# Patient Record
Sex: MALE | Race: ASIAN | NOT HISPANIC OR LATINO | Employment: UNEMPLOYED | URBAN - METROPOLITAN AREA
[De-identification: names, ages, dates, MRNs, and addresses within clinical notes are randomized per-mention and may not be internally consistent; named-entity substitution may affect disease eponyms.]

---

## 2018-03-27 ENCOUNTER — OFFICE VISIT (OUTPATIENT)
Dept: URGENT CARE | Facility: CLINIC | Age: 33
End: 2018-03-27
Payer: COMMERCIAL

## 2018-03-27 ENCOUNTER — HOSPITAL ENCOUNTER (OUTPATIENT)
Dept: RADIOLOGY | Facility: MEDICAL CENTER | Age: 33
End: 2018-03-27
Attending: FAMILY MEDICINE
Payer: COMMERCIAL

## 2018-03-27 VITALS
HEIGHT: 63 IN | TEMPERATURE: 98.5 F | RESPIRATION RATE: 16 BRPM | DIASTOLIC BLOOD PRESSURE: 80 MMHG | BODY MASS INDEX: 38.98 KG/M2 | OXYGEN SATURATION: 96 % | HEART RATE: 95 BPM | WEIGHT: 220 LBS | SYSTOLIC BLOOD PRESSURE: 114 MMHG

## 2018-03-27 DIAGNOSIS — M79.672 PAIN IN BOTH FEET: ICD-10-CM

## 2018-03-27 DIAGNOSIS — S92.515A CLOSED NONDISPLACED FRACTURE OF PROXIMAL PHALANX OF LESSER TOE OF LEFT FOOT, INITIAL ENCOUNTER: ICD-10-CM

## 2018-03-27 DIAGNOSIS — M79.671 PAIN IN BOTH FEET: ICD-10-CM

## 2018-03-27 PROCEDURE — 73610 X-RAY EXAM OF ANKLE: CPT | Mod: RT

## 2018-03-27 PROCEDURE — 99203 OFFICE O/P NEW LOW 30 MIN: CPT | Performed by: FAMILY MEDICINE

## 2018-03-27 PROCEDURE — 73660 X-RAY EXAM OF TOE(S): CPT | Mod: LT

## 2018-03-27 RX ORDER — IBUPROFEN 200 MG
200 TABLET ORAL EVERY 6 HOURS PRN
COMMUNITY
End: 2021-12-26

## 2018-03-27 ASSESSMENT — ENCOUNTER SYMPTOMS
INABILITY TO BEAR WEIGHT: 0
NUMBNESS: 0
LOSS OF SENSATION: 0
TINGLING: 0
LOSS OF MOTION: 1
MUSCLE WEAKNESS: 0

## 2018-03-27 NOTE — PROGRESS NOTES
"Subjective:     Alberto Maldonado is a 32 y.o. male who presents for Ankle Pain (x 1 day (R) ankle and (L) pinky toe swollen)       Ankle Pain    The incident occurred 12 to 24 hours ago. The incident occurred at home. The injury mechanism is unknown. The pain is present in the left ankle and right toes. The pain is moderate. The pain has been constant since onset. Associated symptoms include a loss of motion. Pertinent negatives include no inability to bear weight, loss of sensation, muscle weakness, numbness or tingling. He reports no foreign bodies present. Nothing aggravates the symptoms. He has tried nothing for the symptoms.     Review of Systems   Neurological: Negative for tingling and numbness.     No Known Allergies   Objective:   /80   Pulse 95   Temp 36.9 °C (98.5 °F)   Resp 16   Ht 1.6 m (5' 3\")   Wt 99.8 kg (220 lb)   SpO2 96%   BMI 38.97 kg/m²   Physical Exam   Constitutional: He is oriented to person, place, and time. He appears well-developed and well-nourished. No distress.   HENT:   Head: Normocephalic and atraumatic.   Eyes: Conjunctivae are normal. Pupils are equal, round, and reactive to light.   Cardiovascular: Normal rate and regular rhythm.    Pulmonary/Chest: Effort normal and breath sounds normal.   Musculoskeletal:        Feet:    Neurological: He is alert and oriented to person, place, and time.   Skin: Skin is warm and dry.   Psychiatric: He has a normal mood and affect. Thought content normal.   Vitals reviewed.       Assessment/Plan:   Assessment    1. Closed nondisplaced fracture of proximal phalanx of lesser toe of left foot, initial encounter  - DX-TOE(S) 2+ LEFT; Future  - DX-ANKLE 3+ VIEWS RIGHT; Future  Differential diagnosis, natural history, supportive care, and indications for immediate follow-up discussed.  Use over-the-counter pain reliever, such as acetaminophen (Tylenol), ibuprofen (Advil, Motrin) or naproxen (Aleve) as needed; follow package directions " for dosing.

## 2018-03-27 NOTE — LETTER
March 27, 2018         Patient: Alberto Maldonado   YOB: 1985   Date of Visit: 3/27/2018           To Whom it May Concern:    Alberto Maldonado was seen in my clinic on 3/27/2018..    If you have any questions or concerns, please don't hesitate to call.        Sincerely,           Shawn Hinkle M.D.  Electronically Signed

## 2018-03-27 NOTE — PATIENT INSTRUCTIONS
Ankle Pain  Many things can cause ankle pain, including an injury to the area and overuse of the ankle. The ankle joint holds your body weight and allows you to move around. Ankle pain can occur on either side or the back of one ankle or both ankles. Ankle pain may be sharp and burning or dull and aching. There may be tenderness, stiffness, redness, or warmth around the ankle.  Follow these instructions at home:  Activity  · Rest your ankle as told by your health care provider. Avoid any activities that cause ankle pain.  · Do exercises as told by your health care provider.  · Ask your health care provider if you can drive.  Using a brace, a bandage, or crutches  · If you were given a brace:  ¨ Wear it as told by your health care provider.  ¨ Remove it when you take a bath or a shower.  ¨ Try not to move your ankle very much, but wiggle your toes from time to time. This helps to prevent swelling.  · If you were given an elastic bandage:  ¨ Remove it when you take a bath or a shower.  ¨ Try not to move your ankle very much, but wiggle your toes from time to time. This helps to prevent swelling.  ¨ Adjust the bandage to make it more comfortable if it feels too tight.  ¨ Loosen the bandage if you have numbness or tingling in your foot or if your foot turns cold and blue.  · If you have crutches, use them as told by your health care provider. Continue to use them until you can walk without feeling pain in your ankle.  Managing pain, stiffness, and swelling  · Raise (elevate) your ankle above the level of your heart while you are sitting or lying down.  · If directed, apply ice to the area:  ¨ Put ice in a plastic bag.  ¨ Place a towel between your skin and the bag.  ¨ Leave the ice on for 20 minutes, 2-3 times per day.  General instructions  · Keep all follow-up visits as told by your health care provider. This is important.  · Record this information that may be helpful for you and your health care provider:  ¨ How  often you have ankle pain.  ¨ Where the pain is located.  ¨ What the pain feels like.  · Take over-the-counter and prescription medicines only as told by your health care provider.  Contact a health care provider if:  · Your pain gets worse.  · Your pain is not relieved with medicines.  · You have a fever or chills.  · You are having more trouble with walking.  · You have new symptoms.  Get help right away if:  · Your foot, leg, toes, or ankle tingles or becomes numb.  · Your foot, leg, toes, or ankle becomes swollen.  · Your foot, leg, toes, or ankle turns pale or blue.  This information is not intended to replace advice given to you by your health care provider. Make sure you discuss any questions you have with your health care provider.  Document Released: 06/07/2011 Document Revised: 08/18/2017 Document Reviewed: 07/19/2016  [employer] Interactive Patient Education © 2017 [employer]

## 2019-08-08 ENCOUNTER — HOSPITAL ENCOUNTER (EMERGENCY)
Facility: MEDICAL CENTER | Age: 34
End: 2019-08-08
Attending: EMERGENCY MEDICINE
Payer: COMMERCIAL

## 2019-08-08 VITALS
WEIGHT: 231.48 LBS | TEMPERATURE: 99 F | HEART RATE: 100 BPM | BODY MASS INDEX: 41.02 KG/M2 | OXYGEN SATURATION: 96 % | SYSTOLIC BLOOD PRESSURE: 174 MMHG | DIASTOLIC BLOOD PRESSURE: 89 MMHG | HEIGHT: 63 IN | RESPIRATION RATE: 16 BRPM

## 2019-08-08 DIAGNOSIS — S00.83XA CONTUSION OF FACE, INITIAL ENCOUNTER: ICD-10-CM

## 2019-08-08 PROCEDURE — 99283 EMERGENCY DEPT VISIT LOW MDM: CPT
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
## 2019-08-08 NOTE — ED NOTES
Released with all follow-up and work note at pt request. To POV verbalized understanding all follow-up

## 2020-12-12 ENCOUNTER — OFFICE VISIT (OUTPATIENT)
Dept: URGENT CARE | Facility: CLINIC | Age: 35
End: 2020-12-12
Payer: MEDICAID

## 2020-12-12 VITALS
WEIGHT: 215 LBS | HEART RATE: 110 BPM | OXYGEN SATURATION: 92 % | DIASTOLIC BLOOD PRESSURE: 72 MMHG | RESPIRATION RATE: 16 BRPM | SYSTOLIC BLOOD PRESSURE: 140 MMHG | BODY MASS INDEX: 38.09 KG/M2 | TEMPERATURE: 97.5 F | HEIGHT: 63 IN

## 2020-12-12 DIAGNOSIS — M10.9 ACUTE GOUT INVOLVING TOE OF LEFT FOOT, UNSPECIFIED CAUSE: ICD-10-CM

## 2020-12-12 PROCEDURE — 99214 OFFICE O/P EST MOD 30 MIN: CPT | Mod: 25 | Performed by: PHYSICIAN ASSISTANT

## 2020-12-12 RX ORDER — INDOMETHACIN 50 MG/1
50 CAPSULE ORAL 3 TIMES DAILY
Qty: 21 CAP | Refills: 0 | Status: SHIPPED | OUTPATIENT
Start: 2020-12-12 | End: 2020-12-19

## 2020-12-12 RX ORDER — METHYLPREDNISOLONE SODIUM SUCCINATE 125 MG/2ML
125 INJECTION, POWDER, LYOPHILIZED, FOR SOLUTION INTRAMUSCULAR; INTRAVENOUS ONCE
Status: COMPLETED | OUTPATIENT
Start: 2020-12-12 | End: 2020-12-12

## 2020-12-12 RX ADMIN — METHYLPREDNISOLONE SODIUM SUCCINATE 125 MG: 125 INJECTION, POWDER, LYOPHILIZED, FOR SOLUTION INTRAMUSCULAR; INTRAVENOUS at 11:14

## 2020-12-12 ASSESSMENT — ENCOUNTER SYMPTOMS
CLAUDICATION: 0
LOSS OF CONSCIOUSNESS: 0
ORTHOPNEA: 0
SHORTNESS OF BREATH: 0
SPEECH CHANGE: 0
NAUSEA: 0
COUGH: 0
SEIZURES: 0
FEVER: 0
VOMITING: 0
CHILLS: 0
TINGLING: 0
ABDOMINAL PAIN: 0
SENSORY CHANGE: 0
PALPITATIONS: 0
WEAKNESS: 0
DIZZINESS: 0
DOUBLE VISION: 0
TREMORS: 0
BLURRED VISION: 0
HEADACHES: 0
FOCAL WEAKNESS: 0
DIARRHEA: 0

## 2020-12-12 NOTE — LETTER
December 12, 2020         Patient: Alberto Maldonado   YOB: 1985   Date of Visit: 12/12/2020           To Whom it May Concern:    Alberto Maldonado was seen in my clinic on 12/12/2020.   Please excuse him from work 12/14-12/16/2020.  If you have any questions or concerns, please don't hesitate to call.        Sincerely,           Mukesh Joseph P.A.-C.  Electronically Signed

## 2020-12-13 NOTE — PROGRESS NOTES
"Subjective:   Alberto Maldonado is a 35 y.o. male who presents for Gout (on l leg swelled up )      Foot Swelling  This is a new problem. The current episode started in the past 7 days. The problem occurs constantly. Pertinent negatives include no abdominal pain, chest pain, chills, coughing, fever, headaches, nausea, rash, vomiting or weakness. Nothing aggravates the symptoms. He has tried nothing for the symptoms.       Review of Systems   Constitutional: Negative for chills and fever.   Eyes: Negative for blurred vision and double vision.   Respiratory: Negative for cough and shortness of breath.    Cardiovascular: Negative for chest pain, palpitations, orthopnea, claudication and leg swelling.   Gastrointestinal: Negative for abdominal pain, diarrhea, nausea and vomiting.   Musculoskeletal:        Left big toe pain   Skin: Negative for rash.   Neurological: Negative for dizziness, tingling, tremors, sensory change, speech change, focal weakness, seizures, loss of consciousness, weakness and headaches.   All other systems reviewed and are negative.      Medications:    • ibuprofen Tabs  • indomethacin Caps    Allergies: Patient has no known allergies.    Problem List: Alberto Maldonado does not have a problem list on file.    Surgical History:  No past surgical history on file.    Past Social Hx: Alberto Maldonado  reports that he has never smoked. He has never used smokeless tobacco. He reports that he does not drink alcohol or use drugs.     Past Family Hx:  Alberto Maldonado family history is not on file.     Problem list, medications, and allergies reviewed by myself today in Epic.     Objective:     Blood Pressure 140/72   Pulse (Abnormal) 110   Temperature 36.4 °C (97.5 °F) (Temporal)   Respiration 16   Height 1.6 m (5' 3\")   Weight 97.5 kg (215 lb)   Oxygen Saturation 92%   Body Mass Index 38.09 kg/m²     Physical Exam  Vitals signs reviewed.   Constitutional:       General: " He is not in acute distress.     Appearance: He is well-developed. He is not ill-appearing, toxic-appearing or diaphoretic.   HENT:      Head: Normocephalic and atraumatic.      Right Ear: External ear normal.      Left Ear: External ear normal.   Eyes:      Conjunctiva/sclera: Conjunctivae normal.   Neck:      Musculoskeletal: Normal range of motion and neck supple.   Cardiovascular:      Rate and Rhythm: Normal rate and regular rhythm.      Pulses: Normal pulses.      Heart sounds: Normal heart sounds.   Pulmonary:      Effort: Pulmonary effort is normal.      Breath sounds: Normal breath sounds.   Musculoskeletal:         General: Tenderness present. No deformity.      Comments: Swelling and erythema of the left MTP joint   Skin:     General: Skin is warm and dry.   Neurological:      Mental Status: He is alert and oriented to person, place, and time.      Motor: No abnormal muscle tone.      Coordination: Coordination normal.      Deep Tendon Reflexes: Reflexes are normal and symmetric. Reflexes normal.   Psychiatric:         Behavior: Behavior normal.         Thought Content: Thought content normal.         Judgment: Judgment normal.         Assessment/Plan:     Medical Decision Making/Comments     -recurrent gout attack   Diagnosis and associated orders     1. Acute gout involving toe of left foot, unspecified cause  indomethacin (INDOCIN) 50 MG Cap    methylPREDNISolone sod succ (SOLU-MEDROL) 125 MG injection 125 mg              Differential diagnosis, natural history, supportive care, and indications for immediate follow-up discussed.    Advised the patient to follow-up with the primary care physician for recheck, reevaluation, and consideration of further management.    Please note that this dictation was created using voice recognition software. I have made a reasonable attempt to correct obvious errors, but I expect that there are errors of grammar and possibly content that I did not discover before  finalizing the note.

## 2021-03-21 ENCOUNTER — OCCUPATIONAL MEDICINE (OUTPATIENT)
Dept: URGENT CARE | Facility: CLINIC | Age: 36
End: 2021-03-21
Payer: OTHER MISCELLANEOUS

## 2021-03-21 ENCOUNTER — APPOINTMENT (OUTPATIENT)
Dept: RADIOLOGY | Facility: IMAGING CENTER | Age: 36
End: 2021-03-21
Attending: NURSE PRACTITIONER
Payer: COMMERCIAL

## 2021-03-21 VITALS
WEIGHT: 225 LBS | SYSTOLIC BLOOD PRESSURE: 160 MMHG | DIASTOLIC BLOOD PRESSURE: 100 MMHG | HEIGHT: 63 IN | OXYGEN SATURATION: 95 % | RESPIRATION RATE: 18 BRPM | HEART RATE: 100 BPM | TEMPERATURE: 99.9 F | BODY MASS INDEX: 39.87 KG/M2

## 2021-03-21 DIAGNOSIS — S86.912A KNEE STRAIN, LEFT, INITIAL ENCOUNTER: ICD-10-CM

## 2021-03-21 DIAGNOSIS — S39.012A STRAIN OF LUMBAR REGION, INITIAL ENCOUNTER: ICD-10-CM

## 2021-03-21 PROCEDURE — 99213 OFFICE O/P EST LOW 20 MIN: CPT | Performed by: NURSE PRACTITIONER

## 2021-03-21 PROCEDURE — 73562 X-RAY EXAM OF KNEE 3: CPT | Mod: TC,LT | Performed by: NURSE PRACTITIONER

## 2021-03-21 ASSESSMENT — ENCOUNTER SYMPTOMS
EYE REDNESS: 0
SHORTNESS OF BREATH: 0
NAUSEA: 0
DIZZINESS: 0
VOMITING: 0
FEVER: 0
SORE THROAT: 0
MYALGIAS: 0
CHILLS: 0
BACK PAIN: 1

## 2021-03-21 NOTE — LETTER
"EMPLOYEE’S CLAIM FOR COMPENSATION/ REPORT OF INITIAL TREATMENT  FORM C-4    EMPLOYEE’S CLAIM - PROVIDE ALL INFORMATION REQUESTED   First Name  Alberto Last Name  Erica Birthdate                    1985                Sex  male Claim Number   Home Address  [address] Age  35 y.o. 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 (if applicable)  working    How did this injury or occupational disease occur? (Be specific an answer in detail. Use additional sheet if necessary)  bending, reaching, and walking    If you believe that you have an occupational disease, when did you first have knowledge of the disability and it relationship to your employment?   Witnesses to the Accident  nobody      Nature of Injury or Occupational Disease  Sprain  Part(s) of Body Injured or Affected  Knee (L), Spinal Cord - Trunk,     I certify that the above is true and correct to the best of my knowledge and that I have provided this information in order to obtain the benefits of [place] or " [place]).  I hereby authorize any physician, chiropractor, surgeon, practitioner, or other person, any hospital, including [facility] or [facility] hospital, any medical service organization, any insurance company, or other institution or organization to release to each other, any medical or other information, including benefits paid or payable, pertinent to this injury or disease, except information relative to diagnosis, treatment and/or counseling for AIDS, psychological conditions, alcohol or controlled substances, for which I must give specific authorization.  A Photostat of this authorization shall be as valid as the original.     Date   Place   Employee’s Signature   THIS REPORT MUST BE COMPLETED AND MAILED WITHIN 3 WORKING DAYS OF TREATMENT   Place  [facility]  Name of Facility  [facility]   Date  3/21/2021 Diagnosis  (S86.912A) Knee strain, left, initial encounter  (S39.012A) Strain of lumbar region, initial encounter Is there evidence the injured employee was under the              influence of alcohol and/or another controlled substance at the time of accident?   Hour  6:50 PM Description of Injury or Disease  Diagnoses of Knee strain, left, initial encounter and Strain of lumbar region, initial encounter were pertinent to this visit. No   Treatment  Patient provided pair of crutches advised weightbearing as tolerated he does have a knee brace advised to wear for compression.  Encouraged to rest, ice, gentle range of motion, heat, massage, stretching.  Patient will be placed on temporary work restrictions.  We will transfer care to occupational medicine.  Patient will follow up in 1 week.  Have you advised the patient to remain off work five days or     more? No   X-Ray Findings  Negative   If Yes   From Date  To Date      From information given by the employee, together with medical evidence, can you directly connect this injury or occupational disease as job  "incurred?  Yes If No Full Duty    No Modified Duty  Yes   Is additional medical care by a physician indicated?  Yes If Modified Duty, Specify any Limitations / Restrictions      Do you know of any previous injury or disease contributing to this condition or occupational disease?                            Yes   Date  3/21/2021 Print Doctor’s Name   RICHY Colon I certify the employer’s copy of  this form was mailed on:   Address  [address] [place] [address] [address] Insurer’s Use Only     [facility] Zip  [address]    Provider’s Tax ID Number  [id number] Telephone  Dept: [phone]      becca-LAURYN Reveles  Signature:     Degree          ORIGINAL-TREATING PHYSICIAN OR CHIROPRACTOR    PAGE 2-INSURER/TPA    PAGE 3-EMPLOYER    PAGE 4-EMPLOYEE        Form C-4 (rev.10/07)           BRIEF DESCRIPTION OF RIGHTS AND BENEFITS  (Pursuant to [place] 616C.050)    Notice of Injury or Occupational Disease (Incident Report Form C-1): If an injury or occupational disease (OD) arises out of and in the course of employment, you must provide written notice to your employer as soon as practicable, but no later than 7 days after the accident or OD. Your employer shall maintain a sufficient supply of the required forms.    Claim for Compensation (Form C-4): If medical treatment is sought, the form C-4 is available at the place of initial treatment. A completed \"Claim for Compensation\" (Form C-4) must be filed within 90 days after an accident or OD. The treating physician or chiropractor must, within 3 working days after treatment, complete and mail to the employer, the employer's insurer and third-party , the Claim for Compensation.    Medical Treatment: If you require medical treatment for your on-the-job injury or OD, you may be required to select a physician or chiropractor from a list provided by your workers’ compensation insurer, if it has contracted with an Organization for Managed Care (MCO) or " Preferred Provider Organization (PPO) or providers of health care. If your employer has not entered into a contract with an MCO or PPO, you may select a physician or chiropractor from the Panel of Physicians and Chiropractors. Any medical costs related to your industrial injury or OD will be paid by your insurer.    Temporary Total Disability (TTD): If your doctor has certified that you are unable to work for a period of at least 5 consecutive days, or 5 cumulative days in a 20-day period, or places restrictions on you that your employer does not accommodate, you may be entitled to TTD compensation.    Temporary Partial Disability (TPD): If the wage you receive upon reemployment is less than the compensation for TTD to which you are entitled, the insurer may be required to pay you TPD compensation to make up the difference. TPD can only be paid for a maximum of 24 months.    Permanent Partial Disability (PPD): When your medical condition is stable and there is an indication of a PPD as a result of your injury or OD, within 30 days, your insurer must arrange for an evaluation by a rating physician or chiropractor to determine the degree of your PPD. The amount of your PPD award depends on the date of injury, the results of the PPD evaluation, your age and wage.    Permanent Total Disability (PTD): If you are medically certified by a treating physician or chiropractor as permanently and totally disabled and have been granted a PTD status by your insurer, you are entitled to receive monthly benefits not to exceed 66 2/3% of your average monthly wage. The amount of your PTD payments is subject to reduction if you previously received a lump-sum PPD award.    Vocational Rehabilitation Services: You may be eligible for vocational rehabilitation services if you are unable to return to the job due to a permanent physical impairment or permanent restrictions as a result of your injury or occupational  disease.    Transportation and Per Loyd Reimbursement: You may be eligible for travel expenses and per loyd associated with medical treatment.    Reopening: You may be able to reopen your claim if your condition worsens after claim closure.     Appeal Process: If you disagree with a written determination issued by the insurer or the insurer does not respond to your request, you may appeal to the Department of Administration, , by following the instructions contained in your determination letter. You must appeal the determination within 70 days from the date of the determination letter at [address], [address], [place], [place] [address], or [address], [address], [place], [place] [address]. If you disagree with the  decision, you may appeal to the Department of Administration, . You must file your appeal within 30 days from the date of the  decision letter at [address], [address], [place], [place] [address], or [address], [place], [place] [address]. If you disagree with a decision of an , you may file a petition for judicial review with the District Court. You must do so within 30 days of the Appeal Officer’s decision. You may be represented by an  at your own expense or you may contact the [facility] for possible representation.    [place]  for Injured Workers (NAIW): If you disagree with a  decision, you may request that NAIW represent you without charge at an  Hearing. For information regarding denial of benefits, you may contact the [facility] at: [address], [address], [place] [address], [phone], or [address] [address], [place] [address], [phone]    To File a Complaint with the Division: If you wish to file a complaint with the  of the Division of Industrial Relations (DIR),  please contact the  Workers’ Compensation Section, [address], [address], [place], [place] [address], telephone [phone], or [address], [place], [place] [address], telephone [phone].    For assistance with Workers’ Compensation Issues: You may contact the [facility] Office for Consumer Health Assistance, [address], [place], [place] [address], Toll Free 1-950.822.7508, Web site: http://[facility].[place].gov/Programs/MADHAVI E-mail: madhavi@[facility].nv.gov              __________________________________________________________________                                    _________________            Employee Name / Signature                                                                                                                            Date                                                                                                                                                                                                                              D-2 (rev. 10/20)

## 2021-03-21 NOTE — LETTER
[facility] [address] [address]  Phone:  [phone] - Fax:  [phone]   [facility] Progress Report and Disability Certification  Date of Service: 3/21/2021   No Show:  No  Date / Time of Next Visit: 3/29/2021 @ 3:30PM [facility]   Claim Information   Patient Name: Alberto Maldonado  Claim Number:     Employer: [employer]  Date of Injury: 3/11/2021     Insurer / TPA: [facility] [employer]  ID / SSN:     Occupation: Warehouse / Part Puller  Diagnosis: Diagnoses of Knee strain, left, initial encounter and Strain of lumbar region, initial encounter were pertinent to this visit.    Medical Information   Related to Industrial Injury? Yes    Subjective Complaints:  DOI: 3/11/2021: Patient is a 35-year-old male presents the urgent care for evaluation of low back pain and left knee pain.  Patient states that pain has become exacerbated with bending, walking.  He is uncertain the exact mechanism of injury however associates it with repetitive movement.  Patient notes worsening pain when he lies down at night in bed.  Patient denies previous low back pain and/or left knee injury.  He has tried taking Tylenol and anti-inflammatories with minimal relief in symptoms.  Patient denies any numbness or tingling in bilateral lower extremities, loss of bowel or bladder, fever, dysuria.   Objective Findings: Spine- without midline tenderness, step-off or deformity. Without scoliosis or kyphosis. With lumbar spasms.  Decreased range of motion with flexion and extension.  Without noted tenderness over the sacroiliac notches. Sensation intact bilaterally, BLE motor 5/5 and symmetrical  strength. Negative Straight leg raise.  Gait- WNL without foot drop.   Left knee: Skin without erythema, no ecchymosis, some edema noted of the medial and lateral aspect.  Tenderness palpation to the lateral compartment. +AROM with pain.  Valgus and valgus negative.  Gait antalgic.      Pre-Existing Condition(s):     Assessment:   Initial Visit    Status: Additional Care Required  Permanent Disability:No    Plan: Medication    Diagnostics: X-ray    Comments:  Xray results  No evidence of fracture or dislocation.  Soft tissue swelling is noted.    Disability Information   Status: Released to Restricted Duty    From:  3/21/2021  Through: 3/29/2021 Restrictions are: Temporary   Physical Restrictions   Sitting:    Standing:  < or = to 2 hrs/day Stoopin hrs/day Bendin hrs/day   Squattin hrs/day Walking:  < or = to 2 hrs/day Climbin hrs/day Pushing:      Pulling:    Other:    Reaching Above Shoulder (L):   Reaching Above Shoulder (R):       Reaching Below Shoulder (L):    Reaching Below Shoulder (R):      Not to exceed Weight Limits   Carrying(hrs):   Weight Limit(lb): < or = to 10 pounds Lifting(hrs):   Weight  Limit(lb): < or = to 10 pounds   Comments: Patient provided pair of crutches advised weightbearing as tolerated he does have a knee brace advised to wear for compression.  Encouraged to rest, ice, gentle range of motion, heat, massage, stretching.  Patient will be placed on temporary work restrictions.  We will transfer care to occupational medicine.  Patient will follow up in 1 week.    Repetitive Actions   Hands: i.e. Fine Manipulations from Grasping:     Feet: i.e. Operating Foot Controls:     Driving / Operate Machinery:     Provider Name:   RICHY Colon Physician Signature:  Physician Name:     Clinic Name / Location: [place] [address]  [place] [address] Clinic Phone Number: Dept: [phone]   Appointment Time: 6:45 Pm Visit Start Time: 6:50 PM   Check-In Time:  6:44 Pm Visit Discharge Time:  7:25PM   Original-Treating Physician or Chiropractor    Page 2-Insurer/TPA    Page 3-Employer    Page 4-Employee

## 2021-03-22 NOTE — PROGRESS NOTES
"Subjective:   Alberto Maldonado  is a 35 y.o. male who presents for No chief complaint on file.    DOI: 3/11/2021: Patient is a 35-year-old male presents the urgent care for evaluation of low back pain and left knee pain.  Patient states that pain has become exacerbated with bending, walking.  He is uncertain the exact mechanism of injury however associates it with repetitive movement.  Patient notes worsening pain when he lies down at night in bed.  Patient denies previous low back pain and/or left knee injury.  He has tried taking Tylenol and anti-inflammatories with minimal relief in symptoms.  Patient denies any numbness or tingling in bilateral lower extremities, loss of bowel or bladder, fever, dysuria.   HPI  Review of Systems   Constitutional: Negative for chills and fever.   HENT: Negative for sore throat.    Eyes: Negative for redness.   Respiratory: Negative for shortness of breath.    Cardiovascular: Negative for chest pain.   Gastrointestinal: Negative for nausea and vomiting.   Genitourinary: Negative for dysuria.   Musculoskeletal: Positive for back pain and joint pain. Negative for myalgias.   Skin: Negative for rash.   Neurological: Negative for dizziness.     No Known Allergies   Objective:   /100 (BP Location: Left arm, Patient Position: Sitting, BP Cuff Size: Adult)   Pulse 100   Temp 37.7 °C (99.9 °F) (Temporal)   Resp 18   Ht 1.6 m (5' 3\")   Wt 102 kg (225 lb)   SpO2 95%   BMI 39.86 kg/m²   Physical Exam  Constitutional:       Appearance: Normal appearance. He is not ill-appearing or toxic-appearing.   HENT:      Head: Normocephalic.      Right Ear: External ear normal.      Left Ear: External ear normal.      Nose: Nose normal.      Mouth/Throat:      Lips: Pink.      Mouth: Mucous membranes are moist.   Eyes:      General: Lids are normal.         Right eye: No discharge.         Left eye: No discharge.   Pulmonary:      Effort: Pulmonary effort is normal. No accessory " muscle usage or respiratory distress.   Musculoskeletal:         General: Normal range of motion.      Cervical back: Normal and full passive range of motion without pain.      Thoracic back: Normal.      Lumbar back: Spasms and tenderness present. No swelling. Negative right straight leg raise test and negative left straight leg raise test.      Left knee: Swelling present. No bony tenderness. Tenderness present over the lateral joint line.   Skin:     Coloration: Skin is not pale.   Neurological:      Mental Status: He is alert and oriented to person, place, and time.   Psychiatric:         Mood and Affect: Mood normal.         Thought Content: Thought content normal.       Spine- without midline tenderness, step-off or deformity. Without scoliosis or kyphosis. With lumbar spasms.  Decreased range of motion with flexion and extension.  Without noted tenderness over the sacroiliac notches. Sensation intact bilaterally, BLE motor 5/5 and symmetrical  strength. Negative Straight leg raise.  Gait- WNL without foot drop.   Left knee: Skin without erythema, no ecchymosis, some edema noted of the medial and lateral aspect.  Tenderness palpation to the lateral compartment. +AROM with pain.  Valgus and valgus negative.  Gait antalgic.   Assessment/Plan:     1. Knee strain, left, initial encounter  DX-KNEE 3 VIEWS LEFT    REFERRAL TO OCCUPATIONAL MEDICINE   2. Strain of lumbar region, initial encounter  REFERRAL TO OCCUPATIONAL MEDICINE   • I independently reviewed the patient's imaging and agree with the interpretation of the radiologist.    Xray results  No evidence of fracture or dislocation.  Soft tissue swelling is noted.      Patient provided pair of crutches advised weightbearing as tolerated he does have a knee brace advised to wear for compression.  Encouraged to rest, ice, gentle range of motion, heat, massage, stretching.  Patient will be placed on temporary work restrictions.  We will transfer care to  occupational medicine.  Patient will follow up in 1 week.  Differential diagnosis, natural history, supportive care, and indications for immediate follow-up discussed.

## 2021-12-01 ENCOUNTER — OFFICE VISIT (OUTPATIENT)
Dept: URGENT CARE | Facility: PHYSICIAN GROUP | Age: 36
End: 2021-12-01
Payer: COMMERCIAL

## 2021-12-01 VITALS
OXYGEN SATURATION: 96 % | HEART RATE: 130 BPM | RESPIRATION RATE: 20 BRPM | DIASTOLIC BLOOD PRESSURE: 112 MMHG | WEIGHT: 241 LBS | SYSTOLIC BLOOD PRESSURE: 162 MMHG | HEIGHT: 63 IN | TEMPERATURE: 98.2 F | BODY MASS INDEX: 42.7 KG/M2

## 2021-12-01 DIAGNOSIS — R03.0 ELEVATED BLOOD PRESSURE READING: ICD-10-CM

## 2021-12-01 DIAGNOSIS — R10.33 PERIUMBILICAL ABDOMINAL PAIN: ICD-10-CM

## 2021-12-01 DIAGNOSIS — R11.0 NAUSEA: ICD-10-CM

## 2021-12-01 PROCEDURE — 99214 OFFICE O/P EST MOD 30 MIN: CPT | Performed by: PHYSICIAN ASSISTANT

## 2021-12-01 ASSESSMENT — ENCOUNTER SYMPTOMS
LOSS OF CONSCIOUSNESS: 0
BLOOD IN STOOL: 0
CHILLS: 0
ABDOMINAL PAIN: 1
CONSTIPATION: 0
DIZZINESS: 0
NAUSEA: 1
DIARRHEA: 0
SHORTNESS OF BREATH: 0
FEVER: 0
HEARTBURN: 0

## 2021-12-01 NOTE — PROGRESS NOTES
Subjective:   Alberto Maldonado is a 36 y.o. male who presents for Abdominal Cramps (bloating, nausea, [id number] )        Patient presents for evaluation of sudden onset periumbilical pain that began last night.  Pain occasionally radiates out to the right and left abdomen with movement.  Does not radiate to his back, shoulders, groin.  Pain is worse with movement and slightly improves with rest.  Pain currently 8 out of 10 and seems to be worsening.    He endorses nausea but no vomiting.    Denies diarrhea, melena, hematochezia.  He did have a bowel movement this morning that was normal.    No fevers or chills.    Denies chest pain, shortness of breath, back pain.  Denies prior similar symptoms.   He ate sparingly yesterday.  He would also drink plenty of Gatorade.  He took Tylenol this morning without symptomatic relief.      Review of Systems   Constitutional: Positive for malaise/fatigue. Negative for chills and fever.   Respiratory: Negative for shortness of breath.    Cardiovascular: Negative for chest pain.   Gastrointestinal: Positive for abdominal pain and nausea. Negative for blood in stool, constipation, diarrhea, heartburn and melena.   Neurological: Negative for dizziness and loss of consciousness.       PMH:  has no past medical history of Asthma, Cancer ([facility]), or Diabetes ([facility]).  MEDS:   Current Outpatient Medications:   •  acetaminophen (TYLENOL) 500 MG Tab, Take 500-1,000 mg by mouth every 6 hours as needed., Disp: , Rfl:   •  ibuprofen (MOTRIN) 200 MG Tab, Take 200 mg by mouth every 6 hours as needed., Disp: , Rfl:   ALLERGIES: No Known Allergies  SURGHX: No past surgical history on file.  SOCHX:  reports that he has never smoked. He has never used smokeless tobacco. He reports current alcohol use. He reports that he does not use drugs.  FH: Family history was reviewed, no pertinent findings to report   Objective:   BP (!) 162/112   Pulse (!) 130   Temp 36.8 °C (98.2 °F) (Temporal)    "Resp 20   Ht 1.6 m (5' 3\")   Wt 109 kg (241 lb)   SpO2 96%   BMI 42.69 kg/m²   Physical Exam  Vitals reviewed.   Constitutional:       Appearance: Normal appearance. He is well-developed. He is not toxic-appearing.      Comments: Appears uncomfortable   HENT:      Head: Normocephalic and atraumatic.      Right Ear: External ear normal.      Left Ear: External ear normal.      Nose: Nose normal.   Eyes:      General: Gaze aligned appropriately.   Cardiovascular:      Rate and Rhythm: Regular rhythm. Tachycardia present.   Pulmonary:      Effort: Pulmonary effort is normal. No respiratory distress.      Breath sounds: No stridor.   Abdominal:      General: Abdomen is flat and protuberant. Bowel sounds are normal.      Palpations: Abdomen is soft.      Tenderness: There is abdominal tenderness in the periumbilical area. There is guarding. There is no right CVA tenderness, left CVA tenderness or rebound. Negative signs include Beyer's sign and McBurney's sign.   Musculoskeletal:      Cervical back: Neck supple.   Skin:     General: Skin is warm and dry.      Capillary Refill: Capillary refill takes less than 2 seconds.   Neurological:      Mental Status: He is alert and oriented to person, place, and time.      Comments: CN2-12 grossly intact   Psychiatric:         Speech: Speech normal.         Behavior: Behavior normal.           Assessment/Plan:   1. Periumbilical abdominal pain    2. Nausea    3. Elevated blood pressure reading    Exam somewhat limited by body habitus. Patient is fairly tachycardic today.  Additionally I am concerned by patient's level of pain and progression of symptoms.  I recommend that he have this further evaluated at higher level care immediately.  Concerns discussed with patient and he is amenable to this plan.  He will go to the [facility] emergency room for further evaluation.  He feels comfortable driving himself.    Differential diagnosis, natural history, supportive care, and " indications for immediate follow-up discussed.

## 2021-12-26 ENCOUNTER — OFFICE VISIT (OUTPATIENT)
Dept: URGENT CARE | Facility: CLINIC | Age: 36
End: 2021-12-26
Payer: COMMERCIAL

## 2021-12-26 VITALS
WEIGHT: 241 LBS | BODY MASS INDEX: 42.7 KG/M2 | DIASTOLIC BLOOD PRESSURE: 100 MMHG | RESPIRATION RATE: 20 BRPM | OXYGEN SATURATION: 93 % | HEART RATE: 96 BPM | HEIGHT: 63 IN | TEMPERATURE: 97 F | SYSTOLIC BLOOD PRESSURE: 168 MMHG

## 2021-12-26 DIAGNOSIS — M10.9 ACUTE GOUT OF LEFT KNEE, UNSPECIFIED CAUSE: ICD-10-CM

## 2021-12-26 PROBLEM — I10 UNCONTROLLED HYPERTENSION: Status: ACTIVE | Noted: 2021-12-02

## 2021-12-26 PROBLEM — K85.90 PANCREATITIS: Status: ACTIVE | Noted: 2021-12-02

## 2021-12-26 PROBLEM — Q89.09 ACCESSORY SPLEEN: Status: ACTIVE | Noted: 2021-12-02

## 2021-12-26 PROBLEM — R19.00 ABDOMINAL MASS: Status: ACTIVE | Noted: 2021-12-02

## 2021-12-26 PROCEDURE — 99214 OFFICE O/P EST MOD 30 MIN: CPT | Performed by: PHYSICIAN ASSISTANT

## 2021-12-26 RX ORDER — METHYLPREDNISOLONE 4 MG/1
TABLET ORAL
Qty: 21 TABLET | Refills: 0 | Status: SHIPPED | OUTPATIENT
Start: 2021-12-26 | End: 2022-01-04

## 2021-12-26 RX ORDER — METOPROLOL TARTRATE 50 MG/1
50 TABLET, FILM COATED ORAL 2 TIMES DAILY
COMMUNITY
End: 2023-03-21

## 2021-12-26 RX ORDER — TRIAMCINOLONE ACETONIDE 1 MG/G
OINTMENT TOPICAL
COMMUNITY
Start: 2021-12-13 | End: 2022-01-04

## 2021-12-26 RX ORDER — CETIRIZINE HYDROCHLORIDE 10 MG/1
TABLET ORAL
COMMUNITY
Start: 2021-12-14 | End: 2022-01-04

## 2021-12-26 ASSESSMENT — ENCOUNTER SYMPTOMS
BLURRED VISION: 0
NAUSEA: 0
SORE THROAT: 0
TINGLING: 0
SENSORY CHANGE: 0
CHILLS: 0
PALPITATIONS: 0
SHORTNESS OF BREATH: 0
FEVER: 0
VOMITING: 0

## 2021-12-26 NOTE — PROGRESS NOTES
Subjective     Alberto Maldonado is a 36 y.o. male who presents with Knee Pain (possible gout (L) swollen, hard to bear weight. x 2 days. )    HPI:  Alberto Maldonado is a 36 y.o. male who presents today for evaluation of left knee pain.  Patient reports that his knee started to hurt on Thursday night.  He denies any injury.  Notes that swelling started shortly after and it has been persistent since that time.  It is painful for him to walk or bear weight on it.  He has tried using OTC analgesics without much relief.  He does have a history of gout but mostly has gotten flareups in his toes and ankles.  He does report that this pain feels very similar, however.  He has not been drinking alcohol and notes that he has been eating a mostly healthy diet over the past week.  He denies any fever/chills or numbness/tingling.      Review of Systems   Constitutional: Negative for chills and fever.   HENT: Negative for sore throat.    Eyes: Negative for blurred vision.   Respiratory: Negative for shortness of breath.    Cardiovascular: Negative for chest pain and palpitations.   Gastrointestinal: Negative for nausea and vomiting.   Musculoskeletal: Positive for joint pain (Knee).   Neurological: Negative for tingling and sensory change.         PMH:  has no past medical history of Asthma, Cancer (HCC), or Diabetes ([facility]).  MEDS:   Current Outpatient Medications:   •  metoprolol tartrate (LOPRESSOR) 50 MG Tab, Take 50 mg by mouth 2 times a day., Disp: , Rfl:   •  ALBUTEROL INH, Inhale., Disp: , Rfl:   •  methylPREDNISolone (MEDROL DOSEPAK) 4 MG Tablet Therapy Pack, Follow schedule on package instructions., Disp: 21 Tablet, Rfl: 0  ALLERGIES: No Known Allergies  SURGHX: No past surgical history on file.  SOCHX:  reports that he has never smoked. He has never used smokeless tobacco. He reports current alcohol use. He reports that he does not use drugs.  FH: Family history was reviewed, no pertinent findings to  "report      Objective     BP (!) 168/100   Pulse 96   Temp 36.1 °C (97 °F)   Resp 20   Ht 1.6 m (5' 3\")   Wt 109 kg (241 lb)   SpO2 93%   BMI 42.69 kg/m²      Physical Exam  Constitutional:       Appearance: He is well-developed.   HENT:      Head: Normocephalic and atraumatic.      Right Ear: External ear normal.      Left Ear: External ear normal.   Eyes:      Conjunctiva/sclera: Conjunctivae normal.      Pupils: Pupils are equal, round, and reactive to light.   Cardiovascular:      Rate and Rhythm: Normal rate and regular rhythm.      Heart sounds: Normal heart sounds. No murmur heard.      Pulmonary:      Effort: Pulmonary effort is normal.      Breath sounds: Normal breath sounds. No wheezing.   Musculoskeletal:      Left knee: Swelling and crepitus present. No erythema or bony tenderness. Decreased range of motion. Tenderness present over the medial joint line. Normal alignment, normal meniscus and normal patellar mobility.      Comments: Left knee exhibits diffuse soft tissue swelling with tenderness most and over the medial joint line.  No overlying erythema but there is some warmth to the touch.  Decreased range of motion, most notable with extension.  Antalgic gait.    Skin:     General: Skin is warm and dry.      Capillary Refill: Capillary refill takes less than 2 seconds.   Neurological:      Mental Status: He is alert and oriented to person, place, and time.   Psychiatric:         Behavior: Behavior normal.         Judgment: Judgment normal.           Assessment & Plan     1. Acute gout of left knee, unspecified cause  - methylPREDNISolone (MEDROL DOSEPAK) 4 MG Tablet Therapy Pack; Follow schedule on package instructions.  Dispense: 21 Tablet; Refill: 0  *Symptoms and physical exam findings seem most consistent with gouty arthritis flareup of the left knee.  He will be treated with a Medrol dose pack.  Ace wrap also applied and crutches provided.  Patient was advised to rest the extremity is " much as possible, keep it elevated and apply ice multiple times per day.  Strict return/ER precautions discussed.  Note given for work.      My total time spent caring for the patient on the day of the encounter was 30 minutes.   This does not include time spent on separately billable procedures/tests.      Differential Diagnosis, natural history, and supportive care discussed. Return to the Urgent Care or follow up with your PCP if symptoms fail to resolve, or for any new or worsening symptoms. Emergency room precautions discussed. Patient and/or family appears understanding of information.

## 2021-12-26 NOTE — LETTER
December 26, 2021         Patient: Alberto Maldonado   YOB: 1985   Date of Visit: 12/26/2021           To Whom it May Concern:    Alberto Maldonado was seen in my clinic on 12/26/2021. He may return to work on 12/29/2021.    If you have any questions or concerns, please don't hesitate to call.        Sincerely,           Sondra Rodriguez P.A.-C.  Electronically Signed

## 2022-01-04 ENCOUNTER — OFFICE VISIT (OUTPATIENT)
Dept: URGENT CARE | Facility: CLINIC | Age: 37
End: 2022-01-04
Payer: COMMERCIAL

## 2022-01-04 VITALS
DIASTOLIC BLOOD PRESSURE: 74 MMHG | SYSTOLIC BLOOD PRESSURE: 140 MMHG | HEIGHT: 63 IN | TEMPERATURE: 98.6 F | WEIGHT: 230 LBS | OXYGEN SATURATION: 94 % | RESPIRATION RATE: 20 BRPM | BODY MASS INDEX: 40.75 KG/M2 | HEART RATE: 125 BPM

## 2022-01-04 DIAGNOSIS — M25.562 ACUTE PAIN OF LEFT KNEE: ICD-10-CM

## 2022-01-04 PROCEDURE — 99213 OFFICE O/P EST LOW 20 MIN: CPT | Performed by: PHYSICIAN ASSISTANT

## 2022-01-04 RX ORDER — METHYLPREDNISOLONE 4 MG/1
TABLET ORAL
Qty: 21 TABLET | Refills: 0 | Status: SHIPPED | OUTPATIENT
Start: 2022-01-04 | End: 2022-01-04 | Stop reason: SDUPTHER

## 2022-01-04 RX ORDER — METHYLPREDNISOLONE 4 MG/1
TABLET ORAL
Qty: 21 TABLET | Refills: 0 | Status: SHIPPED | OUTPATIENT
Start: 2022-01-04 | End: 2022-01-22

## 2022-01-04 NOTE — LETTER
January 4, 2022         Patient: Alberto Maldonado   YOB: 1985   Date of Visit: 1/4/2022           To Whom it May Concern:    Alberto Maldonado was seen in my clinic on 1/4/2022. He should be excused from work today and may return to work tomorrow if feeling better.    If you have any questions or concerns, please don't hesitate to call.        Sincerely,           Paige Ogden P.A.-C.  Electronically Signed

## 2022-01-05 PROBLEM — F10.20 ALCOHOLISM (HCC): Status: ACTIVE | Noted: 2022-01-05

## 2022-01-05 PROBLEM — G47.33 OBSTRUCTIVE SLEEP APNEA SYNDROME: Status: ACTIVE | Noted: 2022-01-05

## 2022-01-05 PROBLEM — E66.01 MORBID OBESITY (HCC): Status: ACTIVE | Noted: 2022-01-05

## 2022-01-05 PROBLEM — J45.909 ASTHMA WITHOUT STATUS ASTHMATICUS: Status: ACTIVE | Noted: 2017-06-29

## 2022-01-05 PROBLEM — L23.1 ALLERGIC CONTACT DERMATITIS DUE TO ADHESIVES: Status: ACTIVE | Noted: 2022-01-05

## 2022-01-05 PROBLEM — K76.0 STEATOSIS OF LIVER: Status: ACTIVE | Noted: 2022-01-05

## 2022-01-05 RX ORDER — ALBUTEROL SULFATE 90 UG/1
AEROSOL, METERED RESPIRATORY (INHALATION)
COMMUNITY
End: 2022-12-12

## 2022-01-05 RX ORDER — TRIAMCINOLONE ACETONIDE 1 MG/G
OINTMENT TOPICAL
COMMUNITY
Start: 2021-12-13 | End: 2022-01-22

## 2022-01-05 ASSESSMENT — ENCOUNTER SYMPTOMS
ABDOMINAL PAIN: 0
CHILLS: 0
FEVER: 0
TINGLING: 0
VOMITING: 0
WEAKNESS: 0
NAUSEA: 0
COUGH: 0
SHORTNESS OF BREATH: 0

## 2022-01-05 NOTE — PROGRESS NOTES
"Subjective     Alberto Maldonado is a 36 y.o. male who presents with Knee Pain (Knee and ankle (L) pain, possible gout. Had gout before and it feels identical.)            The patient is here with complaints of recurrent left knee pain. The patient had a gout attack about 2 weeks ago. He took a Medrol dosepak and his symptoms resolved. He states his symptoms returned yesterday after eating some [place] meat. He denies any recent injury. He denies fever or chills. No erythema. Pain is worse with walking and bearing weight. He has no skin redness.    No past medical history on file.    No past surgical history on file.    Family History   Problem Relation Age of Onset   • Stroke Neg Hx    • Heart Disease Neg Hx        No Known Allergies    Medications, Allergies, and current problem list reviewed today in Epic      Review of Systems   Constitutional: Negative for chills, fever and malaise/fatigue.   Respiratory: Negative for cough and shortness of breath.    Cardiovascular: Negative for chest pain and leg swelling.   Gastrointestinal: Negative for abdominal pain, nausea and vomiting.   Musculoskeletal: Positive for joint pain (left knee pain ).   Skin: Negative for rash.   Neurological: Negative for tingling and weakness.     All other systems reviewed and are negative.            Objective     /74 (BP Location: Right arm, Patient Position: Sitting, BP Cuff Size: Adult)   Pulse (!) 125   Temp 37 °C (98.6 °F) (Temporal)   Resp 20   Ht 1.6 m (5' 3\")   Wt 104 kg (230 lb)   SpO2 94%   BMI 40.74 kg/m²      Physical Exam  Constitutional:       General: He is not in acute distress.     Appearance: He is not ill-appearing.   HENT:      Head: Normocephalic and atraumatic.   Eyes:      Conjunctiva/sclera: Conjunctivae normal.   Cardiovascular:      Rate and Rhythm: Normal rate.   Pulmonary:      Effort: Pulmonary effort is normal. No respiratory distress.   Musculoskeletal:      Left knee: Bony tenderness " present. No swelling, deformity or erythema. Normal range of motion. Tenderness (moderate TTP surrounding patella ) present.      Comments: No left calf TTP or edema. No signs of septic joint. Distal n/v intact.    Skin:     General: Skin is warm and dry.      Findings: No erythema.   Neurological:      General: No focal deficit present.      Mental Status: He is alert and oriented to person, place, and time.   Psychiatric:         Mood and Affect: Mood normal.         Behavior: Behavior normal.         Thought Content: Thought content normal.         Judgment: Judgment normal.                             Assessment & Plan        1. Acute pain of left knee    - methylPREDNISolone (MEDROL DOSEPAK) 4 MG Tablet Therapy Pack; Follow schedule on package instructions.  Dispense: 21 Tablet; Refill: 0    Patient has follow-up with PCP next Friday.  Advised him to Discuss Gout recurrence.  Diet modification discussed.     Differential diagnoses, Supportive care, and indications for immediate follow-up discussed with patient.   Pathogenesis of diagnosis discussed including typical length and natural progression.   Instructed to return to clinic or nearest emergency department for any change in condition, further concerns, or worsening of symptoms.    The patient demonstrated a good understanding and agreed with the treatment plan.    Paige Ogden P.A.-C.

## 2022-01-22 ENCOUNTER — OFFICE VISIT (OUTPATIENT)
Dept: URGENT CARE | Facility: CLINIC | Age: 37
End: 2022-01-22
Payer: COMMERCIAL

## 2022-01-22 VITALS
RESPIRATION RATE: 12 BRPM | WEIGHT: 218.6 LBS | TEMPERATURE: 97.9 F | DIASTOLIC BLOOD PRESSURE: 90 MMHG | HEIGHT: 63 IN | SYSTOLIC BLOOD PRESSURE: 148 MMHG | HEART RATE: 103 BPM | OXYGEN SATURATION: 96 % | BODY MASS INDEX: 38.73 KG/M2

## 2022-01-22 DIAGNOSIS — M25.562 ACUTE PAIN OF LEFT KNEE: ICD-10-CM

## 2022-01-22 PROCEDURE — 99214 OFFICE O/P EST MOD 30 MIN: CPT | Performed by: NURSE PRACTITIONER

## 2022-01-23 RX ORDER — METHYLPREDNISOLONE 4 MG/1
TABLET ORAL
Qty: 21 TABLET | Refills: 0 | Status: SHIPPED | OUTPATIENT
Start: 2022-01-23 | End: 2022-12-12

## 2022-01-23 ASSESSMENT — ENCOUNTER SYMPTOMS
NUMBNESS: 0
VOMITING: 0
SHORTNESS OF BREATH: 0
MYALGIAS: 0
DIZZINESS: 0
ABDOMINAL PAIN: 0
EYE PAIN: 0
WEAKNESS: 0
SORE THROAT: 0
CHILLS: 0
JOINT SWELLING: 1
FEVER: 0
NAUSEA: 0

## 2022-01-24 NOTE — PROGRESS NOTES
"Subjective:   Alberto Maldonado is a 36 y.o. male who presents for Gout (Pt has pain on (L) knee x 3 days )      Knee Pain  This is a recurrent problem. The current episode started more than 1 month ago. The problem occurs constantly. The problem has been unchanged. Associated symptoms include joint swelling. Pertinent negatives include no abdominal pain, chest pain, chills, fever, myalgias, nausea, numbness, rash, sore throat, vomiting or weakness. The symptoms are aggravated by walking. He has tried acetaminophen and NSAIDs (steroids) for the symptoms. The treatment provided mild relief.       Review of Systems   Constitutional: Negative for chills and fever.   HENT: Negative for sore throat.    Eyes: Negative for pain.   Respiratory: Negative for shortness of breath.    Cardiovascular: Negative for chest pain.   Gastrointestinal: Negative for abdominal pain, nausea and vomiting.   Genitourinary: Negative for hematuria.   Musculoskeletal: Positive for joint pain and joint swelling. Negative for myalgias.   Skin: Negative for rash.   Neurological: Negative for dizziness, weakness and numbness.       Medications:    • albuterol Aers  • ALBUTEROL INH  • metoprolol tartrate Tabs  • TYLENOL PO    Allergies: Patient has no known allergies.    Problem List: Alberto Maldonado does not have any pertinent problems on file.    Surgical History:  No past surgical history on file.    Past Social Hx: Alberto Maldonado  reports that he has never smoked. He has never used smokeless tobacco. He reports previous alcohol use. He reports that he does not use drugs.     Past Family Hx:  Alberto Maldonado family history is not on file.     Problem list, medications, and allergies reviewed by myself today in Epic.     Objective:     /90 (BP Location: Right arm, Patient Position: Sitting, BP Cuff Size: Large adult)   Pulse (!) 103   Temp 36.6 °C (97.9 °F) (Temporal)   Resp 12   Ht 1.6 m (5' 3\")   Wt " 99.2 kg (218 lb 9.6 oz)   SpO2 96%   BMI 38.72 kg/m²     Physical Exam  Constitutional:       Appearance: Normal appearance. He is not ill-appearing or toxic-appearing.   HENT:      Head: Normocephalic.      Right Ear: External ear normal.      Left Ear: External ear normal.      Nose: Nose normal.      Mouth/Throat:      Lips: Pink.      Mouth: Mucous membranes are moist.   Eyes:      General: Lids are normal.         Right eye: No discharge.         Left eye: No discharge.   Pulmonary:      Effort: Pulmonary effort is normal. No accessory muscle usage or respiratory distress.   Musculoskeletal:      Cervical back: Full passive range of motion without pain.      Left knee: Swelling present. No deformity, effusion, ecchymosis, lacerations or bony tenderness. Decreased range of motion. Tenderness present over the medial joint line.   Skin:     Coloration: Skin is not pale.   Neurological:      Mental Status: He is alert and oriented to person, place, and time.   Psychiatric:         Mood and Affect: Mood normal.         Thought Content: Thought content normal.         Assessment/Plan:     Diagnosis and associated orders:     1. Acute pain of left knee  Referral to Orthopedics      Comments/MDM:     I personally reviewed prior external notes and prior test results pertinent to today's visit.  Patient has been evaluated encounters for similar symptoms and has been treated with oral steroids for suspected gout.  Pain is exacerbated.  Movements and twisting unclear etiologyis present as a possible strain patient follow-up with orthopedics for further evaluation and management.  He has had improvement on steroids trial at this time provide a knee brace patient does have crutches advised weightbearing as tolerated   Discussed management options, risks and benefits, and alternatives to treatment plan agreed upon.   Red flags discussed and indications to immediately call 911 or present to the Emergency Department.    Supportive care, differential diagnoses, and indications for immediate follow-up discussed with patient.    • Patient expresses understanding and agrees to plan. Patient denies any other questions or concerns.   •   • Your blood pressure is elevated here in Urgent Care. Please monitor your blood pressure over the next several days. If your blood pressure continues to be 120/80 or higher please contact your physician for blood pressure management.           My total time spent caring for the patient on the day of the encounter was 30 minutes.   This does not include time spent on separately billable procedures/tests.      Please note that this dictation was created using voice recognition software. I have made a reasonable attempt to correct obvious errors, but I expect that there are errors of grammar and possibly content that I did not discover before finalizing the note.    This note was electronically signed by Ben HOOPER.

## 2022-12-12 ENCOUNTER — OFFICE VISIT (OUTPATIENT)
Dept: URGENT CARE | Facility: CLINIC | Age: 37
End: 2022-12-12
Payer: COMMERCIAL

## 2022-12-12 VITALS
BODY MASS INDEX: 40.93 KG/M2 | RESPIRATION RATE: 18 BRPM | SYSTOLIC BLOOD PRESSURE: 138 MMHG | HEART RATE: 122 BPM | WEIGHT: 231 LBS | DIASTOLIC BLOOD PRESSURE: 102 MMHG | TEMPERATURE: 98.2 F | OXYGEN SATURATION: 97 % | HEIGHT: 63 IN

## 2022-12-12 DIAGNOSIS — H66.001 ACUTE SUPPURATIVE OTITIS MEDIA OF RIGHT EAR WITHOUT SPONTANEOUS RUPTURE OF TYMPANIC MEMBRANE, RECURRENCE NOT SPECIFIED: ICD-10-CM

## 2022-12-12 DIAGNOSIS — J98.8 RTI (RESPIRATORY TRACT INFECTION): ICD-10-CM

## 2022-12-12 DIAGNOSIS — Z87.09 HISTORY OF ASTHMA: ICD-10-CM

## 2022-12-12 PROCEDURE — 99214 OFFICE O/P EST MOD 30 MIN: CPT | Performed by: NURSE PRACTITIONER

## 2022-12-12 RX ORDER — DOXYCYCLINE HYCLATE 100 MG
100 TABLET ORAL 2 TIMES DAILY
Qty: 14 TABLET | Refills: 0 | Status: SHIPPED | OUTPATIENT
Start: 2022-12-12 | End: 2022-12-19

## 2022-12-12 RX ORDER — METHYLPREDNISOLONE 4 MG/1
TABLET ORAL
Qty: 21 TABLET | Refills: 0 | Status: SHIPPED | OUTPATIENT
Start: 2022-12-12 | End: 2023-03-21

## 2022-12-12 RX ORDER — ALBUTEROL SULFATE 90 UG/1
2 AEROSOL, METERED RESPIRATORY (INHALATION) EVERY 6 HOURS PRN
Qty: 8.5 G | Refills: 0 | Status: SHIPPED | OUTPATIENT
Start: 2022-12-12 | End: 2023-06-01

## 2022-12-12 ASSESSMENT — FIBROSIS 4 INDEX: FIB4 SCORE: 2.110579412044345358

## 2022-12-12 ASSESSMENT — ENCOUNTER SYMPTOMS
COUGH: 1
WHEEZING: 1

## 2022-12-12 NOTE — PROGRESS NOTES
Subjective     Alberto Maldonado is a 37 y.o. male who presents with Cough (X 3 weeks with congestion, R ear pain, pain in abdomen when he cough, itchy throat, wheezing, S.O.B..  Hx of Asthma. )            Cough  This is a new problem. Episode onset: pt reports he has been sick for about 3 weeks with sinus congestion and cough. +hx asthma, he feels to be wheezing. no recent fevers. SOB with coughing. The cough is Non-productive. Associated symptoms include ear pain (right), nasal congestion and wheezing. Risk factors: non smoker. He has tried OTC cough suppressant (theraflu and tylenol) for the symptoms. The treatment provided no relief. His past medical history is significant for asthma.     Review of Systems   HENT:  Positive for ear pain (right).    Respiratory:  Positive for cough and wheezing.    All other systems reviewed and are negative.         Past Medical History:   Diagnosis Date    Asthma without status asthmaticus 6/29/2017    History reviewed. No pertinent surgical history.   Social History     Socioeconomic History    Marital status: Single     Spouse name: Not on file    Number of children: Not on file    Years of education: Not on file    Highest education level: Not on file   Occupational History    Not on file   Tobacco Use    Smoking status: Never    Smokeless tobacco: Never   Vaping Use    Vaping Use: Never used   Substance and Sexual Activity    Alcohol use: Not Currently    Drug use: No    Sexual activity: Not Currently   Other Topics Concern    Not on file   Social History Narrative    Not on file     Social Determinants of Health     Financial Resource Strain: Not on file   Food Insecurity: Not on file   Transportation Needs: Not on file   Physical Activity: Not on file   Stress: Not on file   Social Connections: Not on file   Intimate Partner Violence: Not on file   Housing Stability: Not on file       Objective     BP (!) 138/102   Pulse (!) 122   Temp 36.8 °C (98.2 °F) (Temporal)  "  Resp 18   Ht 1.6 m (5' 3\")   Wt 105 kg (231 lb)   SpO2 97%   BMI 40.92 kg/m²      Physical Exam  Vitals and nursing note reviewed.   Constitutional:       Appearance: Normal appearance.   HENT:      Head: Normocephalic and atraumatic.      Right Ear: Tympanic membrane and external ear normal.      Left Ear: Tympanic membrane is erythematous and bulging.      Nose: Nose normal.      Mouth/Throat:      Mouth: Mucous membranes are moist.      Pharynx: Oropharynx is clear.   Eyes:      Extraocular Movements: Extraocular movements intact.      Pupils: Pupils are equal, round, and reactive to light.   Cardiovascular:      Rate and Rhythm: Normal rate and regular rhythm.   Pulmonary:      Effort: Pulmonary effort is normal.      Breath sounds: Wheezing (scattered wheezes throughout) present.   Musculoskeletal:         General: Normal range of motion.      Cervical back: Normal range of motion and neck supple.   Skin:     General: Skin is warm and dry.      Capillary Refill: Capillary refill takes less than 2 seconds.   Neurological:      General: No focal deficit present.      Mental Status: He is alert and oriented to person, place, and time. Mental status is at baseline.   Psychiatric:         Mood and Affect: Mood normal.         Thought Content: Thought content normal.         Judgment: Judgment normal.                           Assessment & Plan        1. History of asthma  - albuterol 108 (90 Base) MCG/ACT Aero Soln inhalation aerosol; Inhale 2 Puffs every 6 hours as needed for Shortness of Breath.  Dispense: 8.5 g; Refill: 0    2. RTI (respiratory tract infection)  - doxycycline (VIBRAMYCIN) 100 MG Tab; Take 1 Tablet by mouth 2 times a day for 7 days.  Dispense: 14 Tablet; Refill: 0  - methylPREDNISolone (MEDROL DOSEPAK) 4 MG Tablet Therapy Pack; Follow schedule on package instructions.  Dispense: 21 Tablet; Refill: 0  - albuterol 108 (90 Base) MCG/ACT Aero Soln inhalation aerosol; Inhale 2 Puffs every 6 " hours as needed for Shortness of Breath.  Dispense: 8.5 g; Refill: 0    3. Acute suppurative otitis media of right ear without spontaneous rupture of tympanic membrane, recurrence not specified  - doxycycline (VIBRAMYCIN) 100 MG Tab; Take 1 Tablet by mouth 2 times a day for 7 days.  Dispense: 14 Tablet; Refill: 0          Take full course of abx and steroids  Use inhaler as directed  Increase water intake  Red flags discussed and when to seek care in the ER  Supportive care, differential diagnoses, and indications for immediate follow-up discussed with patient.    Pathogenesis of diagnosis discussed including typical length and natural progression.    Instructed to return to  or nearest emergency department if symptoms fail to improve, for any change in condition, further concerns, or new concerning symptoms.  Patient states understanding of the plan of care and discharge instructions.

## 2022-12-12 NOTE — LETTER
December 12, 2022    To Whom It May Concern:         This is confirmation that Alberto Manzanares Erica attended his scheduled appointment with RICHY Perry on 12/12/22.    Please excuse him from work today.    Sincerely,      JORDAN Perry.  [phone]

## 2023-01-15 ENCOUNTER — OFFICE VISIT (OUTPATIENT)
Dept: URGENT CARE | Facility: CLINIC | Age: 38
End: 2023-01-15
Payer: COMMERCIAL

## 2023-01-15 VITALS
SYSTOLIC BLOOD PRESSURE: 156 MMHG | DIASTOLIC BLOOD PRESSURE: 108 MMHG | BODY MASS INDEX: 41.82 KG/M2 | RESPIRATION RATE: 16 BRPM | OXYGEN SATURATION: 96 % | HEIGHT: 63 IN | HEART RATE: 108 BPM | TEMPERATURE: 97.2 F | WEIGHT: 236 LBS

## 2023-01-15 DIAGNOSIS — H66.002 NON-RECURRENT ACUTE SUPPURATIVE OTITIS MEDIA OF LEFT EAR WITHOUT SPONTANEOUS RUPTURE OF TYMPANIC MEMBRANE: ICD-10-CM

## 2023-01-15 PROCEDURE — 69210 REMOVE IMPACTED EAR WAX UNI: CPT | Performed by: NURSE PRACTITIONER

## 2023-01-15 PROCEDURE — 99213 OFFICE O/P EST LOW 20 MIN: CPT | Mod: 25 | Performed by: NURSE PRACTITIONER

## 2023-01-15 RX ORDER — AMOXICILLIN 875 MG/1
875 TABLET, COATED ORAL 2 TIMES DAILY
Qty: 14 TABLET | Refills: 0 | Status: SHIPPED | OUTPATIENT
Start: 2023-01-15 | End: 2023-01-22

## 2023-01-15 ASSESSMENT — ENCOUNTER SYMPTOMS
RESPIRATORY NEGATIVE: 1
MUSCULOSKELETAL NEGATIVE: 1
NEUROLOGICAL NEGATIVE: 1
CONSTITUTIONAL NEGATIVE: 1
FEVER: 0
EYES NEGATIVE: 1
GASTROINTESTINAL NEGATIVE: 1
CARDIOVASCULAR NEGATIVE: 1

## 2023-01-15 ASSESSMENT — FIBROSIS 4 INDEX: FIB4 SCORE: 2.110579412044345358

## 2023-01-16 NOTE — PROGRESS NOTES
"Subjective:   Alberto Maldonado is a 37 y.o. male who presents for Otalgia ((L) x 1 month on and off.  Denies fever or chills. )      Otalgia   There is pain in both (left worse than right) ears. Episode onset: one month - did use Qtip, and pulled cotton tip off to get ear wax and scratched ear canal. The problem occurs constantly. The problem has been gradually worsening. There has been no fever. The pain is moderate. He has tried acetaminophen, NSAIDs and heat packs for the symptoms. The treatment provided mild relief.     Review of Systems   Constitutional: Negative.  Negative for fever.   HENT:  Positive for ear pain.    Eyes: Negative.    Respiratory: Negative.     Cardiovascular: Negative.    Gastrointestinal: Negative.    Genitourinary: Negative.    Musculoskeletal: Negative.    Skin: Negative.    Neurological: Negative.      Medications, Allergies, and current problem list reviewed today in Epic.     Objective:     BP (!) 156/108   Pulse (!) 108   Temp 36.2 °C (97.2 °F) (Temporal)   Resp 16   Ht 1.6 m (5' 3\")   Wt 107 kg (236 lb)   SpO2 96%     Physical Exam  Vitals reviewed.   Constitutional:       Appearance: Normal appearance.   HENT:      Head: Normocephalic and atraumatic.      Right Ear: Tenderness present. There is impacted cerumen. Tympanic membrane is erythematous and bulging.      Left Ear: There is impacted cerumen.      Ears:      Comments: Procedure: Cerumen Removal  Risks and benefits of procedure discussed with patient.  Cerumen removed with lavage by the MA. Patient tolerated the procedure well    Post-lavage curette was performed by SANDIE Cook. Post procedure exam right ear clear canal and normal TM. Left ear with erythematous, bulging TM.    Pt educated about proper care of ear canal. Q-tip cleaning discouraged, use of debrox and warm water lavage discussed.         Nose: Nose normal.      Mouth/Throat:      Mouth: Mucous membranes are moist.      Pharynx: Oropharynx is " clear.   Eyes:      Extraocular Movements: Extraocular movements intact.      Conjunctiva/sclera: Conjunctivae normal.      Pupils: Pupils are equal, round, and reactive to light.   Cardiovascular:      Rate and Rhythm: Normal rate and regular rhythm.      Pulses: Normal pulses.      Heart sounds: Normal heart sounds.   Pulmonary:      Effort: Pulmonary effort is normal.      Breath sounds: Normal breath sounds.   Abdominal:      General: Abdomen is flat. Bowel sounds are normal.      Palpations: Abdomen is soft.   Musculoskeletal:         General: Normal range of motion.      Cervical back: Normal range of motion and neck supple.   Skin:     General: Skin is warm and dry.      Capillary Refill: Capillary refill takes less than 2 seconds.   Neurological:      General: No focal deficit present.      Mental Status: He is alert and oriented to person, place, and time.   Psychiatric:         Mood and Affect: Mood normal.         Behavior: Behavior normal.       Assessment/Plan:     Diagnosis and associated orders:     1. Non-recurrent acute suppurative otitis media of left ear without spontaneous rupture of tympanic membrane  amoxicillin (AMOXIL) 875 MG tablet         Comments/MDM:     Provided parent and patient with information on the etiology and pathogenesis of otitis media. Instructed to take antibiotics as prescribed. May give Tylenol/Motrin prn discomfort. May apply warm compress to the ear for prn discomfort. RTC in 2 weeks for reevaluation.           Differential diagnosis, natural history, supportive care, and indications for immediate follow-up discussed.    Advised the patient to follow-up with the primary care physician for recheck, reevaluation, and consideration of further management.    Please note that this dictation was created using voice recognition software. I have made a reasonable attempt to correct obvious errors, but I expect that there are errors of grammar and possibly content that I did not  dino before finalizing the note.    This note was electronically signed by SANDIE Cook

## 2023-01-20 ENCOUNTER — OFFICE VISIT (OUTPATIENT)
Dept: URGENT CARE | Facility: CLINIC | Age: 38
End: 2023-01-20
Payer: COMMERCIAL

## 2023-01-20 VITALS
HEART RATE: 108 BPM | BODY MASS INDEX: 41.29 KG/M2 | HEIGHT: 63 IN | DIASTOLIC BLOOD PRESSURE: 90 MMHG | WEIGHT: 233 LBS | OXYGEN SATURATION: 97 % | RESPIRATION RATE: 16 BRPM | TEMPERATURE: 98.5 F | SYSTOLIC BLOOD PRESSURE: 144 MMHG

## 2023-01-20 DIAGNOSIS — M10.9 ACUTE GOUT OF RIGHT ANKLE, UNSPECIFIED CAUSE: ICD-10-CM

## 2023-01-20 PROCEDURE — 99214 OFFICE O/P EST MOD 30 MIN: CPT | Performed by: PHYSICIAN ASSISTANT

## 2023-01-20 RX ORDER — METHYLPREDNISOLONE 4 MG/1
TABLET ORAL
Qty: 21 TABLET | Refills: 0 | Status: SHIPPED | OUTPATIENT
Start: 2023-01-20 | End: 2023-03-21

## 2023-01-20 RX ORDER — KETOROLAC TROMETHAMINE 30 MG/ML
30 INJECTION, SOLUTION INTRAMUSCULAR; INTRAVENOUS ONCE
Status: COMPLETED | OUTPATIENT
Start: 2023-01-20 | End: 2023-01-20

## 2023-01-20 RX ADMIN — KETOROLAC TROMETHAMINE 30 MG: 30 INJECTION, SOLUTION INTRAMUSCULAR; INTRAVENOUS at 20:38

## 2023-01-20 ASSESSMENT — ENCOUNTER SYMPTOMS
WEAKNESS: 0
MYALGIAS: 1
TINGLING: 0
NAUSEA: 0
FEVER: 0
HEADACHES: 0
CHILLS: 0
DIZZINESS: 0
VOMITING: 0
ABDOMINAL PAIN: 0

## 2023-01-20 ASSESSMENT — FIBROSIS 4 INDEX: FIB4 SCORE: 2.110579412044345358

## 2023-01-21 NOTE — PROGRESS NOTES
Subjective:     CHIEF COMPLAINT  Chief Complaint   Patient presents with    Gout     Right leg x 2 days        HPI  Alberto Maldonado is a very pleasant 37 y.o. male who presents to the clinic with a believed gout flare to his right ankle x2 days.  He denies any preceding injury or trauma.  States he woke up 1 morning and his right ankle was red hot swollen and tender.  He has had multiple gout flares in the past that feels similar to this.  States his diet has not been the best as of late.  He has been eating sushi and red meat.  He denies any fevers, chills, nausea or vomiting.  He has been taking ibuprofen without any improvement.    REVIEW OF SYSTEMS  Review of Systems   Constitutional:  Negative for chills, fever and malaise/fatigue.   Gastrointestinal:  Negative for abdominal pain, nausea and vomiting.   Musculoskeletal:  Positive for joint pain and myalgias.   Skin:         Redness and swelling to right ankle   Neurological:  Negative for dizziness, tingling, weakness and headaches.     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 (Physician Assistant) on 01/20/23 at 1758  Med List Status: <None>     Medication Last Dose Status   Acetaminophen (TYLENOL PO) Taking Active   albuterol 108 (90 Base) MCG/ACT Aero Soln inhalation aerosol Not Taking Active   amoxicillin (AMOXIL) 875 MG tablet Not Taking Active   methylPREDNISolone  "(MEDROL DOSEPAK) 4 MG Tablet Therapy Pack  Active   metoprolol tartrate (LOPRESSOR) 50 MG Tab  Active                    SOCIAL HISTORY  Social History     Tobacco Use    Smoking status: Never    Smokeless tobacco: Never   Vaping Use    Vaping Use: Never used   Substance and Sexual Activity    Alcohol use: Not Currently    Drug use: Not Currently    Sexual activity: Not Currently       FAMILY HISTORY  Family History   Problem Relation Age of Onset    Stroke Neg Hx     Heart Disease Neg Hx           Objective:     VITAL SIGNS: BP (!) 144/90   Pulse (!) 108   Temp 36.9 °C (98.5 °F) (Temporal)   Resp 16   Ht 1.6 m (5' 3\")   Wt 106 kg (233 lb)   SpO2 97%   BMI 41.27 kg/m²     PHYSICAL EXAM  Physical Exam  Constitutional:       Appearance: Normal appearance.   HENT:      Head: Normocephalic and atraumatic.   Eyes:      Conjunctiva/sclera: Conjunctivae normal.   Cardiovascular:      Rate and Rhythm: Tachycardia present.      Pulses: Normal pulses.   Pulmonary:      Effort: Pulmonary effort is normal.   Musculoskeletal:      Cervical back: Normal range of motion.      Comments: Right ankle: Moderate erythema and edema diffusely over the right ankle.  Tenderness to palpation diffusely.  Range of motion limited due to pain.  Gait is antalgic.  No abrasion or signs of puncture wound.   Neurological:      General: No focal deficit present.      Mental Status: He is alert and oriented to person, place, and time. Mental status is at baseline.       Assessment/Plan:     1. Acute gout of right ankle, unspecified cause  - ketorolac (TORADOL) injection 30 mg  - methylPREDNISolone (MEDROL DOSEPAK) 4 MG Tablet Therapy Pack; Follow schedule on package instructions.  Dispense: 21 Tablet; Refill: 0      MDM/Comments:    -Monitor for possible diet correlation and modify diet. Limit alcohol intake.  -Oral hydration.  -RICE Therapy: Rest, Ice, Compression, Elevation  -NSAID's (ibuprofen) for pain and inflammation. The NSAID can be " discontinued two to three days after symptoms have resolved. Typically, the total duration of NSAID therapy for a gout flare is five to seven days.  -Can also take tylenol as directed for pain.   -Advised discussing allopurinol with his PCP    Follow up with PCP. Follow up for persistent or worsening symptoms, fever, chills, signs of infection. Emergently for severe uncontrolled pain, neurovascular compromise (decreased sensation, motion, or circulation).      Differential diagnosis, natural history, supportive care, and indications for immediate follow-up discussed. All questions answered. Patient agrees with the plan of care.    Follow-up as needed if symptoms worsen or fail to improve to PCP, Urgent care or Emergency Room.    I have personally reviewed prior external notes and test results pertinent to today's visit.  I have independently reviewed and interpreted all diagnostics ordered during this urgent care acute visit.   Discussed management options (risks,benefits, and alternatives to treatment). Pt expresses understanding and the treatment plan was agreed upon. Questions were encouraged and answered to pt's satisfaction.    Please note that this dictation was created using voice recognition software. I have made a reasonable attempt to correct obvious errors, but I expect that there are errors of grammar and possibly content that I did not discover before finalizing the note.

## 2023-02-19 ENCOUNTER — OFFICE VISIT (OUTPATIENT)
Dept: URGENT CARE | Facility: CLINIC | Age: 38
End: 2023-02-19
Payer: COMMERCIAL

## 2023-02-19 VITALS
TEMPERATURE: 98.7 F | OXYGEN SATURATION: 98 % | DIASTOLIC BLOOD PRESSURE: 72 MMHG | HEIGHT: 63 IN | BODY MASS INDEX: 41.64 KG/M2 | RESPIRATION RATE: 16 BRPM | SYSTOLIC BLOOD PRESSURE: 152 MMHG | HEART RATE: 130 BPM | WEIGHT: 235 LBS

## 2023-02-19 DIAGNOSIS — H92.01 ACUTE OTALGIA, RIGHT: ICD-10-CM

## 2023-02-19 DIAGNOSIS — R03.0 ELEVATED BLOOD PRESSURE READING: ICD-10-CM

## 2023-02-19 DIAGNOSIS — M62.830 SPASM OF THORACIC BACK MUSCLE: ICD-10-CM

## 2023-02-19 DIAGNOSIS — H61.21 IMPACTED CERUMEN, RIGHT EAR: ICD-10-CM

## 2023-02-19 DIAGNOSIS — R00.0 TACHYCARDIA: ICD-10-CM

## 2023-02-19 PROCEDURE — 99214 OFFICE O/P EST MOD 30 MIN: CPT | Performed by: NURSE PRACTITIONER

## 2023-02-19 RX ORDER — TIZANIDINE 4 MG/1
4 TABLET ORAL EVERY 6 HOURS PRN
Qty: 30 TABLET | Refills: 0 | Status: SHIPPED | OUTPATIENT
Start: 2023-02-19 | End: 2023-02-19

## 2023-02-19 RX ORDER — TIZANIDINE 4 MG/1
4 TABLET ORAL EVERY 6 HOURS PRN
Qty: 30 TABLET | Refills: 0 | Status: SHIPPED | OUTPATIENT
Start: 2023-02-19 | End: 2023-03-21

## 2023-02-19 ASSESSMENT — FIBROSIS 4 INDEX: FIB4 SCORE: 2.110579412044345358

## 2023-02-19 NOTE — PROGRESS NOTES
"Subjective:   Alberto Maldonado is a 37 y.o. male who presents for Ear Pain and Back Pain       HPI  Patient presents for evaluation of approximate 12-hour history of right ear pain and fullness.  Patient was still wearing ear buds often on his right side.  Additionally, patient has had a 2-day history of right thoracic back pain, states this began after walking on the treadmill.  States he has had back pain in the same area in the past.  Denies numbness, tingling, cauda equina symptoms.  Has taken Tylenol without noticing relief of his symptoms.    ROS  All other systems are negative except as documented above within HPI.    MEDS:   Current Outpatient Medications:     Acetaminophen (TYLENOL PO), Take  by mouth., Disp: , Rfl:     methylPREDNISolone (MEDROL DOSEPAK) 4 MG Tablet Therapy Pack, Follow schedule on package instructions. (Patient not taking: Reported on 2/19/2023), Disp: 21 Tablet, Rfl: 0    methylPREDNISolone (MEDROL DOSEPAK) 4 MG Tablet Therapy Pack, Follow schedule on package instructions. (Patient not taking: Reported on 1/15/2023), Disp: 21 Tablet, Rfl: 0    albuterol 108 (90 Base) MCG/ACT Aero Soln inhalation aerosol, Inhale 2 Puffs every 6 hours as needed for Shortness of Breath. (Patient not taking: Reported on 1/20/2023), Disp: 8.5 g, Rfl: 0    metoprolol tartrate (LOPRESSOR) 50 MG Tab, Take 50 mg by mouth 2 times a day. (Patient not taking: Reported on 12/12/2022), Disp: , Rfl:   ALLERGIES: No Known Allergies    Patient's PMH, SocHx, SurgHx, FamHx, Drug allergies and medications were reviewed.     Objective:   BP (!) 152/72   Pulse (!) 130   Temp 37.1 °C (98.7 °F)   Resp 16   Ht 1.6 m (5' 3\")   Wt 107 kg (235 lb)   SpO2 98%   BMI 41.63 kg/m²     Physical Exam  Vitals and nursing note reviewed.   Constitutional:       General: He is awake.      Appearance: Normal appearance. He is well-developed.   HENT:      Head: Normocephalic and atraumatic.      Right Ear: External ear normal.     "  Left Ear: External ear normal. There is impacted cerumen.      Nose: Nose normal.      Mouth/Throat:      Lips: Pink.      Mouth: Mucous membranes are moist.      Pharynx: Oropharynx is clear.   Eyes:      General: Lids are normal.      Extraocular Movements: Extraocular movements intact.      Conjunctiva/sclera: Conjunctivae normal.   Cardiovascular:      Rate and Rhythm: Regular rhythm. Tachycardia present.   Pulmonary:      Effort: Pulmonary effort is normal.   Musculoskeletal:      Cervical back: Normal range of motion.      Thoracic back: Spasms and tenderness present. Decreased range of motion.        Back:    Skin:     General: Skin is warm and dry.   Neurological:      Mental Status: He is alert and oriented to person, place, and time.   Psychiatric:         Mood and Affect: Mood normal.         Behavior: Behavior normal. Behavior is cooperative.         Thought Content: Thought content normal.         Judgment: Judgment normal.       Assessment/Plan:   Assessment    1. Spasm of thoracic back muscle  - tizanidine (ZANAFLEX) 4 MG Tab; Take 1 Tablet by mouth every 6 hours as needed (muscle spams/pain).  Dispense: 30 Tablet; Refill: 0    2. Impacted cerumen, right ear    3. Elevated blood pressure reading    4. Acute otalgia, right    5. Tachycardia      Vital signs stable at today's acute urgent care visit.  Begin medications as listed.  Unable to remove cerumen in right ear, recommend use of OTC Debrox and return to clinic in a few days.  Is also patient continues to be hypertensive as well as slightly tachycardic.  Patient denies chest pain, chest pressure, shortness of breath, lower extremity swelling.    Advised the patient to follow-up with the primary care provider/urgent care if symptoms persist.  Strict red flags discussed and indications to immediately call 911 or present to the ED. All questions were encouraged and answered to the patient's satisfaction and understanding, and they agree to the plan  of care.     This is an acute problem with uncertain prognosis, medication management and instructions as well as management options were provided.  I personally reviewed prior external notes and test results pertinent to today and independently reviewed and interpreted all diagnostics, to include POC testing. Time spent evaluating this patient includes preparing for visit, counseling/education, exam, evaluation, obtaining history, and ordering lab/test/procedures.      Please note that this dictation was created using voice recognition software. I have made a reasonable attempt to correct obvious errors, but I expect that there are errors of grammar and possibly content that I did not discover before finalizing the note.

## 2023-03-21 ENCOUNTER — OFFICE VISIT (OUTPATIENT)
Dept: URGENT CARE | Facility: CLINIC | Age: 38
End: 2023-03-21
Payer: COMMERCIAL

## 2023-03-21 VITALS
SYSTOLIC BLOOD PRESSURE: 168 MMHG | DIASTOLIC BLOOD PRESSURE: 84 MMHG | OXYGEN SATURATION: 94 % | HEART RATE: 115 BPM | RESPIRATION RATE: 22 BRPM | TEMPERATURE: 98.7 F | HEIGHT: 63 IN | WEIGHT: 229 LBS | BODY MASS INDEX: 40.57 KG/M2

## 2023-03-21 DIAGNOSIS — R03.0 ELEVATED BLOOD PRESSURE READING: ICD-10-CM

## 2023-03-21 DIAGNOSIS — Z87.898 HISTORY OF TACHYCARDIA: ICD-10-CM

## 2023-03-21 DIAGNOSIS — M10.9 ACUTE GOUT INVOLVING TOE OF LEFT FOOT, UNSPECIFIED CAUSE: ICD-10-CM

## 2023-03-21 PROBLEM — F10.11 HISTORY OF ALCOHOL ABUSE: Status: ACTIVE | Noted: 2023-03-21

## 2023-03-21 PROCEDURE — 99214 OFFICE O/P EST MOD 30 MIN: CPT | Performed by: NURSE PRACTITIONER

## 2023-03-21 RX ORDER — METHYLPREDNISOLONE 4 MG/1
4 TABLET ORAL DAILY
Qty: 21 EACH | Refills: 0 | Status: SHIPPED | OUTPATIENT
Start: 2023-03-21 | End: 2023-04-01

## 2023-03-21 RX ORDER — METOPROLOL SUCCINATE 50 MG/1
50 TABLET, EXTENDED RELEASE ORAL DAILY
COMMUNITY
End: 2023-06-01

## 2023-03-21 RX ORDER — LOSARTAN POTASSIUM 50 MG/1
75 TABLET ORAL DAILY
COMMUNITY
End: 2023-10-28

## 2023-03-21 ASSESSMENT — FIBROSIS 4 INDEX: FIB4 SCORE: 2.110579412044345358

## 2023-03-21 NOTE — PROGRESS NOTES
Chief Complaint   Patient presents with    Toe Pain     Gout in left toe       HISTORY OF PRESENT ILLNESS: Patient is a pleasant 37 y.o. male with a history of gout, hypertension, tachycardia, alcohol use who presents to urgent care today with complaints of left great toe pain since yesterday.  Denies any fever, chills, malaise.  Denies any recent injury.  Patient notes history of gout, this feels exactly similar.  States that he regularly eats seafood, denies any recent alcohol use.  He has not tried any medication for symptom relief.  He was seen by his PCP last week for his elevated blood pressure and tachycardia, and he is awaiting to undergo a sleep study.    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    Current Outpatient Medications Ordered in Epic   Medication Sig Dispense Refill    metoprolol SR (TOPROL XL) 50 MG TABLET SR 24 HR Take 50 mg by mouth every day.      losartan (COZAAR) 50 MG Tab Take 50 mg by mouth every day.      methylPREDNISolone (MEDROL DOSEPAK) 4 MG Tablet Therapy Pack Take 1 Tablet by mouth every day. Take with food. 21 Each 0    albuterol 108 (90 Base) MCG/ACT Aero Soln inhalation aerosol Inhale 2 Puffs every 6 hours as needed for Shortness of Breath. 8.5 g 0    Acetaminophen (TYLENOL PO) Take  by mouth.       No current Epic-ordered facility-administered medications on file.       Past Medical History:   Diagnosis Date    Asthma without status asthmaticus 6/29/2017       Social History     Tobacco Use    Smoking status:  "Never    Smokeless tobacco: Never   Vaping Use    Vaping Use: Never used   Substance Use Topics    Alcohol use: Not Currently    Drug use: Not Currently       Family Status   Relation Name Status    Neg Hx  (Not Specified)     Family History   Problem Relation Age of Onset    Stroke Neg Hx     Heart Disease Neg Hx        ROS:  Review of Systems   Constitutional: Negative for fever, chills, weight loss, malaise, and fatigue.   HENT: Negative for ear pain, nosebleeds, congestion, sore throat and neck pain.    Eyes: Negative for vision changes.   Neuro: Negative for headache, sensory changes, weakness, seizure, LOC.   Cardiovascular: Negative for chest pain, palpitations, orthopnea and leg swelling.   Respiratory: Negative for cough, sputum production, shortness of breath and wheezing.   Gastrointestinal: Negative for abdominal pain, nausea, vomiting or diarrhea.   Genitourinary: Negative for dysuria, urgency and frequency.  Musculoskeletal: Positive for left great toe pain.  Negative for falls, neck pain, back pain, joint pain, myalgias.   Skin: Negative for rash, diaphoresis.     Exam:  BP (!) 168/84 (BP Location: Left arm, Patient Position: Sitting, BP Cuff Size: Adult)   Pulse (!) 115   Temp 37.1 °C (98.7 °F) (Temporal)   Resp (!) 22   Ht 1.6 m (5' 3\")   Wt 104 kg (229 lb)   SpO2 94%   General: well-nourished, well-developed male in NAD  Head: normocephalic, atraumatic  Eyes: PERRLA, no conjunctival injection, acuity grossly intact, lids normal.  Ears: normal shape and symmetry, no tenderness, no discharge. External canals are without any significant edema or erythema. Tympanic membranes are without any inflammation, no effusion. Gross auditory acuity is intact.  Nose: symmetrical without tenderness, no discharge.  Mouth/Throat: reasonable hygiene, no erythema, exudates or tonsillar enlargement.  Neck: no masses, range of motion within normal limits, no tracheal deviation. No obvious thyroid enlargement. "   Lymph: no cervical adenopathy. No supraclavicular adenopathy.   Neuro: alert and oriented. Cranial nerves 1-12 grossly intact. No sensory deficit.   Cardiovascular: Tachycardic rate and regular rhythm. No edema.  Pulmonary: no distress. Chest is symmetrical with respiration, no wheezes, crackles, or rhonchi.   Musculoskeletal: no clubbing, appropriate muscle tone, gait is stable.  Left tophi: Minimal swelling, tenderness with palpation, distal neurovascular intact.  Skin: warm, dry, intact, no clubbing, no cyanosis, no rashes.   Psych: appropriate mood, affect, judgement.         Assessment/Plan:  1. Acute gout involving toe of left foot, unspecified cause  methylPREDNISolone (MEDROL DOSEPAK) 4 MG Tablet Therapy Pack      2. Elevated blood pressure reading        3. History of tachycardia            Medrol as directed for gout flare.  Diet considerations discussed with patient.  Patient is found to be tachycardic with hypertension in clinic, patient notes history of such and is working closely with his PCP regarding.  Supportive care, differential diagnoses, and indications for immediate follow-up discussed with patient.   Pathogenesis of diagnosis discussed including typical length and natural progression.   Instructed to return to clinic or nearest emergency department for any change in condition, further concerns, or worsening of symptoms.  Patient states understanding of the plan of care and discharge instructions.  Instructed to make an appointment, for follow up, with his primary care provider.        Please note that this dictation was created using voice recognition software. I have made every reasonable attempt to correct obvious errors, but I expect that there are errors of grammar and possibly content that I did not discover before finalizing the note.      JORDAN Krishna.

## 2023-03-21 NOTE — LETTER
March 21, 2023         Patient: Alberto Maldonado   YOB: 1985   Date of Visit: 3/21/2023           To Whom it May Concern:    Alberto Maldonado was seen in my clinic on 3/21/2023. He may be excused from work today and tomorrow.     If you have any questions or concerns, please don't hesitate to call.        Sincerely,           JORDAN Krishna.  Electronically Signed

## 2023-04-01 ENCOUNTER — OFFICE VISIT (OUTPATIENT)
Dept: URGENT CARE | Facility: CLINIC | Age: 38
End: 2023-04-01
Payer: COMMERCIAL

## 2023-04-01 VITALS
BODY MASS INDEX: 41.59 KG/M2 | HEART RATE: 126 BPM | DIASTOLIC BLOOD PRESSURE: 94 MMHG | SYSTOLIC BLOOD PRESSURE: 156 MMHG | TEMPERATURE: 98.1 F | RESPIRATION RATE: 16 BRPM | HEIGHT: 63 IN | WEIGHT: 234.7 LBS | OXYGEN SATURATION: 95 %

## 2023-04-01 DIAGNOSIS — I10 UNCONTROLLED HYPERTENSION: ICD-10-CM

## 2023-04-01 DIAGNOSIS — M25.50 ARTHRALGIA, UNSPECIFIED JOINT: ICD-10-CM

## 2023-04-01 DIAGNOSIS — R00.0 TACHYCARDIA: ICD-10-CM

## 2023-04-01 DIAGNOSIS — R09.89 CHEST CONGESTION: ICD-10-CM

## 2023-04-01 PROCEDURE — 99214 OFFICE O/P EST MOD 30 MIN: CPT | Performed by: FAMILY MEDICINE

## 2023-04-01 RX ORDER — DEXAMETHASONE 6 MG/1
6 TABLET ORAL DAILY
Qty: 3 TABLET | Refills: 0 | Status: SHIPPED | OUTPATIENT
Start: 2023-04-01 | End: 2023-05-13

## 2023-04-01 RX ORDER — COLCHICINE 0.6 MG/1
0.6 TABLET ORAL DAILY
Qty: 7 TABLET | Refills: 0 | Status: SHIPPED | OUTPATIENT
Start: 2023-04-01 | End: 2023-06-01

## 2023-04-01 ASSESSMENT — FIBROSIS 4 INDEX: FIB4 SCORE: 2.110579412044345358

## 2023-04-01 ASSESSMENT — PAIN SCALES - GENERAL: PAINLEVEL: 6=MODERATE PAIN

## 2023-04-01 NOTE — PROGRESS NOTES
Subjective     Alberto Maldonado is a 37 y.o. male who presents with Other (Gout flaring up again, left toes are swollen, congestion in chest)      - This is a pleasant and nontoxic appearing 37 y.o. male who has come to the walk-in clinic today for:    #1) Lt foot pain x 5 days. Feels like gout. No trauma. No NVFC. Had gout attack recently and finished MedrolPak and helped. Usually feels it more in ankle    3-4 days feels a little chest congestion and slight cough like getting a cold. Using otc Delsym and Mucinex and says it helps. No cp/sob or NVFC    Hx HTN and Tachycardia, takes meds      ALLERGIES:  Patient has no known allergies.     PMH:  Past Medical History:   Diagnosis Date    Asthma without status asthmaticus 6/29/2017        PSH:  History reviewed. No pertinent surgical history.    MEDS:    Current Outpatient Medications:     dexamethasone (DECADRON) 6 MG Tab, Take 1 Tablet by mouth every day., Disp: 3 Tablet, Rfl: 0    colchicine (COLCRYS) 0.6 MG Tab, Take 1 Tablet by mouth every day., Disp: 7 Tablet, Rfl: 0    metoprolol SR (TOPROL XL) 50 MG TABLET SR 24 HR, Take 50 mg by mouth every day., Disp: , Rfl:     losartan (COZAAR) 50 MG Tab, Take 50 mg by mouth every day., Disp: , Rfl:     albuterol 108 (90 Base) MCG/ACT Aero Soln inhalation aerosol, Inhale 2 Puffs every 6 hours as needed for Shortness of Breath., Disp: 8.5 g, Rfl: 0    Acetaminophen (TYLENOL PO), Take  by mouth., Disp: , Rfl:     ** I have documented what I find to be significant in regards to past medical, social, family and surgical history  in my HPI or under PMH/PSH/FH review section, otherwise it is noncontributory **         HPI    Review of Systems   Musculoskeletal:  Positive for joint pain.   All other systems reviewed and are negative.           Objective     BP (!) 156/94 (BP Location: Right arm, Patient Position: Sitting, BP Cuff Size: Large adult long)   Pulse (!) 126   Temp 36.7 °C (98.1 °F) (Temporal)   Resp 16   Ht  "1.6 m (5' 3\")   Wt 106 kg (234 lb 11.2 oz)   SpO2 95%   BMI 41.58 kg/m²      Physical Exam  Vitals and nursing note reviewed.   Constitutional:       General: He is not in acute distress.     Appearance: Normal appearance. He is well-developed.   HENT:      Head: Normocephalic.      Mouth/Throat:      Mouth: Mucous membranes are moist.      Pharynx: Oropharynx is clear.   Cardiovascular:      Heart sounds: Normal heart sounds. No murmur heard.  Pulmonary:      Effort: Pulmonary effort is normal. No respiratory distress.      Breath sounds: Normal breath sounds.   Musculoskeletal:        Feet:    Feet:      Comments: Lt foot: slight edema and ttp. No erythema. Good srom  Neurological:      Mental Status: He is alert.      Motor: No abnormal muscle tone.   Psychiatric:         Mood and Affect: Mood normal.         Behavior: Behavior normal.                           Assessment & Plan     1. Gout  dexamethasone (DECADRON) 6 MG Tab    colchicine (COLCRYS) 0.6 MG Tab      2. Chest congestion        3. Uncontrolled hypertension        4. Tachycardia            May be gout, treat as noted in my A&P. If not 100% better in 3-4 days RTC for x-rays and re-evaluation     - Dx, plan & d/c instructions discussed   - Rest  - OTC Motrin and/or Tylenol as needed    Follow up with your regular primary care providers office for a recheck on today's visit. ER if not improving in 2-3 days or if feeling/getting worse. (If you do not have a primary care provider and need to schedule one you may call [facility] at [phone] to do this).  reviewed.     Patient left in stable condition     Pertinent prior office visit notes in [facility] have been reviewed by me today on day of this visit.           "

## 2023-04-01 NOTE — LETTER
April 1, 2023         Patient: Alberto Maldonado   YOB: 1985   Date of Visit: 4/1/2023           To Whom it May Concern:    Alberto Maldonado was seen in my clinic on 4/1/2023. He may return to work in 1-3 days.    If you have any questions or concerns, please don't hesitate to call.        Sincerely,           Jhon Majano M.D.  Electronically Signed

## 2023-04-22 ENCOUNTER — HOSPITAL ENCOUNTER (EMERGENCY)
Facility: MEDICAL CENTER | Age: 38
End: 2023-04-22
Payer: COMMERCIAL

## 2023-04-22 ENCOUNTER — OFFICE VISIT (OUTPATIENT)
Dept: URGENT CARE | Facility: CLINIC | Age: 38
End: 2023-04-22
Payer: COMMERCIAL

## 2023-04-22 VITALS
RESPIRATION RATE: 20 BRPM | HEIGHT: 63 IN | WEIGHT: 239.3 LBS | SYSTOLIC BLOOD PRESSURE: 180 MMHG | DIASTOLIC BLOOD PRESSURE: 114 MMHG | TEMPERATURE: 98.5 F | BODY MASS INDEX: 42.4 KG/M2 | HEART RATE: 115 BPM | OXYGEN SATURATION: 96 %

## 2023-04-22 DIAGNOSIS — R00.0 TACHYCARDIA: ICD-10-CM

## 2023-04-22 DIAGNOSIS — R03.0 ELEVATED BLOOD PRESSURE READING: ICD-10-CM

## 2023-04-22 DIAGNOSIS — J06.9 UPPER RESPIRATORY TRACT INFECTION, UNSPECIFIED TYPE: ICD-10-CM

## 2023-04-22 DIAGNOSIS — M10.9 ACUTE GOUT INVOLVING TOE OF RIGHT FOOT, UNSPECIFIED CAUSE: ICD-10-CM

## 2023-04-22 PROCEDURE — 99214 OFFICE O/P EST MOD 30 MIN: CPT

## 2023-04-22 ASSESSMENT — ENCOUNTER SYMPTOMS
NAUSEA: 0
ABDOMINAL PAIN: 0
COUGH: 1
SHORTNESS OF BREATH: 0
VOMITING: 0
WHEEZING: 0
SORE THROAT: 1
FEVER: 0
DIZZINESS: 1
BLURRED VISION: 0
HEADACHES: 0

## 2023-04-22 ASSESSMENT — FIBROSIS 4 INDEX: FIB4 SCORE: 2.110579412044345358

## 2023-04-22 NOTE — PROGRESS NOTES
Subjective:     Alberto Maldonado is a 37 y.o. male who presents for Otalgia (X4days Cough/yellow phlegm/chest congestion/gout flare up medication refill/)    Patient presents to the urgent care with a chief complaint of pain in the right ear, productive cough, sore throat, and congestion.  He reports that these symptoms began approximately 7 days ago and that they are unchanged since date of onset.  He has tried Mucinex with mild relief.  In addition, he reports that he began experiencing a gout flare.  He is having pain in the fifth toe of the right foot.  He is experiencing pain with ambulation and has taken Tylenol/ibuprofen with mild relief.  He is hypertensive and tachycardic in the clinic with reports of feeling lightheaded.  He reports that he took his blood pressure medications this morning.     Review of Systems   Constitutional:  Positive for malaise/fatigue. Negative for fever.   HENT:  Positive for congestion, ear pain and sore throat.    Eyes:  Negative for blurred vision.   Respiratory:  Positive for cough. Negative for shortness of breath and wheezing.    Cardiovascular:  Positive for chest pain.   Gastrointestinal:  Negative for abdominal pain, nausea and vomiting.   Musculoskeletal:  Positive for joint pain.        Right fifth toe   Skin:  Negative for rash.   Neurological:  Positive for dizziness. Negative for headaches.   All other systems reviewed and are negative.    PMH:   Past Medical History:   Diagnosis Date    Asthma without status asthmaticus 6/29/2017     ALLERGIES: No Known Allergies  SURGHX: History reviewed. No pertinent surgical history.  SOCHX:   Social History     Socioeconomic History    Marital status: Single   Tobacco Use    Smoking status: Never    Smokeless tobacco: Never   Vaping Use    Vaping Use: Never used   Substance and Sexual Activity    Alcohol use: Not Currently    Drug use: Not Currently    Sexual activity: Not Currently     FH:   Family History   Problem  "Relation Age of Onset    Stroke Neg Hx     Heart Disease Neg Hx          Objective:   BP (!) 180/114 (BP Location: Left arm, Patient Position: Sitting)   Pulse (!) 115   Temp 36.9 °C (98.5 °F) (Temporal)   Resp 20   Ht 1.6 m (5' 3\")   Wt 109 kg (239 lb 4.8 oz)   SpO2 96%   BMI 42.39 kg/m²     Physical Exam  Vitals and nursing note reviewed.   Constitutional:       Appearance: Normal appearance.   HENT:      Head: Normocephalic and atraumatic.      Right Ear: Tympanic membrane, ear canal and external ear normal.      Left Ear: Tympanic membrane, ear canal and external ear normal.      Nose: Congestion present. No rhinorrhea.      Mouth/Throat:      Mouth: Mucous membranes are moist.      Pharynx: Posterior oropharyngeal erythema present. No oropharyngeal exudate.   Eyes:      Extraocular Movements: Extraocular movements intact.      Pupils: Pupils are equal, round, and reactive to light.   Cardiovascular:      Rate and Rhythm: Regular rhythm. Tachycardia present.      Pulses: Normal pulses.      Heart sounds: Normal heart sounds.   Pulmonary:      Effort: Pulmonary effort is normal. No tachypnea.      Breath sounds: Normal air entry. No transmitted upper airway sounds. Examination of the right-middle field reveals rhonchi. Examination of the left-middle field reveals rhonchi. Examination of the right-lower field reveals rhonchi. Examination of the left-lower field reveals rhonchi. Rhonchi present. No decreased breath sounds, wheezing or rales.   Abdominal:      General: Abdomen is flat. There is no distension.   Musculoskeletal:         General: No swelling or tenderness.      Cervical back: Normal range of motion and neck supple.        Feet:    Feet:      Right foot:      Skin integrity: Erythema present.      Comments: TTP over right fifth toe.  Erythema and swelling of right fifth toe present.  Skin:     General: Skin is warm and dry.      Findings: No rash.   Neurological:      General: No focal deficit " present.      Mental Status: He is alert and oriented to person, place, and time. Mental status is at baseline.   Psychiatric:         Mood and Affect: Mood normal.         Behavior: Behavior normal.         Thought Content: Thought content normal.         Judgment: Judgment normal.       Assessment/Plan:   Assessment      1. Elevated blood pressure reading    2. Tachycardia    3. Upper respiratory tract infection, unspecified type    4. Acute gout involving toe of right foot, unspecified cause     Patient tachycardic at 115 in clinic and experiencing an elevated blood pressure 180/114 despite taking oral antihypertensive medications.  He is experiencing lightheadedness, which is likely related to his elevated blood pressure. Due to limited resources in urgent care and diagnostic uncertainty, patient referred to emergency room for further work-up.  Patient verbalized understanding and is agreement with this plan of care.  He would like to drive himself to the emergency room and declined EMS transport.  Report called to transfer center.  Patient stable at time of transfer.    I personally reviewed prior external notes and test results pertinent to today's visit.  I have independently reviewed and interpreted all diagnostics ordered during this urgent care visit.     This dictation has been created using voice recognition software. The accuracy of the dictation is limited by the abilities of the software. I expect there may be some errors of grammar and possibly content. I made every attempt to manually correct the errors within my dictation. However, errors related to voice recognition software may still exist and should be interpreted within the appropriate context.    This note was electronically signed by RICHY Garcia

## 2023-04-27 ENCOUNTER — OFFICE VISIT (OUTPATIENT)
Dept: URGENT CARE | Facility: PHYSICIAN GROUP | Age: 38
End: 2023-04-27
Payer: COMMERCIAL

## 2023-04-27 VITALS
BODY MASS INDEX: 41.64 KG/M2 | HEIGHT: 63 IN | OXYGEN SATURATION: 94 % | SYSTOLIC BLOOD PRESSURE: 122 MMHG | RESPIRATION RATE: 16 BRPM | DIASTOLIC BLOOD PRESSURE: 72 MMHG | TEMPERATURE: 98.4 F | HEART RATE: 100 BPM | WEIGHT: 235 LBS

## 2023-04-27 DIAGNOSIS — M10.071 ACUTE IDIOPATHIC GOUT OF RIGHT ANKLE: ICD-10-CM

## 2023-04-27 DIAGNOSIS — B96.89 ACUTE BACTERIAL SINUSITIS: ICD-10-CM

## 2023-04-27 DIAGNOSIS — J01.90 ACUTE BACTERIAL SINUSITIS: ICD-10-CM

## 2023-04-27 PROCEDURE — 99214 OFFICE O/P EST MOD 30 MIN: CPT | Performed by: NURSE PRACTITIONER

## 2023-04-27 RX ORDER — PREDNISONE 20 MG/1
TABLET ORAL
Qty: 10 TABLET | Refills: 0 | Status: SHIPPED | OUTPATIENT
Start: 2023-04-27 | End: 2023-05-13

## 2023-04-27 RX ORDER — AMOXICILLIN AND CLAVULANATE POTASSIUM 875; 125 MG/1; MG/1
1 TABLET, FILM COATED ORAL 2 TIMES DAILY
Qty: 14 TABLET | Refills: 0 | Status: SHIPPED | OUTPATIENT
Start: 2023-04-27 | End: 2023-05-04

## 2023-04-27 ASSESSMENT — ENCOUNTER SYMPTOMS
DIARRHEA: 0
FEVER: 0
CHILLS: 0
HEADACHES: 0
VOMITING: 0
DIZZINESS: 0
COUGH: 1
SPUTUM PRODUCTION: 1

## 2023-04-27 ASSESSMENT — FIBROSIS 4 INDEX: FIB4 SCORE: 2.110579412044345358

## 2023-04-27 NOTE — PROGRESS NOTES
Subjective     Alberto Maldonado is a 37 y.o. male who presents with Other (Gout on right ankle, right ear px, cough, head congestion x 1 week )            HPI  New. 37 year old male with gout to right ankle as well as 10 day history of nasal congestion and cough plus right ear pain. He denies fever, chills, other joint pain or myalgia. Denies headache, nausea or diarrhea. He has been taking tylenol for gout symptoms as he is out of regular medications.   Patient has no known allergies.  Current Outpatient Medications on File Prior to Visit   Medication Sig Dispense Refill    dexamethasone (DECADRON) 6 MG Tab Take 1 Tablet by mouth every day. 3 Tablet 0    colchicine (COLCRYS) 0.6 MG Tab Take 1 Tablet by mouth every day. 7 Tablet 0    metoprolol SR (TOPROL XL) 50 MG TABLET SR 24 HR Take 50 mg by mouth every day.      losartan (COZAAR) 50 MG Tab Take 50 mg by mouth every day.      albuterol 108 (90 Base) MCG/ACT Aero Soln inhalation aerosol Inhale 2 Puffs every 6 hours as needed for Shortness of Breath. 8.5 g 0    Acetaminophen (TYLENOL PO) Take  by mouth.       No current facility-administered medications on file prior to visit.     Social History     Socioeconomic History    Marital status: Single     Spouse name: Not on file    Number of children: Not on file    Years of education: Not on file    Highest education level: Not on file   Occupational History    Not on file   Tobacco Use    Smoking status: Never    Smokeless tobacco: Never   Vaping Use    Vaping Use: Never used   Substance and Sexual Activity    Alcohol use: Not Currently    Drug use: Not Currently    Sexual activity: Not Currently   Other Topics Concern    Not on file   Social History Narrative    Not on file     Social Determinants of Health     Financial Resource Strain: Not on file   Food Insecurity: Not on file   Transportation Needs: Not on file   Physical Activity: Not on file   Stress: Not on file   Social Connections: Not on file  "  Intimate Partner Violence: Not on file   Housing Stability: Not on file     Breast Cancer-related family history is not on file.      Review of Systems   Constitutional:  Negative for chills, fever and malaise/fatigue.   HENT:  Positive for congestion and ear pain.    Respiratory:  Positive for cough and sputum production.    Gastrointestinal:  Negative for diarrhea and vomiting.   Musculoskeletal:  Positive for joint pain.   Neurological:  Negative for dizziness and headaches.            Objective     /72   Pulse 100   Temp 36.9 °C (98.4 °F) (Temporal)   Resp 16   Ht 1.6 m (5' 3\")   Wt 107 kg (235 lb)   SpO2 94%   BMI 41.63 kg/m²      Physical Exam  Vitals and nursing note reviewed.   Constitutional:       General: He is not in acute distress.     Appearance: He is well-developed.   HENT:      Head: Normocephalic.      Right Ear: Tympanic membrane and external ear normal.      Left Ear: Tympanic membrane and external ear normal.      Nose: Mucosal edema, congestion and rhinorrhea present.      Mouth/Throat:      Pharynx: No posterior oropharyngeal erythema.   Eyes:      General:         Right eye: No discharge.         Left eye: No discharge.      Conjunctiva/sclera: Conjunctivae normal.   Cardiovascular:      Rate and Rhythm: Normal rate and regular rhythm.      Heart sounds: Normal heart sounds.   Pulmonary:      Effort: Pulmonary effort is normal.      Breath sounds: Normal breath sounds.   Musculoskeletal:         General: Normal range of motion.      Cervical back: Normal range of motion and neck supple.      Right ankle: No swelling. Tenderness present over the lateral malleolus and medial malleolus.   Lymphadenopathy:      Cervical: No cervical adenopathy.   Skin:     General: Skin is warm and dry.   Neurological:      Mental Status: He is alert and oriented to person, place, and time.   Psychiatric:         Behavior: Behavior normal.         Thought Content: Thought content normal.           "                 Assessment & Plan        1. Acute idiopathic gout of right ankle  predniSONE (DELTASONE) 20 MG Tab      2. Acute bacterial sinusitis  amoxicillin-clavulanate (AUGMENTIN) 875-125 MG Tab        Differential diagnosis, natural history, supportive care, and indications for immediate follow-up were discussed.

## 2023-04-27 NOTE — LETTER
April 27, 2023        Alberto Leighton Maldonado  [address] [place] [address]        Alberto was seen in our clinic today and he is excused from work for today and tomorrow. Thank you.   If you have any questions or concerns, please don't hesitate to call.        Sincerely,        REX Santana.P.OSMAR.    Electronically Signed

## 2023-05-13 ENCOUNTER — OFFICE VISIT (OUTPATIENT)
Dept: URGENT CARE | Facility: PHYSICIAN GROUP | Age: 38
End: 2023-05-13
Payer: COMMERCIAL

## 2023-05-13 VITALS
SYSTOLIC BLOOD PRESSURE: 122 MMHG | DIASTOLIC BLOOD PRESSURE: 80 MMHG | HEART RATE: 88 BPM | RESPIRATION RATE: 16 BRPM | TEMPERATURE: 97 F | BODY MASS INDEX: 41.82 KG/M2 | WEIGHT: 236 LBS | HEIGHT: 63 IN | OXYGEN SATURATION: 94 %

## 2023-05-13 DIAGNOSIS — H61.23 BILATERAL IMPACTED CERUMEN: ICD-10-CM

## 2023-05-13 DIAGNOSIS — H60.501 ACUTE OTITIS EXTERNA OF RIGHT EAR, UNSPECIFIED TYPE: ICD-10-CM

## 2023-05-13 DIAGNOSIS — M10.9 ACUTE GOUT INVOLVING TOE OF RIGHT FOOT, UNSPECIFIED CAUSE: ICD-10-CM

## 2023-05-13 PROCEDURE — 3079F DIAST BP 80-89 MM HG: CPT | Performed by: STUDENT IN AN ORGANIZED HEALTH CARE EDUCATION/TRAINING PROGRAM

## 2023-05-13 PROCEDURE — 99214 OFFICE O/P EST MOD 30 MIN: CPT | Performed by: STUDENT IN AN ORGANIZED HEALTH CARE EDUCATION/TRAINING PROGRAM

## 2023-05-13 PROCEDURE — 1125F AMNT PAIN NOTED PAIN PRSNT: CPT | Performed by: STUDENT IN AN ORGANIZED HEALTH CARE EDUCATION/TRAINING PROGRAM

## 2023-05-13 PROCEDURE — 3074F SYST BP LT 130 MM HG: CPT | Performed by: STUDENT IN AN ORGANIZED HEALTH CARE EDUCATION/TRAINING PROGRAM

## 2023-05-13 RX ORDER — METOPROLOL TARTRATE 50 MG/1
TABLET, FILM COATED ORAL
COMMUNITY
End: 2023-05-13

## 2023-05-13 RX ORDER — OFLOXACIN 3 MG/ML
5 SOLUTION AURICULAR (OTIC) DAILY
Qty: 7 ML | Refills: 0 | Status: SHIPPED | OUTPATIENT
Start: 2023-05-13 | End: 2023-05-20

## 2023-05-13 RX ORDER — LOSARTAN POTASSIUM 50 MG/1
TABLET ORAL
COMMUNITY
End: 2023-05-13

## 2023-05-13 RX ORDER — PREDNISONE 20 MG/1
30 TABLET ORAL DAILY
Qty: 8 TABLET | Refills: 0 | Status: SHIPPED | OUTPATIENT
Start: 2023-05-13 | End: 2023-05-18

## 2023-05-13 RX ORDER — METOPROLOL TARTRATE 50 MG/1
TABLET, FILM COATED ORAL
COMMUNITY
Start: 2023-04-15 | End: 2023-05-13

## 2023-05-13 ASSESSMENT — FIBROSIS 4 INDEX: FIB4 SCORE: 2.110579412044345358

## 2023-05-13 NOTE — PROGRESS NOTES
"Subjective:   Alberto Maldonado is a 37 y.o. male who presents for Ear Pain (R ear pain, was seen earlier, did not improve, painful, still feels full, hearing muffled) and Foot Pain (R foot, gout, colchicine for gout issues)      HPI:  Pleasant 37-year-old male presents the urgent care for 2 different issues.  Patient's first issue is right great toe pain that started a couple days ago.  He states he does have a history of gout and this feels exactly like those past experiences.  He states he does have an appointment with his PCP this coming Friday.  He denies taking any daily allopurinol.  States that it is uncomfortable to walk on and even painful with brushing up against bedsheets.  His second issue is right-sided muffled hearing without ear pain.  Denies fever, chills, blurred vision, double vision, nausea, vomiting, chest pain, palpitations, cough, shortness of breath, sore throat, ear discharge, tinnitus, for hearing loss, dizziness, or headache.      Medications:    albuterol Aers  colchicine Tabs  losartan Tabs  metoprolol SR Tb24  predniSONE Tabs  TYLENOL PO    Allergies: Patient has no known allergies.    Problem List: Alberto Maldonado does not have any pertinent problems on file.    Surgical History:  No past surgical history on file.    Past Social Hx: Alberto Maldonado  reports that he has never smoked. He has never used smokeless tobacco. He reports that he does not currently use alcohol. He reports that he does not currently use drugs.     Past Family Hx:  Alberto Maldonado family history is not on file.     Problem list, medications, and allergies reviewed by myself today in Epic.     Objective:     /80   Pulse 88   Temp 36.1 °C (97 °F) (Temporal)   Resp 16   Ht 1.6 m (5' 3\")   Wt 107 kg (236 lb)   SpO2 94%   BMI 41.81 kg/m²     Physical Exam  Vitals reviewed.   Constitutional:       General: He is not in acute distress.     Appearance: Normal appearance.   HENT: "      Head: Normocephalic.      Right Ear: Hearing, tympanic membrane, ear canal and external ear normal. There is impacted cerumen.      Left Ear: Hearing, tympanic membrane, ear canal and external ear normal. No drainage. There is impacted cerumen. Tympanic membrane is not injected, perforated, erythematous or bulging.      Ears:      Comments: Initial exam shows bilateral impacted cerumen.  Irrigation was performed and cerumen impaction was removed on the left side.  Large chunk of wax also removed on the right side.  Reevaluation does show what looks like discharge to the right ear canal with moderate canal swelling.  There is also erythema to the ear canal.  It looks like the majority of the wax has been cleared and is more consistent with discharge at this time.  Unable to visualize the TM.  Mild tragal tenderness present on the right side.     Nose: Nose normal.      Mouth/Throat:      Mouth: Mucous membranes are moist.   Eyes:      Conjunctiva/sclera: Conjunctivae normal.      Pupils: Pupils are equal, round, and reactive to light.   Cardiovascular:      Rate and Rhythm: Normal rate and regular rhythm.      Pulses: Normal pulses.      Heart sounds: Normal heart sounds. No murmur heard.  Pulmonary:      Effort: Pulmonary effort is normal. No respiratory distress.      Breath sounds: Normal breath sounds. No stridor. No wheezing, rhonchi or rales.   Musculoskeletal:      Cervical back: Normal range of motion.   Lymphadenopathy:      Cervical: No cervical adenopathy.   Skin:     General: Skin is warm and dry.      Capillary Refill: Capillary refill takes less than 2 seconds.      Findings: No erythema, lesion or rash.   Neurological:      General: No focal deficit present.      Mental Status: He is alert and oriented to person, place, and time.         Assessment/Plan:     Diagnosis and associated orders:     1. Acute gout involving toe of right foot, unspecified cause  predniSONE (DELTASONE) 20 MG Tab      2.  Bilateral impacted cerumen        3. Acute otitis externa of right ear, unspecified type  ofloxacin otic sol (FLOXIN OTIC) 0.3 % Solution         Comments/MDM:     Patient's presentation physical exam findings are consistent with acute gout of the right great toe.  We will treat with prednisone.  He has follow-up with his PCP.  Advised to discuss potential allopurinol to determine if this is an appropriate preventative measure.  Alternate ice and heat 3-5 times per day for 15 to 20 minutes at a time.  Elevate the foot at home.  Patient's second issue is bilateral impacted cerumen.  Cerumen impaction removed on the left without any signs of infection.  Large amount of earwax removed from the right side which shows signs of otitis externa.  There does appear to be discharge and I cannot fully visualize the TM.  We will start ofloxacin at this time.  Advised to follow-up with his PCP to make sure infection has fully resolved.  He may also see at that time if his cerumen impaction has fully been removed or if the remainder seen today is discharge due to otitis externa.  Vitals all within normal limits.  Patient is well-appearing and nontoxic.         Differential diagnosis, natural history, supportive care, and indications for immediate follow-up discussed.    Advised the patient to follow-up with the primary care physician for recheck, reevaluation, and consideration of further management.    Please note that this dictation was created using voice recognition software. I have made a reasonable attempt to correct obvious errors, but I expect that there are errors of grammar and possibly content that I did not discover before finalizing the note.    Electronically signed by Tarun Gutierrez PA-C.

## 2023-06-01 ENCOUNTER — APPOINTMENT (OUTPATIENT)
Dept: RADIOLOGY | Facility: MEDICAL CENTER | Age: 38
DRG: 897 | End: 2023-06-01
Attending: EMERGENCY MEDICINE
Payer: COMMERCIAL

## 2023-06-01 ENCOUNTER — HOSPITAL ENCOUNTER (INPATIENT)
Facility: MEDICAL CENTER | Age: 38
LOS: 2 days | DRG: 897 | End: 2023-06-03
Attending: EMERGENCY MEDICINE | Admitting: HOSPITALIST
Payer: COMMERCIAL

## 2023-06-01 DIAGNOSIS — E86.0 DEHYDRATION: ICD-10-CM

## 2023-06-01 DIAGNOSIS — E83.42 HYPOMAGNESEMIA: ICD-10-CM

## 2023-06-01 DIAGNOSIS — F10.10 ALCOHOL ABUSE: ICD-10-CM

## 2023-06-01 PROBLEM — E83.39 HYPOPHOSPHATEMIA: Status: ACTIVE | Noted: 2023-06-01

## 2023-06-01 PROBLEM — E87.20 METABOLIC ACIDOSIS: Status: ACTIVE | Noted: 2023-06-01

## 2023-06-01 PROBLEM — E87.6 HYPOKALEMIA: Status: ACTIVE | Noted: 2023-06-01

## 2023-06-01 PROBLEM — R00.0 TACHYCARDIA WITH HEART RATE 141-160 BEATS PER MINUTE: Status: ACTIVE | Noted: 2023-06-01

## 2023-06-01 PROBLEM — F10.931 ALCOHOL WITHDRAWAL DELIRIUM (HCC): Status: ACTIVE | Noted: 2023-06-01

## 2023-06-01 LAB
ALBUMIN SERPL BCP-MCNC: 4.3 G/DL (ref 3.2–4.9)
ALBUMIN/GLOB SERPL: 1.2 G/DL
ALP SERPL-CCNC: 120 U/L (ref 30–99)
ALT SERPL-CCNC: 63 U/L (ref 2–50)
ANION GAP SERPL CALC-SCNC: 27 MMOL/L (ref 7–16)
AST SERPL-CCNC: 63 U/L (ref 12–45)
BASOPHILS # BLD AUTO: 0.6 % (ref 0–1.8)
BASOPHILS # BLD: 0.08 K/UL (ref 0–0.12)
BILIRUB SERPL-MCNC: 1 MG/DL (ref 0.1–1.5)
BUN SERPL-MCNC: 6 MG/DL (ref 8–22)
CALCIUM ALBUM COR SERPL-MCNC: 8.7 MG/DL (ref 8.5–10.5)
CALCIUM SERPL-MCNC: 8.9 MG/DL (ref 8.5–10.5)
CHLORIDE SERPL-SCNC: 100 MMOL/L (ref 96–112)
CO2 SERPL-SCNC: 14 MMOL/L (ref 20–33)
CREAT SERPL-MCNC: 1.09 MG/DL (ref 0.5–1.4)
EKG IMPRESSION: NORMAL
EKG IMPRESSION: NORMAL
EOSINOPHIL # BLD AUTO: 0.01 K/UL (ref 0–0.51)
EOSINOPHIL NFR BLD: 0.1 % (ref 0–6.9)
ERYTHROCYTE [DISTWIDTH] IN BLOOD BY AUTOMATED COUNT: 44.2 FL (ref 35.9–50)
ETHANOL BLD-MCNC: <10.1 MG/DL
GFR SERPLBLD CREATININE-BSD FMLA CKD-EPI: 90 ML/MIN/1.73 M 2
GLOBULIN SER CALC-MCNC: 3.6 G/DL (ref 1.9–3.5)
GLUCOSE SERPL-MCNC: 118 MG/DL (ref 65–99)
HCT VFR BLD AUTO: 56.8 % (ref 42–52)
HGB BLD-MCNC: 19.7 G/DL (ref 14–18)
IMM GRANULOCYTES # BLD AUTO: 0.04 K/UL (ref 0–0.11)
IMM GRANULOCYTES NFR BLD AUTO: 0.3 % (ref 0–0.9)
INR PPP: 1.08 (ref 0.87–1.13)
LIPASE SERPL-CCNC: 22 U/L (ref 11–82)
LYMPHOCYTES # BLD AUTO: 1.34 K/UL (ref 1–4.8)
LYMPHOCYTES NFR BLD: 9.5 % (ref 22–41)
MAGNESIUM SERPL-MCNC: 1.2 MG/DL (ref 1.5–2.5)
MCH RBC QN AUTO: 31 PG (ref 27–33)
MCHC RBC AUTO-ENTMCNC: 34.7 G/DL (ref 32.3–36.5)
MCV RBC AUTO: 89.4 FL (ref 81.4–97.8)
MONOCYTES # BLD AUTO: 0.85 K/UL (ref 0–0.85)
MONOCYTES NFR BLD AUTO: 6 % (ref 0–13.4)
NEUTROPHILS # BLD AUTO: 11.77 K/UL (ref 1.82–7.42)
NEUTROPHILS NFR BLD: 83.5 % (ref 44–72)
NRBC # BLD AUTO: 0 K/UL
NRBC BLD-RTO: 0 /100 WBC (ref 0–0.2)
NT-PROBNP SERPL IA-MCNC: 122 PG/ML (ref 0–125)
PHOSPHATE SERPL-MCNC: 2.4 MG/DL (ref 2.5–4.5)
PLATELET # BLD AUTO: 402 K/UL (ref 164–446)
PMV BLD AUTO: 8.9 FL (ref 9–12.9)
POTASSIUM SERPL-SCNC: 3.7 MMOL/L (ref 3.6–5.5)
PROT SERPL-MCNC: 7.9 G/DL (ref 6–8.2)
PROTHROMBIN TIME: 13.9 SEC (ref 12–14.6)
RBC # BLD AUTO: 6.35 M/UL (ref 4.7–6.1)
SODIUM SERPL-SCNC: 141 MMOL/L (ref 135–145)
TROPONIN T SERPL-MCNC: 14 NG/L (ref 6–19)
TROPONIN T SERPL-MCNC: 15 NG/L (ref 6–19)
TSH SERPL DL<=0.005 MIU/L-ACNC: 3.04 UIU/ML (ref 0.38–5.33)
WBC # BLD AUTO: 14.1 K/UL (ref 4.8–10.8)

## 2023-06-01 PROCEDURE — 700101 HCHG RX REV CODE 250: Performed by: EMERGENCY MEDICINE

## 2023-06-01 PROCEDURE — 770020 HCHG ROOM/CARE - TELE (206)

## 2023-06-01 PROCEDURE — 96366 THER/PROPH/DIAG IV INF ADDON: CPT

## 2023-06-01 PROCEDURE — 82077 ASSAY SPEC XCP UR&BREATH IA: CPT

## 2023-06-01 PROCEDURE — 700102 HCHG RX REV CODE 250 W/ 637 OVERRIDE(OP): Performed by: HOSPITALIST

## 2023-06-01 PROCEDURE — 96368 THER/DIAG CONCURRENT INF: CPT

## 2023-06-01 PROCEDURE — 71045 X-RAY EXAM CHEST 1 VIEW: CPT

## 2023-06-01 PROCEDURE — 36415 COLL VENOUS BLD VENIPUNCTURE: CPT

## 2023-06-01 PROCEDURE — 84443 ASSAY THYROID STIM HORMONE: CPT

## 2023-06-01 PROCEDURE — 85610 PROTHROMBIN TIME: CPT

## 2023-06-01 PROCEDURE — 93005 ELECTROCARDIOGRAM TRACING: CPT | Performed by: EMERGENCY MEDICINE

## 2023-06-01 PROCEDURE — 93005 ELECTROCARDIOGRAM TRACING: CPT

## 2023-06-01 PROCEDURE — 99223 1ST HOSP IP/OBS HIGH 75: CPT | Performed by: HOSPITALIST

## 2023-06-01 PROCEDURE — 85025 COMPLETE CBC W/AUTO DIFF WBC: CPT

## 2023-06-01 PROCEDURE — 700111 HCHG RX REV CODE 636 W/ 250 OVERRIDE (IP): Performed by: EMERGENCY MEDICINE

## 2023-06-01 PROCEDURE — 83690 ASSAY OF LIPASE: CPT

## 2023-06-01 PROCEDURE — 96365 THER/PROPH/DIAG IV INF INIT: CPT

## 2023-06-01 PROCEDURE — 700102 HCHG RX REV CODE 250 W/ 637 OVERRIDE(OP): Performed by: EMERGENCY MEDICINE

## 2023-06-01 PROCEDURE — 700105 HCHG RX REV CODE 258: Performed by: EMERGENCY MEDICINE

## 2023-06-01 PROCEDURE — 700111 HCHG RX REV CODE 636 W/ 250 OVERRIDE (IP)

## 2023-06-01 PROCEDURE — 94760 N-INVAS EAR/PLS OXIMETRY 1: CPT

## 2023-06-01 PROCEDURE — A9270 NON-COVERED ITEM OR SERVICE: HCPCS | Performed by: EMERGENCY MEDICINE

## 2023-06-01 PROCEDURE — 83880 ASSAY OF NATRIURETIC PEPTIDE: CPT

## 2023-06-01 PROCEDURE — 80053 COMPREHEN METABOLIC PANEL: CPT

## 2023-06-01 PROCEDURE — 96375 TX/PRO/DX INJ NEW DRUG ADDON: CPT

## 2023-06-01 PROCEDURE — 700101 HCHG RX REV CODE 250: Performed by: HOSPITALIST

## 2023-06-01 PROCEDURE — 84100 ASSAY OF PHOSPHORUS: CPT

## 2023-06-01 PROCEDURE — A9270 NON-COVERED ITEM OR SERVICE: HCPCS | Performed by: HOSPITALIST

## 2023-06-01 PROCEDURE — 83735 ASSAY OF MAGNESIUM: CPT

## 2023-06-01 PROCEDURE — 99285 EMERGENCY DEPT VISIT HI MDM: CPT

## 2023-06-01 PROCEDURE — HZ2ZZZZ DETOXIFICATION SERVICES FOR SUBSTANCE ABUSE TREATMENT: ICD-10-PCS | Performed by: HOSPITALIST

## 2023-06-01 PROCEDURE — 84484 ASSAY OF TROPONIN QUANT: CPT

## 2023-06-01 RX ORDER — POLYETHYLENE GLYCOL 3350 17 G/17G
1 POWDER, FOR SOLUTION ORAL
Status: DISCONTINUED | OUTPATIENT
Start: 2023-06-01 | End: 2023-06-03

## 2023-06-01 RX ORDER — METOPROLOL TARTRATE 1 MG/ML
5 INJECTION, SOLUTION INTRAVENOUS ONCE
Status: DISCONTINUED | OUTPATIENT
Start: 2023-06-01 | End: 2023-06-01

## 2023-06-01 RX ORDER — ADENOSINE 3 MG/ML
INJECTION, SOLUTION INTRAVENOUS
Status: COMPLETED
Start: 2023-06-01 | End: 2023-06-01

## 2023-06-01 RX ORDER — SODIUM CHLORIDE 9 MG/ML
1000 INJECTION, SOLUTION INTRAVENOUS ONCE
Status: COMPLETED | OUTPATIENT
Start: 2023-06-01 | End: 2023-06-01

## 2023-06-01 RX ORDER — HYDRALAZINE HYDROCHLORIDE 20 MG/ML
20 INJECTION INTRAMUSCULAR; INTRAVENOUS EVERY 4 HOURS PRN
Status: DISCONTINUED | OUTPATIENT
Start: 2023-06-01 | End: 2023-06-03 | Stop reason: HOSPADM

## 2023-06-01 RX ORDER — ONDANSETRON 4 MG/1
4 TABLET, ORALLY DISINTEGRATING ORAL EVERY 4 HOURS PRN
Status: DISCONTINUED | OUTPATIENT
Start: 2023-06-01 | End: 2023-06-03 | Stop reason: HOSPADM

## 2023-06-01 RX ORDER — ADENOSINE 3 MG/ML
6 INJECTION, SOLUTION INTRAVENOUS ONCE
Status: COMPLETED | OUTPATIENT
Start: 2023-06-01 | End: 2023-06-01

## 2023-06-01 RX ORDER — METOPROLOL TARTRATE 50 MG/1
50 TABLET, FILM COATED ORAL 2 TIMES DAILY
Status: ON HOLD | COMMUNITY
End: 2023-06-03

## 2023-06-01 RX ORDER — LORAZEPAM 2 MG/1
4 TABLET ORAL
Status: DISCONTINUED | OUTPATIENT
Start: 2023-06-01 | End: 2023-06-03 | Stop reason: HOSPADM

## 2023-06-01 RX ORDER — LORAZEPAM 2 MG/ML
2 INJECTION INTRAMUSCULAR
Status: DISCONTINUED | OUTPATIENT
Start: 2023-06-01 | End: 2023-06-03 | Stop reason: HOSPADM

## 2023-06-01 RX ORDER — LORAZEPAM 2 MG/1
2 TABLET ORAL
Status: DISCONTINUED | OUTPATIENT
Start: 2023-06-01 | End: 2023-06-03 | Stop reason: HOSPADM

## 2023-06-01 RX ORDER — ONDANSETRON 2 MG/ML
4 INJECTION INTRAMUSCULAR; INTRAVENOUS EVERY 4 HOURS PRN
Status: DISCONTINUED | OUTPATIENT
Start: 2023-06-01 | End: 2023-06-03 | Stop reason: HOSPADM

## 2023-06-01 RX ORDER — LORAZEPAM 0.5 MG/1
0.5 TABLET ORAL EVERY 4 HOURS PRN
Status: DISCONTINUED | OUTPATIENT
Start: 2023-06-01 | End: 2023-06-03 | Stop reason: HOSPADM

## 2023-06-01 RX ORDER — LORAZEPAM 2 MG/ML
0.5 INJECTION INTRAMUSCULAR EVERY 4 HOURS PRN
Status: DISCONTINUED | OUTPATIENT
Start: 2023-06-01 | End: 2023-06-03 | Stop reason: HOSPADM

## 2023-06-01 RX ORDER — AMOXICILLIN 250 MG
2 CAPSULE ORAL 2 TIMES DAILY
Status: DISCONTINUED | OUTPATIENT
Start: 2023-06-01 | End: 2023-06-03

## 2023-06-01 RX ORDER — LORAZEPAM 2 MG/ML
2 INJECTION INTRAMUSCULAR ONCE
Status: DISCONTINUED | OUTPATIENT
Start: 2023-06-01 | End: 2023-06-01

## 2023-06-01 RX ORDER — PROMETHAZINE HYDROCHLORIDE 25 MG/1
12.5-25 SUPPOSITORY RECTAL EVERY 4 HOURS PRN
Status: DISCONTINUED | OUTPATIENT
Start: 2023-06-01 | End: 2023-06-03 | Stop reason: HOSPADM

## 2023-06-01 RX ORDER — PROCHLORPERAZINE EDISYLATE 5 MG/ML
5-10 INJECTION INTRAMUSCULAR; INTRAVENOUS EVERY 4 HOURS PRN
Status: DISCONTINUED | OUTPATIENT
Start: 2023-06-01 | End: 2023-06-03 | Stop reason: HOSPADM

## 2023-06-01 RX ORDER — FOLIC ACID 1 MG/1
1 TABLET ORAL DAILY
Status: DISCONTINUED | OUTPATIENT
Start: 2023-06-01 | End: 2023-06-03 | Stop reason: HOSPADM

## 2023-06-01 RX ORDER — LORAZEPAM 1 MG/1
1 TABLET ORAL EVERY 4 HOURS PRN
Status: DISCONTINUED | OUTPATIENT
Start: 2023-06-01 | End: 2023-06-03 | Stop reason: HOSPADM

## 2023-06-01 RX ORDER — CHLORDIAZEPOXIDE HYDROCHLORIDE 25 MG/1
25 CAPSULE, GELATIN COATED ORAL EVERY 8 HOURS
Status: DISCONTINUED | OUTPATIENT
Start: 2023-06-01 | End: 2023-06-02

## 2023-06-01 RX ORDER — PROMETHAZINE HYDROCHLORIDE 25 MG/1
12.5-25 TABLET ORAL EVERY 4 HOURS PRN
Status: DISCONTINUED | OUTPATIENT
Start: 2023-06-01 | End: 2023-06-03 | Stop reason: HOSPADM

## 2023-06-01 RX ORDER — BISACODYL 10 MG
10 SUPPOSITORY, RECTAL RECTAL
Status: DISCONTINUED | OUTPATIENT
Start: 2023-06-01 | End: 2023-06-03

## 2023-06-01 RX ORDER — METOPROLOL TARTRATE 50 MG/1
50 TABLET, FILM COATED ORAL 2 TIMES DAILY
Status: DISCONTINUED | OUTPATIENT
Start: 2023-06-01 | End: 2023-06-03

## 2023-06-01 RX ORDER — LORAZEPAM 2 MG/ML
1.5 INJECTION INTRAMUSCULAR
Status: DISCONTINUED | OUTPATIENT
Start: 2023-06-01 | End: 2023-06-03 | Stop reason: HOSPADM

## 2023-06-01 RX ORDER — LORAZEPAM 2 MG/1
2 TABLET ORAL ONCE
Status: COMPLETED | OUTPATIENT
Start: 2023-06-01 | End: 2023-06-01

## 2023-06-01 RX ORDER — SODIUM CHLORIDE AND POTASSIUM CHLORIDE 150; 900 MG/100ML; MG/100ML
INJECTION, SOLUTION INTRAVENOUS CONTINUOUS
Status: DISCONTINUED | OUTPATIENT
Start: 2023-06-01 | End: 2023-06-03

## 2023-06-01 RX ORDER — IBUPROFEN 200 MG
200-400 TABLET ORAL EVERY 6 HOURS PRN
Status: ON HOLD | COMMUNITY
End: 2023-06-03

## 2023-06-01 RX ORDER — MAGNESIUM SULFATE HEPTAHYDRATE 40 MG/ML
2 INJECTION, SOLUTION INTRAVENOUS ONCE
Status: COMPLETED | OUTPATIENT
Start: 2023-06-01 | End: 2023-06-01

## 2023-06-01 RX ORDER — ACETAMINOPHEN 325 MG/1
650 TABLET ORAL EVERY 6 HOURS PRN
Status: DISCONTINUED | OUTPATIENT
Start: 2023-06-01 | End: 2023-06-03 | Stop reason: HOSPADM

## 2023-06-01 RX ORDER — GAUZE BANDAGE 2" X 2"
100 BANDAGE TOPICAL DAILY
Status: DISCONTINUED | OUTPATIENT
Start: 2023-06-01 | End: 2023-06-03 | Stop reason: HOSPADM

## 2023-06-01 RX ORDER — DILTIAZEM HYDROCHLORIDE 5 MG/ML
25 INJECTION INTRAVENOUS ONCE
Status: COMPLETED | OUTPATIENT
Start: 2023-06-01 | End: 2023-06-01

## 2023-06-01 RX ORDER — LORAZEPAM 2 MG/ML
1 INJECTION INTRAMUSCULAR
Status: DISCONTINUED | OUTPATIENT
Start: 2023-06-01 | End: 2023-06-03 | Stop reason: HOSPADM

## 2023-06-01 RX ADMIN — ADENOSINE 6 MG: 3 INJECTION INTRAVENOUS at 11:48

## 2023-06-01 RX ADMIN — MAGNESIUM SULFATE HEPTAHYDRATE 2 G: 2 INJECTION, SOLUTION INTRAVENOUS at 14:19

## 2023-06-01 RX ADMIN — METOPROLOL TARTRATE 50 MG: 50 TABLET ORAL at 17:22

## 2023-06-01 RX ADMIN — POTASSIUM CHLORIDE AND SODIUM CHLORIDE: 900; 150 INJECTION, SOLUTION INTRAVENOUS at 16:52

## 2023-06-01 RX ADMIN — FOLIC ACID 1 MG: 1 TABLET ORAL at 16:54

## 2023-06-01 RX ADMIN — LORAZEPAM 2 MG: 2 TABLET ORAL at 13:48

## 2023-06-01 RX ADMIN — SODIUM CHLORIDE 1000 ML: 9 INJECTION, SOLUTION INTRAVENOUS at 12:02

## 2023-06-01 RX ADMIN — CHLORDIAZEPOXIDE HYDROCHLORIDE 25 MG: 25 CAPSULE ORAL at 16:55

## 2023-06-01 RX ADMIN — RIVAROXABAN 10 MG: 10 TABLET, FILM COATED ORAL at 17:22

## 2023-06-01 RX ADMIN — LORAZEPAM 0.5 MG: 0.5 TABLET ORAL at 17:17

## 2023-06-01 RX ADMIN — ADENOSINE 6 MG: 3 INJECTION, SOLUTION INTRAVENOUS at 11:48

## 2023-06-01 RX ADMIN — POTASSIUM PHOSPHATE, MONOBASIC AND POTASSIUM PHOSPHATE, DIBASIC 30 MMOL: 224; 236 INJECTION, SOLUTION, CONCENTRATE INTRAVENOUS at 16:52

## 2023-06-01 RX ADMIN — DILTIAZEM HYDROCHLORIDE 25 MG: 5 INJECTION INTRAVENOUS at 11:59

## 2023-06-01 RX ADMIN — Medication 100 MG: at 16:55

## 2023-06-01 RX ADMIN — THERA TABS 1 TABLET: TAB at 16:55

## 2023-06-01 RX ADMIN — THIAMINE HYDROCHLORIDE: 100 INJECTION, SOLUTION INTRAMUSCULAR; INTRAVENOUS at 13:52

## 2023-06-01 ASSESSMENT — COGNITIVE AND FUNCTIONAL STATUS - GENERAL
SUGGESTED CMS G CODE MODIFIER DAILY ACTIVITY: CH
DAILY ACTIVITIY SCORE: 24
SUGGESTED CMS G CODE MODIFIER MOBILITY: CH
MOBILITY SCORE: 24

## 2023-06-01 ASSESSMENT — LIFESTYLE VARIABLES
HEADACHE, FULLNESS IN HEAD: NOT PRESENT
ANXIETY: MILDLY ANXIOUS
TREMOR: *
TOTAL SCORE: MILD ITCHING, PINS AND NEEDLES SENSATION, BURNING OR NUMBNESS
VISUAL DISTURBANCES: NOT PRESENT
TOTAL SCORE: 2
DOES PATIENT WANT TO TALK TO SOMEONE ABOUT QUITTING: NO
AUDITORY DISTURBANCES: NOT PRESENT
TOTAL SCORE: 7
CONSUMPTION TOTAL: POSITIVE
ON A TYPICAL DAY WHEN YOU DRINK ALCOHOL HOW MANY DRINKS DO YOU HAVE: 7
HAVE YOU EVER FELT YOU SHOULD CUT DOWN ON YOUR DRINKING: YES
EVER FELT BAD OR GUILTY ABOUT YOUR DRINKING: YES
VISUAL DISTURBANCES: NOT PRESENT
TOTAL SCORE: 4
NAUSEA AND VOMITING: NO NAUSEA AND NO VOMITING
ANXIETY: NO ANXIETY (AT EASE)
AGITATION: NORMAL ACTIVITY
HEADACHE, FULLNESS IN HEAD: NOT PRESENT
AGITATION: NORMAL ACTIVITY
VISUAL DISTURBANCES: NOT PRESENT
HAVE PEOPLE ANNOYED YOU BY CRITICIZING YOUR DRINKING: YES
TOTAL SCORE: 5
AGITATION: NORMAL ACTIVITY
ALCOHOL_USE: YES
EVER HAD A DRINK FIRST THING IN THE MORNING TO STEADY YOUR NERVES TO GET RID OF A HANGOVER: YES
PAROXYSMAL SWEATS: *
TOTAL SCORE: 4
DOES PATIENT WANT TO STOP DRINKING: YES
AUDITORY DISTURBANCES: NOT PRESENT
AVERAGE NUMBER OF DAYS PER WEEK YOU HAVE A DRINK CONTAINING ALCOHOL: 7
ORIENTATION AND CLOUDING OF SENSORIUM: ORIENTED AND CAN DO SERIAL ADDITIONS
TREMOR: NO TREMOR
ANXIETY: MILDLY ANXIOUS
SUBSTANCE_ABUSE: 1
ORIENTATION AND CLOUDING OF SENSORIUM: ORIENTED AND CAN DO SERIAL ADDITIONS
TOTAL SCORE: 4
NAUSEA AND VOMITING: NO NAUSEA AND NO VOMITING
HOW MANY TIMES IN THE PAST YEAR HAVE YOU HAD 5 OR MORE DRINKS IN A DAY: 115
NAUSEA AND VOMITING: NO NAUSEA AND NO VOMITING
PAROXYSMAL SWEATS: NO SWEAT VISIBLE
AUDITORY DISTURBANCES: NOT PRESENT
TOTAL SCORE: MODERATE ITCHING, PINS AND NEEDLES SENSATION, BURNING OR NUMBNESS
HEADACHE, FULLNESS IN HEAD: NOT PRESENT
TREMOR: NO TREMOR
PAROXYSMAL SWEATS: *
ORIENTATION AND CLOUDING OF SENSORIUM: ORIENTED AND CAN DO SERIAL ADDITIONS

## 2023-06-01 ASSESSMENT — ENCOUNTER SYMPTOMS
CHILLS: 0
FEVER: 0
ABDOMINAL PAIN: 1
SHORTNESS OF BREATH: 0
DIARRHEA: 0
VOMITING: 0

## 2023-06-01 ASSESSMENT — PATIENT HEALTH QUESTIONNAIRE - PHQ9
1. LITTLE INTEREST OR PLEASURE IN DOING THINGS: NOT AT ALL
SUM OF ALL RESPONSES TO PHQ9 QUESTIONS 1 AND 2: 0
SUM OF ALL RESPONSES TO PHQ9 QUESTIONS 1 AND 2: 0
2. FEELING DOWN, DEPRESSED, IRRITABLE, OR HOPELESS: NOT AT ALL
2. FEELING DOWN, DEPRESSED, IRRITABLE, OR HOPELESS: NOT AT ALL
1. LITTLE INTEREST OR PLEASURE IN DOING THINGS: NOT AT ALL

## 2023-06-01 ASSESSMENT — FIBROSIS 4 INDEX
FIB4 SCORE: 0.69
FIB4 SCORE: 2.110579412044345358

## 2023-06-01 NOTE — ASSESSMENT & PLAN NOTE
He admits to 5-6 shots a day of vodka last drink was 5/31  With tachycardia, hypertension is ongoing  CIWA protocol  Taper off Librium  MVI  Replace magnesium and phos  Is motivated to quit and family will help keep him accountable while on vacation

## 2023-06-01 NOTE — H&P
Hospital Medicine History & Physical Note    Date of Service  6/1/2023    Primary Care Physician  Pcp Pt States None    Consultants  none      Code Status  Full Code    Chief Complaint  Chief Complaint   Patient presents with    Tachycardia      in triage.    Flank Pain     L sided flank pain started 3 days ago. Hx of pancreatitis. Pt drinks liquor every other day. Also c/o Nausea but no vomiting.       History of Presenting Illness  Alberto Maldonado is a 37 y.o. male who presented 6/1/2023 with abd pain and alcohol withdrawal.  Mr. Maldonado has a past medical history of hypertension and alcohol abuse that has been drinking about 5-6 shots of vodka daily.  Yesterday his family had intervention with him due to the impending trip to the [facility] on [address].  He stopped drinking yesterday and today presents with some left-sided abdominal pain for which he thought he might have pancreatitis.  His lipase is normal though in the emergency room his pulse is up to 160.  The EKG at 156 was very suspicious for SVT though he was given 6 mg adenosine without effect.  Given 25 mg IV Cardizem for possible atrial flutter with 2 1 block.  With IV fluids and these treatments his rate has come down to 130s and appears to be sinus.  He was also found to have low potassium, magnesium, phosphorus, and metabolic acidosis will be admitted for detox and initiated on the CIWA protocol and scheduled Librium.  He is quite motivated to stop drinking for his pending trip to the [facility].    I discussed the plan of care with Dr. Celeste.    Review of Systems  Review of Systems   Constitutional:  Negative for chills and fever.   Respiratory:  Negative for shortness of breath.    Cardiovascular:  Negative for chest pain.   Gastrointestinal:  Positive for abdominal pain. Negative for diarrhea and vomiting.   Psychiatric/Behavioral:  Positive for substance abuse.    All other systems reviewed and are negative.      Past Medical  History   has a past medical history of Asthma without status asthmaticus (6/29/2017).HTN, EtOH abuse requiring hospitalization at [facility] two years ago.    Surgical History  Hand surgery     Family History  family history is not on file.   Family history reviewed with patient. There is no family history that is pertinent to the chief complaint.     Social History   reports that he has never smoked. He has never used smokeless tobacco. He reports current alcohol use of about 7.2 oz of alcohol per week. He reports that he does not currently use drugs.    Allergies  No Known Allergies    Medications  Prior to Admission Medications   Prescriptions Last Dose Informant Patient Reported? Taking?   ibuprofen (MOTRIN) 200 MG Tab ABOUT 1 WEEK AGO at PRN Patient Yes Yes   Sig: Take 200-400 mg by mouth every 6 hours as needed for Mild Pain. 1 to 2 tablets = 200 to 400 mg.   losartan (COZAAR) 50 MG Tab 5/31/2023 at 0800 Patient Yes No   Sig: Take 75 mg by mouth every day. 1.5 tablets = 75 mg.   metoprolol tartrate (LOPRESSOR) 50 MG Tab 5/31/2023 at AM Patient Yes Yes   Sig: Take 50 mg by mouth 2 times a day.      Facility-Administered Medications: None       Physical Exam  Temp:  [36 °C (96.8 °F)] 36 °C (96.8 °F)  Pulse:  [137-158] 138  Resp:  [17-20] 20  BP: (134-183)/() 167/95  SpO2:  [93 %-96 %] 93 %  Blood Pressure: (!) 167/95   Temperature: 36 °C (96.8 °F)   Pulse: (!) 138   Respiration: 20   Pulse Oximetry: 93 %       Physical Exam  Vitals and nursing note reviewed.   Constitutional:       Appearance: He is ill-appearing and toxic-appearing.   HENT:      Mouth/Throat:      Mouth: Mucous membranes are dry.   Cardiovascular:      Rate and Rhythm: Regular rhythm. Tachycardia present.      Heart sounds: No murmur heard.  Pulmonary:      Breath sounds: Normal breath sounds.      Comments: Tachypnea   Abdominal:      General: There is no distension.      Palpations: Abdomen is soft.      Tenderness: There is no  abdominal tenderness.   Musculoskeletal:      Right lower leg: No edema.      Left lower leg: No edema.   Neurological:      General: No focal deficit present.      Mental Status: He is alert and oriented to person, place, and time.   Psychiatric:         Mood and Affect: Mood normal.         Behavior: Behavior normal.         Thought Content: Thought content normal.         Laboratory:  Recent Labs     06/01/23  1152   WBC 14.1*   RBC 6.35*   HEMOGLOBIN 19.7*   HEMATOCRIT 56.8*   MCV 89.4   MCH 31.0   MCHC 34.7   RDW 44.2   PLATELETCT 402   MPV 8.9*     Recent Labs     06/01/23  1222   SODIUM 141   POTASSIUM 3.7   CHLORIDE 100   CO2 14*   GLUCOSE 118*   BUN 6*   CREATININE 1.09   CALCIUM 8.9     Recent Labs     06/01/23  1222   ALTSGPT 63*   ASTSGOT 63*   ALKPHOSPHAT 120*   TBILIRUBIN 1.0   LIPASE 22   GLUCOSE 118*     Recent Labs     06/01/23  1405   INR 1.08     Recent Labs     06/01/23  1222   NTPROBNP 122         Recent Labs     06/01/23  1222   TROPONINT 14       Imaging:  DX-CHEST-PORTABLE (1 VIEW)   Final Result      No acute cardiac or pulmonary abnormalities are identified.          Sinus tachy 156    Assessment/Plan:  Justification for Admission Status  I anticipate this patient will require at least two midnights for appropriate medical management, necessitating inpatient admission because treatment of severe EtOH detox    Patient will need a Telemetry bed on MEDICAL service .  The need is secondary to tachycardia over 150 .    * Alcohol withdrawal delirium (HCC)- (present on admission)  Assessment & Plan  He admits to 5-6 shots a day of vodka last drink was 5/31  With severe tachycardia  He admits to altered mentation  WA protocol  Start scheduled librium  MVI  Replace magnesium and phos  Cessation discussed and he is quite motivated to stop.    Hypokalemia- (present on admission)  Assessment & Plan  IV fluids with potassium ordered  Check level in the morning    Metabolic acidosis- (present on  admission)  Assessment & Plan  Bicarb is low at 14 while anion gap is high at 27  IV fluids ordered.    Hypophosphatemia- (present on admission)  Assessment & Plan  Replacement ordered  Recheck phos in the morning    Hypomagnesemia- (present on admission)  Assessment & Plan  Magnesium is low at 1.2  IV replacement and recheck in the morning    Tachycardia with heart rate 141-160 beats per minute- (present on admission)  Assessment & Plan  Adenosine was given in the ER without effect thus unlikely PSVT. This was followed by 25 mg IV Cardizem which slowed him and appears to be sinus tachycardia.   Continuous tele monitoring.   Secondary to severe alcohol detox.     Morbid obesity (HCC)- (present on admission)  Assessment & Plan  BMI over 40        VTE prophylaxis: Xarelto 10 mg daily as prophylaxis

## 2023-06-01 NOTE — ED NOTES
Med rec completed per patient at bedside.  Allergies reviewed with patient. NKDA.  Patient denies outpatient antibiotic usage within the last 30 days.  Patient's preferred pharmacy: [facility] on [facility].    Patient states no longer using albuterol or colchicine and last doses over 30 days ago. These medications have been removed from the med rec.

## 2023-06-01 NOTE — ED NOTES
Pt came in with elevated heart rate at 152 bpm, erp was paged, pt was given 6mg of adenosine, no response to heart rate

## 2023-06-01 NOTE — ED TRIAGE NOTES
"Chief Complaint   Patient presents with    Tachycardia      in triage.    Flank Pain     L sided flank pain started 3 days ago. Hx of pancreatitis. Pt drinks liquor every other day. Also c/o Nausea but no vomiting.     BP (!) 172/101   Pulse (!) 158   Temp 36 °C (96.8 °F) (Temporal)   Resp 18   Ht 1.6 m (5' 3\")   Wt 104 kg (230 lb)   SpO2 96%   BMI 40.74 kg/m²     "

## 2023-06-01 NOTE — ASSESSMENT & PLAN NOTE
Adenosine was given in the ER without effect thus unlikely PSVT. This was followed by 25 mg IV Cardizem which slowed him and appears to be sinus tachycardia.   Secondary to severe alcohol detox  Remains in sinus tachycardia, continue telemetry

## 2023-06-01 NOTE — LETTER
June 4, 2023          Alberto Maldonado was hospitalized from 6/1/23 to 6/3/23. He can to return to work 6/5/23 with no restrictions.          Thank you,        Dr. Gordon Brambila  [address] [address]  [phone]

## 2023-06-01 NOTE — LETTER
June 4, 2023              Alberto Maldonado was hospitalized at [facility] 6/1/23 to 6/3/23. He may return to work on light duty 6/5/23 for 1 week.        Thank you,        Dr. Gordon Brambila  [address] [address]  [phone]

## 2023-06-01 NOTE — ED PROVIDER NOTES
"ED Provider Note    CHIEF COMPLAINT  Chief Complaint   Patient presents with    Tachycardia      in triage.    Flank Pain     L sided flank pain started 3 days ago. Hx of pancreatitis. Pt drinks liquor every other day. Also c/o Nausea but no vomiting.       EXTERNAL RECORDS REVIEWED  Outpatient Notes family medicine note from 5/13/2023 reviewed.  Patient seen for ear pain at that time.    HPI/ROS  LIMITATION TO HISTORY   Select: : None  OUTSIDE HISTORIAN(S):  Family patient's sister-in-law is at the bedside    Alberto Maldonado is a 37 y.o. male who presents for left flank pain.  Patient states \"I am here for my pancreas.\"    Patient states he noticed that his heart was beating fast this morning.    Patient reports drinking 5 shots daily of \"those small little bottles of alcohol\" on and off since January.  Prior to this the patient was hospitalized for alcohol detox in 2021 and was sober for 1 year.    Patient denies chest pain, shortness of breath, diaphoresis, vomiting, diarrhea, thyroid problems, calf pain, leg swelling, dizziness, abdominal pain, melena, hematochezia.  He reports nausea.      PAST MEDICAL HISTORY   has a past medical history of Asthma without status asthmaticus (6/29/2017).    SURGICAL HISTORY  patient denies any surgical history    FAMILY HISTORY  Family History   Problem Relation Age of Onset    Stroke Neg Hx     Heart Disease Neg Hx        SOCIAL HISTORY  Social History     Tobacco Use    Smoking status: Never    Smokeless tobacco: Never   Vaping Use    Vaping Use: Never used   Substance and Sexual Activity    Alcohol use: Yes     Alcohol/week: 7.2 oz     Types: 12 Shots of liquor per week    Drug use: Not Currently    Sexual activity: Not Currently       CURRENT MEDICATIONS  Home Medications       Reviewed by Bernard Hernández (Pharmacy Tech) on 06/01/23 at 1344  Med List Status: Complete     Medication Last Dose Status   ibuprofen (MOTRIN) 200 MG Tab ABOUT 1 WEEK AGO Active " "  losartan (COZAAR) 50 MG Tab 5/31/2023 Active   metoprolol tartrate (LOPRESSOR) 50 MG Tab 5/31/2023 Active                    ALLERGIES  No Known Allergies    PHYSICAL EXAM  VITAL SIGNS: BP (!) 156/99   Pulse (!) 139   Temp 36 °C (96.8 °F) (Temporal)   Resp 18   Ht 1.6 m (5' 3\")   Wt 104 kg (230 lb)   SpO2 95%   BMI 40.74 kg/m²    General:  WD male with elevated BMI, nontoxic but slightly anxious appearing; A+Ox3; V/S as above; afebrile, not hypoxic; tachycardic with elevated blood pressure  Skin: warm and dry; good color; no rash  HEENT: NCAT; EOMs intact; PERRL; no scleral icterus   Neck: FROM; no JVD  Cardiovascular: Tachycardic heart rate and regular rhythm.  No murmurs, rubs, or gallops; pulses 2+ bilaterally radially and DP areas  Lungs: No respiratory distress or tachypnea; Clear to auscultation with good air movement bilaterally.  No wheezes, rhonchi, or rales.   Abdomen: BS present; soft; NTND; no rebound, guarding, or rigidity.  No organomegaly or pulsatile mass   Extremities: FAUSTIN x 4; no e/o trauma; no pedal edema; neg Jim's  Neurologic: CNs III-XII grossly intact; speech clear  Psychiatric: Appropriate affect, normal mood      DIAGNOSTIC STUDIES / PROCEDURES  EKG  I have independently interpreted this EKG. Aflutter versus SVT in the 150s.    LABS  Results for orders placed or performed during the hospital encounter of 06/01/23   CBC WITH DIFFERENTIAL   Result Value Ref Range    WBC 14.1 (H) 4.8 - 10.8 K/uL    RBC 6.35 (H) 4.70 - 6.10 M/uL    Hemoglobin 19.7 (H) 14.0 - 18.0 g/dL    Hematocrit 56.8 (H) 42.0 - 52.0 %    MCV 89.4 81.4 - 97.8 fL    MCH 31.0 27.0 - 33.0 pg    MCHC 34.7 32.3 - 36.5 g/dL    RDW 44.2 35.9 - 50.0 fL    Platelet Count 402 164 - 446 K/uL    MPV 8.9 (L) 9.0 - 12.9 fL    Neutrophils-Polys 83.50 (H) 44.00 - 72.00 %    Lymphocytes 9.50 (L) 22.00 - 41.00 %    Monocytes 6.00 0.00 - 13.40 %    Eosinophils 0.10 0.00 - 6.90 %    Basophils 0.60 0.00 - 1.80 %    Immature Granulocytes " 0.30 0.00 - 0.90 %    Nucleated RBC 0.00 0.00 - 0.20 /100 WBC    Neutrophils (Absolute) 11.77 (H) 1.82 - 7.42 K/uL    Lymphs (Absolute) 1.34 1.00 - 4.80 K/uL    Monos (Absolute) 0.85 0.00 - 0.85 K/uL    Eos (Absolute) 0.01 0.00 - 0.51 K/uL    Baso (Absolute) 0.08 0.00 - 0.12 K/uL    Immature Granulocytes (abs) 0.04 0.00 - 0.11 K/uL    NRBC (Absolute) 0.00 K/uL   Comp Metabolic Panel   Result Value Ref Range    Sodium 141 135 - 145 mmol/L    Potassium 3.7 3.6 - 5.5 mmol/L    Chloride 100 96 - 112 mmol/L    Co2 14 (L) 20 - 33 mmol/L    Anion Gap 27.0 (H) 7.0 - 16.0    Glucose 118 (H) 65 - 99 mg/dL    Bun 6 (L) 8 - 22 mg/dL    Creatinine 1.09 0.50 - 1.40 mg/dL    Calcium 8.9 8.5 - 10.5 mg/dL    AST(SGOT) 63 (H) 12 - 45 U/L    ALT(SGPT) 63 (H) 2 - 50 U/L    Alkaline Phosphatase 120 (H) 30 - 99 U/L    Total Bilirubin 1.0 0.1 - 1.5 mg/dL    Albumin 4.3 3.2 - 4.9 g/dL    Total Protein 7.9 6.0 - 8.2 g/dL    Globulin 3.6 (H) 1.9 - 3.5 g/dL    A-G Ratio 1.2 g/dL   TROPONIN   Result Value Ref Range    Troponin T 14 6 - 19 ng/L   LIPASE   Result Value Ref Range    Lipase 22 11 - 82 U/L   MAGNESIUM   Result Value Ref Range    Magnesium 1.2 (L) 1.5 - 2.5 mg/dL   PHOSPHORUS   Result Value Ref Range    Phosphorus 2.4 (L) 2.5 - 4.5 mg/dL   DIAGNOSTIC ALCOHOL   Result Value Ref Range    Diagnostic Alcohol <10.1 <10.1 mg/dL   proBrain Natriuretic Peptide, NT   Result Value Ref Range    NT-proBNP 122 0 - 125 pg/mL   CORRECTED CALCIUM   Result Value Ref Range    Correct Calcium 8.7 8.5 - 10.5 mg/dL   ESTIMATED GFR   Result Value Ref Range    GFR (CKD-EPI) 90 >60 mL/min/1.73 m 2   EKG (NOW)   Result Value Ref Range    Report       [facility] Emergency Dept.    Test Date:  2023  Pt Name:    ANNIA ALMONTE                Department: ER  MRN:        [id number]                      Room:  Gender:     Male                         Technician: [id number]  :        1985                   Requested By:ER TRIAGE  PROTOCOL  Order #:    [id number]                    Reading MD: TAYLOR RICKS MD    Measurements  Intervals                                Axis  Rate:       156                          P:          22  LA:         100                          QRS:        33  QRSD:       88                           T:          -79  QT:         287  QTc:        463    Interpretive Statements  atrial flut  Nonspecific repol abnormality, diffuse leads  No previous ECG available for comparison  Electronically Signed On 2023 11:57:01 PDT by TAYLOR RICKS MD     EKG   Result Value Ref Range    Report       [facility] Emergency Dept.    Test Date:  2023  Pt Name:    ANNIA ALMONTE                Department: ER  MRN:        [id number]                      Room:       [facility]  Gender:     Male                         Technician: [id number]  :        1985                   Requested By:TAYLOR RICKS  Order #:    [id number]                    Reading MD: TAYLOR RICKS MD    Measurements  Intervals                                Axis  Rate:       136                          P:          15  LA:         124                          QRS:        16  QRSD:       89                           T:          257  QT:         296  QTc:        446    Interpretive Statements  Sinus tachycardia  Probable LVH with secondary repol abnrm  Anterior Q waves, possibly due to LVH  Baseline wander in lead(s) I,II,aVR  Compared to ECG 2023 11:27:11  Left ventricular hypertrophy now present  Q waves now present  Electronically Signed On 2023 13:36:35 PDT by TAYLOR RICKS MD            RADIOLOGY  I have independently interpreted the diagnostic imaging associated with this visit and am waiting the final reading from the radiologist.   My preliminary interpretation is as follows: no consolidation or effusion  Radiologist interpretation:   DX-CHEST-PORTABLE (1 VIEW)   Final Result      No acute cardiac or pulmonary  abnormalities are identified.            COURSE & MEDICAL DECISION MAKING    ED Observation Status? Yes; I am placing the patient in to an observation status due to a diagnostic uncertainty as well as therapeutic intensity. Patient placed in observation status at 12:00 PM, 6/1/2023.     Observation plan is as follows: Cardioversion versus improvement of heart rate, labs, hydration, treatment for possible alcohol withdrawal    Upon Reevaluation, the patient's condition has: Improved; and will be discharged.    Patient discharged from ED Observation status at 1:52 PM at 6/1/23     INITIAL ASSESSMENT, COURSE AND PLAN  Care Narrative: pt here with SVT vs aflutter    Vagal maneuvers attempted.  Patient advised to bear down, carotid massage performed while medications being prepared.  No effect on heart rate.  Patient consistently 150.    Adenosine 6 mg IV given without change.    Diltiazem bolus given.  Heart rate decreased to the 130s.    1:01 PM  Patient rechecked.  Heart rate 140.  Patient shows me his medications which include metoprolol and ibuprofen.  Repeat EKG requested.    Labs demonstrated leukocytosis and elevated hemoglobin, likely secondary to hemoconcentration.  Although the patient does have a lymphopenia and neutrophilia.  Patient's CMP demonstrates dehydration with a CO2 14, anion gap 27.  Transaminases and alk phos are elevated.  Lipase is normal.  Troponin is normal.  Patient requires magnesium replacement with a level of 1.2 and has hypophosphatemia as well.    Magnesium 2 mg IV ordered.    Repeat EKG demonstrates sinus tachycardia at 136.  No acute ST changes.    Detox bag and Ativan 2 mg p.o. ordered for persistent sinus tachycardia which I suspect is secondary to dehydration and alcohol withdrawal.  Patient's blood pressure currently 150s.      1:52 PM  I discussed the case with Dr. Arechiga from the hospitalist service who agrees to admit the patient.    The total critical care time spent caring  for this patient totaled 35 minutes due to the high probability of imminent deterioration, life-threatening condition, threatened life/limb/vital organ function given cardiac arrhythmia.  This time included frequent reassessments and intervening with necessary action such as adenosine, Cardizem, IV fluids, Ativan, magnesium and speaking with the admitting physician.    HYDRATION: Based on the patient's presentation of CIWA, Dehydration, and Tachycardia the patient was given IV fluids. IV Hydration was used because oral hydration was not adequate alone. Upon recheck following hydration, the patient was improved.      DISPOSITION AND DISCUSSIONS  I have discussed management of the patient with the following physicians and BATSHEVA's:    [place]    Discussion of management with other [facility] or appropriate source(s): Pharmacy [facility]        FINAL DIAGNOSIS  1. Alcohol abuse    2. Dehydration    3. Hypomagnesemia      Electronically signed by: Rosemary Celeste M.D., 6/1/2023 11:54 AM

## 2023-06-02 LAB
ALBUMIN SERPL BCP-MCNC: 3.7 G/DL (ref 3.2–4.9)
ALBUMIN/GLOB SERPL: 1.2 G/DL
ALP SERPL-CCNC: 100 U/L (ref 30–99)
ALT SERPL-CCNC: 50 U/L (ref 2–50)
AMPHET UR QL SCN: NEGATIVE
ANION GAP SERPL CALC-SCNC: 17 MMOL/L (ref 7–16)
ANION GAP SERPL CALC-SCNC: 20 MMOL/L (ref 7–16)
APPEARANCE UR: CLEAR
AST SERPL-CCNC: 53 U/L (ref 12–45)
BACTERIA #/AREA URNS HPF: NEGATIVE /HPF
BARBITURATES UR QL SCN: NEGATIVE
BENZODIAZ UR QL SCN: POSITIVE
BILIRUB SERPL-MCNC: 1.3 MG/DL (ref 0.1–1.5)
BILIRUB UR QL STRIP.AUTO: NEGATIVE
BUN SERPL-MCNC: 5 MG/DL (ref 8–22)
BUN SERPL-MCNC: 9 MG/DL (ref 8–22)
BZE UR QL SCN: NEGATIVE
CALCIUM ALBUM COR SERPL-MCNC: 8.3 MG/DL (ref 8.5–10.5)
CALCIUM SERPL-MCNC: 8.1 MG/DL (ref 8.5–10.5)
CALCIUM SERPL-MCNC: 8.3 MG/DL (ref 8.5–10.5)
CANNABINOIDS UR QL SCN: NEGATIVE
CHLORIDE SERPL-SCNC: 107 MMOL/L (ref 96–112)
CHLORIDE SERPL-SCNC: 107 MMOL/L (ref 96–112)
CO2 SERPL-SCNC: 17 MMOL/L (ref 20–33)
CO2 SERPL-SCNC: 18 MMOL/L (ref 20–33)
COLOR UR: ABNORMAL
CREAT SERPL-MCNC: 0.69 MG/DL (ref 0.5–1.4)
CREAT SERPL-MCNC: 0.93 MG/DL (ref 0.5–1.4)
EPI CELLS #/AREA URNS HPF: NEGATIVE /HPF
FENTANYL UR QL: NEGATIVE
GFR SERPLBLD CREATININE-BSD FMLA CKD-EPI: 109 ML/MIN/1.73 M 2
GFR SERPLBLD CREATININE-BSD FMLA CKD-EPI: 123 ML/MIN/1.73 M 2
GLOBULIN SER CALC-MCNC: 3 G/DL (ref 1.9–3.5)
GLUCOSE SERPL-MCNC: 101 MG/DL (ref 65–99)
GLUCOSE SERPL-MCNC: 140 MG/DL (ref 65–99)
GLUCOSE UR STRIP.AUTO-MCNC: NEGATIVE MG/DL
HYALINE CASTS #/AREA URNS LPF: ABNORMAL /LPF
KETONES UR STRIP.AUTO-MCNC: NEGATIVE MG/DL
LEUKOCYTE ESTERASE UR QL STRIP.AUTO: NEGATIVE
MAGNESIUM SERPL-MCNC: 2 MG/DL (ref 1.5–2.5)
METHADONE UR QL SCN: NEGATIVE
MICRO URNS: ABNORMAL
NITRITE UR QL STRIP.AUTO: NEGATIVE
OPIATES UR QL SCN: NEGATIVE
OXYCODONE UR QL SCN: NEGATIVE
PCP UR QL SCN: NEGATIVE
PH UR STRIP.AUTO: 5.5 [PH] (ref 5–8)
PHOSPHATE SERPL-MCNC: 2.1 MG/DL (ref 2.5–4.5)
POTASSIUM SERPL-SCNC: 3.6 MMOL/L (ref 3.6–5.5)
POTASSIUM SERPL-SCNC: 4.8 MMOL/L (ref 3.6–5.5)
PROPOXYPH UR QL SCN: NEGATIVE
PROT SERPL-MCNC: 6.7 G/DL (ref 6–8.2)
PROT UR QL STRIP: NEGATIVE MG/DL
RBC # URNS HPF: ABNORMAL /HPF
RBC UR QL AUTO: ABNORMAL
SODIUM SERPL-SCNC: 142 MMOL/L (ref 135–145)
SODIUM SERPL-SCNC: 144 MMOL/L (ref 135–145)
SP GR UR STRIP.AUTO: 1.02
UROBILINOGEN UR STRIP.AUTO-MCNC: 1 MG/DL
WBC #/AREA URNS HPF: ABNORMAL /HPF

## 2023-06-02 PROCEDURE — 99232 SBSQ HOSP IP/OBS MODERATE 35: CPT | Performed by: INTERNAL MEDICINE

## 2023-06-02 PROCEDURE — 81001 URINALYSIS AUTO W/SCOPE: CPT

## 2023-06-02 PROCEDURE — 770020 HCHG ROOM/CARE - TELE (206)

## 2023-06-02 PROCEDURE — 84100 ASSAY OF PHOSPHORUS: CPT

## 2023-06-02 PROCEDURE — 80053 COMPREHEN METABOLIC PANEL: CPT

## 2023-06-02 PROCEDURE — 700101 HCHG RX REV CODE 250: Performed by: HOSPITALIST

## 2023-06-02 PROCEDURE — 36415 COLL VENOUS BLD VENIPUNCTURE: CPT

## 2023-06-02 PROCEDURE — 700102 HCHG RX REV CODE 250 W/ 637 OVERRIDE(OP): Performed by: HOSPITALIST

## 2023-06-02 PROCEDURE — 700105 HCHG RX REV CODE 258: Performed by: INTERNAL MEDICINE

## 2023-06-02 PROCEDURE — 80048 BASIC METABOLIC PNL TOTAL CA: CPT

## 2023-06-02 PROCEDURE — 83735 ASSAY OF MAGNESIUM: CPT

## 2023-06-02 PROCEDURE — 80307 DRUG TEST PRSMV CHEM ANLYZR: CPT

## 2023-06-02 PROCEDURE — A9270 NON-COVERED ITEM OR SERVICE: HCPCS | Performed by: HOSPITALIST

## 2023-06-02 PROCEDURE — 700101 HCHG RX REV CODE 250: Performed by: INTERNAL MEDICINE

## 2023-06-02 RX ORDER — CHOLECALCIFEROL (VITAMIN D3) 125 MCG
5 CAPSULE ORAL NIGHTLY PRN
Status: DISCONTINUED | OUTPATIENT
Start: 2023-06-02 | End: 2023-06-03 | Stop reason: HOSPADM

## 2023-06-02 RX ADMIN — Medication 100 MG: at 04:46

## 2023-06-02 RX ADMIN — METOPROLOL TARTRATE 50 MG: 50 TABLET ORAL at 17:49

## 2023-06-02 RX ADMIN — CHLORDIAZEPOXIDE HYDROCHLORIDE 25 MG: 25 CAPSULE ORAL at 00:19

## 2023-06-02 RX ADMIN — LOSARTAN POTASSIUM 75 MG: 50 TABLET, FILM COATED ORAL at 05:02

## 2023-06-02 RX ADMIN — POTASSIUM CHLORIDE AND SODIUM CHLORIDE: 900; 150 INJECTION, SOLUTION INTRAVENOUS at 01:43

## 2023-06-02 RX ADMIN — FOLIC ACID 1 MG: 1 TABLET ORAL at 04:46

## 2023-06-02 RX ADMIN — POTASSIUM CHLORIDE AND SODIUM CHLORIDE: 900; 150 INJECTION, SOLUTION INTRAVENOUS at 13:15

## 2023-06-02 RX ADMIN — METOPROLOL TARTRATE 50 MG: 50 TABLET ORAL at 05:02

## 2023-06-02 RX ADMIN — THERA TABS 1 TABLET: TAB at 04:46

## 2023-06-02 RX ADMIN — POTASSIUM CHLORIDE AND SODIUM CHLORIDE: 900; 150 INJECTION, SOLUTION INTRAVENOUS at 21:34

## 2023-06-02 RX ADMIN — POTASSIUM PHOSPHATE, MONOBASIC AND POTASSIUM PHOSPHATE, DIBASIC 30 MMOL: 224; 236 INJECTION, SOLUTION, CONCENTRATE INTRAVENOUS at 13:18

## 2023-06-02 RX ADMIN — RIVAROXABAN 10 MG: 10 TABLET, FILM COATED ORAL at 17:49

## 2023-06-02 ASSESSMENT — LIFESTYLE VARIABLES
NAUSEA AND VOMITING: NO NAUSEA AND NO VOMITING
VISUAL DISTURBANCES: NOT PRESENT
AGITATION: NORMAL ACTIVITY
TREMOR: NO TREMOR
ORIENTATION AND CLOUDING OF SENSORIUM: ORIENTED AND CAN DO SERIAL ADDITIONS
TOTAL SCORE: MILD ITCHING, PINS AND NEEDLES SENSATION, BURNING OR NUMBNESS
TOTAL SCORE: 3
HEADACHE, FULLNESS IN HEAD: NOT PRESENT
NAUSEA AND VOMITING: NO NAUSEA AND NO VOMITING
AUDITORY DISTURBANCES: NOT PRESENT
PAROXYSMAL SWEATS: NO SWEAT VISIBLE
VISUAL DISTURBANCES: NOT PRESENT
TOTAL SCORE: 3
TOTAL SCORE: 3
AUDITORY DISTURBANCES: NOT PRESENT
NAUSEA AND VOMITING: NO NAUSEA AND NO VOMITING
TOTAL SCORE: 7
VISUAL DISTURBANCES: NOT PRESENT
ANXIETY: *
ORIENTATION AND CLOUDING OF SENSORIUM: ORIENTED AND CAN DO SERIAL ADDITIONS
NAUSEA AND VOMITING: NO NAUSEA AND NO VOMITING
ANXIETY: MILDLY ANXIOUS
AGITATION: NORMAL ACTIVITY
HEADACHE, FULLNESS IN HEAD: NOT PRESENT
PAROXYSMAL SWEATS: NO SWEAT VISIBLE
VISUAL DISTURBANCES: NOT PRESENT
AGITATION: NORMAL ACTIVITY
HEADACHE, FULLNESS IN HEAD: NOT PRESENT
ANXIETY: *
ORIENTATION AND CLOUDING OF SENSORIUM: ORIENTED AND CAN DO SERIAL ADDITIONS
VISUAL DISTURBANCES: NOT PRESENT
AUDITORY DISTURBANCES: NOT PRESENT
ORIENTATION AND CLOUDING OF SENSORIUM: ORIENTED AND CAN DO SERIAL ADDITIONS
ORIENTATION AND CLOUDING OF SENSORIUM: ORIENTED AND CAN DO SERIAL ADDITIONS
NAUSEA AND VOMITING: NO NAUSEA AND NO VOMITING
ORIENTATION AND CLOUDING OF SENSORIUM: ORIENTED AND CAN DO SERIAL ADDITIONS
TOTAL SCORE: 4
AGITATION: NORMAL ACTIVITY
ANXIETY: NO ANXIETY (AT EASE)
TREMOR: NO TREMOR
AUDITORY DISTURBANCES: NOT PRESENT
TOTAL SCORE: MILD ITCHING, PINS AND NEEDLES SENSATION, BURNING OR NUMBNESS
PAROXYSMAL SWEATS: BARELY PERCEPTIBLE SWEATING. PALMS MOIST
TREMOR: NO TREMOR
TREMOR: NO TREMOR
HEADACHE, FULLNESS IN HEAD: NOT PRESENT
PAROXYSMAL SWEATS: NO SWEAT VISIBLE
ANXIETY: *
AGITATION: NORMAL ACTIVITY
VISUAL DISTURBANCES: NOT PRESENT
PAROXYSMAL SWEATS: *
AUDITORY DISTURBANCES: NOT PRESENT
ANXIETY: *
PAROXYSMAL SWEATS: NO SWEAT VISIBLE
HEADACHE, FULLNESS IN HEAD: NOT PRESENT
AGITATION: NORMAL ACTIVITY
AUDITORY DISTURBANCES: NOT PRESENT
TOTAL SCORE: 0
TREMOR: NO TREMOR
NAUSEA AND VOMITING: NO NAUSEA AND NO VOMITING
TREMOR: NO TREMOR
HEADACHE, FULLNESS IN HEAD: NOT PRESENT

## 2023-06-02 ASSESSMENT — ENCOUNTER SYMPTOMS
VOMITING: 0
WEAKNESS: 0
ABDOMINAL PAIN: 0
SHORTNESS OF BREATH: 0
DIARRHEA: 0
NERVOUS/ANXIOUS: 0
DIZZINESS: 0
TREMORS: 0
MYALGIAS: 0

## 2023-06-02 ASSESSMENT — PAIN DESCRIPTION - PAIN TYPE
TYPE: ACUTE PAIN;NEUROPATHIC PAIN
TYPE: ACUTE PAIN

## 2023-06-02 NOTE — PROGRESS NOTES
0700 - Report received from Justyna MILNER at patient's bedside. Patient resting in bed quietly with no complaints at this time. Telemetry monitor intact et functioning. Call light and belongings within reach, safety measures intact, white board updated.     0730 - reported off to Lashawn MILNER

## 2023-06-02 NOTE — PROGRESS NOTES
4 Eyes Skin Assessment Completed by ALF Carvalho and LAF Spann.    Head WDL  Ears WDL  Nose WDL  Mouth WDL  Neck WDL  Breast/Chest WDL  Shoulder Blades WDL  Spine WDL  (R) Arm/Elbow/Hand WDL  (L) Arm/Elbow/Hand WDL  Abdomen WDL  Groin WDL  Scrotum/Coccyx/Buttocks WDL  (R) Leg WDL  (L) Leg WDL  (R) Heel/Foot/Toe WDL  (L) Heel/Foot/Toe WDL          Devices In Places Tele Box      Interventions In Place N/A    Possible Skin Injury No    Pictures Uploaded Into Epic N/A  Wound Consult Placed N/A  RN Wound Prevention Protocol Ordered No

## 2023-06-02 NOTE — DISCHARGE PLANNING
Case Management Discharge Planning    Admission Date: 6/1/2023  GMLOS: 3.3  ALOS: 1    6-Clicks ADL Score: 24  6-Clicks Mobility Score: 24      Anticipated Discharge Dispo: Discharge Disposition: Discharged to home/self care (01)    DME Needed: No    Action(s) Taken: Updated Provider/Nurse on Discharge Plan, Patient Conference, and DC Assessment Complete (See below)    Escalations Completed: None    Medically Clear: No    Next Steps: Drinking cessation resources provided to the patient. He denies any needs at home.    Barriers to Discharge: Medical clearance    Is the patient up for discharge tomorrow: Yes    Is transport arranged for discharge disposition: No

## 2023-06-02 NOTE — DIETARY
NUTRITION SERVICES: BMI - Pt with BMI >40 (=Body mass index is 41.12 kg/m².), Class III obesity. Weight obtained via stand up scale. Weight loss counseling not appropriate in acute care setting. RECOMMEND - If appropriate at DC and per pt preferences,  please refer to outpatient nutrition services for weight management.

## 2023-06-02 NOTE — PROGRESS NOTES
Spoke with Dr. Arechiga about patient's high BP, per MD will put in orders for blood pressure medication. Continue to monitor.

## 2023-06-02 NOTE — PROGRESS NOTES
Received report from ALF Gibson (ER). Pt is A&O x 4. Pt transported via [facility] with ACLS RN and zoll monitor. Pt arrived to unit, tele box on patient, confirmed with  monitor room . Pt ambulated steadily with standby assist from [facility] to hospital bed, tolerated well. Pt has seizure precautions at bedside. Pt oriented to unit and call light, verbalized understanding. Fall precautions in place. Call light and bedside table within reach, bed locked in lowest position bed alarm on. Pt educated on being a high fall risk. Assessment completed. Will continue to monitor.

## 2023-06-02 NOTE — PROGRESS NOTES
Hospital Medicine Daily Progress Note    Date of Service  6/2/2023    Chief Complaint  Alberto Maldonado is a 37 y.o. male admitted 6/1/2023 with abd pain    Hospital Course  36 yo man with HTN, and alcohol abuse who had family intervention and he stopped drinking but developed abd pain. Lipase was normal. He was admitted for alcohol withdrawal.     Interval Problem Update  Sinus on telemetry, remains tachycardic  hypertensive  On librium, no ativan overnight given  Anion gap 20  AST/ALT improved  Hypo-Phos, replete  He denies abdominal pain, nausea, headache, dizziness, sweating, anxiety.  He says he never formally quit alcohol before, has gone several days without alcohol in the past.  He says he had quit for about a year by himself, but then started drinking again due to peer pressure.  His sister later visited and her family plans to keep him accountable during his trip to the [facility].    I have discussed this patient's plan of care and discharge plan at IDT rounds today with Case Management, Nursing, Nursing leadership, and other members of the IDT team.    Consultants/Specialty  none    Code Status  Full Code    Disposition  The patient is not medically cleared for discharge to home or a post-acute facility.  Anticipate discharge to: home with close outpatient follow-up    I have placed the appropriate orders for post-discharge needs.    Review of Systems  Review of Systems   Constitutional:  Negative for malaise/fatigue.   Respiratory:  Negative for shortness of breath.    Cardiovascular:  Negative for chest pain.   Gastrointestinal:  Negative for abdominal pain, diarrhea and vomiting.   Musculoskeletal:  Negative for myalgias.   Neurological:  Negative for dizziness, tremors and weakness.   Psychiatric/Behavioral:  The patient is not nervous/anxious.         Physical Exam  Temp:  [36.4 °C (97.5 °F)-37.1 °C (98.8 °F)] 36.6 °C (97.9 °F)  Pulse:  [] 99  Resp:  [18-22] 18  BP: (138-168)/()  163/107  SpO2:  [91 %-96 %] 91 %    Physical Exam  Vitals and nursing note reviewed.   Constitutional:       General: He is not in acute distress.     Appearance: He is not toxic-appearing.   HENT:      Head: Normocephalic.      Mouth/Throat:      Mouth: Mucous membranes are moist.   Eyes:      General:         Right eye: No discharge.         Left eye: No discharge.   Cardiovascular:      Rate and Rhythm: Regular rhythm. Tachycardia present.   Pulmonary:      Effort: Pulmonary effort is normal. No respiratory distress.      Breath sounds: No wheezing or rales.   Abdominal:      Palpations: Abdomen is soft.      Tenderness: There is no abdominal tenderness. There is no right CVA tenderness or left CVA tenderness.   Musculoskeletal:         General: No swelling.      Cervical back: Neck supple.   Skin:     General: Skin is warm and dry.   Neurological:      Mental Status: He is alert and oriented to person, place, and time.      Comments: No tremor         Fluids    Intake/Output Summary (Last 24 hours) at 6/2/2023 1404  Last data filed at 6/2/2023 0500  Gross per 24 hour   Intake --   Output 400 ml   Net -400 ml       Laboratory  Recent Labs     06/01/23  1152   WBC 14.1*   RBC 6.35*   HEMOGLOBIN 19.7*   HEMATOCRIT 56.8*   MCV 89.4   MCH 31.0   MCHC 34.7   RDW 44.2   PLATELETCT 402   MPV 8.9*     Recent Labs     06/01/23  1222 06/02/23  0134   SODIUM 141 144   POTASSIUM 3.7 3.6   CHLORIDE 100 107   CO2 14* 17*   GLUCOSE 118* 101*   BUN 6* 5*   CREATININE 1.09 0.69   CALCIUM 8.9 8.1*     Recent Labs     06/01/23  1405   INR 1.08               Imaging  DX-CHEST-PORTABLE (1 VIEW)   Final Result      No acute cardiac or pulmonary abnormalities are identified.           Assessment/Plan  * Alcohol withdrawal delirium (HCC)- (present on admission)  Assessment & Plan  He admits to 5-6 shots a day of vodka last drink was 5/31  With tachycardia, hypertension is ongoing  CIWA protocol  Taper off Librium  MVI  Replace magnesium  and phos  Is motivated to quit and family will help keep him accountable while on vacation    Hypokalemia- (present on admission)  Assessment & Plan  Improved    Metabolic acidosis- (present on admission)  Assessment & Plan  Due to alcohol use   anion gap is improving  Continue fluids    Hypophosphatemia- (present on admission)  Assessment & Plan  Low, replete and recheck    Hypomagnesemia- (present on admission)  Assessment & Plan  Improved    Tachycardia with heart rate 141-160 beats per minute- (present on admission)  Assessment & Plan  Adenosine was given in the ER without effect thus unlikely PSVT. This was followed by 25 mg IV Cardizem which slowed him and appears to be sinus tachycardia.   Secondary to severe alcohol detox  Remains in sinus tachycardia, continue telemetry    Morbid obesity (HCC)- (present on admission)  Assessment & Plan  BMI over 40         VTE prophylaxis: Xarelto 10 mg daily as prophylaxis    I have performed a physical exam and reviewed and updated ROS and Plan today (6/2/2023). In review of yesterday's note (6/1/2023), there are no changes except as documented above.

## 2023-06-02 NOTE — CARE PLAN
The patient is Watcher - Medium risk of patient condition declining or worsening    Shift Goals  Clinical Goals: monitor HR, withdrawl symptoms, CIWA  Patient Goals: rest, get home, no pain, decreased withdrawl symptoms    Progress made toward(s) clinical / shift goals:    Problem: Knowledge Deficit - Standard  Goal: Patient and family/care givers will demonstrate understanding of plan of care, disease process/condition, diagnostic tests and medications  Description: Target End Date:  1-3 days or as soon as patient condition allows    Document in Patient Education    1.  Patient and family/caregiver oriented to unit, equipment, visitation policy and means for communicating concern  2.  Complete/review Learning Assessment  3.  Assess knowledge level of disease process/condition, treatment plan, diagnostic tests and medications  4.  Explain disease process/condition, treatment plan, diagnostic tests and medications  Outcome: Progressing     Problem: Optimal Care for Alcohol Withdrawal  Goal: Optimal Care for the alcohol withdrawal patient  Description: Target End Date:  1 to 3 days    1.  Alcohol history screening done on admission  2.  CIWA-AR score assessment (includes assessment of nausea/vomiting, tremor, sweats, anxiety, agitation, tactile, visual and auditory disturbances, headache and orientation/sensorium).  Document on CIWA flowsheet.  3.  Follow CIWA-AR score protocol  4.  Frequent reorientation  5.  Monitor for fluid and electrolyte imbalance.  6.  Assess for respiratory depression due to sedation (pulse ox)  7.  Consider thiamine, multivitamins, folic acid and magnesium sulfate per provider order  8.  Collaborate with Social Workers/Case Management  9.  Collaborate with mental health  Outcome: Progressing     Problem: Seizure Precautions  Goal: Implementation of seizure precautions  Description: Target End Date:  Prior to discharge or change in level of care    1.  Padded side rails up at all times  2.   Suction equipment and oxygen delivery system at bedside  3.  Continuous pulse oximeter in use  4.  Implement fall precautions, bed alarm on, bed in lowest position  5.  IV access (per order)  6.  Provide low stimulus environment, avoid exposure to triggers  7.  Instruct patient to use call light/seizure button if having warning signs of impending seizure  Outcome: Progressing     Problem: Lifestyle Changes  Goal: Patient's ability to identify lifestyle changes and available resources to help reduce recurrence of condition will improve  Description: Target End Date:  1 to 3 days    1.  Discuss recommended lifestyle changes  2.  Identify available resources and support systems  3.  Consider referral to substance abuse program  Outcome: Progressing     Problem: Psychosocial  Goal: Patient's level of anxiety will decrease  Description: Target End Date:  1-3 days or as soon as patient condition allows    1.  Collaborate with patient and family/caregiver to identify triggers and develop strategies to cope with anxiety  2.  Implement stimuli reduction, calming techniques  3.  Pharmacologic management per provider order  4.  Encourage patient/family/care giver participation  5.  Collaborate with interdisciplinary team including Psychologist or Behavioral Health Team as needed  Outcome: Progressing  Goal: Spiritual and cultural needs incorporated into hospitalization  Description: Target End Date:  End of day 1    1.  Encourage verbalization of feelings, concerns, expectations and needs  2.  Collaborate with Case Management/  3.  Collaborate with Pastoral Care to meet spiritual needs  Outcome: Progressing     Problem: Risk for Aspiration  Goal: Patient's risk for aspiration will be absent or decrease  Description: Target End Date:  Prior to discharge or change in level of care    1.   Complete dysphagia screening on admission  2.   NPO until dysphagia screening complete or medically cleared  3.   Collaborate  with Speech Therapy, Clinical Dietitian and interdisciplinary team  4.   Implement aspiration precautions  5.   Assist patient up to chair for meals  6.   Elevate head of bed 90 degrees if patient is unable to get out of bed  7.   Encourage small bites  8.   Ensure foods/liquids are of appropriate consistency  9.   Assess for any signs/symptoms of aspiration  10. Assess breath sounds and vital signs after oral intake  Outcome: Progressing     Problem: Fall Risk  Goal: Patient will remain free from falls  Description: Target End Date:  Prior to discharge or change in level of care    Document interventions on the [facility] [employer] Fall Risk Assessment    1.  Assess for fall risk factors  2.  Implement fall precautions  Outcome: Progressing       Patient is not progressing towards the following goals:

## 2023-06-03 ENCOUNTER — PHARMACY VISIT (OUTPATIENT)
Dept: PHARMACY | Facility: MEDICAL CENTER | Age: 38
End: 2023-06-03
Payer: COMMERCIAL

## 2023-06-03 VITALS
RESPIRATION RATE: 18 BRPM | WEIGHT: 232.14 LBS | TEMPERATURE: 98.1 F | BODY MASS INDEX: 41.13 KG/M2 | HEART RATE: 106 BPM | OXYGEN SATURATION: 95 % | DIASTOLIC BLOOD PRESSURE: 94 MMHG | HEIGHT: 63 IN | SYSTOLIC BLOOD PRESSURE: 149 MMHG

## 2023-06-03 LAB
ALBUMIN SERPL BCP-MCNC: 3.8 G/DL (ref 3.2–4.9)
ALBUMIN/GLOB SERPL: 1.1 G/DL
ALP SERPL-CCNC: 109 U/L (ref 30–99)
ALT SERPL-CCNC: 38 U/L (ref 2–50)
ANION GAP SERPL CALC-SCNC: 13 MMOL/L (ref 7–16)
AST SERPL-CCNC: 37 U/L (ref 12–45)
BASOPHILS # BLD AUTO: 0.4 % (ref 0–1.8)
BASOPHILS # BLD: 0.05 K/UL (ref 0–0.12)
BILIRUB SERPL-MCNC: 1.5 MG/DL (ref 0.1–1.5)
BUN SERPL-MCNC: 9 MG/DL (ref 8–22)
CALCIUM ALBUM COR SERPL-MCNC: 8.6 MG/DL (ref 8.5–10.5)
CALCIUM SERPL-MCNC: 8.4 MG/DL (ref 8.5–10.5)
CHLORIDE SERPL-SCNC: 113 MMOL/L (ref 96–112)
CO2 SERPL-SCNC: 17 MMOL/L (ref 20–33)
CREAT SERPL-MCNC: 0.79 MG/DL (ref 0.5–1.4)
EOSINOPHIL # BLD AUTO: 0.24 K/UL (ref 0–0.51)
EOSINOPHIL NFR BLD: 1.9 % (ref 0–6.9)
ERYTHROCYTE [DISTWIDTH] IN BLOOD BY AUTOMATED COUNT: 45.5 FL (ref 35.9–50)
EST. AVERAGE GLUCOSE BLD GHB EST-MCNC: 105 MG/DL
GFR SERPLBLD CREATININE-BSD FMLA CKD-EPI: 118 ML/MIN/1.73 M 2
GLOBULIN SER CALC-MCNC: 3.5 G/DL (ref 1.9–3.5)
GLUCOSE SERPL-MCNC: 123 MG/DL (ref 65–99)
HBA1C MFR BLD: 5.3 % (ref 4–5.6)
HCT VFR BLD AUTO: 43.1 % (ref 42–52)
HGB BLD-MCNC: 14.5 G/DL (ref 14–18)
IMM GRANULOCYTES # BLD AUTO: 0.04 K/UL (ref 0–0.11)
IMM GRANULOCYTES NFR BLD AUTO: 0.3 % (ref 0–0.9)
LYMPHOCYTES # BLD AUTO: 2.2 K/UL (ref 1–4.8)
LYMPHOCYTES NFR BLD: 17.5 % (ref 22–41)
MAGNESIUM SERPL-MCNC: 1.9 MG/DL (ref 1.5–2.5)
MCH RBC QN AUTO: 31 PG (ref 27–33)
MCHC RBC AUTO-ENTMCNC: 33.6 G/DL (ref 32.3–36.5)
MCV RBC AUTO: 92.1 FL (ref 81.4–97.8)
MONOCYTES # BLD AUTO: 1.08 K/UL (ref 0–0.85)
MONOCYTES NFR BLD AUTO: 8.6 % (ref 0–13.4)
NEUTROPHILS # BLD AUTO: 8.94 K/UL (ref 1.82–7.42)
NEUTROPHILS NFR BLD: 71.3 % (ref 44–72)
NRBC # BLD AUTO: 0 K/UL
NRBC BLD-RTO: 0 /100 WBC (ref 0–0.2)
PHOSPHATE SERPL-MCNC: 2.1 MG/DL (ref 2.5–4.5)
PLATELET # BLD AUTO: 242 K/UL (ref 164–446)
PMV BLD AUTO: 8.8 FL (ref 9–12.9)
POTASSIUM SERPL-SCNC: 3.9 MMOL/L (ref 3.6–5.5)
PROT SERPL-MCNC: 7.3 G/DL (ref 6–8.2)
RBC # BLD AUTO: 4.68 M/UL (ref 4.7–6.1)
SODIUM SERPL-SCNC: 143 MMOL/L (ref 135–145)
WBC # BLD AUTO: 12.6 K/UL (ref 4.8–10.8)

## 2023-06-03 PROCEDURE — A9270 NON-COVERED ITEM OR SERVICE: HCPCS | Performed by: HOSPITALIST

## 2023-06-03 PROCEDURE — 700105 HCHG RX REV CODE 258: Performed by: INTERNAL MEDICINE

## 2023-06-03 PROCEDURE — 84100 ASSAY OF PHOSPHORUS: CPT

## 2023-06-03 PROCEDURE — 83036 HEMOGLOBIN GLYCOSYLATED A1C: CPT

## 2023-06-03 PROCEDURE — RXMED WILLOW AMBULATORY MEDICATION CHARGE: Performed by: INTERNAL MEDICINE

## 2023-06-03 PROCEDURE — 700102 HCHG RX REV CODE 250 W/ 637 OVERRIDE(OP)

## 2023-06-03 PROCEDURE — 36415 COLL VENOUS BLD VENIPUNCTURE: CPT

## 2023-06-03 PROCEDURE — 83735 ASSAY OF MAGNESIUM: CPT

## 2023-06-03 PROCEDURE — 80053 COMPREHEN METABOLIC PANEL: CPT

## 2023-06-03 PROCEDURE — 85025 COMPLETE CBC W/AUTO DIFF WBC: CPT

## 2023-06-03 PROCEDURE — A9270 NON-COVERED ITEM OR SERVICE: HCPCS

## 2023-06-03 PROCEDURE — 99239 HOSP IP/OBS DSCHRG MGMT >30: CPT | Performed by: INTERNAL MEDICINE

## 2023-06-03 PROCEDURE — 700101 HCHG RX REV CODE 250: Performed by: INTERNAL MEDICINE

## 2023-06-03 PROCEDURE — 700102 HCHG RX REV CODE 250 W/ 637 OVERRIDE(OP): Performed by: HOSPITALIST

## 2023-06-03 RX ORDER — METOPROLOL TARTRATE 100 MG/1
100 TABLET ORAL 2 TIMES DAILY
Qty: 60 TABLET | Refills: 0 | Status: SHIPPED | OUTPATIENT
Start: 2023-06-03 | End: 2023-10-28

## 2023-06-03 RX ORDER — LOPERAMIDE HYDROCHLORIDE 2 MG/1
2 CAPSULE ORAL ONCE
Status: COMPLETED | OUTPATIENT
Start: 2023-06-03 | End: 2023-06-03

## 2023-06-03 RX ORDER — METOPROLOL TARTRATE 50 MG/1
100 TABLET, FILM COATED ORAL 2 TIMES DAILY
Status: DISCONTINUED | OUTPATIENT
Start: 2023-06-03 | End: 2023-06-03 | Stop reason: HOSPADM

## 2023-06-03 RX ADMIN — ACETAMINOPHEN 650 MG: 325 TABLET, FILM COATED ORAL at 11:46

## 2023-06-03 RX ADMIN — Medication 100 MG: at 04:34

## 2023-06-03 RX ADMIN — LOPERAMIDE HYDROCHLORIDE 2 MG: 2 CAPSULE ORAL at 05:19

## 2023-06-03 RX ADMIN — METOPROLOL TARTRATE 50 MG: 50 TABLET ORAL at 04:34

## 2023-06-03 RX ADMIN — POTASSIUM PHOSPHATE, MONOBASIC AND POTASSIUM PHOSPHATE, DIBASIC 30 MMOL: 224; 236 INJECTION, SOLUTION, CONCENTRATE INTRAVENOUS at 09:06

## 2023-06-03 RX ADMIN — THERA TABS 1 TABLET: TAB at 04:34

## 2023-06-03 RX ADMIN — FOLIC ACID 1 MG: 1 TABLET ORAL at 04:34

## 2023-06-03 RX ADMIN — LOSARTAN POTASSIUM 75 MG: 50 TABLET, FILM COATED ORAL at 04:34

## 2023-06-03 ASSESSMENT — LIFESTYLE VARIABLES
ANXIETY: NO ANXIETY (AT EASE)
NAUSEA AND VOMITING: NO NAUSEA AND NO VOMITING
TREMOR: NO TREMOR
ORIENTATION AND CLOUDING OF SENSORIUM: ORIENTED AND CAN DO SERIAL ADDITIONS
ORIENTATION AND CLOUDING OF SENSORIUM: ORIENTED AND CAN DO SERIAL ADDITIONS
NAUSEA AND VOMITING: NO NAUSEA AND NO VOMITING
VISUAL DISTURBANCES: NOT PRESENT
AGITATION: NORMAL ACTIVITY
TREMOR: NO TREMOR
AUDITORY DISTURBANCES: NOT PRESENT
PAROXYSMAL SWEATS: NO SWEAT VISIBLE
AGITATION: NORMAL ACTIVITY
PAROXYSMAL SWEATS: NO SWEAT VISIBLE
AUDITORY DISTURBANCES: NOT PRESENT
TOTAL SCORE: 0
HEADACHE, FULLNESS IN HEAD: NOT PRESENT
ANXIETY: NO ANXIETY (AT EASE)
VISUAL DISTURBANCES: NOT PRESENT
HEADACHE, FULLNESS IN HEAD: NOT PRESENT
TOTAL SCORE: 0

## 2023-06-03 ASSESSMENT — PAIN DESCRIPTION - PAIN TYPE: TYPE: ACUTE PAIN

## 2023-06-03 NOTE — DISCHARGE INSTRUCTIONS
Discharge Instructions per Gordon Brambila M.D.    DIAGNOSIS:   As we discussed, it will be important for you to continue abstinence from alcohol. I encourage to utilize community programs such as [facility] or the other alcohol rehab programs provided to help you.  Please see your primary care doctor regarding your hypertension as your blood pressure has been high. It may be high because you have stopped drinking alcohol, which would be temporary. But your primary care doctor needs to recheck your blood pressure.

## 2023-06-03 NOTE — PROGRESS NOTES
Monitor Summary:   Rhythm: NSR - ST  Rate: 98 - 128  Measurement: .14/.07/.32  Ectopy: Rare PVC, occasional bigeminy    12 hour chart check

## 2023-06-03 NOTE — CARE PLAN
The patient is Stable - Low risk of patient condition declining or worsening    Shift Goals  Clinical Goals: Monitor BP, discharge  Patient Goals: Go home  Family Goals: na    Progress made toward(s) clinical / shift goals:    Monitoring SBP, wanting SBP below 150 prior to discharge.  Discharge home once medically cleared. Patient showered, denies pain at the moment.     Problem: Knowledge Deficit - Standard  Goal: Patient and family/care givers will demonstrate understanding of plan of care, disease process/condition, diagnostic tests and medications  Outcome: Progressing     Problem: Optimal Care for Alcohol Withdrawal  Goal: Optimal Care for the alcohol withdrawal patient  Outcome: Progressing     Problem: Psychosocial  Goal: Patient's level of anxiety will decrease  Outcome: Progressing     Problem: Fall Risk  Goal: Patient will remain free from falls  Outcome: Progressing       Patient is not progressing towards the following goals:

## 2023-06-03 NOTE — CARE PLAN
The patient is Stable - Low risk of patient condition declining or worsening    Shift Goals  Clinical Goals: monitor HR, withdrawl symptoms, CIWA  Patient Goals: rest, get home, no pain, decreased withdrawl symptoms    Progress made toward(s) clinical / shift goals:  Librium discontinued      Problem: Knowledge Deficit - Standard  Goal: Patient and family/care givers will demonstrate understanding of plan of care, disease process/condition, diagnostic tests and medications  Outcome: Progressing     Problem: Optimal Care for Alcohol Withdrawal  Goal: Optimal Care for the alcohol withdrawal patient  Outcome: Progressing     Problem: Seizure Precautions  Goal: Implementation of seizure precautions  Outcome: Progressing     Problem: Lifestyle Changes  Goal: Patient's ability to identify lifestyle changes and available resources to help reduce recurrence of condition will improve  Outcome: Progressing     Problem: Psychosocial  Goal: Patient's level of anxiety will decrease  Outcome: Progressing  Goal: Spiritual and cultural needs incorporated into hospitalization  Outcome: Progressing     Problem: Risk for Aspiration  Goal: Patient's risk for aspiration will be absent or decrease  Outcome: Progressing     Problem: Fall Risk  Goal: Patient will remain free from falls  Outcome: Progressing       Patient is not progressing towards the following goals:

## 2023-06-03 NOTE — PROGRESS NOTES
Pt discharged with all belongings, home meds, and meds to bed. Cardiac monitor removed. LDAs disconnected. Pt prescriptions with pt. Pt wheeled out to car in wheelchair. All discharge paperwork discussed.

## 2023-06-03 NOTE — DISCHARGE SUMMARY
Discharge Summary    CHIEF COMPLAINT ON ADMISSION  Chief Complaint   Patient presents with    Tachycardia      in triage.    Flank Pain     L sided flank pain started 3 days ago. Hx of pancreatitis. Pt drinks liquor every other day. Also c/o Nausea but no vomiting.       Reason for Admission  ULQ Pain, history of pancreatitis     Admission Date  6/1/2023    CODE STATUS  Full Code    HPI & HOSPITAL COURSE  36 yo man with HTN, and alcohol abuse who had family intervention and he stopped drinking but developed abd pain. Lipase was normal.  He was found to have hypertension and tachycardia.  It was initially thought to be SVT and given adenosine but appears to be sinus tachycardia.  He was admitted for alcohol withdrawal.  His abdominal pain resolved.  He was weaned off CIWA protocol.  He remains somewhat hypertensive and tachycardic.  TSH and A1c were normal.  Some residual withdrawal could be contributing, though chart review shows he has multiple ER and urgent care visits that shows he has had issues with uncontrolled hypertension.  [facility] about increasing his metoprolol dose but he declined at the time.  He was agreeable to increasing the metoprolol dose now and he plans to follow-up with his PCP after the weekend before he goes on his trip to the [facility].  I discussed alcohol cessation with him.  He has quit before and is motivated to do it again and also work on his diet and exercise.  I discussed with the sister who says that his family will be working on keeping him accountable.  Case management provided him with alcohol program resources.    Therefore, he is discharged in good and stable condition to home with close outpatient follow-up.    The patient met 2-midnight criteria for an inpatient stay at the time of discharge.    Discharge Date  6/3    FOLLOW UP ITEMS POST DISCHARGE  Follow-up hypertension, metoprolol was  increased  Continue support for alcohol cessation    DISCHARGE DIAGNOSES  Principal Problem:    Alcohol withdrawal delirium (HCC) (POA: Yes)  Active Problems:    Morbid obesity (HCC) (POA: Yes)    Tachycardia with heart rate 141-160 beats per minute (POA: Yes)    Hypomagnesemia (POA: Yes)    Hypophosphatemia (POA: Yes)    Metabolic acidosis (POA: Yes)    Hypokalemia (POA: Yes)  Resolved Problems:    * No resolved hospital problems. *      FOLLOW UP    [facility] ([facility]) - Primary Care and Family Medicine  [address] [place] [id number]  [phone]  Schedule an appointment as soon as possible for a visit in 1 week(s)  Follow-up      MEDICATIONS ON DISCHARGE     Medication List        CHANGE how you take these medications        Instructions   metoprolol tartrate 100 MG Tabs  What changed:   medication strength  how much to take  Commonly known as: LOPRESSOR   Take 1 Tablet by mouth 2 times a day.  Dose: 100 mg            CONTINUE taking these medications        Instructions   losartan 50 MG Tabs  Commonly known as: COZAAR   Take 75 mg by mouth every day. 1.5 tablets = 75 mg.  Dose: 75 mg            STOP taking these medications      ibuprofen 200 MG Tabs  Commonly known as: MOTRIN              Allergies  No Known Allergies    DIET  Orders Placed This Encounter   Procedures    Diet Order Diet: Regular     Standing Status:   Standing     Number of Occurrences:   1     Order Specific Question:   Diet:     Answer:   Regular [1]       ACTIVITY  As tolerated.    CONSULTATIONS  none    PROCEDURES  DX-CHEST-PORTABLE (1 VIEW)   Final Result      No acute cardiac or pulmonary abnormalities are identified.            LABORATORY  Lab Results   Component Value Date    SODIUM 143 06/03/2023    POTASSIUM 3.9 06/03/2023    CHLORIDE 113 (H) 06/03/2023    CO2 17 (L) 06/03/2023    GLUCOSE 123 (H) 06/03/2023    BUN 9 06/03/2023    CREATININE 0.79 06/03/2023        Lab Results   Component Value Date    WBC 12.6  (H) 06/03/2023    HEMOGLOBIN 14.5 06/03/2023    HEMATOCRIT 43.1 06/03/2023    PLATELETCT 242 06/03/2023        Total time of the discharge process exceeds 34 minutes.

## 2023-06-03 NOTE — CARE PLAN
The patient is Stable - Low risk of patient condition declining or worsening    Shift Goals  Clinical Goals: monitor HR, withdrawl symptoms, CIWA  Patient Goals: rest, get home, no pain, decreased withdrawl symptoms    Progress made toward(s) clinical / shift goals:  progressing     Problem: Knowledge Deficit - Standard  Goal: Patient and family/care givers will demonstrate understanding of plan of care, disease process/condition, diagnostic tests and medications  Outcome: Progressing     Problem: Optimal Care for Alcohol Withdrawal  Goal: Optimal Care for the alcohol withdrawal patient  Outcome: Progressing     Problem: Lifestyle Changes  Goal: Patient's ability to identify lifestyle changes and available resources to help reduce recurrence of condition will improve  Outcome: Progressing     Problem: Risk for Aspiration  Goal: Patient's risk for aspiration will be absent or decrease  Outcome: Progressing     Problem: Fall Risk  Goal: Patient will remain free from falls  Outcome: Progressing       Patient is not progressing towards the following goals:

## 2023-06-04 ENCOUNTER — OFFICE VISIT (OUTPATIENT)
Dept: URGENT CARE | Facility: CLINIC | Age: 38
End: 2023-06-04
Payer: COMMERCIAL

## 2023-06-04 VITALS
BODY MASS INDEX: 41.11 KG/M2 | HEART RATE: 104 BPM | DIASTOLIC BLOOD PRESSURE: 90 MMHG | TEMPERATURE: 98.8 F | WEIGHT: 232 LBS | HEIGHT: 63 IN | SYSTOLIC BLOOD PRESSURE: 158 MMHG | OXYGEN SATURATION: 96 % | RESPIRATION RATE: 16 BRPM

## 2023-06-04 DIAGNOSIS — M10.042 ACUTE IDIOPATHIC GOUT OF LEFT HAND: ICD-10-CM

## 2023-06-04 PROCEDURE — 3077F SYST BP >= 140 MM HG: CPT | Performed by: FAMILY MEDICINE

## 2023-06-04 PROCEDURE — 3080F DIAST BP >= 90 MM HG: CPT | Performed by: FAMILY MEDICINE

## 2023-06-04 PROCEDURE — 99213 OFFICE O/P EST LOW 20 MIN: CPT | Performed by: FAMILY MEDICINE

## 2023-06-04 RX ORDER — COLCHICINE 0.6 MG/1
TABLET ORAL
Qty: 10 TABLET | Refills: 0 | Status: SHIPPED | OUTPATIENT
Start: 2023-06-04 | End: 2023-08-03

## 2023-06-04 RX ORDER — LOSARTAN POTASSIUM 100 MG/1
TABLET ORAL
COMMUNITY
Start: 2023-05-19 | End: 2023-08-03

## 2023-06-04 RX ORDER — METHYLPREDNISOLONE 4 MG/1
TABLET ORAL
Qty: 21 TABLET | Refills: 0 | Status: SHIPPED | OUTPATIENT
Start: 2023-06-04 | End: 2023-07-08

## 2023-06-04 ASSESSMENT — ENCOUNTER SYMPTOMS
NAUSEA: 0
WEIGHT LOSS: 0
MYALGIAS: 0
EYE REDNESS: 0
VOMITING: 0
EYE DISCHARGE: 0

## 2023-06-04 ASSESSMENT — FIBROSIS 4 INDEX: FIB4 SCORE: 0.87

## 2023-06-04 NOTE — PROGRESS NOTES
"Subjective     Alberto Maldonado is a 37 y.o. male who presents with No chief complaint on file.            Onset last night left hand pain and swelling. Suspects gout. PMH gout. Pain severity 7/10 and worse with movement. No injury. No fever. No arm swelling. No OTC medications. No recent change in diet. No other aggravating or alleviating factors.          Review of Systems   Constitutional:  Negative for malaise/fatigue and weight loss.   Eyes:  Negative for discharge and redness.   Gastrointestinal:  Negative for nausea and vomiting.   Musculoskeletal:  Negative for joint pain and myalgias.        No other joints affected   Skin:  Negative for itching and rash.              Objective     BP (!) 158/90   Pulse (!) 104   Temp 37.1 °C (98.8 °F)   Resp 16   Ht 1.6 m (5' 3\")   Wt 105 kg (232 lb)   SpO2 96%   BMI 41.10 kg/m²      Physical Exam  Constitutional:       Appearance: Normal appearance.   Musculoskeletal:      Comments: Left hand: diffusely tender, swollen, and mildly warm. Distal neuro/vascular intact.      Skin:     General: Skin is warm and dry.      Findings: No rash.   Neurological:      Mental Status: He is alert.                             Assessment & Plan        1. Acute idiopathic gout of left hand  methylPREDNISolone (MEDROL DOSEPAK) 4 MG Tablet Therapy Pack    colchicine (COLCRYS) 0.6 MG Tab        Differential diagnosis, natural history, supportive care, and indications for immediate follow-up were discussed.     F/u pcp        "

## 2023-06-04 NOTE — LETTER
June 8, 2023         Patient: Alberto Maldonado   YOB: 1985   Date of Visit: 6/4/2023           To Whom it May Concern:    Alberto Maldonado was seen in my clinic on 6/4/2023. Please allow light duty 6/5 through 6/19/2023. Lifting and carrying less than 10# with left upper extremity. He may then return to full duty on 6/20/2023.     Sincerely,           Stan Estrella M.D.  Electronically Signed

## 2023-06-04 NOTE — LETTER
June 4, 2023         Patient: Alberto Maldonado   YOB: 1985   Date of Visit: 6/4/2023           To Whom it May Concern:    Alberto Maldonado was seen in my clinic on 6/4/2023. Please excuse from work 6/5 and 6/6/2023. He may then return to his next scheduled shift to full duty.   Sincerely,           Stan Estrella M.D.  Electronically Signed

## 2023-06-06 ENCOUNTER — TELEPHONE (OUTPATIENT)
Dept: URGENT CARE | Facility: CLINIC | Age: 38
End: 2023-06-06

## 2023-07-08 ENCOUNTER — OFFICE VISIT (OUTPATIENT)
Dept: URGENT CARE | Facility: PHYSICIAN GROUP | Age: 38
End: 2023-07-08
Payer: COMMERCIAL

## 2023-07-08 VITALS
RESPIRATION RATE: 18 BRPM | SYSTOLIC BLOOD PRESSURE: 150 MMHG | TEMPERATURE: 98.4 F | OXYGEN SATURATION: 93 % | WEIGHT: 232 LBS | DIASTOLIC BLOOD PRESSURE: 90 MMHG | BODY MASS INDEX: 41.11 KG/M2 | HEART RATE: 83 BPM | HEIGHT: 63 IN

## 2023-07-08 DIAGNOSIS — M10.072 ACUTE IDIOPATHIC GOUT OF LEFT FOOT: ICD-10-CM

## 2023-07-08 DIAGNOSIS — I10 PRIMARY HYPERTENSION: ICD-10-CM

## 2023-07-08 PROCEDURE — 3077F SYST BP >= 140 MM HG: CPT | Performed by: FAMILY MEDICINE

## 2023-07-08 PROCEDURE — 99213 OFFICE O/P EST LOW 20 MIN: CPT | Performed by: FAMILY MEDICINE

## 2023-07-08 PROCEDURE — 3080F DIAST BP >= 90 MM HG: CPT | Performed by: FAMILY MEDICINE

## 2023-07-08 RX ORDER — METHYLPREDNISOLONE 4 MG/1
TABLET ORAL
Qty: 21 TABLET | Refills: 0 | Status: SHIPPED | OUTPATIENT
Start: 2023-07-08 | End: 2023-08-03 | Stop reason: SDUPTHER

## 2023-07-08 ASSESSMENT — ENCOUNTER SYMPTOMS: FEVER: 0

## 2023-07-08 ASSESSMENT — FIBROSIS 4 INDEX: FIB4 SCORE: 0.87

## 2023-07-08 NOTE — PROGRESS NOTES
"Subjective:     Alberto Maldonado is a 37 y.o. male who presents for Other (Gout flare up on L foot, swelling)    HPI  Pt presents for a recurrent problem  Patient with left foot/ankle swelling  Has quite a bit of pain  Has history of recurrent gout and feels similar  Usually gets gout in his foot/ankle  Recently ate calamari and thinks that that was the trigger  He does take allopurinol daily and normally prevents too many breakthroughs, however just had another breakthrough episode of gout a month ago which was treated successfully with Medrol Dosepak    Review of Systems   Constitutional:  Negative for fever.   Skin:  Negative for rash.     PMH:  has a past medical history of Asthma without status asthmaticus (6/29/2017).    He has no past medical history of Cancer (HCC) or Diabetes (HCC).  MEDS:   Current Outpatient Medications:     methylPREDNISolone (MEDROL DOSEPAK) 4 MG Tablet Therapy Pack, Follow schedule on package instructions., Disp: 21 Tablet, Rfl: 0    losartan (COZAAR) 100 MG Tab, TAKE 1 TABLET BY MOUTH EVERY DAY FOR 90 DAYS, Disp: , Rfl:     metoprolol tartrate (LOPRESSOR) 100 MG Tab, Take 1 Tablet by mouth 2 times a day., Disp: 60 Tablet, Rfl: 0    losartan (COZAAR) 50 MG Tab, Take 75 mg by mouth every day. 1.5 tablets = 75 mg., Disp: , Rfl:     colchicine (COLCRYS) 0.6 MG Tab, 2 PO  then 1 PO in 1 hour if pain persists, then 1 PO daily, Disp: 10 Tablet, Rfl: 0  ALLERGIES: No Known Allergies  SURGHX: History reviewed. No pertinent surgical history.  SOCHX:  reports that he has never smoked. He has never used smokeless tobacco. He reports current alcohol use of about 7.2 oz of alcohol per week. He reports that he does not currently use drugs.     Objective:   BP (!) 150/90   Pulse 83   Temp 36.9 °C (98.4 °F) (Temporal)   Resp 18   Ht 1.6 m (5' 3\")   Wt 105 kg (232 lb)   SpO2 93%   BMI 41.10 kg/m²     Physical Exam  Constitutional:       General: He is not in acute distress.     " Appearance: He is well-developed. He is not diaphoretic.   Pulmonary:      Effort: Pulmonary effort is normal.   Neurological:      Mental Status: He is alert.     Right ankle/foot:  Appearance: No bruising, erythema, or deformity appreciated  Palpation: No TTP along medial malleolus or deltoid ligament.  No TTP of lateral malleolus, ATFL, CFL, or PTFL.  +TTP along dorsal midfoot mostly at base of 1st metatarsal   Neurovascular: 2+ dorsalis pedis and posterior tibial  Gait: Antalgic     Assessment/Plan:   Assessment    1. Acute idiopathic gout of left foot  - methylPREDNISolone (MEDROL DOSEPAK) 4 MG Tablet Therapy Pack; Follow schedule on package instructions.  Dispense: 21 Tablet; Refill: 0    2. Primary hypertension    Patient with recurrent gout.  Has done well with Medrol dose packs in the past.  Did advise that this can sometimes make the blood pressure elevate.  Emphasized importance of being strict on a low salt/sodium diet and recommended he check his blood pressure more frequently.  He will follow-up with his PCP for blood pressure management and also for his long-term gout medications.

## 2023-07-20 ENCOUNTER — OFFICE VISIT (OUTPATIENT)
Dept: URGENT CARE | Facility: CLINIC | Age: 38
End: 2023-07-20
Payer: COMMERCIAL

## 2023-07-20 DIAGNOSIS — M10.9 ACUTE GOUT OF RIGHT ANKLE, UNSPECIFIED CAUSE: ICD-10-CM

## 2023-07-20 DIAGNOSIS — R50.9 FEVER, UNSPECIFIED FEVER CAUSE: ICD-10-CM

## 2023-07-20 DIAGNOSIS — I10 HYPERTENSION, UNSPECIFIED TYPE: ICD-10-CM

## 2023-07-20 DIAGNOSIS — M10.9 ACUTE GOUT OF LEFT WRIST, UNSPECIFIED CAUSE: ICD-10-CM

## 2023-07-20 DIAGNOSIS — F10.11 HISTORY OF ALCOHOL ABUSE: ICD-10-CM

## 2023-07-20 LAB
FLUAV RNA SPEC QL NAA+PROBE: NEGATIVE
FLUBV RNA SPEC QL NAA+PROBE: NEGATIVE
RSV RNA SPEC QL NAA+PROBE: NEGATIVE
S PYO DNA SPEC NAA+PROBE: NOT DETECTED
SARS-COV-2 RNA RESP QL NAA+PROBE: NEGATIVE

## 2023-07-20 PROCEDURE — 87651 STREP A DNA AMP PROBE: CPT | Performed by: STUDENT IN AN ORGANIZED HEALTH CARE EDUCATION/TRAINING PROGRAM

## 2023-07-20 PROCEDURE — 3080F DIAST BP >= 90 MM HG: CPT | Performed by: STUDENT IN AN ORGANIZED HEALTH CARE EDUCATION/TRAINING PROGRAM

## 2023-07-20 PROCEDURE — 99215 OFFICE O/P EST HI 40 MIN: CPT | Performed by: STUDENT IN AN ORGANIZED HEALTH CARE EDUCATION/TRAINING PROGRAM

## 2023-07-20 PROCEDURE — 0241U POCT CEPHEID COV-2, FLU A/B, RSV - PCR: CPT | Performed by: STUDENT IN AN ORGANIZED HEALTH CARE EDUCATION/TRAINING PROGRAM

## 2023-07-20 PROCEDURE — 3077F SYST BP >= 140 MM HG: CPT | Performed by: STUDENT IN AN ORGANIZED HEALTH CARE EDUCATION/TRAINING PROGRAM

## 2023-07-20 RX ORDER — COLCHICINE 0.6 MG/1
0.6 TABLET ORAL DAILY
Qty: 30 TABLET | Refills: 1 | Status: SHIPPED | OUTPATIENT
Start: 2023-07-20 | End: 2023-08-18 | Stop reason: SDUPTHER

## 2023-07-20 ASSESSMENT — FIBROSIS 4 INDEX: FIB4 SCORE: 0.87

## 2023-07-20 NOTE — PROGRESS NOTES
Subjective:   CHIEF COMPLAINT  Chief Complaint   Patient presents with    Pain     X1day Right ankle/left wrist pain/gout flare up       HPI  Alberto Maldonado is a 37 y.o. male who presents with a chief complaint of a gout flare.  Says symptoms started overnight with pain and swelling of his left wrist and right ankle.  He has had notable flares in his right ankle and has had 1 previous fair in his left wrist.  Says he had a pot luck yesterday at work and possibly eating port of subs could have triggered the flare.  Says he took 2 Aleve this morning which has not helped.  Pain in wrist is aggravated with range of motion.  Pain in ankles aggravated with weightbearing.  He is on PPx allopurinol.  He has an appointment with his PCP next week.    He was seen in urgent care 12 days ago for an acute gout flare of his left ankle/foot and treated with colchicine and Medrol Dosepak.  This is his fourth visit to urgent care in the last 3 months for gout flareups.  Patient is a poor historian and does not know many of the medications he has been taking for management of his gout.    On presentation patient was afebrile on 2 separate reads with a temperature of 100.5.  Says he is experiencing a scratchy throat, but reports he is otherwise feeling fine.  He is not experiencing any body aches or chills.  No nausea, vomiting or diarrhea.  He is not experiencing abdominal pain.  No dysuria or hematuria.  He has a previous history of alcohol abuse, requiring inpatient hospitalization last month however says he has not had any alcohol since discharge from the hospital.    Additionally blood pressure is acutely elevated at 162/100.  This is a chronic, uncontrolled issue for the patient.  He manages with losartan 100 mg po qd and metoprolol tartrate 100 mg twice daily.  Says he took his medications this morning.  He has not experienced any chest pain or shortness of breath.    REVIEW OF SYSTEMS  General: no fever or chills  GI:  "no nausea or vomiting  See HPI for further details.    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 (Physician) on 07/20/23 at 1418  Med List Status: <None>     Medication Last Dose Status   colchicine (COLCRYS) 0.6 MG Tab  Active   losartan (COZAAR) 100 MG Tab Taking Active   losartan (COZAAR) 50 MG Tab Taking Active   methylPREDNISolone (MEDROL DOSEPAK) 4 MG Tablet Therapy Pack  Active   metoprolol tartrate (LOPRESSOR) 100 MG Tab Taking Active                    SOCIAL HISTORY  Social History     Tobacco Use    Smoking status: Never    Smokeless tobacco: Never   Vaping Use    Vaping Use: Never used   Substance and Sexual Activity    Alcohol use: Yes     Alcohol/week: 7.2 oz     Types: 12 Shots of liquor per week    Drug use: Not Currently    Sexual activity: Not Currently       FAMILY HISTORY  Family History   Problem Relation Age of Onset    Stroke Neg Hx     Heart Disease Neg Hx           Objective:   PHYSICAL EXAM  VITAL SIGNS: BP (!) 162/100 (BP Location: Left arm, Patient Position: Sitting)   Pulse 97   Temp 37.4 °C (99.4 °F)   Resp 20   Ht 1.6 m (5' 3\")   Wt 109 " kg (240 lb 8 oz)   SpO2 95%   BMI 42.60 kg/m²     Gen: no acute distress, normal voice  Skin: dry, intact, moist mucosal membranes  Eyes: No conjunctival injection b/l  Neck: Normal range of motion. No meningeal signs.   ENT: No oropharyngeal erythema or exudates. Uvula midline. TMs clear and intact b/l w/o bulging, erythema or effusion. No lymphadenopathy.  Lungs: No increased work of breathing.  CTAB w/ symmetric expansion  CV: RRR w/o murmurs or clicks  Psych: normal affect, normal judgement, alert, awake  MSK: Mild edema of the left wrist with minimal warmth.  No obvious erythema.  Mild tenderness to palpation along the radiocarpal joint; no focal bony tenderness to palpation.  No motor or sensory deficits of distal 5 digits however mild loss of AROM at the radiocarpal joint secondary to tenderness and effusion.    Right ankle with edema laterally.  No erythema or warmth.  Global tenderness to palpation along the lateral tibiotalar joint.  No focal bony tenderness to palpation.  Good AROM of the ankle joint associated with mild discernible discomfort with terminal plantar flexion and dorsiflexion.  No motor or sensory deficits distally.    Assessment/Plan:     1. Acute gout of right ankle, unspecified cause        2. Acute gout of left wrist, unspecified cause        3. Hypertension, unspecified type        4. History of alcohol abuse  colchicine (COLCRYS) 0.6 MG Tab      5. Fever, unspecified fever cause  POCT GROUP A STREP, PCR    POCT CoV-2, Flu A/B, RSV by PCR      History and physical exam consistent with an acute gout flare.  This has been a recurrent issue for the patient the last couple of months, dietary related.  Additionally his blood pressure is elevated at 162/100, multifactorial secondary to noncompliance to medication, use of NSAIDs, and diet.  He has had multiple rounds of p.o. steroids in the last couple of weeks, so I would like to avoid additional systemic steroids at this time, especially  in the the setting of having a borderline temperature (see below).  And given his blood pressure I would like to avoid additional NSAIDs.  -Ordered prescription for colchicine  -Ice and Tylenol for symptomatic relief  -Dietary modifications discussed  -Strongly encouraged to follow-up with his primary care for reevaluation and continue management    On presentation patient had a borderline temperature of 100.5.  Several repeat temperatures were taken, with a low of 99.4 and up too 102.6 when taken orally.  I was unable to get the same temperature with consecutive attempts and suspect elevated temperature is related to our thermometers.  I ordered a viral panel and strep test which were negative.  I did a thorough review of systems which were negative for a source of infection.  Patient was very well-appearing, nontoxic and clinically did not appear to be someone with a septic joint.  I was able to passively range both the wrist joint and ankle joint patient was able to bear weight without assistance.  I suspect the elevated temperature are due to malfunction of equipment.  However I did had a lengthy and detailed conversation with the patient describing red flags and ED precautions for septic joint.  Patient understood everything discussed today and all questions were answered.    Differential diagnosis and supportive care discussed. Follow-up as needed if symptoms worsen or fail to improve to PCP, Urgent care or Emergency Room.    53 minutes was spent on this encounter including review of previous medical records, face-to-face time, discussing the diagnosis, medical management, need for follow-up, return/emergency room precautions and charting. This does not include time spent on separately billable procedures/tests.    Please note that this dictation was created using voice recognition software. I have made a reasonable attempt to correct obvious errors, but I expect that there are errors of grammar and possibly  content that I did not discover before finalizing the note.

## 2023-07-24 VITALS
BODY MASS INDEX: 42.61 KG/M2 | OXYGEN SATURATION: 95 % | DIASTOLIC BLOOD PRESSURE: 100 MMHG | WEIGHT: 240.5 LBS | TEMPERATURE: 99.4 F | RESPIRATION RATE: 20 BRPM | HEART RATE: 97 BPM | SYSTOLIC BLOOD PRESSURE: 162 MMHG | HEIGHT: 63 IN

## 2023-08-03 ENCOUNTER — OFFICE VISIT (OUTPATIENT)
Dept: URGENT CARE | Facility: PHYSICIAN GROUP | Age: 38
End: 2023-08-03
Payer: COMMERCIAL

## 2023-08-03 VITALS
OXYGEN SATURATION: 98 % | SYSTOLIC BLOOD PRESSURE: 136 MMHG | RESPIRATION RATE: 16 BRPM | HEIGHT: 63 IN | WEIGHT: 233 LBS | DIASTOLIC BLOOD PRESSURE: 84 MMHG | TEMPERATURE: 98 F | HEART RATE: 74 BPM | BODY MASS INDEX: 41.29 KG/M2

## 2023-08-03 DIAGNOSIS — M10.072 ACUTE IDIOPATHIC GOUT OF LEFT FOOT: ICD-10-CM

## 2023-08-03 PROCEDURE — 3075F SYST BP GE 130 - 139MM HG: CPT

## 2023-08-03 PROCEDURE — 99213 OFFICE O/P EST LOW 20 MIN: CPT

## 2023-08-03 PROCEDURE — 3079F DIAST BP 80-89 MM HG: CPT

## 2023-08-03 RX ORDER — METOPROLOL TARTRATE 50 MG/1
TABLET, FILM COATED ORAL
COMMUNITY
Start: 2023-07-28 | End: 2023-08-03

## 2023-08-03 RX ORDER — METHYLPREDNISOLONE 4 MG/1
TABLET ORAL
Qty: 21 TABLET | Refills: 0 | Status: SHIPPED | OUTPATIENT
Start: 2023-08-03 | End: 2023-10-19

## 2023-08-03 RX ORDER — AMLODIPINE BESYLATE 5 MG/1
TABLET ORAL
COMMUNITY
Start: 2023-07-28 | End: 2023-10-28

## 2023-08-03 RX ORDER — ALLOPURINOL 100 MG/1
100 TABLET ORAL 3 TIMES DAILY
COMMUNITY
Start: 2023-06-27 | End: 2023-11-06

## 2023-08-03 ASSESSMENT — FIBROSIS 4 INDEX: FIB4 SCORE: 0.87

## 2023-08-03 NOTE — LETTER
August 3, 2023    To Whom It May Concern:         This is confirmation that Alberto Leightonaaron Maldonado attended his scheduled appointment with RICHY Copeland on 8/03/23.         If you have any questions please do not hesitate to call me at the phone number listed below.    Sincerely,          JORDAN Copeland.  [phone]

## 2023-08-18 ENCOUNTER — OFFICE VISIT (OUTPATIENT)
Dept: URGENT CARE | Facility: PHYSICIAN GROUP | Age: 38
End: 2023-08-18
Payer: COMMERCIAL

## 2023-08-18 VITALS
OXYGEN SATURATION: 98 % | HEIGHT: 63 IN | HEART RATE: 86 BPM | DIASTOLIC BLOOD PRESSURE: 80 MMHG | SYSTOLIC BLOOD PRESSURE: 120 MMHG | RESPIRATION RATE: 16 BRPM | WEIGHT: 233 LBS | BODY MASS INDEX: 41.29 KG/M2 | TEMPERATURE: 98.6 F

## 2023-08-18 DIAGNOSIS — M10.9 ACUTE GOUT OF LEFT KNEE, UNSPECIFIED CAUSE: ICD-10-CM

## 2023-08-18 DIAGNOSIS — F10.11 HISTORY OF ALCOHOL ABUSE: ICD-10-CM

## 2023-08-18 PROCEDURE — 3079F DIAST BP 80-89 MM HG: CPT | Performed by: REGISTERED NURSE

## 2023-08-18 PROCEDURE — 99214 OFFICE O/P EST MOD 30 MIN: CPT | Performed by: REGISTERED NURSE

## 2023-08-18 PROCEDURE — 3074F SYST BP LT 130 MM HG: CPT | Performed by: REGISTERED NURSE

## 2023-08-18 RX ORDER — COLCHICINE 0.6 MG/1
TABLET ORAL
Qty: 6 TABLET | Refills: 1 | Status: SHIPPED | OUTPATIENT
Start: 2023-08-18 | End: 2023-10-19

## 2023-08-18 ASSESSMENT — ENCOUNTER SYMPTOMS
HEADACHES: 0
MYALGIAS: 0
FEVER: 0
EYE PAIN: 0
ABDOMINAL PAIN: 0
SHORTNESS OF BREATH: 0
DIZZINESS: 0

## 2023-08-18 ASSESSMENT — FIBROSIS 4 INDEX: FIB4 SCORE: 0.87

## 2023-08-18 NOTE — LETTER
August 18, 2023         Patient: Alberto Maldonado   YOB: 1985   Date of Visit: 8/18/2023           To Whom it May Concern:    Alberto Maldonado was seen in my clinic on 8/18/2023. He may return to work on 08/21/23.    If you have any questions or concerns, please don't hesitate to call.        Sincerely,           RICHY Amaya  Electronically Signed

## 2023-08-19 NOTE — PROGRESS NOTES
Subjective:   Alberto Maldonado is a 37 y.o. male who presents for Knee Pain (Swelling x2d)    HPI  37-year-old male presenting with left knee pain consistent with prior gout flares.  Has been battling with recurrent flares over the past few months.  Recently primary care started him on allopurinol.  Seen in clinic a few weeks ago given Medrol Dosepak which resolved the symptoms temporarily they returned 2 days ago.  Still drinks alcohol.  Is not on any gout friendly diet.  Tolerating p.o.  No recent illness.  No trauma or injury.    Review of Systems   Constitutional:  Negative for fever.   Eyes:  Negative for pain.   Respiratory:  Negative for shortness of breath.    Cardiovascular:  Negative for chest pain.   Gastrointestinal:  Negative for abdominal pain.   Genitourinary:  Negative for dysuria and hematuria.   Musculoskeletal:  Negative for myalgias.   Neurological:  Negative for dizziness and headaches.       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 Day #2 and thereafter, take 0.6mg BID until symptoms resolve. 6 Tablet 1    allopurinol  "(ZYLOPRIM) 100 MG Tab Take 100 mg by mouth every day.      amLODIPine (NORVASC) 5 MG Tab TAKE 1 TABLET BY MOUTH EVERY DAY FOR 90 DAYS      methylPREDNISolone (MEDROL DOSEPAK) 4 MG Tablet Therapy Pack Follow schedule on package instructions. 21 Tablet 0    metoprolol tartrate (LOPRESSOR) 100 MG Tab Take 1 Tablet by mouth 2 times a day. 60 Tablet 0    losartan (COZAAR) 50 MG Tab Take 75 mg by mouth every day. 1.5 tablets = 75 mg.       No current [facility]-ordered facility-administered medications on file.       No past surgical history on file.    Social History     Tobacco Use    Smoking status: Never    Smokeless tobacco: Never   Vaping Use    Vaping Use: Never used   Substance Use Topics    Alcohol use: Yes     Alcohol/week: 7.2 oz     Types: 12 Shots of liquor per week    Drug use: Not Currently       family history is not on file.     Problem list, medications, and allergies reviewed by myself today in Epic.     Objective:   /80   Pulse 86   Temp 37 °C (98.6 °F)   Resp 16   Ht 1.6 m (5' 3\")   Wt 106 kg (233 lb)   SpO2 98%   BMI 41.27 kg/m²     Physical Exam  Vitals and nursing note reviewed.   Constitutional:       Appearance: Normal appearance. He is not ill-appearing or toxic-appearing.   HENT:      Mouth/Throat:      Mouth: Mucous membranes are moist.   Eyes:      Pupils: Pupils are equal, round, and reactive to light.   Cardiovascular:      Rate and Rhythm: Normal rate and regular rhythm.      Heart sounds: No murmur heard.  Pulmonary:      Effort: Pulmonary effort is normal. No respiratory distress.      Breath sounds: Normal breath sounds.   Abdominal:      General: Abdomen is flat.      Palpations: Abdomen is soft.   Musculoskeletal:      Cervical back: Normal range of motion.      Right knee: Normal.      Left knee: No swelling, effusion, erythema, bony tenderness or crepitus. Decreased range of motion (secondary to pain). No tenderness.   Skin:     General: Skin is warm and dry.      Capillary " Refill: Capillary refill takes less than 2 seconds.      Findings: No rash.   Neurological:      General: No focal deficit present.      Mental Status: He is alert and oriented to person, place, and time.      Cranial Nerves: No cranial nerve deficit.      Gait: Gait abnormal (In wheelchair, secondary to pain).   Psychiatric:         Mood and Affect: Mood normal.       2 months ago: eGFR 118, AST 37, ALT 38, Alk Phos 109    Diagnosis and associated orders:   1. Acute gout of left knee, unspecified cause  URIC ACID, SERUM      2. History of alcohol abuse  colchicine (COLCRYS) 0.6 MG Tab         Comments/MDM:   Differential diagnosis discussed     Pleasant 37-year-old sitting in wheelchair secondary to pain in left knee when ambulating.  Chronic history of gout currently uncontrolled despite allopurinol use.  Patient is not on gout friendly diet and continues to abuse alcohol.  He was treated for gout flare beginning of August with Medrol Dosepak.  No recent illness.  Signs are currently stable.  Estimated GFR from labs completed 2 months ago was 118 mL/min, had AST of 37, ALT 38, alk phos 109.  Exam he has discomfort with range of motion activities of left knee, states he does not ambulate at this time because hurts so he is in a wheelchair, called out of work.  There is no redness, swelling, crepitus.  Neurovascular intact distally.  Lower extremity swelling.  Given recent Medrol Dosepak will place patient on colchicine, liver and kidney function appears intact based on labs completed 2 months ago.  Not see a uric acid level completed, will order serum uric acid.  Recommend patient continue with allopurinol.  Reiterated the importance of gout friendly diet and avoiding alcohol.  Increase fluids.  Follow-up care.    Return to clinic or go to ED if symptoms worsen or persist. Indications for ED discussed at length. Patient/Parent/Guardian voices understanding. Follow-up with your primary care provider in 3-5 days. Red  flag symptoms discussed. All side effects of medication discussed including allergic response, GI upset, tendon injury, rash, sedation etc.    I personally reviewed prior external notes and test results pertinent to today's visit as well as additional imaging and testing completed in clinic today.     Please note that this dictation was created using voice recognition software. I have made every reasonable attempt to correct obvious errors, but I expect that there are errors of grammar and possibly content that I did not discover before finalizing the note.    This note was electronically signed by RICHY Amaya

## 2023-09-25 ENCOUNTER — HOSPITAL ENCOUNTER (OUTPATIENT)
Dept: RADIOLOGY | Facility: MEDICAL CENTER | Age: 38
End: 2023-09-25
Attending: FAMILY MEDICINE
Payer: COMMERCIAL

## 2023-09-25 ENCOUNTER — OFFICE VISIT (OUTPATIENT)
Dept: URGENT CARE | Facility: PHYSICIAN GROUP | Age: 38
End: 2023-09-25
Payer: COMMERCIAL

## 2023-09-25 VITALS
RESPIRATION RATE: 20 BRPM | HEIGHT: 63 IN | OXYGEN SATURATION: 97 % | WEIGHT: 235 LBS | TEMPERATURE: 100 F | BODY MASS INDEX: 41.64 KG/M2 | HEART RATE: 130 BPM | SYSTOLIC BLOOD PRESSURE: 156 MMHG | DIASTOLIC BLOOD PRESSURE: 80 MMHG

## 2023-09-25 DIAGNOSIS — S06.0X1A CONCUSSION WITH LOSS OF CONSCIOUSNESS OF 30 MINUTES OR LESS, INITIAL ENCOUNTER: ICD-10-CM

## 2023-09-25 DIAGNOSIS — R06.02 SOB (SHORTNESS OF BREATH): ICD-10-CM

## 2023-09-25 DIAGNOSIS — S20.219A CONTUSION OF CHEST WALL, UNSPECIFIED LATERALITY, INITIAL ENCOUNTER: ICD-10-CM

## 2023-09-25 PROCEDURE — 3079F DIAST BP 80-89 MM HG: CPT | Performed by: FAMILY MEDICINE

## 2023-09-25 PROCEDURE — 3077F SYST BP >= 140 MM HG: CPT | Performed by: FAMILY MEDICINE

## 2023-09-25 PROCEDURE — 99214 OFFICE O/P EST MOD 30 MIN: CPT | Performed by: FAMILY MEDICINE

## 2023-09-25 PROCEDURE — 71046 X-RAY EXAM CHEST 2 VIEWS: CPT

## 2023-09-25 ASSESSMENT — FIBROSIS 4 INDEX: FIB4 SCORE: 0.87

## 2023-09-25 NOTE — LETTER
September 25, 2023         Patient: Alberto Maldonado   YOB: 1985   Date of Visit: 9/25/2023           To Whom it May Concern:    Alberto Maldonado was seen in my clinic on 9/25/2023. Please excuse from work 9/25 through 9/27/2023. He may then return to full duty.     Sincerely,           Stan Estrella M.D.  Electronically Signed

## 2023-09-25 NOTE — PROGRESS NOTES
"Subjective     Alberto Maldonado is a 37 y.o. male who presents with Head Injury (Hit head against  the door ) and Chest Injury (Upper is sore )            Fall down 10 stairs. Sore upper chest. Mild SOB. No hemoptysis.   Struck top of head to wall. Possible brief LOC. No HA. No vomiting. No light sensitivity. No difficulty concentrating. No emotional lability No neck pain. No back pain. No other aggravating or alleviating factors.          Review of Systems   Constitutional:  Negative for malaise/fatigue and weight loss.   Eyes:  Negative for discharge and redness.   Gastrointestinal:  Negative for nausea and vomiting.   Musculoskeletal:  Negative for joint pain and myalgias.   Skin:  Negative for itching and rash.              Objective     BP (!) 156/80   Pulse (!) 130   Temp 37.8 °C (100 °F) (Temporal)   Resp 20   Ht 1.6 m (5' 3\")   Wt 107 kg (235 lb)   SpO2 97%   BMI 41.63 kg/m²      Physical Exam  Constitutional:       General: He is not in acute distress.     Appearance: He is well-developed.   HENT:      Head: Normocephalic and atraumatic.   Eyes:      Conjunctiva/sclera: Conjunctivae normal.   Cardiovascular:      Rate and Rhythm: Regular rhythm. Tachycardia present.      Heart sounds: Normal heart sounds. No murmur heard.  Pulmonary:      Effort: Pulmonary effort is normal.      Breath sounds: Normal breath sounds. No wheezing.   Chest:       Skin:     General: Skin is warm and dry.      Findings: No rash.   Neurological:      Mental Status: He is alert.                             Assessment & Plan      Cxr per radiology:  1.  Enlarged cardiac silhouette.  2.  No acute abnormality.    1. Concussion with loss of consciousness of 30 minutes or less, initial encounter        2. Contusion of chest wall, unspecified laterality, initial encounter  DX-CHEST-2 VIEWS      3. SOB (shortness of breath)  DX-CHEST-2 VIEWS        Differential diagnosis, natural history, supportive care, and indications for " immediate follow-up were discussed.     Monitor pulse and to ER with persistent tachycardia and/or CP.     Copy of incidental cardiomegaly finding to Alberto to f/u with his primary care.

## 2023-09-26 ASSESSMENT — ENCOUNTER SYMPTOMS
MYALGIAS: 0
EYE DISCHARGE: 0
WEIGHT LOSS: 0
EYE REDNESS: 0
VOMITING: 0
NAUSEA: 0

## 2023-10-19 ENCOUNTER — OFFICE VISIT (OUTPATIENT)
Dept: URGENT CARE | Facility: CLINIC | Age: 38
End: 2023-10-19
Payer: COMMERCIAL

## 2023-10-19 ENCOUNTER — APPOINTMENT (OUTPATIENT)
Dept: RADIOLOGY | Facility: IMAGING CENTER | Age: 38
End: 2023-10-19
Attending: PHYSICIAN ASSISTANT
Payer: COMMERCIAL

## 2023-10-19 VITALS
BODY MASS INDEX: 43.85 KG/M2 | WEIGHT: 247.5 LBS | SYSTOLIC BLOOD PRESSURE: 180 MMHG | RESPIRATION RATE: 16 BRPM | HEIGHT: 63 IN | DIASTOLIC BLOOD PRESSURE: 86 MMHG | OXYGEN SATURATION: 96 % | TEMPERATURE: 97.9 F | HEART RATE: 128 BPM

## 2023-10-19 DIAGNOSIS — R07.9 CHEST PAIN, UNSPECIFIED TYPE: ICD-10-CM

## 2023-10-19 DIAGNOSIS — S29.9XXA INJURY OF CHEST WALL, INITIAL ENCOUNTER: ICD-10-CM

## 2023-10-19 DIAGNOSIS — R03.0 ELEVATED BLOOD PRESSURE READING: ICD-10-CM

## 2023-10-19 PROCEDURE — 93000 ELECTROCARDIOGRAM COMPLETE: CPT | Performed by: PHYSICIAN ASSISTANT

## 2023-10-19 PROCEDURE — 3079F DIAST BP 80-89 MM HG: CPT | Performed by: PHYSICIAN ASSISTANT

## 2023-10-19 PROCEDURE — 99214 OFFICE O/P EST MOD 30 MIN: CPT | Performed by: PHYSICIAN ASSISTANT

## 2023-10-19 PROCEDURE — 3077F SYST BP >= 140 MM HG: CPT | Performed by: PHYSICIAN ASSISTANT

## 2023-10-19 PROCEDURE — 71046 X-RAY EXAM CHEST 2 VIEWS: CPT | Mod: TC | Performed by: PHYSICIAN ASSISTANT

## 2023-10-19 ASSESSMENT — FIBROSIS 4 INDEX: FIB4 SCORE: 0.87

## 2023-10-19 NOTE — LETTER
October 19, 2023         Patient: Alberto Maldonado   YOB: 1985   Date of Visit: 10/19/2023           To Whom it May Concern:    Alberto Maldonado was seen in my clinic on 10/19/2023.     If you have any questions or concerns, please don't hesitate to call.        Sincerely,           Gage Paredes P.A.-C.  Electronically Signed

## 2023-10-28 ENCOUNTER — HOSPITAL ENCOUNTER (INPATIENT)
Facility: MEDICAL CENTER | Age: 38
LOS: 2 days | DRG: 897 | End: 2023-10-30
Attending: EMERGENCY MEDICINE | Admitting: INTERNAL MEDICINE
Payer: COMMERCIAL

## 2023-10-28 ENCOUNTER — APPOINTMENT (OUTPATIENT)
Dept: RADIOLOGY | Facility: MEDICAL CENTER | Age: 38
DRG: 897 | End: 2023-10-28
Attending: INTERNAL MEDICINE
Payer: COMMERCIAL

## 2023-10-28 ENCOUNTER — APPOINTMENT (OUTPATIENT)
Dept: RADIOLOGY | Facility: MEDICAL CENTER | Age: 38
DRG: 897 | End: 2023-10-28
Attending: EMERGENCY MEDICINE
Payer: COMMERCIAL

## 2023-10-28 DIAGNOSIS — F10.939 ALCOHOL WITHDRAWAL SYNDROME WITH COMPLICATION (HCC): ICD-10-CM

## 2023-10-28 DIAGNOSIS — E87.20 LACTIC ACIDOSIS: ICD-10-CM

## 2023-10-28 DIAGNOSIS — R07.9 ACUTE CHEST PAIN: ICD-10-CM

## 2023-10-28 DIAGNOSIS — E66.01 MORBID OBESITY (HCC): ICD-10-CM

## 2023-10-28 DIAGNOSIS — R00.0 SINUS TACHYCARDIA: ICD-10-CM

## 2023-10-28 DIAGNOSIS — I10 UNCONTROLLED HYPERTENSION: ICD-10-CM

## 2023-10-28 DIAGNOSIS — N30.00 ACUTE CYSTITIS WITHOUT HEMATURIA: ICD-10-CM

## 2023-10-28 PROBLEM — R79.89 ELEVATED LFTS: Status: ACTIVE | Noted: 2023-10-28

## 2023-10-28 LAB
ALBUMIN SERPL BCP-MCNC: 4.6 G/DL (ref 3.2–4.9)
ALBUMIN/GLOB SERPL: 1.1 G/DL
ALP SERPL-CCNC: 246 U/L (ref 30–99)
ALT SERPL-CCNC: 90 U/L (ref 2–50)
AMPHET UR QL SCN: NEGATIVE
ANION GAP SERPL CALC-SCNC: 21 MMOL/L (ref 7–16)
ANION GAP SERPL CALC-SCNC: 28 MMOL/L (ref 7–16)
APPEARANCE UR: ABNORMAL
APTT PPP: 30.2 SEC (ref 24.7–36)
AST SERPL-CCNC: 134 U/L (ref 12–45)
BACTERIA #/AREA URNS HPF: NEGATIVE /HPF
BARBITURATES UR QL SCN: NEGATIVE
BASOPHILS # BLD AUTO: 0.8 % (ref 0–1.8)
BASOPHILS # BLD: 0.11 K/UL (ref 0–0.12)
BENZODIAZ UR QL SCN: NEGATIVE
BILIRUB SERPL-MCNC: 1.7 MG/DL (ref 0.1–1.5)
BILIRUB UR QL STRIP.AUTO: ABNORMAL
BUN SERPL-MCNC: 7 MG/DL (ref 8–22)
BUN SERPL-MCNC: 7 MG/DL (ref 8–22)
BZE UR QL SCN: NEGATIVE
CALCIUM ALBUM COR SERPL-MCNC: 8.6 MG/DL (ref 8.5–10.5)
CALCIUM SERPL-MCNC: 8.8 MG/DL (ref 8.5–10.5)
CALCIUM SERPL-MCNC: 9.1 MG/DL (ref 8.5–10.5)
CANNABINOIDS UR QL SCN: NEGATIVE
CHLORIDE SERPL-SCNC: 100 MMOL/L (ref 96–112)
CHLORIDE SERPL-SCNC: 97 MMOL/L (ref 96–112)
CO2 SERPL-SCNC: 15 MMOL/L (ref 20–33)
CO2 SERPL-SCNC: 17 MMOL/L (ref 20–33)
COLOR UR: ABNORMAL
CREAT SERPL-MCNC: 1.04 MG/DL (ref 0.5–1.4)
CREAT SERPL-MCNC: 1.06 MG/DL (ref 0.5–1.4)
EKG IMPRESSION: NORMAL
EOSINOPHIL # BLD AUTO: 0.01 K/UL (ref 0–0.51)
EOSINOPHIL NFR BLD: 0.1 % (ref 0–6.9)
EPI CELLS #/AREA URNS HPF: NEGATIVE /HPF
ERYTHROCYTE [DISTWIDTH] IN BLOOD BY AUTOMATED COUNT: 48.5 FL (ref 35.9–50)
ETHANOL BLD-MCNC: <10.1 MG/DL
FENTANYL UR QL: NEGATIVE
GFR SERPLBLD CREATININE-BSD FMLA CKD-EPI: 93 ML/MIN/1.73 M 2
GFR SERPLBLD CREATININE-BSD FMLA CKD-EPI: 95 ML/MIN/1.73 M 2
GLOBULIN SER CALC-MCNC: 4.3 G/DL (ref 1.9–3.5)
GLUCOSE SERPL-MCNC: 153 MG/DL (ref 65–99)
GLUCOSE SERPL-MCNC: 171 MG/DL (ref 65–99)
GLUCOSE UR STRIP.AUTO-MCNC: NEGATIVE MG/DL
GRAN CASTS #/AREA URNS LPF: ABNORMAL /LPF
HCT VFR BLD AUTO: 53.8 % (ref 42–52)
HGB BLD-MCNC: 18.8 G/DL (ref 14–18)
HYALINE CASTS #/AREA URNS LPF: >20 /LPF
IMM GRANULOCYTES # BLD AUTO: 0.04 K/UL (ref 0–0.11)
IMM GRANULOCYTES NFR BLD AUTO: 0.3 % (ref 0–0.9)
INR PPP: 1.14 (ref 0.87–1.13)
KETONES UR STRIP.AUTO-MCNC: NEGATIVE MG/DL
LACTATE SERPL-SCNC: 4.9 MMOL/L (ref 0.5–2)
LACTATE SERPL-SCNC: 6.3 MMOL/L (ref 0.5–2)
LACTATE SERPL-SCNC: 7.1 MMOL/L (ref 0.5–2)
LEUKOCYTE ESTERASE UR QL STRIP.AUTO: ABNORMAL
LIPASE SERPL-CCNC: 38 U/L (ref 11–82)
LYMPHOCYTES # BLD AUTO: 1.79 K/UL (ref 1–4.8)
LYMPHOCYTES NFR BLD: 13.6 % (ref 22–41)
MAGNESIUM SERPL-MCNC: 2 MG/DL (ref 1.5–2.5)
MCH RBC QN AUTO: 29.7 PG (ref 27–33)
MCHC RBC AUTO-ENTMCNC: 34.9 G/DL (ref 32.3–36.5)
MCV RBC AUTO: 85.1 FL (ref 81.4–97.8)
METHADONE UR QL SCN: NEGATIVE
MICRO URNS: ABNORMAL
MONOCYTES # BLD AUTO: 0.71 K/UL (ref 0–0.85)
MONOCYTES NFR BLD AUTO: 5.4 % (ref 0–13.4)
MUCOUS THREADS #/AREA URNS HPF: ABNORMAL /HPF
NEUTROPHILS # BLD AUTO: 10.47 K/UL (ref 1.82–7.42)
NEUTROPHILS NFR BLD: 79.8 % (ref 44–72)
NITRITE UR QL STRIP.AUTO: POSITIVE
NRBC # BLD AUTO: 0 K/UL
NRBC BLD-RTO: 0 /100 WBC (ref 0–0.2)
OPIATES UR QL SCN: POSITIVE
OXYCODONE UR QL SCN: NEGATIVE
PCP UR QL SCN: NEGATIVE
PH UR STRIP.AUTO: 5 [PH] (ref 5–8)
PHOSPHATE SERPL-MCNC: 2.3 MG/DL (ref 2.5–4.5)
PLATELET # BLD AUTO: 355 K/UL (ref 164–446)
PMV BLD AUTO: 8.7 FL (ref 9–12.9)
POTASSIUM SERPL-SCNC: 3.4 MMOL/L (ref 3.6–5.5)
POTASSIUM SERPL-SCNC: 3.5 MMOL/L (ref 3.6–5.5)
PROPOXYPH UR QL SCN: NEGATIVE
PROT SERPL-MCNC: 8.9 G/DL (ref 6–8.2)
PROT UR QL STRIP: 100 MG/DL
PROTHROMBIN TIME: 14.8 SEC (ref 12–14.6)
RBC # BLD AUTO: 6.32 M/UL (ref 4.7–6.1)
RBC # URNS HPF: ABNORMAL /HPF
RBC UR QL AUTO: ABNORMAL
SODIUM SERPL-SCNC: 138 MMOL/L (ref 135–145)
SODIUM SERPL-SCNC: 140 MMOL/L (ref 135–145)
SP GR UR STRIP.AUTO: 1.03
TROPONIN T SERPL-MCNC: 17 NG/L (ref 6–19)
TROPONIN T SERPL-MCNC: 17 NG/L (ref 6–19)
TSH SERPL DL<=0.005 MIU/L-ACNC: 2.84 UIU/ML (ref 0.38–5.33)
UROBILINOGEN UR STRIP.AUTO-MCNC: 0.2 MG/DL
WBC # BLD AUTO: 13.1 K/UL (ref 4.8–10.8)
WBC #/AREA URNS HPF: ABNORMAL /HPF

## 2023-10-28 PROCEDURE — A9270 NON-COVERED ITEM OR SERVICE: HCPCS | Performed by: INTERNAL MEDICINE

## 2023-10-28 PROCEDURE — 83605 ASSAY OF LACTIC ACID: CPT | Mod: 91

## 2023-10-28 PROCEDURE — 700111 HCHG RX REV CODE 636 W/ 250 OVERRIDE (IP): Performed by: INTERNAL MEDICINE

## 2023-10-28 PROCEDURE — 80307 DRUG TEST PRSMV CHEM ANLYZR: CPT

## 2023-10-28 PROCEDURE — 96375 TX/PRO/DX INJ NEW DRUG ADDON: CPT

## 2023-10-28 PROCEDURE — 36415 COLL VENOUS BLD VENIPUNCTURE: CPT

## 2023-10-28 PROCEDURE — 71045 X-RAY EXAM CHEST 1 VIEW: CPT

## 2023-10-28 PROCEDURE — 700101 HCHG RX REV CODE 250: Performed by: EMERGENCY MEDICINE

## 2023-10-28 PROCEDURE — HZ2ZZZZ DETOXIFICATION SERVICES FOR SUBSTANCE ABUSE TREATMENT: ICD-10-PCS | Performed by: INTERNAL MEDICINE

## 2023-10-28 PROCEDURE — 700102 HCHG RX REV CODE 250 W/ 637 OVERRIDE(OP): Performed by: INTERNAL MEDICINE

## 2023-10-28 PROCEDURE — 85730 THROMBOPLASTIN TIME PARTIAL: CPT

## 2023-10-28 PROCEDURE — 96376 TX/PRO/DX INJ SAME DRUG ADON: CPT

## 2023-10-28 PROCEDURE — 83690 ASSAY OF LIPASE: CPT

## 2023-10-28 PROCEDURE — 96365 THER/PROPH/DIAG IV INF INIT: CPT

## 2023-10-28 PROCEDURE — 700105 HCHG RX REV CODE 258: Performed by: INTERNAL MEDICINE

## 2023-10-28 PROCEDURE — 82077 ASSAY SPEC XCP UR&BREATH IA: CPT

## 2023-10-28 PROCEDURE — 80048 BASIC METABOLIC PNL TOTAL CA: CPT

## 2023-10-28 PROCEDURE — 770020 HCHG ROOM/CARE - TELE (206)

## 2023-10-28 PROCEDURE — 84443 ASSAY THYROID STIM HORMONE: CPT

## 2023-10-28 PROCEDURE — 84100 ASSAY OF PHOSPHORUS: CPT

## 2023-10-28 PROCEDURE — 85610 PROTHROMBIN TIME: CPT

## 2023-10-28 PROCEDURE — 93005 ELECTROCARDIOGRAM TRACING: CPT

## 2023-10-28 PROCEDURE — 99223 1ST HOSP IP/OBS HIGH 75: CPT | Mod: AI | Performed by: INTERNAL MEDICINE

## 2023-10-28 PROCEDURE — 99285 EMERGENCY DEPT VISIT HI MDM: CPT

## 2023-10-28 PROCEDURE — 93005 ELECTROCARDIOGRAM TRACING: CPT | Performed by: EMERGENCY MEDICINE

## 2023-10-28 PROCEDURE — 80053 COMPREHEN METABOLIC PANEL: CPT

## 2023-10-28 PROCEDURE — 85025 COMPLETE CBC W/AUTO DIFF WBC: CPT

## 2023-10-28 PROCEDURE — 84484 ASSAY OF TROPONIN QUANT: CPT

## 2023-10-28 PROCEDURE — 700111 HCHG RX REV CODE 636 W/ 250 OVERRIDE (IP): Mod: JZ,UD | Performed by: EMERGENCY MEDICINE

## 2023-10-28 PROCEDURE — 81001 URINALYSIS AUTO W/SCOPE: CPT

## 2023-10-28 PROCEDURE — 83735 ASSAY OF MAGNESIUM: CPT

## 2023-10-28 PROCEDURE — 96366 THER/PROPH/DIAG IV INF ADDON: CPT

## 2023-10-28 RX ORDER — ENOXAPARIN SODIUM 100 MG/ML
40 INJECTION SUBCUTANEOUS EVERY 12 HOURS
Status: DISCONTINUED | OUTPATIENT
Start: 2023-10-28 | End: 2023-10-30 | Stop reason: HOSPADM

## 2023-10-28 RX ORDER — OXYCODONE HYDROCHLORIDE 5 MG/1
5 TABLET ORAL
Status: DISCONTINUED | OUTPATIENT
Start: 2023-10-28 | End: 2023-10-30 | Stop reason: HOSPADM

## 2023-10-28 RX ORDER — LORAZEPAM 0.5 MG/1
0.5 TABLET ORAL EVERY 4 HOURS PRN
Status: DISCONTINUED | OUTPATIENT
Start: 2023-10-28 | End: 2023-10-29

## 2023-10-28 RX ORDER — LABETALOL HYDROCHLORIDE 5 MG/ML
10 INJECTION, SOLUTION INTRAVENOUS EVERY 4 HOURS PRN
Status: DISCONTINUED | OUTPATIENT
Start: 2023-10-28 | End: 2023-10-30 | Stop reason: HOSPADM

## 2023-10-28 RX ORDER — LORAZEPAM 2 MG/1
4 TABLET ORAL
Status: DISCONTINUED | OUTPATIENT
Start: 2023-10-28 | End: 2023-10-29

## 2023-10-28 RX ORDER — LORAZEPAM 2 MG/ML
1 INJECTION INTRAMUSCULAR ONCE
Status: COMPLETED | OUTPATIENT
Start: 2023-10-28 | End: 2023-10-28

## 2023-10-28 RX ORDER — LORAZEPAM 2 MG/ML
1 INJECTION INTRAMUSCULAR
Status: DISCONTINUED | OUTPATIENT
Start: 2023-10-28 | End: 2023-10-29

## 2023-10-28 RX ORDER — LANOLIN ALCOHOL/MO/W.PET/CERES
400 CREAM (GRAM) TOPICAL 2 TIMES DAILY
Status: COMPLETED | OUTPATIENT
Start: 2023-10-28 | End: 2023-10-29

## 2023-10-28 RX ORDER — PROCHLORPERAZINE EDISYLATE 5 MG/ML
5-10 INJECTION INTRAMUSCULAR; INTRAVENOUS EVERY 4 HOURS PRN
Status: DISCONTINUED | OUTPATIENT
Start: 2023-10-28 | End: 2023-10-30 | Stop reason: HOSPADM

## 2023-10-28 RX ORDER — ONDANSETRON 4 MG/1
4 TABLET, ORALLY DISINTEGRATING ORAL EVERY 4 HOURS PRN
Status: DISCONTINUED | OUTPATIENT
Start: 2023-10-28 | End: 2023-10-30 | Stop reason: HOSPADM

## 2023-10-28 RX ORDER — PROMETHAZINE HYDROCHLORIDE 25 MG/1
12.5-25 TABLET ORAL EVERY 4 HOURS PRN
Status: DISCONTINUED | OUTPATIENT
Start: 2023-10-28 | End: 2023-10-30 | Stop reason: HOSPADM

## 2023-10-28 RX ORDER — LORAZEPAM 2 MG/ML
2 INJECTION INTRAMUSCULAR
Status: DISCONTINUED | OUTPATIENT
Start: 2023-10-28 | End: 2023-10-29

## 2023-10-28 RX ORDER — ACETAMINOPHEN 325 MG/1
650 TABLET ORAL EVERY 6 HOURS PRN
Status: DISCONTINUED | OUTPATIENT
Start: 2023-10-28 | End: 2023-10-30 | Stop reason: HOSPADM

## 2023-10-28 RX ORDER — AMLODIPINE BESYLATE 10 MG/1
10 TABLET ORAL DAILY
COMMUNITY
Start: 2023-09-08 | End: 2023-11-06

## 2023-10-28 RX ORDER — PROMETHAZINE HYDROCHLORIDE 25 MG/1
12.5-25 SUPPOSITORY RECTAL EVERY 4 HOURS PRN
Status: DISCONTINUED | OUTPATIENT
Start: 2023-10-28 | End: 2023-10-30 | Stop reason: HOSPADM

## 2023-10-28 RX ORDER — OXYCODONE HYDROCHLORIDE 10 MG/1
10 TABLET ORAL
Status: DISCONTINUED | OUTPATIENT
Start: 2023-10-28 | End: 2023-10-30 | Stop reason: HOSPADM

## 2023-10-28 RX ORDER — ONDANSETRON 2 MG/ML
4 INJECTION INTRAMUSCULAR; INTRAVENOUS EVERY 4 HOURS PRN
Status: DISCONTINUED | OUTPATIENT
Start: 2023-10-28 | End: 2023-10-30 | Stop reason: HOSPADM

## 2023-10-28 RX ORDER — MORPHINE SULFATE 4 MG/ML
4 INJECTION INTRAVENOUS ONCE
Status: COMPLETED | OUTPATIENT
Start: 2023-10-28 | End: 2023-10-28

## 2023-10-28 RX ORDER — NAPROXEN SODIUM 220 MG
440 TABLET ORAL
COMMUNITY
End: 2023-11-11

## 2023-10-28 RX ORDER — GUAIFENESIN/DEXTROMETHORPHAN 100-10MG/5
10 SYRUP ORAL EVERY 6 HOURS PRN
Status: DISCONTINUED | OUTPATIENT
Start: 2023-10-28 | End: 2023-10-30 | Stop reason: HOSPADM

## 2023-10-28 RX ORDER — LORAZEPAM 2 MG/ML
1.5 INJECTION INTRAMUSCULAR
Status: DISCONTINUED | OUTPATIENT
Start: 2023-10-28 | End: 2023-10-29

## 2023-10-28 RX ORDER — SODIUM CHLORIDE, SODIUM LACTATE, POTASSIUM CHLORIDE, CALCIUM CHLORIDE 600; 310; 30; 20 MG/100ML; MG/100ML; MG/100ML; MG/100ML
INJECTION, SOLUTION INTRAVENOUS CONTINUOUS
Status: DISCONTINUED | OUTPATIENT
Start: 2023-10-28 | End: 2023-10-30

## 2023-10-28 RX ORDER — DILTIAZEM HYDROCHLORIDE 5 MG/ML
10 INJECTION INTRAVENOUS ONCE
Status: COMPLETED | OUTPATIENT
Start: 2023-10-28 | End: 2023-10-28

## 2023-10-28 RX ORDER — BISACODYL 10 MG
10 SUPPOSITORY, RECTAL RECTAL
Status: DISCONTINUED | OUTPATIENT
Start: 2023-10-28 | End: 2023-10-30 | Stop reason: HOSPADM

## 2023-10-28 RX ORDER — AMLODIPINE BESYLATE 5 MG/1
5 TABLET ORAL
Status: DISCONTINUED | OUTPATIENT
Start: 2023-10-29 | End: 2023-10-29

## 2023-10-28 RX ORDER — POLYETHYLENE GLYCOL 3350 17 G/17G
1 POWDER, FOR SOLUTION ORAL
Status: DISCONTINUED | OUTPATIENT
Start: 2023-10-28 | End: 2023-10-30 | Stop reason: HOSPADM

## 2023-10-28 RX ORDER — AMOXICILLIN 250 MG
2 CAPSULE ORAL 2 TIMES DAILY
Status: DISCONTINUED | OUTPATIENT
Start: 2023-10-28 | End: 2023-10-30 | Stop reason: HOSPADM

## 2023-10-28 RX ORDER — ONDANSETRON 2 MG/ML
4 INJECTION INTRAMUSCULAR; INTRAVENOUS ONCE
Status: COMPLETED | OUTPATIENT
Start: 2023-10-28 | End: 2023-10-28

## 2023-10-28 RX ORDER — LORAZEPAM 2 MG/1
2 TABLET ORAL
Status: DISCONTINUED | OUTPATIENT
Start: 2023-10-28 | End: 2023-10-29

## 2023-10-28 RX ORDER — LORAZEPAM 1 MG/1
1 TABLET ORAL EVERY 4 HOURS PRN
Status: DISCONTINUED | OUTPATIENT
Start: 2023-10-28 | End: 2023-10-29

## 2023-10-28 RX ORDER — LORAZEPAM 2 MG/ML
0.5 INJECTION INTRAMUSCULAR EVERY 4 HOURS PRN
Status: DISCONTINUED | OUTPATIENT
Start: 2023-10-28 | End: 2023-10-29

## 2023-10-28 RX ORDER — HYDROMORPHONE HYDROCHLORIDE 1 MG/ML
0.5 INJECTION, SOLUTION INTRAMUSCULAR; INTRAVENOUS; SUBCUTANEOUS
Status: DISCONTINUED | OUTPATIENT
Start: 2023-10-28 | End: 2023-10-30 | Stop reason: HOSPADM

## 2023-10-28 RX ORDER — ALLOPURINOL 100 MG/1
100 TABLET ORAL DAILY
Status: DISCONTINUED | OUTPATIENT
Start: 2023-10-28 | End: 2023-10-30 | Stop reason: HOSPADM

## 2023-10-28 RX ADMIN — ONDANSETRON 4 MG: 2 INJECTION INTRAMUSCULAR; INTRAVENOUS at 09:40

## 2023-10-28 RX ADMIN — MORPHINE SULFATE 4 MG: 4 INJECTION, SOLUTION INTRAMUSCULAR; INTRAVENOUS at 11:10

## 2023-10-28 RX ADMIN — DILTIAZEM HYDROCHLORIDE 10 MG: 5 INJECTION INTRAVENOUS at 09:40

## 2023-10-28 RX ADMIN — LABETALOL HYDROCHLORIDE 10 MG: 5 INJECTION INTRAVENOUS at 13:56

## 2023-10-28 RX ADMIN — THIAMINE HYDROCHLORIDE: 100 INJECTION, SOLUTION INTRAMUSCULAR; INTRAVENOUS at 10:11

## 2023-10-28 RX ADMIN — LORAZEPAM 1 MG: 2 INJECTION INTRAMUSCULAR; INTRAVENOUS at 12:19

## 2023-10-28 RX ADMIN — MORPHINE SULFATE 4 MG: 4 INJECTION, SOLUTION INTRAMUSCULAR; INTRAVENOUS at 09:40

## 2023-10-28 RX ADMIN — POTASSIUM BICARBONATE 25 MEQ: 978 TABLET, EFFERVESCENT ORAL at 18:13

## 2023-10-28 RX ADMIN — Medication 400 MG: at 17:38

## 2023-10-28 RX ADMIN — ALLOPURINOL 100 MG: 100 TABLET ORAL at 13:57

## 2023-10-28 RX ADMIN — ENOXAPARIN SODIUM 40 MG: 100 INJECTION SUBCUTANEOUS at 17:38

## 2023-10-28 RX ADMIN — DIBASIC SODIUM PHOSPHATE, MONOBASIC POTASSIUM PHOSPHATE AND MONOBASIC SODIUM PHOSPHATE 500 MG: 852; 155; 130 TABLET ORAL at 17:38

## 2023-10-28 RX ADMIN — POTASSIUM BICARBONATE 25 MEQ: 978 TABLET, EFFERVESCENT ORAL at 13:27

## 2023-10-28 RX ADMIN — SODIUM CHLORIDE, POTASSIUM CHLORIDE, SODIUM LACTATE AND CALCIUM CHLORIDE: 600; 310; 30; 20 INJECTION, SOLUTION INTRAVENOUS at 13:00

## 2023-10-28 ASSESSMENT — LIFESTYLE VARIABLES
HEADACHE, FULLNESS IN HEAD: NOT PRESENT
EVER HAD A DRINK FIRST THING IN THE MORNING TO STEADY YOUR NERVES TO GET RID OF A HANGOVER: NO
ORIENTATION AND CLOUDING OF SENSORIUM: ORIENTED AND CAN DO SERIAL ADDITIONS
DOES PATIENT WANT TO TALK TO SOMEONE ABOUT QUITTING: NO
AGITATION: NORMAL ACTIVITY
VISUAL DISTURBANCES: NOT PRESENT
TREMOR: TREMOR NOT VISIBLE BUT CAN BE FELT, FINGERTIP TO FINGERTIP
HAVE PEOPLE ANNOYED YOU BY CRITICIZING YOUR DRINKING: YES
ANXIETY: NO ANXIETY (AT EASE)
TOTAL SCORE: 3
TOTAL SCORE: 0
PAROXYSMAL SWEATS: BARELY PERCEPTIBLE SWEATING. PALMS MOIST
AVERAGE NUMBER OF DAYS PER WEEK YOU HAVE A DRINK CONTAINING ALCOHOL: 5
AGITATION: NORMAL ACTIVITY
TOTAL SCORE: 3
TREMOR: NO TREMOR
NAUSEA AND VOMITING: NO NAUSEA AND NO VOMITING
PAROXYSMAL SWEATS: NO SWEAT VISIBLE
HEADACHE, FULLNESS IN HEAD: NOT PRESENT
CONSUMPTION TOTAL: POSITIVE
DOES PATIENT WANT TO STOP DRINKING: YES
VISUAL DISTURBANCES: NOT PRESENT
ANXIETY: NO ANXIETY (AT EASE)
HAVE YOU EVER FELT YOU SHOULD CUT DOWN ON YOUR DRINKING: YES
HEADACHE, FULLNESS IN HEAD: NOT PRESENT
AUDITORY DISTURBANCES: NOT PRESENT
TOTAL SCORE: 3
PAROXYSMAL SWEATS: NO SWEAT VISIBLE
ALCOHOL_USE: YES
AUDITORY DISTURBANCES: NOT PRESENT
NAUSEA AND VOMITING: NO NAUSEA AND NO VOMITING
TOTAL SCORE: 1
ORIENTATION AND CLOUDING OF SENSORIUM: ORIENTED AND CAN DO SERIAL ADDITIONS
TOTAL SCORE: 2
ANXIETY: NO ANXIETY (AT EASE)
HOW MANY TIMES IN THE PAST YEAR HAVE YOU HAD 5 OR MORE DRINKS IN A DAY: 250
EVER FELT BAD OR GUILTY ABOUT YOUR DRINKING: YES
TREMOR: TREMOR NOT VISIBLE BUT CAN BE FELT, FINGERTIP TO FINGERTIP
AGITATION: NORMAL ACTIVITY
VISUAL DISTURBANCES: NOT PRESENT
ORIENTATION AND CLOUDING OF SENSORIUM: ORIENTED AND CAN DO SERIAL ADDITIONS
ON A TYPICAL DAY WHEN YOU DRINK ALCOHOL HOW MANY DRINKS DO YOU HAVE: 5
NAUSEA AND VOMITING: NO NAUSEA AND NO VOMITING
AUDITORY DISTURBANCES: NOT PRESENT

## 2023-10-28 ASSESSMENT — COGNITIVE AND FUNCTIONAL STATUS - GENERAL
DAILY ACTIVITIY SCORE: 24
MOBILITY SCORE: 21
SUGGESTED CMS G CODE MODIFIER MOBILITY: CJ
STANDING UP FROM CHAIR USING ARMS: A LITTLE
CLIMB 3 TO 5 STEPS WITH RAILING: A LITTLE
WALKING IN HOSPITAL ROOM: A LITTLE
SUGGESTED CMS G CODE MODIFIER DAILY ACTIVITY: CH

## 2023-10-28 ASSESSMENT — PAIN DESCRIPTION - PAIN TYPE
TYPE: ACUTE PAIN

## 2023-10-28 ASSESSMENT — PATIENT HEALTH QUESTIONNAIRE - PHQ9
1. LITTLE INTEREST OR PLEASURE IN DOING THINGS: NOT AT ALL
2. FEELING DOWN, DEPRESSED, IRRITABLE, OR HOPELESS: NOT AT ALL
SUM OF ALL RESPONSES TO PHQ9 QUESTIONS 1 AND 2: 0

## 2023-10-28 ASSESSMENT — ENCOUNTER SYMPTOMS
SHORTNESS OF BREATH: 1
PALPITATIONS: 0

## 2023-10-28 ASSESSMENT — FIBROSIS 4 INDEX
FIB4 SCORE: 1.39
FIB4 SCORE: 1.39

## 2023-10-28 NOTE — ED PROVIDER NOTES
ED Provider Note    CHIEF COMPLAINT  Chief Complaint   Patient presents with    Chest Pain    Shortness of Breath       EXTERNAL RECORDS REVIEWED  Inpatient Notes previous hospitalization notes which noted him to be in alcohol withdrawal and sinus tachycardia and Outpatient Notes primary care notes and ER notes    HPI/ROS  LIMITATION TO HISTORY   Select: : None    Alberto Maldonado is a 37 y.o. male who presents for evaluation of chest pain and shortness of breath.  Patient states over the last couple days he has been having chest pain and shortness of breath.  He denies radiation to the neck jaw arms associated with this.  He also is having severe abdominal pain.  The patient does have a past history of alcohol abuse and has been diagnosed with pancreatitis in the past.  He states he has been drinking heavily over the last 4 months since he returned from the [facility].  The patient denies recent: Fever, chills, URI symptoms, cough, sputum, hemoptysis, vomiting, hematemesis, melena hematochezia, pain or swelling lower extremities.  No other acute symptomatology or complaints.    PAST MEDICAL HISTORY   has a past medical history of Asthma without status asthmaticus (6/29/2017).    SURGICAL HISTORY  patient denies any surgical history    FAMILY HISTORY  Family History   Problem Relation Age of Onset    Stroke Neg Hx     Heart Disease Neg Hx        SOCIAL HISTORY  Social History     Tobacco Use    Smoking status: Never    Smokeless tobacco: Never   Vaping Use    Vaping Use: Never used   Substance and Sexual Activity    Alcohol use: Yes     Alcohol/week: 7.2 oz     Types: 12 Shots of liquor per week    Drug use: Not Currently    Sexual activity: Not Currently       CURRENT MEDICATIONS  Home Medications       Reviewed by Dion Gamboa R.N. (Registered Nurse) on 10/28/23 at 0830  Med List Status: Partial     Medication Last Dose Status   allopurinol (ZYLOPRIM) 100 MG Tab  Active   amLODIPine (NORVASC) 5 MG Tab   "Active   losartan (COZAAR) 50 MG Tab  Active   metoprolol tartrate (LOPRESSOR) 100 MG Tab  Active   Naproxen Sodium (ALEVE PO)  Active                    ALLERGIES  No Known Allergies    PHYSICAL EXAM  VITAL SIGNS: BP (!) 161/106   Pulse (!) 151   Temp 36.6 °C (97.8 °F) (Temporal)   Resp (!) 22   Ht 1.6 m (5' 3\")   SpO2 97%   BMI 43.84 kg/m²    Constitutional: 37-year-old male, BMI 43; appears uncomfortable but oriented x3  HENT: Normocephalic, Atraumatic, Nares:Clear, Oropharynx: Mildly dry, posterior pharynx:clear   Eyes: PERRL, EOMI, Conjunctiva normal, No discharge.   Neck: Normal range of motion, No tenderness, Supple, No stridor.   Lymphatic: No lymphadenopathy noted.   Cardiovascular: Tachycardic rate and rhythm without mumurs, gallups, rubs   Thorax & Lungs: Mild decreased breath sounds bilaterally with scant rhonchi, No respiratory distress, No wheezing, no stridor, no rales. No chest tenderness.   Abdomen: Epigastric tenderness; nondistended, no organomegaly, positive bowel sounds normal in quality. No guarding or rebound.  Skin: Good skin turgor, pink, warm, dry. No rashes, petechiae, purpura. Normal capillary refill.   Back: No tenderness, No CVA tenderness.   Extremities: Intact distal pulses, No edema, No tenderness, No cyanosis,  Vascular: Pulses are 2+, symmetric in the upper and lower extremities.  Musculoskeletal: Good range of motion in all major joints. No tenderness to palpation or major deformities noted.   Neurologic: Alert & oriented x 3,  No gross focal deficits noted.   Psychiatric: Affect normal, Judgment normal, Mood normal.       DIAGNOSTIC STUDIES / PROCEDURES  EKG  I have independently interpreted this EKG  Rate 150, rhythm sinus tachycardia versus atrial flutter with 2 1 block, axis normal, KS QRS QT intervals normal, nonspecific ST-T wave changes, twelve-lead EKG, this is a change compared to previous tracing when he was noted to be less tachycardic;    LABS  CBC shows white " count of 13.1, 79% neutrophils, 13% lymphocytes, 5% monocytes, hemoglobin 18.8, adequate 53.8; CMP shows potassium 3.5, CO2 15, anion gap 28, glucose 153, , ALT 90, alkaline phosphatase 246; bilirubin 1.7; lipase 38; lactic acid 7.1; troponin 17; PT 14.8/INR 1.14; PTT 30; TSH 2.84;    RADIOLOGY  I have independently interpreted the diagnostic imaging associated with this visit and am waiting the final reading from the radiologist.   My preliminary interpretation is as follows: No significant abnormalities identified  Radiologist interpretation:   DX-CHEST-PORTABLE (1 VIEW)   Final Result      No acute process.            COURSE & MEDICAL DECISION MAKING        INITIAL ASSESSMENT, COURSE AND PLAN    1.  Monitor  2.  IV detox  3.  Zofran 4 mg IV  4.  Morphine 4 mg IV, titrated  5.  Diltiazem 10 mg IV  6.  Ativan 1 mg IV, titrated    Care Narrative: This time, the patient presents for evaluation of chest pain.  The patient was noted to be tachycardic.  It was such a regular tachycardia at 150 I thought this might represent atrial flutter with 2 1 block.  The patient was given diltiazem and his heart rate slowed to 130 and no discernible flutter waves were identified.  Reviewing the old records this was also seen previously.  I spoke with the hospitalist on-call.  I spent 35 minutes of bedside attendance evaluating the patient, reassessing patient, reviewing laboratory and imaging studies, implementing treatment and reviewing response to treatment, speak with consultants.    HYDRATION: Based on the patient's presentation of Dehydration and Tachycardia the patient was given IV fluids. IV Hydration was used because oral hydration was not adequate alone. Upon recheck following hydration, the patient was improved.      ADDITIONAL PROBLEM LIST  1.  Alcohol abuse    DISPOSITION AND DISCUSSIONS  I have discussed management of the patient with the following physicians and BATSHEVA's: Hospitalist on-call      FINAL  DIAGNOSIS  1. Acute chest pain    2. Alcohol withdrawal syndrome with complication (HCC)    3. Sinus tachycardia    4. Lactic acidosis    5.      Critical care time, 35 minutes         Electronically signed by: Guy G Gansert, M.D., 10/28/2023 8:50 AM

## 2023-10-28 NOTE — CARE PLAN
The patient is Stable - Low risk of patient condition declining or worsening         Progress made toward(s) clinical / shift goals:      Problem: Pain - Standard  Goal: Alleviation of pain or a reduction in pain to the patient’s comfort goal  Description: Target End Date:  Prior to discharge or change in level of care    Document on Vitals flowsheet    1.  Document pain using the appropriate pain scale per order or unit policy  2.  Educate and implement non-pharmacologic comfort measures (i.e. relaxation, distraction, massage, cold/heat therapy, etc.)  3.  Pain management medications as ordered  4.  Reassess pain after pain med administration per policy  5.  If opiods administered assess patient's response to pain medication is appropriate per POSS sedation scale  6.  Follow pain management plan developed in collaboration with patient and interdisciplinary team (including palliative care or pain specialists if applicable)  Outcome: Progressing     Problem: Knowledge Deficit - Standard  Goal: Patient and family/care givers will demonstrate understanding of plan of care, disease process/condition, diagnostic tests and medications  Description: Target End Date:  1-3 days or as soon as patient condition allows    Document in Patient Education    1.  Patient and family/caregiver oriented to unit, equipment, visitation policy and means for communicating concern  2.  Complete/review Learning Assessment  3.  Assess knowledge level of disease process/condition, treatment plan, diagnostic tests and medications  4.  Explain disease process/condition, treatment plan, diagnostic tests and medications  Outcome: Progressing     Problem: Optimal Care for Alcohol Withdrawal  Goal: Optimal Care for the alcohol withdrawal patient  Description: Target End Date:  1 to 3 days    1.  Alcohol history screening done on admission  2.  CIWA-AR score assessment (includes assessment of nausea/vomiting, tremor, sweats, anxiety, agitation,  tactile, visual and auditory disturbances, headache and orientation/sensorium).  Document on CIWA flowsheet.  3.  Follow CIWA-AR score protocol  4.  Frequent reorientation  5.  Monitor for fluid and electrolyte imbalance.  6.  Assess for respiratory depression due to sedation (pulse ox)  7.  Consider thiamine, multivitamins, folic acid and magnesium sulfate per provider order  8.  Collaborate with Social Workers/Case Management  9.  Collaborate with mental health  Outcome: Progressing     Problem: Seizure Precautions  Goal: Implementation of seizure precautions  Description: Target End Date:  Prior to discharge or change in level of care    1.  Padded side rails up at all times  2.  Suction equipment and oxygen delivery system at bedside  3.  Continuous pulse oximeter in use  4.  Implement fall precautions, bed alarm on, bed in lowest position  5.  IV access (per order)  6.  Provide low stimulus environment, avoid exposure to triggers  7.  Instruct patient to use call light/seizure button if having warning signs of impending seizure  Outcome: Progressing     Problem: Lifestyle Changes  Goal: Patient's ability to identify lifestyle changes and available resources to help reduce recurrence of condition will improve  Description: Target End Date:  1 to 3 days    1.  Discuss recommended lifestyle changes  2.  Identify available resources and support systems  3.  Consider referral to substance abuse program  Outcome: Progressing     Problem: Psychosocial  Goal: Patient's level of anxiety will decrease  Description: Target End Date:  1-3 days or as soon as patient condition allows    1.  Collaborate with patient and family/caregiver to identify triggers and develop strategies to cope with anxiety  2.  Implement stimuli reduction, calming techniques  3.  Pharmacologic management per provider order  4.  Encourage patient/family/care giver participation  5.  Collaborate with interdisciplinary team including Psychologist or  Behavioral Health Team as needed  Outcome: Progressing  Goal: Spiritual and cultural needs incorporated into hospitalization  Description: Target End Date:  End of day 1    1.  Encourage verbalization of feelings, concerns, expectations and needs  2.  Collaborate with Case Management/  3.  Collaborate with Pastoral Care to meet spiritual needs  Outcome: Progressing     Problem: Risk for Aspiration  Goal: Patient's risk for aspiration will be absent or decrease  Description: Target End Date:  Prior to discharge or change in level of care    1.   Complete dysphagia screening on admission  2.   NPO until dysphagia screening complete or medically cleared  3.   Collaborate with Speech Therapy, Clinical Dietitian and interdisciplinary team  4.   Implement aspiration precautions  5.   Assist patient up to chair for meals  6.   Elevate head of bed 90 degrees if patient is unable to get out of bed  7.   Encourage small bites  8.   Ensure foods/liquids are of appropriate consistency  9.   Assess for any signs/symptoms of aspiration  10. Assess breath sounds and vital signs after oral intake  Outcome: Progressing     Problem: Fall Risk  Goal: Patient will remain free from falls  Description: Target End Date:  Prior to discharge or change in level of care    Document interventions on the Nona Krishnamurthy Fall Risk Assessment    1.  Assess for fall risk factors  2.  Implement fall precautions  Outcome: Progressing

## 2023-10-28 NOTE — H&P
Hospital Medicine History & Physical Note    Date of Service  10/28/2023    Primary Care Physician  Pcp Pt States None    Consultants  None    Specialist Names:     Code Status  Prior    Chief Complaint  Chief Complaint   Patient presents with    Chest Pain    Shortness of Breath       History of Presenting Illness  Alberto Maldonado is a 37 y.o. male with history of hypertension, gout who presented 10/28/2023 with chest tightness, shortness of breath.    Patient states he is recently back from the [facility] and has been drinking a lot of alcohol for the past month.  A few weeks ago after drinking he fell down some stairs and began having chest pain.  An urgent care told him he had rib contusions.  Chest pain has been intermittent sitting forward make chest pain worse.  He is more comfortable lying on his abdomen.    Afebrile.  On presentation he was tachycardic, tachypneic, hypertensive with systolics up into the 200s.  On room air.  Hemoglobin 18.8.  Leukocytosis At 13.1.  Lactic acid 7.1.  Potassium 3.5.  Patient does have high anion gap metabolic acidosis with a bicarb of 15 and anion gap of 28.  Elevated LFTs.  Total bilirubin 1.7.  Troponins were negative x2.    In the emergency room he was given diltiazem 10 mg IV x1.  A total of 8 mg of IV morphine and 1 mg of IV lorazepam.  Patient was started on a rally bag.    I discussed the plan of care with patient and ERP .    Review of Systems  Review of Systems   Respiratory:  Positive for shortness of breath.    Cardiovascular:  Positive for chest pain. Negative for palpitations.       Past Medical History   has a past medical history of Asthma without status asthmaticus (6/29/2017).  Hypertension, gout    Surgical History  Reports prior surgery on his right hand    Family History  Denies family history of heart attack or stroke  Family history reviewed with patient. There is no family history that is pertinent to the chief complaint.     Social History    reports that he has never smoked. He has never used smokeless tobacco. He reports current alcohol use of about 7.2 oz of alcohol per week. He reports that he does not currently use drugs.    Allergies  No Known Allergies    Medications  Prior to Admission Medications   Prescriptions Last Dose Informant Patient Reported? Taking?   Naproxen Sodium (ALEVE PO)   Yes No   Sig: Take  by mouth.   allopurinol (ZYLOPRIM) 100 MG Tab   Yes No   Sig: Take 100 mg by mouth every day.   amLODIPine (NORVASC) 5 MG Tab   Yes No   Sig: TAKE 1 TABLET BY MOUTH EVERY DAY FOR 90 DAYS   losartan (COZAAR) 50 MG Tab  Patient Yes No   Sig: Take 75 mg by mouth every day. 1.5 tablets = 75 mg.   metoprolol tartrate (LOPRESSOR) 100 MG Tab   No No   Sig: Take 1 Tablet by mouth 2 times a day.      Facility-Administered Medications: None       Physical Exam  Temp:  [36.6 °C (97.8 °F)] 36.6 °C (97.8 °F)  Pulse:  [129-152] 129  Resp:  [20-32] 30  BP: (152-204)/() 204/96  SpO2:  [94 %-97 %] 94 %  Blood Pressure: (!) 204/96   Temperature: 36.6 °C (97.8 °F)   Pulse: (!) 129   Respiration: (!) 30   Pulse Oximetry: 94 %       Physical Exam  Vitals and nursing note reviewed.   Constitutional:       Appearance: Normal appearance. He is ill-appearing.   HENT:      Head: Normocephalic and atraumatic.      Nose: Nose normal.      Mouth/Throat:      Mouth: Mucous membranes are moist.      Pharynx: Oropharynx is clear.   Eyes:      Extraocular Movements: Extraocular movements intact.      Conjunctiva/sclera: Conjunctivae normal.   Cardiovascular:      Rate and Rhythm: Regular rhythm. Tachycardia present.      Pulses: Normal pulses.      Heart sounds: Normal heart sounds. No murmur heard.     No friction rub. No gallop.   Pulmonary:      Effort: Pulmonary effort is normal. Tachypnea present.      Breath sounds: Normal breath sounds. No wheezing or rales.   Chest:      Chest wall: Tenderness present.   Abdominal:      General: Abdomen is flat. Bowel sounds  "are normal. There is no distension.      Palpations: Abdomen is soft. There is no mass.      Tenderness: There is no abdominal tenderness. There is no guarding.   Musculoskeletal:         General: Normal range of motion.      Cervical back: Normal range of motion and neck supple.   Skin:     General: Skin is warm.      Capillary Refill: Capillary refill takes less than 2 seconds.   Neurological:      General: No focal deficit present.      Mental Status: He is alert and oriented to person, place, and time. Mental status is at baseline.      Cranial Nerves: No cranial nerve deficit.      Motor: No weakness.   Psychiatric:         Mood and Affect: Mood normal.         Behavior: Behavior normal.         Laboratory:  Recent Labs     10/28/23  0842   WBC 13.1*   RBC 6.32*   HEMOGLOBIN 18.8*   HEMATOCRIT 53.8*   MCV 85.1   MCH 29.7   MCHC 34.9   RDW 48.5   PLATELETCT 355   MPV 8.7*     Recent Labs     10/28/23  0842   SODIUM 140   POTASSIUM 3.5*   CHLORIDE 97   CO2 15*   GLUCOSE 153*   BUN 7*   CREATININE 1.06   CALCIUM 9.1     Recent Labs     10/28/23  0842   ALTSGPT 90*   ASTSGOT 134*   ALKPHOSPHAT 246*   TBILIRUBIN 1.7*   LIPASE 38   GLUCOSE 153*     Recent Labs     10/28/23  0842   APTT 30.2   INR 1.14*     No results for input(s): \"NTPROBNP\" in the last 72 hours.      Recent Labs     10/28/23  0842 10/28/23  1050   TROPONINT 17 17       Imaging:  DX-CHEST-PORTABLE (1 VIEW)   Final Result      No acute process.          X-Ray:  I have personally reviewed the images and compared with prior images. and My impression is: No acute cardiopulmonary disease  EKG:  I have personally reviewed the images and compared with prior images. and My impression is: Sinus tachycardia, no specific ST changes.    Assessment/Plan:  Justification for Admission Status  I anticipate this patient will require at least two midnights for appropriate medical management, necessitating inpatient admission because of alcohol withdrawal, sinus " tachycardia, anion gap metabolic acidosis    Patient will need a Telemetry bed on EMERGENCY service .  The need is secondary to alcohol withdrawal, sinus tachycardia, anion gap metabolic acidosis.    * Alcohol withdrawal delirium (HCC)- (present on admission)  Assessment & Plan  10/28/2023  IV fluids  Received rally bag in the emergency room  Continue CIWA protocol  Aggressively replete electrolytes  Monitor on telemetry    Elevated LFTs  Assessment & Plan  Elevated LFTs and bilirubin  Possibly secondary to alcohol  Check hepatitis serologies  Abdominal ultrasound    Hypokalemia- (present on admission)  Assessment & Plan  Monitor and continue to replete as needed    Metabolic acidosis- (present on admission)  Assessment & Plan  Likely secondary to alcohol intoxication  Continue IV fluids    Morbid obesity (HCC)- (present on admission)  Assessment & Plan  Body mass index is 42.14 kg/m².  Counseled about diet and exercise    Uncontrolled hypertension- (present on admission)  Assessment & Plan  Continue home amlodipine.  Alcohol withdrawal likely contributing        VTE prophylaxis: enoxaparin ppx

## 2023-10-28 NOTE — ED TRIAGE NOTES
Alberto Leighton Maldonado  37 y.o. male  Chief Complaint   Patient presents with    Chest Pain    Shortness of Breath     Pt ambulatory to triage with steady gait for above complaint accompanied by brother.    Pt is GCS 15, speaking in full sentences, follows commands and responds appropriately to questions. Resp are even and unlabored.    Chest pain protocol ordered. Pt placed in ED lobby. Pt educated on triage process. Pt encouraged to alert staff for any changes.       Vitals:    10/28/23 0813   BP: (!) 161/106   Pulse: (!) 151   Resp: (!) 22   Temp: 36.6 °C (97.8 °F)   SpO2: 97%

## 2023-10-28 NOTE — ED NOTES
ERP at bedside   Post-Care Instructions: I reviewed with the patient in detail post-care instructions. Patient is to wear sunprotection, and avoid picking at any of the treated lesions. Pt may apply Vaseline to crusted or scabbing areas. Consent: The patient's consent was obtained including but not limited to risks of crusting, scabbing, blistering, scarring, darker or lighter pigmentary change, recurrence, incomplete removal and infection. Render Note In Bullet Format When Appropriate: No Render Post-Care Instructions In Note?: yes Detail Level: Detailed Duration Of Freeze Thaw-Cycle (Seconds): 10 Number Of Freeze-Thaw Cycles: 2 freeze-thaw cycles

## 2023-10-28 NOTE — ED NOTES
Med rec completed per patient at bedside, with RX bottles provided by patient (reviewed and returned).  Allergies reviewed with patient. NKDA.  Patient's preferred pharmacy: [facility] on [facility].    No outpatient antibiotics within the last 30 days.

## 2023-10-28 NOTE — PROGRESS NOTES
4 Eyes Skin Assessment Completed by ALF Orellana and ALF Dominique.    Head WDL  Ears WDL  Nose WDL  Mouth WDL  Neck WDL  Breast/Chest WDL  Shoulder Blades WDL  Spine WDL  (R) Arm/Elbow/Hand WDL  (L) Arm/Elbow/Hand WDL  Abdomen WDL  Groin WDL  Scrotum/Coccyx/Buttocks Redness, Blanching, and Discoloration  (R) Leg WDL  (L) Leg WDL  (R) Heel/Foot/Toe WDL  (L) Heel/Foot/Toe WDL          Devices In Places Tele Box      Interventions In Place Pillows    Possible Skin Injury No    Pictures Uploaded Into Epic N/A  Wound Consult Placed N/A  RN Wound Prevention Protocol Ordered No

## 2023-10-29 ENCOUNTER — APPOINTMENT (OUTPATIENT)
Dept: CARDIOLOGY | Facility: MEDICAL CENTER | Age: 38
DRG: 897 | End: 2023-10-29
Attending: INTERNAL MEDICINE
Payer: COMMERCIAL

## 2023-10-29 ENCOUNTER — APPOINTMENT (OUTPATIENT)
Dept: RADIOLOGY | Facility: MEDICAL CENTER | Age: 38
DRG: 897 | End: 2023-10-29
Attending: INTERNAL MEDICINE
Payer: COMMERCIAL

## 2023-10-29 PROBLEM — N30.00 ACUTE CYSTITIS WITHOUT HEMATURIA: Status: ACTIVE | Noted: 2023-10-29

## 2023-10-29 LAB
ALBUMIN SERPL BCP-MCNC: 4.1 G/DL (ref 3.2–4.9)
ALBUMIN/GLOB SERPL: 1.1 G/DL
ALP SERPL-CCNC: 204 U/L (ref 30–99)
ALT SERPL-CCNC: 66 U/L (ref 2–50)
ANION GAP SERPL CALC-SCNC: 15 MMOL/L (ref 7–16)
AST SERPL-CCNC: 126 U/L (ref 12–45)
BASOPHILS # BLD AUTO: 1.2 % (ref 0–1.8)
BASOPHILS # BLD: 0.09 K/UL (ref 0–0.12)
BILIRUB SERPL-MCNC: 1.3 MG/DL (ref 0.1–1.5)
BUN SERPL-MCNC: 11 MG/DL (ref 8–22)
CALCIUM ALBUM COR SERPL-MCNC: 8.8 MG/DL (ref 8.5–10.5)
CALCIUM SERPL-MCNC: 8.9 MG/DL (ref 8.5–10.5)
CHLORIDE SERPL-SCNC: 100 MMOL/L (ref 96–112)
CO2 SERPL-SCNC: 21 MMOL/L (ref 20–33)
CREAT SERPL-MCNC: 0.95 MG/DL (ref 0.5–1.4)
EOSINOPHIL # BLD AUTO: 0.23 K/UL (ref 0–0.51)
EOSINOPHIL NFR BLD: 3 % (ref 0–6.9)
ERYTHROCYTE [DISTWIDTH] IN BLOOD BY AUTOMATED COUNT: 52.2 FL (ref 35.9–50)
GFR SERPLBLD CREATININE-BSD FMLA CKD-EPI: 106 ML/MIN/1.73 M 2
GLOBULIN SER CALC-MCNC: 3.7 G/DL (ref 1.9–3.5)
GLUCOSE SERPL-MCNC: 133 MG/DL (ref 65–99)
HAV IGM SERPL QL IA: NORMAL
HBV CORE IGM SER QL: NORMAL
HBV SURFACE AG SER QL: NORMAL
HCT VFR BLD AUTO: 47.9 % (ref 42–52)
HCV AB SER QL: NORMAL
HGB BLD-MCNC: 15.6 G/DL (ref 14–18)
IMM GRANULOCYTES # BLD AUTO: 0.02 K/UL (ref 0–0.11)
IMM GRANULOCYTES NFR BLD AUTO: 0.3 % (ref 0–0.9)
LACTATE SERPL-SCNC: 2.6 MMOL/L (ref 0.5–2)
LACTATE SERPL-SCNC: 4.7 MMOL/L (ref 0.5–2)
LV EJECT FRACT MOD 2C 99903: 76.44
LV EJECT FRACT MOD 4C 99902: 66.21
LV EJECT FRACT MOD BP 99901: 72.13
LYMPHOCYTES # BLD AUTO: 2.24 K/UL (ref 1–4.8)
LYMPHOCYTES NFR BLD: 29.7 % (ref 22–41)
MAGNESIUM SERPL-MCNC: 1.9 MG/DL (ref 1.5–2.5)
MCH RBC QN AUTO: 29.1 PG (ref 27–33)
MCHC RBC AUTO-ENTMCNC: 32.6 G/DL (ref 32.3–36.5)
MCV RBC AUTO: 89.4 FL (ref 81.4–97.8)
MONOCYTES # BLD AUTO: 0.68 K/UL (ref 0–0.85)
MONOCYTES NFR BLD AUTO: 9 % (ref 0–13.4)
NEUTROPHILS # BLD AUTO: 4.29 K/UL (ref 1.82–7.42)
NEUTROPHILS NFR BLD: 56.8 % (ref 44–72)
NRBC # BLD AUTO: 0 K/UL
NRBC BLD-RTO: 0 /100 WBC (ref 0–0.2)
PHOSPHATE SERPL-MCNC: 2.5 MG/DL (ref 2.5–4.5)
PLATELET # BLD AUTO: 284 K/UL (ref 164–446)
PMV BLD AUTO: 8.9 FL (ref 9–12.9)
POTASSIUM SERPL-SCNC: 3.3 MMOL/L (ref 3.6–5.5)
PROT SERPL-MCNC: 7.8 G/DL (ref 6–8.2)
RBC # BLD AUTO: 5.36 M/UL (ref 4.7–6.1)
SODIUM SERPL-SCNC: 136 MMOL/L (ref 135–145)
WBC # BLD AUTO: 7.6 K/UL (ref 4.8–10.8)

## 2023-10-29 PROCEDURE — 83605 ASSAY OF LACTIC ACID: CPT | Mod: 91

## 2023-10-29 PROCEDURE — 80053 COMPREHEN METABOLIC PANEL: CPT

## 2023-10-29 PROCEDURE — 99233 SBSQ HOSP IP/OBS HIGH 50: CPT | Performed by: INTERNAL MEDICINE

## 2023-10-29 PROCEDURE — 700111 HCHG RX REV CODE 636 W/ 250 OVERRIDE (IP): Performed by: INTERNAL MEDICINE

## 2023-10-29 PROCEDURE — 80074 ACUTE HEPATITIS PANEL: CPT

## 2023-10-29 PROCEDURE — 84100 ASSAY OF PHOSPHORUS: CPT

## 2023-10-29 PROCEDURE — 700102 HCHG RX REV CODE 250 W/ 637 OVERRIDE(OP): Performed by: INTERNAL MEDICINE

## 2023-10-29 PROCEDURE — 36415 COLL VENOUS BLD VENIPUNCTURE: CPT

## 2023-10-29 PROCEDURE — 700117 HCHG RX CONTRAST REV CODE 255: Performed by: INTERNAL MEDICINE

## 2023-10-29 PROCEDURE — 700105 HCHG RX REV CODE 258: Performed by: INTERNAL MEDICINE

## 2023-10-29 PROCEDURE — A9270 NON-COVERED ITEM OR SERVICE: HCPCS | Performed by: INTERNAL MEDICINE

## 2023-10-29 PROCEDURE — 76705 ECHO EXAM OF ABDOMEN: CPT

## 2023-10-29 PROCEDURE — 83735 ASSAY OF MAGNESIUM: CPT

## 2023-10-29 PROCEDURE — 700111 HCHG RX REV CODE 636 W/ 250 OVERRIDE (IP): Mod: JZ | Performed by: INTERNAL MEDICINE

## 2023-10-29 PROCEDURE — 85025 COMPLETE CBC W/AUTO DIFF WBC: CPT

## 2023-10-29 PROCEDURE — 93306 TTE W/DOPPLER COMPLETE: CPT | Mod: 26 | Performed by: INTERNAL MEDICINE

## 2023-10-29 PROCEDURE — 93306 TTE W/DOPPLER COMPLETE: CPT

## 2023-10-29 PROCEDURE — 770020 HCHG ROOM/CARE - TELE (206)

## 2023-10-29 RX ORDER — LOSARTAN POTASSIUM 25 MG/1
25 TABLET ORAL
Status: DISCONTINUED | OUTPATIENT
Start: 2023-10-29 | End: 2023-10-30

## 2023-10-29 RX ORDER — AMLODIPINE BESYLATE 5 MG/1
5 TABLET ORAL ONCE
Status: COMPLETED | OUTPATIENT
Start: 2023-10-29 | End: 2023-10-29

## 2023-10-29 RX ORDER — POTASSIUM CHLORIDE 20 MEQ/1
40 TABLET, EXTENDED RELEASE ORAL ONCE
Status: COMPLETED | OUTPATIENT
Start: 2023-10-29 | End: 2023-10-29

## 2023-10-29 RX ORDER — AMLODIPINE BESYLATE 10 MG/1
10 TABLET ORAL
Status: DISCONTINUED | OUTPATIENT
Start: 2023-10-30 | End: 2023-10-30 | Stop reason: HOSPADM

## 2023-10-29 RX ADMIN — AMLODIPINE BESYLATE 5 MG: 5 TABLET ORAL at 05:19

## 2023-10-29 RX ADMIN — HUMAN ALBUMIN MICROSPHERES AND PERFLUTREN 3 ML: 10; .22 INJECTION, SOLUTION INTRAVENOUS at 18:15

## 2023-10-29 RX ADMIN — ENOXAPARIN SODIUM 40 MG: 100 INJECTION SUBCUTANEOUS at 16:34

## 2023-10-29 RX ADMIN — DIBASIC SODIUM PHOSPHATE, MONOBASIC POTASSIUM PHOSPHATE AND MONOBASIC SODIUM PHOSPHATE 500 MG: 852; 155; 130 TABLET ORAL at 05:19

## 2023-10-29 RX ADMIN — CEFTRIAXONE SODIUM 2000 MG: 10 INJECTION, POWDER, FOR SOLUTION INTRAVENOUS at 13:27

## 2023-10-29 RX ADMIN — Medication 400 MG: at 05:19

## 2023-10-29 RX ADMIN — LOSARTAN POTASSIUM 25 MG: 25 TABLET, FILM COATED ORAL at 15:28

## 2023-10-29 RX ADMIN — SODIUM CHLORIDE, POTASSIUM CHLORIDE, SODIUM LACTATE AND CALCIUM CHLORIDE: 600; 310; 30; 20 INJECTION, SOLUTION INTRAVENOUS at 17:44

## 2023-10-29 RX ADMIN — DIBASIC SODIUM PHOSPHATE, MONOBASIC POTASSIUM PHOSPHATE AND MONOBASIC SODIUM PHOSPHATE 500 MG: 852; 155; 130 TABLET ORAL at 13:15

## 2023-10-29 RX ADMIN — POTASSIUM BICARBONATE 25 MEQ: 978 TABLET, EFFERVESCENT ORAL at 05:19

## 2023-10-29 RX ADMIN — AMLODIPINE BESYLATE 5 MG: 5 TABLET ORAL at 10:15

## 2023-10-29 RX ADMIN — OXYCODONE HYDROCHLORIDE 10 MG: 10 TABLET ORAL at 23:29

## 2023-10-29 RX ADMIN — POTASSIUM CHLORIDE 40 MEQ: 1500 TABLET, EXTENDED RELEASE ORAL at 10:12

## 2023-10-29 RX ADMIN — ALLOPURINOL 100 MG: 100 TABLET ORAL at 05:20

## 2023-10-29 RX ADMIN — ENOXAPARIN SODIUM 40 MG: 100 INJECTION SUBCUTANEOUS at 05:20

## 2023-10-29 ASSESSMENT — FIBROSIS 4 INDEX: FIB4 SCORE: 1.91

## 2023-10-29 ASSESSMENT — ENCOUNTER SYMPTOMS
MYALGIAS: 0
VOMITING: 0
DIZZINESS: 0
CHILLS: 0
HEADACHES: 0
SHORTNESS OF BREATH: 0
DEPRESSION: 0
ABDOMINAL PAIN: 0
FEVER: 0
NAUSEA: 0

## 2023-10-29 ASSESSMENT — PAIN DESCRIPTION - PAIN TYPE
TYPE: ACUTE PAIN

## 2023-10-29 NOTE — CARE PLAN
Problem: Knowledge Deficit - Standard  Goal: Patient and family/care givers will demonstrate understanding of plan of care, disease process/condition, diagnostic tests and medications  Outcome: Progressing  Note: Discuss and review POC with patient/family. Re-educate as needed.       Problem: Seizure Precautions  Goal: Implementation of seizure precautions  Outcome: Progressing     Problem: Fall Risk  Goal: Patient will remain free from falls  Outcome: Progressing  Note: Treaded socks and bed/strip alarm on, side rails up x 2. Call light within reach. Pt educated to call for assistance. Reinforce as needed. Continue to monitor.       The patient is Watcher - Medium risk of patient condition declining or worsening    Shift Goals  Clinical Goals: monitor labs  Patient Goals: rest, possibly go home, shower  Family Goals: not present    Progress made toward(s) clinical / shift goals:  No active CIWA, abx started for UTI    Patient is not progressing towards the following goals:

## 2023-10-29 NOTE — CARE PLAN
The patient is Stable - Low risk of patient condition declining or worsening    Shift Goals  Clinical Goals: Follow labs, monitor pain  Patient Goals: Pain control    Progress made toward(s) clinical / shift goals:  Progressing      Problem: Pain - Standard  Goal: Alleviation of pain or a reduction in pain to the patient’s comfort goal  Outcome: Progressing  Note: Pt reports pain is significantly decreased, pt does not require pain interventions at this time     Problem: Knowledge Deficit - Standard  Goal: Patient and family/care givers will demonstrate understanding of plan of care, disease process/condition, diagnostic tests and medications  Outcome: Progressing  Note: Pt verbalizes understanding of plan of care

## 2023-10-29 NOTE — ASSESSMENT & PLAN NOTE
10/28/2023  IV fluids  Received rally bag in the emergency room  Continue CIWA protocol  Aggressively replete electrolytes  Monitor on telemetry

## 2023-10-29 NOTE — PROGRESS NOTES
Hospital Medicine Daily Progress Note    Date of Service  10/29/2023    Chief Complaint  Alberto Maldonado is a 37 y.o. male admitted 10/28/2023 with chest pain    Hospital Course  37 y.o. male with history of hypertension, gout who presented 10/28/2023 with chest tightness, shortness of breath.     Patient states he is recently back from the [facility] and has been drinking a lot of alcohol for the past month.  A few weeks ago after drinking he fell down some stairs and began having chest pain.  An urgent care told him he had rib contusions.  Chest pain has been intermittent sitting forward make chest pain worse.  He is more comfortable lying on his abdomen.     In the emergency room he was given diltiazem 10 mg IV x1.  A total of 8 mg of IV morphine and 1 mg of IV lorazepam.  Patient was started on a rally bag. Troponins were negative x2.     Interval Problem Update  Patient tachycardic and hypertensive on room air. Increase amlodipne 10 mg daily. CIWA score 0, no signs of withdrawal DC CIWA protocol.  Lactic 4.7, continue IV fluids.  UA was positive and patient complaining of urinary frequency we will start Rocephin.  Potassium 3.3, replacement ordered.  Right upper quadrant ultrasound showed hepatomegaly and hepatic steatosis. , ALT 66, hepatitis panel negative.  Reviewed telemetry patient in sinus rhythm and sinus tachycardia overnight.  Patient still having some intermittent chest pain, echo pending.  Patient still hypertensive this afternoon I will start losartan in addition to his amlodipine.  Patient reports he is unable to lie flat when he sleeping.  I have discussed with him that he will need an outpatient sleep study, I have placed a referral.    I have discussed this patient's plan of care and discharge plan at IDT rounds today with Case Management, Nursing, Nursing leadership, and other members of the IDT team.    Consultants/Specialty  N/A    Code Status  Full Code    Disposition  The  patient is not medically cleared for discharge to home or a post-acute facility.  Anticipate discharge to: home with close outpatient follow-up    I have placed the appropriate orders for post-discharge needs.    Review of Systems  Review of Systems   Constitutional:  Negative for chills and fever.   Respiratory:  Negative for shortness of breath.    Cardiovascular:  Positive for chest pain.   Gastrointestinal:  Negative for abdominal pain, nausea and vomiting.   Genitourinary:  Negative for dysuria.   Musculoskeletal:  Negative for myalgias.   Skin:  Negative for rash.   Neurological:  Negative for dizziness and headaches.   Psychiatric/Behavioral:  Negative for depression.    All other systems reviewed and are negative.       Physical Exam  Temp:  [36.5 °C (97.7 °F)-37.1 °C (98.7 °F)] 36.8 °C (98.2 °F)  Pulse:  [] 118  Resp:  [18-20] 18  BP: (151-169)/() 158/99  SpO2:  [93 %-96 %] 93 %    Physical Exam  Vitals and nursing note reviewed.   Constitutional:       General: He is not in acute distress.     Appearance: Normal appearance.   HENT:      Head: Normocephalic and atraumatic.   Eyes:      Conjunctiva/sclera: Conjunctivae normal.      Pupils: Pupils are equal, round, and reactive to light.   Cardiovascular:      Rate and Rhythm: Regular rhythm. Tachycardia present.      Pulses: Normal pulses.   Pulmonary:      Effort: Pulmonary effort is normal. No respiratory distress.      Breath sounds: Normal breath sounds. No wheezing or rales.   Abdominal:      General: Abdomen is flat. There is no distension.      Palpations: Abdomen is soft.      Tenderness: There is no abdominal tenderness.   Musculoskeletal:         General: Normal range of motion.      Cervical back: Normal range of motion and neck supple.      Right lower leg: No edema.      Left lower leg: No edema.   Skin:     General: Skin is warm and dry.      Findings: No rash.   Neurological:      General: No focal deficit present.      Mental  Status: He is alert and oriented to person, place, and time.      Cranial Nerves: No cranial nerve deficit.   Psychiatric:         Mood and Affect: Mood normal.         Behavior: Behavior normal.         Fluids    Intake/Output Summary (Last 24 hours) at 10/29/2023 1507  Last data filed at 10/29/2023 1111  Gross per 24 hour   Intake 120 ml   Output 100 ml   Net 20 ml       Laboratory  Recent Labs     10/28/23  0842 10/29/23  0130   WBC 13.1* 7.6   RBC 6.32* 5.36   HEMOGLOBIN 18.8* 15.6   HEMATOCRIT 53.8* 47.9   MCV 85.1 89.4   MCH 29.7 29.1   MCHC 34.9 32.6   RDW 48.5 52.2*   PLATELETCT 355 284   MPV 8.7* 8.9*     Recent Labs     10/28/23  0842 10/28/23  1256 10/29/23  0130   SODIUM 140 138 136   POTASSIUM 3.5* 3.4* 3.3*   CHLORIDE 97 100 100   CO2 15* 17* 21   GLUCOSE 153* 171* 133*   BUN 7* 7* 11   CREATININE 1.06 1.04 0.95   CALCIUM 9.1 8.8 8.9     Recent Labs     10/28/23  0842   APTT 30.2   INR 1.14*               Imaging  US-RUQ   Final Result         1. Hepatomegaly and hepatic steatosis.   2. Otherwise, normal.   3. Technically challenging exam secondary to body habitus.      DX-CHEST-PORTABLE (1 VIEW)   Final Result      No acute process.      EC-ECHOCARDIOGRAM COMPLETE W/O CONT    (Results Pending)        Assessment/Plan  * Alcohol withdrawal delirium (HCC)- (present on admission)  Assessment & Plan  10/28/2023  IV fluids  Received rally bag in the emergency room  Continue CIWA protocol  Aggressively replete electrolytes  Monitor on telemetry    Elevated LFTs  Assessment & Plan  Elevated LFTs and bilirubin  Possibly secondary to alcohol however, suspect more likely NAFLD  Hepatitis panel negative  Abdominal ultrasound showed hepatic steatosis and hepatomegaly    Hypokalemia- (present on admission)  Assessment & Plan  Monitor and continue to replete as needed    Metabolic acidosis- (present on admission)  Assessment & Plan  Likely secondary to alcohol intoxication  Continue IV fluids    Morbid obesity  ([facility])- (present on admission)  Assessment & Plan  Body mass index is 42.14 kg/m².  Counseled about diet and exercise    Uncontrolled hypertension- (present on admission)  Assessment & Plan  Increase amlodipine 10 mg  Add losartan 25 mg daily         VTE prophylaxis: enoxaparin ppx    I have performed a physical exam and reviewed and updated ROS and Plan today (10/29/2023). In review of yesterday's note (10/28/2023), there are no changes except as documented above.

## 2023-10-29 NOTE — PROGRESS NOTES
Bedside report received from night RN, pt care assumed, assessment completed. Pt is A&O4, pain 0/10, ST on the monitor. Updated on POC, questions answered. Bed in lowest, locked position, treaded socks on, call light and belongings within reach.

## 2023-10-30 ENCOUNTER — APPOINTMENT (OUTPATIENT)
Dept: RADIOLOGY | Facility: IMAGING CENTER | Age: 38
End: 2023-10-30
Attending: PHYSICIAN ASSISTANT
Payer: COMMERCIAL

## 2023-10-30 ENCOUNTER — OFFICE VISIT (OUTPATIENT)
Dept: URGENT CARE | Facility: CLINIC | Age: 38
End: 2023-10-30
Payer: COMMERCIAL

## 2023-10-30 ENCOUNTER — PHARMACY VISIT (OUTPATIENT)
Dept: PHARMACY | Facility: MEDICAL CENTER | Age: 38
End: 2023-10-30
Payer: COMMERCIAL

## 2023-10-30 VITALS
RESPIRATION RATE: 18 BRPM | TEMPERATURE: 97.9 F | BODY MASS INDEX: 44.22 KG/M2 | DIASTOLIC BLOOD PRESSURE: 101 MMHG | WEIGHT: 249.56 LBS | HEART RATE: 101 BPM | SYSTOLIC BLOOD PRESSURE: 162 MMHG | OXYGEN SATURATION: 97 % | HEIGHT: 63 IN

## 2023-10-30 VITALS
SYSTOLIC BLOOD PRESSURE: 186 MMHG | DIASTOLIC BLOOD PRESSURE: 110 MMHG | RESPIRATION RATE: 18 BRPM | OXYGEN SATURATION: 98 % | WEIGHT: 244 LBS | BODY MASS INDEX: 41.66 KG/M2 | HEIGHT: 64 IN | HEART RATE: 90 BPM | TEMPERATURE: 98.6 F

## 2023-10-30 DIAGNOSIS — M79.641 RIGHT HAND PAIN: ICD-10-CM

## 2023-10-30 DIAGNOSIS — S63.91XA SPRAIN OF RIGHT HAND, INITIAL ENCOUNTER: ICD-10-CM

## 2023-10-30 DIAGNOSIS — I10 UNCONTROLLED HYPERTENSION: ICD-10-CM

## 2023-10-30 LAB
ALBUMIN SERPL BCP-MCNC: 3.8 G/DL (ref 3.2–4.9)
ALBUMIN/GLOB SERPL: 1.2 G/DL
ALP SERPL-CCNC: 175 U/L (ref 30–99)
ALT SERPL-CCNC: 56 U/L (ref 2–50)
ANION GAP SERPL CALC-SCNC: 10 MMOL/L (ref 7–16)
AST SERPL-CCNC: 101 U/L (ref 12–45)
BILIRUB SERPL-MCNC: 1 MG/DL (ref 0.1–1.5)
BUN SERPL-MCNC: 9 MG/DL (ref 8–22)
CALCIUM ALBUM COR SERPL-MCNC: 8.9 MG/DL (ref 8.5–10.5)
CALCIUM SERPL-MCNC: 8.7 MG/DL (ref 8.5–10.5)
CHLORIDE SERPL-SCNC: 102 MMOL/L (ref 96–112)
CO2 SERPL-SCNC: 26 MMOL/L (ref 20–33)
CREAT SERPL-MCNC: 0.69 MG/DL (ref 0.5–1.4)
ERYTHROCYTE [DISTWIDTH] IN BLOOD BY AUTOMATED COUNT: 50.4 FL (ref 35.9–50)
GFR SERPLBLD CREATININE-BSD FMLA CKD-EPI: 123 ML/MIN/1.73 M 2
GLOBULIN SER CALC-MCNC: 3.3 G/DL (ref 1.9–3.5)
GLUCOSE SERPL-MCNC: 108 MG/DL (ref 65–99)
HCT VFR BLD AUTO: 41 % (ref 42–52)
HGB BLD-MCNC: 13.9 G/DL (ref 14–18)
LACTATE SERPL-SCNC: 1.7 MMOL/L (ref 0.5–2)
LACTATE SERPL-SCNC: 2.8 MMOL/L (ref 0.5–2)
MCH RBC QN AUTO: 30.3 PG (ref 27–33)
MCHC RBC AUTO-ENTMCNC: 33.9 G/DL (ref 32.3–36.5)
MCV RBC AUTO: 89.3 FL (ref 81.4–97.8)
PLATELET # BLD AUTO: 228 K/UL (ref 164–446)
PMV BLD AUTO: 8.9 FL (ref 9–12.9)
POTASSIUM SERPL-SCNC: 4 MMOL/L (ref 3.6–5.5)
PROT SERPL-MCNC: 7.1 G/DL (ref 6–8.2)
RBC # BLD AUTO: 4.59 M/UL (ref 4.7–6.1)
SODIUM SERPL-SCNC: 138 MMOL/L (ref 135–145)
WBC # BLD AUTO: 10.7 K/UL (ref 4.8–10.8)

## 2023-10-30 PROCEDURE — 700102 HCHG RX REV CODE 250 W/ 637 OVERRIDE(OP): Performed by: INTERNAL MEDICINE

## 2023-10-30 PROCEDURE — 36415 COLL VENOUS BLD VENIPUNCTURE: CPT

## 2023-10-30 PROCEDURE — 80053 COMPREHEN METABOLIC PANEL: CPT

## 2023-10-30 PROCEDURE — A9270 NON-COVERED ITEM OR SERVICE: HCPCS | Performed by: INTERNAL MEDICINE

## 2023-10-30 PROCEDURE — 85027 COMPLETE CBC AUTOMATED: CPT

## 2023-10-30 PROCEDURE — 73130 X-RAY EXAM OF HAND: CPT | Mod: TC,RT | Performed by: PHYSICIAN ASSISTANT

## 2023-10-30 PROCEDURE — 3077F SYST BP >= 140 MM HG: CPT | Performed by: PHYSICIAN ASSISTANT

## 2023-10-30 PROCEDURE — 700105 HCHG RX REV CODE 258: Performed by: INTERNAL MEDICINE

## 2023-10-30 PROCEDURE — 83605 ASSAY OF LACTIC ACID: CPT | Mod: 91

## 2023-10-30 PROCEDURE — 700111 HCHG RX REV CODE 636 W/ 250 OVERRIDE (IP): Performed by: INTERNAL MEDICINE

## 2023-10-30 PROCEDURE — 3080F DIAST BP >= 90 MM HG: CPT | Performed by: PHYSICIAN ASSISTANT

## 2023-10-30 PROCEDURE — RXMED WILLOW AMBULATORY MEDICATION CHARGE: Performed by: INTERNAL MEDICINE

## 2023-10-30 PROCEDURE — 99239 HOSP IP/OBS DSCHRG MGMT >30: CPT | Performed by: INTERNAL MEDICINE

## 2023-10-30 PROCEDURE — 700111 HCHG RX REV CODE 636 W/ 250 OVERRIDE (IP): Mod: JZ | Performed by: INTERNAL MEDICINE

## 2023-10-30 PROCEDURE — 99214 OFFICE O/P EST MOD 30 MIN: CPT | Performed by: PHYSICIAN ASSISTANT

## 2023-10-30 RX ORDER — SULFAMETHOXAZOLE AND TRIMETHOPRIM 800; 160 MG/1; MG/1
1 TABLET ORAL 2 TIMES DAILY
Qty: 10 TABLET | Refills: 0 | Status: ACTIVE | OUTPATIENT
Start: 2023-10-30 | End: 2023-11-04

## 2023-10-30 RX ORDER — LOSARTAN POTASSIUM 50 MG/1
50 TABLET ORAL
Status: DISCONTINUED | OUTPATIENT
Start: 2023-10-31 | End: 2023-10-30 | Stop reason: HOSPADM

## 2023-10-30 RX ORDER — LOSARTAN POTASSIUM 25 MG/1
25 TABLET ORAL ONCE
Status: COMPLETED | OUTPATIENT
Start: 2023-10-30 | End: 2023-10-30

## 2023-10-30 RX ORDER — LOSARTAN POTASSIUM 50 MG/1
50 TABLET ORAL DAILY
Qty: 30 TABLET | Refills: 0 | Status: SHIPPED | OUTPATIENT
Start: 2023-10-31 | End: 2023-11-06

## 2023-10-30 RX ADMIN — ENOXAPARIN SODIUM 40 MG: 100 INJECTION SUBCUTANEOUS at 05:16

## 2023-10-30 RX ADMIN — ALLOPURINOL 100 MG: 100 TABLET ORAL at 05:16

## 2023-10-30 RX ADMIN — SODIUM CHLORIDE, POTASSIUM CHLORIDE, SODIUM LACTATE AND CALCIUM CHLORIDE: 600; 310; 30; 20 INJECTION, SOLUTION INTRAVENOUS at 05:21

## 2023-10-30 RX ADMIN — LOSARTAN POTASSIUM 25 MG: 25 TABLET, FILM COATED ORAL at 05:15

## 2023-10-30 RX ADMIN — CEFTRIAXONE SODIUM 2000 MG: 10 INJECTION, POWDER, FOR SOLUTION INTRAVENOUS at 05:16

## 2023-10-30 RX ADMIN — LOSARTAN POTASSIUM 25 MG: 25 TABLET, FILM COATED ORAL at 09:03

## 2023-10-30 RX ADMIN — AMLODIPINE BESYLATE 10 MG: 10 TABLET ORAL at 05:16

## 2023-10-30 ASSESSMENT — ENCOUNTER SYMPTOMS
FEVER: 0
NAUSEA: 0
VOMITING: 0
CHILLS: 0

## 2023-10-30 ASSESSMENT — PAIN DESCRIPTION - PAIN TYPE
TYPE: ACUTE PAIN

## 2023-10-30 ASSESSMENT — FIBROSIS 4 INDEX
FIB4 SCORE: 2.07
FIB4 SCORE: 2.07

## 2023-10-30 NOTE — DIETARY
NUTRITION SERVICES: BMI - Pt with BMI >40 (=Body mass index is 44.21 kg/m².), Class III obesity. Weight loss counseling not appropriate in acute care setting. RECOMMEND - If appropriate at DC please refer to outpatient nutrition services for weight management.

## 2023-10-30 NOTE — CARE PLAN
The patient is Stable - Low risk of patient condition declining or worsening    Shift Goals  Clinical Goals: pain control  Patient Goals: rest and go home  Family Goals: go home    Progress made toward(s) clinical / shift goals:    Problem: Pain - Standard  Goal: Alleviation of pain or a reduction in pain to the patient’s comfort goal  Outcome: Progressing  Note: Required only one dose of pain medication on NOC shift     Problem: Knowledge Deficit - Standard  Goal: Patient and family/care givers will demonstrate understanding of plan of care, disease process/condition, diagnostic tests and medications  Outcome: Progressing  Note: Education provided to Pt and family     Problem: Optimal Care for Alcohol Withdrawal  Goal: Optimal Care for the alcohol withdrawal patient  Outcome: Progressing  Note: Education provided for ETOH       Patient is not progressing towards the following goals:

## 2023-10-30 NOTE — DISCHARGE SUMMARY
Discharge Summary    CHIEF COMPLAINT ON ADMISSION  Chief Complaint   Patient presents with    Chest Pain    Shortness of Breath       Reason for Admission  SOB. Chest Pain      Admission Date  10/28/2023    CODE STATUS  Full Code    HPI & HOSPITAL COURSE  This is a 37 y.o. male here with chest pain     37 y.o. male with history of hypertension, gout who presented 10/28/2023 with chest tightness, shortness of breath.     Patient states he is recently back from the [facility] and has been drinking a lot of alcohol for the past month.  A few weeks ago after drinking he fell down some stairs and began having chest pain.  An urgent care told him he had rib contusions.  Chest pain has been intermittent sitting forward make chest pain worse.  He is more comfortable lying on his abdomen.     In the emergency room he was given diltiazem 10 mg IV x1.  A total of 8 mg of IV morphine and 1 mg of IV lorazepam.  Patient was started on a rally bag. Troponins were negative x2.  Echocardiogram showed preserved EF.  On admission patient had severe lactic acidosis which was treated with IV fluid hydration and antibiotics for UTI, it has now normalized.  There was also concern for alcohol withdrawal however CIWA scores were within normal limits.  I suspect most of patient's symptoms are from alcohol and sleep apnea.  Patient did have elevated transaminitis, abdominal ultrasound showed hepatic steatosis.  Patient counseled on weight loss, diet, exercise.  He will need an outpatient sleep study for which I have placed a referral.  Patient is still mildly hypertensive, added losartan 50 mg in addition to his amlodipine.  He will also be given Bactrim to finish off his course for his urinary tract infection.  Patient is stable and he is requesting to be discharged home.  We have made him an appointment to follow-up with primary care on 11/3.  He is to return to the ER if his condition worsens.    Therefore, he is discharged in good and  stable condition to home with close outpatient follow-up.    The patient met 2-midnight criteria for an inpatient stay at the time of discharge.    Discharge Date  10/30/2023    FOLLOW UP ITEMS POST DISCHARGE  Follow-up with primary care on 11/3  Referral placed for outpatient sleep study    DISCHARGE DIAGNOSES  Principal Problem:    Alcohol withdrawal delirium (HCC) (POA: Yes)  Active Problems:    Uncontrolled hypertension (POA: Yes)    Morbid obesity (HCC) (POA: Yes)    Metabolic acidosis (POA: Yes)    Hypokalemia (POA: Yes)    Elevated LFTs (POA: Unknown)    Acute cystitis without hematuria (POA: Yes)  Resolved Problems:    * No resolved hospital problems. *      FOLLOW UP  No future appointments.  No follow-up provider specified.    MEDICATIONS ON DISCHARGE     Medication List        START taking these medications        Instructions   losartan 50 MG Tabs  Start taking on: October 31, 2023  Commonly known as: Cozaar   Take 1 Tablet by mouth every day.  Dose: 50 mg     sulfamethoxazole-trimethoprim 800-160 MG tablet  Commonly known as: Bactrim DS   Take 1 Tablet by mouth 2 times a day for 5 days.  Dose: 1 Tablet            CONTINUE taking these medications        Instructions   Aleve 220 MG tablet  Generic drug: naproxen   Take 440 mg by mouth 1 time a day as needed (Mild Pain). 2 tablets = 440 mg.  Dose: 440 mg     allopurinol 100 MG Tabs  Commonly known as: Zyloprim   Take 100 mg by mouth 3 times a day.  Dose: 100 mg     amLODIPine 10 MG Tabs  Commonly known as: Norvasc   Take 10 mg by mouth every day.  Dose: 10 mg              Allergies  No Known Allergies    DIET  Orders Placed This Encounter   Procedures    Diet Order Diet: Regular     Standing Status:   Standing     Number of Occurrences:   1     Order Specific Question:   Diet:     Answer:   Regular [1]       ACTIVITY  As tolerated.  Weight bearing as tolerated    CONSULTATIONS  None    PROCEDURES  None    LABORATORY  Lab Results   Component Value Date     SODIUM 138 10/30/2023    POTASSIUM 4.0 10/30/2023    CHLORIDE 102 10/30/2023    CO2 26 10/30/2023    GLUCOSE 108 (H) 10/30/2023    BUN 9 10/30/2023    CREATININE 0.69 10/30/2023        Lab Results   Component Value Date    WBC 10.7 10/30/2023    HEMOGLOBIN 13.9 (L) 10/30/2023    HEMATOCRIT 41.0 (L) 10/30/2023    PLATELETCT 228 10/30/2023        Total time of the discharge process exceeds 33 minutes.

## 2023-10-30 NOTE — PROGRESS NOTES
Patient being discharged. Extensive education regarding lifestyle changes related to HTN, alcohol cessation and diet provided to patient. Pt educated on discharge instructions and new prescriptions, verbalized understanding. Pt educated on warning signs of HTN and Stroke like symptoms, krames info given. Follow up appointment made with PCP. PIV removed, monitor checked in. Patient going home via family.

## 2023-10-31 NOTE — PROGRESS NOTES
"Subjective:   Alberto Maldonado is a 37 y.o. male who presents for Hand Pain (R hand )        Patient presents with concerns of right hand pain that began 30 minutes ago.  States that he accidentally punched a wall.  Since then he has had pain in his hand and he points to the fourth MCP as the site of his pain.  Denies numbness and tingling.  He is able to move the hand but this does cause some discomfort.  He has not taken any medications for his symptoms.      Review of Systems   Constitutional:  Negative for chills and fever.   Gastrointestinal:  Negative for nausea and vomiting.   Musculoskeletal:  Positive for joint pain.       PMH:  has a past medical history of Asthma without status asthmaticus (6/29/2017).    He has no past medical history of Cancer ([facility]) or Diabetes ([facility]).  MEDS:   Current Outpatient Medications:     [START ON 10/31/2023] losartan (COZAAR) 50 MG Tab, Take 1 Tablet by mouth every day., Disp: 30 Tablet, Rfl: 0    sulfamethoxazole-trimethoprim (BACTRIM DS) 800-160 MG tablet, Take 1 Tablet by mouth 2 times a day for 5 days., Disp: 10 Tablet, Rfl: 0    amLODIPine (NORVASC) 10 MG Tab, Take 10 mg by mouth every day., Disp: , Rfl:     naproxen (ALEVE) 220 MG tablet, Take 440 mg by mouth 1 time a day as needed (Mild Pain). 2 tablets = 440 mg., Disp: , Rfl:     allopurinol (ZYLOPRIM) 100 MG Tab, Take 100 mg by mouth 3 times a day., Disp: , Rfl:   ALLERGIES: No Known Allergies  SURGHX: History reviewed. No pertinent surgical history.  SOCHX:  reports that he has never smoked. He has never used smokeless tobacco. He reports current alcohol use of about 7.2 oz of alcohol per week. He reports that he does not currently use drugs.  FH: Family history was reviewed, no pertinent findings to report   Objective:   BP (!) 186/110 (BP Location: Left arm, Patient Position: Sitting, BP Cuff Size: Adult)   Pulse 90   Temp 37 °C (98.6 °F) (Temporal)   Resp 18   Ht 1.613 m (5' 3.5\")   Wt 111 kg (244 lb)  "  SpO2 98%   BMI 42.54 kg/m²   Physical Exam  Vitals reviewed.   Constitutional:       General: He is not in acute distress.     Appearance: Normal appearance. He is well-developed. He is not toxic-appearing.   HENT:      Head: Normocephalic and atraumatic.      Right Ear: External ear normal.      Left Ear: External ear normal.      Nose: Nose normal.   Cardiovascular:      Rate and Rhythm: Normal rate and regular rhythm.   Pulmonary:      Effort: Pulmonary effort is normal. No respiratory distress.      Breath sounds: No stridor.   Musculoskeletal:      Comments: Right hand: Skin is intact and atraumatic.  Patient is tender to palpation over distal fourth metacarpal and fourth MCP.  Otherwise hand and digits nontender to palpation.  Wrist, hand, digit range of motion within normal limits bilaterally.  Distal sensation intact and even bilaterally.  Radial pulse 2+ and cap refill is brisk.   Skin:     General: Skin is dry.   Neurological:      Comments: Alert and oriented.    Psychiatric:         Speech: Speech normal.         Behavior: Behavior normal.              COMPARISON:  None     FINDINGS:     The bony structures and articulations are within normal limits.  No fracture, subluxation, or dislocation is appreciated.     IMPRESSION:        1.  No acute traumatic bony injury.  Assessment/Plan:   1. Sprain of right hand, initial encounter    2. Right hand pain  - DX-HAND 3+ RIGHT; Future    3. Uncontrolled hypertension    1.  Fortunately there is no evidence of fracture on imaging.  Elevate, ice, ibuprofen or Aleve as needed.  He should avoid aggravating activities.  If no improvement within 7 days or new symptoms develop at any point he should be immediately reevaluated.  Additionally should be reevaluated if his symptoms to fully resolve within 4-6 weeks.    2.  Patient's blood pressure significantly elevated today.  Patient recently underwent extensive cardiac work-up in the ED.  States that he has an  appointment to follow-up with cardiology this Friday.  His blood pressure continues to be poorly controlled but he admittedly did not take one of his blood pressure medications today-he does not recall which one.  Recommend that he continue to monitor and follow-up as scheduled.  He has a good understanding of red flag signs and symptoms and we reviewed ED precautions.

## 2023-11-01 ENCOUNTER — OFFICE VISIT (OUTPATIENT)
Dept: URGENT CARE | Facility: CLINIC | Age: 38
End: 2023-11-01
Payer: COMMERCIAL

## 2023-11-01 VITALS
OXYGEN SATURATION: 98 % | BODY MASS INDEX: 41.66 KG/M2 | SYSTOLIC BLOOD PRESSURE: 124 MMHG | HEART RATE: 103 BPM | RESPIRATION RATE: 20 BRPM | HEIGHT: 64 IN | WEIGHT: 244 LBS | DIASTOLIC BLOOD PRESSURE: 88 MMHG | TEMPERATURE: 98.7 F

## 2023-11-01 DIAGNOSIS — M10.071 ACUTE IDIOPATHIC GOUT OF RIGHT ANKLE: ICD-10-CM

## 2023-11-01 PROCEDURE — 3074F SYST BP LT 130 MM HG: CPT | Performed by: PHYSICIAN ASSISTANT

## 2023-11-01 PROCEDURE — 99214 OFFICE O/P EST MOD 30 MIN: CPT | Performed by: PHYSICIAN ASSISTANT

## 2023-11-01 PROCEDURE — 3079F DIAST BP 80-89 MM HG: CPT | Performed by: PHYSICIAN ASSISTANT

## 2023-11-01 RX ORDER — COLCHICINE 0.6 MG/1
TABLET ORAL
Qty: 12 TABLET | Refills: 0 | Status: SHIPPED | OUTPATIENT
Start: 2023-11-01 | End: 2023-11-11 | Stop reason: SDUPTHER

## 2023-11-01 ASSESSMENT — FIBROSIS 4 INDEX: FIB4 SCORE: 2.07

## 2023-11-02 ENCOUNTER — OFFICE VISIT (OUTPATIENT)
Dept: URGENT CARE | Facility: CLINIC | Age: 38
End: 2023-11-02
Payer: COMMERCIAL

## 2023-11-02 ENCOUNTER — APPOINTMENT (OUTPATIENT)
Dept: URGENT CARE | Facility: CLINIC | Age: 38
End: 2023-11-02
Payer: COMMERCIAL

## 2023-11-02 VITALS
BODY MASS INDEX: 41.66 KG/M2 | OXYGEN SATURATION: 95 % | HEART RATE: 107 BPM | DIASTOLIC BLOOD PRESSURE: 70 MMHG | HEIGHT: 64 IN | TEMPERATURE: 98 F | RESPIRATION RATE: 20 BRPM | WEIGHT: 244 LBS | SYSTOLIC BLOOD PRESSURE: 118 MMHG

## 2023-11-02 DIAGNOSIS — H66.001 ACUTE SUPPURATIVE OTITIS MEDIA OF RIGHT EAR WITHOUT SPONTANEOUS RUPTURE OF TYMPANIC MEMBRANE, RECURRENCE NOT SPECIFIED: ICD-10-CM

## 2023-11-02 PROCEDURE — 3074F SYST BP LT 130 MM HG: CPT | Performed by: FAMILY MEDICINE

## 2023-11-02 PROCEDURE — 3078F DIAST BP <80 MM HG: CPT | Performed by: FAMILY MEDICINE

## 2023-11-02 PROCEDURE — 99213 OFFICE O/P EST LOW 20 MIN: CPT | Performed by: FAMILY MEDICINE

## 2023-11-02 RX ORDER — AMOXICILLIN AND CLAVULANATE POTASSIUM 875; 125 MG/1; MG/1
1 TABLET, FILM COATED ORAL 2 TIMES DAILY
Qty: 14 TABLET | Refills: 0 | Status: SHIPPED | OUTPATIENT
Start: 2023-11-02 | End: 2023-11-06

## 2023-11-02 ASSESSMENT — FIBROSIS 4 INDEX: FIB4 SCORE: 2.07

## 2023-11-03 ENCOUNTER — TELEPHONE (OUTPATIENT)
Dept: HEALTH INFORMATION MANAGEMENT | Facility: OTHER | Age: 38
End: 2023-11-03

## 2023-11-03 ENCOUNTER — OFFICE VISIT (OUTPATIENT)
Dept: URGENT CARE | Facility: CLINIC | Age: 38
End: 2023-11-03
Payer: COMMERCIAL

## 2023-11-03 ENCOUNTER — APPOINTMENT (OUTPATIENT)
Dept: MEDICAL GROUP | Facility: PHYSICIAN GROUP | Age: 38
End: 2023-11-03
Payer: COMMERCIAL

## 2023-11-03 VITALS
HEART RATE: 101 BPM | DIASTOLIC BLOOD PRESSURE: 82 MMHG | RESPIRATION RATE: 20 BRPM | SYSTOLIC BLOOD PRESSURE: 138 MMHG | WEIGHT: 241.5 LBS | OXYGEN SATURATION: 98 % | HEIGHT: 64 IN | BODY MASS INDEX: 41.23 KG/M2 | TEMPERATURE: 99 F

## 2023-11-03 DIAGNOSIS — H66.001 NON-RECURRENT ACUTE SUPPURATIVE OTITIS MEDIA OF RIGHT EAR WITHOUT SPONTANEOUS RUPTURE OF TYMPANIC MEMBRANE: ICD-10-CM

## 2023-11-03 PROCEDURE — 3079F DIAST BP 80-89 MM HG: CPT | Performed by: NURSE PRACTITIONER

## 2023-11-03 PROCEDURE — 3075F SYST BP GE 130 - 139MM HG: CPT | Performed by: NURSE PRACTITIONER

## 2023-11-03 PROCEDURE — 99213 OFFICE O/P EST LOW 20 MIN: CPT | Performed by: NURSE PRACTITIONER

## 2023-11-03 RX ORDER — AMOXICILLIN AND CLAVULANATE POTASSIUM 125; 31.25 MG/5ML; MG/5ML
500 FOR SUSPENSION ORAL 2 TIMES DAILY
Qty: 280 ML | Refills: 0 | Status: SHIPPED | OUTPATIENT
Start: 2023-11-03 | End: 2023-11-06

## 2023-11-03 ASSESSMENT — FIBROSIS 4 INDEX: FIB4 SCORE: 2.07

## 2023-11-03 ASSESSMENT — ENCOUNTER SYMPTOMS
CHILLS: 0
FEVER: 0

## 2023-11-03 NOTE — PROGRESS NOTES
Subjective:      Chief Complaint   Patient presents with    Otalgia     Right ear pain with ringing that started today               Otalgia - rt  This is a new problem. The current episode started in the past 2 days. The problem occurs constantly. The problem has been unchanged. Associated symptoms include congestion and coughing. Pertinent negatives include no abdominal pain, chest pain, chills, fever, headaches, joint swelling, myalgias, nausea, neck pain, rash or visual change. Nothing aggravates the symptoms. She has tried nothing for the symptoms.     Social History     Tobacco Use    Smoking status: Never    Smokeless tobacco: Never   Vaping Use    Vaping Use: Never used   Substance Use Topics    Alcohol use: Yes     Alcohol/week: 7.2 oz     Types: 12 Shots of liquor per week    Drug use: Not Currently         Current Outpatient Medications on File Prior to Visit   Medication Sig Dispense Refill    colchicine (COLCRYS) 0.6 MG Tab Take 2 tabs PO once, then 1 tab PO once 2-3 hours later.  Repeat every 48-72 hours prn gout/pain. 12 Tablet 0    losartan (COZAAR) 50 MG Tab Take 1 Tablet by mouth every day. 30 Tablet 0    sulfamethoxazole-trimethoprim (BACTRIM DS) 800-160 MG tablet Take 1 Tablet by mouth 2 times a day for 5 days. 10 Tablet 0    amLODIPine (NORVASC) 10 MG Tab Take 10 mg by mouth every day.      naproxen (ALEVE) 220 MG tablet Take 440 mg by mouth 1 time a day as needed (Mild Pain). 2 tablets = 440 mg.      allopurinol (ZYLOPRIM) 100 MG Tab Take 100 mg by mouth 3 times a day.       No current facility-administered medications on file prior to visit.         Past Medical History:   Diagnosis Date    Asthma without status asthmaticus 6/29/2017         Family History   Problem Relation Age of Onset    Stroke Neg Hx     Heart Disease Neg Hx           Review of Systems   Constitutional: +fatigue  HENT: Positive for congestion and ear pain. Negative for hearing loss and tinnitus.    Respiratory:   Negative  "for hemoptysis, shortness of breath and wheezing.    Cardiovascular: Negative for chest pain, palpitations and leg swelling.   Gastrointestinal: Negative for nausea and abdominal pain.   Musculoskeletal: Negative for myalgias, joint swelling and neck pain.   Skin: Negative for rash.   Neurological: Negative for headaches.   All other systems reviewed and are negative.         Objective:     /70 (BP Location: Left arm, Patient Position: Sitting, BP Cuff Size: Adult)   Pulse (!) 107   Temp 36.7 °C (98 °F) (Temporal)   Resp 20   Ht 1.613 m (5' 3.5\")   Wt 111 kg (244 lb)   SpO2 95%     Physical Exam   Constitutional: Vital signs are normal.  No distress.   HENT:   Head: There is normal jaw occlusion.   Left  Ear: External ear normal. Tympanic membrane is normal. No middle ear effusion.   Rt  Ear: External ear normal. Tympanic membrane is abnormal - erythematous and bulging. A middle ear effusion is present.   Nose:  No nasal discharge.   Mouth/Throat: Mucous membranes are moist. No oral lesions. Pharynx erythema present. No oropharyngeal exudate, pharynx swelling or pharynx petechiae.  No   exudate.   Eyes: Conjunctivae and EOM are normal. Pupils are equal, round, and reactive to light. Right eye exhibits no discharge. Left eye exhibits no discharge.   Neck: Normal range of motion. Neck supple.    Cardiovascular: Normal rate and regular rhythm.  Pulses are palpable.    No murmur heard.  Pulmonary/Chest: Effort normal and breath sounds normal. There is normal air entry. No respiratory distress. no wheezes, rhonchi,  retraction.   Musculoskeletal:   no edema.   Neurological: A/O x 3.   CN 2-12 intact   Skin: Skin is warm. Capillary refill takes less than 3 seconds. No purpura and no rash noted. Patient is not diaphoretic. No jaundice or pallor.   Nursing note and vitals reviewed.              Assessment/Plan:        1. Acute suppurative otitis media of right ear without spontaneous rupture of tympanic " membrane, recurrence not specified     - amoxicillin-clavulanate (AUGMENTIN) 875-125 MG Tab; Take 1 Tablet by mouth 2 times a day for 7 days.  Dispense: 14 Tablet; Refill: 0      Differential diagnosis, natural history, supportive care, and indications for immediate follow-up discussed. All questions answered. Patient agrees with the plan of care.     Follow-up as needed if symptoms worsen or fail to improve to PCP, Urgent care or Emergency Room.     I have personally reviewed prior external notes and test results pertinent to today's visit.  I have independently reviewed and interpreted all diagnostics ordered during this urgent care acute visit.

## 2023-11-04 NOTE — PROGRESS NOTES
"Subjective:   Alberto Maldonado is a 37 y.o. male who presents for Follow-Up (Patient would like to change antibiotic pills/to ear drops/)      HPI  Patient is a 37-year-old male presents urgent care for evaluation after he was diagnosed with a right ear infection yesterday and prescribed oral Augmentin.  Patient states that the medication prescribed the pills are too large and he is unable to swallow them.  He is requesting an eardrop instead.  Continues to have right ear pain.  No pain with movement, no fevers.  He is also taking oral Bactrim for a UTI which she was prescribed from the ER.    Review of Systems   Constitutional:  Negative for chills, fever and malaise/fatigue.   HENT:  Positive for ear pain. Negative for congestion.        Medications:    allopurinol Tabs  amLODIPine Tabs  amoxicillin-clavulanate Tabs  colchicine Tabs  losartan Tabs  naproxen  sulfamethoxazole-trimethoprim    Allergies: Patient has no known allergies.    Problem List: Alberto Maldonado does not have any pertinent problems on file.    Surgical History:  No past surgical history on file.    Past Social Hx: Alberto Maldonado  reports that he has never smoked. He has never used smokeless tobacco. He reports current alcohol use of about 7.2 oz of alcohol per week. He reports that he does not currently use drugs.     Past Family Hx:  Alberto Maldonado family history is not on file.     Problem list, medications, and allergies reviewed by myself today in Epic.     Objective:     /82 (BP Location: Left arm, Patient Position: Sitting)   Pulse (!) 101   Temp 37.2 °C (99 °F) (Temporal)   Resp 20   Ht 1.613 m (5' 3.5\")   Wt 110 kg (241 lb 8 oz)   SpO2 98%   BMI 42.11 kg/m²     Physical Exam  Vitals and nursing note reviewed.   Constitutional:       General: He is not in acute distress.     Appearance: He is well-developed.   HENT:      Head: Normocephalic and atraumatic.      Right Ear: External ear normal. " No middle ear effusion. Tympanic membrane is erythematous and bulging.      Left Ear: External ear normal.      Nose: Nose normal.      Mouth/Throat:      Mouth: Mucous membranes are moist.   Eyes:      Conjunctiva/sclera: Conjunctivae normal.   Cardiovascular:      Rate and Rhythm: Normal rate.   Pulmonary:      Effort: Pulmonary effort is normal. No respiratory distress.      Breath sounds: Normal breath sounds.   Abdominal:      General: There is no distension.   Musculoskeletal:         General: Normal range of motion.   Skin:     General: Skin is warm and dry.   Neurological:      General: No focal deficit present.      Mental Status: He is alert and oriented to person, place, and time. Mental status is at baseline.      Gait: Gait (gait at baseline) normal.   Psychiatric:         Judgment: Judgment normal.         Assessment/Plan:     Diagnosis and associated orders:     1. Non-recurrent acute suppurative otitis media of right ear without spontaneous rupture of tympanic membrane  amoxicillin-clavulanate (AUGMENTIN) 125-31.25 mg/5mL Recon Susp           Comments/MDM:     I personally reviewed prior external notes and prior test results pertinent to today's visit.   Discussed management options, risks and benefits, and alternatives to treatment plan agreed upon.   Red flags discussed and indications to immediately call 911 or present to the Emergency Department.   Supportive care, differential diagnoses, and indications for immediate follow-up discussed with patient.    Patient expresses understanding and agrees to plan. Patient denies any other questions or concerns.                Please note that this dictation was created using voice recognition software. I have made a reasonable attempt to correct obvious errors, but I expect that there are errors of grammar and possibly content that I did not discover before finalizing the note.    This note was electronically signed by Ben DOBBS

## 2023-11-05 ENCOUNTER — OFFICE VISIT (OUTPATIENT)
Dept: URGENT CARE | Facility: CLINIC | Age: 38
End: 2023-11-05
Payer: COMMERCIAL

## 2023-11-05 VITALS
OXYGEN SATURATION: 98 % | TEMPERATURE: 97.8 F | BODY MASS INDEX: 40.97 KG/M2 | HEART RATE: 100 BPM | RESPIRATION RATE: 16 BRPM | DIASTOLIC BLOOD PRESSURE: 68 MMHG | SYSTOLIC BLOOD PRESSURE: 118 MMHG | HEIGHT: 64 IN | WEIGHT: 240 LBS

## 2023-11-05 DIAGNOSIS — R19.7 DIARRHEA, UNSPECIFIED TYPE: ICD-10-CM

## 2023-11-05 PROCEDURE — 3074F SYST BP LT 130 MM HG: CPT | Performed by: PHYSICIAN ASSISTANT

## 2023-11-05 PROCEDURE — 99214 OFFICE O/P EST MOD 30 MIN: CPT | Performed by: PHYSICIAN ASSISTANT

## 2023-11-05 PROCEDURE — 3078F DIAST BP <80 MM HG: CPT | Performed by: PHYSICIAN ASSISTANT

## 2023-11-05 RX ORDER — LOPERAMIDE HYDROCHLORIDE 2 MG/1
2 CAPSULE ORAL 4 TIMES DAILY PRN
Qty: 30 CAPSULE | Refills: 0 | Status: SHIPPED | OUTPATIENT
Start: 2023-11-05 | End: 2023-11-11

## 2023-11-05 ASSESSMENT — ENCOUNTER SYMPTOMS
DIARRHEA: 1
CHILLS: 1

## 2023-11-05 ASSESSMENT — FIBROSIS 4 INDEX: FIB4 SCORE: 2.07

## 2023-11-06 ENCOUNTER — OFFICE VISIT (OUTPATIENT)
Dept: MEDICAL GROUP | Facility: PHYSICIAN GROUP | Age: 38
End: 2023-11-06
Payer: COMMERCIAL

## 2023-11-06 VITALS
HEART RATE: 96 BPM | SYSTOLIC BLOOD PRESSURE: 122 MMHG | TEMPERATURE: 98.3 F | RESPIRATION RATE: 16 BRPM | OXYGEN SATURATION: 95 % | WEIGHT: 244.3 LBS | HEIGHT: 64 IN | DIASTOLIC BLOOD PRESSURE: 84 MMHG | BODY MASS INDEX: 41.71 KG/M2

## 2023-11-06 DIAGNOSIS — I10 ESSENTIAL HYPERTENSION: Chronic | ICD-10-CM

## 2023-11-06 DIAGNOSIS — M10.9 GOUT, UNSPECIFIED CAUSE, UNSPECIFIED CHRONICITY, UNSPECIFIED SITE: ICD-10-CM

## 2023-11-06 DIAGNOSIS — G47.33 OBSTRUCTIVE SLEEP APNEA SYNDROME: Chronic | ICD-10-CM

## 2023-11-06 DIAGNOSIS — J45.20 MILD INTERMITTENT ASTHMA WITHOUT STATUS ASTHMATICUS WITHOUT COMPLICATION: Chronic | ICD-10-CM

## 2023-11-06 DIAGNOSIS — K76.0 STEATOSIS OF LIVER: Chronic | ICD-10-CM

## 2023-11-06 DIAGNOSIS — R73.03 PREDIABETES: ICD-10-CM

## 2023-11-06 DIAGNOSIS — F10.20 ALCOHOLISM (HCC): Chronic | ICD-10-CM

## 2023-11-06 DIAGNOSIS — R79.89 ELEVATED LFTS: Chronic | ICD-10-CM

## 2023-11-06 DIAGNOSIS — E66.01 MORBID OBESITY (HCC): Chronic | ICD-10-CM

## 2023-11-06 DIAGNOSIS — Z09 HOSPITAL DISCHARGE FOLLOW-UP: ICD-10-CM

## 2023-11-06 PROBLEM — K85.90 PANCREATITIS: Status: RESOLVED | Noted: 2021-12-02 | Resolved: 2023-11-06

## 2023-11-06 PROBLEM — J45.909 ASTHMA WITHOUT STATUS ASTHMATICUS: Chronic | Status: ACTIVE | Noted: 2017-06-29

## 2023-11-06 PROBLEM — L23.1 ALLERGIC CONTACT DERMATITIS DUE TO ADHESIVES: Chronic | Status: ACTIVE | Noted: 2022-01-05

## 2023-11-06 PROCEDURE — 3074F SYST BP LT 130 MM HG: CPT | Performed by: INTERNAL MEDICINE

## 2023-11-06 PROCEDURE — 99215 OFFICE O/P EST HI 40 MIN: CPT | Performed by: INTERNAL MEDICINE

## 2023-11-06 PROCEDURE — G2212 PROLONG OUTPT/OFFICE VIS: HCPCS | Performed by: INTERNAL MEDICINE

## 2023-11-06 PROCEDURE — 3079F DIAST BP 80-89 MM HG: CPT | Performed by: INTERNAL MEDICINE

## 2023-11-06 RX ORDER — LOSARTAN POTASSIUM 50 MG/1
50 TABLET ORAL DAILY
Qty: 90 TABLET | Refills: 3 | Status: SHIPPED | OUTPATIENT
Start: 2023-11-06 | End: 2023-11-11

## 2023-11-06 RX ORDER — AMLODIPINE BESYLATE 10 MG/1
10 TABLET ORAL DAILY
Qty: 90 TABLET | Refills: 3 | Status: SHIPPED | OUTPATIENT
Start: 2023-11-06

## 2023-11-06 RX ORDER — ALLOPURINOL 300 MG/1
300 TABLET ORAL DAILY
Qty: 90 TABLET | Refills: 3 | Status: SHIPPED | OUTPATIENT
Start: 2023-11-06

## 2023-11-06 ASSESSMENT — ENCOUNTER SYMPTOMS
ABDOMINAL PAIN: 0
NEUROLOGICAL NEGATIVE: 1
COUGH: 0
MYALGIAS: 0
COUGH: 0
EYES NEGATIVE: 1
HEADACHES: 1
CONSTITUTIONAL NEGATIVE: 1
MUSCULOSKELETAL NEGATIVE: 1
GASTROINTESTINAL NEGATIVE: 1
CLAUDICATION: 0
HEMOPTYSIS: 0
FALLS: 0
NAUSEA: 0
ORTHOPNEA: 0
WHEEZING: 0
DIZZINESS: 0
BLOOD IN STOOL: 0
SHORTNESS OF BREATH: 1
SPUTUM PRODUCTION: 0
PALPITATIONS: 0
FEVER: 0
RESPIRATORY NEGATIVE: 1
CONSTITUTIONAL NEGATIVE: 1
CARDIOVASCULAR NEGATIVE: 1
SORE THROAT: 0
VOMITING: 0

## 2023-11-06 ASSESSMENT — FIBROSIS 4 INDEX: FIB4 SCORE: 2.07

## 2023-11-06 NOTE — PROGRESS NOTES
Subjective:   Alberto Maldonado is a 37 y.o. male who presents for Diarrhea and Chills        Patient presents with concerns of diarrhea that started today.    Stools are loose, brown and watery.  No melena or hematochezia.  Stools are not foul-smelling.  He endorses occasional chills but denies fevers.  No nausea or vomiting.  No abdominal pain.  He feels that symptoms are slightly improved today.  He is drinking tea and plenty of fluids.  His appetite has been normal and he is eating normally.  States that his nephew was ill with similar symptoms.  Patient started a second round of antibiotics for an ear infection on Friday. Also on Bactrim for a UTI. He is not taking his antibiotic with food.  He is not taking a probiotic.        Review of Systems   Constitutional:  Positive for chills. Negative for fever and malaise/fatigue.   HENT:  Negative for sore throat.    Respiratory:  Negative for cough.    Gastrointestinal:  Positive for diarrhea. Negative for abdominal pain, blood in stool, melena, nausea and vomiting.   Genitourinary: Negative.    Musculoskeletal:  Negative for myalgias.       PMH:  has a past medical history of Asthma without status asthmaticus (6/29/2017).    He has no past medical history of Cancer ([facility]) or Diabetes ([facility]).  MEDS:   Current Outpatient Medications:     loperamide (IMODIUM) 2 MG Cap, Take 1 Capsule by mouth 4 times a day as needed for Diarrhea., Disp: 30 Capsule, Rfl: 0    amoxicillin-clavulanate (AUGMENTIN) 875-125 MG Tab, Take 1 Tablet by mouth 2 times a day for 7 days., Disp: 14 Tablet, Rfl: 0    colchicine (COLCRYS) 0.6 MG Tab, Take 2 tabs PO once, then 1 tab PO once 2-3 hours later.  Repeat every 48-72 hours prn gout/pain., Disp: 12 Tablet, Rfl: 0    losartan (COZAAR) 50 MG Tab, Take 1 Tablet by mouth every day., Disp: 30 Tablet, Rfl: 0    amLODIPine (NORVASC) 10 MG Tab, Take 10 mg by mouth every day., Disp: , Rfl:     naproxen (ALEVE) 220 MG tablet, Take 440 mg by mouth 1  "time a day as needed (Mild Pain). 2 tablets = 440 mg., Disp: , Rfl:     allopurinol (ZYLOPRIM) 100 MG Tab, Take 100 mg by mouth 3 times a day., Disp: , Rfl:     amoxicillin-clavulanate (AUGMENTIN) 125-31.25 mg/5mL Recon Susp, Take 20 mL by mouth 2 times a day for 7 days. (Patient not taking: Reported on 11/5/2023), Disp: 280 mL, Rfl: 0  ALLERGIES: No Known Allergies  SURGHX: History reviewed. No pertinent surgical history.  SOCHX:  reports that he has never smoked. He has never used smokeless tobacco. He reports current alcohol use of about 7.2 oz of alcohol per week. He reports that he does not currently use drugs.  FH: Family history was reviewed, no pertinent findings to report   Objective:   /68 (BP Location: Left arm, Patient Position: Sitting, BP Cuff Size: Adult)   Pulse 100   Temp 36.6 °C (97.8 °F) (Temporal)   Resp 16   Ht 1.613 m (5' 3.5\")   Wt 109 kg (240 lb)   SpO2 98%   BMI 41.85 kg/m²   Physical Exam  Vitals reviewed.   Constitutional:       General: He is not in acute distress.     Appearance: Normal appearance. He is well-developed. He is not toxic-appearing.   HENT:      Head: Normocephalic and atraumatic.      Right Ear: External ear normal.      Left Ear: External ear normal.      Nose: Nose normal.   Cardiovascular:      Rate and Rhythm: Normal rate and regular rhythm.   Pulmonary:      Effort: Pulmonary effort is normal. No respiratory distress.      Breath sounds: No stridor.   Abdominal:      Comments: Normoactive bowel sounds.  Abdomen is soft and nontender to palpation.  No guarding or rebound tenderness.  No palpable masses organomegaly.  No CVA tenderness bilaterally.   Skin:     General: Skin is dry.   Neurological:      Comments: Alert and oriented.    Psychiatric:         Speech: Speech normal.         Behavior: Behavior normal.           Assessment/Plan:   1. Diarrhea, unspecified type  - loperamide (IMODIUM) 2 MG Cap; Take 1 Capsule by mouth 4 times a day as needed for " Diarrhea.  Dispense: 30 Capsule; Refill: 0    Antibiotic associated diarrhea versus viral gastroenteritis?  Clinical suspicion for potentially emergent intra-abdominal pathology low at this time.  Patient advised that I would like him to take his antibiotic with food and I would also like him to start a daily probiotic.  May take Imodium as needed.  He should continue to ensure adequate hydration and I also recommend a 50-50 water Gatorade mix.  Brat diet.    If symptoms or not improving within the next 3 to 4 days, new symptoms develop at any point, or symptoms worsen I would like him to be immediately reevaluated.

## 2023-11-07 NOTE — ASSESSMENT & PLAN NOTE
Chronic condition.  Discussed with the patient regarding diet, exercise, and lifestyle modification to help achieve and maintain healthy weight

## 2023-11-07 NOTE — ASSESSMENT & PLAN NOTE
Chronic condition but the patient is taking amlodipine 10 mg daily.  Losartan 50 mg daily.  Blood pressure has been well controlled.

## 2023-11-07 NOTE — PROGRESS NOTES
Subjective     Alberto Maldonado is a very pleasant 37 y.o. male who presents with Ankle Pain (Gout flare up on the right ankle)            HPI  Acute gout flare of right ankle.  Long history of well-documented gout flares in the past.  He does take allopurinol.  He denies any injury fall or precipitating event.  The area is red, swollen and tender and warm.  There is no rash, open lesions or discharge.  No systemic symptoms.  He does state that colchicine works better than oral corticosteroids.      PMH:  has a past medical history of Asthma without status asthmaticus (6/29/2017).    He has no past medical history of Cancer ([facility]) or Diabetes ([facility]).  MEDS:   Current Outpatient Medications:     colchicine (COLCRYS) 0.6 MG Tab, Take 2 tabs PO once, then 1 tab PO once 2-3 hours later.  Repeat every 48-72 hours prn gout/pain., Disp: 12 Tablet, Rfl: 0    naproxen (ALEVE) 220 MG tablet, Take 440 mg by mouth 1 time a day as needed (Mild Pain). 2 tablets = 440 mg., Disp: , Rfl:     allopurinol (ZYLOPRIM) 300 MG Tab, Take 1 Tablet by mouth every day., Disp: 90 Tablet, Rfl: 3    amLODIPine (NORVASC) 10 MG Tab, Take 1 Tablet by mouth every day., Disp: 90 Tablet, Rfl: 3    losartan (COZAAR) 50 MG Tab, Take 1 Tablet by mouth every day., Disp: 90 Tablet, Rfl: 3    loperamide (IMODIUM) 2 MG Cap, Take 1 Capsule by mouth 4 times a day as needed for Diarrhea., Disp: 30 Capsule, Rfl: 0  ALLERGIES: No Known Allergies  SURGHX: No past surgical history on file.  SOCHX:  reports that he has never smoked. He has never used smokeless tobacco. He reports current alcohol use of about 7.2 oz of alcohol per week. He reports that he does not currently use drugs.  FH: family history is not on file.      Review of Systems   Constitutional: Negative.    Respiratory: Negative.     Cardiovascular: Negative.    Musculoskeletal:  Positive for joint pain. Negative for falls.   Neurological: Negative.        Medications, Allergies, and current  "problem list reviewed today in Epic           Objective     /88 (BP Location: Right arm, Patient Position: Sitting, BP Cuff Size: Adult)   Pulse (!) 103   Temp 37.1 °C (98.7 °F) (Temporal)   Resp 20   Ht 1.613 m (5' 3.5\")   Wt 111 kg (244 lb)   SpO2 98%   BMI 42.54 kg/m²      Physical Exam  Vitals and nursing note reviewed.   Constitutional:       General: He is not in acute distress.     Appearance: Normal appearance. He is well-developed. He is not diaphoretic.   HENT:      Head: Normocephalic.      Right Ear: Hearing and external ear normal.      Left Ear: Hearing and external ear normal.      Nose: Nose normal.      Mouth/Throat:      Mouth: Mucous membranes are moist.   Eyes:      General:         Right eye: No discharge.         Left eye: No discharge.      Conjunctiva/sclera: Conjunctivae normal.   Cardiovascular:      Rate and Rhythm: Normal rate and regular rhythm.   Pulmonary:      Effort: Pulmonary effort is normal. No respiratory distress.      Breath sounds: Normal breath sounds.   Musculoskeletal:      Cervical back: Normal range of motion and neck supple.      Comments: Tenderness, swelling and redness of the right ankle.  Increased warmth noted.  No gross deformity or bruising.  Full range of motion.  Distal neurovascular intact.  No rash, open lesions or discharge.   Skin:     General: Skin is warm and dry.   Neurological:      General: No focal deficit present.      Mental Status: He is alert and oriented to person, place, and time. Mental status is at baseline.   Psychiatric:         Mood and Affect: Mood normal.         Behavior: Behavior normal.         Thought Content: Thought content normal.         Judgment: Judgment normal.                         Assessment & Plan     This is a very pleasant 37-year-old male presenting with an acute gout flare of his right ankle for the past 2 days.  History of well-documented recurrent gout flares in the past.  He does take allopurinol.  He " denies any injury fall or precipitating event.  There is no rash, open lesions or systemic symptoms.  Vital signs appropriate.  Exam shows soft tissue swelling, tenderness and redness of the right ankle.  No open lesions, discharge, rash or signs of infection.  Patient be treated for an acute gouty arthritis with colchicine.  Continue previously prescribed medication.  OTC meds and conservative measures as discussed.    1. Acute idiopathic gout of right ankle  colchicine (COLCRYS) 0.6 MG Tab          I personally reviewed prior external notes and test results pertinent to today's visit. Return to clinic or go to ED if symptoms worsen or persist. Red flag symptoms and indications for ED discussed at length. Patient/Parent/Guardian voices understanding.  AVS with post-visit instructions provided or given verbally.  Follow-up with your primary care provider in 3-5 days. All side effects and potential interactions of prescribed medication discussed including allergic response, GI upset, tendon injury, rash, sedation, OCP effectiveness, etc.    Please note that this dictation was created using voice recognition software. I have made every reasonable attempt to correct obvious errors, but I expect that there are errors of grammar and possibly content that I did not discover before finalizing the note.

## 2023-11-07 NOTE — ASSESSMENT & PLAN NOTE
Chronic condition.  The patient takes allopurinol 30 mg daily.  Patient tolerating medication well.

## 2023-11-07 NOTE — ASSESSMENT & PLAN NOTE
Chronic condition.  The patient advised denies shortness of breath or wheezing.  Patient uses albuterol as needed.

## 2023-11-07 NOTE — ASSESSMENT & PLAN NOTE
Chronic condition.  Noted with chart review.  Patient currently on diet therapy.  Patient not interested in taking the medication at this time

## 2023-11-07 NOTE — PROGRESS NOTES
"03/25/2020  Foot and Ankle Surgery - Established Patient/Follow-up  Provider: Dr. Roc Smith DPM  Location: [facility]    Subjective:  Sánchez Spaulding is a 48 y.o. male.     Chief Complaint   Patient presents with   • Left Foot - Follow-up   • Right Foot - Follow-up       HPI: Patient returns for follow-up regarding his left foot.  He states that he has remained in the cam boot and denies any new issues.  He feels that the area is improving.    No Known Allergies    Current Outpatient Medications on File Prior to Visit   Medication Sig Dispense Refill   • atorvastatin (LIPITOR) 20 MG tablet   11   • JANUMET  MG per tablet      • LANTUS SOLOSTAR 100 UNIT/ML injection pen INJECT 7 UNITS SUBCUTANEOUSLY AT BEDTIME INCREASE BY 2 UNITS EVERY WEEK FOR FASTING BLOOD SUGAR OVER 140     • lisinopril (PRINIVIL,ZESTRIL) 20 MG tablet   11   • RELION PEN NEEDLES 32G X 4 MM misc        No current facility-administered medications on file prior to visit.        Objective   BP (!) 181/110   Pulse 86   Temp 97.7 °F (36.5 °C) (Oral)   Ht 182.9 cm (72\")   Wt 135 kg (298 lb)   BMI 40.42 kg/m²     General Exam  Diabetic Foot Exam: performed  Appearance: appears stated age and healthy   Orientation: alert and oriented to person, place, and time   Affect: appropriate   Gait: unimpaired      Foot/Ankle Exam  Inspection and Palpation  Ecchymosis - Right: none Left: none  Tenderness - Right: none   Left: none   Swelling - Right: none   Left: none    Arch - Right: pes planus (Mild rocker-bottom deformity) Left: normal  Hammertoes - Left: absent  Claw Toes - Left: absent  Mallet Toes - Left: absent  Hallux Valgus - Left: no  Hallux Limitus - Left: no  Skin - Right: skin intact  Left:   Superficial erosion noted to the plantar lateral aspect of the foot.  Wound is granular without deep extension.  No signs of infection.  Wound measures approximately 1.0 x 0.5 cm.     Vascular  Dorsalis Pedis - " Subjective     Alberto Maldonado is a very pleasant 37 y.o. male who presents with Chest Pain (Pt has chest pain, hurts when taking a breath x 1 month. Pt fell down stairs )            HPI  Patient presenting with ongoing chest pressure and shortness of breath for the past month.  He states symptoms started after he fell down a set of stairs.  He was seen in the urgent care where he had a negative chest x-ray for fracture but was diagnosed with a contusion.  He states pain has been present consistently but is improving.  Pain is worse with exertion, deep breathing and palpation.  Patient states his blood pressure has been running very high.  He does take Cozaar 50 mg but states he has not been taking it the last several days and is generally inconsistent with his blood pressure medication use.  Patient has a history of recurrent chest pain, tachycardia and hypertensive crises.  He has had multiple visits to the ER with subsequent admissions and cardiac work-ups.  On further questioning patient admits to a long history of alcohol abuse.  He states over the last several months he has been drinking a significant amount of beer and liquor daily.  He has struggled with alcohol abuse for years.  He was able to quit however 4 months ago started up again after he returned home from the [facility].  His alcohol abuse has caused acute pancreatitis and chronic liver problems.  Currently he denies URI symptoms, fever, chills, hemoptysis, vomiting, hematemesis, melena, hematochezia, lower leg swelling, calf pain or any other symptoms.        PMH:  has a past medical history of Asthma without status asthmaticus (6/29/2017).    He has no past medical history of Cancer (HCC) or Diabetes (HCC).  MEDS:   Current Outpatient Medications:     allopurinol (ZYLOPRIM) 100 MG Tab, Take 100 mg by mouth 3 times a day., Disp: , Rfl:     loperamide (IMODIUM) 2 MG Cap, Take 1 Capsule by mouth 4 times a day as needed for Diarrhea., Disp:  "30 Capsule, Rfl: 0    amoxicillin-clavulanate (AUGMENTIN) 125-31.25 mg/5mL Recon Susp, Take 20 mL by mouth 2 times a day for 7 days. (Patient not taking: Reported on 11/5/2023), Disp: 280 mL, Rfl: 0    amoxicillin-clavulanate (AUGMENTIN) 875-125 MG Tab, Take 1 Tablet by mouth 2 times a day for 7 days., Disp: 14 Tablet, Rfl: 0    colchicine (COLCRYS) 0.6 MG Tab, Take 2 tabs PO once, then 1 tab PO once 2-3 hours later.  Repeat every 48-72 hours prn gout/pain., Disp: 12 Tablet, Rfl: 0    losartan (COZAAR) 50 MG Tab, Take 1 Tablet by mouth every day., Disp: 30 Tablet, Rfl: 0    amLODIPine (NORVASC) 10 MG Tab, Take 10 mg by mouth every day., Disp: , Rfl:     naproxen (ALEVE) 220 MG tablet, Take 440 mg by mouth 1 time a day as needed (Mild Pain). 2 tablets = 440 mg., Disp: , Rfl:   ALLERGIES: No Known Allergies  SURGHX: History reviewed. No pertinent surgical history.  SOCHX:  reports that he has never smoked. He has never used smokeless tobacco. He reports current alcohol use of about 7.2 oz of alcohol per week. He reports that he does not currently use drugs.  FH: family history is not on file.        Review of Systems   Constitutional: Negative.    HENT: Negative.     Eyes: Negative.    Respiratory:  Positive for shortness of breath. Negative for cough, hemoptysis, sputum production and wheezing.    Cardiovascular:  Positive for chest pain. Negative for palpitations, orthopnea, claudication and leg swelling.   Gastrointestinal: Negative.    Genitourinary: Negative.    Musculoskeletal: Negative.    Neurological:  Positive for headaches. Negative for dizziness.   All other systems reviewed and are negative.      Medications, Allergies, and current problem list reviewed today in Epic           Objective     BP (!) 180/86 (BP Location: Left arm, Patient Position: Sitting, BP Cuff Size: Large adult)   Pulse (!) 128   Temp 36.6 °C (97.9 °F) (Temporal)   Resp 16   Ht 1.6 m (5' 3\")   Wt 112 kg (247 lb 8 oz)   SpO2 96%   " Right: 3+ Left: 3+  Posterior Tibial - Right: 3+ Left: 3+  Skin Temperature -  Right: warm  Left: warm    CFT - Right: < 3 seconds Left: < 3 seconds     Neurologic  Saphenous Nerve Sensation  - Right: diminished Left: diminished  Tibial Nerve Sensation - Right: diminished Left: diminished  Superficial Peroneal Nerve Sensation - Right: diminished Left: diminished  Deep Peroneal Nerve Sensation - Right: diminished Left: diminished  Sural Nerve Sensation - Right: diminished Left: diminished  Protective Sensation using [place]-Chante Monofilament - Right: 2 Left: 2  Achilles Reflex - Right: 2+ Left: 2+     Muscle Strength  Dorsiflexion - Right: 5 Left: 5  Plantar Flexion - Right: 5 Left: 5  Eversion - Right: 5 Left: 5  Inversion - Right: 5 Left: 5  Great Toe Extension - Right: 5 Left: 5  Great Toe Flexion - Right: 5 Left: 5     Right Foot/Ankle Comments  No open wounds or signs of infection.  No progressive deformity or instability  Left Foot/Ankle Comments  S/p transmetatarsal amputation -stable    Assessment/Plan   Sánchez was seen today for follow-up and follow-up.    Diagnoses and all orders for this visit:    Chronic ulcer of left foot limited to breakdown of skin (CMS/HCC)    History of transmetatarsal amputation of left foot (CMS/HCC)    DM type 2 with diabetic peripheral neuropathy (CMS/HCC)      Patient returns for follow-up regarding his left foot wound.  He has been wearing the boot.  On evaluation, the wound is superficial and granular today.  There is much improved with less maceration.  No signs of infection.  I have asked that he remain in the cam boot with decreased overall activity.  I would like him to paint the open area with Betadine and monitor closely.  I will see him in 2 weeks for reevaluation where I hope we can return to his custom diabetic shoes.    No orders of the defined types were placed in this encounter.         Note is dictated utilizing voice recognition software. Unfortunately this  BMI 43.84 kg/m²      Physical Exam  Vitals and nursing note reviewed.   Constitutional:       General: He is not in acute distress.     Appearance: Normal appearance. He is well-developed. He is not ill-appearing, toxic-appearing or diaphoretic.   HENT:      Head: Normocephalic and atraumatic.      Right Ear: Tympanic membrane, ear canal and external ear normal.      Left Ear: Tympanic membrane, ear canal and external ear normal.      Nose: Nose normal. No congestion or rhinorrhea.      Mouth/Throat:      Mouth: Mucous membranes are moist.      Pharynx: Oropharynx is clear. No oropharyngeal exudate or posterior oropharyngeal erythema.   Eyes:      General:         Right eye: No discharge.         Left eye: No discharge.      Extraocular Movements: Extraocular movements intact.      Conjunctiva/sclera: Conjunctivae normal.      Pupils: Pupils are equal, round, and reactive to light.   Cardiovascular:      Rate and Rhythm: Regular rhythm. Tachycardia present.      Pulses: Normal pulses.      Heart sounds: Normal heart sounds. No murmur heard.  Pulmonary:      Effort: Pulmonary effort is normal. No respiratory distress.      Breath sounds: Normal breath sounds. No wheezing, rhonchi or rales.   Chest:      Chest wall: No tenderness.   Abdominal:      General: Abdomen is flat. There is no distension.      Palpations: Abdomen is soft. There is no hepatomegaly or splenomegaly.      Tenderness: There is no abdominal tenderness. There is no right CVA tenderness, left CVA tenderness, guarding or rebound. Negative signs include Beyer's sign and McBurney's sign.      Comments: No skin changes.  No masses.    Musculoskeletal:         General: No swelling or tenderness.      Cervical back: Normal range of motion and neck supple. No rigidity or tenderness.      Right lower leg: No edema.      Left lower leg: No edema.   Lymphadenopathy:      Cervical: No cervical adenopathy.   Skin:     General: Skin is warm and dry.       leads to occasional typographical errors. I apologize in advance if the situation occurs. If questions occur please do not hesitate to call our office.   Coloration: Skin is not jaundiced.   Neurological:      General: No focal deficit present.      Mental Status: He is alert and oriented to person, place, and time. Mental status is at baseline.      Cranial Nerves: No cranial nerve deficit.      Motor: No weakness.      Gait: Gait normal.      Deep Tendon Reflexes: Reflexes normal.   Psychiatric:         Mood and Affect: Mood normal.         Behavior: Behavior normal.         Thought Content: Thought content normal.         Judgment: Judgment normal.                      Assessment & Plan     This is a very pleasant 37-year-old male presenting with chest pain, pressure and shortness of breath for the past month.  Symptoms started after a fall down a set of stairs injuring his chest wall.  He was seen in urgent care where he had a negative chest x-ray and was diagnosed with a contusion.  He states the pain is more of a pressure and has been constant but fluctuating in severity.  It is worse with deep breathing and exertion.  He has associated headache.  He states his blood pressure has been running very high recently.  He is prescribed Cozaar 50 mg but admits to not taking it over the last several days and generally being inconsistent with his chronic medication use.  Of note patient admits to a long history of alcohol abuse dating back several years.  He was able to quit however 4 months ago, however 4 months ago he began drinking again after returning home to the [facility].  He admits to excess amount of daily alcohol intake.  His drinking has led to pancreatitis and admission.  He has a long history of chest pain, hypertensive emergencies resulting in hospital visits, and admissions with full cardiac work-up.  Currently he denies URI symptoms, fever, chills, hemoptysis, vomiting, hematemesis, melena, hematochezia, lower leg swelling, calf pain or any other symptoms. His blood pressure is 180/86 on intake.  However, I did retake his blood pressure on each arm and  it was found to be closer to 160/90.  He is tachycardic.  His vital signs are otherwise normal.  Patient is tachycardic on exam.  His lungs are clear bilaterally without wheezing rhonchi or rails.  His abdominal exam is benign showing no tenderness, rebound, guarding, masses or deformity.  He has no lower leg swelling or tenderness.  His neuro exam is at baseline.  There is no diaphoresis, pallor or jaundice.  His EKG and chest x-ray showed no acute findings.  I had long discussion with patient about his symptoms, presentation and alcohol use.  He is accompanied by his brother who is a excellent support system.  I did recommend transfer to ER based on symptoms and presentation.  Patient declines at this time.  I did state that he has to take his blood pressure medication as directed.  If his blood pressure does not return to a normal level then he will report to the ER, his brother states he will drive him personally.  We had a long discussion about alcohol use and abuse and potential sequelae and outcomes.  I did advise patient that if he continues to drink at his current rate he will have long-term consequences and possible death.  I did agree to discharge patient and his brother's care with the caveat that they report to the ER for any worsening or changing symptoms.  Patient will call his PCP in the morning for immediate follow-up for likely blood pressure medication adjustment.    1. Chest pain, unspecified type  DX-CHEST-2 VIEWS    EKG - Clinic Performed      2. Injury of chest wall, initial encounter        3. Elevated blood pressure reading            I personally reviewed prior external notes and test results pertinent to today's visit. Return to clinic or go to ED if symptoms worsen or persist. Red flag symptoms and indications for ED discussed at length. Patient/Parent/Guardian voices understanding.  AVS with post-visit instructions provided or given verbally.  Follow-up with your primary care provider in  3-5 days. All side effects and potential interactions of prescribed medication discussed including allergic response, GI upset, tendon injury, rash, sedation, OCP effectiveness, etc.    Please note that this dictation was created using voice recognition software. I have made every reasonable attempt to correct obvious errors, but I expect that there are errors of grammar and possibly content that I did not discover before finalizing the note.

## 2023-11-07 NOTE — ASSESSMENT & PLAN NOTE
Chronic cond  The patient was admitted to the hospital recently due to alcoholism.  Strongly advised pt to stop drinking etoh completely  Last drink was 2 wks ago.  rec pt to go to AA  Today I also strongly recommended referral for the patient to see addiction medicine specialist.  The patient however declined the recommendation.

## 2023-11-07 NOTE — ASSESSMENT & PLAN NOTE
Chronic condition.  Lab test result discussed with the patient.  Abdominal ultrasound showed patient with hepatomegaly and hepatic steatosis.  Refer the patient to GI for consultation.  Strongly for the patient to abstain from EtOH use.

## 2023-11-07 NOTE — PROGRESS NOTES
PRIMARY CARE CLINIC VISIT        Chief Complaint   Patient presents with    [facility]      New patient here to [facility]  Hospital discharge follow-up  Alcoholism   elevated liver panel  Gout  Hepatic steatosis  Hypertension  Obesity  Asthma  Sleep apnea  Prediabetes        History of Present Illness     Elevated LFTs  Chronic condition.  Lab test result discussed with the patient.  Abdominal ultrasound showed patient with hepatomegaly and hepatic steatosis.  Refer the patient to GI for consultation.  Strongly for the patient to abstain from EtOH use.    Essential hypertension  Chronic condition but the patient is taking amlodipine 10 mg daily.  Losartan 50 mg daily.  Blood pressure has been well controlled.    Morbid obesity (HCC)  Chronic condition.  Discussed with the patient regarding diet, exercise, and lifestyle modification to help achieve and maintain healthy weight          Asthma without status asthmaticus  Chronic condition.  The patient advised denies shortness of breath or wheezing.  Patient uses albuterol as needed.    Gout  Chronic condition.  The patient takes allopurinol 30 mg daily.  Patient tolerating medication well.    Alcoholism (HCC)  Chronic cond  The patient was admitted to the hospital recently due to alcoholism.  Strongly advised pt to stop drinking etoh completely  Last drink was 2 wks ago.  rec pt to go to AA  Today I also strongly recommended referral for the patient to see addiction medicine specialist.  The patient however declined the recommendation.    Obstructive sleep apnea syndrome  Chronic condition.  The patient has appointment to follow-up with sleep medicine specialist.    Prediabetes  Chronic condition.  Noted with chart review.  Patient currently on diet therapy.  Patient not interested in taking the medication at this time    Current Outpatient Medications on File Prior to Visit   Medication Sig Dispense Refill    colchicine (COLCRYS) 0.6 MG Tab Take 2 tabs PO  once, then 1 tab PO once 2-3 hours later.  Repeat every 48-72 hours prn gout/pain. 12 Tablet 0    naproxen (ALEVE) 220 MG tablet Take 440 mg by mouth 1 time a day as needed (Mild Pain). 2 tablets = 440 mg.      loperamide (IMODIUM) 2 MG Cap Take 1 Capsule by mouth 4 times a day as needed for Diarrhea. 30 Capsule 0     No current facility-administered medications on file prior to visit.        Allergies: Patient has no known allergies.    Current Outpatient Medications Ordered in Epic   Medication Sig Dispense Refill    allopurinol (ZYLOPRIM) 300 MG Tab Take 1 Tablet by mouth every day. 90 Tablet 3    amLODIPine (NORVASC) 10 MG Tab Take 1 Tablet by mouth every day. 90 Tablet 3    losartan (COZAAR) 50 MG Tab Take 1 Tablet by mouth every day. 90 Tablet 3    colchicine (COLCRYS) 0.6 MG Tab Take 2 tabs PO once, then 1 tab PO once 2-3 hours later.  Repeat every 48-72 hours prn gout/pain. 12 Tablet 0    naproxen (ALEVE) 220 MG tablet Take 440 mg by mouth 1 time a day as needed (Mild Pain). 2 tablets = 440 mg.      loperamide (IMODIUM) 2 MG Cap Take 1 Capsule by mouth 4 times a day as needed for Diarrhea. 30 Capsule 0     No current Epic-ordered facility-administered medications on file.       Past Medical History:   Diagnosis Date    Asthma without status asthmaticus 6/29/2017       History reviewed. No pertinent surgical history.    Family History   Problem Relation Age of Onset    Stroke Neg Hx     Heart Disease Neg Hx        Social History     Tobacco Use   Smoking Status Never   Smokeless Tobacco Never       Social History     Substance and Sexual Activity   Alcohol Use Yes    Alcohol/week: 7.2 oz    Types: 12 Shots of liquor per week       Review of systems.  As per HPI above. All other systems reviewed and negative.      Past Medical, Social, and Family history reviewed and updated in EPIC     Objective     /84 (BP Location: Left arm, Patient Position: Sitting, BP Cuff Size: Adult)   Pulse 96   Temp 36.8 °C  "(98.3 °F) (Temporal)   Resp 16   Ht 1.613 m (5' 3.5\")   Wt 111 kg (244 lb 4.8 oz)   SpO2 95%    Body mass index is 42.6 kg/m².    General: alert in no apparent distress.  Cardiovascular: regular rate and rhythm  Pulmonary: lungs : no wheezing   Gastrointestinal: BS present.  No rebound guarding rigidity noted  Cranial nerves II to XII grossly intact no withdrawal symptoms noted.        Lab Results   Component Value Date/Time    HBA1C 5.3 06/03/2023 03:01 AM         Lab Results   Component Value Date/Time    SODIUM 138 10/30/2023 01:12 AM    POTASSIUM 4.0 10/30/2023 01:12 AM    GLUCOSE 108 (H) 10/30/2023 01:12 AM    BUN 9 10/30/2023 01:12 AM    CREATININE 0.69 10/30/2023 01:12 AM       No results found for: \"CHOLSTRLTOT\", \"TRIGLYCERIDE\", \"HDL\", \"LDL\"    Lab Results   Component Value Date/Time    ALTSGPT 56 (H) 10/30/2023 01:12 AM             Assessment and Plan     1. Hospital discharge follow-up    2. Alcoholism (HCC)  Chronic condition.  Patient reported that he has determined to quit drinking completely.  Recommend the patient to attend alcoholic Anonymous.  As above the patient declined addiction medicine referral    3. Elevated LFTs  4. Steatosis of liver  - Referral to Gastroenterology  Chronic condition.  Likely due to alcoholism.  In addition recent liver ultrasound reviewed with the patient show patient with hepatomegaly/hepatic steatosis    - Referral to Gastroenterology  Recommend patient to lose weight.  As above advised the patient to abstain from drinking alcohol.    5. Essential hypertension  Chronic condition.  Stable.  Continue amlodipine 10 mg daily and losartan 50 mg daily    6. Morbid obesity (HCC)  Chronic condition.  Uncontrolled.  Advised the patient regarding diet and exercise.  Encourage patient to lose weight.    7. Mild intermittent asthma without status asthmaticus without complication  Chronic stable condition.  Patient may use albuterol as needed.  Currently patient " asymptomatic.    8. Gout, unspecified cause, unspecified chronicity, unspecified site  Chronic stable condition.  The patient denies gout flareup.  Continue allopurinol 300 mg daily    9. Obstructive sleep apnea syndrome  Chronic condition.  For the patient to keep appointment to see sleep medicine specialist    10. Prediabetes  Condition.  Recommend diet and exercise.  Continue to monitor    Other orders  - allopurinol (ZYLOPRIM) 300 MG Tab; Take 1 Tablet by mouth every day.  Dispense: 90 Tablet; Refill: 3  - amLODIPine (NORVASC) 10 MG Tab; Take 1 Tablet by mouth every day.  Dispense: 90 Tablet; Refill: 3  - losartan (COZAAR) 50 MG Tab; Take 1 Tablet by mouth every day.  Dispense: 90 Tablet; Refill: 3      Attestation: I spent:   71  min -  That includes time for chart review before the visit, the actual patient visit, and time spent on documentation in EMR after the visit.  Chart review/prep, review of other providers' records, imaging/lab review, face-to-face time for history/examination, pt's counseling/education, ordering, prescribing,  review of results/meds/ treatment plan with patient, and care coordination.    The patient is of extensive complexity. This pt is at high risk for complications and morbidity.                 Please note that this dictation was created using voice recognition software. I have made every reasonable attempt to correct obvious errors, but I expect that there are errors of grammar and possibly content that I did not discover before finalizing the note.    Rey Pacheco MD  Internal Medicine  [facility]

## 2023-11-09 ENCOUNTER — OFFICE VISIT (OUTPATIENT)
Dept: SLEEP MEDICINE | Facility: MEDICAL CENTER | Age: 38
End: 2023-11-09
Attending: INTERNAL MEDICINE
Payer: COMMERCIAL

## 2023-11-09 VITALS
RESPIRATION RATE: 16 BRPM | DIASTOLIC BLOOD PRESSURE: 84 MMHG | BODY MASS INDEX: 40.8 KG/M2 | SYSTOLIC BLOOD PRESSURE: 128 MMHG | WEIGHT: 239 LBS | OXYGEN SATURATION: 94 % | HEIGHT: 64 IN | HEART RATE: 104 BPM

## 2023-11-09 DIAGNOSIS — F51.04 CHRONIC INSOMNIA: ICD-10-CM

## 2023-11-09 DIAGNOSIS — E66.01 CLASS 3 SEVERE OBESITY DUE TO EXCESS CALORIES WITH SERIOUS COMORBIDITY AND BODY MASS INDEX (BMI) OF 40.0 TO 44.9 IN ADULT (HCC): ICD-10-CM

## 2023-11-09 DIAGNOSIS — G47.19 EXCESSIVE DAYTIME SLEEPINESS: ICD-10-CM

## 2023-11-09 DIAGNOSIS — G47.30 SLEEP DISORDER BREATHING: Primary | ICD-10-CM

## 2023-11-09 DIAGNOSIS — I10 ESSENTIAL HYPERTENSION: Chronic | ICD-10-CM

## 2023-11-09 DIAGNOSIS — K76.0 STEATOSIS OF LIVER: Chronic | ICD-10-CM

## 2023-11-09 PROCEDURE — 3079F DIAST BP 80-89 MM HG: CPT | Performed by: STUDENT IN AN ORGANIZED HEALTH CARE EDUCATION/TRAINING PROGRAM

## 2023-11-09 PROCEDURE — 99204 OFFICE O/P NEW MOD 45 MIN: CPT | Performed by: STUDENT IN AN ORGANIZED HEALTH CARE EDUCATION/TRAINING PROGRAM

## 2023-11-09 PROCEDURE — 3074F SYST BP LT 130 MM HG: CPT | Performed by: STUDENT IN AN ORGANIZED HEALTH CARE EDUCATION/TRAINING PROGRAM

## 2023-11-09 PROCEDURE — 99212 OFFICE O/P EST SF 10 MIN: CPT | Performed by: STUDENT IN AN ORGANIZED HEALTH CARE EDUCATION/TRAINING PROGRAM

## 2023-11-09 ASSESSMENT — FIBROSIS 4 INDEX: FIB4 SCORE: 2.07

## 2023-11-09 NOTE — PROGRESS NOTES
[facility] Consult Note     Date: 11/9/2023 / Time: 12:43 PM      Thank you for requesting a sleep medicine consultation on Alberto Maldonado at the sleep center. Presents today with the chief complaints of snoring,  occasional excessive daytime sleepiness. He is referred by Fernanda Daniels D.O.  [address] [place] [id number] for evaluation and treatment of sleep disorder breathing .     HISTORY OF PRESENT ILLNESS:     Alberto Maldonado is a 37 y.o. male with hypertension, gout, morbid obesity, and suspected obstructive sleep apnea.  Presents to Sleep Clinic for evaluation of sleep.     He states the reason for today's visit is for potential sleep apnea.  He is concerned regarding his breathing in his sleep.  He does snore in his sleep.  He denies any choking or gasping or witnessed apneas.  However during the day he often is tired.  When he was working before he became unemployed he would often nap on his breaks unintentionally.    It generally takes 1 to 2 hours to fall asleep.  Once he is asleep he will often awaken 1-2 times and have difficulty getting back to sleep.  He is napping approximately 2 times a day for 15 to 30 minutes at a time which she finds restorative.  He does have low energy and brain fog at times during the day.  He can be irritable at times during the day.  He does find his sleep restorative.  He denies any movements or purposeful movements in his sleep.  Denies any drowsiness while driving.  He states he has been having trouble with his sleep and breathing in his sleep for the past 3 to 5 years.    He states his brother has sleep apnea and recently was given a CPAP machine    As per supplemental questionnaire to be scanned or imported into chart:    [place] Sleepiness Score: 11    Sleep Schedule  Bedtime: Weekday 10pm Weekend 10pm  Wake time: Weekday 4am Weekend 4am  Sleep-onset latency: 1-2 hrs   Awakenings from sleep: 1-2  Difficulty falling back  "asleep: yes   Bedroom partner: No  Naps: Yes, 2 times a day. 15-30min, refreshing     DAYTIME SYMPTOMS:   Excessive daytime sleepiness: Yes  Daytime fatigue: Yes  Difficulty concentrating: Yes  Memory problems: No   Irritability:Yes  Work/school performance issues: No   Sleepiness with driving: No   Caffeine/stimulant use: No  Alcohol use:No     SLEEP RELATED SYMPTOMS  Snoring: Yes  Witnessed apnea or gasping/choking: No   Dry mouth or mouth breathing: Yes  Sweating: No   Teeth grinding/biting: No   Morning headaches: No   Refreshed Upon Awakening: Yes     SLEEP RELATED BEHAVIORS:  Parasomnias (walking, talking, eating, violence): No   Leg kicking: yes   Restless legs - \"urge to move\": No   Nightmares: No  Recurrent: No   Dream enactment: No      NARCOLEPSY:  Cataplexy: No   Sleep paralysis: No   Sleep attacks: No   Hypnagogic/hypnopompic hallucinations: No     MEDICAL HISTORY  Past Medical History:   Diagnosis Date    Asthma without status asthmaticus 06/29/2017    Chickenpox         SURGICAL HISTORY  History reviewed. No pertinent surgical history.     FAMILY HISTORY  Family History   Problem Relation Age of Onset    Stroke Neg Hx     Heart Disease Neg Hx        SOCIAL HISTORY  Social History     Socioeconomic History    Marital status: Single   Tobacco Use    Smoking status: Never    Smokeless tobacco: Never   Vaping Use    Vaping Use: Never used   Substance and Sexual Activity    Alcohol use: Yes     Alcohol/week: 7.2 oz     Types: 12 Shots of liquor per week    Drug use: Not Currently    Sexual activity: Not Currently        Occupation: unemployed     CURRENT MEDICATIONS  Current Outpatient Medications   Medication Sig Dispense Refill    allopurinol (ZYLOPRIM) 300 MG Tab Take 1 Tablet by mouth every day. 90 Tablet 3    amLODIPine (NORVASC) 10 MG Tab Take 1 Tablet by mouth every day. 90 Tablet 3    losartan (COZAAR) 50 MG Tab Take 1 Tablet by mouth every day. 90 Tablet 3    loperamide (IMODIUM) 2 MG Cap Take 1 " "Capsule by mouth 4 times a day as needed for Diarrhea. 30 Capsule 0    colchicine (COLCRYS) 0.6 MG Tab Take 2 tabs PO once, then 1 tab PO once 2-3 hours later.  Repeat every 48-72 hours prn gout/pain. 12 Tablet 0    naproxen (ALEVE) 220 MG tablet Take 440 mg by mouth 1 time a day as needed (Mild Pain). 2 tablets = 440 mg.       No current facility-administered medications for this visit.       REVIEW OF SYSTEMS  Constitutional: Denies fevers, Denies weight changes  Ears/Nose/Throat/Mouth: Denies nasal congestion or sore throat   Cardiovascular: Denies chest pain  Respiratory: Denies shortness of breath, Denies cough  Gastrointestinal/Hepatic: Denies nausea, vomiting  Sleep: see HPI    Physical Examination:  Vitals/ General Appearance:   Weight/BMI: Body mass index is 41.67 kg/m².  /84 (BP Location: Left arm, Patient Position: Sitting, BP Cuff Size: Adult)   Pulse (!) 104   Resp 16   Ht 1.613 m (5' 3.5\")   Wt 108 kg (239 lb)   SpO2 94%   Vitals:    11/09/23 1243   BP: 128/84   BP Location: Left arm   Patient Position: Sitting   BP Cuff Size: Adult   Pulse: (!) 104   Resp: 16   SpO2: 94%   Weight: 108 kg (239 lb)   Height: 1.613 m (5' 3.5\")       Pt. is alert and oriented to time, place and person. Cooperative and in no apparent distress.     Constitutional: Alert, no distress, well-groomed.  Skin: No rashes in visible areas.  Eye: Round. Conjunctiva clear, lids normal. No icterus.   ENT EXAM  Nasal alae/valves collapsible: No   Nasal septum deviation: Yes  Nasal turbinate hypertrophy: Left: Grade 1   Right: Grade 1  Hard palate narrow: No   Hard palate high: No   Soft palate/uvula (Mallampati score): 4  Tongue Scalloping: Yes  Retrognathia: No   Micrognathia: No   Cardiovascular:no murmus/gallops/rubs, normal S1 and S2 heart sounds, regular rate and rhythm  Pulmonary:Clear to auscultation, No wheezes, No crackles.  Neurologic:Awake, alert and oriented x 3, Normal age appropriate gait, No involuntary " motions.  Extremities: No clubbing, cyanosis, or edema       ASSESSMENT AND PLAN   Alberto Maldonado is a 37 y.o. male with hypertension, gout, morbid obesity, and suspected obstructive sleep apnea.  Presents to Sleep Clinic for evaluation of sleep.     1. Alberto Maldonado  has symptoms of Obstructive Sleep Apnea (BOLIVAR). Alberto Maldonado has symptoms of snoring, daytime fatigue, excessive daytime sleepiness. These  interfere with activities of daily living.   ESS 11  Pt has risk factors for BOLIVAR include obesity, family history, and crowded oropharynx.     The pathophysiology of BOLIVAR and the increased risk of cardiovascular morbidity from untreated BOLIVAR is discussed in detail with the patient. He  also has HTN, which can be worsened by BOLIVAR.      We have discussed diagnostic options including in-laboratory, attended polysomnography and home sleep testing. We have also discussed treatment options including airway pressurization, reconstructive otolaryngologic surgery, dental appliances and weight management.     Subsequently,treatment options will be discussed based on the diagnostic study. Meanwhile, He is urged to avoid supine sleep, weight gain and alcoholic beverages since all of these can worsen BOLIVAR. He is cautioned against drowsy driving. If He feels sleepy while driving, advised must pull over for a break/nap, rather than persist on the road, in the interest of Pt's own safety and that of others on the road.    Plan  -  He  will be scheduled for an overnight  HST to assess sleep related breathing disorder.  - Follow up 1-2 weeks after sleep study to discuss results and treatment options moving forward   -Advised to reach out via MyChart or by phone with any questions or concerns.     2.Chronic Insomnia   With prolonged sleep latency, with difficulty falling back asleep and feeling this is impacting daily functioning for 3-5 years meets criteria for chronic insomnia. Discussed potential causes of  insomnia and importance of having a routine around bedtime.  Discussed that untreated sleep apnea can worsen insomnia.  Recommend treating sleep apnea present and seeing if insomnia improves.    RECOMMENDATIONS  -Maintain a regular sleep schedule  -Avoid caffeinated beverages after lunch, and alcohol in late afternoon and evening  -Avoid prolonged use of light-emitting screens before bedtime  -Exercise regularly for at least 20 minutes, preferably more than four to five hours prior to bedtime  -Avoid daytime naps, especially if they are longer than 20 to 30 minutes or occur late in the day  -Advised to contact our office or myself with any questions via [employer]          Regarding treatment of other past medical problems and general health maintenance,  Pt is urged to follow up with PCP.      Please note portions of this record was created using voice recognition software. I have made every reasonable attempt to correct obvious errors, but I expect that there are errors of grammar and possibly content I did not discover before finalizing the note.

## 2023-11-11 ENCOUNTER — OFFICE VISIT (OUTPATIENT)
Dept: URGENT CARE | Facility: CLINIC | Age: 38
End: 2023-11-11
Payer: COMMERCIAL

## 2023-11-11 VITALS
BODY MASS INDEX: 42.89 KG/M2 | TEMPERATURE: 97.1 F | HEIGHT: 63 IN | OXYGEN SATURATION: 95 % | SYSTOLIC BLOOD PRESSURE: 138 MMHG | RESPIRATION RATE: 16 BRPM | DIASTOLIC BLOOD PRESSURE: 76 MMHG | HEART RATE: 99 BPM | WEIGHT: 242.1 LBS

## 2023-11-11 DIAGNOSIS — M10.072 ACUTE IDIOPATHIC GOUT INVOLVING TOE OF LEFT FOOT: ICD-10-CM

## 2023-11-11 DIAGNOSIS — H66.003 NON-RECURRENT ACUTE SUPPURATIVE OTITIS MEDIA OF BOTH EARS WITHOUT SPONTANEOUS RUPTURE OF TYMPANIC MEMBRANES: ICD-10-CM

## 2023-11-11 PROCEDURE — 99213 OFFICE O/P EST LOW 20 MIN: CPT | Performed by: PHYSICIAN ASSISTANT

## 2023-11-11 PROCEDURE — 3078F DIAST BP <80 MM HG: CPT | Performed by: PHYSICIAN ASSISTANT

## 2023-11-11 PROCEDURE — 3075F SYST BP GE 130 - 139MM HG: CPT | Performed by: PHYSICIAN ASSISTANT

## 2023-11-11 RX ORDER — CEFDINIR 125 MG/5ML
300 POWDER, FOR SUSPENSION ORAL 2 TIMES DAILY
Qty: 168 ML | Refills: 0 | Status: SHIPPED | OUTPATIENT
Start: 2023-11-11 | End: 2023-11-18

## 2023-11-11 RX ORDER — COLCHICINE 0.6 MG/1
TABLET ORAL
Qty: 12 TABLET | Refills: 0 | Status: SHIPPED | OUTPATIENT
Start: 2023-11-11 | End: 2023-12-02

## 2023-11-11 ASSESSMENT — ENCOUNTER SYMPTOMS
DIAPHORESIS: 0
COUGH: 0
EYE PAIN: 0
EYE REDNESS: 0
SORE THROAT: 0
FEVER: 0
CHILLS: 0
SHORTNESS OF BREATH: 0
DIZZINESS: 0
WHEEZING: 0
SINUS PAIN: 0
EYE DISCHARGE: 0
HEADACHES: 0

## 2023-11-11 ASSESSMENT — FIBROSIS 4 INDEX: FIB4 SCORE: 2.07

## 2023-11-12 NOTE — PROGRESS NOTES
"  Subjective:     Alberto Maldonado  is a 37 y.o. male who presents for Gout (Pt has pain on (L) big toe x today ) and Otalgia (Pt has pain on both ears x 2 weeks )       He presents today with acute idiopathic gout flareup of his left big toe.  Symptoms began after he was at the gym today.  No trauma or injury associate with symptom onset.  He has a well-documented past medical history of gout flareups in the past.  Previous gout notes were reviewed.  He is having pain over the left big toe at all times of the day, with active and passive movements as well as with weightbearing.  Antalgic gait due to pain.  Denies fevers.    Also presents in today with Bilateral ear pain.  No changes in hearing.  No drainage or discharge to the ear.  No sinus congestion, no sore throat.  No chest pain or shortness of breath.       Review of Systems   Constitutional:  Negative for chills, diaphoresis, fever and malaise/fatigue.   HENT:  Positive for ear pain. Negative for congestion, ear discharge, hearing loss, sinus pain and sore throat.    Eyes:  Negative for pain, discharge and redness.   Respiratory:  Negative for cough, shortness of breath and wheezing.    Cardiovascular:  Negative for chest pain.   Musculoskeletal:  Positive for joint pain.   Neurological:  Negative for dizziness and headaches.      No Known Allergies  Past Medical History:   Diagnosis Date    Asthma without status asthmaticus 06/29/2017    Chickenpox         Objective:   Temp 36.2 °C (97.1 °F) (Temporal)   Ht 1.6 m (5' 3\")   Wt 110 kg (242 lb 1.6 oz)   BMI 42.89 kg/m²   Physical Exam  Vitals and nursing note reviewed.   Constitutional:       General: He is not in acute distress.     Appearance: He is not ill-appearing or toxic-appearing.   HENT:      Head: Normocephalic.      Ears:      Comments: Eaxmination of the bilateral ear does reveal errythematous and bulging TM with increased supperative fluid within the middle ear     Nose: No rhinorrhea. "   Eyes:      General: No scleral icterus.     Conjunctiva/sclera: Conjunctivae normal.   Pulmonary:      Effort: Pulmonary effort is normal. No respiratory distress.      Breath sounds: No stridor.   Musculoskeletal:      Cervical back: Neck supple.      Comments: Examination of the left foot does reveal localized swelling and erythema over the first MCP joint.  Tenderness to palpation over the joint.  Antalgic gait due to toe pain.   Neurological:      Mental Status: He is alert and oriented to person, place, and time.   Psychiatric:         Mood and Affect: Mood normal.         Behavior: Behavior normal.         Thought Content: Thought content normal.         Judgment: Judgment normal.             Diagnostic testing: None    Assessment/Plan:     Encounter Diagnoses   Name Primary?    Acute idiopathic gout involving toe of left foot     Non-recurrent acute suppurative otitis media of both ears without spontaneous rupture of tympanic membranes         Plan for care for today's complaint includes starting the patient on colchicine for acute gout flareup of his left great toe.  Will have patient discontinue his Augmentin and start cefdinir suspension as his ear infection of bilateral ears is ongoing despite having taking oral antibiotics for 1 week.  Did prescribe suspension medication as patient is unable to take large pills.  Continue to monitor symptoms and return to urgent care or follow-up with primary care provider if symptoms remain ongoing.  Follow-up in the emergency department if symptoms become severe, ER precautions discussed in office today..  Prescription for colchicine, cefdinir suspension.  provided.    See AVS Instructions below for written guidance provided to patient on after-visit management and care in addition to our verbal discussion during the visit.    Please note that this dictation was created using voice recognition software. I have attempted to correct all errors, but there may be  sound-alike, spelling, grammar and possibly content errors that I did not discover before finalizing the note.    Kings Windy GUERRERO

## 2023-11-17 ENCOUNTER — OFFICE VISIT (OUTPATIENT)
Dept: URGENT CARE | Facility: CLINIC | Age: 38
End: 2023-11-17
Payer: COMMERCIAL

## 2023-11-17 VITALS
HEIGHT: 63 IN | OXYGEN SATURATION: 97 % | BODY MASS INDEX: 43.11 KG/M2 | SYSTOLIC BLOOD PRESSURE: 126 MMHG | WEIGHT: 243.3 LBS | HEART RATE: 101 BPM | DIASTOLIC BLOOD PRESSURE: 84 MMHG | RESPIRATION RATE: 16 BRPM | TEMPERATURE: 97.7 F

## 2023-11-17 DIAGNOSIS — R06.2 WHEEZE: ICD-10-CM

## 2023-11-17 DIAGNOSIS — H66.003 NON-RECURRENT ACUTE SUPPURATIVE OTITIS MEDIA OF BOTH EARS WITHOUT SPONTANEOUS RUPTURE OF TYMPANIC MEMBRANES: ICD-10-CM

## 2023-11-17 DIAGNOSIS — Z87.09 HISTORY OF ASTHMA: ICD-10-CM

## 2023-11-17 PROCEDURE — 3074F SYST BP LT 130 MM HG: CPT | Performed by: PHYSICIAN ASSISTANT

## 2023-11-17 PROCEDURE — 3079F DIAST BP 80-89 MM HG: CPT | Performed by: PHYSICIAN ASSISTANT

## 2023-11-17 PROCEDURE — 99213 OFFICE O/P EST LOW 20 MIN: CPT | Performed by: PHYSICIAN ASSISTANT

## 2023-11-17 RX ORDER — CEFDINIR 250 MG/5ML
300 POWDER, FOR SUSPENSION ORAL 2 TIMES DAILY
Qty: 84 ML | Refills: 0 | Status: SHIPPED | OUTPATIENT
Start: 2023-11-17 | End: 2023-11-24

## 2023-11-17 ASSESSMENT — FIBROSIS 4 INDEX: FIB4 SCORE: 2.07

## 2023-11-18 NOTE — PROGRESS NOTES
Subjective     Alberto Maldonado is a 37 y.o. male who presents with Otalgia (Bilateral ear pain,on and off pain, nausea, acid reflux  X 2.5 weeks )    PMH:  has a past medical history of Asthma without status asthmaticus (06/29/2017) and Chickenpox.    He has no past medical history of Cancer ([facility]) or Diabetes ([facility]).  MEDS:   Current Outpatient Medications:     colchicine (COLCRYS) 0.6 MG Tab, Take 2 tabs PO once, then 1 tab PO once 2-3 hours later.  Repeat every 48-72 hours prn gout/pain., Disp: 12 Tablet, Rfl: 0    cefDINIR (OMNICEF) 125 MG/5ML Recon Susp, Take 12 mL by mouth 2 times a day for 7 days., Disp: 168 mL, Rfl: 0    allopurinol (ZYLOPRIM) 300 MG Tab, Take 1 Tablet by mouth every day., Disp: 90 Tablet, Rfl: 3    amLODIPine (NORVASC) 10 MG Tab, Take 1 Tablet by mouth every day., Disp: 90 Tablet, Rfl: 3  ALLERGIES: No Known Allergies  SURGHX: History reviewed. No pertinent surgical history.  SOCHX:  reports that he has never smoked. He has never used smokeless tobacco. He reports that he does not currently use alcohol after a past usage of about 7.2 oz of alcohol per week. He reports that he does not currently use drugs.  FH: Reviewed with patient, not pertinent to this visit.           Patient presents with:  Otalgia: Bilateral ear pain,on and off pain, nausea  X 4 weeks.  Pt was seen one week ago with same symptoms, given rx for one week of cefdinir.  Pt states ear pain has improved a bit but feels he needs a longer course of treatment. PT also has wheezy cough at times taking some otc medication for this. .  No other complaints.         Otalgia   There is pain in both ears. This is a new problem. The current episode started 1 to 4 weeks ago. The problem occurs constantly. The problem has been unchanged. There has been no fever. Associated symptoms include coughing and headaches. Pertinent negatives include no abdominal pain or hearing loss. He has tried antibiotics and NSAIDs for the symptoms. The  "treatment provided mild relief. There is no history of hearing loss or a tympanostomy tube.       Review of Systems   Constitutional:  Negative for fever.   HENT:  Positive for ear pain. Negative for hearing loss.    Respiratory:  Positive for cough and wheezing.    Gastrointestinal:  Negative for abdominal pain.   Neurological:  Positive for headaches.   All other systems reviewed and are negative.             Objective     /84 (BP Location: Left arm, Patient Position: Sitting, BP Cuff Size: Large adult long)   Pulse (!) 101   Temp 36.5 °C (97.7 °F) (Temporal)   Resp 16   Ht 1.6 m (5' 3\")   Wt 110 kg (243 lb 4.8 oz)   SpO2 97%   BMI 43.10 kg/m²      Physical Exam  Vitals and nursing note reviewed.   Constitutional:       General: He is not in acute distress.     Appearance: Normal appearance. He is well-developed, well-groomed and normal weight. He is not toxic-appearing.   HENT:      Head: Normocephalic and atraumatic.      Right Ear: Ear canal normal. A middle ear effusion is present. Tympanic membrane is injected and erythematous.      Left Ear: Ear canal normal. A middle ear effusion is present. Tympanic membrane is injected and erythematous.      Nose: Nose normal.      Mouth/Throat:      Lips: Pink.      Mouth: Mucous membranes are moist.      Pharynx: Uvula midline.   Eyes:      Extraocular Movements: Extraocular movements intact.      Conjunctiva/sclera: Conjunctivae normal.      Pupils: Pupils are equal, round, and reactive to light.   Cardiovascular:      Rate and Rhythm: Normal rate and regular rhythm.      Heart sounds: Normal heart sounds.   Pulmonary:      Effort: Pulmonary effort is normal.      Breath sounds: Wheezing present.   Abdominal:      Palpations: Abdomen is soft.   Musculoskeletal:         General: Normal range of motion.      Cervical back: Normal range of motion and neck supple.   Skin:     General: Skin is warm and dry.      Capillary Refill: Capillary refill takes less " than 2 seconds.   Neurological:      General: No focal deficit present.      Mental Status: He is alert and oriented to person, place, and time.      Gait: Gait normal.   Psychiatric:         Mood and Affect: Mood normal.         Behavior: Behavior is cooperative.                             Assessment & Plan                1. Non-recurrent acute suppurative otitis media of both ears without spontaneous rupture of tympanic membranes  cefdinir (OMNICEF) 250 MG/5ML suspension      2. History of asthma  cefdinir (OMNICEF) 250 MG/5ML suspension      3. Wheeze  cefdinir (OMNICEF) 250 MG/5ML suspension          PT HPI/PE consistent with otitis media of both ears as well as a wheezy cough.  I will treat otitis media with omnicef x 7 more days.  PT declined steroids/inhaler.      Differential diagnosis, supportive care, and indications for immediate follow-up discussed with patient.  Instructed to return to clinic or nearest emergency department for any change in condition, further concerns, or worsening of symptoms.    I personally reviewed prior external notes and test results pertinent to today's visit.  I have independently reviewed and interpreted all diagnostics ordered during this urgent care visit.    PT should follow up with PCP in 1-2 days for re-evaluation if symptoms have not improved.      Discussed red flags and reasons to return to UC or ED.      Pt and/or family verbalized understanding of diagnosis and follow up instructions and was offered informational handout on diagnosis.  PT discharged.     Please note that this dictation was created using voice recognition software. I have made every reasonable attempt to correct obvious errors, but I expect that there may be errors of grammar and possibly content that I did not discover before finalizing the note.

## 2023-11-25 ENCOUNTER — OFFICE VISIT (OUTPATIENT)
Dept: URGENT CARE | Facility: CLINIC | Age: 38
End: 2023-11-25
Payer: COMMERCIAL

## 2023-11-25 VITALS
TEMPERATURE: 96.8 F | OXYGEN SATURATION: 98 % | DIASTOLIC BLOOD PRESSURE: 84 MMHG | SYSTOLIC BLOOD PRESSURE: 128 MMHG | HEART RATE: 121 BPM | HEIGHT: 63 IN | BODY MASS INDEX: 43.12 KG/M2 | RESPIRATION RATE: 16 BRPM | WEIGHT: 243.4 LBS

## 2023-11-25 DIAGNOSIS — B96.89 BACTERIAL SINUSITIS: ICD-10-CM

## 2023-11-25 DIAGNOSIS — J32.9 BACTERIAL SINUSITIS: ICD-10-CM

## 2023-11-25 DIAGNOSIS — R00.0 TACHYCARDIA: ICD-10-CM

## 2023-11-25 PROCEDURE — 3079F DIAST BP 80-89 MM HG: CPT | Performed by: FAMILY MEDICINE

## 2023-11-25 PROCEDURE — 99214 OFFICE O/P EST MOD 30 MIN: CPT | Performed by: FAMILY MEDICINE

## 2023-11-25 PROCEDURE — 3074F SYST BP LT 130 MM HG: CPT | Performed by: FAMILY MEDICINE

## 2023-11-25 RX ORDER — AMOXICILLIN AND CLAVULANATE POTASSIUM 875; 125 MG/1; MG/1
1 TABLET, FILM COATED ORAL 2 TIMES DAILY
Qty: 10 TABLET | Refills: 0 | Status: SHIPPED | OUTPATIENT
Start: 2023-11-25 | End: 2023-11-30

## 2023-11-25 ASSESSMENT — FIBROSIS 4 INDEX: FIB4 SCORE: 2.07

## 2023-11-25 ASSESSMENT — ENCOUNTER SYMPTOMS
WHEEZING: 1
ABDOMINAL PAIN: 0
FEVER: 0
HEADACHES: 1
COUGH: 1

## 2023-11-26 ENCOUNTER — OFFICE VISIT (OUTPATIENT)
Dept: URGENT CARE | Facility: CLINIC | Age: 38
End: 2023-11-26
Payer: COMMERCIAL

## 2023-11-26 VITALS
OXYGEN SATURATION: 98 % | RESPIRATION RATE: 18 BRPM | HEART RATE: 149 BPM | HEIGHT: 64 IN | TEMPERATURE: 99.1 F | SYSTOLIC BLOOD PRESSURE: 146 MMHG | DIASTOLIC BLOOD PRESSURE: 84 MMHG | WEIGHT: 240 LBS | BODY MASS INDEX: 40.97 KG/M2

## 2023-11-26 DIAGNOSIS — F10.20 ALCOHOLISM (HCC): Chronic | ICD-10-CM

## 2023-11-26 DIAGNOSIS — K21.9 GASTROESOPHAGEAL REFLUX DISEASE WITHOUT ESOPHAGITIS: ICD-10-CM

## 2023-11-26 PROCEDURE — 99213 OFFICE O/P EST LOW 20 MIN: CPT | Performed by: PHYSICIAN ASSISTANT

## 2023-11-26 PROCEDURE — 3079F DIAST BP 80-89 MM HG: CPT | Performed by: PHYSICIAN ASSISTANT

## 2023-11-26 PROCEDURE — 3077F SYST BP >= 140 MM HG: CPT | Performed by: PHYSICIAN ASSISTANT

## 2023-11-26 ASSESSMENT — ENCOUNTER SYMPTOMS
VOMITING: 0
MYALGIAS: 0
NAUSEA: 0
FEVER: 0
CHILLS: 0
HEADACHES: 0
COUGH: 0
CONSTIPATION: 0
EYE PAIN: 0
SHORTNESS OF BREATH: 0
HEARTBURN: 1
SORE THROAT: 1
DIARRHEA: 1
ABDOMINAL PAIN: 0

## 2023-11-26 ASSESSMENT — FIBROSIS 4 INDEX: FIB4 SCORE: 2.07

## 2023-11-27 NOTE — PROGRESS NOTES
"Subjective:   Alberto Maldonado is a 37 y.o. male who presents for Heartburn and Pharyngitis      37-year-old male noted some worsening heartburn, mild diarrhea as well as, sore throat after starting his Augmentin for sinus infection yesterday.  Normally does not have significant heartburn, unclear on what to take.  Symptoms are worse after eating and improve thereafter.    Review of Systems   Constitutional:  Negative for chills and fever.   HENT:  Positive for sore throat. Negative for congestion and ear pain.    Eyes:  Negative for pain.   Respiratory:  Negative for cough and shortness of breath.    Cardiovascular:  Negative for chest pain.   Gastrointestinal:  Positive for diarrhea and heartburn. Negative for abdominal pain, constipation, nausea and vomiting.   Genitourinary:  Negative for dysuria.   Musculoskeletal:  Negative for myalgias.   Skin:  Negative for rash.   Neurological:  Negative for headaches.       Medications, Allergies, and current problem list reviewed today in Epic.     Objective:     BP (!) 146/84 (BP Location: Left arm, Patient Position: Sitting, BP Cuff Size: Adult)   Pulse (!) 149   Temp 37.3 °C (99.1 °F) (Temporal)   Resp 18   Ht 1.613 m (5' 3.5\")   Wt 109 kg (240 lb)   SpO2 98%     Physical Exam  Vitals reviewed.   Constitutional:       Appearance: Normal appearance.   HENT:      Head: Normocephalic and atraumatic.      Right Ear: External ear normal.      Left Ear: External ear normal.      Nose: Nose normal.      Mouth/Throat:      Mouth: Mucous membranes are moist.   Eyes:      Conjunctiva/sclera: Conjunctivae normal.   Cardiovascular:      Rate and Rhythm: Normal rate.   Pulmonary:      Effort: Pulmonary effort is normal.   Abdominal:      Tenderness: There is no abdominal tenderness.   Skin:     General: Skin is warm and dry.      Capillary Refill: Capillary refill takes less than 2 seconds.   Neurological:      Mental Status: He is alert and oriented to person, place, " and time.         Assessment/Plan:     Diagnosis and associated orders:     1. Gastroesophageal reflux disease without esophagitis        2. Alcoholism (HCC)           Comments/MDM:     Alcoholic gastritis versus antibiotic gastritis with some associated antibiotic associated diarrhea.  No sign of C. difficile.  Recommend omeprazole, Pepcid, GERD diet.  Reviewed use of these medications.         Differential diagnosis, natural history, supportive care, and indications for immediate follow-up discussed.    Advised the patient to follow-up with the primary care physician for recheck, reevaluation, and consideration of further management.    Please note that this dictation was created using voice recognition software. I have made a reasonable attempt to correct obvious errors, but I expect that there are errors of grammar and possibly content that I did not discover before finalizing the note.    This note was electronically signed by Bill Corbett PA-C

## 2023-12-02 ENCOUNTER — APPOINTMENT (OUTPATIENT)
Dept: RADIOLOGY | Facility: MEDICAL CENTER | Age: 38
DRG: 897 | End: 2023-12-02
Attending: STUDENT IN AN ORGANIZED HEALTH CARE EDUCATION/TRAINING PROGRAM
Payer: COMMERCIAL

## 2023-12-02 ENCOUNTER — HOSPITAL ENCOUNTER (INPATIENT)
Facility: MEDICAL CENTER | Age: 38
LOS: 6 days | DRG: 897 | End: 2023-12-08
Attending: STUDENT IN AN ORGANIZED HEALTH CARE EDUCATION/TRAINING PROGRAM | Admitting: STUDENT IN AN ORGANIZED HEALTH CARE EDUCATION/TRAINING PROGRAM
Payer: COMMERCIAL

## 2023-12-02 DIAGNOSIS — M10.9 ACUTE GOUT OF LEFT ANKLE, UNSPECIFIED CAUSE: Chronic | ICD-10-CM

## 2023-12-02 DIAGNOSIS — F10.930 ALCOHOL WITHDRAWAL SYNDROME WITHOUT COMPLICATION (HCC): ICD-10-CM

## 2023-12-02 DIAGNOSIS — E87.20 METABOLIC ACIDOSIS: ICD-10-CM

## 2023-12-02 DIAGNOSIS — R06.82 TACHYPNEA: ICD-10-CM

## 2023-12-02 DIAGNOSIS — R06.02 SHORTNESS OF BREATH: ICD-10-CM

## 2023-12-02 DIAGNOSIS — E87.20 LACTIC ACIDOSIS: ICD-10-CM

## 2023-12-02 DIAGNOSIS — E87.6 HYPOKALEMIA: ICD-10-CM

## 2023-12-02 DIAGNOSIS — E83.42 HYPOMAGNESEMIA: ICD-10-CM

## 2023-12-02 PROBLEM — R19.7 DIARRHEA: Status: ACTIVE | Noted: 2023-12-02

## 2023-12-02 PROBLEM — I16.0 HYPERTENSIVE URGENCY: Status: ACTIVE | Noted: 2023-12-02

## 2023-12-02 PROBLEM — R79.89 ELEVATED LACTIC ACID LEVEL: Status: ACTIVE | Noted: 2023-12-02

## 2023-12-02 PROBLEM — K21.9 GERD (GASTROESOPHAGEAL REFLUX DISEASE): Status: ACTIVE | Noted: 2023-12-02

## 2023-12-02 PROBLEM — F10.931 ALCOHOL WITHDRAWAL DELIRIUM, ACUTE, HYPERACTIVE (HCC): Status: ACTIVE | Noted: 2023-12-02

## 2023-12-02 PROBLEM — E87.8 ELECTROLYTE ABNORMALITY: Status: ACTIVE | Noted: 2023-12-02

## 2023-12-02 LAB
ALBUMIN SERPL BCP-MCNC: 4.2 G/DL (ref 3.2–4.9)
ALBUMIN/GLOB SERPL: 1.2 G/DL
ALP SERPL-CCNC: 160 U/L (ref 30–99)
ALT SERPL-CCNC: 47 U/L (ref 2–50)
ANION GAP SERPL CALC-SCNC: 19 MMOL/L (ref 7–16)
ANION GAP SERPL CALC-SCNC: 25 MMOL/L (ref 7–16)
AST SERPL-CCNC: 50 U/L (ref 12–45)
B-OH-BUTYR SERPL-MCNC: 0.2 MMOL/L (ref 0.02–0.27)
BASE EXCESS BLDV CALC-SCNC: -2 MMOL/L
BASOPHILS # BLD AUTO: 0.4 % (ref 0–1.8)
BASOPHILS # BLD: 0.04 K/UL (ref 0–0.12)
BILIRUB SERPL-MCNC: 0.9 MG/DL (ref 0.1–1.5)
BODY TEMPERATURE: 37.1 CENTIGRADE
BUN SERPL-MCNC: 4 MG/DL (ref 8–22)
BUN SERPL-MCNC: 4 MG/DL (ref 8–22)
C DIFF DNA SPEC QL NAA+PROBE: NEGATIVE
C DIFF TOX GENS STL QL NAA+PROBE: NEGATIVE
CALCIUM ALBUM COR SERPL-MCNC: 8 MG/DL (ref 8.5–10.5)
CALCIUM SERPL-MCNC: 7.2 MG/DL (ref 8.5–10.5)
CALCIUM SERPL-MCNC: 8.2 MG/DL (ref 8.5–10.5)
CHLORIDE SERPL-SCNC: 103 MMOL/L (ref 96–112)
CHLORIDE SERPL-SCNC: 108 MMOL/L (ref 96–112)
CO2 SERPL-SCNC: 16 MMOL/L (ref 20–33)
CO2 SERPL-SCNC: 19 MMOL/L (ref 20–33)
CREAT SERPL-MCNC: 0.71 MG/DL (ref 0.5–1.4)
CREAT SERPL-MCNC: 0.83 MG/DL (ref 0.5–1.4)
CRP SERPL HS-MCNC: <0.3 MG/DL (ref 0–0.75)
D DIMER PPP IA.FEU-MCNC: 0.3 UG/ML (FEU) (ref 0–0.5)
EOSINOPHIL # BLD AUTO: 0.01 K/UL (ref 0–0.51)
EOSINOPHIL NFR BLD: 0.1 % (ref 0–6.9)
ERYTHROCYTE [DISTWIDTH] IN BLOOD BY AUTOMATED COUNT: 50.4 FL (ref 35.9–50)
ETHANOL BLD-MCNC: <10.1 MG/DL
GFR SERPLBLD CREATININE-BSD FMLA CKD-EPI: 116 ML/MIN/1.73 M 2
GFR SERPLBLD CREATININE-BSD FMLA CKD-EPI: 122 ML/MIN/1.73 M 2
GLOBULIN SER CALC-MCNC: 3.4 G/DL (ref 1.9–3.5)
GLUCOSE SERPL-MCNC: 119 MG/DL (ref 65–99)
GLUCOSE SERPL-MCNC: 152 MG/DL (ref 65–99)
HCO3 BLDV-SCNC: 19 MMOL/L (ref 24–28)
HCT VFR BLD AUTO: 41.7 % (ref 42–52)
HGB BLD-MCNC: 14.6 G/DL (ref 14–18)
IMM GRANULOCYTES # BLD AUTO: 0.05 K/UL (ref 0–0.11)
IMM GRANULOCYTES NFR BLD AUTO: 0.5 % (ref 0–0.9)
INHALED O2 FLOW RATE: ABNORMAL L/MIN
LACTATE SERPL-SCNC: 6.8 MMOL/L (ref 0.5–2)
LACTATE SERPL-SCNC: 7 MMOL/L (ref 0.5–2)
LACTATE SERPL-SCNC: 7.8 MMOL/L (ref 0.5–2)
LYMPHOCYTES # BLD AUTO: 1.78 K/UL (ref 1–4.8)
LYMPHOCYTES NFR BLD: 17.6 % (ref 22–41)
MAGNESIUM SERPL-MCNC: 1.1 MG/DL (ref 1.5–2.5)
MCH RBC QN AUTO: 29.7 PG (ref 27–33)
MCHC RBC AUTO-ENTMCNC: 35 G/DL (ref 32.3–36.5)
MCV RBC AUTO: 84.9 FL (ref 81.4–97.8)
MONOCYTES # BLD AUTO: 0.92 K/UL (ref 0–0.85)
MONOCYTES NFR BLD AUTO: 9.1 % (ref 0–13.4)
NEUTROPHILS # BLD AUTO: 7.32 K/UL (ref 1.82–7.42)
NEUTROPHILS NFR BLD: 72.3 % (ref 44–72)
NRBC # BLD AUTO: 0.04 K/UL
NRBC BLD-RTO: 0.4 /100 WBC (ref 0–0.2)
NT-PROBNP SERPL IA-MCNC: 68 PG/ML (ref 0–125)
PCO2 BLDV: 24 MMHG (ref 41–51)
PCO2 TEMP ADJ BLDV: 24.1 MMHG (ref 41–51)
PH BLDV: 7.51 [PH] (ref 7.31–7.45)
PH TEMP ADJ BLDV: 7.51 [PH] (ref 7.31–7.45)
PHOSPHATE SERPL-MCNC: 3.2 MG/DL (ref 2.5–4.5)
PLATELET # BLD AUTO: 323 K/UL (ref 164–446)
PMV BLD AUTO: 8.7 FL (ref 9–12.9)
PO2 BLDV: 47.9 MMHG (ref 25–40)
PO2 TEMP ADJ BLDV: 48.2 MMHG (ref 25–40)
POTASSIUM SERPL-SCNC: 2.6 MMOL/L (ref 3.6–5.5)
POTASSIUM SERPL-SCNC: 3.1 MMOL/L (ref 3.6–5.5)
PROCALCITONIN SERPL-MCNC: 0.1 NG/ML
PROT SERPL-MCNC: 7.6 G/DL (ref 6–8.2)
RBC # BLD AUTO: 4.91 M/UL (ref 4.7–6.1)
SAO2 % BLDV: 84.6 %
SODIUM SERPL-SCNC: 144 MMOL/L (ref 135–145)
SODIUM SERPL-SCNC: 146 MMOL/L (ref 135–145)
TROPONIN T SERPL-MCNC: 16 NG/L (ref 6–19)
TROPONIN T SERPL-MCNC: 17 NG/L (ref 6–19)
WBC # BLD AUTO: 10.1 K/UL (ref 4.8–10.8)

## 2023-12-02 PROCEDURE — 87493 C DIFF AMPLIFIED PROBE: CPT

## 2023-12-02 PROCEDURE — 700105 HCHG RX REV CODE 258: Performed by: HOSPITALIST

## 2023-12-02 PROCEDURE — 80307 DRUG TEST PRSMV CHEM ANLYZR: CPT

## 2023-12-02 PROCEDURE — 86140 C-REACTIVE PROTEIN: CPT

## 2023-12-02 PROCEDURE — 770000 HCHG ROOM/CARE - INTERMEDIATE ICU *

## 2023-12-02 PROCEDURE — C9113 INJ PANTOPRAZOLE SODIUM, VIA: HCPCS | Performed by: STUDENT IN AN ORGANIZED HEALTH CARE EDUCATION/TRAINING PROGRAM

## 2023-12-02 PROCEDURE — 80048 BASIC METABOLIC PNL TOTAL CA: CPT

## 2023-12-02 PROCEDURE — 700111 HCHG RX REV CODE 636 W/ 250 OVERRIDE (IP): Performed by: STUDENT IN AN ORGANIZED HEALTH CARE EDUCATION/TRAINING PROGRAM

## 2023-12-02 PROCEDURE — 700102 HCHG RX REV CODE 250 W/ 637 OVERRIDE(OP): Mod: JZ | Performed by: STUDENT IN AN ORGANIZED HEALTH CARE EDUCATION/TRAINING PROGRAM

## 2023-12-02 PROCEDURE — 700105 HCHG RX REV CODE 258: Performed by: STUDENT IN AN ORGANIZED HEALTH CARE EDUCATION/TRAINING PROGRAM

## 2023-12-02 PROCEDURE — 99285 EMERGENCY DEPT VISIT HI MDM: CPT

## 2023-12-02 PROCEDURE — 96376 TX/PRO/DX INJ SAME DRUG ADON: CPT

## 2023-12-02 PROCEDURE — 80053 COMPREHEN METABOLIC PANEL: CPT

## 2023-12-02 PROCEDURE — A9270 NON-COVERED ITEM OR SERVICE: HCPCS | Performed by: STUDENT IN AN ORGANIZED HEALTH CARE EDUCATION/TRAINING PROGRAM

## 2023-12-02 PROCEDURE — 96365 THER/PROPH/DIAG IV INF INIT: CPT

## 2023-12-02 PROCEDURE — 36415 COLL VENOUS BLD VENIPUNCTURE: CPT

## 2023-12-02 PROCEDURE — 82010 KETONE BODYS QUAN: CPT

## 2023-12-02 PROCEDURE — 83605 ASSAY OF LACTIC ACID: CPT

## 2023-12-02 PROCEDURE — 700101 HCHG RX REV CODE 250: Performed by: STUDENT IN AN ORGANIZED HEALTH CARE EDUCATION/TRAINING PROGRAM

## 2023-12-02 PROCEDURE — 93005 ELECTROCARDIOGRAM TRACING: CPT

## 2023-12-02 PROCEDURE — A9270 NON-COVERED ITEM OR SERVICE: HCPCS | Mod: JZ | Performed by: STUDENT IN AN ORGANIZED HEALTH CARE EDUCATION/TRAINING PROGRAM

## 2023-12-02 PROCEDURE — 700102 HCHG RX REV CODE 250 W/ 637 OVERRIDE(OP): Performed by: STUDENT IN AN ORGANIZED HEALTH CARE EDUCATION/TRAINING PROGRAM

## 2023-12-02 PROCEDURE — 82077 ASSAY SPEC XCP UR&BREATH IA: CPT

## 2023-12-02 PROCEDURE — 84484 ASSAY OF TROPONIN QUANT: CPT

## 2023-12-02 PROCEDURE — 96375 TX/PRO/DX INJ NEW DRUG ADDON: CPT

## 2023-12-02 PROCEDURE — 93005 ELECTROCARDIOGRAM TRACING: CPT | Performed by: STUDENT IN AN ORGANIZED HEALTH CARE EDUCATION/TRAINING PROGRAM

## 2023-12-02 PROCEDURE — 700102 HCHG RX REV CODE 250 W/ 637 OVERRIDE(OP): Mod: JZ | Performed by: HOSPITALIST

## 2023-12-02 PROCEDURE — 99223 1ST HOSP IP/OBS HIGH 75: CPT | Mod: AI | Performed by: STUDENT IN AN ORGANIZED HEALTH CARE EDUCATION/TRAINING PROGRAM

## 2023-12-02 PROCEDURE — 71045 X-RAY EXAM CHEST 1 VIEW: CPT

## 2023-12-02 PROCEDURE — 700101 HCHG RX REV CODE 250: Performed by: HOSPITALIST

## 2023-12-02 PROCEDURE — 84100 ASSAY OF PHOSPHORUS: CPT

## 2023-12-02 PROCEDURE — 85379 FIBRIN DEGRADATION QUANT: CPT

## 2023-12-02 PROCEDURE — 85025 COMPLETE CBC W/AUTO DIFF WBC: CPT

## 2023-12-02 PROCEDURE — 87040 BLOOD CULTURE FOR BACTERIA: CPT

## 2023-12-02 PROCEDURE — 99255 IP/OBS CONSLTJ NEW/EST HI 80: CPT | Performed by: INTERNAL MEDICINE

## 2023-12-02 PROCEDURE — 83735 ASSAY OF MAGNESIUM: CPT

## 2023-12-02 PROCEDURE — 82803 BLOOD GASES ANY COMBINATION: CPT

## 2023-12-02 PROCEDURE — 96372 THER/PROPH/DIAG INJ SC/IM: CPT

## 2023-12-02 PROCEDURE — 700111 HCHG RX REV CODE 636 W/ 250 OVERRIDE (IP): Mod: JZ | Performed by: STUDENT IN AN ORGANIZED HEALTH CARE EDUCATION/TRAINING PROGRAM

## 2023-12-02 PROCEDURE — 83880 ASSAY OF NATRIURETIC PEPTIDE: CPT

## 2023-12-02 PROCEDURE — A9270 NON-COVERED ITEM OR SERVICE: HCPCS | Mod: JZ | Performed by: HOSPITALIST

## 2023-12-02 PROCEDURE — 84145 PROCALCITONIN (PCT): CPT

## 2023-12-02 PROCEDURE — 81003 URINALYSIS AUTO W/O SCOPE: CPT

## 2023-12-02 RX ORDER — FOLIC ACID 1 MG/1
1 TABLET ORAL DAILY
Status: DISCONTINUED | OUTPATIENT
Start: 2023-12-02 | End: 2023-12-03

## 2023-12-02 RX ORDER — SODIUM CHLORIDE AND POTASSIUM CHLORIDE 300; 900 MG/100ML; MG/100ML
INJECTION, SOLUTION INTRAVENOUS CONTINUOUS
Status: DISCONTINUED | OUTPATIENT
Start: 2023-12-02 | End: 2023-12-03

## 2023-12-02 RX ORDER — ACETAMINOPHEN 325 MG/1
650 TABLET ORAL EVERY 6 HOURS PRN
Status: DISCONTINUED | OUTPATIENT
Start: 2023-12-02 | End: 2023-12-08 | Stop reason: HOSPADM

## 2023-12-02 RX ORDER — POLYETHYLENE GLYCOL 3350 17 G/17G
1 POWDER, FOR SOLUTION ORAL
Status: DISCONTINUED | OUTPATIENT
Start: 2023-12-02 | End: 2023-12-02

## 2023-12-02 RX ORDER — CLONIDINE HYDROCHLORIDE 0.1 MG/1
0.1 TABLET ORAL
Status: DISCONTINUED | OUTPATIENT
Start: 2023-12-02 | End: 2023-12-08 | Stop reason: HOSPADM

## 2023-12-02 RX ORDER — DIAZEPAM 5 MG/ML
2.5 INJECTION, SOLUTION INTRAMUSCULAR; INTRAVENOUS ONCE
Status: COMPLETED | OUTPATIENT
Start: 2023-12-02 | End: 2023-12-02

## 2023-12-02 RX ORDER — LORAZEPAM 1 MG/1
1 TABLET ORAL EVERY 4 HOURS PRN
Status: DISCONTINUED | OUTPATIENT
Start: 2023-12-02 | End: 2023-12-04

## 2023-12-02 RX ORDER — LORAZEPAM 2 MG/ML
1 INJECTION INTRAMUSCULAR ONCE
Status: DISCONTINUED | OUTPATIENT
Start: 2023-12-02 | End: 2023-12-02

## 2023-12-02 RX ORDER — POTASSIUM CHLORIDE 7.45 MG/ML
10 INJECTION INTRAVENOUS ONCE
Status: COMPLETED | OUTPATIENT
Start: 2023-12-02 | End: 2023-12-02

## 2023-12-02 RX ORDER — MAGNESIUM SULFATE HEPTAHYDRATE 40 MG/ML
4 INJECTION, SOLUTION INTRAVENOUS ONCE
Status: COMPLETED | OUTPATIENT
Start: 2023-12-02 | End: 2023-12-02

## 2023-12-02 RX ORDER — PROCHLORPERAZINE EDISYLATE 5 MG/ML
5-10 INJECTION INTRAMUSCULAR; INTRAVENOUS EVERY 4 HOURS PRN
Status: DISCONTINUED | OUTPATIENT
Start: 2023-12-02 | End: 2023-12-08 | Stop reason: HOSPADM

## 2023-12-02 RX ORDER — ALLOPURINOL 300 MG/1
300 TABLET ORAL DAILY
Status: DISCONTINUED | OUTPATIENT
Start: 2023-12-02 | End: 2023-12-08 | Stop reason: HOSPADM

## 2023-12-02 RX ORDER — LOSARTAN POTASSIUM 50 MG/1
50 TABLET ORAL
Status: DISCONTINUED | OUTPATIENT
Start: 2023-12-02 | End: 2023-12-06

## 2023-12-02 RX ORDER — AMLODIPINE BESYLATE 5 MG/1
10 TABLET ORAL DAILY
Status: DISCONTINUED | OUTPATIENT
Start: 2023-12-02 | End: 2023-12-06

## 2023-12-02 RX ORDER — LORAZEPAM 0.5 MG/1
0.5 TABLET ORAL EVERY 4 HOURS PRN
Status: DISCONTINUED | OUTPATIENT
Start: 2023-12-02 | End: 2023-12-04

## 2023-12-02 RX ORDER — CHLORDIAZEPOXIDE HYDROCHLORIDE 25 MG/1
50 CAPSULE, GELATIN COATED ORAL EVERY 6 HOURS
Status: DISCONTINUED | OUTPATIENT
Start: 2023-12-03 | End: 2023-12-04

## 2023-12-02 RX ORDER — POTASSIUM CHLORIDE 20 MEQ/1
40 TABLET, EXTENDED RELEASE ORAL ONCE
Status: COMPLETED | OUTPATIENT
Start: 2023-12-02 | End: 2023-12-02

## 2023-12-02 RX ORDER — LORAZEPAM 2 MG/1
4 TABLET ORAL
Status: DISCONTINUED | OUTPATIENT
Start: 2023-12-02 | End: 2023-12-04

## 2023-12-02 RX ORDER — MAGNESIUM SULFATE 1 G/100ML
1 INJECTION INTRAVENOUS ONCE
Status: DISCONTINUED | OUTPATIENT
Start: 2023-12-02 | End: 2023-12-02

## 2023-12-02 RX ORDER — PROMETHAZINE HYDROCHLORIDE 25 MG/1
12.5-25 SUPPOSITORY RECTAL EVERY 4 HOURS PRN
Status: DISCONTINUED | OUTPATIENT
Start: 2023-12-02 | End: 2023-12-08 | Stop reason: HOSPADM

## 2023-12-02 RX ORDER — DIAZEPAM 5 MG/ML
5 INJECTION, SOLUTION INTRAMUSCULAR; INTRAVENOUS ONCE
Status: COMPLETED | OUTPATIENT
Start: 2023-12-02 | End: 2023-12-02

## 2023-12-02 RX ORDER — DIAZEPAM 5 MG/ML
10 INJECTION, SOLUTION INTRAMUSCULAR; INTRAVENOUS
Status: DISCONTINUED | OUTPATIENT
Start: 2023-12-02 | End: 2023-12-04

## 2023-12-02 RX ORDER — ONDANSETRON 2 MG/ML
4 INJECTION INTRAMUSCULAR; INTRAVENOUS EVERY 4 HOURS PRN
Status: DISCONTINUED | OUTPATIENT
Start: 2023-12-02 | End: 2023-12-08 | Stop reason: HOSPADM

## 2023-12-02 RX ORDER — COLCHICINE 0.6 MG/1
0.6 TABLET ORAL
Status: ON HOLD | COMMUNITY
End: 2023-12-07

## 2023-12-02 RX ORDER — LABETALOL HYDROCHLORIDE 5 MG/ML
10 INJECTION, SOLUTION INTRAVENOUS EVERY 4 HOURS PRN
Status: DISCONTINUED | OUTPATIENT
Start: 2023-12-02 | End: 2023-12-08 | Stop reason: HOSPADM

## 2023-12-02 RX ORDER — DIAZEPAM 5 MG/ML
2.5 INJECTION, SOLUTION INTRAMUSCULAR; INTRAVENOUS ONCE
Status: DISCONTINUED | OUTPATIENT
Start: 2023-12-02 | End: 2023-12-02

## 2023-12-02 RX ORDER — PROMETHAZINE HYDROCHLORIDE 25 MG/1
12.5-25 TABLET ORAL EVERY 4 HOURS PRN
Status: DISCONTINUED | OUTPATIENT
Start: 2023-12-02 | End: 2023-12-08 | Stop reason: HOSPADM

## 2023-12-02 RX ORDER — MAGNESIUM SULFATE 1 G/100ML
1 INJECTION INTRAVENOUS ONCE
Status: COMPLETED | OUTPATIENT
Start: 2023-12-02 | End: 2023-12-02

## 2023-12-02 RX ORDER — LORAZEPAM 2 MG/1
2 TABLET ORAL
Status: DISCONTINUED | OUTPATIENT
Start: 2023-12-02 | End: 2023-12-04

## 2023-12-02 RX ORDER — CEFDINIR 250 MG/5ML
6 POWDER, FOR SUSPENSION ORAL 2 TIMES DAILY
Status: ON HOLD | COMMUNITY
Start: 2023-11-18 | End: 2023-12-07

## 2023-12-02 RX ORDER — BISACODYL 10 MG
10 SUPPOSITORY, RECTAL RECTAL
Status: DISCONTINUED | OUTPATIENT
Start: 2023-12-02 | End: 2023-12-02

## 2023-12-02 RX ORDER — ENOXAPARIN SODIUM 100 MG/ML
40 INJECTION SUBCUTANEOUS DAILY
Status: DISCONTINUED | OUTPATIENT
Start: 2023-12-02 | End: 2023-12-05

## 2023-12-02 RX ORDER — CHLORDIAZEPOXIDE HYDROCHLORIDE 25 MG/1
100 CAPSULE, GELATIN COATED ORAL EVERY 6 HOURS
Status: COMPLETED | OUTPATIENT
Start: 2023-12-02 | End: 2023-12-02

## 2023-12-02 RX ORDER — METOPROLOL TARTRATE 50 MG/1
50 TABLET, FILM COATED ORAL 2 TIMES DAILY
Status: DISCONTINUED | OUTPATIENT
Start: 2023-12-02 | End: 2023-12-06

## 2023-12-02 RX ORDER — ENALAPRILAT 1.25 MG/ML
1.25 INJECTION INTRAVENOUS EVERY 6 HOURS PRN
Status: DISCONTINUED | OUTPATIENT
Start: 2023-12-02 | End: 2023-12-08 | Stop reason: HOSPADM

## 2023-12-02 RX ORDER — SODIUM CHLORIDE, SODIUM LACTATE, POTASSIUM CHLORIDE, AND CALCIUM CHLORIDE .6; .31; .03; .02 G/100ML; G/100ML; G/100ML; G/100ML
1000 INJECTION, SOLUTION INTRAVENOUS ONCE
Status: COMPLETED | OUTPATIENT
Start: 2023-12-02 | End: 2023-12-02

## 2023-12-02 RX ORDER — DEXMEDETOMIDINE HYDROCHLORIDE 4 UG/ML
.1-1.5 INJECTION, SOLUTION INTRAVENOUS CONTINUOUS
Status: DISCONTINUED | OUTPATIENT
Start: 2023-12-02 | End: 2023-12-04

## 2023-12-02 RX ORDER — ONDANSETRON 4 MG/1
4 TABLET, ORALLY DISINTEGRATING ORAL EVERY 4 HOURS PRN
Status: DISCONTINUED | OUTPATIENT
Start: 2023-12-02 | End: 2023-12-08 | Stop reason: HOSPADM

## 2023-12-02 RX ORDER — AMOXICILLIN AND CLAVULANATE POTASSIUM 875; 125 MG/1; MG/1
1 TABLET, FILM COATED ORAL 2 TIMES DAILY
Status: ON HOLD | COMMUNITY
Start: 2023-11-25 | End: 2023-12-07

## 2023-12-02 RX ORDER — AMOXICILLIN 250 MG
2 CAPSULE ORAL 2 TIMES DAILY
Status: DISCONTINUED | OUTPATIENT
Start: 2023-12-02 | End: 2023-12-02

## 2023-12-02 RX ORDER — PANTOPRAZOLE SODIUM 40 MG/10ML
40 INJECTION, POWDER, LYOPHILIZED, FOR SOLUTION INTRAVENOUS DAILY
Status: DISCONTINUED | OUTPATIENT
Start: 2023-12-02 | End: 2023-12-02

## 2023-12-02 RX ORDER — GAUZE BANDAGE 2" X 2"
100 BANDAGE TOPICAL DAILY
Status: DISCONTINUED | OUTPATIENT
Start: 2023-12-02 | End: 2023-12-03

## 2023-12-02 RX ORDER — SODIUM CHLORIDE, SODIUM LACTATE, POTASSIUM CHLORIDE, AND CALCIUM CHLORIDE .6; .31; .03; .02 G/100ML; G/100ML; G/100ML; G/100ML
500 INJECTION, SOLUTION INTRAVENOUS
Status: DISCONTINUED | OUTPATIENT
Start: 2023-12-02 | End: 2023-12-08 | Stop reason: HOSPADM

## 2023-12-02 RX ORDER — PANTOPRAZOLE SODIUM 40 MG/10ML
40 INJECTION, POWDER, LYOPHILIZED, FOR SOLUTION INTRAVENOUS 2 TIMES DAILY
Status: DISCONTINUED | OUTPATIENT
Start: 2023-12-02 | End: 2023-12-03

## 2023-12-02 RX ADMIN — METOPROLOL TARTRATE 50 MG: 50 TABLET, FILM COATED ORAL at 05:40

## 2023-12-02 RX ADMIN — MAGNESIUM SULFATE HEPTAHYDRATE 4 G: 40 INJECTION, SOLUTION INTRAVENOUS at 08:31

## 2023-12-02 RX ADMIN — DIAZEPAM 2.5 MG: 5 INJECTION, SOLUTION INTRAMUSCULAR; INTRAVENOUS at 04:29

## 2023-12-02 RX ADMIN — MAGNESIUM SULFATE IN DEXTROSE 1 G: 10 INJECTION, SOLUTION INTRAVENOUS at 11:36

## 2023-12-02 RX ADMIN — CLONIDINE HYDROCHLORIDE 0.1 MG: 0.1 TABLET ORAL at 07:33

## 2023-12-02 RX ADMIN — DEXMEDETOMIDINE HYDROCHLORIDE 0.2 MCG/KG/HR: 100 INJECTION, SOLUTION INTRAVENOUS at 11:40

## 2023-12-02 RX ADMIN — CHLORDIAZEPOXIDE HYDROCHLORIDE 100 MG: 25 CAPSULE ORAL at 05:50

## 2023-12-02 RX ADMIN — THIAMINE HYDROCHLORIDE 500 MG: 100 INJECTION, SOLUTION INTRAMUSCULAR; INTRAVENOUS at 22:08

## 2023-12-02 RX ADMIN — FOLIC ACID: 5 INJECTION, SOLUTION INTRAMUSCULAR; INTRAVENOUS; SUBCUTANEOUS at 10:59

## 2023-12-02 RX ADMIN — POTASSIUM CHLORIDE AND SODIUM CHLORIDE: 900; 300 INJECTION, SOLUTION INTRAVENOUS at 08:40

## 2023-12-02 RX ADMIN — CHLORDIAZEPOXIDE HYDROCHLORIDE 100 MG: 25 CAPSULE ORAL at 11:29

## 2023-12-02 RX ADMIN — THIAMINE HYDROCHLORIDE 500 MG: 100 INJECTION, SOLUTION INTRAMUSCULAR; INTRAVENOUS at 15:00

## 2023-12-02 RX ADMIN — DIAZEPAM 5 MG: 5 INJECTION, SOLUTION INTRAMUSCULAR; INTRAVENOUS at 05:40

## 2023-12-02 RX ADMIN — AMLODIPINE BESYLATE 10 MG: 5 TABLET ORAL at 08:41

## 2023-12-02 RX ADMIN — THERA TABS 1 TABLET: TAB at 05:40

## 2023-12-02 RX ADMIN — LORAZEPAM 2 MG: 2 TABLET ORAL at 07:33

## 2023-12-02 RX ADMIN — CLONIDINE HYDROCHLORIDE 0.1 MG: 0.1 TABLET ORAL at 11:22

## 2023-12-02 RX ADMIN — PANTOPRAZOLE SODIUM 40 MG: 40 INJECTION, POWDER, FOR SOLUTION INTRAVENOUS at 05:41

## 2023-12-02 RX ADMIN — SODIUM CHLORIDE, POTASSIUM CHLORIDE, SODIUM LACTATE AND CALCIUM CHLORIDE 1000 ML: 600; 310; 30; 20 INJECTION, SOLUTION INTRAVENOUS at 05:51

## 2023-12-02 RX ADMIN — LOSARTAN POTASSIUM 50 MG: 50 TABLET, FILM COATED ORAL at 05:40

## 2023-12-02 RX ADMIN — POTASSIUM CHLORIDE 40 MEQ: 1500 TABLET, EXTENDED RELEASE ORAL at 18:14

## 2023-12-02 RX ADMIN — CHLORDIAZEPOXIDE HYDROCHLORIDE 100 MG: 25 CAPSULE ORAL at 23:45

## 2023-12-02 RX ADMIN — METOPROLOL TARTRATE 50 MG: 50 TABLET, FILM COATED ORAL at 18:00

## 2023-12-02 RX ADMIN — DEXMEDETOMIDINE HYDROCHLORIDE 0.2 MCG/KG/HR: 100 INJECTION, SOLUTION INTRAVENOUS at 19:39

## 2023-12-02 RX ADMIN — ENOXAPARIN SODIUM 40 MG: 100 INJECTION SUBCUTANEOUS at 17:16

## 2023-12-02 RX ADMIN — ENOXAPARIN SODIUM 40 MG: 100 INJECTION SUBCUTANEOUS at 05:41

## 2023-12-02 RX ADMIN — POTASSIUM CHLORIDE 40 MEQ: 1500 TABLET, EXTENDED RELEASE ORAL at 03:27

## 2023-12-02 RX ADMIN — CHLORDIAZEPOXIDE HYDROCHLORIDE 100 MG: 25 CAPSULE ORAL at 18:00

## 2023-12-02 RX ADMIN — PANTOPRAZOLE SODIUM 40 MG: 40 INJECTION, POWDER, FOR SOLUTION INTRAVENOUS at 17:15

## 2023-12-02 RX ADMIN — ALLOPURINOL 300 MG: 300 TABLET ORAL at 08:20

## 2023-12-02 RX ADMIN — FOLIC ACID 1 MG: 1 TABLET ORAL at 05:40

## 2023-12-02 RX ADMIN — THIAMINE HYDROCHLORIDE 500 MG: 100 INJECTION, SOLUTION INTRAMUSCULAR; INTRAVENOUS at 08:34

## 2023-12-02 RX ADMIN — LABETALOL HYDROCHLORIDE 10 MG: 5 INJECTION, SOLUTION INTRAVENOUS at 08:21

## 2023-12-02 RX ADMIN — POTASSIUM CHLORIDE 10 MEQ: 7.46 INJECTION, SOLUTION INTRAVENOUS at 03:36

## 2023-12-02 RX ADMIN — Medication 100 MG: at 05:40

## 2023-12-02 RX ADMIN — LORAZEPAM 1 MG: 1 TABLET ORAL at 08:05

## 2023-12-02 ASSESSMENT — PAIN DESCRIPTION - PAIN TYPE
TYPE: ACUTE PAIN

## 2023-12-02 ASSESSMENT — LIFESTYLE VARIABLES
ANXIETY: *
AUDITORY DISTURBANCES: NOT PRESENT
ANXIETY: NO ANXIETY (AT EASE)
HEADACHE, FULLNESS IN HEAD: NOT PRESENT
ANXIETY: MODERATELY ANXIOUS OR GUARDED, SO ANXIETY IS INFERRED
HEADACHE, FULLNESS IN HEAD: NOT PRESENT
TOTAL SCORE: 0
PAROXYSMAL SWEATS: NO SWEAT VISIBLE
NAUSEA AND VOMITING: NO NAUSEA AND NO VOMITING
TREMOR: NO TREMOR
NAUSEA AND VOMITING: NO NAUSEA AND NO VOMITING
ORIENTATION AND CLOUDING OF SENSORIUM: ORIENTED AND CAN DO SERIAL ADDITIONS
PAROXYSMAL SWEATS: NO SWEAT VISIBLE
AGITATION: NORMAL ACTIVITY
TREMOR: NO TREMOR
AUDITORY DISTURBANCES: NOT PRESENT
NAUSEA AND VOMITING: NO NAUSEA AND NO VOMITING
VISUAL DISTURBANCES: NOT PRESENT
TOTAL SCORE: 13
HEADACHE, FULLNESS IN HEAD: NOT PRESENT
ANXIETY: NO ANXIETY (AT EASE)
AGITATION: MODERATELY FIDGETY AND RESTLESS
PAROXYSMAL SWEATS: NO SWEAT VISIBLE
PAROXYSMAL SWEATS: NO SWEAT VISIBLE
VISUAL DISTURBANCES: NOT PRESENT
PAROXYSMAL SWEATS: NO SWEAT VISIBLE
ORIENTATION AND CLOUDING OF SENSORIUM: ORIENTED AND CAN DO SERIAL ADDITIONS
TREMOR: NO TREMOR
ORIENTATION AND CLOUDING OF SENSORIUM: ORIENTED AND CAN DO SERIAL ADDITIONS
TREMOR: NO TREMOR
HEADACHE, FULLNESS IN HEAD: NOT PRESENT
ORIENTATION AND CLOUDING OF SENSORIUM: CANNOT DO SERIAL ADDITIONS OR IS UNCERTAIN ABOUT DATE
AGITATION: NORMAL ACTIVITY
NAUSEA AND VOMITING: NO NAUSEA AND NO VOMITING
TOTAL SCORE: 2
VISUAL DISTURBANCES: NOT PRESENT
AUDITORY DISTURBANCES: NOT PRESENT
ANXIETY: NO ANXIETY (AT EASE)
AGITATION: NORMAL ACTIVITY
HEADACHE, FULLNESS IN HEAD: MILD
VISUAL DISTURBANCES: NOT PRESENT
ORIENTATION AND CLOUDING OF SENSORIUM: DATE DISORIENTATION BY MORE THAN TWO CALENDAR DAYS
AUDITORY DISTURBANCES: NOT PRESENT
SUBSTANCE_ABUSE: 0
TOTAL SCORE: 3
VISUAL DISTURBANCES: NOT PRESENT
NAUSEA AND VOMITING: *
TREMOR: NO TREMOR
AGITATION: NORMAL ACTIVITY
AUDITORY DISTURBANCES: NOT PRESENT
TOTAL SCORE: 1

## 2023-12-02 ASSESSMENT — ENCOUNTER SYMPTOMS
PALPITATIONS: 1
SEIZURES: 0
DIARRHEA: 1
HEADACHES: 0
DEPRESSION: 0
SPUTUM PRODUCTION: 0
DIZZINESS: 0
HEARTBURN: 1
SHORTNESS OF BREATH: 1
ABDOMINAL PAIN: 1
BRUISES/BLEEDS EASILY: 0
WEAKNESS: 1
ORTHOPNEA: 1
VOMITING: 1
FEVER: 0
NAUSEA: 1
DOUBLE VISION: 0
COUGH: 0
CHILLS: 1
BLURRED VISION: 0
MYALGIAS: 0

## 2023-12-02 ASSESSMENT — FIBROSIS 4 INDEX
FIB4 SCORE: 0.81
FIB4 SCORE: 2.13

## 2023-12-02 NOTE — PROGRESS NOTES
"Pt needed to use the restroom, pt refuses to take pants off to use the urinal. Pt refuses to remove pants. Pt was \"uncomfortable\" curtain was drawn for pt since pt requested privacy. Pt then was unable to use the urinal and is now wet all over including pants and bedding. Pt continues to refuse to let RN clean pt up and remove wet clothing.   "

## 2023-12-02 NOTE — ED NOTES
Pt wheeled to [facility] from [employer] with steady gait.  On monitor, call light in reach. Chart up for ERP.

## 2023-12-02 NOTE — PROGRESS NOTES
4 Eyes Skin Assessment Completed by ALF Gill and ALF Lopes.    Head WDL  Ears WDL  Nose WDL  Mouth WDL  Neck WDL  Breast/Chest WDL  Shoulder Blades WDL  Spine WDL  (R) Arm/Elbow/Hand WDL  (L) Arm/Elbow/Hand WDL  Abdomen WDL  Groin WDL  Scrotum/Coccyx/Buttocks WDL  (R) Leg WDL  (L) Leg WDL  (R) Heel/Foot/Toe WDL  (L) Heel/Foot/Toe WDL          Devices In Places ECG, Tele Box, Blood Pressure Cuff, and Pulse Ox      Interventions In Place Pillows and Q2 Turns    Possible Skin Injury No    Pictures Uploaded Into Epic N/A  Wound Consult Placed N/A  RN Wound Prevention Protocol Ordered Yes

## 2023-12-02 NOTE — ED NOTES
Patient bedside report given to ALF Elena . Pt AAO X 4 , respirations even and unlabored, on room air . Call light in reach,  Fall risk interventions in place.

## 2023-12-02 NOTE — ED NOTES
Sukumar from Lab called with critical result of K 2.6 at 0311 hrs. Critical lab result read back to Sukumar.   Dr. Sood notified of critical lab result at 0312.

## 2023-12-02 NOTE — PROGRESS NOTES
Pt arrived to unit via [facility]ney at 0640 with 2 RNs. Pt oriented to room, unit, and plan of care. Tele-monitor placed and monitor room notified. All questions answered at this time. Call light within reach; fall precautions in place.

## 2023-12-02 NOTE — ED NOTES
Med Rec partial complete per patient   Allergies reviewed  Antibiotics in the past 30 days:yes  Anticoagulant in past 14 days:no  Pharmacy patient utilizes:[facility] [address] [facility]    Metoprolol found via [employer] outside information. Pt unable to verify if he takes Metoprolol at the moment. He states he takes too many medications and is unsure.     Pt states he takes both gout medications PRN

## 2023-12-02 NOTE — H&P
Hospital Medicine History & Physical Note    Date of Service  12/2/2023    Primary Care Physician  Rey Pacheco M.D.    Consultants  ICU (Dr. Beth) - to IMCU    Code Status  Full Code    Chief Complaint  Chief Complaint   Patient presents with    Chest Pain    Shortness of Breath     Pt said he has on/off for 1 week, increased severity which prompted consult    He said he is drinking alcohol everyday    Denied any fever, cough       History of Presenting Illness  Alberto Maldonado is a 38 y.o. morbidly obese male with history of alcohol abuse who presented 12/2/2023 with evaluation for chest pain, shortness of breath.  Patient reported he has been binge drinking for the past week or so, stated his last drink was more than 48 hours ago.  He admits to drinking multiple many bottles of hard liquor daily, however difficulty tolerating oral diet, gagging down.  Complaint of nausea vomiting epigastric discomfort.  He was recently treated with oral antibiotic for suspected sinus infection, reported diarrhea for the past week.  In ER, noted to have concerning potassium level 2.6, magnesium 1.1, lactic acid 7.8.  He was given Valium 7.5 mg IV (shortage of Ativan) and 1 L IVF bolus.  Admission requested by ERP.  I requested ICU attending for evaluation, agreeable for IMCU admission.  Admitted to medicine service for further evaluation and treatment.    I discussed the plan of care with patient, bedside RN, pharmacy, and critical care.    Review of Systems  Review of Systems   Constitutional:  Positive for chills and malaise/fatigue. Negative for fever.   HENT:  Negative for hearing loss and tinnitus.    Eyes:  Negative for blurred vision and double vision.   Respiratory:  Positive for shortness of breath. Negative for cough and sputum production.    Cardiovascular:  Positive for chest pain, palpitations and orthopnea. Negative for leg swelling.   Gastrointestinal:  Positive for abdominal pain, heartburn, nausea and  vomiting.   Genitourinary:  Negative for dysuria, frequency and urgency.   Musculoskeletal:  Negative for joint pain and myalgias.   Skin:  Negative for itching and rash.   Neurological:  Positive for weakness. Negative for dizziness and headaches.   Endo/Heme/Allergies:  Negative for environmental allergies. Does not bruise/bleed easily.   Psychiatric/Behavioral:  Negative for depression and substance abuse.    All other systems reviewed and are negative.      Past Medical History   has a past medical history of Asthma without status asthmaticus (06/29/2017), Chickenpox, and Hypertension.    Surgical History   has no past surgical history on file.     Family History  family history is not on file.   Family history reviewed with patient. There is no family history that is pertinent to the chief complaint.     Social History   reports that he has never smoked. He has never used smokeless tobacco. He reports current alcohol use of about 7.2 oz of alcohol per week. He reports that he does not currently use drugs.    Allergies  No Known Allergies    Medications  Prior to Admission Medications   Prescriptions Last Dose Informant Patient Reported? Taking?   allopurinol (ZYLOPRIM) 300 MG Tab 1 WEEK AGO at K Patient No No   Sig: Take 1 Tablet by mouth every day.   amLODIPine (NORVASC) 10 MG Tab COUPLE WEEKS AGO at OUT Patient No No   Sig: Take 1 Tablet by mouth every day.   amoxicillin-clavulanate (AUGMENTIN) 875-125 MG Tab 11/30/2023 at COMPLETE Patient Yes Yes   Sig: Take 1 Tablet by mouth 2 times a day. X 5 days   cefdinir (OMNICEF) 250 MG/5ML suspension 11/25/2023 at COMPLETE Patient, Historical Yes Yes   Sig: Take 6 mL by mouth 2 times a day. X 7 day supply   colchicine (COLCRYS) 0.6 MG Tab COUPLE WEEKS AGO at PRN Patient, Historical Yes Yes   Sig: Take 0.6 mg by mouth 1 time a day as needed (gout).      Facility-Administered Medications: None       Physical Exam  Temp:  [36.5 °C (97.7 °F)] 36.5 °C (97.7 °F)  Pulse:   [121-140] 132  Resp:  [22-26] 25  BP: (170-189)/() 189/137  SpO2:  [94 %-98 %] 94 %  Blood Pressure: (!) 178/101   Temperature: 36.5 °C (97.7 °F)   Pulse: (!) 124   Respiration: (!) 24   Pulse Oximetry: 95 %       Physical Exam  Vitals and nursing note reviewed.   Constitutional:       Appearance: He is obese.   HENT:      Head: Normocephalic and atraumatic.      Nose: Nose normal.      Mouth/Throat:      Mouth: Mucous membranes are dry.      Pharynx: Oropharynx is clear.   Eyes:      General: No scleral icterus.     Extraocular Movements: Extraocular movements intact.   Cardiovascular:      Rate and Rhythm: Regular rhythm. Tachycardia present.   Pulmonary:      Effort: No respiratory distress.      Breath sounds: No wheezing or rales.      Comments: Tachypneic  Chest:      Chest wall: No tenderness.   Abdominal:      General: There is distension.      Tenderness: There is no abdominal tenderness. There is no guarding or rebound.   Musculoskeletal:         General: No tenderness. Normal range of motion.      Cervical back: Neck supple. No tenderness.      Right lower leg: No edema.      Left lower leg: No edema.   Skin:     General: Skin is warm and dry.      Capillary Refill: Capillary refill takes less than 2 seconds.   Neurological:      General: No focal deficit present.      Mental Status: He is alert and oriented to person, place, and time.         Laboratory:  Recent Labs     12/02/23 0211   WBC 10.1   RBC 4.91   HEMOGLOBIN 14.6   HEMATOCRIT 41.7*   MCV 84.9   MCH 29.7   MCHC 35.0   RDW 50.4*   PLATELETCT 323   MPV 8.7*     Recent Labs     12/02/23 0211   SODIUM 144   POTASSIUM 2.6*   CHLORIDE 103   CO2 16*   GLUCOSE 119*   BUN 4*   CREATININE 0.83   CALCIUM 8.2*     Recent Labs     12/02/23 0211   ALTSGPT 47   ASTSGOT 50*   ALKPHOSPHAT 160*   TBILIRUBIN 0.9   GLUCOSE 119*         Recent Labs     12/02/23 0211   NTPROBNP 68         Recent Labs     12/02/23 0211 12/02/23 0348   TROPONINT 16 17        Imaging:  DX-CHEST-PORTABLE (1 VIEW)   Final Result      Increased BILATERAL underinflation atelectasis          X-Ray:  I have personally reviewed the images and compared with prior images.  EKG:  I have personally reviewed the images and compared with prior images.    Assessment/Plan:  Justification for Admission Status  I anticipate this patient will require at least 2 midnights hospitalization, therefore appropriate for inpatient status.      * Alcohol withdrawal delirium, acute, hyperactive (HCC)- (present on admission)  Assessment & Plan  Detox bag  Multivitamins  Scheduled Librium  CIWA  Low threshold to escalate to Precedex drip  Admit to IMCU for close hemodynamic monitoring      Hypertensive urgency  Assessment & Plan  SBP>190 in ER  Continue losartan, metoprolol, amlodipine  As needed IV labetalol, Catapres    Hypokalemia  Assessment & Plan  K2.6  Replace  Replace Mg    Diarrhea  Assessment & Plan  Given recent antibiotic intake, check C. difficile    Elevated lactic acid level  Assessment & Plan  Likely secondary to EtOH/liver disease, dehydration  IVF  High-dose IV thiamine  Trend    Hypomagnesemia- (present on admission)  Assessment & Plan  Profound, Mg 1.1  Replace    GERD (gastroesophageal reflux disease)  Assessment & Plan  PPI    Gout- (present on admission)  Assessment & Plan  Allopurinol    Morbid obesity (HCC)- (present on admission)  Assessment & Plan  Diet and lifestyle modification  Body mass index is 42.51 kg/m².      BOLIVAR (obstructive sleep apnea)- (present on admission)  Assessment & Plan  Probable severe BOLIVAR  CPAP while inpatient        VTE prophylaxis: enoxaparin ppx

## 2023-12-02 NOTE — CARE PLAN
The patient is Watcher - Medium risk of patient condition declining or worsening    Shift Goals  Clinical Goals: Pt will not fall during shift    Progress made toward(s) clinical / shift goals:        Problem: Knowledge Deficit - Standard  Goal: Patient and family/care givers will demonstrate understanding of plan of care, disease process/condition, diagnostic tests and medications  Outcome: Progressing  Note: Pt educated regarding plan of care and medications. All questions answered.      Problem: Optimal Care for Alcohol Withdrawal  Goal: Optimal Care for the alcohol withdrawal patient  Outcome: Not Progressing  Note: CIWAA protocol in place         Problem: Pain - Standard  Goal: Alleviation of pain or a reduction in pain to the patient’s comfort goal  Outcome: Progressing  Note: Pt assessed for pain regularly and medicated PRN per MAR. Pt assessed for pain regularly and medicated PRN per MAR.        Problem: Optimal Care for Alcohol Withdrawal  Goal: Optimal Care for the alcohol withdrawal patient  Outcome: Not Progressing  Note: CIWAA protocol in place

## 2023-12-02 NOTE — ASSESSMENT & PLAN NOTE
Hx gout.  Treated with allopurinol.  Patient currently has a gout flare in the right ankle.  Cannot bear weight.  - Prednisone 40 mg.  - Allopurinol 300 mg.

## 2023-12-02 NOTE — ASSESSMENT & PLAN NOTE
Resolved    Likely secondary to EtOH/liver disease, dehydration  IVF  High-dose IV thiamine  Trend

## 2023-12-02 NOTE — ED PROVIDER NOTES
ED Provider Note    CHIEF COMPLAINT  Chief Complaint   Patient presents with    Chest Pain    Shortness of Breath     Pt said he has on/off for 1 week, increased severity which prompted consult    He said he is drinking alcohol everyday    Denied any fever, cough       EXTERNAL RECORDS REVIEWED  Other His weight was 230 06/2023. He was admitted 06/01/2023 after presenting with SVT versus atrial flutter, given adenosine without change, given diltiazem and HR decreased to 130s    HPI/ROS  LIMITATION TO HISTORY   Select: : None  OUTSIDE HISTORIAN(S):  brother    Alberto Maldonado is a 38 y.o. male who presents for evaluation of intermittent shortness of breath that has been ongoing for the past week.  States that has been worse since yesterday which made him come to the emergency room.  He is concerned that his symptoms are related to stopping alcohol.  He states that he typically drinks 5 shots in 1 day and has been doing that for the past several weeks.  His last drink was the night before last therefore approximately 29 hours ago.  He is not having any chest pain he just feels like it is hard to take a deep breath.  He has no abdominal pain, nausea, vomiting, fever, chills, leg pain, leg swelling.  He states that he is not able to sleep lying flat which is normal for him.    PAST MEDICAL HISTORY   has a past medical history of Asthma without status asthmaticus (06/29/2017), Chickenpox, and Hypertension.    SURGICAL HISTORY  patient denies any surgical history    FAMILY HISTORY  Family History   Problem Relation Age of Onset    Stroke Neg Hx     Heart Disease Neg Hx        SOCIAL HISTORY  Social History     Tobacco Use    Smoking status: Never    Smokeless tobacco: Never   Vaping Use    Vaping Use: Never used   Substance and Sexual Activity    Alcohol use: Yes     Alcohol/week: 7.2 oz     Types: 12 Shots of liquor per week     Comment: everyday    Drug use: Not Currently    Sexual activity: Not Currently  "      CURRENT MEDICATIONS  Home Medications       Reviewed by Juan Holden R.N. (Registered Nurse) on 12/02/23 at 0200  Med List Status: Partial     Medication Last Dose Status   allopurinol (ZYLOPRIM) 300 MG Tab  Active   amLODIPine (NORVASC) 10 MG Tab  Active   colchicine (COLCRYS) 0.6 MG Tab  Active                    ALLERGIES  No Known Allergies    PHYSICAL EXAM  VITAL SIGNS: BP (!) 189/124   Pulse (!) 140   Temp 36.5 °C (97.7 °F) (Temporal)   Resp (!) 22   Ht 1.6 m (5' 3\")   Wt 109 kg (240 lb)   SpO2 95%   BMI 42.51 kg/m²      Constitutional: Well developed, Well nourished, Sitting up in bed, tachypneic however speaking full sentences  HEENT: Normocephalic, Atraumatic,  external ears normal, pharynx pink,  Mucous  Membranes dry, No rhinorrhea or mucosal edema  Eyes: PERRL, EOMI, Conjunctiva normal, No discharge.   Neck: Normal range of motion, No tenderness, Supple, No stridor.   Cardiovascular: Tachycardic, regular rhythm, No murmurs,  rubs, or gallops.   Thorax & Lungs: Lungs clear to auscultation bilaterally, tachypneic, speaking full sentences  Abdomen:  Soft, non tender, non distended,  No pulsatile masses., no rebound guarding or peritoneal signs.   Skin: Warm, Dry, No erythema, No rash,   Back:  No CVA tenderness,  No spinal tenderness, bony crepitance step offs or instability.   Extremities: Equal, intact distal pulses, No cyanosis, clubbing or edema,  No tenderness.   Musculoskeletal: Good range of motion in all major joints. No tenderness to palpation or major deformities noted.   Neurologic: Alert & oriented No focal deficits noted.  No tremor with outstretched hand noted  Psychiatric: Affect normal, Judgment normal, Mood normal.      DIAGNOSTIC STUDIES / PROCEDURES  EKG  EKG (Now)   Result Value Ref Range    Report       [facility] Emergency Dept.    Test Date:  2023-12-02  Pt Name:    ANNIA ALMONTE                Department: ER  MRN:        [id number]                    "   Room:       [facility] [place]  Gender:     Male                         Technician: [id number]  :        1985                   Requested By:ER TRIAGE PROTOCOL  Order #:    [id number]                    Reading MD: Indigo Nagel    Measurements  Intervals                                Axis  Rate:       130                          P:          69  HI:         130                          QRS:        29  QRSD:       90                           T:          249  QT:         285  QTc:        419    Interpretive Statements  Sinus tachycardia  Normal axis  Normal intervals  Diffuse ST depressions, no ST elevations  Compared to ECG 10/28/2023 08:20:35  No significant changes    Electronically Signed On 2023 09:49:06 PST by Indigo Nagel         I have independently interpreted this EKG      LABS  Abnormal Labs Reviewed   CBC WITH DIFFERENTIAL - Abnormal; Notable for the following components:       Result Value    Hematocrit 41.7 (*)     RDW 50.4 (*)     MPV 8.7 (*)     Neutrophils-Polys 72.30 (*)     Lymphocytes 17.60 (*)     Nucleated RBC 0.40 (*)     Monos (Absolute) 0.92 (*)     All other components within normal limits    Narrative:     Biotin intake of greater than 5 mg per day may interfere with  troponin levels, causing false low values.   COMP METABOLIC PANEL - Abnormal; Notable for the following components:    Potassium 2.6 (*)     Co2 16 (*)     Anion Gap 25.0 (*)     Glucose 119 (*)     Bun 4 (*)     Calcium 8.2 (*)     Correct Calcium 8.0 (*)     AST(SGOT) 50 (*)     Alkaline Phosphatase 160 (*)     All other components within normal limits    Narrative:     Biotin intake of greater than 5 mg per day may interfere with  troponin levels, causing false low values.   LACTIC ACID - Abnormal; Notable for the following components:    Lactic Acid 7.8 (*)     All other components within normal limits   VENOUS BLOOD GAS - Abnormal; Notable for the following components:    Venous Bg Ph 7.51 (*)     Venous Bg Ph  Temp Corrected 7.51 (*)     Venous Bg Pco2 24.0 (*)     Venous Bg Pco2 Temp Corrected 24.1 (*)     Venous Bg Po2 47.9 (*)     Venous Bg Po2 Temp Corrected 48.2 (*)     Venous Bg Hco3 19 (*)     All other components within normal limits   MAGNESIUM - Abnormal; Notable for the following components:    Magnesium 1.1 (*)     All other components within normal limits         RADIOLOGY  I have independently interpreted the diagnostic imaging associated with this visit and am waiting the final reading from the radiologist.   My preliminary interpretation is as follows: no focal consolidation  Radiologist interpretation:   DX-CHEST-PORTABLE (1 VIEW)   Final Result      Increased BILATERAL underinflation atelectasis            COURSE & MEDICAL DECISION MAKING    ED Observation Status? Yes; I am placing the patient in to an observation status due to a diagnostic uncertainty as well as therapeutic intensity. Patient placed in observation status at 2:38 AM, 12/2/2023.     Observation plan is as follows: Labs, imaging, treatment for alcohol withdrawal, likely admission    4:45 AM patient was reevaluated bedside.  He has no now sleeping.  Heart rate at this time is 121, will give another dose of Valium.  Given his acidosis that is noted on BMP, lactate ordered.    4:50 AM I discussed with hospitalist, Dr. Corado, who agreed to admit the patient to the hospital.     0540 AM patient was found to have elevated lactate of 7.8.  He has no leukocytosis, no fever.  Lactic acidosis is likely related to dehydration as well as alcohol use and underlying liver disease.  No indication for antibiotics at this time.  Patient will be admitted to the IMCU for closer observation    Upon Reevaluation, the patient's condition has: not improved; and will be escalated to hospitalization.    Patient discharged from ED Observation status at 450 (Time) 12/02/2023 (Date).     INITIAL ASSESSMENT, COURSE AND PLAN  Care Narrative:   This is a 38-year-old  male with history as noted above who is presenting for evaluation of shortness of breath as well as p.o. intolerance.  He has a history of alcohol use disorder and his last drink was over 24 hours ago.  On arrival, he is hypertensive and tachycardic as well as tachypneic.  He has no fever.  He feels like he is in alcohol withdrawal.    An IV was established and labs were obtained.  There is no leukocytosis.  His hemoglobin is normal.  His alcohol level was less than 10.  He was given a dose of Valium with some improvement in his heart rate.    In terms of his shortness of breath, chest x-ray without any evidence of focal consolidation.  He has orthopnea at baseline likely due to underlying sleep apnea given his body habitus.  I considered PE however his D-dimer is negative and he has no risk factors for PE at this time.  I considered pneumonia but there is no evidence on chest x-ray.  I considered ACS however serial troponins are not elevated and EKG without any acute ischemic changes.  I considered new onset heart failure but there is no evidence of pulmonary edema seen on his chest x-ray and NT proBNP is normal.  Patient recently had a echocardiogram done October 29, 2023 which showed a normal EF.    Labs were obtained and he was found to have an anion gap metabolic acidosis.  VBG was obtained and he is alkalotic with pH of 7.51 with decreasing pCO2 likely due to tachypnea.  I considered alcoholic versus starvation ketosis but his beta hydroxybutyrate is normal.  His glucose is only elevated to 119 and he is not on any medications that would raise concern for euglycemic DKA.  He has no history of diabetes.  He was also found to be hypokalemic to 2.6 therefore he was given oral ablation and started on IV repletion.  His magnesium was also found to be low at 1.1 therefore he was given repletion.  He was found to have a lactic acidosis of 7.8.  I did consider a infectious process therefore blood cultures x 2 were  obtained.  His CRP as well as procalcitonin are not elevated.  He has no fever or leukocytosis.  Antibiotics were deferred at this time.    Patient admitted to [facility] in guarded condition.  His brother was updated by phone who is agreeable to plan.  Patient is agreeable to plan with no further questions.    HYDRATION: Based on the patient's presentation of Other lactic acidosis the patient was given IV fluids. IV Hydration was used because oral hydration was not adequate alone. Upon recheck following hydration, the patient was improved with lactic acidosis slightly better.        ADDITIONAL PROBLEM LIST  Alcohol withdrawal -treated with valium  Hypokalemia and hypomagnesemia - treated with IV potassium and IV magnesium  Anion gap metabolic acidosis -likely secondary to lactic acidosis likely due to dehydration as well as underlying liver disease    CRITICAL CARE  The very real possibilty of a deterioration of this patient's condition required the highest level of my preparedness for sudden, emergent intervention.  I provided critical care services, which included medication orders, frequent reevaluations of the patient's condition and response to treatment, ordering and reviewing test results, and discussing the case with various consultants.  The critical care time associated with the care of the patient was 31 minutes. Review chart for interventions. This time is exclusive of any other billable procedures.       DISPOSITION AND DISCUSSIONS  I have discussed management of the patient with the following physicians and BATSHEVA's:    Hospitalist, Dr. Corado    Discussion of management with other [facility] [place] or appropriate source(s): None     Patient admitted to hospitalistDr. Corado, in guarded condition    FINAL DIAGNOSIS  1. Shortness of breath    2. Tachypnea    3. Alcohol withdrawal syndrome without complication (HCC)    4. Metabolic acidosis    5. Lactic acidosis    6. Hypokalemia    7. Hypomagnesemia            Electronically signed by: Indigo Nagel M.D., 12/2/2023 2:38 AM

## 2023-12-02 NOTE — ED NOTES
Patient refused to sit on bed and started crying because of dyspnea, charge nurse made aware and recliner placed in patient room to initiate IV potasium, call light place with in reach. Instructed patient to call Nurse.

## 2023-12-02 NOTE — ASSESSMENT & PLAN NOTE
Suspect due to dehydration and underlying liver disease  IV hydration and trend lactics  If not improving, CT abdomen

## 2023-12-02 NOTE — PROGRESS NOTES
Pt was making unsafe attempts to get out of bed. Pt was educated to stay in bed. Fall precautions in place. Bed in lowest position. Non-skid socks in place. Personal possessions within reach. Mobility sign on door. Bed-alarm on. Call light within reach. Pt educated regarding fall prevention and states understanding.

## 2023-12-02 NOTE — PROGRESS NOTES
Dr. Soler was updated regarding lactic acid of 7. Also, pt appears restless and SOB. SPO2% 95% on RA.

## 2023-12-02 NOTE — CONSULTS
Critical Care Consultation    Date of consult: 12/2/2023    Referring Physician  Gage Corado M.D.    Reason for Consultation  Alcohol withdrawal, elevated lactic    History of Presenting Illness  38 y.o. male who presented 12/2/2023 for alcohol detox. He drinks 5-10 mini bottles of hard alcohol daily. Last drink Wednesday. Has been gagging so can't tolerate his drinks anymore so came in for detox.  Was treated with both cefdinir and augmentin recently for sinus infection and otitis media. Having 10 episodes of diarrhea daily for a week or 2. Also mild emesis and lots of gagging. No blood in emesis or stool. No history of seizure that he's aware of. Mild abdominal pain. Also feeling a bit SOB.     Code Status  Full Code    Review of Systems  Review of Systems   Respiratory:  Positive for shortness of breath.    Gastrointestinal:  Positive for abdominal pain, diarrhea and vomiting.   Neurological:  Negative for seizures.       Past Medical History   has a past medical history of Asthma without status asthmaticus (06/29/2017), Chickenpox, and Hypertension.    He has no past medical history of Cancer (HCC) or Diabetes (HCC).    Surgical History   has no past surgical history on file.    Family History  family history is not on file.    Social History   reports that he has never smoked. He has never used smokeless tobacco. He reports current alcohol use of about 7.2 oz of alcohol per week. He reports that he does not currently use drugs.    Medications  Home Medications       Reviewed by Bernard Block (Pharmacy Tech) on 12/02/23 at 0423  Med List Status: Partial     Medication Last Dose Status   allopurinol (ZYLOPRIM) 300 MG Tab 1 WEEK AGO Active   amLODIPine (NORVASC) 10 MG Tab COUPLE WEEKS AGO Active   amoxicillin-clavulanate (AUGMENTIN) 875-125 MG Tab 11/30/2023 Active   cefdinir (OMNICEF) 250 MG/5ML suspension 11/25/2023 Active   colchicine (COLCRYS) 0.6 MG Tab COUPLE WEEKS AGO Active                   Current Facility-Administered Medications   Medication Dose Route Frequency Provider Last Rate Last Admin    potassium chloride (Kcl) ivpb 10 mEq  10 mEq Intravenous Once Indigo Nagle M.D. 25 mL/hr at 12/02/23 0336 10 mEq at 12/02/23 0336    Rally Bag IV (D5LR 1000 mL + Thiamine 100 mg + Folic Acid 1 mg + Magnesium Sulfate 1 g) infusion   Intravenous Once Indigo Nagel M.D.        senna-docusate (Pericolace Or Senokot S) 8.6-50 MG per tablet 2 Tablet  2 Tablet Oral BID Gage Corado M.D.        And    polyethylene glycol/lytes (Miralax) Packet 1 Packet  1 Packet Oral QDAY PRN Gage Corado M.D.        And    bisacodyl (Dulcolax) suppository 10 mg  10 mg Rectal QDAY PRN Gage Corado M.D.        lactated ringers infusion (BOLUS)  500 mL Intravenous Once PRN Gage Corado M.D.        enoxaparin (Lovenox) inj 40 mg  40 mg Subcutaneous DAILY AT 1800 Gage Corado M.D.   40 mg at 12/02/23 0541    acetaminophen (Tylenol) tablet 650 mg  650 mg Oral Q6HRS PRN Gage Corado M.D.        labetalol (Normodyne/Trandate) injection 10 mg  10 mg Intravenous Q4HRS PRN Gage Corado M.D.        ondansetron (Zofran) syringe/vial injection 4 mg  4 mg Intravenous Q4HRS PRN Gage Corado M.D.        ondansetron (Zofran ODT) dispertab 4 mg  4 mg Oral Q4HRS PRN Gage Corado M.D.        promethazine (Phenergan) tablet 12.5-25 mg  12.5-25 mg Oral Q4HRS PRN Gage Corado M.D.        promethazine (Phenergan) suppository 12.5-25 mg  12.5-25 mg Rectal Q4HRS PRN Gage Corado M.D.        prochlorperazine (Compazine) injection 5-10 mg  5-10 mg Intravenous Q4HRS PRN Gage Corado M.D.        LORazepam (Ativan) tablet 0.5 mg  0.5 mg Oral Q4HRS PRN Gage Corado M.D.        LORazepam (Ativan) tablet 1 mg  1 mg Oral Q4HRS PRN Gage Corado M.D.        LORazepam (Ativan) tablet 2 mg  2 mg Oral Q2HRS PRN Gage Corado M.D.        LORazepam (Ativan) tablet 3 mg  3 mg Oral Q HOUR PRN Gage Corado M.D.        LORazepam (Ativan) tablet 4  mg  4 mg Oral Q15 MIN PRN Gage Corado M.D.        Or    diazePAM (Valium) injection 10 mg  10 mg Intravenous Q15 MIN PRN Gage Corado M.D.        chlordiazePOXIDE (Librium) capsule 100 mg  100 mg Oral Q6HRS Gage Corado M.D.   100 mg at 12/02/23 0550    Followed by    [START ON 12/3/2023] chlordiazePOXIDE (Librium) capsule 50 mg  50 mg Oral Q6HRS Gage Corado M.D.        thiamine (Vitamin B-1) tablet 100 mg  100 mg Oral DAILY Gage Corado M.D.   100 mg at 12/02/23 0540    And    multivitamin tablet 1 Tablet  1 Tablet Oral DAILY Gage Corado M.D.   1 Tablet at 12/02/23 0540    And    folic acid (Folvite) tablet 1 mg  1 mg Oral DAILY Gage Corado M.D.   1 mg at 12/02/23 0540    cloNIDine (Catapres) tablet 0.1 mg  0.1 mg Oral Q HOUR PRN Gage Corado M.D.        0.9 % NaCl with KCl 40 mEq 1,000 mL   Intravenous Continuous Gage Corado M.D.        pantoprazole (Protonix) injection 40 mg  40 mg Intravenous DAILY Gage Corado M.D.   40 mg at 12/02/23 0541    metoprolol tartrate (Lopressor) tablet 50 mg  50 mg Oral BID Gage Corado M.D.   50 mg at 12/02/23 0540    losartan (Cozaar) tablet 50 mg  50 mg Oral Q DAY Gage Corado M.D.   50 mg at 12/02/23 0540    ipratropium (Atrovent) 0.02 % nebulizer solution 0.5 mg  0.5 mg Nebulization Q4HRS PRN Gage Corado M.D.        thiamine (B-1) 500 mg in dextrose 5% 100 mL IVPB  500 mg Intravenous TID Gage Corado M.D.        magnesium sulfate 5 g in  mL IVPB  5 g Intravenous Once Gage Corado M.D.        allopurinol (Zyloprim) tablet 300 mg  300 mg Oral DAILY Gage Corado M.D.        amLODIPine (Norvasc) tablet 10 mg  10 mg Oral DAILY Gage Corado M.D.         Current Outpatient Medications   Medication Sig Dispense Refill    colchicine (COLCRYS) 0.6 MG Tab Take 0.6 mg by mouth 1 time a day as needed (gout).      amoxicillin-clavulanate (AUGMENTIN) 875-125 MG Tab Take 1 Tablet by mouth 2 times a day. X 5 days      cefdinir (OMNICEF) 250 MG/5ML suspension Take 6  mL by mouth 2 times a day. X 7 day supply      allopurinol (ZYLOPRIM) 300 MG Tab Take 1 Tablet by mouth every day. 90 Tablet 3    amLODIPine (NORVASC) 10 MG Tab Take 1 Tablet by mouth every day. 90 Tablet 3       Allergies  No Known Allergies    Vital Signs last 24 hours  Temp:  [36.5 °C (97.7 °F)] 36.5 °C (97.7 °F)  Pulse:  [121-140] 126  Resp:  [22-26] 25  BP: (170-189)/() 189/137  SpO2:  [95 %-98 %] 95 %    Physical Exam  Physical Exam  Constitutional:       General: He is not in acute distress.     Appearance: He is not ill-appearing.   HENT:      Mouth/Throat:      Mouth: Mucous membranes are dry.   Cardiovascular:      Rate and Rhythm: Regular rhythm. Tachycardia present.   Pulmonary:      Comments: Mildly increase work of breathing  Abdominal:      General: Bowel sounds are normal.      Palpations: Abdomen is soft.      Tenderness: There is no abdominal tenderness.   Skin:     General: Skin is warm and dry.   Neurological:      General: No focal deficit present.      Mental Status: He is oriented to person, place, and time.      Comments: No tremor   Psychiatric:         Mood and Affect: Mood normal.      Comments: pleasant         Fluids  No intake or output data in the 24 hours ending 23 0559    Laboratory  Recent Results (from the past 48 hour(s))   EKG (Now)    Collection Time: 23  1:59 AM   Result Value Ref Range    Report       [facility]    Test Date:  2023  Pt Name:    ANNIA ALMONTE                Department: ER  MRN:        [id number]                      Room:  Gender:     Male                         Technician: [id number]  :        1985                   Requested By:ER TRIAGE PROTOCOL  Order #:    [id number]                    Reading MD:    Measurements  Intervals                                Axis  Rate:       130                          P:          69  IN:         130                          QRS:        29  QRSD:       90                            T:          249  QT:         285  QTc:        419    Interpretive Statements  Sinus tachycardia  LVH with secondary repolarization abnormality  Compared to ECG 10/28/2023 08:20:35  Left ventricular hypertrophy now present  Possible ischemia no longer present     CBC with Differential    Collection Time: 12/02/23  2:11 AM   Result Value Ref Range    WBC 10.1 4.8 - 10.8 K/uL    RBC 4.91 4.70 - 6.10 M/uL    Hemoglobin 14.6 14.0 - 18.0 g/dL    Hematocrit 41.7 (L) 42.0 - 52.0 %    MCV 84.9 81.4 - 97.8 fL    MCH 29.7 27.0 - 33.0 pg    MCHC 35.0 32.3 - 36.5 g/dL    RDW 50.4 (H) 35.9 - 50.0 fL    Platelet Count 323 164 - 446 K/uL    MPV 8.7 (L) 9.0 - 12.9 fL    Neutrophils-Polys 72.30 (H) 44.00 - 72.00 %    Lymphocytes 17.60 (L) 22.00 - 41.00 %    Monocytes 9.10 0.00 - 13.40 %    Eosinophils 0.10 0.00 - 6.90 %    Basophils 0.40 0.00 - 1.80 %    Immature Granulocytes 0.50 0.00 - 0.90 %    Nucleated RBC 0.40 (H) 0.00 - 0.20 /100 WBC    Neutrophils (Absolute) 7.32 1.82 - 7.42 K/uL    Lymphs (Absolute) 1.78 1.00 - 4.80 K/uL    Monos (Absolute) 0.92 (H) 0.00 - 0.85 K/uL    Eos (Absolute) 0.01 0.00 - 0.51 K/uL    Baso (Absolute) 0.04 0.00 - 0.12 K/uL    Immature Granulocytes (abs) 0.05 0.00 - 0.11 K/uL    NRBC (Absolute) 0.04 K/uL   Complete Metabolic Panel (CMP)    Collection Time: 12/02/23  2:11 AM   Result Value Ref Range    Sodium 144 135 - 145 mmol/L    Potassium 2.6 (LL) 3.6 - 5.5 mmol/L    Chloride 103 96 - 112 mmol/L    Co2 16 (L) 20 - 33 mmol/L    Anion Gap 25.0 (H) 7.0 - 16.0    Glucose 119 (H) 65 - 99 mg/dL    Bun 4 (L) 8 - 22 mg/dL    Creatinine 0.83 0.50 - 1.40 mg/dL    Calcium 8.2 (L) 8.5 - 10.5 mg/dL    Correct Calcium 8.0 (L) 8.5 - 10.5 mg/dL    AST(SGOT) 50 (H) 12 - 45 U/L    ALT(SGPT) 47 2 - 50 U/L    Alkaline Phosphatase 160 (H) 30 - 99 U/L    Total Bilirubin 0.9 0.1 - 1.5 mg/dL    Albumin 4.2 3.2 - 4.9 g/dL    Total Protein 7.6 6.0 - 8.2 g/dL    Globulin 3.4 1.9 - 3.5 g/dL    A-G Ratio 1.2 g/dL    Troponins NOW    Collection Time: 12/02/23  2:11 AM   Result Value Ref Range    Troponin T 16 6 - 19 ng/L   D-DIMER    Collection Time: 12/02/23  2:11 AM   Result Value Ref Range    D-Dimer 0.30 0.00 - 0.50 ug/mL (FEU)   proBrain Natriuretic Peptide, NT    Collection Time: 12/02/23  2:11 AM   Result Value Ref Range    NT-proBNP 68 0 - 125 pg/mL   ESTIMATED GFR    Collection Time: 12/02/23  2:11 AM   Result Value Ref Range    GFR (CKD-EPI) 116 >60 mL/min/1.73 m 2   BETA-HYDROXYBUTYRIC ACID    Collection Time: 12/02/23  2:11 AM   Result Value Ref Range    beta-Hydroxybutyric Acid 0.20 0.02 - 0.27 mmol/L   Troponins in two (2) hours    Collection Time: 12/02/23  3:48 AM   Result Value Ref Range    Troponin T 17 6 - 19 ng/L   DIAGNOSTIC ALCOHOL    Collection Time: 12/02/23  3:48 AM   Result Value Ref Range    Diagnostic Alcohol <10.1 <10.1 mg/dL   Lactic Acid    Collection Time: 12/02/23  4:44 AM   Result Value Ref Range    Lactic Acid 7.8 (HH) 0.5 - 2.0 mmol/L   MAGNESIUM    Collection Time: 12/02/23  4:49 AM   Result Value Ref Range    Magnesium 1.1 (L) 1.5 - 2.5 mg/dL   PHOSPHORUS    Collection Time: 12/02/23  4:49 AM   Result Value Ref Range    Phosphorus 3.2 2.5 - 4.5 mg/dL   CRP QUANTITIVE (NON-CARDIAC)    Collection Time: 12/02/23  4:49 AM   Result Value Ref Range    Stat C-Reactive Protein <0.30 0.00 - 0.75 mg/dL   PROCALCITONIN    Collection Time: 12/02/23  4:49 AM   Result Value Ref Range    Procalcitonin 0.10 <0.25 ng/mL   VENOUS BLOOD GAS    Collection Time: 12/02/23  5:16 AM   Result Value Ref Range    Venous Bg Ph 7.51 (H) 7.31 - 7.45    Venous Bg Ph Temp Corrected 7.51 (H) 7.31 - 7.45    Venous Bg Pco2 24.0 (L) 41.0 - 51.0 mmHg    Venous Bg Pco2 Temp Corrected 24.1 (L) 41.0 - 51.0 mmHg    Venous Bg Po2 47.9 (H) 25.0 - 40.0 mmHg    Venous Bg Po2 Temp Corrected 48.2 (H) 25.0 - 40.0 mmHg    Venous Bg O2 Saturation 84.6 %    Venous Bg Hco3 19 (L) 24 - 28 mmol/L    Venous Bg Base Excess -2 mmol/L    Body  Temp 37.1 Centigrade    O2 Therapy room        Imaging  DX-CHEST-PORTABLE (1 VIEW)   Final Result      Increased BILATERAL underinflation atelectasis          Assessment/Plan  * Alcohol withdrawal delirium, acute, hyperactive (HCC)- (present on admission)  Assessment & Plan  Tachycardic, hypertensive, no tremor  Librium and CIWA targeted ativan per hospitalist  vitamins    Electrolyte abnormality  Assessment & Plan  Severe hypokalemia and hypomagnesemia  Replete and trend    Diarrhea  Assessment & Plan  Concern for C. Diff given recent antibiotics use  Check C. Diff  IV hydration    Elevated lactic acid level  Assessment & Plan  Suspect due to dehydration and underlying liver disease  IV hydration and trend lactics  If not improving, CT abdomen      Appropriate for IMCU    Discussed patient condition and risk of morbidity and/or mortality with Hospitalist, RN, and Patient.

## 2023-12-02 NOTE — ASSESSMENT & PLAN NOTE
Resolved.    Detox bag  Multivitamins  CIWA  Low-level sedation as the patient appears intermittently severely medicated, oversedated

## 2023-12-02 NOTE — ED NOTES
Bedside report received from off going RN/tech: Jeimy, assumed care of patient.  POC discussed with patient. Call light within reach, all needs addressed at this time.       Fall risk interventions in place: Not Applicable (all applicable per [place] Fall risk assessment)   Continuous monitoring: Cardiac Leads, Pulse Ox, or Blood Pressure  IVF/IV medications: Infusion per MAR (List Med(s)) Potassium, Rally Bag  Oxygen: Room Air  Bedside sitter: Not Applicable   Isolation: Not Applicable

## 2023-12-02 NOTE — ASSESSMENT & PLAN NOTE
Resolved     SBP>190 in ER  Continue losartan, metoprolol, amlodipine  As needed IV labetalol, Catapres

## 2023-12-02 NOTE — ED TRIAGE NOTES
Chief Complaint   Patient presents with    Chest Pain    Shortness of Breath     Pt said he has on/off for 1 week, increased severity which prompted consult    He said he is drinking alcohol everyday    Denied any fever, cough       Pain: 6/10    Pt came in to triage ambulatory with steady gait for the above complaints.     Pt is alert and oriented x 4, speaking in full sentences, follows commands and responds appropriately to questions.     Noted tachypnea    Pt placed in lobby. Pt educated on triage process.     Pt encouraged to inform staff for any changes in condition or if needs help while waiting to be room in.    Vitals:    12/02/23 0152   BP: (!) 189/124   Pulse: (!) 140   Resp: (!) 22   Temp: 36.5 °C (97.7 °F)   SpO2: 95%

## 2023-12-03 LAB
ANION GAP SERPL CALC-SCNC: 12 MMOL/L (ref 7–16)
BUN SERPL-MCNC: 7 MG/DL (ref 8–22)
CALCIUM SERPL-MCNC: 7.4 MG/DL (ref 8.5–10.5)
CHLORIDE SERPL-SCNC: 111 MMOL/L (ref 96–112)
CO2 SERPL-SCNC: 20 MMOL/L (ref 20–33)
CREAT SERPL-MCNC: 0.8 MG/DL (ref 0.5–1.4)
EKG IMPRESSION: NORMAL
ERYTHROCYTE [DISTWIDTH] IN BLOOD BY AUTOMATED COUNT: 54 FL (ref 35.9–50)
GFR SERPLBLD CREATININE-BSD FMLA CKD-EPI: 118 ML/MIN/1.73 M 2
GLUCOSE SERPL-MCNC: 113 MG/DL (ref 65–99)
HCT VFR BLD AUTO: 35 % (ref 42–52)
HGB BLD-MCNC: 12.2 G/DL (ref 14–18)
MAGNESIUM SERPL-MCNC: 2.5 MG/DL (ref 1.5–2.5)
MCH RBC QN AUTO: 30.5 PG (ref 27–33)
MCHC RBC AUTO-ENTMCNC: 34.9 G/DL (ref 32.3–36.5)
MCV RBC AUTO: 87.5 FL (ref 81.4–97.8)
PLATELET # BLD AUTO: 248 K/UL (ref 164–446)
PMV BLD AUTO: 8.9 FL (ref 9–12.9)
POTASSIUM SERPL-SCNC: 4 MMOL/L (ref 3.6–5.5)
RBC # BLD AUTO: 4 M/UL (ref 4.7–6.1)
SODIUM SERPL-SCNC: 143 MMOL/L (ref 135–145)
WBC # BLD AUTO: 5.8 K/UL (ref 4.8–10.8)

## 2023-12-03 PROCEDURE — 700101 HCHG RX REV CODE 250: Performed by: STUDENT IN AN ORGANIZED HEALTH CARE EDUCATION/TRAINING PROGRAM

## 2023-12-03 PROCEDURE — 94640 AIRWAY INHALATION TREATMENT: CPT

## 2023-12-03 PROCEDURE — 85027 COMPLETE CBC AUTOMATED: CPT

## 2023-12-03 PROCEDURE — A9270 NON-COVERED ITEM OR SERVICE: HCPCS | Performed by: HOSPITALIST

## 2023-12-03 PROCEDURE — 700101 HCHG RX REV CODE 250: Performed by: HOSPITALIST

## 2023-12-03 PROCEDURE — 99233 SBSQ HOSP IP/OBS HIGH 50: CPT | Performed by: HOSPITALIST

## 2023-12-03 PROCEDURE — 770000 HCHG ROOM/CARE - INTERMEDIATE ICU *

## 2023-12-03 PROCEDURE — 700111 HCHG RX REV CODE 636 W/ 250 OVERRIDE (IP): Mod: JZ | Performed by: STUDENT IN AN ORGANIZED HEALTH CARE EDUCATION/TRAINING PROGRAM

## 2023-12-03 PROCEDURE — 80048 BASIC METABOLIC PNL TOTAL CA: CPT

## 2023-12-03 PROCEDURE — 700105 HCHG RX REV CODE 258: Performed by: HOSPITALIST

## 2023-12-03 PROCEDURE — 700105 HCHG RX REV CODE 258: Performed by: STUDENT IN AN ORGANIZED HEALTH CARE EDUCATION/TRAINING PROGRAM

## 2023-12-03 PROCEDURE — C9113 INJ PANTOPRAZOLE SODIUM, VIA: HCPCS | Performed by: STUDENT IN AN ORGANIZED HEALTH CARE EDUCATION/TRAINING PROGRAM

## 2023-12-03 PROCEDURE — 83735 ASSAY OF MAGNESIUM: CPT

## 2023-12-03 PROCEDURE — 700102 HCHG RX REV CODE 250 W/ 637 OVERRIDE(OP): Performed by: HOSPITALIST

## 2023-12-03 PROCEDURE — A9270 NON-COVERED ITEM OR SERVICE: HCPCS | Performed by: STUDENT IN AN ORGANIZED HEALTH CARE EDUCATION/TRAINING PROGRAM

## 2023-12-03 PROCEDURE — 700102 HCHG RX REV CODE 250 W/ 637 OVERRIDE(OP): Performed by: STUDENT IN AN ORGANIZED HEALTH CARE EDUCATION/TRAINING PROGRAM

## 2023-12-03 RX ORDER — CALCIUM GLUCONATE 20 MG/ML
2 INJECTION, SOLUTION INTRAVENOUS ONCE
Status: COMPLETED | OUTPATIENT
Start: 2023-12-03 | End: 2023-12-03

## 2023-12-03 RX ORDER — QUETIAPINE FUMARATE 25 MG/1
50 TABLET, FILM COATED ORAL EVERY MORNING
Status: DISCONTINUED | OUTPATIENT
Start: 2023-12-04 | End: 2023-12-04

## 2023-12-03 RX ORDER — FOLIC ACID 1 MG/1
1 TABLET ORAL DAILY
Status: DISPENSED | OUTPATIENT
Start: 2023-12-04 | End: 2023-12-06

## 2023-12-03 RX ORDER — SODIUM CHLORIDE, SODIUM LACTATE, POTASSIUM CHLORIDE, CALCIUM CHLORIDE 600; 310; 30; 20 MG/100ML; MG/100ML; MG/100ML; MG/100ML
INJECTION, SOLUTION INTRAVENOUS CONTINUOUS
Status: DISCONTINUED | OUTPATIENT
Start: 2023-12-04 | End: 2023-12-04

## 2023-12-03 RX ORDER — OMEPRAZOLE 20 MG/1
20 CAPSULE, DELAYED RELEASE ORAL DAILY
Status: DISCONTINUED | OUTPATIENT
Start: 2023-12-04 | End: 2023-12-03

## 2023-12-03 RX ORDER — CALCIUM GLUCONATE 20 MG/ML
2 INJECTION, SOLUTION INTRAVENOUS ONCE
Status: COMPLETED | OUTPATIENT
Start: 2023-12-04 | End: 2023-12-04

## 2023-12-03 RX ORDER — LOPERAMIDE HYDROCHLORIDE 2 MG/1
2 CAPSULE ORAL 4 TIMES DAILY PRN
Status: DISCONTINUED | OUTPATIENT
Start: 2023-12-03 | End: 2023-12-08 | Stop reason: HOSPADM

## 2023-12-03 RX ORDER — GAUZE BANDAGE 2" X 2"
100 BANDAGE TOPICAL DAILY
Status: COMPLETED | OUTPATIENT
Start: 2023-12-05 | End: 2023-12-06

## 2023-12-03 RX ORDER — HYDROXYZINE HYDROCHLORIDE 25 MG/1
50 TABLET, FILM COATED ORAL 4 TIMES DAILY PRN
Status: DISCONTINUED | OUTPATIENT
Start: 2023-12-03 | End: 2023-12-08 | Stop reason: HOSPADM

## 2023-12-03 RX ORDER — OMEPRAZOLE 20 MG/1
40 CAPSULE, DELAYED RELEASE ORAL 2 TIMES DAILY
Status: DISCONTINUED | OUTPATIENT
Start: 2023-12-04 | End: 2023-12-08 | Stop reason: HOSPADM

## 2023-12-03 RX ORDER — QUETIAPINE FUMARATE 100 MG/1
100 TABLET, FILM COATED ORAL NIGHTLY
Status: DISCONTINUED | OUTPATIENT
Start: 2023-12-04 | End: 2023-12-03

## 2023-12-03 RX ADMIN — ENOXAPARIN SODIUM 40 MG: 100 INJECTION SUBCUTANEOUS at 17:15

## 2023-12-03 RX ADMIN — PANTOPRAZOLE SODIUM 40 MG: 40 INJECTION, POWDER, FOR SOLUTION INTRAVENOUS at 05:43

## 2023-12-03 RX ADMIN — CHLORDIAZEPOXIDE HYDROCHLORIDE 50 MG: 25 CAPSULE ORAL at 05:43

## 2023-12-03 RX ADMIN — QUETIAPINE FUMARATE 150 MG: 100 TABLET ORAL at 23:48

## 2023-12-03 RX ADMIN — IPRATROPIUM BROMIDE 0.5 MG: 0.5 SOLUTION RESPIRATORY (INHALATION) at 19:00

## 2023-12-03 RX ADMIN — POTASSIUM CHLORIDE AND SODIUM CHLORIDE: 900; 300 INJECTION, SOLUTION INTRAVENOUS at 16:24

## 2023-12-03 RX ADMIN — LOPERAMIDE HYDROCHLORIDE 2 MG: 2 CAPSULE ORAL at 16:26

## 2023-12-03 RX ADMIN — CHLORDIAZEPOXIDE HYDROCHLORIDE 50 MG: 25 CAPSULE ORAL at 23:13

## 2023-12-03 RX ADMIN — THIAMINE HYDROCHLORIDE 500 MG: 100 INJECTION, SOLUTION INTRAMUSCULAR; INTRAVENOUS at 20:13

## 2023-12-03 RX ADMIN — LOPERAMIDE HYDROCHLORIDE 2 MG: 2 CAPSULE ORAL at 13:06

## 2023-12-03 RX ADMIN — LORAZEPAM 1 MG: 1 TABLET ORAL at 10:15

## 2023-12-03 RX ADMIN — ALLOPURINOL 300 MG: 300 TABLET ORAL at 05:43

## 2023-12-03 RX ADMIN — DEXMEDETOMIDINE HYDROCHLORIDE 0.2 MCG/KG/HR: 100 INJECTION, SOLUTION INTRAVENOUS at 22:51

## 2023-12-03 RX ADMIN — LORAZEPAM 2 MG: 2 TABLET ORAL at 22:09

## 2023-12-03 RX ADMIN — METOPROLOL TARTRATE 50 MG: 50 TABLET, FILM COATED ORAL at 17:16

## 2023-12-03 RX ADMIN — AMLODIPINE BESYLATE 10 MG: 5 TABLET ORAL at 05:42

## 2023-12-03 RX ADMIN — POTASSIUM CHLORIDE AND SODIUM CHLORIDE: 900; 300 INJECTION, SOLUTION INTRAVENOUS at 01:51

## 2023-12-03 RX ADMIN — THIAMINE HYDROCHLORIDE 500 MG: 100 INJECTION, SOLUTION INTRAMUSCULAR; INTRAVENOUS at 08:32

## 2023-12-03 RX ADMIN — CALCIUM GLUCONATE 2 G: 20 INJECTION, SOLUTION INTRAVENOUS at 08:30

## 2023-12-03 RX ADMIN — THERA TABS 1 TABLET: TAB at 05:43

## 2023-12-03 RX ADMIN — LOPERAMIDE HYDROCHLORIDE 2 MG: 2 CAPSULE ORAL at 14:43

## 2023-12-03 RX ADMIN — CHLORDIAZEPOXIDE HYDROCHLORIDE 50 MG: 25 CAPSULE ORAL at 12:33

## 2023-12-03 RX ADMIN — FOLIC ACID 1 MG: 1 TABLET ORAL at 05:43

## 2023-12-03 RX ADMIN — SODIUM CHLORIDE, POTASSIUM CHLORIDE, SODIUM LACTATE AND CALCIUM CHLORIDE: 600; 310; 30; 20 INJECTION, SOLUTION INTRAVENOUS at 23:55

## 2023-12-03 RX ADMIN — CALCIUM GLUCONATE 2 G: 20 INJECTION, SOLUTION INTRAVENOUS at 23:53

## 2023-12-03 RX ADMIN — Medication 100 MG: at 05:43

## 2023-12-03 RX ADMIN — POTASSIUM CHLORIDE AND SODIUM CHLORIDE: 900; 300 INJECTION, SOLUTION INTRAVENOUS at 09:06

## 2023-12-03 RX ADMIN — THIAMINE HYDROCHLORIDE 500 MG: 100 INJECTION, SOLUTION INTRAMUSCULAR; INTRAVENOUS at 16:24

## 2023-12-03 RX ADMIN — CHLORDIAZEPOXIDE HYDROCHLORIDE 50 MG: 25 CAPSULE ORAL at 17:15

## 2023-12-03 RX ADMIN — LORAZEPAM 2 MG: 2 TABLET ORAL at 20:04

## 2023-12-03 RX ADMIN — METOPROLOL TARTRATE 50 MG: 50 TABLET, FILM COATED ORAL at 05:43

## 2023-12-03 RX ADMIN — LOSARTAN POTASSIUM 50 MG: 50 TABLET, FILM COATED ORAL at 05:44

## 2023-12-03 ASSESSMENT — LIFESTYLE VARIABLES
ORIENTATION AND CLOUDING OF SENSORIUM: CANNOT DO SERIAL ADDITIONS OR IS UNCERTAIN ABOUT DATE
TOTAL SCORE: 14
AUDITORY DISTURBANCES: NOT PRESENT
NAUSEA AND VOMITING: NO NAUSEA AND NO VOMITING
TREMOR: NO TREMOR
AUDITORY DISTURBANCES: NOT PRESENT
ANXIETY: MILDLY ANXIOUS
NAUSEA AND VOMITING: NO NAUSEA AND NO VOMITING
TREMOR: NO TREMOR
PAROXYSMAL SWEATS: *
TOTAL SCORE: 14
HEADACHE, FULLNESS IN HEAD: MODERATE
ANXIETY: MODERATELY ANXIOUS OR GUARDED, SO ANXIETY IS INFERRED
ORIENTATION AND CLOUDING OF SENSORIUM: ORIENTED AND CAN DO SERIAL ADDITIONS
TOTAL SCORE: 1
PAROXYSMAL SWEATS: NO SWEAT VISIBLE
PAROXYSMAL SWEATS: BARELY PERCEPTIBLE SWEATING. PALMS MOIST
AGITATION: MODERATELY FIDGETY AND RESTLESS
ANXIETY: MILDLY ANXIOUS
ORIENTATION AND CLOUDING OF SENSORIUM: ORIENTED AND CAN DO SERIAL ADDITIONS
TREMOR: NO TREMOR
VISUAL DISTURBANCES: NOT PRESENT
HEADACHE, FULLNESS IN HEAD: NOT PRESENT
TREMOR: NO TREMOR
AUDITORY DISTURBANCES: NOT PRESENT
VISUAL DISTURBANCES: NOT PRESENT
AUDITORY DISTURBANCES: NOT PRESENT
ORIENTATION AND CLOUDING OF SENSORIUM: ORIENTED AND CAN DO SERIAL ADDITIONS
ANXIETY: NO ANXIETY (AT EASE)
ORIENTATION AND CLOUDING OF SENSORIUM: CANNOT DO SERIAL ADDITIONS OR IS UNCERTAIN ABOUT DATE
PAROXYSMAL SWEATS: BARELY PERCEPTIBLE SWEATING. PALMS MOIST
AUDITORY DISTURBANCES: NOT PRESENT
TREMOR: NO TREMOR
NAUSEA AND VOMITING: NO NAUSEA AND NO VOMITING
AGITATION: NORMAL ACTIVITY
HEADACHE, FULLNESS IN HEAD: MODERATE
TOTAL SCORE: 2
AGITATION: MODERATELY FIDGETY AND RESTLESS
HEADACHE, FULLNESS IN HEAD: NOT PRESENT
HEADACHE, FULLNESS IN HEAD: NOT PRESENT
ANXIETY: *
NAUSEA AND VOMITING: NO NAUSEA AND NO VOMITING
SUBSTANCE_ABUSE: 1
NAUSEA AND VOMITING: NO NAUSEA AND NO VOMITING
AGITATION: NORMAL ACTIVITY
PAROXYSMAL SWEATS: *
AGITATION: NORMAL ACTIVITY
VISUAL DISTURBANCES: NOT PRESENT
VISUAL DISTURBANCES: NOT PRESENT
TOTAL SCORE: 3
VISUAL DISTURBANCES: NOT PRESENT

## 2023-12-03 ASSESSMENT — ENCOUNTER SYMPTOMS
COUGH: 0
SORE THROAT: 0
DIZZINESS: 0
NAUSEA: 0
PALPITATIONS: 0
ABDOMINAL PAIN: 0
BLOOD IN STOOL: 0
NERVOUS/ANXIOUS: 0
FEVER: 0
SHORTNESS OF BREATH: 0
DIARRHEA: 1
HEADACHES: 0
FOCAL WEAKNESS: 0
HEARTBURN: 1
BACK PAIN: 0
HALLUCINATIONS: 0

## 2023-12-03 ASSESSMENT — PAIN DESCRIPTION - PAIN TYPE
TYPE: ACUTE PAIN
TYPE: ACUTE PAIN

## 2023-12-03 ASSESSMENT — FIBROSIS 4 INDEX: FIB4 SCORE: 1.06

## 2023-12-03 NOTE — PROGRESS NOTES
Hospital Medicine Daily Progress Note    Date of Service  12/3/2023    Chief Complaint  Alcohol withdrawal and diarrhea    Hospital Course  Alberto Maldonado is a obese 38 y.o. male alcoholic who drinks 5-10 mini bottles of hard alcohol daily.  He is having some gagging could not tolerate drinks tomorrow and came in for detox.  He also complained that he is having frequent diarrhea 10 episodes a day for the past week.  He was admitted to the hospital for concerns of alcohol withdrawal.  He was placed in the IMCU and initiated on Precedex drip.  He had recently been on antibiotics for upper respiratory infection and ear infection.  C. difficile study was unremarkable here at the hospital.  Patient continues to have some watery stools multiple times a day.    Interval Problem Update  12/3 have encouraged nursing to give Librium and oral Ativan rather than Precedex drip if able.  They were able to wean Precedex off today.  Patient is alert but lethargic.  His mom and brother at bedside.  I have encouraged and gave tips for alcohol cessation.  The brother states that the patient has some hoarse voice.  Patient denies any problems breathing or any problems swallowing.  Patient denies any hallucinations.    I have discussed this patient's plan of care and discharge plan at IDT rounds today with Case Management, Nursing, Nursing leadership, and other members of the IDT team.      Code Status  Full Code    Disposition  The patient is not medically cleared for discharge to home or a post-acute facility.      I have placed the appropriate orders for post-discharge needs.    Review of Systems  Review of Systems   Constitutional:  Negative for fever and malaise/fatigue.   HENT:  Negative for congestion and sore throat.    Respiratory:  Negative for cough and shortness of breath.    Cardiovascular:  Negative for chest pain, palpitations and leg swelling.   Gastrointestinal:  Positive for diarrhea and heartburn. Negative  for abdominal pain, blood in stool, melena and nausea.   Genitourinary:  Negative for dysuria.   Musculoskeletal:  Negative for back pain.   Neurological:  Negative for dizziness, focal weakness and headaches.   Psychiatric/Behavioral:  Positive for substance abuse. Negative for hallucinations. The patient is not nervous/anxious.         Physical Exam  Temp:  [36.5 °C (97.7 °F)-37 °C (98.6 °F)] 37 °C (98.6 °F)  Pulse:  [65-86] 76  Resp:  [13-26] 26  BP: (104-153)/(57-94) 153/93  SpO2:  [93 %-97 %] 96 %    Physical Exam  Vitals reviewed.   Constitutional:       Appearance: Normal appearance. He is obese. He is not diaphoretic.   HENT:      Head: Normocephalic and atraumatic.      Nose: Nose normal.      Mouth/Throat:      Mouth: Mucous membranes are moist.      Pharynx: No oropharyngeal exudate.      Comments: Mallampati score 3  Eyes:      General: No scleral icterus.        Right eye: No discharge.         Left eye: No discharge.      Extraocular Movements: Extraocular movements intact.      Conjunctiva/sclera: Conjunctivae normal.   Neck:      Comments: Bolus neck with enlarged jowls family states she has been like this patient denies any recent swelling of his neck.  Cardiovascular:      Rate and Rhythm: Regular rhythm. Tachycardia present.      Pulses:           Radial pulses are 2+ on the right side and 2+ on the left side.        Dorsalis pedis pulses are 2+ on the right side and 2+ on the left side.      Heart sounds: No murmur heard.  Pulmonary:      Effort: Pulmonary effort is normal. No respiratory distress.      Breath sounds: Normal breath sounds. No wheezing or rales.   Abdominal:      General: Bowel sounds are normal. There is no distension.      Palpations: Abdomen is soft.      Tenderness: There is no abdominal tenderness.   Musculoskeletal:         General: No swelling or tenderness.      Cervical back: Neck supple. No rigidity or tenderness. No muscular tenderness.      Right lower leg: No edema.       Left lower leg: No edema.   Lymphadenopathy:      Cervical: No cervical adenopathy.   Skin:     General: Skin is dry.      Coloration: Skin is not jaundiced or pale.   Neurological:      General: No focal deficit present.      Mental Status: He is alert and oriented to person, place, and time. Mental status is at baseline.      Cranial Nerves: No cranial nerve deficit.   Psychiatric:         Attention and Perception: He does not perceive auditory or visual hallucinations.         Mood and Affect: Mood normal.         Speech: Speech normal.         Behavior: Behavior is slowed.         Fluids    Intake/Output Summary (Last 24 hours) at 12/3/2023 1408  Last data filed at 12/3/2023 0800  Gross per 24 hour   Intake 5007.4 ml   Output --   Net 5007.4 ml       Laboratory  Recent Labs     12/02/23  0211 12/03/23  0132   WBC 10.1 5.8   RBC 4.91 4.00*   HEMOGLOBIN 14.6 12.2*   HEMATOCRIT 41.7* 35.0*   MCV 84.9 87.5   MCH 29.7 30.5   MCHC 35.0 34.9   RDW 50.4* 54.0*   PLATELETCT 323 248   MPV 8.7* 8.9*     Recent Labs     12/02/23  0211 12/02/23  1135 12/03/23  0132   SODIUM 144 146* 143   POTASSIUM 2.6* 3.1* 4.0   CHLORIDE 103 108 111   CO2 16* 19* 20   GLUCOSE 119* 152* 113*   BUN 4* 4* 7*   CREATININE 0.83 0.71 0.80   CALCIUM 8.2* 7.2* 7.4*                   Imaging  DX-CHEST-PORTABLE (1 VIEW)   Final Result      Increased BILATERAL underinflation atelectasis           Assessment/Plan  * Alcohol withdrawal delirium, acute, hyperactive (HCC)- (present on admission)  Assessment & Plan  Detox bag  Multivitamins  Scheduled Librium  CIWA  Low threshold to escalate to Precedex drip  Admit to IMCU for close hemodynamic monitoring      GERD (gastroesophageal reflux disease)  Assessment & Plan  PPI    Hypertensive urgency  Assessment & Plan  SBP>190 in ER  Continue losartan, metoprolol, amlodipine  As needed IV labetalol, Catapres    Hypokalemia  Assessment & Plan  12/3 K: 4 <3.1< 2.6  Replace and monitor  Replace  Mg    Diarrhea  Assessment & Plan  Normal WBC  C. difficile negative  10/28/23 TSH:2.8  Imodium as needed    Elevated lactic acid level  Assessment & Plan  Likely secondary to EtOH/liver disease, dehydration  IVF  High-dose IV thiamine  Trend    Gout- (present on admission)  Assessment & Plan  Allopurinol    Hypomagnesemia- (present on admission)  Assessment & Plan  Profound, on admit Mg 1.1  Replace and monitor    Morbid obesity (HCC)- (present on admission)  Assessment & Plan  Diet and lifestyle modification  Body mass index is 42.06 kg/m².      BOLIVAR (obstructive sleep apnea)- (present on admission)  Assessment & Plan  Probable severe BOLIVAR  Improving CO2 acidosis 12/3 CO2: 20  Monitor BMP recommend outpatient sleep study and encouraged weight loss         VTE prophylaxis:    enoxaparin ppx      I have performed a physical exam and reviewed and updated ROS and Plan today (12/3/2023). In review of yesterday's note (12/2/2023), there are no changes except as documented above.

## 2023-12-03 NOTE — CARE PLAN
The patient is Stable - Low risk of patient condition declining or worsening    Shift Goals  Clinical Goals: Pt will not fall during shift    Progress made toward(s) clinical / shift goals:      Fall precautions in place. Bed in lowest position. Non-skid socks in place. Personal possessions within reach. Mobility sign on door. Bed-alarm on. Call light within reach. Pt educated regarding fall prevention and states understanding.       Problem: Knowledge Deficit - Standard  Goal: Patient and family/care givers will demonstrate understanding of plan of care, disease process/condition, diagnostic tests and medications  Outcome: Progressing  Note: Pt educated regarding plan of care and medications. All questions answered. Pt educated regarding plan of care and medications. All questions answered.      Problem: Optimal Care for Alcohol Withdrawal  Goal: Optimal Care for the alcohol withdrawal patient  Outcome: Progressing

## 2023-12-04 ENCOUNTER — APPOINTMENT (OUTPATIENT)
Dept: RADIOLOGY | Facility: MEDICAL CENTER | Age: 38
DRG: 897 | End: 2023-12-04
Attending: HOSPITALIST
Payer: COMMERCIAL

## 2023-12-04 ENCOUNTER — APPOINTMENT (OUTPATIENT)
Dept: RADIOLOGY | Facility: MEDICAL CENTER | Age: 38
DRG: 897 | End: 2023-12-04
Attending: STUDENT IN AN ORGANIZED HEALTH CARE EDUCATION/TRAINING PROGRAM
Payer: COMMERCIAL

## 2023-12-04 LAB
ARTERIAL PATENCY WRIST A: ABNORMAL
BASE EXCESS BLDA CALC-SCNC: -4 MMOL/L (ref -4–3)
BODY TEMPERATURE: ABNORMAL DEGREES
CK SERPL-CCNC: 539 U/L (ref 0–154)
CO2 BLDA-SCNC: 22 MMOL/L (ref 20–33)
DELSYS IDSYS: ABNORMAL
ERYTHROCYTE [DISTWIDTH] IN BLOOD BY AUTOMATED COUNT: 57.5 FL (ref 35.9–50)
HCO3 BLDA-SCNC: 20.7 MMOL/L (ref 17–25)
HCT VFR BLD AUTO: 37.1 % (ref 42–52)
HGB BLD-MCNC: 12.3 G/DL (ref 14–18)
LACTATE BLD-SCNC: 0.9 MMOL/L (ref 0.5–2)
LACTATE SERPL-SCNC: 1.6 MMOL/L (ref 0.5–2)
LPM ILPM: 2 LPM
MAGNESIUM SERPL-MCNC: 1.9 MG/DL (ref 1.5–2.5)
MCH RBC QN AUTO: 30.2 PG (ref 27–33)
MCHC RBC AUTO-ENTMCNC: 33.2 G/DL (ref 32.3–36.5)
MCV RBC AUTO: 91.2 FL (ref 81.4–97.8)
PCO2 BLDA: 34.2 MMHG (ref 26–37)
PCO2 TEMP ADJ BLDA: 33.9 MMHG (ref 26–37)
PH BLDA: 7.39 [PH] (ref 7.4–7.5)
PH TEMP ADJ BLDA: 7.39 [PH] (ref 7.4–7.5)
PHOSPHATE SERPL-MCNC: 4 MG/DL (ref 2.5–4.5)
PLATELET # BLD AUTO: 227 K/UL (ref 164–446)
PMV BLD AUTO: 8.7 FL (ref 9–12.9)
PO2 BLDA: 92 MMHG (ref 64–87)
PO2 TEMP ADJ BLDA: 90 MMHG (ref 64–87)
RBC # BLD AUTO: 4.07 M/UL (ref 4.7–6.1)
SAO2 % BLDA: 97 % (ref 93–99)
SPECIMEN DRAWN FROM PATIENT: ABNORMAL
WBC # BLD AUTO: 10.1 K/UL (ref 4.8–10.8)

## 2023-12-04 PROCEDURE — 83605 ASSAY OF LACTIC ACID: CPT | Mod: 91

## 2023-12-04 PROCEDURE — 700102 HCHG RX REV CODE 250 W/ 637 OVERRIDE(OP): Performed by: HOSPITALIST

## 2023-12-04 PROCEDURE — 87040 BLOOD CULTURE FOR BACTERIA: CPT

## 2023-12-04 PROCEDURE — 84100 ASSAY OF PHOSPHORUS: CPT

## 2023-12-04 PROCEDURE — 71045 X-RAY EXAM CHEST 1 VIEW: CPT

## 2023-12-04 PROCEDURE — 94640 AIRWAY INHALATION TREATMENT: CPT

## 2023-12-04 PROCEDURE — 83735 ASSAY OF MAGNESIUM: CPT

## 2023-12-04 PROCEDURE — 700111 HCHG RX REV CODE 636 W/ 250 OVERRIDE (IP): Mod: JZ | Performed by: STUDENT IN AN ORGANIZED HEALTH CARE EDUCATION/TRAINING PROGRAM

## 2023-12-04 PROCEDURE — 700102 HCHG RX REV CODE 250 W/ 637 OVERRIDE(OP): Performed by: STUDENT IN AN ORGANIZED HEALTH CARE EDUCATION/TRAINING PROGRAM

## 2023-12-04 PROCEDURE — 700111 HCHG RX REV CODE 636 W/ 250 OVERRIDE (IP): Performed by: STUDENT IN AN ORGANIZED HEALTH CARE EDUCATION/TRAINING PROGRAM

## 2023-12-04 PROCEDURE — 82803 BLOOD GASES ANY COMBINATION: CPT

## 2023-12-04 PROCEDURE — 770000 HCHG ROOM/CARE - INTERMEDIATE ICU *

## 2023-12-04 PROCEDURE — 700101 HCHG RX REV CODE 250: Performed by: STUDENT IN AN ORGANIZED HEALTH CARE EDUCATION/TRAINING PROGRAM

## 2023-12-04 PROCEDURE — 36600 WITHDRAWAL OF ARTERIAL BLOOD: CPT

## 2023-12-04 PROCEDURE — 85027 COMPLETE CBC AUTOMATED: CPT

## 2023-12-04 PROCEDURE — 700105 HCHG RX REV CODE 258: Performed by: STUDENT IN AN ORGANIZED HEALTH CARE EDUCATION/TRAINING PROGRAM

## 2023-12-04 PROCEDURE — 99233 SBSQ HOSP IP/OBS HIGH 50: CPT | Performed by: HOSPITALIST

## 2023-12-04 PROCEDURE — A9270 NON-COVERED ITEM OR SERVICE: HCPCS | Performed by: HOSPITALIST

## 2023-12-04 PROCEDURE — 700105 HCHG RX REV CODE 258: Performed by: HOSPITALIST

## 2023-12-04 PROCEDURE — A9270 NON-COVERED ITEM OR SERVICE: HCPCS | Performed by: STUDENT IN AN ORGANIZED HEALTH CARE EDUCATION/TRAINING PROGRAM

## 2023-12-04 PROCEDURE — 82550 ASSAY OF CK (CPK): CPT

## 2023-12-04 RX ORDER — DIAZEPAM 5 MG/ML
10 INJECTION, SOLUTION INTRAMUSCULAR; INTRAVENOUS
Status: DISCONTINUED | OUTPATIENT
Start: 2023-12-04 | End: 2023-12-07

## 2023-12-04 RX ORDER — LORAZEPAM 1 MG/1
1 TABLET ORAL EVERY 4 HOURS PRN
Status: DISCONTINUED | OUTPATIENT
Start: 2023-12-04 | End: 2023-12-07

## 2023-12-04 RX ORDER — LABETALOL HYDROCHLORIDE 5 MG/ML
10 INJECTION, SOLUTION INTRAVENOUS ONCE
Status: COMPLETED | OUTPATIENT
Start: 2023-12-05 | End: 2023-12-05

## 2023-12-04 RX ORDER — QUETIAPINE FUMARATE 25 MG/1
50 TABLET, FILM COATED ORAL NIGHTLY
Status: DISCONTINUED | OUTPATIENT
Start: 2023-12-04 | End: 2023-12-06

## 2023-12-04 RX ORDER — ACETAMINOPHEN 325 MG/1
650 TABLET ORAL ONCE
Status: DISCONTINUED | OUTPATIENT
Start: 2023-12-04 | End: 2023-12-05

## 2023-12-04 RX ORDER — CHLORDIAZEPOXIDE HYDROCHLORIDE 25 MG/1
25 CAPSULE, GELATIN COATED ORAL 3 TIMES DAILY
Status: DISCONTINUED | OUTPATIENT
Start: 2023-12-04 | End: 2023-12-05

## 2023-12-04 RX ORDER — LORAZEPAM 2 MG/1
4 TABLET ORAL
Status: DISCONTINUED | OUTPATIENT
Start: 2023-12-04 | End: 2023-12-07

## 2023-12-04 RX ORDER — ALBUTEROL SULFATE 90 UG/1
2 AEROSOL, METERED RESPIRATORY (INHALATION) ONCE
Status: DISCONTINUED | OUTPATIENT
Start: 2023-12-05 | End: 2023-12-04

## 2023-12-04 RX ORDER — LORAZEPAM 2 MG/1
2 TABLET ORAL
Status: DISCONTINUED | OUTPATIENT
Start: 2023-12-04 | End: 2023-12-07

## 2023-12-04 RX ORDER — SODIUM CHLORIDE, SODIUM LACTATE, POTASSIUM CHLORIDE, AND CALCIUM CHLORIDE .6; .31; .03; .02 G/100ML; G/100ML; G/100ML; G/100ML
500 INJECTION, SOLUTION INTRAVENOUS ONCE
Status: COMPLETED | OUTPATIENT
Start: 2023-12-04 | End: 2023-12-04

## 2023-12-04 RX ORDER — MAGNESIUM SULFATE 1 G/100ML
1 INJECTION INTRAVENOUS ONCE
Status: COMPLETED | OUTPATIENT
Start: 2023-12-04 | End: 2023-12-04

## 2023-12-04 RX ORDER — LORAZEPAM 1 MG/1
0.5 TABLET ORAL EVERY 4 HOURS PRN
Status: DISCONTINUED | OUTPATIENT
Start: 2023-12-04 | End: 2023-12-07

## 2023-12-04 RX ORDER — FUROSEMIDE 10 MG/ML
40 INJECTION INTRAMUSCULAR; INTRAVENOUS ONCE
Status: COMPLETED | OUTPATIENT
Start: 2023-12-05 | End: 2023-12-04

## 2023-12-04 RX ORDER — DEXMEDETOMIDINE HYDROCHLORIDE 4 UG/ML
.1-1.5 INJECTION, SOLUTION INTRAVENOUS CONTINUOUS
Status: DISCONTINUED | OUTPATIENT
Start: 2023-12-04 | End: 2023-12-05

## 2023-12-04 RX ADMIN — SODIUM CHLORIDE, POTASSIUM CHLORIDE, SODIUM LACTATE AND CALCIUM CHLORIDE 500 ML: 600; 310; 30; 20 INJECTION, SOLUTION INTRAVENOUS at 21:00

## 2023-12-04 RX ADMIN — THIAMINE HYDROCHLORIDE 500 MG: 100 INJECTION, SOLUTION INTRAMUSCULAR; INTRAVENOUS at 14:38

## 2023-12-04 RX ADMIN — FUROSEMIDE 40 MG: 10 INJECTION, SOLUTION INTRAMUSCULAR; INTRAVENOUS at 23:55

## 2023-12-04 RX ADMIN — THIAMINE HYDROCHLORIDE 500 MG: 100 INJECTION, SOLUTION INTRAMUSCULAR; INTRAVENOUS at 07:57

## 2023-12-04 RX ADMIN — SODIUM CHLORIDE, POTASSIUM CHLORIDE, SODIUM LACTATE AND CALCIUM CHLORIDE 500 ML: 600; 310; 30; 20 INJECTION, SOLUTION INTRAVENOUS at 06:20

## 2023-12-04 RX ADMIN — MAGNESIUM SULFATE HEPTAHYDRATE 1 G: 1 INJECTION, SOLUTION INTRAVENOUS at 06:34

## 2023-12-04 RX ADMIN — IPRATROPIUM BROMIDE 0.5 MG: 0.5 SOLUTION RESPIRATORY (INHALATION) at 07:50

## 2023-12-04 RX ADMIN — ACETAMINOPHEN 650 MG: 325 TABLET, FILM COATED ORAL at 19:58

## 2023-12-04 RX ADMIN — IPRATROPIUM BROMIDE 0.5 MG: 0.5 SOLUTION RESPIRATORY (INHALATION) at 14:02

## 2023-12-04 RX ADMIN — LORAZEPAM 1 MG: 1 TABLET ORAL at 20:31

## 2023-12-04 RX ADMIN — SODIUM CHLORIDE, POTASSIUM CHLORIDE, SODIUM LACTATE AND CALCIUM CHLORIDE: 600; 310; 30; 20 INJECTION, SOLUTION INTRAVENOUS at 11:59

## 2023-12-04 RX ADMIN — ENOXAPARIN SODIUM 40 MG: 100 INJECTION SUBCUTANEOUS at 16:45

## 2023-12-04 RX ADMIN — QUETIAPINE FUMARATE 50 MG: 25 TABLET ORAL at 20:32

## 2023-12-04 RX ADMIN — THIAMINE HYDROCHLORIDE 500 MG: 100 INJECTION, SOLUTION INTRAMUSCULAR; INTRAVENOUS at 20:36

## 2023-12-04 ASSESSMENT — LIFESTYLE VARIABLES
TREMOR: NO TREMOR
TOTAL SCORE: 0
VISUAL DISTURBANCES: NOT PRESENT
ORIENTATION AND CLOUDING OF SENSORIUM: ORIENTED AND CAN DO SERIAL ADDITIONS
NAUSEA AND VOMITING: NO NAUSEA AND NO VOMITING
AUDITORY DISTURBANCES: NOT PRESENT
HEADACHE, FULLNESS IN HEAD: NOT PRESENT
TREMOR: NO TREMOR
HEADACHE, FULLNESS IN HEAD: NOT PRESENT
TOTAL SCORE: 18
NAUSEA AND VOMITING: NO NAUSEA AND NO VOMITING
AGITATION: *
ANXIETY: *
TOTAL SCORE: 0
TOTAL SCORE: 9
NAUSEA AND VOMITING: NO NAUSEA AND NO VOMITING
ANXIETY: NO ANXIETY (AT EASE)
PAROXYSMAL SWEATS: *
VISUAL DISTURBANCES: NOT PRESENT
PAROXYSMAL SWEATS: *
VISUAL DISTURBANCES: NOT PRESENT
PAROXYSMAL SWEATS: NO SWEAT VISIBLE
ANXIETY: *
AGITATION: NORMAL ACTIVITY
PAROXYSMAL SWEATS: NO SWEAT VISIBLE
VISUAL DISTURBANCES: NOT PRESENT
SUBSTANCE_ABUSE: 1
ANXIETY: NO ANXIETY (AT EASE)
AUDITORY DISTURBANCES: NOT PRESENT
ORIENTATION AND CLOUDING OF SENSORIUM: ORIENTED AND CAN DO SERIAL ADDITIONS
ORIENTATION AND CLOUDING OF SENSORIUM: ORIENTED AND CAN DO SERIAL ADDITIONS
AGITATION: NORMAL ACTIVITY
AUDITORY DISTURBANCES: NOT PRESENT
ORIENTATION AND CLOUDING OF SENSORIUM: ORIENTED AND CAN DO SERIAL ADDITIONS
HEADACHE, FULLNESS IN HEAD: MODERATE
TREMOR: *
HEADACHE, FULLNESS IN HEAD: NOT PRESENT
NAUSEA AND VOMITING: NO NAUSEA AND NO VOMITING
AUDITORY DISTURBANCES: NOT PRESENT
AGITATION: *
TREMOR: NO TREMOR

## 2023-12-04 ASSESSMENT — FIBROSIS 4 INDEX: FIB4 SCORE: 1.06

## 2023-12-04 ASSESSMENT — ENCOUNTER SYMPTOMS
NERVOUS/ANXIOUS: 0
DIARRHEA: 1
FOCAL WEAKNESS: 0
FEVER: 0
SORE THROAT: 0
BACK PAIN: 0
BLOOD IN STOOL: 0
COUGH: 0
ABDOMINAL PAIN: 0
SHORTNESS OF BREATH: 0
HEARTBURN: 1
PALPITATIONS: 0
DIZZINESS: 0
NAUSEA: 0
HEADACHES: 0
HALLUCINATIONS: 0

## 2023-12-04 ASSESSMENT — PAIN DESCRIPTION - PAIN TYPE
TYPE: ACUTE PAIN

## 2023-12-04 NOTE — CARE PLAN
The patient is Watcher - Medium risk of patient condition declining or worsening    Shift Goals  Clinical Goals: SPO2% will remain above 100% during shift    Progress made toward(s) clinical / shift goals:      Lung sounds and respiratory effort assessed regularly. RT protocol in effect. Pt given breathing treatments and encouraged to cough PRN.        Problem: Knowledge Deficit - Standard  Goal: Patient and family/care givers will demonstrate understanding of plan of care, disease process/condition, diagnostic tests and medications  Outcome: Progressing  Note: Pt educated regarding plan of care and medications. All questions answered.      Problem: Pain - Standard  Goal: Alleviation of pain or a reduction in pain to the patient’s comfort goal  Outcome: Progressing  Note: Pt assessed for pain regularly and medicated PRN per MAR.      Problem: Fall Risk  Goal: Patient will remain free from falls  Note: Fall precautions in place. Bed in lowest position. Non-skid socks in place. Personal possessions within reach. Mobility sign on door. Bed-alarm on. Call light within reach. Pt educated regarding fall prevention and states understanding.

## 2023-12-04 NOTE — PROGRESS NOTES
Pt became agitated, uncooperative and trying to get out of bed despite prn ativan being given. Pt removed medical equipment and an IV. Pt was brought back to bed with assistance from floor staff. Dex drip was started.

## 2023-12-04 NOTE — PROGRESS NOTES
Hospital Medicine Daily Progress Note    Date of Service  12/4/2023    Chief Complaint  Alcohol withdrawal and diarrhea    Hospital Course  Alberto Maldonado is a obese 38 y.o. male alcoholic who drinks 5-10 mini bottles of hard alcohol daily.  He is having some gagging could not tolerate drinks tomorrow and came in for detox.  He also complained that he is having frequent diarrhea 10 episodes a day for the past week.  He was admitted to the hospital for concerns of alcohol withdrawal.  He was placed in the IMCU and initiated on Precedex drip.  He had recently been on antibiotics for upper respiratory infection and ear infection.  C. difficile study was unremarkable here at the hospital.  Patient continues to have some watery stools multiple times a day.    Interval Problem Update  12/4: Overnight patient was agitated he had been given Seroquel, Librium and was placed later on Precedex drip.  This morning nurse alerted to me that patient was having gurgling upper airway.  Oxygen saturations were stable on supplemental oxygen but was now on oxime mask.  RT had been at bedside got some upper airway suctioning with improvement.  Patient very lethargic responded to chest rub.  A stat ABG was obtained as well as a chest x-ray.  Patient was saturating stable.  A nasal trumpet was placed in the left nare.  I did have pulmonologist Dr. Surya Mckenzie evaluate patient.  This point in time will take the patient off Precedex drip monitor closely if any acute worsening he will go to ICU with potential intubation.    12/3 have encouraged nursing to give Librium and oral Ativan rather than Precedex drip if able.  They were able to wean Precedex off today.  Patient is alert but lethargic.  His mom and brother at bedside.  I have encouraged and gave tips for alcohol cessation.  The brother states that the patient has some hoarse voice.  Patient denies any problems breathing or any problems swallowing.  Patient denies any  hallucinations.    I have discussed this patient's plan of care and discharge plan at IDT rounds today with Case Management, Nursing, Nursing leadership, and other members of the IDT team.      Code Status  Full Code    Disposition  Medically Cleared  I have placed the appropriate orders for post-discharge needs.    Review of Systems  Review of Systems   Constitutional:  Negative for fever and malaise/fatigue.   HENT:  Negative for congestion and sore throat.    Respiratory:  Negative for cough and shortness of breath.    Cardiovascular:  Negative for chest pain, palpitations and leg swelling.   Gastrointestinal:  Positive for diarrhea and heartburn. Negative for abdominal pain, blood in stool, melena and nausea.   Genitourinary:  Negative for dysuria.   Musculoskeletal:  Negative for back pain.   Neurological:  Negative for dizziness, focal weakness and headaches.   Psychiatric/Behavioral:  Positive for substance abuse. Negative for hallucinations. The patient is not nervous/anxious.         Physical Exam  Temp:  [36.2 °C (97.1 °F)-37.1 °C (98.8 °F)] 36.2 °C (97.1 °F)  Pulse:  [] 102  Resp:  [13-40] 18  BP: ()/() 105/59  SpO2:  [87 %-100 %] 100 %    Physical Exam  Vitals reviewed.   Constitutional:       Appearance: Normal appearance. He is obese. He is not diaphoretic.   HENT:      Head: Normocephalic and atraumatic.      Nose: Nose normal.      Mouth/Throat:      Mouth: Mucous membranes are moist.      Pharynx: No oropharyngeal exudate.      Comments: Mallampati score 3  Eyes:      General: No scleral icterus.        Right eye: No discharge.         Left eye: No discharge.      Extraocular Movements: Extraocular movements intact.      Conjunctiva/sclera: Conjunctivae normal.   Neck:      Comments: Bolus neck with enlarged jowls family states she has been like this patient denies any recent swelling of his neck.  Cardiovascular:      Rate and Rhythm: Regular rhythm. Tachycardia present.       Pulses:           Radial pulses are 2+ on the right side and 2+ on the left side.        Dorsalis pedis pulses are 2+ on the right side and 2+ on the left side.      Heart sounds: No murmur heard.  Pulmonary:      Effort: Pulmonary effort is normal. No respiratory distress.      Breath sounds: Normal breath sounds. No wheezing or rales.   Abdominal:      General: Bowel sounds are normal. There is no distension.      Palpations: Abdomen is soft.      Tenderness: There is no abdominal tenderness.   Musculoskeletal:         General: No swelling or tenderness.      Cervical back: Neck supple. No rigidity or tenderness. No muscular tenderness.      Right lower leg: No edema.      Left lower leg: No edema.   Lymphadenopathy:      Cervical: No cervical adenopathy.   Skin:     General: Skin is dry.      Coloration: Skin is not jaundiced or pale.   Neurological:      General: No focal deficit present.      Mental Status: He is alert and oriented to person, place, and time. Mental status is at baseline.      Cranial Nerves: No cranial nerve deficit.   Psychiatric:         Attention and Perception: He does not perceive auditory or visual hallucinations.         Mood and Affect: Mood normal.         Speech: Speech normal.         Behavior: Behavior is slowed.         Fluids    Intake/Output Summary (Last 24 hours) at 12/4/2023 1350  Last data filed at 12/4/2023 0800  Gross per 24 hour   Intake 6079.78 ml   Output 200 ml   Net 5879.78 ml       Laboratory  Recent Labs     12/02/23  0211 12/03/23  0132 12/04/23 0211   WBC 10.1 5.8 10.1   RBC 4.91 4.00* 4.07*   HEMOGLOBIN 14.6 12.2* 12.3*   HEMATOCRIT 41.7* 35.0* 37.1*   MCV 84.9 87.5 91.2   MCH 29.7 30.5 30.2   MCHC 35.0 34.9 33.2   RDW 50.4* 54.0* 57.5*   PLATELETCT 323 248 227   MPV 8.7* 8.9* 8.7*     Recent Labs     12/02/23  0211 12/02/23  1135 12/03/23 0132   SODIUM 144 146* 143   POTASSIUM 2.6* 3.1* 4.0   CHLORIDE 103 108 111   CO2 16* 19* 20   GLUCOSE 119* 152* 113*   BUN  4* 4* 7*   CREATININE 0.83 0.71 0.80   CALCIUM 8.2* 7.2* 7.4*                   Imaging  DX-CHEST-LIMITED (1 VIEW)   Final Result      Bibasilar underinflation atelectasis which could obscure an additional process.      DX-CHEST-PORTABLE (1 VIEW)   Final Result      Increased BILATERAL underinflation atelectasis           Assessment/Plan  * Alcohol withdrawal delirium, acute, hyperactive (HCC)- (present on admission)  Assessment & Plan  Detox bag  Multivitamins  Scheduled Librium with parameters. 12/4 decrease to 25mg TID and hold if lethargy/sedated.  CIWA  Low threshold to escalate to Precedex drip (hold while lethargic)  Admit to IMCU for close hemodynamic monitoring      GERD (gastroesophageal reflux disease)  Assessment & Plan  PPI    Hypertensive urgency  Assessment & Plan  SBP>190 in ER  Continue losartan, metoprolol, amlodipine  As needed IV labetalol, Catapres    Hypokalemia  Assessment & Plan  12/3 K: 4 <3.1< 2.6  Replace and monitor  Replace Mg    Diarrhea  Assessment & Plan  Normal WBC  C. difficile negative  monitor  10/28/23 TSH:2.8  Imodium as needed    Elevated lactic acid level  Assessment & Plan  Likely secondary to EtOH/liver disease, dehydration  IVF  High-dose IV thiamine  Trend    Gout- (present on admission)  Assessment & Plan  Allopurinol    Hypomagnesemia- (present on admission)  Assessment & Plan  Profound, on admit Mg 1.1  Replace and monitor    Morbid obesity (HCC)- (present on admission)  Assessment & Plan  Diet and lifestyle modification  Body mass index is 44.25 kg/m².      BOLIVAR (obstructive sleep apnea)- (present on admission)  Assessment & Plan  Probable severe BOLIVAR  Improving CO2 acidosis 12/3 CO2: 20  Monitor BMP recommend outpatient sleep study and encouraged weight loss         VTE prophylaxis:    enoxaparin ppx      I have performed a physical exam and reviewed and updated ROS and Plan today (12/4/2023). In review of yesterday's note (12/3/2023), there are no changes except as  documented above.

## 2023-12-04 NOTE — PROGRESS NOTES
Evaluated Mr. Maldonado per Dr. Soler's request. He is sleepy but awakes to nox stimuli, coughs with suctioning, some grunting/one word phrases. His ABG is reassuring, nasal trump placed to bypass upper airway obstruction. Hospitalist already reduced sedation. He is ok to remain closely monitored in the IMCU. Should he decline or fail to improve critical care will consult.     Surya Mckenzie M.D.

## 2023-12-04 NOTE — PROGRESS NOTES
Paged by IMCU RN regarding agitation.  Patient try to get out of bed, attempted to rip off IV lines.  He has been off Precedex, however now becoming more tachycardic, tachypneic, hypertensive and tremulousness concerning severe withdrawal    -- Restart Precedex drip  -- Started on Seroquel 150 mg at bedtime, 50 mg every morning to transition with weaning off Precedex  -- continue Librium  -- b/l upper extremities restraints applied  -- replace electrolytes as needed  -- pt seen by me in IMCU, reassured pt and POC d/w pt

## 2023-12-04 NOTE — PROGRESS NOTES
Pt appears very sedated, Pt does squeeze fingers, wiggle toes and follow commands. Pt's breathing is concerning and has tachypnea. Pt was suctioned. SPO2% is 100% on RA. RT was called and breathing treatment was provided. Dr. Soler was updated and at bedside. Nasal trumpet was placed and pt was NT suctioned.     Plan is to continue to assess pt. Dr. Mckenzie was updated.

## 2023-12-04 NOTE — PROGRESS NOTES
Pt became hypotensive with BP in the 80's systolic. Pt needs painful stimulus for arousal but is increasing in alertness.  was updated and placed orders.

## 2023-12-04 NOTE — DIETARY
NUTRITION SERVICES: BMI - Pt with BMI >40 (=Body mass index is 44.25 kg/m².), Class III obesity. Weight loss counseling not appropriate in acute care setting. RECOMMEND - If appropriate at DC please refer to outpatient services for weight management.

## 2023-12-04 NOTE — CARE PLAN
The patient is Watcher - Medium risk of patient condition declining or worsening    Shift Goals  Clinical Goals: Pt will not fall during shift    Progress made toward(s) clinical / shift goals:    Problem: Optimal Care for Alcohol Withdrawal  Goal: Optimal Care for the alcohol withdrawal patient  Outcome: Progressing     Problem: Pain - Standard  Goal: Alleviation of pain or a reduction in pain to the patient’s comfort goal  Outcome: Progressing     Problem: Safety - Medical Restraint  Goal: Remains free of injury from restraints (Restraint for Interference with Medical Device)  Outcome: Progressing  Goal: Free from restraint(s) (Restraint for Interference with Medical Device)  Outcome: Progressing       Patient is not progressing towards the following goals:

## 2023-12-05 LAB
AMPHET UR QL SCN: NEGATIVE
ANION GAP SERPL CALC-SCNC: 14 MMOL/L (ref 7–16)
APPEARANCE UR: CLEAR
BARBITURATES UR QL SCN: NEGATIVE
BASE EXCESS BLDA CALC-SCNC: -3 MMOL/L (ref -4–3)
BENZODIAZ UR QL SCN: POSITIVE
BILIRUB UR QL STRIP.AUTO: NEGATIVE
BODY TEMPERATURE: 36.9 CENTIGRADE
BUN SERPL-MCNC: 6 MG/DL (ref 8–22)
BZE UR QL SCN: NEGATIVE
CALCIUM SERPL-MCNC: 8.2 MG/DL (ref 8.5–10.5)
CANNABINOIDS UR QL SCN: NEGATIVE
CHLORIDE SERPL-SCNC: 106 MMOL/L (ref 96–112)
CO2 SERPL-SCNC: 22 MMOL/L (ref 20–33)
COLOR UR: YELLOW
CORTIS SERPL-MCNC: 11.1 UG/DL (ref 0–23)
CREAT SERPL-MCNC: 0.79 MG/DL (ref 0.5–1.4)
CRP SERPL HS-MCNC: 30.03 MG/DL (ref 0–0.75)
ERYTHROCYTE [DISTWIDTH] IN BLOOD BY AUTOMATED COUNT: 58 FL (ref 35.9–50)
ERYTHROCYTE [SEDIMENTATION RATE] IN BLOOD BY WESTERGREN METHOD: 82 MM/HOUR (ref 0–20)
FENTANYL UR QL: NEGATIVE
FIBRINOGEN PPP-MCNC: 621 MG/DL (ref 215–460)
GFR SERPLBLD CREATININE-BSD FMLA CKD-EPI: 118 ML/MIN/1.73 M 2
GLUCOSE SERPL-MCNC: 115 MG/DL (ref 65–99)
GLUCOSE UR STRIP.AUTO-MCNC: NEGATIVE MG/DL
HCO3 BLDA-SCNC: 22 MMOL/L (ref 17–25)
HCT VFR BLD AUTO: 37.3 % (ref 42–52)
HGB BLD-MCNC: 12.5 G/DL (ref 14–18)
INHALED O2 FLOW RATE: 2.5 L/MIN (ref 2–10)
INHALED O2 FLOW RATE: ABNORMAL L/MIN
INR PPP: 1.19 (ref 0.87–1.13)
KETONES UR STRIP.AUTO-MCNC: ABNORMAL MG/DL
LACTATE SERPL-SCNC: 0.9 MMOL/L (ref 0.5–2)
LDH SERPL L TO P-CCNC: 307 U/L (ref 107–266)
LEUKOCYTE ESTERASE UR QL STRIP.AUTO: NEGATIVE
MAGNESIUM SERPL-MCNC: 1.9 MG/DL (ref 1.5–2.5)
MCH RBC QN AUTO: 30.6 PG (ref 27–33)
MCHC RBC AUTO-ENTMCNC: 33.5 G/DL (ref 32.3–36.5)
MCV RBC AUTO: 91.4 FL (ref 81.4–97.8)
METHADONE UR QL SCN: NEGATIVE
MICRO URNS: ABNORMAL
NITRITE UR QL STRIP.AUTO: NEGATIVE
NT-PROBNP SERPL IA-MCNC: 788 PG/ML (ref 0–125)
OPIATES UR QL SCN: NEGATIVE
OXYCODONE UR QL SCN: NEGATIVE
PCO2 BLDA: 38.3 MMHG (ref 26–37)
PCO2 TEMP ADJ BLDA: 38.1 MMHG (ref 26–37)
PCP UR QL SCN: NEGATIVE
PH BLDA: 7.37 [PH] (ref 7.4–7.5)
PH TEMP ADJ BLDA: 7.37 [PH] (ref 7.4–7.5)
PH UR STRIP.AUTO: 5 [PH] (ref 5–8)
PHOSPHATE SERPL-MCNC: 3.5 MG/DL (ref 2.5–4.5)
PLATELET # BLD AUTO: 213 K/UL (ref 164–446)
PMV BLD AUTO: 8.6 FL (ref 9–12.9)
PO2 BLDA: 108.6 MMHG (ref 64–87)
PO2 TEMP ADJ BLDA: 108 MMHG (ref 64–87)
POTASSIUM SERPL-SCNC: 3.9 MMOL/L (ref 3.6–5.5)
PROCALCITONIN SERPL-MCNC: 0.56 NG/ML
PROPOXYPH UR QL SCN: NEGATIVE
PROT UR QL STRIP: NEGATIVE MG/DL
PROTHROMBIN TIME: 15.3 SEC (ref 12–14.6)
RBC # BLD AUTO: 4.08 M/UL (ref 4.7–6.1)
RBC UR QL AUTO: NEGATIVE
SAO2 % BLDA: 97 % (ref 93–99)
SCCMEC + MECA PNL NOSE NAA+PROBE: NEGATIVE
SCCMEC + MECA PNL NOSE NAA+PROBE: NEGATIVE
SODIUM SERPL-SCNC: 142 MMOL/L (ref 135–145)
SP GR UR STRIP.AUTO: 1.01
UROBILINOGEN UR STRIP.AUTO-MCNC: 0.2 MG/DL
WBC # BLD AUTO: 11.8 K/UL (ref 4.8–10.8)

## 2023-12-05 PROCEDURE — 700102 HCHG RX REV CODE 250 W/ 637 OVERRIDE(OP): Performed by: STUDENT IN AN ORGANIZED HEALTH CARE EDUCATION/TRAINING PROGRAM

## 2023-12-05 PROCEDURE — 770000 HCHG ROOM/CARE - INTERMEDIATE ICU *

## 2023-12-05 PROCEDURE — 83880 ASSAY OF NATRIURETIC PEPTIDE: CPT

## 2023-12-05 PROCEDURE — 85610 PROTHROMBIN TIME: CPT

## 2023-12-05 PROCEDURE — 84145 PROCALCITONIN (PCT): CPT

## 2023-12-05 PROCEDURE — 86140 C-REACTIVE PROTEIN: CPT

## 2023-12-05 PROCEDURE — 700111 HCHG RX REV CODE 636 W/ 250 OVERRIDE (IP): Mod: JZ | Performed by: STUDENT IN AN ORGANIZED HEALTH CARE EDUCATION/TRAINING PROGRAM

## 2023-12-05 PROCEDURE — 85384 FIBRINOGEN ACTIVITY: CPT

## 2023-12-05 PROCEDURE — 700101 HCHG RX REV CODE 250: Performed by: STUDENT IN AN ORGANIZED HEALTH CARE EDUCATION/TRAINING PROGRAM

## 2023-12-05 PROCEDURE — 87640 STAPH A DNA AMP PROBE: CPT

## 2023-12-05 PROCEDURE — A9270 NON-COVERED ITEM OR SERVICE: HCPCS | Performed by: HOSPITALIST

## 2023-12-05 PROCEDURE — 700102 HCHG RX REV CODE 250 W/ 637 OVERRIDE(OP): Performed by: HOSPITALIST

## 2023-12-05 PROCEDURE — 85652 RBC SED RATE AUTOMATED: CPT

## 2023-12-05 PROCEDURE — 82803 BLOOD GASES ANY COMBINATION: CPT

## 2023-12-05 PROCEDURE — 700105 HCHG RX REV CODE 258: Performed by: STUDENT IN AN ORGANIZED HEALTH CARE EDUCATION/TRAINING PROGRAM

## 2023-12-05 PROCEDURE — 84100 ASSAY OF PHOSPHORUS: CPT

## 2023-12-05 PROCEDURE — 87086 URINE CULTURE/COLONY COUNT: CPT

## 2023-12-05 PROCEDURE — 83605 ASSAY OF LACTIC ACID: CPT

## 2023-12-05 PROCEDURE — 85027 COMPLETE CBC AUTOMATED: CPT

## 2023-12-05 PROCEDURE — 700111 HCHG RX REV CODE 636 W/ 250 OVERRIDE (IP): Mod: JZ | Performed by: HOSPITALIST

## 2023-12-05 PROCEDURE — 700111 HCHG RX REV CODE 636 W/ 250 OVERRIDE (IP): Performed by: STUDENT IN AN ORGANIZED HEALTH CARE EDUCATION/TRAINING PROGRAM

## 2023-12-05 PROCEDURE — 83735 ASSAY OF MAGNESIUM: CPT

## 2023-12-05 PROCEDURE — 83615 LACTATE (LD) (LDH) ENZYME: CPT

## 2023-12-05 PROCEDURE — 99233 SBSQ HOSP IP/OBS HIGH 50: CPT | Performed by: HOSPITALIST

## 2023-12-05 PROCEDURE — 82533 TOTAL CORTISOL: CPT

## 2023-12-05 PROCEDURE — 700105 HCHG RX REV CODE 258: Performed by: HOSPITALIST

## 2023-12-05 PROCEDURE — 80048 BASIC METABOLIC PNL TOTAL CA: CPT

## 2023-12-05 PROCEDURE — A9270 NON-COVERED ITEM OR SERVICE: HCPCS | Performed by: STUDENT IN AN ORGANIZED HEALTH CARE EDUCATION/TRAINING PROGRAM

## 2023-12-05 PROCEDURE — 87641 MR-STAPH DNA AMP PROBE: CPT

## 2023-12-05 RX ORDER — DIAZEPAM 5 MG/1
5 TABLET ORAL EVERY 6 HOURS
Status: DISCONTINUED | OUTPATIENT
Start: 2023-12-05 | End: 2023-12-05

## 2023-12-05 RX ORDER — IBUPROFEN 800 MG/1
400 TABLET ORAL EVERY 6 HOURS PRN
Status: DISCONTINUED | OUTPATIENT
Start: 2023-12-05 | End: 2023-12-08 | Stop reason: HOSPADM

## 2023-12-05 RX ORDER — DOXYCYCLINE 100 MG/1
100 TABLET ORAL EVERY 12 HOURS
Status: DISCONTINUED | OUTPATIENT
Start: 2023-12-05 | End: 2023-12-05

## 2023-12-05 RX ORDER — ACETAMINOPHEN 650 MG/1
1000 SUPPOSITORY RECTAL ONCE
Status: COMPLETED | OUTPATIENT
Start: 2023-12-05 | End: 2023-12-05

## 2023-12-05 RX ORDER — DIAZEPAM 2 MG/1
2 TABLET ORAL EVERY 6 HOURS
Status: DISCONTINUED | OUTPATIENT
Start: 2023-12-05 | End: 2023-12-06

## 2023-12-05 RX ORDER — ENOXAPARIN SODIUM 100 MG/ML
40 INJECTION SUBCUTANEOUS EVERY 12 HOURS
Status: DISCONTINUED | OUTPATIENT
Start: 2023-12-05 | End: 2023-12-08 | Stop reason: HOSPADM

## 2023-12-05 RX ADMIN — QUETIAPINE FUMARATE 50 MG: 25 TABLET ORAL at 21:08

## 2023-12-05 RX ADMIN — DIAZEPAM 2 MG: 2 TABLET ORAL at 17:05

## 2023-12-05 RX ADMIN — METOPROLOL TARTRATE 50 MG: 50 TABLET, FILM COATED ORAL at 17:05

## 2023-12-05 RX ADMIN — PIPERACILLIN AND TAZOBACTAM 3.38 G: 3; .375 INJECTION, POWDER, FOR SOLUTION INTRAVENOUS at 04:31

## 2023-12-05 RX ADMIN — METOPROLOL TARTRATE 50 MG: 50 TABLET, FILM COATED ORAL at 10:33

## 2023-12-05 RX ADMIN — ENOXAPARIN SODIUM 40 MG: 100 INJECTION SUBCUTANEOUS at 17:03

## 2023-12-05 RX ADMIN — DOXYCYCLINE 100 MG: 100 INJECTION, POWDER, LYOPHILIZED, FOR SOLUTION INTRAVENOUS at 03:29

## 2023-12-05 RX ADMIN — FOLIC ACID 1 MG: 1 TABLET ORAL at 10:32

## 2023-12-05 RX ADMIN — PIPERACILLIN AND TAZOBACTAM 3.38 G: 3; .375 INJECTION, POWDER, FOR SOLUTION INTRAVENOUS at 01:16

## 2023-12-05 RX ADMIN — ALLOPURINOL 300 MG: 300 TABLET ORAL at 10:33

## 2023-12-05 RX ADMIN — IPRATROPIUM BROMIDE 0.5 MG: 0.5 SOLUTION RESPIRATORY (INHALATION) at 01:11

## 2023-12-05 RX ADMIN — Medication 100 MG: at 10:34

## 2023-12-05 RX ADMIN — LABETALOL HYDROCHLORIDE 10 MG: 5 INJECTION, SOLUTION INTRAVENOUS at 00:01

## 2023-12-05 RX ADMIN — OMEPRAZOLE 40 MG: 20 CAPSULE, DELAYED RELEASE ORAL at 17:05

## 2023-12-05 RX ADMIN — ACETAMINOPHEN 650 MG: 325 TABLET, FILM COATED ORAL at 19:26

## 2023-12-05 RX ADMIN — AMPICILLIN AND SULBACTAM 3 G: 1; 2 INJECTION, POWDER, FOR SOLUTION INTRAMUSCULAR; INTRAVENOUS at 17:08

## 2023-12-05 RX ADMIN — AMPICILLIN AND SULBACTAM 3 G: 1; 2 INJECTION, POWDER, FOR SOLUTION INTRAMUSCULAR; INTRAVENOUS at 12:08

## 2023-12-05 RX ADMIN — ACETAMINOPHEN 975 MG: 650 SUPPOSITORY RECTAL at 01:17

## 2023-12-05 RX ADMIN — THERA TABS 1 TABLET: TAB at 10:34

## 2023-12-05 ASSESSMENT — COGNITIVE AND FUNCTIONAL STATUS - GENERAL
DRESSING REGULAR LOWER BODY CLOTHING: A LITTLE
DRESSING REGULAR UPPER BODY CLOTHING: A LITTLE
PERSONAL GROOMING: A LITTLE
MOVING FROM LYING ON BACK TO SITTING ON SIDE OF FLAT BED: A LITTLE
TURNING FROM BACK TO SIDE WHILE IN FLAT BAD: A LITTLE
DAILY ACTIVITIY SCORE: 19
MOBILITY SCORE: 17
SUGGESTED CMS G CODE MODIFIER DAILY ACTIVITY: CK
WALKING IN HOSPITAL ROOM: A LOT
TOILETING: A LITTLE
CLIMB 3 TO 5 STEPS WITH RAILING: A LOT
HELP NEEDED FOR BATHING: A LITTLE
SUGGESTED CMS G CODE MODIFIER MOBILITY: CK
STANDING UP FROM CHAIR USING ARMS: A LITTLE

## 2023-12-05 ASSESSMENT — ENCOUNTER SYMPTOMS
FEVER: 0
COUGH: 0
NAUSEA: 0
SORE THROAT: 0
DIARRHEA: 1
DIZZINESS: 0
HEADACHES: 0
HEARTBURN: 1
FOCAL WEAKNESS: 0
ABDOMINAL PAIN: 0
PALPITATIONS: 0
HALLUCINATIONS: 0
BLOOD IN STOOL: 0
NERVOUS/ANXIOUS: 0
SHORTNESS OF BREATH: 1
BACK PAIN: 0

## 2023-12-05 ASSESSMENT — LIFESTYLE VARIABLES
ALCOHOL_USE: YES
PAROXYSMAL SWEATS: BARELY PERCEPTIBLE SWEATING. PALMS MOIST
NAUSEA AND VOMITING: NO NAUSEA AND NO VOMITING
TOTAL SCORE: 3
DOES PATIENT WANT TO TALK TO SOMEONE ABOUT QUITTING: NO
HOW MANY STANDARD DRINKS CONTAINING ALCOHOL DO YOU HAVE ON A TYPICAL DAY: 10 OR MORE
AVERAGE NUMBER OF DAYS PER WEEK YOU HAVE A DRINK CONTAINING ALCOHOL: 7
AGITATION: NORMAL ACTIVITY
TREMOR: TREMOR NOT VISIBLE BUT CAN BE FELT, FINGERTIP TO FINGERTIP
ANXIETY: MILDLY ANXIOUS
AGITATION: NORMAL ACTIVITY
HOW OFTEN DO YOU HAVE A DRINK CONTAINING ALCOHOL: 4 OR MORE TIMES A WEEK
CONSUMPTION TOTAL: POSITIVE
EVER HAD A DRINK FIRST THING IN THE MORNING TO STEADY YOUR NERVES TO GET RID OF A HANGOVER: YES
HEADACHE, FULLNESS IN HEAD: NOT PRESENT
ANXIETY: MILDLY ANXIOUS
NAUSEA AND VOMITING: NO NAUSEA AND NO VOMITING
NAUSEA AND VOMITING: NO NAUSEA AND NO VOMITING
TREMOR: TREMOR NOT VISIBLE BUT CAN BE FELT, FINGERTIP TO FINGERTIP
EVER FELT BAD OR GUILTY ABOUT YOUR DRINKING: NO
PAROXYSMAL SWEATS: BARELY PERCEPTIBLE SWEATING. PALMS MOIST
HEADACHE, FULLNESS IN HEAD: NOT PRESENT
ANXIETY: MILDLY ANXIOUS
AGITATION: NORMAL ACTIVITY
TREMOR: TREMOR NOT VISIBLE BUT CAN BE FELT, FINGERTIP TO FINGERTIP
ORIENTATION AND CLOUDING OF SENSORIUM: ORIENTED AND CAN DO SERIAL ADDITIONS
VISUAL DISTURBANCES: NOT PRESENT
DOES PATIENT WANT TO STOP DRINKING: YES
NAUSEA AND VOMITING: NO NAUSEA AND NO VOMITING
AUDITORY DISTURBANCES: NOT PRESENT
HAVE PEOPLE ANNOYED YOU BY CRITICIZING YOUR DRINKING: YES
ORIENTATION AND CLOUDING OF SENSORIUM: ORIENTED AND CAN DO SERIAL ADDITIONS
SKIP TO QUESTIONS 9-10: 0
TOTAL SCORE: 3
ORIENTATION AND CLOUDING OF SENSORIUM: ORIENTED AND CAN DO SERIAL ADDITIONS
AUDITORY DISTURBANCES: NOT PRESENT
AUDITORY DISTURBANCES: NOT PRESENT
SUBSTANCE_ABUSE: 1
AGITATION: NORMAL ACTIVITY
TOTAL SCORE: 3
TOTAL SCORE: 3
ORIENTATION AND CLOUDING OF SENSORIUM: ORIENTED AND CAN DO SERIAL ADDITIONS
ANXIETY: MILDLY ANXIOUS
AUDITORY DISTURBANCES: NOT PRESENT
AUDIT-C TOTAL SCORE: 12
HOW OFTEN DO YOU HAVE SIX OR MORE DRINKS ON ONE OCCASION: DAILY OR ALMOST DAILY
HEADACHE, FULLNESS IN HEAD: NOT PRESENT
VISUAL DISTURBANCES: NOT PRESENT
HAVE YOU EVER FELT YOU SHOULD CUT DOWN ON YOUR DRINKING: YES
TOTAL SCORE: 3
TOTAL SCORE: 3
VISUAL DISTURBANCES: NOT PRESENT
TREMOR: TREMOR NOT VISIBLE BUT CAN BE FELT, FINGERTIP TO FINGERTIP
HOW MANY TIMES IN THE PAST YEAR HAVE YOU HAD 5 OR MORE DRINKS IN A DAY: 10
ON A TYPICAL DAY WHEN YOU DRINK ALCOHOL HOW MANY DRINKS DO YOU HAVE: 7
TOTAL SCORE: 3
VISUAL DISTURBANCES: NOT PRESENT
HEADACHE, FULLNESS IN HEAD: NOT PRESENT

## 2023-12-05 ASSESSMENT — PAIN DESCRIPTION - PAIN TYPE
TYPE: ACUTE PAIN

## 2023-12-05 ASSESSMENT — PATIENT HEALTH QUESTIONNAIRE - PHQ9
2. FEELING DOWN, DEPRESSED, IRRITABLE, OR HOPELESS: NOT AT ALL
1. LITTLE INTEREST OR PLEASURE IN DOING THINGS: NOT AT ALL
SUM OF ALL RESPONSES TO PHQ9 QUESTIONS 1 AND 2: 0

## 2023-12-05 ASSESSMENT — FIBROSIS 4 INDEX: FIB4 SCORE: 1.23

## 2023-12-05 NOTE — PROGRESS NOTES
Hospital Medicine Daily Progress Note    Date of Service  12/5/2023    Chief Complaint  Alcohol withdrawal and diarrhea    Hospital Course  Alberto Maldonado is a obese 38 y.o. male alcoholic who drinks 5-10 mini bottles of hard alcohol daily.  He is having some gagging could not tolerate drinks tomorrow and came in for detox.  He also complained that he is having frequent diarrhea 10 episodes a day for the past week.  He was admitted to the hospital for concerns of alcohol withdrawal.  He was placed in the IMCU and initiated on Precedex drip.  He had recently been on antibiotics for upper respiratory infection and ear infection.  C. difficile study was unremarkable here at the hospital.  Patient continues to have some watery stools multiple times a day.    Interval Problem Update  Patient seen and examined today.  Data, Medication data reviewed.  Case discussed with nursing as available.  Plan of Care reviewed with patient and notified of changes.  12/5 the patient with evidence of significant somnolence yesterday, sedation was loosened, overnight the patient was placed back on Precedex as he has developed some worsening tachycardia, hypertension, anxiety,  This morning the patient extremely somnolent again, does have some upper airway compromise secondary to large neck circumference, likely obstructive apnea.  The patient is afebrile, heart rate around 100, respiration is unlabored, the patient is saturating in high 90s on 2 L, blood pressure in the 1 teens to 140s over 70s,  Laboratory data is reviewed, white count 11.8, hemoglobin 12.5, hematocrit 37.3, platelet count of 213, sedimentation rate 82, glucose 115, BUN is 6, calcium 8.2, negative lactic acid, ,  Repeat ABG with a pH of 7.37, pCO2 of 38, pO2 of 108,  Cortisol 11.1, procalcitonin 0.56, Staph aureus PCR negative, MRSA negative,  Chest x-ray with pulmonary edema, cardiomegaly  The patient had recent echocardiography showing a normal  ejection fraction, possibly right-sided pressure elevation  12/4: Overnight patient was agitated he had been given Seroquel, Librium and was placed later on Precedex drip.  This morning nurse alerted to me that patient was having gurgling upper airway.  Oxygen saturations were stable on supplemental oxygen but was now on oxime mask.  RT had been at bedside got some upper airway suctioning with improvement.  Patient very lethargic responded to chest rub.  A stat ABG was obtained as well as a chest x-ray.  Patient was saturating stable.  A nasal trumpet was placed in the left nare.  I did have pulmonologist Dr. Surya Mckenzie evaluate patient.  This point in time will take the patient off Precedex drip monitor closely if any acute worsening he will go to ICU with potential intubation.    12/3 have encouraged nursing to give Librium and oral Ativan rather than Precedex drip if able.  They were able to wean Precedex off today.  Patient is alert but lethargic.  His mom and brother at bedside.  I have encouraged and gave tips for alcohol cessation.  The brother states that the patient has some hoarse voice.  Patient denies any problems breathing or any problems swallowing.  Patient denies any hallucinations.    I have discussed this patient's plan of care and discharge plan at IDT rounds today with Case Management, Nursing, Nursing leadership, and other members of the IDT team.      Code Status  Full Code    Disposition  The patient is not medically cleared for discharge to home or a post-acute facility.  Anticipate discharge to: home with close outpatient follow-up    I have placed the appropriate orders for post-discharge needs.    Review of Systems  Review of Systems   Constitutional:  Positive for malaise/fatigue. Negative for fever.   HENT:  Negative for congestion and sore throat.    Respiratory:  Positive for shortness of breath. Negative for cough.    Cardiovascular:  Positive for leg swelling. Negative for chest  pain and palpitations.   Gastrointestinal:  Positive for diarrhea and heartburn. Negative for abdominal pain, blood in stool, melena and nausea.   Genitourinary:  Negative for dysuria.   Musculoskeletal:  Negative for back pain.   Neurological:  Negative for dizziness, focal weakness and headaches.   Psychiatric/Behavioral:  Positive for substance abuse. Negative for hallucinations. The patient is not nervous/anxious.         Physical Exam  Temp:  [36.2 °C (97.1 °F)-39.4 °C (102.9 °F)] 37.8 °C (100.1 °F)  Pulse:  [] 120  Resp:  [17-28] 17  BP: (102-196)/() 133/61  SpO2:  [94 %-100 %] 98 %    Physical Exam  Vitals reviewed.   Constitutional:       Appearance: Normal appearance. He is obese. He is not diaphoretic.   HENT:      Head: Normocephalic and atraumatic.      Nose: Nose normal.      Mouth/Throat:      Mouth: Mucous membranes are moist.      Pharynx: No oropharyngeal exudate.   Eyes:      General: No scleral icterus.        Right eye: No discharge.         Left eye: No discharge.      Extraocular Movements: Extraocular movements intact.      Conjunctiva/sclera: Conjunctivae normal.   Neck:      Comments: Large neck circumference  Cardiovascular:      Rate and Rhythm: Regular rhythm. Tachycardia present.      Pulses:           Radial pulses are 2+ on the right side and 2+ on the left side.        Dorsalis pedis pulses are 2+ on the right side and 2+ on the left side.      Heart sounds: No murmur heard.  Pulmonary:      Effort: Pulmonary effort is normal. No respiratory distress.      Breath sounds: Normal breath sounds. No wheezing or rales.   Abdominal:      General: Bowel sounds are normal. There is no distension.      Palpations: Abdomen is soft.      Tenderness: There is no abdominal tenderness.   Musculoskeletal:         General: No swelling or tenderness.      Cervical back: Neck supple. No rigidity or tenderness. No muscular tenderness.      Right lower leg: No edema.      Left lower leg: No  edema.   Lymphadenopathy:      Cervical: No cervical adenopathy.   Skin:     General: Skin is dry.      Coloration: Skin is not jaundiced or pale.   Neurological:      General: No focal deficit present.      Mental Status: He is alert. Mental status is at baseline.      Cranial Nerves: No cranial nerve deficit.      Motor: Weakness present.      Comments: Somnolent   Psychiatric:         Attention and Perception: He does not perceive auditory or visual hallucinations.         Mood and Affect: Mood normal.         Speech: Speech normal.         Behavior: Behavior is slowed.         Fluids    Intake/Output Summary (Last 24 hours) at 12/5/2023 0730  Last data filed at 12/5/2023 0700  Gross per 24 hour   Intake 3652.12 ml   Output 1450 ml   Net 2202.12 ml         Laboratory  Recent Labs     12/03/23  0132 12/04/23  0211 12/05/23  0047   WBC 5.8 10.1 11.8*   RBC 4.00* 4.07* 4.08*   HEMOGLOBIN 12.2* 12.3* 12.5*   HEMATOCRIT 35.0* 37.1* 37.3*   MCV 87.5 91.2 91.4   MCH 30.5 30.2 30.6   MCHC 34.9 33.2 33.5   RDW 54.0* 57.5* 58.0*   PLATELETCT 248 227 213   MPV 8.9* 8.7* 8.6*       Recent Labs     12/02/23  1135 12/03/23  0132 12/05/23  0047   SODIUM 146* 143 142   POTASSIUM 3.1* 4.0 3.9   CHLORIDE 108 111 106   CO2 19* 20 22   GLUCOSE 152* 113* 115*   BUN 4* 7* 6*   CREATININE 0.71 0.80 0.79   CALCIUM 7.2* 7.4* 8.2*       Recent Labs     12/05/23  0047   INR 1.19*               Imaging  DX-CHEST-LIMITED (1 VIEW)   Final Result         1.  Pulmonary edema and/or infiltrates, somewhat increased since prior study.   2.  Cardiomegaly      DX-CHEST-LIMITED (1 VIEW)   Final Result      Bibasilar underinflation atelectasis which could obscure an additional process.      DX-CHEST-PORTABLE (1 VIEW)   Final Result      Increased BILATERAL underinflation atelectasis           Assessment/Plan  * Alcohol withdrawal delirium, acute, hyperactive (HCC)- (present on admission)  Assessment & Plan  Detox bag  Multivitamins  CIWA  Low-level  sedation as the patient appears intermittently severely medicated, oversedated      GERD (gastroesophageal reflux disease)  Assessment & Plan  PPI    Hypertensive urgency  Assessment & Plan  SBP>190 in ER  Continue losartan, metoprolol, amlodipine  As needed IV labetalol, Catapres    Hypokalemia  Assessment & Plan    Replace and monitor  Replace Mg    Electrolyte abnormality- (present on admission)  Assessment & Plan  Monitor, replace accordingly    Diarrhea  Assessment & Plan  Normal WBC  C. difficile negative  monitor  Imodium as needed    Elevated lactic acid level  Assessment & Plan  Likely secondary to EtOH/liver disease, dehydration  IVF  High-dose IV thiamine  Trend    Gout- (present on admission)  Assessment & Plan  Allopurinol    Hypomagnesemia- (present on admission)  Assessment & Plan  Profound, on admit Mg 1.1  Replace and monitor    Alcoholism (HCC)- (present on admission)  Assessment & Plan  Apparently chronic, relapsing    Morbid obesity (HCC)- (present on admission)  Assessment & Plan  Diet and lifestyle modification  Body mass index is 44.25 kg/m².      BOLIVAR (obstructive sleep apnea)- (present on admission)  Assessment & Plan  Probable severe BOLIVAR by appearance, symptoms  Monitor BMP      Plan  Continue supportive care  Hopefully will be able to take oral nutrition and medication  Reduce sedation  Electrolyte balance, replacement  Reevaluate in terms of the patient's respiratory status, ABG without significant increase of pCO2  Continue adequate oxygenation and ventilation  See orders  Patient is has a high medical complexity, complex decision making and is at high risk for complication, morbidity, and mortality.  My total time spent caring for the patient on the day of the encounter was 56 minutes.   This does not include time spent on separately billable procedures/tests.    VTE prophylaxis:    enoxaparin ppx      I have performed a physical exam and reviewed and updated ROS and Plan today  (12/5/2023). In review of yesterday's note (12/4/2023), there are no changes except as documented above.        Please note that this dictation was created using voice recognition software. I have made every reasonable attempt to correct obvious errors, but I expect that there are errors of grammar and possibly context that I did not discover before finalizing the note.

## 2023-12-05 NOTE — PROGRESS NOTES
Noted to have worsening tachycardia with HR 160s, febrile, .  Pt is AAO x 3.    Precedex drip initiated.

## 2023-12-05 NOTE — PROGRESS NOTES
Pt has a temp of 101.2 ice packs applied, temp of the room was turned down and blankets were removed.  pt is waking up from sedation.

## 2023-12-05 NOTE — DISCHARGE PLANNING
Care Transition Team Assessment    RNCM completed assessment and verified information on face sheet. Patient lives in [place] with family. He prefers that his brother, Freddy be listed as his emergency contact. Patient was independent with ADL's and IADL's prior to this admission. Patient has had multiple hospitalizations due to his ETOH use and has been provided with resources each time. It is not likely patient will have any needs upon DC, however HCM will continue to follow to assist as needed. SODH added to AVS.     Information Source  Orientation Level: Oriented to person  Who is responsible for making decisions for patient? : Patient    Readmission Evaluation  Is this a readmission?: No    Elopement Risk  Legal Hold: No  Ambulatory or Self Mobile in Wheelchair: No-Not an Elopement Risk  Elopement Risk: Not at Risk for Elopement         Discharge Preparedness  What is your plan after discharge?: Home with help  What are your discharge supports?: Parent, Sibling  Prior Functional Level: Ambulatory, Drives Self, Independent with Activities of Daily Living, Independent with Medication Management  Difficulity with ADLs: None  Difficulity with IADLs: None    Functional Assesment  Prior Functional Level: Ambulatory, Drives Self, Independent with Activities of Daily Living, Independent with Medication Management    Finances  Financial Barriers to Discharge: No  Prescription Coverage: Yes              Advance Directive  Advance Directive?: None    Domestic Abuse  Have you ever been the victim of abuse or violence?: No    Psychological Assessment  History of Substance Abuse: Alcohol  History of Psychiatric Problems: No  Non-compliant with Treatment: Yes  Newly Diagnosed Illness: No    Discharge Risks or Barriers  Discharge risks or barriers?: Substance abuse  Patient risk factors: Noncompliance, Substance abuse    Anticipated Discharge Information  Discharge Disposition: Discharged to home/self care (01)

## 2023-12-05 NOTE — CARE PLAN
The patient is Watcher - Medium risk of patient condition declining or worsening    Shift Goals  Clinical Goals: hemodynamically stable  Patient Goals: rest  Family Goals: aide    Progress made toward(s) clinical / shift goals:        Problem: Knowledge Deficit - Standard  Goal: Patient and family/care givers will demonstrate understanding of plan of care, disease process/condition, diagnostic tests and medications  Outcome: Progressing     Problem: Optimal Care for Alcohol Withdrawal  Goal: Optimal Care for the alcohol withdrawal patient  Outcome: Progressing     Problem: Seizure Precautions  Goal: Implementation of seizure precautions  Outcome: Progressing     Problem: Pain - Standard  Goal: Alleviation of pain or a reduction in pain to the patient’s comfort goal  Outcome: Progressing     Problem: Safety - Medical Restraint  Goal: Remains free of injury from restraints (Restraint for Interference with Medical Device)  Outcome: Progressing  Goal: Free from restraint(s) (Restraint for Interference with Medical Device)  Outcome: Progressing

## 2023-12-05 NOTE — ASSESSMENT & PLAN NOTE
Apparently chronic, relapsing  - We discussed this with the patient in detail.  Patient reports that he has hobbies visit.  His currently out of work but is going to start looking for work.  Patient is a strong family support.  He is interested in going to AA.  He is also interested in naltrexone treatment outpatient.

## 2023-12-06 LAB
ALBUMIN SERPL BCP-MCNC: 3 G/DL (ref 3.2–4.9)
ALBUMIN/GLOB SERPL: 0.9 G/DL
ALP SERPL-CCNC: 113 U/L (ref 30–99)
ALT SERPL-CCNC: 15 U/L (ref 2–50)
ANION GAP SERPL CALC-SCNC: 9 MMOL/L (ref 7–16)
APPEARANCE UR: CLEAR
AST SERPL-CCNC: 22 U/L (ref 12–45)
BACTERIA #/AREA URNS HPF: NEGATIVE /HPF
BILIRUB SERPL-MCNC: 3.6 MG/DL (ref 0.1–1.5)
BILIRUB UR QL STRIP.AUTO: ABNORMAL
BUN SERPL-MCNC: 13 MG/DL (ref 8–22)
CALCIUM ALBUM COR SERPL-MCNC: 8.4 MG/DL (ref 8.5–10.5)
CALCIUM SERPL-MCNC: 7.6 MG/DL (ref 8.5–10.5)
CHLORIDE SERPL-SCNC: 106 MMOL/L (ref 96–112)
CO2 SERPL-SCNC: 25 MMOL/L (ref 20–33)
COLOR UR: ABNORMAL
CREAT SERPL-MCNC: 0.93 MG/DL (ref 0.5–1.4)
EPI CELLS #/AREA URNS HPF: NEGATIVE /HPF
ERYTHROCYTE [DISTWIDTH] IN BLOOD BY AUTOMATED COUNT: 59.7 FL (ref 35.9–50)
GFR SERPLBLD CREATININE-BSD FMLA CKD-EPI: 109 ML/MIN/1.73 M 2
GLOBULIN SER CALC-MCNC: 3.5 G/DL (ref 1.9–3.5)
GLUCOSE SERPL-MCNC: 112 MG/DL (ref 65–99)
GLUCOSE UR STRIP.AUTO-MCNC: NEGATIVE MG/DL
HCT VFR BLD AUTO: 32.6 % (ref 42–52)
HGB BLD-MCNC: 10.8 G/DL (ref 14–18)
HYALINE CASTS #/AREA URNS LPF: ABNORMAL /LPF
KETONES UR STRIP.AUTO-MCNC: ABNORMAL MG/DL
LEUKOCYTE ESTERASE UR QL STRIP.AUTO: ABNORMAL
MAGNESIUM SERPL-MCNC: 2.1 MG/DL (ref 1.5–2.5)
MCH RBC QN AUTO: 31.1 PG (ref 27–33)
MCHC RBC AUTO-ENTMCNC: 33.1 G/DL (ref 32.3–36.5)
MCV RBC AUTO: 93.9 FL (ref 81.4–97.8)
MICRO URNS: ABNORMAL
NITRITE UR QL STRIP.AUTO: POSITIVE
PH UR STRIP.AUTO: 5.5 [PH] (ref 5–8)
PHOSPHATE SERPL-MCNC: 2.6 MG/DL (ref 2.5–4.5)
PLATELET # BLD AUTO: 200 K/UL (ref 164–446)
PMV BLD AUTO: 9.3 FL (ref 9–12.9)
POTASSIUM SERPL-SCNC: 3.7 MMOL/L (ref 3.6–5.5)
PROT SERPL-MCNC: 6.5 G/DL (ref 6–8.2)
PROT UR QL STRIP: 30 MG/DL
RBC # BLD AUTO: 3.47 M/UL (ref 4.7–6.1)
RBC # URNS HPF: ABNORMAL /HPF
RBC UR QL AUTO: NEGATIVE
SODIUM SERPL-SCNC: 140 MMOL/L (ref 135–145)
SP GR UR STRIP.AUTO: 1.02
UROBILINOGEN UR STRIP.AUTO-MCNC: 2 MG/DL
WBC # BLD AUTO: 10.6 K/UL (ref 4.8–10.8)
WBC #/AREA URNS HPF: ABNORMAL /HPF

## 2023-12-06 PROCEDURE — A9270 NON-COVERED ITEM OR SERVICE: HCPCS | Performed by: HOSPITALIST

## 2023-12-06 PROCEDURE — 700111 HCHG RX REV CODE 636 W/ 250 OVERRIDE (IP): Mod: JZ | Performed by: HOSPITALIST

## 2023-12-06 PROCEDURE — A9270 NON-COVERED ITEM OR SERVICE: HCPCS | Performed by: STUDENT IN AN ORGANIZED HEALTH CARE EDUCATION/TRAINING PROGRAM

## 2023-12-06 PROCEDURE — 700102 HCHG RX REV CODE 250 W/ 637 OVERRIDE(OP): Performed by: HOSPITALIST

## 2023-12-06 PROCEDURE — 81001 URINALYSIS AUTO W/SCOPE: CPT

## 2023-12-06 PROCEDURE — 99233 SBSQ HOSP IP/OBS HIGH 50: CPT | Performed by: HOSPITALIST

## 2023-12-06 PROCEDURE — 80053 COMPREHEN METABOLIC PANEL: CPT

## 2023-12-06 PROCEDURE — A9270 NON-COVERED ITEM OR SERVICE: HCPCS | Mod: JZ | Performed by: HOSPITALIST

## 2023-12-06 PROCEDURE — 700105 HCHG RX REV CODE 258: Performed by: HOSPITALIST

## 2023-12-06 PROCEDURE — 83735 ASSAY OF MAGNESIUM: CPT

## 2023-12-06 PROCEDURE — 700102 HCHG RX REV CODE 250 W/ 637 OVERRIDE(OP): Performed by: STUDENT IN AN ORGANIZED HEALTH CARE EDUCATION/TRAINING PROGRAM

## 2023-12-06 PROCEDURE — 770020 HCHG ROOM/CARE - TELE (206)

## 2023-12-06 PROCEDURE — 84100 ASSAY OF PHOSPHORUS: CPT

## 2023-12-06 PROCEDURE — 85027 COMPLETE CBC AUTOMATED: CPT

## 2023-12-06 PROCEDURE — 700102 HCHG RX REV CODE 250 W/ 637 OVERRIDE(OP): Mod: JZ | Performed by: HOSPITALIST

## 2023-12-06 RX ORDER — QUETIAPINE FUMARATE 25 MG/1
25 TABLET, FILM COATED ORAL NIGHTLY
Status: DISCONTINUED | OUTPATIENT
Start: 2023-12-06 | End: 2023-12-08 | Stop reason: HOSPADM

## 2023-12-06 RX ORDER — POTASSIUM CHLORIDE 20 MEQ/1
20 TABLET, EXTENDED RELEASE ORAL ONCE
Status: COMPLETED | OUTPATIENT
Start: 2023-12-06 | End: 2023-12-06

## 2023-12-06 RX ORDER — ACETAMINOPHEN 650 MG/1
650 SUPPOSITORY RECTAL EVERY 4 HOURS PRN
Status: DISCONTINUED | OUTPATIENT
Start: 2023-12-06 | End: 2023-12-08 | Stop reason: HOSPADM

## 2023-12-06 RX ORDER — SODIUM CHLORIDE 9 MG/ML
1000 INJECTION, SOLUTION INTRAVENOUS ONCE
Status: COMPLETED | OUTPATIENT
Start: 2023-12-06 | End: 2023-12-06

## 2023-12-06 RX ADMIN — AMPICILLIN AND SULBACTAM 3 G: 1; 2 INJECTION, POWDER, FOR SOLUTION INTRAMUSCULAR; INTRAVENOUS at 12:01

## 2023-12-06 RX ADMIN — SODIUM CHLORIDE 1000 ML: 9 INJECTION, SOLUTION INTRAVENOUS at 08:32

## 2023-12-06 RX ADMIN — IBUPROFEN 400 MG: 400 TABLET, FILM COATED ORAL at 12:04

## 2023-12-06 RX ADMIN — ENOXAPARIN SODIUM 40 MG: 100 INJECTION SUBCUTANEOUS at 05:45

## 2023-12-06 RX ADMIN — Medication 100 MG: at 05:46

## 2023-12-06 RX ADMIN — METOPROLOL TARTRATE 50 MG: 50 TABLET, FILM COATED ORAL at 05:46

## 2023-12-06 RX ADMIN — AMPICILLIN AND SULBACTAM 3 G: 1; 2 INJECTION, POWDER, FOR SOLUTION INTRAMUSCULAR; INTRAVENOUS at 00:52

## 2023-12-06 RX ADMIN — OMEPRAZOLE 40 MG: 20 CAPSULE, DELAYED RELEASE ORAL at 17:20

## 2023-12-06 RX ADMIN — LOPERAMIDE HYDROCHLORIDE 2 MG: 2 CAPSULE ORAL at 12:04

## 2023-12-06 RX ADMIN — QUETIAPINE FUMARATE 25 MG: 25 TABLET ORAL at 20:21

## 2023-12-06 RX ADMIN — ACETAMINOPHEN 650 MG: 650 SUPPOSITORY RECTAL at 02:48

## 2023-12-06 RX ADMIN — ALLOPURINOL 300 MG: 300 TABLET ORAL at 05:45

## 2023-12-06 RX ADMIN — POTASSIUM CHLORIDE 20 MEQ: 1500 TABLET, EXTENDED RELEASE ORAL at 12:04

## 2023-12-06 RX ADMIN — AMPICILLIN AND SULBACTAM 3 G: 1; 2 INJECTION, POWDER, FOR SOLUTION INTRAMUSCULAR; INTRAVENOUS at 23:46

## 2023-12-06 RX ADMIN — ENOXAPARIN SODIUM 40 MG: 100 INJECTION SUBCUTANEOUS at 17:20

## 2023-12-06 RX ADMIN — OMEPRAZOLE 40 MG: 20 CAPSULE, DELAYED RELEASE ORAL at 05:45

## 2023-12-06 RX ADMIN — LOPERAMIDE HYDROCHLORIDE 2 MG: 2 CAPSULE ORAL at 09:54

## 2023-12-06 RX ADMIN — AMPICILLIN AND SULBACTAM 3 G: 1; 2 INJECTION, POWDER, FOR SOLUTION INTRAMUSCULAR; INTRAVENOUS at 17:22

## 2023-12-06 RX ADMIN — AMPICILLIN AND SULBACTAM 3 G: 1; 2 INJECTION, POWDER, FOR SOLUTION INTRAMUSCULAR; INTRAVENOUS at 05:47

## 2023-12-06 RX ADMIN — ACETAMINOPHEN 650 MG: 325 TABLET, FILM COATED ORAL at 09:54

## 2023-12-06 RX ADMIN — AMLODIPINE BESYLATE 10 MG: 5 TABLET ORAL at 05:45

## 2023-12-06 ASSESSMENT — LIFESTYLE VARIABLES
VISUAL DISTURBANCES: NOT PRESENT
AGITATION: NORMAL ACTIVITY
PAROXYSMAL SWEATS: BARELY PERCEPTIBLE SWEATING. PALMS MOIST
ORIENTATION AND CLOUDING OF SENSORIUM: CANNOT DO SERIAL ADDITIONS OR IS UNCERTAIN ABOUT DATE
AUDITORY DISTURBANCES: NOT PRESENT
ORIENTATION AND CLOUDING OF SENSORIUM: ORIENTED AND CAN DO SERIAL ADDITIONS
TREMOR: NO TREMOR
HEADACHE, FULLNESS IN HEAD: NOT PRESENT
ANXIETY: MILDLY ANXIOUS
HEADACHE, FULLNESS IN HEAD: NOT PRESENT
VISUAL DISTURBANCES: NOT PRESENT
NAUSEA AND VOMITING: NO NAUSEA AND NO VOMITING
AUDITORY DISTURBANCES: NOT PRESENT
TREMOR: NO TREMOR
PAROXYSMAL SWEATS: BARELY PERCEPTIBLE SWEATING. PALMS MOIST
SUBSTANCE_ABUSE: 1
ORIENTATION AND CLOUDING OF SENSORIUM: ORIENTED AND CAN DO SERIAL ADDITIONS
PAROXYSMAL SWEATS: NO SWEAT VISIBLE
AUDITORY DISTURBANCES: NOT PRESENT
TOTAL SCORE: 2
NAUSEA AND VOMITING: NO NAUSEA AND NO VOMITING
ANXIETY: MILDLY ANXIOUS
HEADACHE, FULLNESS IN HEAD: NOT PRESENT
TREMOR: NO TREMOR
TOTAL SCORE: 2
TOTAL SCORE: 2
AGITATION: NORMAL ACTIVITY
NAUSEA AND VOMITING: NO NAUSEA AND NO VOMITING
VISUAL DISTURBANCES: NOT PRESENT
AGITATION: NORMAL ACTIVITY
ANXIETY: MILDLY ANXIOUS

## 2023-12-06 ASSESSMENT — PAIN DESCRIPTION - PAIN TYPE
TYPE: ACUTE PAIN

## 2023-12-06 ASSESSMENT — ENCOUNTER SYMPTOMS
HEADACHES: 0
ABDOMINAL PAIN: 0
BLOOD IN STOOL: 0
HALLUCINATIONS: 0
PALPITATIONS: 0
HEARTBURN: 1
COUGH: 0
SHORTNESS OF BREATH: 1
FEVER: 0
DIARRHEA: 1
FOCAL WEAKNESS: 0
NERVOUS/ANXIOUS: 0
DIZZINESS: 0
BACK PAIN: 0
NAUSEA: 0
SORE THROAT: 0

## 2023-12-06 ASSESSMENT — FIBROSIS 4 INDEX
FIB4 SCORE: 1.02
FIB4 SCORE: 1.23

## 2023-12-06 NOTE — CARE PLAN
The patient is Watcher - Medium risk of patient condition declining or worsening    Shift Goals  Clinical Goals: reduce fever, hemodynamically stabl  Patient Goals: rest  Family Goals: aide        Problem: Knowledge Deficit - Standard  Goal: Patient and family/care givers will demonstrate understanding of plan of care, disease process/condition, diagnostic tests and medications  Outcome: Progressing     Problem: Optimal Care for Alcohol Withdrawal  Goal: Optimal Care for the alcohol withdrawal patient  Outcome: Progressing     Problem: Seizure Precautions  Goal: Implementation of seizure precautions  Outcome: Progressing     Problem: Hemodynamics  Goal: Patient's hemodynamics, fluid balance and neurologic status will be stable or improve  Outcome: Progressing

## 2023-12-06 NOTE — PROGRESS NOTES
Hospital Medicine Daily Progress Note    Date of Service  12/6/2023    Chief Complaint  Alcohol withdrawal and diarrhea    Hospital Course  Alberto Maldonado is a obese 38 y.o. male alcoholic who drinks 5-10 mini bottles of hard alcohol daily.  He is having some gagging could not tolerate drinks tomorrow and came in for detox.  He also complained that he is having frequent diarrhea 10 episodes a day for the past week.  He was admitted to the hospital for concerns of alcohol withdrawal.  He was placed in the IMCU and initiated on Precedex drip.  He had recently been on antibiotics for upper respiratory infection and ear infection.  C. difficile study was unremarkable here at the hospital.  Patient continues to have some watery stools multiple times a day.    Interval Problem Update  Patient seen and examined today.  Data, Medication data reviewed.  Case discussed with nursing as available.  Plan of Care reviewed with patient and notified of changes.  12/6 the patient is improved, he has low blood pressure and required some IV fluid bolus yesterday, appears to have mild drop in  p.o. intake, he is awake and alert, somewhat drowsy, currently no agitation or withdrawal symptoms noticeable  The patient is afebrile, heart rate in the 90s, respiration unlabored, he is on room air at 95%,  Laboratory data with a count 10.6, hemoglobin 10.8, platelet count 200, chemistry to sodium 140, potassium 3.7, chloride 106, bicarb 25, glucose 112, BUN 13, creatinine 0.93  12/5 the patient with evidence of significant somnolence yesterday, sedation was loosened, overnight the patient was placed back on Precedex as he has developed some worsening tachycardia, hypertension, anxiety,  This morning the patient extremely somnolent again, does have some upper airway compromise secondary to large neck circumference, likely obstructive apnea.  The patient is afebrile, heart rate around 100, respiration is unlabored, the patient is  saturating in high 90s on 2 L, blood pressure in the 1 teens to 140s over 70s,  Laboratory data is reviewed, white count 11.8, hemoglobin 12.5, hematocrit 37.3, platelet count of 213, sedimentation rate 82, glucose 115, BUN is 6, calcium 8.2, negative lactic acid, ,  Repeat ABG with a pH of 7.37, pCO2 of 38, pO2 of 108,  Cortisol 11.1, procalcitonin 0.56, Staph aureus PCR negative, MRSA negative,  Chest x-ray with pulmonary edema, cardiomegaly  The patient had recent echocardiography showing a normal ejection fraction, possibly right-sided pressure elevation  12/4: Overnight patient was agitated he had been given Seroquel, Librium and was placed later on Precedex drip.  This morning nurse alerted to me that patient was having gurgling upper airway.  Oxygen saturations were stable on supplemental oxygen but was now on oxime mask.  RT had been at bedside got some upper airway suctioning with improvement.  Patient very lethargic responded to chest rub.  A stat ABG was obtained as well as a chest x-ray.  Patient was saturating stable.  A nasal trumpet was placed in the left nare.  I did have pulmonologist Dr. Surya Mckenzie evaluate patient.  This point in time will take the patient off Precedex drip monitor closely if any acute worsening he will go to ICU with potential intubation.    12/3 have encouraged nursing to give Librium and oral Ativan rather than Precedex drip if able.  They were able to wean Precedex off today.  Patient is alert but lethargic.  His mom and brother at bedside.  I have encouraged and gave tips for alcohol cessation.  The brother states that the patient has some hoarse voice.  Patient denies any problems breathing or any problems swallowing.  Patient denies any hallucinations.    I have discussed this patient's plan of care and discharge plan at IDT rounds today with Case Management, Nursing, Nursing leadership, and other members of the IDT team.      Code Status  Full  Code    Disposition  The patient is not medically cleared for discharge to home or a post-acute facility.  Anticipate discharge to: home with close outpatient follow-up    I have placed the appropriate orders for post-discharge needs.    Review of Systems  Review of Systems   Constitutional:  Positive for malaise/fatigue. Negative for fever.   HENT:  Negative for congestion and sore throat.    Respiratory:  Positive for shortness of breath. Negative for cough.    Cardiovascular:  Positive for leg swelling. Negative for chest pain and palpitations.   Gastrointestinal:  Positive for diarrhea and heartburn. Negative for abdominal pain, blood in stool, melena and nausea.   Genitourinary:  Negative for dysuria.   Musculoskeletal:  Negative for back pain.   Neurological:  Negative for dizziness, focal weakness and headaches.   Psychiatric/Behavioral:  Positive for substance abuse. Negative for hallucinations. The patient is not nervous/anxious.         Physical Exam  Temp:  [37.3 °C (99.1 °F)-39.9 °C (103.9 °F)] 37.3 °C (99.1 °F)  Pulse:  [100-125] 105  Resp:  [15-39] 39  BP: ()/(50-79) 136/60  SpO2:  [93 %-99 %] 96 %    Physical Exam  Vitals reviewed.   Constitutional:       Appearance: Normal appearance. He is obese. He is not diaphoretic.   HENT:      Head: Normocephalic and atraumatic.      Nose: Nose normal.      Mouth/Throat:      Mouth: Mucous membranes are moist.      Pharynx: No oropharyngeal exudate.   Eyes:      General: No scleral icterus.        Right eye: No discharge.         Left eye: No discharge.      Extraocular Movements: Extraocular movements intact.      Conjunctiva/sclera: Conjunctivae normal.   Neck:      Comments: Large neck circumference  Cardiovascular:      Rate and Rhythm: Regular rhythm. Tachycardia present.      Pulses:           Radial pulses are 2+ on the right side and 2+ on the left side.        Dorsalis pedis pulses are 2+ on the right side and 2+ on the left side.      Heart  sounds: No murmur heard.  Pulmonary:      Effort: Pulmonary effort is normal. No respiratory distress.      Breath sounds: Normal breath sounds. No wheezing or rales.   Abdominal:      General: Bowel sounds are normal. There is no distension.      Palpations: Abdomen is soft.      Tenderness: There is no abdominal tenderness.   Musculoskeletal:         General: No swelling or tenderness.      Cervical back: Neck supple. No rigidity or tenderness. No muscular tenderness.      Right lower leg: No edema.      Left lower leg: No edema.   Lymphadenopathy:      Cervical: No cervical adenopathy.   Skin:     General: Skin is dry.      Coloration: Skin is not jaundiced or pale.   Neurological:      General: No focal deficit present.      Mental Status: He is alert. Mental status is at baseline.      Cranial Nerves: No cranial nerve deficit.      Motor: Weakness present.      Comments: Somnolent   Psychiatric:         Attention and Perception: He does not perceive auditory or visual hallucinations.         Mood and Affect: Mood normal.         Speech: Speech normal.         Behavior: Behavior is slowed.         Fluids    Intake/Output Summary (Last 24 hours) at 12/6/2023 0833  Last data filed at 12/6/2023 0600  Gross per 24 hour   Intake 240 ml   Output 1650 ml   Net -1410 ml         Laboratory  Recent Labs     12/04/23  0211 12/05/23  0047 12/06/23  0419   WBC 10.1 11.8* 10.6   RBC 4.07* 4.08* 3.47*   HEMOGLOBIN 12.3* 12.5* 10.8*   HEMATOCRIT 37.1* 37.3* 32.6*   MCV 91.2 91.4 93.9   MCH 30.2 30.6 31.1   MCHC 33.2 33.5 33.1   RDW 57.5* 58.0* 59.7*   PLATELETCT 227 213 200   MPV 8.7* 8.6* 9.3       Recent Labs     12/05/23  0047 12/06/23  0419   SODIUM 142 140   POTASSIUM 3.9 3.7   CHLORIDE 106 106   CO2 22 25   GLUCOSE 115* 112*   BUN 6* 13   CREATININE 0.79 0.93   CALCIUM 8.2* 7.6*       Recent Labs     12/05/23  0047   INR 1.19*                 Imaging  DX-CHEST-LIMITED (1 VIEW)   Final Result         1.  Pulmonary edema  and/or infiltrates, somewhat increased since prior study.   2.  Cardiomegaly      DX-CHEST-LIMITED (1 VIEW)   Final Result      Bibasilar underinflation atelectasis which could obscure an additional process.      DX-CHEST-PORTABLE (1 VIEW)   Final Result      Increased BILATERAL underinflation atelectasis           Assessment/Plan  * Alcohol withdrawal delirium, acute, hyperactive (HCC)- (present on admission)  Assessment & Plan  Detox bag  Multivitamins  CIWA  Low-level sedation as the patient appears intermittently severely medicated, oversedated      GERD (gastroesophageal reflux disease)  Assessment & Plan  PPI    Hypertensive urgency  Assessment & Plan  SBP>190 in ER  Continue losartan, metoprolol, amlodipine  As needed IV labetalol, Catapres    Hypokalemia  Assessment & Plan    Replace and monitor  Replace Mg    Electrolyte abnormality- (present on admission)  Assessment & Plan  Monitor, replace accordingly    Diarrhea  Assessment & Plan  Normal WBC  C. difficile negative  monitor  Imodium as needed    Elevated lactic acid level  Assessment & Plan  Likely secondary to EtOH/liver disease, dehydration  IVF  High-dose IV thiamine  Trend    Gout- (present on admission)  Assessment & Plan  Allopurinol    Hypomagnesemia- (present on admission)  Assessment & Plan  Profound, on admit Mg 1.1  Replace and monitor    Alcoholism (HCC)- (present on admission)  Assessment & Plan  Apparently chronic, relapsing    Morbid obesity (HCC)- (present on admission)  Assessment & Plan  Diet and lifestyle modification  Body mass index is 44.25 kg/m².      BOLIVAR (obstructive sleep apnea)- (present on admission)  Assessment & Plan  Probable severe BOLIVAR by appearance, symptoms  Monitor BMP      Plan  DC scheduled sedation, monitor, increase activity, increase p.o. intake, fluid intake  Fluid bolus, hold antihypertensives  Decrease beta-blockade, observe further  Continue supportive care  Hopefully will be able to take oral nutrition and  medication in a more adequate fashion  Electrolyte balance, replacement  Continue adequate oxygenation and ventilation  See orders  Patient is has a high medical complexity, complex decision making and is at high risk for complication, morbidity, and mortality.  My total time spent caring for the patient on the day of the encounter was 52 minutes.   This does not include time spent on separately billable procedures/tests.    VTE prophylaxis:    enoxaparin ppx      I have performed a physical exam and reviewed and updated ROS and Plan today (12/6/2023). In review of yesterday's note (12/5/2023), there are no changes except as documented above.        Please note that this dictation was created using voice recognition software. I have made every reasonable attempt to correct obvious errors, but I expect that there are errors of grammar and possibly context that I did not discover before finalizing the note.

## 2023-12-06 NOTE — CARE PLAN
The patient is Stable - Low risk of patient condition declining or worsening    Shift Goals  Clinical Goals: Hemodynamic stability  Patient Goals: Rest  Family Goals: aide    Progress made toward(s) clinical / shift goals:    Problem: Knowledge Deficit - Standard  Goal: Patient and family/care givers will demonstrate understanding of plan of care, disease process/condition, diagnostic tests and medications  Outcome: Progressing     Problem: Optimal Care for Alcohol Withdrawal  Goal: Optimal Care for the alcohol withdrawal patient  Outcome: Progressing     Problem: Seizure Precautions  Goal: Implementation of seizure precautions  Outcome: Progressing     Problem: Hemodynamics  Goal: Patient's hemodynamics, fluid balance and neurologic status will be stable or improve  Outcome: Progressing     Problem: Pain - Standard  Goal: Alleviation of pain or a reduction in pain to the patient’s comfort goal  Outcome: Progressing     Problem: Fall Risk  Goal: Patient will remain free from falls  Outcome: Progressing     Problem: Skin Integrity  Goal: Skin integrity is maintained or improved  Outcome: Progressing

## 2023-12-07 LAB
ALBUMIN SERPL BCP-MCNC: 3 G/DL (ref 3.2–4.9)
ALBUMIN/GLOB SERPL: 0.8 G/DL
ALP SERPL-CCNC: 116 U/L (ref 30–99)
ALT SERPL-CCNC: 16 U/L (ref 2–50)
ANION GAP SERPL CALC-SCNC: 10 MMOL/L (ref 7–16)
AST SERPL-CCNC: 29 U/L (ref 12–45)
BACTERIA BLD CULT: NORMAL
BACTERIA BLD CULT: NORMAL
BACTERIA UR CULT: NORMAL
BILIRUB SERPL-MCNC: 1.7 MG/DL (ref 0.1–1.5)
BUN SERPL-MCNC: 10 MG/DL (ref 8–22)
CALCIUM ALBUM COR SERPL-MCNC: 8.6 MG/DL (ref 8.5–10.5)
CALCIUM SERPL-MCNC: 7.8 MG/DL (ref 8.5–10.5)
CHLORIDE SERPL-SCNC: 106 MMOL/L (ref 96–112)
CO2 SERPL-SCNC: 24 MMOL/L (ref 20–33)
CREAT SERPL-MCNC: 0.67 MG/DL (ref 0.5–1.4)
ERYTHROCYTE [DISTWIDTH] IN BLOOD BY AUTOMATED COUNT: 58.3 FL (ref 35.9–50)
GFR SERPLBLD CREATININE-BSD FMLA CKD-EPI: 124 ML/MIN/1.73 M 2
GLOBULIN SER CALC-MCNC: 4 G/DL (ref 1.9–3.5)
GLUCOSE SERPL-MCNC: 108 MG/DL (ref 65–99)
HCT VFR BLD AUTO: 32.7 % (ref 42–52)
HGB BLD-MCNC: 10.6 G/DL (ref 14–18)
MAGNESIUM SERPL-MCNC: 2.2 MG/DL (ref 1.5–2.5)
MCH RBC QN AUTO: 30.3 PG (ref 27–33)
MCHC RBC AUTO-ENTMCNC: 32.4 G/DL (ref 32.3–36.5)
MCV RBC AUTO: 93.4 FL (ref 81.4–97.8)
PHOSPHATE SERPL-MCNC: 1.9 MG/DL (ref 2.5–4.5)
PLATELET # BLD AUTO: 239 K/UL (ref 164–446)
PMV BLD AUTO: 9.5 FL (ref 9–12.9)
POTASSIUM SERPL-SCNC: 3.4 MMOL/L (ref 3.6–5.5)
PROT SERPL-MCNC: 7 G/DL (ref 6–8.2)
RBC # BLD AUTO: 3.5 M/UL (ref 4.7–6.1)
SIGNIFICANT IND 70042: NORMAL
SITE SITE: NORMAL
SODIUM SERPL-SCNC: 140 MMOL/L (ref 135–145)
SOURCE SOURCE: NORMAL
WBC # BLD AUTO: 7.9 K/UL (ref 4.8–10.8)

## 2023-12-07 PROCEDURE — A9270 NON-COVERED ITEM OR SERVICE: HCPCS

## 2023-12-07 PROCEDURE — 85027 COMPLETE CBC AUTOMATED: CPT

## 2023-12-07 PROCEDURE — A9270 NON-COVERED ITEM OR SERVICE: HCPCS | Performed by: STUDENT IN AN ORGANIZED HEALTH CARE EDUCATION/TRAINING PROGRAM

## 2023-12-07 PROCEDURE — 700105 HCHG RX REV CODE 258: Performed by: HOSPITALIST

## 2023-12-07 PROCEDURE — 700111 HCHG RX REV CODE 636 W/ 250 OVERRIDE (IP)

## 2023-12-07 PROCEDURE — 770020 HCHG ROOM/CARE - TELE (206)

## 2023-12-07 PROCEDURE — 99232 SBSQ HOSP IP/OBS MODERATE 35: CPT | Mod: GC | Performed by: INTERNAL MEDICINE

## 2023-12-07 PROCEDURE — 94760 N-INVAS EAR/PLS OXIMETRY 1: CPT

## 2023-12-07 PROCEDURE — A9270 NON-COVERED ITEM OR SERVICE: HCPCS | Performed by: HOSPITALIST

## 2023-12-07 PROCEDURE — 700111 HCHG RX REV CODE 636 W/ 250 OVERRIDE (IP): Mod: JZ | Performed by: HOSPITALIST

## 2023-12-07 PROCEDURE — 700102 HCHG RX REV CODE 250 W/ 637 OVERRIDE(OP)

## 2023-12-07 PROCEDURE — 36415 COLL VENOUS BLD VENIPUNCTURE: CPT

## 2023-12-07 PROCEDURE — 97162 PT EVAL MOD COMPLEX 30 MIN: CPT

## 2023-12-07 PROCEDURE — 80053 COMPREHEN METABOLIC PANEL: CPT

## 2023-12-07 PROCEDURE — 700102 HCHG RX REV CODE 250 W/ 637 OVERRIDE(OP): Performed by: HOSPITALIST

## 2023-12-07 PROCEDURE — 700102 HCHG RX REV CODE 250 W/ 637 OVERRIDE(OP): Performed by: STUDENT IN AN ORGANIZED HEALTH CARE EDUCATION/TRAINING PROGRAM

## 2023-12-07 PROCEDURE — 51798 US URINE CAPACITY MEASURE: CPT

## 2023-12-07 PROCEDURE — 83735 ASSAY OF MAGNESIUM: CPT

## 2023-12-07 PROCEDURE — 84100 ASSAY OF PHOSPHORUS: CPT

## 2023-12-07 PROCEDURE — 97167 OT EVAL HIGH COMPLEX 60 MIN: CPT

## 2023-12-07 RX ORDER — PREDNISONE 20 MG/1
40 TABLET ORAL DAILY
Status: DISCONTINUED | OUTPATIENT
Start: 2023-12-07 | End: 2023-12-08 | Stop reason: HOSPADM

## 2023-12-07 RX ORDER — NALTREXONE HYDROCHLORIDE 50 MG/1
50 TABLET, FILM COATED ORAL DAILY
Qty: 30 TABLET | Refills: 0 | Status: SHIPPED | OUTPATIENT
Start: 2023-12-07 | End: 2024-01-06

## 2023-12-07 RX ORDER — OMEPRAZOLE 40 MG/1
40 CAPSULE, DELAYED RELEASE ORAL 2 TIMES DAILY
Qty: 60 CAPSULE | Refills: 0 | Status: SHIPPED | OUTPATIENT
Start: 2023-12-07

## 2023-12-07 RX ORDER — POTASSIUM CHLORIDE 20 MEQ/1
40 TABLET, EXTENDED RELEASE ORAL ONCE
Status: COMPLETED | OUTPATIENT
Start: 2023-12-07 | End: 2023-12-07

## 2023-12-07 RX ADMIN — METOPROLOL TARTRATE 25 MG: 25 TABLET, FILM COATED ORAL at 16:59

## 2023-12-07 RX ADMIN — ALLOPURINOL 300 MG: 300 TABLET ORAL at 05:33

## 2023-12-07 RX ADMIN — OMEPRAZOLE 40 MG: 20 CAPSULE, DELAYED RELEASE ORAL at 05:32

## 2023-12-07 RX ADMIN — QUETIAPINE FUMARATE 25 MG: 25 TABLET ORAL at 21:25

## 2023-12-07 RX ADMIN — ENOXAPARIN SODIUM 40 MG: 100 INJECTION SUBCUTANEOUS at 05:32

## 2023-12-07 RX ADMIN — POTASSIUM CHLORIDE 40 MEQ: 1500 TABLET, EXTENDED RELEASE ORAL at 08:41

## 2023-12-07 RX ADMIN — PREDNISONE 40 MG: 20 TABLET ORAL at 13:35

## 2023-12-07 RX ADMIN — AMPICILLIN AND SULBACTAM 3 G: 1; 2 INJECTION, POWDER, FOR SOLUTION INTRAMUSCULAR; INTRAVENOUS at 05:44

## 2023-12-07 RX ADMIN — DIBASIC SODIUM PHOSPHATE, MONOBASIC POTASSIUM PHOSPHATE AND MONOBASIC SODIUM PHOSPHATE 250 MG: 852; 155; 130 TABLET ORAL at 08:41

## 2023-12-07 RX ADMIN — OMEPRAZOLE 40 MG: 20 CAPSULE, DELAYED RELEASE ORAL at 16:59

## 2023-12-07 RX ADMIN — METOPROLOL TARTRATE 25 MG: 25 TABLET, FILM COATED ORAL at 05:33

## 2023-12-07 RX ADMIN — ENOXAPARIN SODIUM 40 MG: 100 INJECTION SUBCUTANEOUS at 16:59

## 2023-12-07 RX ADMIN — DIBASIC SODIUM PHOSPHATE, MONOBASIC POTASSIUM PHOSPHATE AND MONOBASIC SODIUM PHOSPHATE 250 MG: 852; 155; 130 TABLET ORAL at 16:59

## 2023-12-07 RX ADMIN — DIBASIC SODIUM PHOSPHATE, MONOBASIC POTASSIUM PHOSPHATE AND MONOBASIC SODIUM PHOSPHATE 250 MG: 852; 155; 130 TABLET ORAL at 13:36

## 2023-12-07 ASSESSMENT — LIFESTYLE VARIABLES
VISUAL DISTURBANCES: NOT PRESENT
NAUSEA AND VOMITING: NO NAUSEA AND NO VOMITING
PAROXYSMAL SWEATS: NO SWEAT VISIBLE
TOTAL SCORE: 1
NAUSEA AND VOMITING: NO NAUSEA AND NO VOMITING
ANXIETY: NO ANXIETY (AT EASE)
AGITATION: NORMAL ACTIVITY
AGITATION: NORMAL ACTIVITY
ANXIETY: NO ANXIETY (AT EASE)
TREMOR: NO TREMOR
HEADACHE, FULLNESS IN HEAD: NOT PRESENT
VISUAL DISTURBANCES: NOT PRESENT
ORIENTATION AND CLOUDING OF SENSORIUM: CANNOT DO SERIAL ADDITIONS OR IS UNCERTAIN ABOUT DATE
HEADACHE, FULLNESS IN HEAD: NOT PRESENT
AUDITORY DISTURBANCES: NOT PRESENT
AUDITORY DISTURBANCES: NOT PRESENT
TREMOR: NO TREMOR
PAROXYSMAL SWEATS: NO SWEAT VISIBLE
ORIENTATION AND CLOUDING OF SENSORIUM: CANNOT DO SERIAL ADDITIONS OR IS UNCERTAIN ABOUT DATE
TOTAL SCORE: 1

## 2023-12-07 ASSESSMENT — COGNITIVE AND FUNCTIONAL STATUS - GENERAL
MOBILITY SCORE: 10
SUGGESTED CMS G CODE MODIFIER DAILY ACTIVITY: CK
DRESSING REGULAR LOWER BODY CLOTHING: A LOT
SUGGESTED CMS G CODE MODIFIER MOBILITY: CL
STANDING UP FROM CHAIR USING ARMS: A LITTLE
HELP NEEDED FOR BATHING: A LITTLE
DRESSING REGULAR UPPER BODY CLOTHING: A LITTLE
DAILY ACTIVITIY SCORE: 18
MOVING FROM LYING ON BACK TO SITTING ON SIDE OF FLAT BED: UNABLE
PERSONAL GROOMING: A LITTLE
WALKING IN HOSPITAL ROOM: A LOT
CLIMB 3 TO 5 STEPS WITH RAILING: A LOT
TOILETING: A LITTLE
MOVING TO AND FROM BED TO CHAIR: UNABLE
TURNING FROM BACK TO SIDE WHILE IN FLAT BAD: UNABLE

## 2023-12-07 ASSESSMENT — ENCOUNTER SYMPTOMS
FEVER: 0
SHORTNESS OF BREATH: 0
DIAPHORESIS: 0
CHILLS: 0
CONSTIPATION: 0
ORTHOPNEA: 0
TREMORS: 0
HEADACHES: 0
ABDOMINAL PAIN: 0
COUGH: 0
DIARRHEA: 0
PALPITATIONS: 0
VOMITING: 0
WHEEZING: 0
HEARTBURN: 0
SPUTUM PRODUCTION: 0
NAUSEA: 0

## 2023-12-07 ASSESSMENT — GAIT ASSESSMENTS: GAIT LEVEL OF ASSIST: UNABLE TO PARTICIPATE

## 2023-12-07 ASSESSMENT — ACTIVITIES OF DAILY LIVING (ADL): TOILETING: INDEPENDENT

## 2023-12-07 ASSESSMENT — FIBROSIS 4 INDEX: FIB4 SCORE: 1.09

## 2023-12-07 ASSESSMENT — PAIN DESCRIPTION - PAIN TYPE
TYPE: ACUTE PAIN

## 2023-12-07 NOTE — CARE PLAN
The patient is Stable - Low risk of patient condition declining or worsening    Shift Goals  Clinical Goals: temperature control  Patient Goals: Confort  Family Goals: aide    Progress made toward(s) clinical / shift goals:    Problem: Knowledge Deficit - Standard  Goal: Patient and family/care givers will demonstrate understanding of plan of care, disease process/condition, diagnostic tests and medications  Outcome: Progressing     Problem: Optimal Care for Alcohol Withdrawal  Goal: Optimal Care for the alcohol withdrawal patient  Outcome: Progressing     Problem: Pain - Standard  Goal: Alleviation of pain or a reduction in pain to the patient’s comfort goal  Outcome: Progressing     Problem: Fall Risk  Goal: Patient will remain free from falls  Outcome: Progressing     Problem: Skin Integrity  Goal: Skin integrity is maintained or improved  Outcome: Progressing

## 2023-12-07 NOTE — PROGRESS NOTES
Pt transferred from [facility] during shift. A/o x4, forgetful at times. Tele sitter at bedside. Tele monitored SR/ST. Urinated via urinal, no BM. PIV x2. SL, abx given during shift per MAR. Slept most of the night. Up heavy assist of 2 w/ walker

## 2023-12-07 NOTE — CARE PLAN
The patient is Stable - Low risk of patient condition declining or worsening    Shift Goals  Clinical Goals: temperature control  Patient Goals: Confort  Family Goals: aide    Progress made toward(s) clinical / shift goals:        Problem: Pain - Standard  Goal: Alleviation of pain or a reduction in pain to the patient’s comfort goal  Outcome: Progressing  Note: Patient declines pain during shift     Problem: Fall Risk  Goal: Patient will remain free from falls  Outcome: Progressing  Note: Assessed pt for fall risks, fall precautions implemented. Frame alarm on, telesitter in room, pt a/o x4. Calls appropriately.

## 2023-12-07 NOTE — PROGRESS NOTES
Bladder scanned pt for 10mL, pt attempted to pee but does not feel like he has to. Will continue to assess with increased intake.

## 2023-12-07 NOTE — PROGRESS NOTES
Report called to [facility] on S1.   Patient transferring to S149.  VSS.   Patient updated on plan of care.

## 2023-12-07 NOTE — PROGRESS NOTES
Report received, poc discussed, assumed care of pt.   Call light in reach, hourly rounding in place.   Pt gets up x 2 max A.   Regular diet.  + void. LBM 12/6, loose, none overnight.   Denies pain.  No further needs.   Bed alarm is on.

## 2023-12-07 NOTE — CARE PLAN
The patient is Stable - Low risk of patient condition declining or worsening    Shift Goals  Clinical Goals: ambulate, participate in care, adequate arousal  Patient Goals: rest  Family Goals: not present    Progress made toward(s) clinical / shift goals: Seen by PT/OT and was unwilling to attempt ambulation. Patient was able to get up to BR x 2 assist later with RN and CNA but is having trouble safely ambulating. Possible combination of weakness, deconditioning and cooperation. Patient remains lethargic when not stimulated.     Patient is not progressing towards the following goals:  N/A

## 2023-12-07 NOTE — THERAPY
Physical Therapy   Initial Evaluation     Patient Name: Alberto Maldonado  Age:  38 y.o., Sex:  male  Medical Record #: [id number]  Today's Date: 12/7/2023          Assessment  Patient is 38 y.o. male admitted w/ SOB, chest pain and diarrhea.  Hx of ETOH, obesity.  He lives w/ his brother and sister in law in a two story house w/ multiple steps to enter.  He was indep PTA.  Today, he needs min assist to sit eob and to stand w/ a fww.  Unable to come to full upright standing despite assist.  Attempted ambulation several time w/ a fww, pt unable.  Presentation upon exam appeared effort dependent.  PT will follow and address impairments noted below.  Recommend eob all meals and oob w/ nsg as able.  Plan    Physical Therapy Initial Treatment Plan   Treatment Plan : Bed Mobility, Gait Training, Therapeutic Activities, Stair Training  Treatment Frequency: 3 Times per Week  Duration: Until Therapy Goals Met    DC Equipment Recommendations: Unable to determine at this time  Discharge Recommendations: Recommend home health for continued physical therapy services         Objective       12/07/23 0828   Prior Living Situation   Housing / Facility 2 Story House   Steps Into Home 10   Steps In Home 15   Rail Both Rail (Steps into Home);Left Rail (Steps in Home)   Equipment Owned None   Lives with - Patient's Self Care Capacity   (brother and sister in law)   Prior Level of Functional Mobility   Bed Mobility Independent   Transfer Status Independent   Ambulation Independent   Assistive Devices Used None   Stairs Independent   Cognition    Level of Consciousness Alert   Strength Lower Body   Comments grossly 3+/5, limited by effort   Balance Assessment   Sitting Balance (Static) Fair +   Sitting Balance (Dynamic) Fair +   Standing Balance (Static) Fair -   Standing Balance (Dynamic) Poor +   Weight Shift Sitting Poor   Weight Shift Standing Absent   Comments w/ fww   Bed Mobility    Supine to Sit Minimal Assist   Gait Analysis    Gait Level Of Assist Unable to Participate   Functional Mobility   Sit to Stand Minimal Assist   Bed, Chair, Wheelchair Transfer Unable to Participate   Short Term Goals    Short Term Goal # 1 Pt to move supine to/from eob w/ spv in 6 visits to improve fxl indep   Short Term Goal # 2 Pt to move sit to/from stand w/ spv in 6 visits to improve fxl indep   Short Term Goal # 3 Pt to ambulate w/ or w/o fww 150 ft w/ spv in 6 visits   Short Term Goal # 4 Pt to move up/down flight of stairs w/ spv in 6 visits to access his home   Physical Therapy Initial Treatment Plan    Treatment Plan  Bed Mobility;Gait Training;Therapeutic Activities;Stair Training   Treatment Frequency 3 Times per Week   Duration Until Therapy Goals Met   Problem List    Problems Impaired Ambulation;Impaired Transfers;Impaired Bed Mobility;Decreased Activity Tolerance   Anticipated Discharge Equipment and Recommendations   DC Equipment Recommendations Unable to determine at this time   Discharge Recommendations Recommend home health for continued physical therapy services

## 2023-12-07 NOTE — DISCHARGE PLANNING
Case Management Discharge Planning    Admission Date: 2023  GMLOS: 3.4  ALOS: 5    6-Clicks ADL Score: 18  6-Clicks Mobility Score: 10  PT and/or OT Eval ordered: Yes  Post-acute Referrals Ordered: No  Post-acute Choice Obtained: No  Has referral(s) been sent to post-acute provider:  No      Anticipated Discharge Dispo: Discharge Disposition: Discharged to home/self care ()    DME Needed: No    Action(s) Taken: Updated Provider/Nurse on Discharge Plan    Escalations Completed: None    Medically Clear: No    Next Steps: SW met with patient, identified self, role and provided supportive visit. Patient is alert and oriented x4; however, appears drowsy. Patient can verify: pt's name, , location as pt is in the hospital and verify; pt's mother is at bedside. SW discussed ETOH use, pt reports drinking alcohol, every other day. Patient denies tobacco and recreational drug use. Patient reports, patient has never been to a detox facility in the past. Patient reports, pt is currently unemployed and last worked 1 month ago. Patient is receptive to substance use resources and reported a desire to want to stop drinking. SW provided patient with substance use resources and informed pt; pt will need to call facilities on his own to discuss entering a facility for inpatient detox vs outpatient (IOP). Patient expressed a desire for mental health counseling to learn coping skills. SW discussed with patient mental health resources within substance use packet. SW pointed out [facility] listed in packet. SW answered pt's questions to satisfaction. Patient reported no additional need for SW support at this time. SW to remain available for discharge planning needs.     Barriers to Discharge: Medical clearance    Is the patient up for discharge tomorrow: No

## 2023-12-07 NOTE — PROGRESS NOTES
4 Eyes Skin Assessment Completed by ALF [facility] and ALF [facility].    Head WDL  Ears WDL  Nose WDL  Mouth WDL  Neck WDL  Breast/Chest WDL  Shoulder Blades WDL  Spine WDL  (R) Arm/Elbow/Hand WDL  (L) Arm/Elbow/Hand WDL  Abdomen WDL  Groin Swelling  Scrotum/Coccyx/Buttocks WDL  (R) Leg WDL  (L) Leg WDL  (R) Heel/Foot/Toe WDL  (L) Heel/Foot/Toe WDL          Devices In Places Tele Box      Interventions In Place N/A    Possible Skin Injury No    Pictures Uploaded Into Epic N/A  Wound Consult Placed N/A  RN Wound Prevention Protocol Ordered No

## 2023-12-08 ENCOUNTER — PHARMACY VISIT (OUTPATIENT)
Dept: PHARMACY | Facility: MEDICAL CENTER | Age: 38
End: 2023-12-08
Payer: COMMERCIAL

## 2023-12-08 VITALS
RESPIRATION RATE: 22 BRPM | WEIGHT: 247.14 LBS | HEIGHT: 63 IN | OXYGEN SATURATION: 96 % | HEART RATE: 78 BPM | SYSTOLIC BLOOD PRESSURE: 142 MMHG | TEMPERATURE: 97.4 F | BODY MASS INDEX: 43.79 KG/M2 | DIASTOLIC BLOOD PRESSURE: 93 MMHG

## 2023-12-08 LAB
ALBUMIN SERPL BCP-MCNC: 3.2 G/DL (ref 3.2–4.9)
ALBUMIN/GLOB SERPL: 0.8 G/DL
ALP SERPL-CCNC: 132 U/L (ref 30–99)
ALT SERPL-CCNC: 20 U/L (ref 2–50)
ANION GAP SERPL CALC-SCNC: 13 MMOL/L (ref 7–16)
AST SERPL-CCNC: 37 U/L (ref 12–45)
BASOPHILS # BLD AUTO: 0.2 % (ref 0–1.8)
BASOPHILS # BLD: 0.02 K/UL (ref 0–0.12)
BILIRUB SERPL-MCNC: 1 MG/DL (ref 0.1–1.5)
BUN SERPL-MCNC: 10 MG/DL (ref 8–22)
CALCIUM ALBUM COR SERPL-MCNC: 8.5 MG/DL (ref 8.5–10.5)
CALCIUM SERPL-MCNC: 7.9 MG/DL (ref 8.5–10.5)
CHLORIDE SERPL-SCNC: 105 MMOL/L (ref 96–112)
CO2 SERPL-SCNC: 23 MMOL/L (ref 20–33)
CREAT SERPL-MCNC: 0.69 MG/DL (ref 0.5–1.4)
EOSINOPHIL # BLD AUTO: 0.01 K/UL (ref 0–0.51)
EOSINOPHIL NFR BLD: 0.1 % (ref 0–6.9)
ERYTHROCYTE [DISTWIDTH] IN BLOOD BY AUTOMATED COUNT: 56.3 FL (ref 35.9–50)
GFR SERPLBLD CREATININE-BSD FMLA CKD-EPI: 123 ML/MIN/1.73 M 2
GLOBULIN SER CALC-MCNC: 3.8 G/DL (ref 1.9–3.5)
GLUCOSE SERPL-MCNC: 142 MG/DL (ref 65–99)
HCT VFR BLD AUTO: 32.2 % (ref 42–52)
HGB BLD-MCNC: 10.5 G/DL (ref 14–18)
IMM GRANULOCYTES # BLD AUTO: 0.07 K/UL (ref 0–0.11)
IMM GRANULOCYTES NFR BLD AUTO: 0.8 % (ref 0–0.9)
LYMPHOCYTES # BLD AUTO: 1.24 K/UL (ref 1–4.8)
LYMPHOCYTES NFR BLD: 14.7 % (ref 22–41)
MAGNESIUM SERPL-MCNC: 2.2 MG/DL (ref 1.5–2.5)
MCH RBC QN AUTO: 30.2 PG (ref 27–33)
MCHC RBC AUTO-ENTMCNC: 32.6 G/DL (ref 32.3–36.5)
MCV RBC AUTO: 92.5 FL (ref 81.4–97.8)
MONOCYTES # BLD AUTO: 0.48 K/UL (ref 0–0.85)
MONOCYTES NFR BLD AUTO: 5.7 % (ref 0–13.4)
NEUTROPHILS # BLD AUTO: 6.61 K/UL (ref 1.82–7.42)
NEUTROPHILS NFR BLD: 78.5 % (ref 44–72)
NRBC # BLD AUTO: 0 K/UL
NRBC BLD-RTO: 0 /100 WBC (ref 0–0.2)
PHOSPHATE SERPL-MCNC: 2.2 MG/DL (ref 2.5–4.5)
PLATELET # BLD AUTO: 316 K/UL (ref 164–446)
PMV BLD AUTO: 9.5 FL (ref 9–12.9)
POTASSIUM SERPL-SCNC: 3.7 MMOL/L (ref 3.6–5.5)
PROT SERPL-MCNC: 7 G/DL (ref 6–8.2)
RBC # BLD AUTO: 3.48 M/UL (ref 4.7–6.1)
SODIUM SERPL-SCNC: 141 MMOL/L (ref 135–145)
WBC # BLD AUTO: 8.4 K/UL (ref 4.8–10.8)

## 2023-12-08 PROCEDURE — 700102 HCHG RX REV CODE 250 W/ 637 OVERRIDE(OP): Mod: JZ

## 2023-12-08 PROCEDURE — 83735 ASSAY OF MAGNESIUM: CPT

## 2023-12-08 PROCEDURE — A9270 NON-COVERED ITEM OR SERVICE: HCPCS | Performed by: HOSPITALIST

## 2023-12-08 PROCEDURE — 700102 HCHG RX REV CODE 250 W/ 637 OVERRIDE(OP): Performed by: HOSPITALIST

## 2023-12-08 PROCEDURE — 85025 COMPLETE CBC W/AUTO DIFF WBC: CPT

## 2023-12-08 PROCEDURE — 700111 HCHG RX REV CODE 636 W/ 250 OVERRIDE (IP): Mod: JZ | Performed by: HOSPITALIST

## 2023-12-08 PROCEDURE — 700102 HCHG RX REV CODE 250 W/ 637 OVERRIDE(OP): Performed by: STUDENT IN AN ORGANIZED HEALTH CARE EDUCATION/TRAINING PROGRAM

## 2023-12-08 PROCEDURE — 80053 COMPREHEN METABOLIC PANEL: CPT

## 2023-12-08 PROCEDURE — 99239 HOSP IP/OBS DSCHRG MGMT >30: CPT | Mod: GC | Performed by: INTERNAL MEDICINE

## 2023-12-08 PROCEDURE — A9270 NON-COVERED ITEM OR SERVICE: HCPCS | Performed by: STUDENT IN AN ORGANIZED HEALTH CARE EDUCATION/TRAINING PROGRAM

## 2023-12-08 PROCEDURE — A9270 NON-COVERED ITEM OR SERVICE: HCPCS | Mod: JZ

## 2023-12-08 PROCEDURE — RXMED WILLOW AMBULATORY MEDICATION CHARGE

## 2023-12-08 PROCEDURE — 700111 HCHG RX REV CODE 636 W/ 250 OVERRIDE (IP)

## 2023-12-08 PROCEDURE — 84100 ASSAY OF PHOSPHORUS: CPT

## 2023-12-08 PROCEDURE — 36415 COLL VENOUS BLD VENIPUNCTURE: CPT

## 2023-12-08 RX ORDER — POTASSIUM CHLORIDE 20 MEQ/1
20 TABLET, EXTENDED RELEASE ORAL ONCE
Status: COMPLETED | OUTPATIENT
Start: 2023-12-08 | End: 2023-12-08

## 2023-12-08 RX ORDER — PREDNISONE 20 MG/1
TABLET ORAL
Qty: 11 TABLET | Refills: 0 | Status: SHIPPED | OUTPATIENT
Start: 2023-12-08 | End: 2023-12-18

## 2023-12-08 RX ADMIN — ALLOPURINOL 300 MG: 300 TABLET ORAL at 05:35

## 2023-12-08 RX ADMIN — OMEPRAZOLE 40 MG: 20 CAPSULE, DELAYED RELEASE ORAL at 05:35

## 2023-12-08 RX ADMIN — METOPROLOL TARTRATE 25 MG: 25 TABLET, FILM COATED ORAL at 05:35

## 2023-12-08 RX ADMIN — ENOXAPARIN SODIUM 40 MG: 100 INJECTION SUBCUTANEOUS at 05:35

## 2023-12-08 RX ADMIN — PREDNISONE 40 MG: 20 TABLET ORAL at 05:35

## 2023-12-08 RX ADMIN — DIBASIC SODIUM PHOSPHATE, MONOBASIC POTASSIUM PHOSPHATE AND MONOBASIC SODIUM PHOSPHATE 500 MG: 852; 155; 130 TABLET ORAL at 11:50

## 2023-12-08 RX ADMIN — DIBASIC SODIUM PHOSPHATE, MONOBASIC POTASSIUM PHOSPHATE AND MONOBASIC SODIUM PHOSPHATE 500 MG: 852; 155; 130 TABLET ORAL at 07:32

## 2023-12-08 RX ADMIN — POTASSIUM CHLORIDE 20 MEQ: 1500 TABLET, EXTENDED RELEASE ORAL at 07:32

## 2023-12-08 ASSESSMENT — FIBROSIS 4 INDEX: FIB4 SCORE: 0.94

## 2023-12-08 NOTE — PROGRESS NOTES
[facility] Internal Medicine Daily Progress Note    Date of Service  12/7/2023    [facility] Team: [facility] Team   Attending: Josh Gregory M.d.  Senior Resident: Dr. Balwinder Laughlin  Intern:  Dr. Paulo Chew  Contact Number: [phone]    Chief Complaint  Alberto Maldonado is a 38 y.o. male admitted 12/2/2023 with alcohol withdrawal.    Hospital Course  Alberto Maldonado is a obese 38 y.o. male alcoholic who drinks 10 mini bottles of hard alcohol daily. He is having some gagging could not tolerate drinks tomorrow and came in for detox. He also complained that he is having frequent diarrhea 10 episodes a day for the past week. He was admitted to the hospital for concerns of alcohol withdrawal. He was placed in the IMCU and initiated on Precedex drip. He had recently been on antibiotics for upper respiratory infection and ear infection. C. difficile study was unremarkable here at the hospital. Patient continues to have some watery stools multiple times a day.  On 12/7/2023, patient was transferred to the floor after multiple negative CIWA scores and not requiring any Ativan.    Interval Problem Update  No acute overnight events.  Patient was doing well so he was transferred to the floor.  CIWA's have been negative.  Not requiring any Ativan.  Patient states that he is doing well.  Plan initially was to discharge patient, however PT OT evaluated patient and he was unable to walk.  Patient then reported that he has an active gout flare in his right ankle.  Otherwise, the patient denies any tremors, hallucinations, agitation, nausea, CP, SOB, palpitations.    I have discussed this patient's plan of care and discharge plan at IDT rounds today with Case Management, Nursing, Nursing leadership, and other members of the IDT team.    Consultants/Specialty  pulmonary    Code Status  Full Code    Disposition  The patient is not medically cleared for discharge to home or a post-acute facility.        Review of  Systems  Review of Systems   Constitutional:  Negative for chills, diaphoresis and fever.   Respiratory:  Negative for cough, sputum production, shortness of breath and wheezing.    Cardiovascular:  Negative for chest pain, palpitations, orthopnea and leg swelling.   Gastrointestinal:  Negative for abdominal pain, constipation, diarrhea, heartburn, nausea and vomiting.   Musculoskeletal:  Positive for joint pain.   Neurological:  Negative for tremors and headaches.        Physical Exam  Temp:  [36.3 °C (97.3 °F)-37.4 °C (99.4 °F)] 36.9 °C (98.5 °F)  Pulse:  [] 114  Resp:  [18-20] 18  BP: (103-132)/(64-88) 122/73  SpO2:  [92 %-97 %] 94 %    Physical Exam  Constitutional:       General: He is not in acute distress.     Appearance: He is obese. He is not ill-appearing, toxic-appearing or diaphoretic.   HENT:      Head: Normocephalic and atraumatic.   Eyes:      General: No scleral icterus.        Right eye: No discharge.         Left eye: No discharge.      Extraocular Movements: Extraocular movements intact.      Conjunctiva/sclera: Conjunctivae normal.   Cardiovascular:      Rate and Rhythm: Normal rate and regular rhythm.      Pulses: Normal pulses.      Heart sounds: Normal heart sounds. No murmur heard.     No friction rub. No gallop.   Pulmonary:      Effort: No respiratory distress.      Breath sounds: Normal breath sounds. No stridor. No wheezing, rhonchi or rales.   Abdominal:      General: Bowel sounds are normal. There is no distension.      Palpations: Abdomen is soft.      Tenderness: There is no abdominal tenderness. There is no guarding or rebound.   Musculoskeletal:      Cervical back: Normal range of motion. No rigidity.      Right lower leg: No edema.      Left lower leg: No edema.      Right ankle: Tenderness present.   Skin:     General: Skin is warm and dry.   Neurological:      General: No focal deficit present.      Mental Status: He is alert and oriented to person, place, and time. Mental  status is at baseline.   Psychiatric:         Mood and Affect: Mood normal.         Behavior: Behavior normal.         Thought Content: Thought content normal.         Fluids    Intake/Output Summary (Last 24 hours) at 12/7/2023 1954  Last data filed at 12/7/2023 1500  Gross per 24 hour   Intake 2386.14 ml   Output 250 ml   Net 2136.14 ml       Laboratory  Recent Labs     12/05/23 0047 12/06/23 0419 12/07/23 0212   WBC 11.8* 10.6 7.9   RBC 4.08* 3.47* 3.50*   HEMOGLOBIN 12.5* 10.8* 10.6*   HEMATOCRIT 37.3* 32.6* 32.7*   MCV 91.4 93.9 93.4   MCH 30.6 31.1 30.3   MCHC 33.5 33.1 32.4   RDW 58.0* 59.7* 58.3*   PLATELETCT 213 200 239   MPV 8.6* 9.3 9.5     Recent Labs     12/05/23 0047 12/06/23 0419 12/07/23 0212   SODIUM 142 140 140   POTASSIUM 3.9 3.7 3.4*   CHLORIDE 106 106 106   CO2 22 25 24   GLUCOSE 115* 112* 108*   BUN 6* 13 10   CREATININE 0.79 0.93 0.67   CALCIUM 8.2* 7.6* 7.8*     Recent Labs     12/05/23 0047   INR 1.19*               Imaging  DX-CHEST-LIMITED (1 VIEW)   Final Result         1.  Pulmonary edema and/or infiltrates, somewhat increased since prior study.   2.  Cardiomegaly      DX-CHEST-LIMITED (1 VIEW)   Final Result      Bibasilar underinflation atelectasis which could obscure an additional process.      DX-CHEST-PORTABLE (1 VIEW)   Final Result      Increased BILATERAL underinflation atelectasis           Assessment/Plan  Problem Representation:    * Alcohol withdrawal delirium, acute, hyperactive (HCC)- (present on admission)  Assessment & Plan  Resolved.    Detox bag  Multivitamins  CIWA  Low-level sedation as the patient appears intermittently severely medicated, oversedated      GERD (gastroesophageal reflux disease)  Assessment & Plan  PPI    Hypertensive urgency  Assessment & Plan  Resolved     SBP>190 in ER  Continue losartan, metoprolol, amlodipine  As needed IV labetalol, Catapres    Hypokalemia  Assessment & Plan    Replace and monitor  Replace Mg    Electrolyte abnormality-  (present on admission)  Assessment & Plan  Monitor, replace accordingly    Diarrhea  Assessment & Plan  Normal WBC  C. difficile negative  monitor  Imodium as needed    Elevated lactic acid level  Assessment & Plan  Resolved    Likely secondary to EtOH/liver disease, dehydration  IVF  High-dose IV thiamine  Trend    Gout- (present on admission)  Assessment & Plan  Hx gout.  Treated with allopurinol.  Patient currently has a gout flare in the right ankle.  Cannot bear weight.  - Prednisone 40 mg.  - Allopurinol 300 mg.    Hypomagnesemia- (present on admission)  Assessment & Plan  Profound, on admit Mg 1.1  Replace and monitor    Alcoholism (HCC)- (present on admission)  Assessment & Plan  Apparently chronic, relapsing  - We discussed this with the patient in detail.  Patient reports that he has hobbies visit.  His currently out of work but is going to start looking for work.  Patient is a strong family support.  He is interested in going to AA.  He is also interested in naltrexone treatment outpatient.    Morbid obesity (HCC)- (present on admission)  Assessment & Plan  Diet and lifestyle modification  Body mass index is 44.25 kg/m².      BOLIVAR (obstructive sleep apnea)- (present on admission)  Assessment & Plan  Probable severe BOLIVAR by appearance, symptoms  Monitor BMP           VTE prophylaxis: enoxaparin ppx    I have performed a physical exam and reviewed and updated ROS and Plan today (12/7/2023). In review of yesterday's note (12/6/2023), there are no changes except as documented above.

## 2023-12-08 NOTE — HOSPITAL COURSE
Alberto Maldonado is a obese 38 y.o. male alcoholic who drinks 10 mini bottles of hard alcohol daily. He is having some gagging could not tolerate drinks tomorrow and came in for detox. He also complained that he is having frequent diarrhea 10 episodes a day for the past week. He was admitted to the hospital for concerns of alcohol withdrawal. He was placed in the IMCU and initiated on Precedex drip. He had recently been on antibiotics for upper respiratory infection and ear infection. C. difficile study was unremarkable here at the hospital. Patient continues to have some watery stools multiple times a day.  On 12/7/2023, patient was transferred to the floor after multiple negative CIWA scores and not requiring any Ativan.

## 2023-12-08 NOTE — DISCHARGE INSTRUCTIONS
- You were admitted for alcohol withdrawal.  - We are recommending that you follow up with a support group to maintain sobriety, such as Alcoholic's Anonymous.  - We are also recommending getting rid of any alcohol in the house to help prevent the temptation to drink. Also try to identify the triggers that make you want to drink and avoid those triggers.  - We are prescribing a medication called naltrexone to help with alcohol cravings.  - Please continue to take prednisone for your gout flare. It is a taper, so please take 20 mg for 3 days, then 15 mg for 3 days, then 10 mg for 3 days, and finally 5 mg for 3 days.  - If you develop shaking, tremors, agitation, headaches, and/or hallucinations (all symptoms of alcohol withdrawal), then please return to the emergency department for further evaluation.  - Please follow up with your primary care provider.

## 2023-12-08 NOTE — PROGRESS NOTES
Pt a/o x4, forgetful and impulsive at times but redirectable. Tele monitored.   Nonproductive cough, dyspnea on exertion. Heavy assist of 1 with walker to restroom. BM x1. Left ankle swollen.

## 2023-12-08 NOTE — CARE PLAN
The patient is Stable - Low risk of patient condition declining or worsening    Shift Goals  Clinical Goals: ambulate, participate in care, adequate arousal  Patient Goals: rest  Family Goals: not present    Progress made toward(s) clinical / shift goals:        Problem: Fall Risk  Goal: Patient will remain free from falls  Outcome: Progressing  Note: Pt assessed for fall risks, implemented fall precautions with bed alarm     Problem: Skin Integrity  Goal: Skin integrity is maintained or improved  Outcome: Progressing  Note: Skin intact.

## 2023-12-08 NOTE — DISCHARGE PLANNING
Case Management Discharge Planning    Admission Date: 12/2/2023  GMLOS: 3.4  ALOS: 6    6-Clicks ADL Score: 18  6-Clicks Mobility Score: 10  PT and/or OT Eval ordered: Yes  Post-acute Referrals Ordered: No  Post-acute Choice Obtained: No  Has referral(s) been sent to post-acute provider:  No      Anticipated Discharge Dispo: Discharge Disposition: Discharged to home/self care (01)    DME Needed: Yes    DME Ordered: Yes    Action(s) Taken: Updated Provider/Nurse on Discharge Plan    Escalations Completed: None    Medically Clear: Yes    Next Steps: Per IDT, patient is medically clear. YAMEL acknowledged order for DME-FWW. YAMEL sent referral to [address] [place] [address] (f) [phone] (f) [phone]. YAMEL informed pt's RN Mirrissa via voalte to place a traction order    Barriers to Discharge: None

## 2023-12-09 LAB
BACTERIA BLD CULT: NORMAL
BACTERIA BLD CULT: NORMAL
SIGNIFICANT IND 70042: NORMAL
SIGNIFICANT IND 70042: NORMAL
SITE SITE: NORMAL
SITE SITE: NORMAL
SOURCE SOURCE: NORMAL
SOURCE SOURCE: NORMAL

## 2023-12-09 NOTE — DISCHARGE SUMMARY
[facility] Internal Medicine Discharge Summary    Attending: Dr. Josh Gregory  Senior Resident: Dr. Balwinder Laughlin  Intern:  Dr. Paulo Chew    CHIEF COMPLAINT ON ADMISSION  Chief Complaint   Patient presents with    Chest Pain    Shortness of Breath     Pt said he has on/off for 1 week, increased severity which prompted consult    He said he is drinking alcohol everyday    Denied any fever, cough       Reason for Admission  Chest Pain     Admission Date  12/2/2023    CODE STATUS  Full Code    HPI & HOSPITAL COURSE  Alberto Maldonado is a obese 38 y.o. male alcoholic who drinks 10 mini bottles of hard alcohol daily. He is having some gagging could not tolerate drinks tomorrow and came in for detox. He also complained that he is having frequent diarrhea 10 episodes a day for the past week. He was admitted to the hospital for concerns of alcohol withdrawal. He was placed in the IMCU and initiated on Precedex drip. He had recently been on antibiotics for upper respiratory infection and ear infection. C. difficile study was unremarkable here at the hospital. Patient continues to have some watery stools multiple times a day.  On 12/7/2023, patient was transferred to the floor after multiple negative CIWA scores and not requiring any Ativan. Patient was going to be discharged, but then informed the team of the left gout flare. He was then treated with steroids and improved significantly. Patient told to attend AA, get rid of alcohol in house, and avoid triggers. He was offered naltrexone and would like to trial it outpatient for cravings. Discussed prednisone taper for acute gout flare.    Therefore, he is discharged in good and stable condition to home with close outpatient follow-up.    The patient met 2-midnight criteria for an inpatient stay at the time of discharge.    Discharge Date  12/8/2023    Physical Exam on Day of Discharge  Constitutional:       General: He is not in acute distress.     Appearance: He is  obese. He is not ill-appearing, toxic-appearing or diaphoretic.   HENT:      Head: Normocephalic and atraumatic.   Eyes:      General: No scleral icterus.        Right eye: No discharge.         Left eye: No discharge.      Extraocular Movements: Extraocular movements intact.      Conjunctiva/sclera: Conjunctivae normal.   Cardiovascular:      Rate and Rhythm: Normal rate and regular rhythm.      Pulses: Normal pulses.      Heart sounds: Normal heart sounds. No murmur heard.     No friction rub. No gallop.   Pulmonary:      Effort: No respiratory distress.      Breath sounds: Normal breath sounds. No stridor. No wheezing, rhonchi or rales.   Abdominal:      General: Bowel sounds are normal. There is no distension.      Palpations: Abdomen is soft.      Tenderness: There is no abdominal tenderness. There is no guarding or rebound.   Musculoskeletal:      Cervical back: Normal range of motion. No rigidity.      Right lower leg: No edema.      Left lower leg: No edema.      Left ankle: Tenderness present, improved. Able to walk.   Skin:     General: Skin is warm and dry.   Neurological:      General: No focal deficit present.      Mental Status: He is alert and oriented to person, place, and time. Mental status is at baseline.   Psychiatric:         Mood and Affect: Mood normal.         Behavior: Behavior normal.         Thought Content: Thought content normal.     FOLLOW UP ITEMS POST DISCHARGE  Follow up with primary care and AA.    DISCHARGE DIAGNOSES  Principal Problem:    Alcohol withdrawal delirium, acute, hyperactive (HCC) (POA: Yes)  Active Problems:    BOLIVAR (obstructive sleep apnea) (POA: Yes)      Overview: Awaiting to see sleep specialist    Morbid obesity (HCC) (Chronic) (POA: Yes)    Alcoholism (HCC) (Chronic) (POA: Yes)    Hypomagnesemia (POA: Yes)    Gout (Chronic) (POA: Yes)    Elevated lactic acid level (POA: Unknown)    Diarrhea (POA: Unknown)    Electrolyte abnormality (POA: Yes)    Hypokalemia (POA:  Unknown)    Hypertensive urgency (POA: Unknown)    GERD (gastroesophageal reflux disease) (POA: Unknown)  Resolved Problems:    * No resolved hospital problems. *      FOLLOW UP  Future Appointments   Date Time Provider Department Center   12/14/2023  2:20 PM [facility]   1/4/2024  2:00 PM [facility] None   2/6/2024  1:40 PM Bakari Jorge M.D. [facility] None   2/7/2024  1:20 PM SERGEY Collins [place]     Rey Pacheco M.D.  [address]  [facility] [address]  [phone]    Schedule an appointment as soon as possible for a visit in 1 week(s)        MEDICATIONS ON DISCHARGE     Medication List        START taking these medications        Instructions   metoprolol tartrate 25 MG Tabs  Commonly known as: Lopressor   Take 1 Tablet by mouth 2 times a day.  Dose: 25 mg     naltrexone 50 MG Tabs  Commonly known as: Depade   Take 1 Tablet by mouth every day for 30 days.  Dose: 50 mg     omeprazole 40 MG delayed-release capsule  Commonly known as: PriLOSEC   Take 1 Capsule by mouth 2 times a day.  Dose: 40 mg     predniSONE 20 MG Tabs  Start taking on: December 8, 2023  Commonly known as: Deltasone   Take 2 Tablets by mouth every day for 1 day, THEN 1.5 Tablets every day for 3 days, THEN 1 Tablet every day for 3 days, THEN 0.5 Tablets every day for 3 days.            CONTINUE taking these medications        Instructions   allopurinol 300 MG Tabs  Commonly known as: Zyloprim   Take 1 Tablet by mouth every day.  Dose: 300 mg     amLODIPine 10 MG Tabs  Commonly known as: Norvasc   Take 1 Tablet by mouth every day.  Dose: 10 mg            STOP taking these medications      amoxicillin-clavulanate 875-125 MG Tabs  Commonly known as: Augmentin     cefdinir 250 MG/5ML suspension  Commonly known as: Omnicef     colchicine 0.6 MG Tabs  Commonly known as: Colcrys              Allergies  No Known Allergies    DIET  No orders of the defined types were placed in this  encounter.      ACTIVITY  As tolerated.  Weight bearing as tolerated    CONSULTATIONS  Pulmonology.    PROCEDURES  None.    LABORATORY  Lab Results   Component Value Date    SODIUM 141 12/08/2023    POTASSIUM 3.7 12/08/2023    CHLORIDE 105 12/08/2023    CO2 23 12/08/2023    GLUCOSE 142 (H) 12/08/2023    BUN 10 12/08/2023    CREATININE 0.69 12/08/2023        Lab Results   Component Value Date    WBC 8.4 12/08/2023    HEMOGLOBIN 10.5 (L) 12/08/2023    HEMATOCRIT 32.2 (L) 12/08/2023    PLATELETCT 316 12/08/2023        Total time of the discharge process exceeds 45 minutes.

## 2023-12-10 ENCOUNTER — OFFICE VISIT (OUTPATIENT)
Dept: URGENT CARE | Facility: CLINIC | Age: 38
End: 2023-12-10
Payer: COMMERCIAL

## 2023-12-10 VITALS
WEIGHT: 237 LBS | TEMPERATURE: 97.5 F | RESPIRATION RATE: 16 BRPM | SYSTOLIC BLOOD PRESSURE: 130 MMHG | HEART RATE: 84 BPM | HEIGHT: 63 IN | OXYGEN SATURATION: 98 % | BODY MASS INDEX: 41.99 KG/M2 | DIASTOLIC BLOOD PRESSURE: 84 MMHG

## 2023-12-10 DIAGNOSIS — H93.91 EAR PROBLEM, RIGHT: ICD-10-CM

## 2023-12-10 PROCEDURE — 3075F SYST BP GE 130 - 139MM HG: CPT

## 2023-12-10 PROCEDURE — 3079F DIAST BP 80-89 MM HG: CPT

## 2023-12-10 PROCEDURE — 99212 OFFICE O/P EST SF 10 MIN: CPT

## 2023-12-10 ASSESSMENT — FIBROSIS 4 INDEX: FIB4 SCORE: 0.94

## 2023-12-11 NOTE — PROGRESS NOTES
Subjective:   Alberto Maldonado is a 38 y.o. male who presents for Foreign Body in Ear (Cotton swab stuck in ear )      HPI: This is a 38-year-old male who presents today for possible foreign body to right ear.  Patient reports that he used Q-tip this evening and believes he may have piece of cotton stuck in his right ear.    ROS per HPI    Medications:    Current Outpatient Medications on File Prior to Visit   Medication Sig Dispense Refill    predniSONE (DELTASONE) 20 MG Tab Take 2 Tablets by mouth every day for 1 day, THEN 1.5 Tablets every day for 3 days, THEN 1 Tablet every day for 3 days, THEN 0.5 Tablets every day for 3 days. 11 Tablet 0    metoprolol tartrate (LOPRESSOR) 25 MG Tab Take 1 Tablet by mouth 2 times a day. 60 Tablet 0    omeprazole (PRILOSEC) 40 MG delayed-release capsule Take 1 Capsule by mouth 2 times a day. 60 Capsule 0    naltrexone (DEPADE) 50 MG Tab Take 1 Tablet by mouth every day for 30 days. 30 Tablet 0    allopurinol (ZYLOPRIM) 300 MG Tab Take 1 Tablet by mouth every day. 90 Tablet 3    amLODIPine (NORVASC) 10 MG Tab Take 1 Tablet by mouth every day. 90 Tablet 3     No current facility-administered medications on file prior to visit.        Allergies:   Patient has no known allergies.    Problem List:   Patient Active Problem List   Diagnosis    Essential hypertension    Steatosis of liver    BOLIVAR (obstructive sleep apnea)    Morbid obesity (HCC)    Asthma without status asthmaticus    Allergic contact dermatitis due to adhesives    Alcoholism (HCC)    Hypomagnesemia    Hypophosphatemia    Elevated LFTs    Gout    Hospital discharge follow-up    Prediabetes    Alcohol withdrawal delirium, acute, hyperactive (HCC)    Elevated lactic acid level    Diarrhea    Electrolyte abnormality    Hypokalemia    Hypertensive urgency    GERD (gastroesophageal reflux disease)        Surgical History:  No past surgical history on file.    Past Social Hx:   Social History     Tobacco Use    Smoking  "status: Never    Smokeless tobacco: Never   Vaping Use    Vaping Use: Never used   Substance Use Topics    Alcohol use: Yes     Alcohol/week: 7.2 oz     Types: 12 Shots of liquor per week     Comment: everyday    Drug use: Not Currently          Problem list, medications, and allergies reviewed by myself today in Epic.     Objective:     /84 (BP Location: Left arm, Patient Position: Sitting, BP Cuff Size: Adult)   Pulse 84   Temp 36.4 °C (97.5 °F) (Temporal)   Resp 16   Ht 1.6 m (5' 3\")   Wt 108 kg (237 lb)   SpO2 98%   BMI 41.98 kg/m²     Physical Exam  HENT:      Right Ear: Tympanic membrane, ear canal and external ear normal.      Left Ear: Tympanic membrane, ear canal and external ear normal.      Ears:      Comments: Right ear: Cerumen deep within canal.  No sign of foreign body identified        Assessment/Plan:     Diagnosis and associated orders:   1. Ear problem, right               Comments/MDM:   Pt is clinically stable at today's acute urgent care visit.  No acute distress noted. Appropriate for outpatient management at this time.     Acute problem.  No foreign body identified to right ear.  Patient noted to have cerumen deep within the canal.  I have discussed using Debrox and caution with Q-tips.  He is to return for any new or worsening signs or symptoms or for symptoms that fail to improve           Discussed DDx, management options (risks,benefits, and alternatives to planned treatment), natural progression and supportive care.  Expressed understanding and the treatment plan was agreed upon. Questions were encouraged and answered   Return to urgent care prn if new or worsening sx or if there is no improvement in condition prn.    Educated in Red flags and indications to immediately call 911 or present to the Emergency Department.   Advised the patient to follow-up with the primary care physician for recheck, reevaluation, and consideration of further management.    I personally reviewed " prior external notes and test results pertinent to today's visit.  I have independently reviewed and interpreted all diagnostics ordered during this urgent care acute visit.       Please note that this dictation was created using voice recognition software. I have made a reasonable attempt to correct obvious errors, but I expect that there are errors of grammar and possibly content that I did not discover before finalizing the note.    This note was electronically signed by SANDIE Tello

## 2023-12-13 ENCOUNTER — OFFICE VISIT (OUTPATIENT)
Dept: URGENT CARE | Facility: CLINIC | Age: 38
End: 2023-12-13
Payer: COMMERCIAL

## 2023-12-13 VITALS
HEART RATE: 82 BPM | OXYGEN SATURATION: 97 % | DIASTOLIC BLOOD PRESSURE: 66 MMHG | BODY MASS INDEX: 43.77 KG/M2 | HEIGHT: 63 IN | TEMPERATURE: 97.5 F | WEIGHT: 247 LBS | SYSTOLIC BLOOD PRESSURE: 118 MMHG | RESPIRATION RATE: 12 BRPM

## 2023-12-13 DIAGNOSIS — H10.33 ACUTE CONJUNCTIVITIS OF BOTH EYES, UNSPECIFIED ACUTE CONJUNCTIVITIS TYPE: ICD-10-CM

## 2023-12-13 DIAGNOSIS — J02.9 PHARYNGITIS, UNSPECIFIED ETIOLOGY: ICD-10-CM

## 2023-12-13 LAB
FLUAV RNA SPEC QL NAA+PROBE: NEGATIVE
FLUBV RNA SPEC QL NAA+PROBE: NEGATIVE
RSV RNA SPEC QL NAA+PROBE: NEGATIVE
SARS-COV-2 RNA RESP QL NAA+PROBE: NEGATIVE

## 2023-12-13 PROCEDURE — 3078F DIAST BP <80 MM HG: CPT | Performed by: NURSE PRACTITIONER

## 2023-12-13 PROCEDURE — 3074F SYST BP LT 130 MM HG: CPT | Performed by: NURSE PRACTITIONER

## 2023-12-13 PROCEDURE — 99213 OFFICE O/P EST LOW 20 MIN: CPT | Performed by: NURSE PRACTITIONER

## 2023-12-13 PROCEDURE — 0241U POCT CEPHEID COV-2, FLU A/B, RSV - PCR: CPT | Performed by: NURSE PRACTITIONER

## 2023-12-13 RX ORDER — POLYMYXIN B SULFATE AND TRIMETHOPRIM 1; 10000 MG/ML; [USP'U]/ML
1 SOLUTION OPHTHALMIC EVERY 4 HOURS
Qty: 10 ML | Refills: 0 | Status: SHIPPED | OUTPATIENT
Start: 2023-12-13 | End: 2023-12-20

## 2023-12-13 ASSESSMENT — ENCOUNTER SYMPTOMS
EYE PAIN: 0
EYE ITCHING: 1
EYE DISCHARGE: 1
SORE THROAT: 1
FOREIGN BODY SENSATION: 0
PHOTOPHOBIA: 0
BLURRED VISION: 0
EYE REDNESS: 1
CHILLS: 0
FEVER: 0

## 2023-12-13 ASSESSMENT — FIBROSIS 4 INDEX: FIB4 SCORE: 0.94

## 2023-12-14 NOTE — PROGRESS NOTES
"Subjective:   Alberto Maldonado is a 38 y.o. male who presents for Eye Problem (Possible pink eye on right eye started in the morning) and Headache (Started today )      Eye Problem   Both eyes are affected. This is a new problem. The current episode started today (multiple family members with pink eye). The problem occurs constantly. The problem has been unchanged. The patient is experiencing no pain. He Does not wear contacts. Associated symptoms include an eye discharge, eye redness and itching. Pertinent negatives include no blurred vision, fever, foreign body sensation or photophobia.       Review of Systems   Constitutional:  Negative for chills, fever and malaise/fatigue.   HENT:  Positive for congestion and sore throat.    Eyes:  Positive for discharge, redness and itching. Negative for blurred vision, photophobia and pain.       Medications:    allopurinol Tabs  amLODIPine Tabs  metoprolol tartrate Tabs  naltrexone Tabs  omeprazole  polymixin-trimethoprim Soln  predniSONE Tabs    Allergies: Patient has no known allergies.    Problem List: Alberto Maldonado does not have any pertinent problems on file.    Surgical History:  No past surgical history on file.    Past Social Hx: Alberto Maldonado  reports that he has never smoked. He has never used smokeless tobacco. He reports current alcohol use of about 7.2 oz of alcohol per week. He reports that he does not currently use drugs.     Past Family Hx:  Alberto Maldonado family history is not on file.     Problem list, medications, and allergies reviewed by myself today in Epic.     Objective:     /66 (BP Location: Left arm, Patient Position: Sitting)   Pulse 82   Temp 36.4 °C (97.5 °F) (Temporal)   Resp 12   Ht 1.6 m (5' 3\")   Wt 112 kg (247 lb)   SpO2 97%   BMI 43.75 kg/m²     Physical Exam  Vitals and nursing note reviewed.   Constitutional:       General: He is not in acute distress.     Appearance: He is well-developed. "   HENT:      Head: Normocephalic and atraumatic.      Right Ear: Tympanic membrane and external ear normal.      Left Ear: Tympanic membrane and external ear normal.      Nose: Nose normal.      Right Sinus: No maxillary sinus tenderness or frontal sinus tenderness.      Left Sinus: No maxillary sinus tenderness or frontal sinus tenderness.      Mouth/Throat:      Mouth: Mucous membranes are moist.      Pharynx: Uvula midline. No posterior oropharyngeal erythema.      Tonsils: No tonsillar exudate or tonsillar abscesses.   Eyes:      General:         Right eye: Discharge present.         Left eye: Discharge present.     Conjunctiva/sclera: Conjunctivae normal.      Right eye: Right conjunctiva is not injected.      Left eye: Left conjunctiva is not injected.   Cardiovascular:      Rate and Rhythm: Normal rate.   Pulmonary:      Effort: Pulmonary effort is normal. No respiratory distress.      Breath sounds: Normal breath sounds.   Abdominal:      General: There is no distension.   Musculoskeletal:         General: Normal range of motion.   Skin:     General: Skin is warm and dry.   Neurological:      General: No focal deficit present.      Mental Status: He is alert and oriented to person, place, and time. Mental status is at baseline.      Gait: Gait (gait at baseline) normal.   Psychiatric:         Judgment: Judgment normal.         Assessment/Plan:     Diagnosis and associated orders:     1. Acute conjunctivitis of both eyes, unspecified acute conjunctivitis type  polymixin-trimethoprim (POLYTRIM) 82772-0.1 UNIT/ML-% Solution      2. Pharyngitis, unspecified etiology  POCT CoV-2, Flu A/B, RSV by PCR         Comments/MDM:     Results for orders placed or performed in visit on 12/13/23   POCT CoV-2, Flu A/B, RSV by PCR   Result Value Ref Range    SARS-CoV-2 by PCR Negative Negative, Invalid    Influenza virus A RNA Negative Negative, Invalid    Influenza virus B, PCR Negative Negative, Invalid    RSV, PCR Negative  Negative, Invalid     It was explained today that due to the viral nature of the pt's illness, we will treat symptomatically today.   Encouraged OTC supportive meds PRN. Humidification, increase fluids, Warm compresses to affected eye(s) 3 times daily  Hand hygiene discussed  Wash all recently used linens and towels in hot water  Do not touch dropper to eye (s)    Discussed side effects of OTC meds and any prescribed.  Given precautionary s/sx that mandate immediate follow up and evaluation in the ED. Advised of risks of not doing so.    DDX, Supportive care, and indications for immediate follow-up discussed with patient.    Instructed to return to clinic or nearest emergency department if we are not available for any change in condition, further concerns, or worsening of symptoms.    The patient  and/or guardian demonstrated a good understanding and agreed with the treatment plan.                 Please note that this dictation was created using voice recognition software. I have made a reasonable attempt to correct obvious errors, but I expect that there are errors of grammar and possibly content that I did not discover before finalizing the note.    This note was electronically signed by Ben HOOPER.

## 2023-12-20 ENCOUNTER — OFFICE VISIT (OUTPATIENT)
Dept: URGENT CARE | Facility: CLINIC | Age: 38
End: 2023-12-20
Payer: COMMERCIAL

## 2023-12-20 VITALS
BODY MASS INDEX: 42.52 KG/M2 | HEIGHT: 63 IN | OXYGEN SATURATION: 93 % | TEMPERATURE: 98.8 F | RESPIRATION RATE: 32 BRPM | DIASTOLIC BLOOD PRESSURE: 80 MMHG | HEART RATE: 103 BPM | SYSTOLIC BLOOD PRESSURE: 136 MMHG | WEIGHT: 240 LBS

## 2023-12-20 DIAGNOSIS — M10.072 ACUTE IDIOPATHIC GOUT OF LEFT FOOT: ICD-10-CM

## 2023-12-20 PROCEDURE — 3075F SYST BP GE 130 - 139MM HG: CPT | Performed by: PHYSICIAN ASSISTANT

## 2023-12-20 PROCEDURE — 3079F DIAST BP 80-89 MM HG: CPT | Performed by: PHYSICIAN ASSISTANT

## 2023-12-20 PROCEDURE — 99214 OFFICE O/P EST MOD 30 MIN: CPT | Performed by: PHYSICIAN ASSISTANT

## 2023-12-20 RX ORDER — METHYLPREDNISOLONE 4 MG/1
TABLET ORAL
Qty: 21 TABLET | Refills: 1 | Status: SHIPPED | OUTPATIENT
Start: 2023-12-20 | End: 2024-01-03

## 2023-12-20 ASSESSMENT — FIBROSIS 4 INDEX: FIB4 SCORE: 0.94

## 2023-12-24 ENCOUNTER — OFFICE VISIT (OUTPATIENT)
Dept: URGENT CARE | Facility: CLINIC | Age: 38
End: 2023-12-24
Payer: COMMERCIAL

## 2023-12-24 VITALS
SYSTOLIC BLOOD PRESSURE: 120 MMHG | HEART RATE: 100 BPM | DIASTOLIC BLOOD PRESSURE: 82 MMHG | TEMPERATURE: 97 F | OXYGEN SATURATION: 96 % | RESPIRATION RATE: 18 BRPM

## 2023-12-24 DIAGNOSIS — R21 RASH AND NONSPECIFIC SKIN ERUPTION: ICD-10-CM

## 2023-12-24 PROCEDURE — 3079F DIAST BP 80-89 MM HG: CPT | Performed by: REGISTERED NURSE

## 2023-12-24 PROCEDURE — 3074F SYST BP LT 130 MM HG: CPT | Performed by: REGISTERED NURSE

## 2023-12-24 PROCEDURE — 99213 OFFICE O/P EST LOW 20 MIN: CPT | Performed by: REGISTERED NURSE

## 2023-12-24 RX ORDER — TRIAMCINOLONE ACETONIDE 1 MG/G
1 OINTMENT TOPICAL 2 TIMES DAILY
Qty: 30 G | Refills: 0 | Status: SHIPPED | OUTPATIENT
Start: 2023-12-24 | End: 2023-12-31

## 2023-12-24 ASSESSMENT — ENCOUNTER SYMPTOMS
FEVER: 0
SHORTNESS OF BREATH: 0
DIZZINESS: 0
CHILLS: 0

## 2023-12-25 NOTE — PROGRESS NOTES
Subjective:   Alberto Maldonado is a 38 y.o. male who presents for Rash (R hand, x few weeks)      HPI  2 to 3 weeks of itchy rash on the dorsal aspect of right hand.  There are also some skin colored bumps there.  No treatments tried.  No recent illness.  No blisters or pustules noted.    Review of Systems   Constitutional:  Negative for chills and fever.   Respiratory:  Negative for shortness of breath.    Cardiovascular:  Negative for chest pain.   Neurological:  Negative for dizziness.       Medications, Allergies, and current problem list reviewed today in Epic.     Objective:     /82   Pulse 100   Temp 36.1 °C (97 °F)   Resp 18   SpO2 96%     Physical Exam  Vitals and nursing note reviewed.   Constitutional:       Appearance: Normal appearance. He is not ill-appearing or toxic-appearing.   HENT:      Mouth/Throat:      Mouth: Mucous membranes are moist.   Eyes:      Pupils: Pupils are equal, round, and reactive to light.   Cardiovascular:      Rate and Rhythm: Normal rate and regular rhythm.      Heart sounds: No murmur heard.  Pulmonary:      Effort: Pulmonary effort is normal.      Breath sounds: Normal breath sounds.   Musculoskeletal:         General: Normal range of motion.        Hands:       Cervical back: Normal range of motion.      Comments: Skin colored papules, excoriation from scratching.  No tunneling.  No pustules or vesicles.  No fluctuance.  No drainage.  Neurovascular intact distally   Skin:     General: Skin is warm and dry.      Capillary Refill: Capillary refill takes less than 2 seconds.   Neurological:      Mental Status: He is alert and oriented to person, place, and time.   Psychiatric:         Mood and Affect: Mood normal.         Assessment/Plan:     Differential diagnosis discussed     1. Rash and nonspecific skin eruption  triamcinolone acetonide (KENALOG) 0.1 % Ointment        2 to 3 weeks of itchy rash on dorsal aspect of right hand.  Has been no pustules or  vesicles.  No treatments tried.  Does have eczema.  Vital signs reassuring.  No recent illness.  Does have papular lesions on the dorsal aspect primarily over the first second third MCP joints, there is excoriation from scratching.  No signs of secondary bacterial infection.  Will start on topical steroid.  Discussed eczema creams.  Needs to follow-up with primary care    Return to clinic or go to ED if symptoms worsen or persist. Indications for ED discussed at length. Red flag symptoms discussed. All side effects of medication discussed including allergic response, GI upset, tendon injury, rash, sedation etc. Patient and/or guardian voices understanding.     I personally reviewed prior external notes and test results pertinent to today's visit as well as additional imaging and testing completed in clinic today.     Please note that this dictation was created using voice recognition software. I have made every reasonable attempt to correct obvious errors, but I expect that there are errors of grammar and possibly content that I did not discover before finalizing the note.    This note was electronically signed by RICHY Amaya

## 2023-12-31 ASSESSMENT — ENCOUNTER SYMPTOMS
COUGH: 0
RESPIRATORY NEGATIVE: 1
GASTROINTESTINAL NEGATIVE: 1
NEUROLOGICAL NEGATIVE: 1
CARDIOVASCULAR NEGATIVE: 1
CONSTITUTIONAL NEGATIVE: 1

## 2023-12-31 NOTE — PROGRESS NOTES
Subjective     Alberto Maldonado is a very pleasant 38 y.o. male who presents with Gout (Gout flareup on left foot.)            HPI  Acute gout flare left foot.  Well-documented history of similar in the past.  No new or concerning symptoms.      PMH:  has a past medical history of Asthma without status asthmaticus (06/29/2017), Chickenpox, and Hypertension.    He has no past medical history of Cancer (HCC) or Diabetes (HCC).  MEDS:   Current Outpatient Medications:     methylPREDNISolone (MEDROL DOSEPAK) 4 MG Tablet Therapy Pack, Follow schedule on package instructions., Disp: 21 Tablet, Rfl: 1    metoprolol tartrate (LOPRESSOR) 25 MG Tab, Take 1 Tablet by mouth 2 times a day., Disp: 60 Tablet, Rfl: 0    omeprazole (PRILOSEC) 40 MG delayed-release capsule, Take 1 Capsule by mouth 2 times a day., Disp: 60 Capsule, Rfl: 0    naltrexone (DEPADE) 50 MG Tab, Take 1 Tablet by mouth every day for 30 days., Disp: 30 Tablet, Rfl: 0    allopurinol (ZYLOPRIM) 300 MG Tab, Take 1 Tablet by mouth every day., Disp: 90 Tablet, Rfl: 3    amLODIPine (NORVASC) 10 MG Tab, Take 1 Tablet by mouth every day., Disp: 90 Tablet, Rfl: 3    triamcinolone acetonide (KENALOG) 0.1 % Ointment, Apply 1 Application topically 2 times a day for 7 days., Disp: 30 g, Rfl: 0  ALLERGIES: No Known Allergies  SURGHX: No past surgical history on file.  SOCHX:  reports that he has never smoked. He has never used smokeless tobacco. He reports current alcohol use of about 7.2 oz of alcohol per week. He reports that he does not currently use drugs.  FH: family history is not on file.    Review of Systems   Constitutional: Negative.    Respiratory: Negative.  Negative for cough.    Cardiovascular: Negative.    Gastrointestinal: Negative.    Genitourinary: Negative.    Musculoskeletal:  Positive for joint pain.   Skin:  Negative for rash.   Neurological: Negative.        Medications, Allergies, and current problem list reviewed today in Epic      "      Objective     /80 (BP Location: Right arm, Patient Position: Sitting, BP Cuff Size: Adult)   Pulse (!) 103   Temp 37.1 °C (98.8 °F)   Resp (!) 32   Ht 1.6 m (5' 3\")   Wt 109 kg (240 lb)   SpO2 93%   BMI 42.51 kg/m²      Physical Exam  Vitals and nursing note reviewed.   Constitutional:       General: He is not in acute distress.     Appearance: Normal appearance. He is well-developed. He is not diaphoretic.   HENT:      Head: Normocephalic.      Right Ear: Hearing and external ear normal.      Left Ear: Hearing and external ear normal.      Nose: Nose normal.      Mouth/Throat:      Mouth: Mucous membranes are moist.   Eyes:      General:         Right eye: No discharge.         Left eye: No discharge.      Conjunctiva/sclera: Conjunctivae normal.   Cardiovascular:      Rate and Rhythm: Normal rate and regular rhythm.   Pulmonary:      Effort: Pulmonary effort is normal. No respiratory distress.      Breath sounds: Normal breath sounds.   Musculoskeletal:      Cervical back: Normal range of motion and neck supple.      Left foot: Normal range of motion. Swelling, tenderness and bony tenderness present.        Feet:    Skin:     General: Skin is warm and dry.   Neurological:      General: No focal deficit present.      Mental Status: He is alert and oriented to person, place, and time. Mental status is at baseline.   Psychiatric:         Mood and Affect: Mood normal.         Behavior: Behavior normal.         Thought Content: Thought content normal.         Judgment: Judgment normal.                             Assessment & Plan        1. Acute idiopathic gout of left foot  methylPREDNISolone (MEDROL DOSEPAK) 4 MG Tablet Therapy Pack        Acute gout flare of left great toe.  Well-documented strips similar in the past.  States this feels exactly the same.  No injury or precipitating event.  No fever, chills, open lesions rash or signs of infection.      I personally reviewed prior external notes " and test results pertinent to today's visit. Return to clinic or go to ED if symptoms worsen or persist. Red flag symptoms and indications for ED discussed at length. Patient/Parent/Guardian voices understanding.  AVS with post-visit instructions provided or given verbally.  Follow-up with your primary care provider in 3-5 days. All side effects and potential interactions of prescribed medication discussed including allergic response, GI upset, tendon injury, rash, sedation, OCP effectiveness, etc.    Please note that this dictation was created using voice recognition software. I have made every reasonable attempt to correct obvious errors, but I expect that there are errors of grammar and possibly content that I did not discover before finalizing the note.

## 2024-01-03 ENCOUNTER — OFFICE VISIT (OUTPATIENT)
Dept: URGENT CARE | Facility: CLINIC | Age: 39
End: 2024-01-03
Payer: COMMERCIAL

## 2024-01-03 VITALS
BODY MASS INDEX: 43.18 KG/M2 | RESPIRATION RATE: 16 BRPM | TEMPERATURE: 97.4 F | DIASTOLIC BLOOD PRESSURE: 84 MMHG | SYSTOLIC BLOOD PRESSURE: 124 MMHG | WEIGHT: 243.7 LBS | HEIGHT: 63 IN | HEART RATE: 87 BPM | OXYGEN SATURATION: 95 %

## 2024-01-03 DIAGNOSIS — M10.071 ACUTE IDIOPATHIC GOUT OF RIGHT ANKLE: ICD-10-CM

## 2024-01-03 PROCEDURE — 99214 OFFICE O/P EST MOD 30 MIN: CPT | Performed by: PHYSICIAN ASSISTANT

## 2024-01-03 PROCEDURE — 3079F DIAST BP 80-89 MM HG: CPT | Performed by: PHYSICIAN ASSISTANT

## 2024-01-03 PROCEDURE — 3074F SYST BP LT 130 MM HG: CPT | Performed by: PHYSICIAN ASSISTANT

## 2024-01-03 RX ORDER — LOSARTAN POTASSIUM 50 MG/1
1 TABLET ORAL
COMMUNITY

## 2024-01-03 RX ORDER — METHYLPREDNISOLONE 4 MG/1
TABLET ORAL
Qty: 21 TABLET | Refills: 1 | Status: SHIPPED | OUTPATIENT
Start: 2024-01-03 | End: 2024-02-22

## 2024-01-03 ASSESSMENT — ENCOUNTER SYMPTOMS
CONSTITUTIONAL NEGATIVE: 1
NECK PAIN: 0
BACK PAIN: 0
CARDIOVASCULAR NEGATIVE: 1
NEUROLOGICAL NEGATIVE: 1
RESPIRATORY NEGATIVE: 1
MYALGIAS: 0

## 2024-01-03 ASSESSMENT — FIBROSIS 4 INDEX: FIB4 SCORE: 0.94

## 2024-01-04 ENCOUNTER — HOME STUDY (OUTPATIENT)
Dept: SLEEP MEDICINE | Facility: MEDICAL CENTER | Age: 39
End: 2024-01-04
Attending: STUDENT IN AN ORGANIZED HEALTH CARE EDUCATION/TRAINING PROGRAM
Payer: COMMERCIAL

## 2024-01-04 DIAGNOSIS — G47.19 EXCESSIVE DAYTIME SLEEPINESS: ICD-10-CM

## 2024-01-04 DIAGNOSIS — I10 ESSENTIAL HYPERTENSION: Chronic | ICD-10-CM

## 2024-01-04 DIAGNOSIS — E66.01 CLASS 3 SEVERE OBESITY DUE TO EXCESS CALORIES WITH SERIOUS COMORBIDITY AND BODY MASS INDEX (BMI) OF 40.0 TO 44.9 IN ADULT (HCC): ICD-10-CM

## 2024-01-04 DIAGNOSIS — F51.04 CHRONIC INSOMNIA: ICD-10-CM

## 2024-01-04 DIAGNOSIS — G47.30 SLEEP DISORDER BREATHING: ICD-10-CM

## 2024-01-04 PROCEDURE — 95800 SLP STDY UNATTENDED: CPT | Performed by: STUDENT IN AN ORGANIZED HEALTH CARE EDUCATION/TRAINING PROGRAM

## 2024-01-04 NOTE — PROGRESS NOTES
Subjective     Alberto Maldonado is a very pleasant 38 y.o. male who presents with Gout (Gout flare up in LT leg x1 day. )            HPI  Acute gout flare of RT ankle started today.  Redness was tenderness and swelling.  No injury or precipitating event.  No systemic symptoms.  Well-documented history of gout flares in the past.  Patient states this feels exactly the same with no concerning symptoms.  He is taking his allopurinol 3 times daily.      PMH:  has a past medical history of Asthma without status asthmaticus (06/29/2017), Chickenpox, and Hypertension.    He has no past medical history of Cancer (HCC) or Diabetes (HCC).  MEDS:   Current Outpatient Medications:     losartan (COZAAR) 50 MG Tab, Take 1 Tablet by mouth every day., Disp: , Rfl:     methylPREDNISolone (MEDROL DOSEPAK) 4 MG Tablet Therapy Pack, Follow schedule on package instructions., Disp: 21 Tablet, Rfl: 1    metoprolol tartrate (LOPRESSOR) 25 MG Tab, Take 1 Tablet by mouth 2 times a day., Disp: 60 Tablet, Rfl: 0    omeprazole (PRILOSEC) 40 MG delayed-release capsule, Take 1 Capsule by mouth 2 times a day., Disp: 60 Capsule, Rfl: 0    naltrexone (DEPADE) 50 MG Tab, Take 1 Tablet by mouth every day for 30 days., Disp: 30 Tablet, Rfl: 0    allopurinol (ZYLOPRIM) 300 MG Tab, Take 1 Tablet by mouth every day., Disp: 90 Tablet, Rfl: 3    amLODIPine (NORVASC) 10 MG Tab, Take 1 Tablet by mouth every day., Disp: 90 Tablet, Rfl: 3  ALLERGIES: No Known Allergies  SURGHX: History reviewed. No pertinent surgical history.  SOCHX:  reports that he has never smoked. He has never used smokeless tobacco. He reports current alcohol use of about 7.2 oz of alcohol per week. He reports that he does not currently use drugs.  FH: family history is not on file.        Review of Systems   Constitutional: Negative.    Respiratory: Negative.     Cardiovascular: Negative.    Musculoskeletal:  Positive for joint pain. Negative for back pain, myalgias and neck pain.  "  Skin: Negative.    Neurological: Negative.        Medications, Allergies, and current problem list reviewed today in Epic             Objective     /84 (BP Location: Right arm, Patient Position: Sitting, BP Cuff Size: Large adult)   Pulse 87   Temp 36.3 °C (97.4 °F) (Temporal)   Resp 16   Ht 1.6 m (5' 3\")   Wt 111 kg (243 lb 11.2 oz)   SpO2 95%   BMI 43.17 kg/m²      Physical Exam  Vitals and nursing note reviewed.   Constitutional:       General: He is not in acute distress.     Appearance: Normal appearance. He is well-developed. He is not diaphoretic.   HENT:      Head: Normocephalic.      Right Ear: Hearing and external ear normal.      Left Ear: Hearing and external ear normal.      Nose: Nose normal.      Mouth/Throat:      Mouth: Mucous membranes are moist.   Eyes:      General:         Right eye: No discharge.         Left eye: No discharge.      Conjunctiva/sclera: Conjunctivae normal.   Cardiovascular:      Rate and Rhythm: Normal rate and regular rhythm.   Pulmonary:      Effort: Pulmonary effort is normal. No respiratory distress.      Breath sounds: Normal breath sounds.   Musculoskeletal:      Cervical back: Normal range of motion and neck supple.      Right ankle: Swelling present. No deformity or ecchymosis. Tenderness present. Normal range of motion. Anterior drawer test negative.      Right Achilles Tendon: Normal.        Legs:    Skin:     General: Skin is warm and dry.   Neurological:      General: No focal deficit present.      Mental Status: He is alert and oriented to person, place, and time. Mental status is at baseline.   Psychiatric:         Mood and Affect: Mood normal.         Behavior: Behavior normal.         Thought Content: Thought content normal.         Judgment: Judgment normal.                             Assessment & Plan        1. Acute idiopathic gout of right ankle  methylPREDNISolone (MEDROL DOSEPAK) 4 MG Tablet Therapy Pack        Acute gout flare of right " ankle.  Well-documented history of similar flares in the past.  States this feels exactly the same.  No injury or precipitating event.  No fever, chills, open lesions rash or signs of infection.      I personally reviewed prior external notes and test results pertinent to today's visit. Return to clinic or go to ED if symptoms worsen or persist. Red flag symptoms and indications for ED discussed at length. Patient/Parent/Guardian voices understanding.  AVS with post-visit instructions provided or given verbally.  Follow-up with your primary care provider in 3-5 days. All side effects and potential interactions of prescribed medication discussed including allergic response, GI upset, tendon injury, rash, sedation, OCP effectiveness, etc.    Please note that this dictation was created using voice recognition software. I have made every reasonable attempt to correct obvious errors, but I expect that there are errors of grammar and possibly content that I did not discover before finalizing the note.

## 2024-01-12 NOTE — PROCEDURES
DIAGNOSTIC HOME SLEEP TEST (HST) REPORT WatchPAT      PATIENT ID:  NAME:  Alberto Maldonado  MRN:               [id number]  YOB: 1985  DATE OF STUDY: 01/04/2024      Impression:     This study shows evidence of:      1. Severe obstructive sleep apnea with PAT apnea hypopnea index(pAHI) of 41.1 per hour.  PAT respiratory disturbance index (pRDI) was 42.5 per hour. These findings are based on 7 channels recording of PAT signal with sleep staging, heart rate, pulse oximetry, actigraphy, body position, snoring and respiratory movement.     2. Oxygenation O2 Sat. mean O2 sat was 91%,  lisette was 53%,  and maximum O2 at 98 %. O2 sat was at or  below 88% for 53.8 min of evaluation time. Oxygen Desaturation (>=4%) Index was 28.8/hr. AVG HR was 76 BPM.      TECHNICAL DESCRIPTION: Patient underwent home sleep apnea testing with peripheral arterial tone signal (WatchPAT™). This is a Type IV portable monitor and device per Medicare. Monitoring was done with 7 channels recording of PAT signal with sleep staging, heart rate, pulse oximetry, actigraphy, body position, snoring and respiratory movement. Prior to using the device, the patient received verbal and written instructions for its application and was provided with the help desk phone number for additional telephonic instruction with 24-hour availability of qualified personnel to answer questions.    Respiratory events:      General sleep summary: . Total recording time is 7 hours and 15 minutes and total Sleep time is 5 hours and 58 minutes. The patient spent 38 minutes in the supine position and 320.3 minutes in the nonsupine position.      Recommendations:    1. CPAP titration study vs Auto CPAP trial.   2.  Clustering of respiratory events with significant oxygen desaturations.   3.  In general patients with sleep apnea are advised to avoid alcohol and sedatives and to not operate a motor vehicle while drowsy. In some cases alternative treatment  options may prove effective in resolving sleep apnea in these options include upper airway surgery, the use of a dental orthotic or weight loss and positional therapy. Clinical correlation is required.         Bakari Jorge MD

## 2024-02-06 ENCOUNTER — OFFICE VISIT (OUTPATIENT)
Dept: SLEEP MEDICINE | Facility: MEDICAL CENTER | Age: 39
End: 2024-02-06
Attending: STUDENT IN AN ORGANIZED HEALTH CARE EDUCATION/TRAINING PROGRAM
Payer: COMMERCIAL

## 2024-02-06 VITALS
HEART RATE: 116 BPM | WEIGHT: 254 LBS | DIASTOLIC BLOOD PRESSURE: 74 MMHG | SYSTOLIC BLOOD PRESSURE: 122 MMHG | BODY MASS INDEX: 45 KG/M2 | OXYGEN SATURATION: 94 % | HEIGHT: 63 IN

## 2024-02-06 DIAGNOSIS — E66.01 CLASS 3 SEVERE OBESITY DUE TO EXCESS CALORIES WITH SERIOUS COMORBIDITY AND BODY MASS INDEX (BMI) OF 40.0 TO 44.9 IN ADULT (HCC): ICD-10-CM

## 2024-02-06 DIAGNOSIS — G47.19 EXCESSIVE DAYTIME SLEEPINESS: ICD-10-CM

## 2024-02-06 DIAGNOSIS — G47.33 OSA (OBSTRUCTIVE SLEEP APNEA): Primary | ICD-10-CM

## 2024-02-06 PROCEDURE — 99212 OFFICE O/P EST SF 10 MIN: CPT | Performed by: STUDENT IN AN ORGANIZED HEALTH CARE EDUCATION/TRAINING PROGRAM

## 2024-02-06 PROCEDURE — 3074F SYST BP LT 130 MM HG: CPT | Performed by: STUDENT IN AN ORGANIZED HEALTH CARE EDUCATION/TRAINING PROGRAM

## 2024-02-06 PROCEDURE — 3078F DIAST BP <80 MM HG: CPT | Performed by: STUDENT IN AN ORGANIZED HEALTH CARE EDUCATION/TRAINING PROGRAM

## 2024-02-06 PROCEDURE — 99213 OFFICE O/P EST LOW 20 MIN: CPT | Performed by: STUDENT IN AN ORGANIZED HEALTH CARE EDUCATION/TRAINING PROGRAM

## 2024-02-06 ASSESSMENT — FIBROSIS 4 INDEX: FIB4 SCORE: 0.94

## 2024-02-06 NOTE — PROGRESS NOTES
[facility] Follow-up Visit    CC: Follow-up to discuss sleep study results      HPI:  Alberto Maldonado is a 38 y.o.male  with hypertension, gout, GERD, morbid obesity, excessive daytime sleepiness, and severe obstructive sleep apnea and nocturnal hypoxia.  Presents today to follow-up to discuss sleep study results.    He reports night the sleep study was a typical night sleep for him.  He states he did have difficulty with the chest sensor staying in place.  He did not review the study results prior to today's visit.    At last visit was discussed that he does have trouble sleeping at night.  In addition he is excessively sleepy.  He does report that since last visit he did discover that his brother also has the diagnosis of sleep apnea.      Sleep History  1/4/2024 HST WatchPAT study showed severe obstructive sleep apnea with an overall AHI of 41.1 events an hour, minimum oxygen saturation of 53%, time out of below 88% saturation of 54 minutes.  Significant clustering seen on night of study suspicion for worsening during REM sleep.    Patient Active Problem List    Diagnosis Date Noted    Alcohol withdrawal delirium, acute, hyperactive (HCC) 12/02/2023    Elevated lactic acid level 12/02/2023    Diarrhea 12/02/2023    Electrolyte abnormality 12/02/2023    Hypokalemia 12/02/2023    Hypertensive urgency 12/02/2023    GERD (gastroesophageal reflux disease) 12/02/2023    Gout 11/06/2023    Hospital discharge follow-up 11/06/2023    Prediabetes 11/06/2023    Elevated LFTs 10/28/2023    Hypomagnesemia 06/01/2023    Hypophosphatemia 06/01/2023    Steatosis of liver 01/05/2022    BOLIVAR (obstructive sleep apnea) 01/05/2022    Morbid obesity (HCC) 01/05/2022    Allergic contact dermatitis due to adhesives 01/05/2022    Alcoholism (HCC) 01/05/2022    Essential hypertension 12/02/2021    Asthma without status asthmaticus 06/29/2017       Past Medical History:   Diagnosis Date    Asthma without status asthmaticus  06/29/2017    Chickenpox     Hypertension         No past surgical history on file.    Family History   Problem Relation Age of Onset    Stroke Neg Hx     Heart Disease Neg Hx        Social History     Socioeconomic History    Marital status: Single     Spouse name: Not on file    Number of children: Not on file    Years of education: Not on file    Highest education level: Not on file   Occupational History    Not on file   Tobacco Use    Smoking status: Never    Smokeless tobacco: Never   Vaping Use    Vaping Use: Never used   Substance and Sexual Activity    Alcohol use: Yes     Alcohol/week: 7.2 oz     Types: 12 Shots of liquor per week     Comment: everyday    Drug use: Not Currently    Sexual activity: Not Currently   Other Topics Concern    Not on file   Social History Narrative    Not on file     Social Determinants of Health     Financial Resource Strain: Not on file   Food Insecurity: Not on file   Transportation Needs: Not on file   Physical Activity: Not on file   Stress: Not on file   Social Connections: Not on file   Intimate Partner Violence: Not on file   Housing Stability: Not on file       Current Outpatient Medications   Medication Sig Dispense Refill    losartan (COZAAR) 50 MG Tab Take 1 Tablet by mouth every day.      methylPREDNISolone (MEDROL DOSEPAK) 4 MG Tablet Therapy Pack Follow schedule on package instructions. 21 Tablet 1    metoprolol tartrate (LOPRESSOR) 25 MG Tab Take 1 Tablet by mouth 2 times a day. 60 Tablet 0    omeprazole (PRILOSEC) 40 MG delayed-release capsule Take 1 Capsule by mouth 2 times a day. 60 Capsule 0    allopurinol (ZYLOPRIM) 300 MG Tab Take 1 Tablet by mouth every day. 90 Tablet 3    amLODIPine (NORVASC) 10 MG Tab Take 1 Tablet by mouth every day. 90 Tablet 3     No current facility-administered medications for this visit.        ALLERGIES: Patient has no known allergies.    ROS  Constitutional: Denies fevers, Denies weight changes  Ears/Nose/Throat/Mouth: Denies  "nasal congestion or sore throat   Cardiovascular: Denies chest pain  Respiratory: Denies shortness of breath, Denies cough  Gastrointestinal/Hepatic: Denies nausea, vomiting  Sleep: see HPI      PHYSICAL EXAM  /74 (BP Location: Left arm, Patient Position: Sitting, BP Cuff Size: Adult)   Pulse (!) 116   Ht 1.6 m (5' 3\")   Wt 115 kg (254 lb)   SpO2 94%   BMI 44.99 kg/m²   Appearance: Well-nourished, well-developed, no acute distress  Eyes:  No scleral icterus , EOMI  Musculoskeletal:  Grossly normal; gait and station normal; digits and nails normal  Skin:  No rashes, petechiae, cyanosis  Neurologic: without focal signs; oriented to person, time, place, and purpose; judgement intact      Medical Decision Making   Assessment and Plan  Alberto Maldonado is a 38 y.o.male  with hypertension, gout, GERD, morbid obesity, excessive daytime sleepiness, and severe obstructive sleep apnea and nocturnal hypoxia.  Presents today to follow-up to discuss sleep study results.    The medical record was reviewed.    Obstructive sleep apnea   Reviewed recent HST with patient showing an AHI of 41.1,  and Min Oxygen saturation of 53%.  Time at or below 88% saturation 54minutes  Based on sleep study and symptoms meets criteria for Severe obstructive sleep apnea.   We discussed the pathophysiology of obstructive sleep apnea (BOLIVAR) and risk factors for the disease. We also discussed possible consequences of untreated BOLIVAR, including excessive daytime sleepiness and fatigue, cognitive dysfunction, cardiovascular complications such as elevated blood pressure, heart attacks, cardiac arrhythmias, and strokes. We discussed how BOLIVAR typically gets worse with age. We discussed treatment options for BOLIVAR, including the gold standard therapy (PAP), alternative options such as a mandibular advancement device (custom-made oral appliances) and surgeries. We will proceed CPAP therapy.     RECOMMENDATIONS  -Start Auto CPAP at pressures 5-15 " cm H2O  -Discussed importance of adherence/compliance   -Prescription generated for supplies   -Patient counseled to avoid driving when sleepy. Encouraged to anticipate sleepiness, consider taking a 10 min nap prior to driving, alternate with another , or pull over if sleepy while driving  -Advised to contact our office or myself with any questions via MyChart  -Follow up in 3 months or sooner if needed      Positive airway pressure will favorably impact many of the adverse conditions and effects provoked by BOLIVAR.    Have advised the patient to follow up with the appropriate healthcare practitioners for all other medical problems and issues.    Return in about 3 months (around 5/6/2024).      Please note portions of this record was created using voice recognition software. I have made every reasonable attempt to correct obvious errors, but I expect that there are errors of grammar and possibly content I did not discover before finalizing the note.

## 2024-02-19 ENCOUNTER — OFFICE VISIT (OUTPATIENT)
Dept: URGENT CARE | Facility: CLINIC | Age: 39
End: 2024-02-19
Payer: COMMERCIAL

## 2024-02-19 VITALS
HEIGHT: 64 IN | OXYGEN SATURATION: 98 % | WEIGHT: 255 LBS | DIASTOLIC BLOOD PRESSURE: 88 MMHG | BODY MASS INDEX: 43.54 KG/M2 | TEMPERATURE: 98.2 F | SYSTOLIC BLOOD PRESSURE: 138 MMHG | HEART RATE: 117 BPM | RESPIRATION RATE: 18 BRPM

## 2024-02-19 DIAGNOSIS — J45.21 MILD INTERMITTENT ASTHMA WITH ACUTE EXACERBATION: ICD-10-CM

## 2024-02-19 DIAGNOSIS — J22 LRTI (LOWER RESPIRATORY TRACT INFECTION): ICD-10-CM

## 2024-02-19 PROCEDURE — 99214 OFFICE O/P EST MOD 30 MIN: CPT | Performed by: PHYSICIAN ASSISTANT

## 2024-02-19 PROCEDURE — 3079F DIAST BP 80-89 MM HG: CPT | Performed by: PHYSICIAN ASSISTANT

## 2024-02-19 PROCEDURE — 3075F SYST BP GE 130 - 139MM HG: CPT | Performed by: PHYSICIAN ASSISTANT

## 2024-02-19 RX ORDER — ALBUTEROL SULFATE 90 UG/1
2 AEROSOL, METERED RESPIRATORY (INHALATION) EVERY 6 HOURS PRN
Qty: 8.5 G | Refills: 0 | Status: SHIPPED | OUTPATIENT
Start: 2024-02-19

## 2024-02-19 RX ORDER — DOXYCYCLINE HYCLATE 100 MG
100 TABLET ORAL 2 TIMES DAILY
Qty: 14 TABLET | Refills: 0 | Status: SHIPPED | OUTPATIENT
Start: 2024-02-19 | End: 2024-02-24

## 2024-02-19 ASSESSMENT — FIBROSIS 4 INDEX: FIB4 SCORE: 0.94

## 2024-02-19 NOTE — LETTER
ANGY  [facility] [id number] [facility]  [place] [address]     February 19, 2024    Patient: Alberto Maldonado   YOB: 1985   Date of Visit: 2/19/2024       To Whom It May Concern:    Alberto Maldonado was seen and treated in our department on 2/19/2024. I recommend light duty for the rest of the week.    Sincerely,     Jason Huerta P.A.-C.

## 2024-02-20 ASSESSMENT — ENCOUNTER SYMPTOMS
FEVER: 0
VOMITING: 0
COUGH: 1
ORTHOPNEA: 0
PND: 0
WHEEZING: 1
SHORTNESS OF BREATH: 1
NAUSEA: 0
CHILLS: 0
SINUS PAIN: 1

## 2024-02-20 NOTE — PROGRESS NOTES
Subjective:   Alberto Maldonado is a 38 y.o. male who presents for Cough (X 2 weeks, cough, wheezing, SOB )        Patient presents with concerns of cough, congestion, sinus pressure, wheezing and shortness of breath for the last 2 weeks.  Overall he feels that symptoms have been worsening.  He has not had any fevers or pain with breathing.  Patient saw his PCP this morning and was prescribed prednisone.  He states that his inhaler was not refilled however.  He is not taking any other medications for his symptoms.      Review of Systems   Constitutional:  Positive for malaise/fatigue. Negative for chills and fever.   HENT:  Positive for congestion and sinus pain. Negative for ear pain.    Respiratory:  Positive for cough, shortness of breath and wheezing.    Cardiovascular:  Negative for chest pain, orthopnea, leg swelling and PND.   Gastrointestinal:  Negative for nausea and vomiting.       PMH:  has a past medical history of Asthma without status asthmaticus (06/29/2017), Chickenpox, and Hypertension.    He has no past medical history of Cancer ([facility]) or Diabetes ([facility]).  MEDS:   Current Outpatient Medications:     albuterol 108 (90 Base) MCG/ACT Aero Soln inhalation aerosol, Inhale 2 Puffs every 6 hours as needed for Shortness of Breath., Disp: 8.5 g, Rfl: 0    doxycycline (VIBRAMYCIN) 100 MG Tab, Take 1 Tablet by mouth 2 times a day for 7 days., Disp: 14 Tablet, Rfl: 0    losartan (COZAAR) 50 MG Tab, Take 1 Tablet by mouth every day., Disp: , Rfl:     methylPREDNISolone (MEDROL DOSEPAK) 4 MG Tablet Therapy Pack, Follow schedule on package instructions., Disp: 21 Tablet, Rfl: 1    metoprolol tartrate (LOPRESSOR) 25 MG Tab, Take 1 Tablet by mouth 2 times a day., Disp: 60 Tablet, Rfl: 0    omeprazole (PRILOSEC) 40 MG delayed-release capsule, Take 1 Capsule by mouth 2 times a day., Disp: 60 Capsule, Rfl: 0    allopurinol (ZYLOPRIM) 300 MG Tab, Take 1 Tablet by mouth every day., Disp: 90 Tablet, Rfl: 3     "amLODIPine (NORVASC) 10 MG Tab, Take 1 Tablet by mouth every day., Disp: 90 Tablet, Rfl: 3  ALLERGIES: No Known Allergies  SURGHX: History reviewed. No pertinent surgical history.  SOCHX:  reports that he has never smoked. He has never used smokeless tobacco. He reports current alcohol use of about 7.2 oz of alcohol per week. He reports that he does not currently use drugs.  FH: Family history was reviewed, no pertinent findings to report   Objective:   /88 (BP Location: Left arm, Patient Position: Sitting, BP Cuff Size: Large adult)   Pulse (!) 117   Temp 36.8 °C (98.2 °F) (Temporal)   Resp 18   Ht 1.613 m (5' 3.5\")   Wt 116 kg (255 lb)   SpO2 98%   BMI 44.46 kg/m²   Physical Exam  Vitals reviewed.   Constitutional:       General: He is not in acute distress.     Appearance: Normal appearance. He is well-developed. He is not toxic-appearing.   HENT:      Head: Normocephalic and atraumatic.      Right Ear: External ear normal.      Left Ear: External ear normal.      Nose: Congestion and rhinorrhea present.   Cardiovascular:      Rate and Rhythm: Regular rhythm. Tachycardia present.      Heart sounds: Normal heart sounds, S1 normal and S2 normal.   Pulmonary:      Effort: Pulmonary effort is normal. No respiratory distress.      Breath sounds: No stridor.      Comments: No tachypnea.  Patient speaks comfortably in full sentences.  Patient has coarse expiratory wheezes bilaterally and questionable Rales left lower lobe.  Musculoskeletal:      Right lower leg: No edema.      Left lower leg: No edema.   Skin:     General: Skin is dry.   Neurological:      Comments: Alert and oriented.    Psychiatric:         Speech: Speech normal.         Behavior: Behavior normal.           Assessment/Plan:   1. LRTI (lower respiratory tract infection)  - doxycycline (VIBRAMYCIN) 100 MG Tab; Take 1 Tablet by mouth 2 times a day for 7 days.  Dispense: 14 Tablet; Refill: 0    2. Mild intermittent asthma with acute " exacerbation  - albuterol 108 (90 Base) MCG/ACT Aero Soln inhalation aerosol; Inhale 2 Puffs every 6 hours as needed for Shortness of Breath.  Dispense: 8.5 g; Refill: 0      Patient records reviewed for continuity of care.  Patient was evaluated by his PCP earlier today.  He was diagnosed with an asthma exacerbation and was started on a burst of prednisone and represcribed albuterol as needed.    Exam concerning for early pneumonia.  Will also start patient on doxycycline.  He should continue albuterol as needed for wheezing, bronchospasm and shortness of breath.  May also take over-the-counter antitussives as needed. Recommend PCP in 48 hrs.     Recommend that he rest and ensure he is adequately hydrated.  If no improvement within 48 hours, new symptoms develop at any point or symptoms worsen I would like him to return to clinic or follow-up with his PCP for reevaluation.    If he develops any worsening shortness of breath, difficulty breathing, chest pain, pain with breathing, cyanosis, confusion or dizziness he should call 911 and go to the emergency room for reevaluation.

## 2024-02-22 ENCOUNTER — OFFICE VISIT (OUTPATIENT)
Dept: MEDICAL GROUP | Facility: PHYSICIAN GROUP | Age: 39
End: 2024-02-22
Payer: COMMERCIAL

## 2024-02-22 ENCOUNTER — TELEPHONE (OUTPATIENT)
Dept: SLEEP MEDICINE | Facility: MEDICAL CENTER | Age: 39
End: 2024-02-22

## 2024-02-22 VITALS
SYSTOLIC BLOOD PRESSURE: 126 MMHG | HEART RATE: 100 BPM | BODY MASS INDEX: 44.9 KG/M2 | HEIGHT: 63 IN | TEMPERATURE: 98.8 F | DIASTOLIC BLOOD PRESSURE: 84 MMHG | OXYGEN SATURATION: 94 % | WEIGHT: 253.4 LBS

## 2024-02-22 DIAGNOSIS — M10.9 GOUT, UNSPECIFIED CAUSE, UNSPECIFIED CHRONICITY, UNSPECIFIED SITE: Chronic | ICD-10-CM

## 2024-02-22 DIAGNOSIS — J45.20 MILD INTERMITTENT ASTHMA WITHOUT STATUS ASTHMATICUS WITHOUT COMPLICATION: Chronic | ICD-10-CM

## 2024-02-22 DIAGNOSIS — R09.89 CHEST CONGESTION: ICD-10-CM

## 2024-02-22 DIAGNOSIS — I10 ESSENTIAL HYPERTENSION: Chronic | ICD-10-CM

## 2024-02-22 DIAGNOSIS — E66.01 MORBID OBESITY (HCC): Chronic | ICD-10-CM

## 2024-02-22 LAB
FLUAV RNA SPEC QL NAA+PROBE: NEGATIVE
FLUBV RNA SPEC QL NAA+PROBE: POSITIVE
RSV RNA SPEC QL NAA+PROBE: NEGATIVE
SARS-COV-2 RNA RESP QL NAA+PROBE: NEGATIVE

## 2024-02-22 PROCEDURE — 3074F SYST BP LT 130 MM HG: CPT | Performed by: INTERNAL MEDICINE

## 2024-02-22 PROCEDURE — 0241U POCT CEPHEID COV-2, FLU A/B, RSV - PCR: CPT | Performed by: INTERNAL MEDICINE

## 2024-02-22 PROCEDURE — 3079F DIAST BP 80-89 MM HG: CPT | Performed by: INTERNAL MEDICINE

## 2024-02-22 PROCEDURE — 99214 OFFICE O/P EST MOD 30 MIN: CPT | Performed by: INTERNAL MEDICINE

## 2024-02-22 RX ORDER — BENZONATATE 100 MG/1
100 CAPSULE ORAL 3 TIMES DAILY PRN
Qty: 60 CAPSULE | Refills: 0 | Status: SHIPPED | OUTPATIENT
Start: 2024-02-22

## 2024-02-22 RX ORDER — PREDNISONE 10 MG/1
TABLET ORAL
Qty: 20 TABLET | Refills: 0 | Status: SHIPPED | OUTPATIENT
Start: 2024-02-22 | End: 2024-02-29

## 2024-02-22 RX ORDER — OSELTAMIVIR PHOSPHATE 75 MG/1
75 CAPSULE ORAL 2 TIMES DAILY
Qty: 10 CAPSULE | Refills: 0 | Status: SHIPPED | OUTPATIENT
Start: 2024-02-22 | End: 2024-03-05

## 2024-02-22 ASSESSMENT — FIBROSIS 4 INDEX: FIB4 SCORE: 0.94

## 2024-02-22 ASSESSMENT — PATIENT HEALTH QUESTIONNAIRE - PHQ9: CLINICAL INTERPRETATION OF PHQ2 SCORE: 0

## 2024-02-22 NOTE — TELEPHONE ENCOUNTER
Ania  Caller: Alberto Maldonado     Topic/issue: Patient contacted his DME company to follow up on getting his Equipment and he was notified that the order had been canceled due to his insurance.    Callback Number: [phone]      Thank you  Sofi REID

## 2024-02-23 ENCOUNTER — HOSPITAL ENCOUNTER (OUTPATIENT)
Dept: RADIOLOGY | Facility: MEDICAL CENTER | Age: 39
End: 2024-02-23
Attending: INTERNAL MEDICINE
Payer: COMMERCIAL

## 2024-02-23 ENCOUNTER — OFFICE VISIT (OUTPATIENT)
Dept: URGENT CARE | Facility: CLINIC | Age: 39
End: 2024-02-23
Payer: COMMERCIAL

## 2024-02-23 VITALS
OXYGEN SATURATION: 93 % | HEART RATE: 98 BPM | BODY MASS INDEX: 45 KG/M2 | RESPIRATION RATE: 20 BRPM | WEIGHT: 254 LBS | DIASTOLIC BLOOD PRESSURE: 102 MMHG | SYSTOLIC BLOOD PRESSURE: 144 MMHG | TEMPERATURE: 97.4 F | HEIGHT: 63 IN

## 2024-02-23 DIAGNOSIS — R06.2 WHEEZING: ICD-10-CM

## 2024-02-23 DIAGNOSIS — R09.89 CHEST CONGESTION: ICD-10-CM

## 2024-02-23 DIAGNOSIS — J98.11 ATELECTASIS: ICD-10-CM

## 2024-02-23 DIAGNOSIS — J45.21 MILD INTERMITTENT ASTHMA WITH ACUTE EXACERBATION: ICD-10-CM

## 2024-02-23 DIAGNOSIS — J98.8 RTI (RESPIRATORY TRACT INFECTION): ICD-10-CM

## 2024-02-23 PROCEDURE — 71046 X-RAY EXAM CHEST 2 VIEWS: CPT

## 2024-02-23 PROCEDURE — 3080F DIAST BP >= 90 MM HG: CPT | Performed by: STUDENT IN AN ORGANIZED HEALTH CARE EDUCATION/TRAINING PROGRAM

## 2024-02-23 PROCEDURE — 99214 OFFICE O/P EST MOD 30 MIN: CPT | Performed by: STUDENT IN AN ORGANIZED HEALTH CARE EDUCATION/TRAINING PROGRAM

## 2024-02-23 PROCEDURE — 3077F SYST BP >= 140 MM HG: CPT | Performed by: STUDENT IN AN ORGANIZED HEALTH CARE EDUCATION/TRAINING PROGRAM

## 2024-02-23 RX ORDER — AZITHROMYCIN 250 MG/1
TABLET, FILM COATED ORAL
Qty: 6 TABLET | Refills: 0 | Status: SHIPPED | OUTPATIENT
Start: 2024-02-23

## 2024-02-23 RX ORDER — AZITHROMYCIN 250 MG/1
TABLET, FILM COATED ORAL
Qty: 6 TABLET | Refills: 0 | Status: SHIPPED
Start: 2024-02-23 | End: 2024-02-23

## 2024-02-23 ASSESSMENT — ENCOUNTER SYMPTOMS: WHEEZING: 1

## 2024-02-23 ASSESSMENT — FIBROSIS 4 INDEX: FIB4 SCORE: 0.94

## 2024-02-23 NOTE — ASSESSMENT & PLAN NOTE
This is a new condition.  The patient reported persistent cough/congestion since the last several days.  He was seen in urgent care and was given doxycycline and albuterol     Patient reported recurrent wheezing  Patient denies fever or chills.   Denies chest pain nausea or vomiting.

## 2024-02-23 NOTE — ASSESSMENT & PLAN NOTE
This is a chronic condition.  The patient is taking amlodipine and losartan daily.  Blood pressure has been well-controlled.  No significant side effects reported.

## 2024-02-23 NOTE — ASSESSMENT & PLAN NOTE
Chronic ongoing condition.  Patient is taking allopurinol daily.  Patient denied recent gout flare.

## 2024-02-23 NOTE — PROGRESS NOTES
PRIMARY CARE CLINIC VISIT        Chief Complaint   Patient presents with    Follow-Up     Chest congestion cough  Asthma  Hypertension  Gout  Obesity      Pcp JORDAN Johnson.       History of Present Illness     Asthma without status asthmaticus  Chronic condition.  The patient presently taking albuterol as needed.      Essential hypertension  This is a chronic condition.  The patient is taking amlodipine and losartan daily.  Blood pressure has been well-controlled.  No significant side effects reported.    Gout  Chronic ongoing condition.  Patient is taking allopurinol daily.  Patient denied recent gout flare.    Morbid obesity (HCC)  Chronic condition.  Discussed with the patient regarding diet, exercise, and lifestyle modification to help achieve and maintain healthy weight         Chest congestion  This is a new condition.  The patient reported persistent cough/congestion since the last several days.  He was seen in urgent care and was given doxycycline and albuterol     Patient reported recurrent wheezing  Patient denies fever or chills.   Denies chest pain nausea or vomiting.    Current Outpatient Medications on File Prior to Visit   Medication Sig Dispense Refill    albuterol 108 (90 Base) MCG/ACT Aero Soln inhalation aerosol Inhale 2 Puffs every 6 hours as needed for Shortness of Breath. 8.5 g 0    doxycycline (VIBRAMYCIN) 100 MG Tab Take 1 Tablet by mouth 2 times a day for 7 days. 14 Tablet 0    losartan (COZAAR) 50 MG Tab Take 1 Tablet by mouth every day.      metoprolol tartrate (LOPRESSOR) 25 MG Tab Take 1 Tablet by mouth 2 times a day. 60 Tablet 0    omeprazole (PRILOSEC) 40 MG delayed-release capsule Take 1 Capsule by mouth 2 times a day. 60 Capsule 0    allopurinol (ZYLOPRIM) 300 MG Tab Take 1 Tablet by mouth every day. 90 Tablet 3    amLODIPine (NORVASC) 10 MG Tab Take 1 Tablet by mouth every day. 90 Tablet 3     No current facility-administered medications on file prior to visit.         Allergies: Patient has no known allergies.    Current Outpatient Medications Ordered in Epic   Medication Sig Dispense Refill    predniSONE (DELTASONE) 10 MG Tab Take 4 Tablets by mouth every day for 2 days, THEN 3 Tablets every day for 2 days, THEN 2 Tablets every day for 2 days, THEN 1 Tablet every day for 2 days. 20 Tablet 0    benzonatate (TESSALON) 100 MG Cap Take 1 Capsule by mouth 3 times a day as needed for Cough. 60 Capsule 0    albuterol 108 (90 Base) MCG/ACT Aero Soln inhalation aerosol Inhale 2 Puffs every 6 hours as needed for Shortness of Breath. 8.5 g 0    doxycycline (VIBRAMYCIN) 100 MG Tab Take 1 Tablet by mouth 2 times a day for 7 days. 14 Tablet 0    losartan (COZAAR) 50 MG Tab Take 1 Tablet by mouth every day.      metoprolol tartrate (LOPRESSOR) 25 MG Tab Take 1 Tablet by mouth 2 times a day. 60 Tablet 0    omeprazole (PRILOSEC) 40 MG delayed-release capsule Take 1 Capsule by mouth 2 times a day. 60 Capsule 0    allopurinol (ZYLOPRIM) 300 MG Tab Take 1 Tablet by mouth every day. 90 Tablet 3    amLODIPine (NORVASC) 10 MG Tab Take 1 Tablet by mouth every day. 90 Tablet 3     No current Epic-ordered facility-administered medications on file.       Past Medical History:   Diagnosis Date    Asthma without status asthmaticus 06/29/2017    Chickenpox     Hypertension        History reviewed. No pertinent surgical history.    Family History   Problem Relation Age of Onset    Stroke Neg Hx     Heart Disease Neg Hx        Social History     Tobacco Use   Smoking Status Never   Smokeless Tobacco Never       Social History     Substance and Sexual Activity   Alcohol Use Yes    Alcohol/week: 7.2 oz    Types: 12 Shots of liquor per week    Comment: everyday       Review of systems.  As per HPI above. All other systems reviewed and negative.      Past Medical, Social, and Family history reviewed and updated in EPIC     Objective     /84 (BP Location: Left arm, Patient Position: Sitting, BP Cuff Size:  "Adult)   Pulse 100   Temp 37.1 °C (98.8 °F) (Temporal)   Ht 1.6 m (5' 3\")   Wt 115 kg (253 lb 6.4 oz)   SpO2 94%    Body mass index is 44.89 kg/m².    General: alert in no apparent distress.  Cardiovascular: regular rate and rhythm  Gastrointestinal: BS present.   Nose with mild mucosal inflammation oropharynx no exudates  Lung with expiratory wheezing and reduced breath sound the bases            Assessment and Plan     1. Chest congestion/cough  Suspect viral URI/bronchitis.  Rule out COVID infection versus bacterial infection.      Symptomatic and supportive care recommended:   Advised pt to stay well hydrated and plenty of rest   For sore throat: rec warm salt water gargles soft foods. Throat coats /chloraseptic sprays prn.  Saline nasal spray and Flonase as a decongestant.   Infection control measures:  Frequent Hand washings with soap and water, covering sneeze/cough, clean/disinfect surfaces. Avoid close contact with sick people.  May try otc mucinex as needed [expectorant]        Go to urgent care or ER is symptoms worsen /change such as temperature >101, worsening shortness of breath, wheezing, worsening cough, chest pain, dizziness, lightheadedness, lethargy, confusion, headaches, change in vision, motor weakness, abdominal pain, the inability to keep fluids/ foods down, etc... or any change in the pt's condition.    - DX-CHEST-2 VIEWS; Future  - POCT CEPHEID COV-2, FLU A/B, RSV - PCR  - predniSONE (DELTASONE) 10 MG Tab; Take 4 Tablets by mouth every day for 2 days, THEN 3 Tablets every day for 2 days, THEN 2 Tablets every day for 2 days, THEN 1 Tablet every day for 2 days.  Dispense: 20 Tablet; Refill: 0  - benzonatate (TESSALON) 100 MG Cap; Take 1 Capsule by mouth 3 times a day as needed for Cough.  Dispense: 60 Capsule; Refill: 0    2. Mild intermittent asthma without status asthmaticus without complication  Chronic condition.  Patient to start taper prednisone as above.  Patient may use albuterol " as needed.    3. Essential hypertension  Chronic stable condition.  Continue amlodipine 10 mg daily and losartan 50 mg daily.  BP today 126/84.      4. Gout, unspecified cause, unspecified chronicity, unspecified site  Chronic condition but the patient denies any gout flareup.  Continue allopurinol 300 Mg daily      5. Morbid obesity (HCC)  Chronic condition.  Uncontrolled.  Recommend diet and lifestyle modification.  enCourage patient to lose weight    Recommend patient to follow-up with PCP in 7 to 10 days.  -------------------------------------------    Addendum note:    Chart note:    2/22/2024 at 6:15 PM     Pt's name: Alberto Maldonado [phone] (home)    /  PCP: RICHY Johnson.    I personally called the patient    -Influenza test positive.      Recommended pt to start Tamiflu.  Rx sent to pharm        Rey Pacheco M.D.         Attestation: I spent:   32  min -  That includes time for chart review before the visit, the actual patient visit, and time spent on documentation in EMR after the visit.  Chart review/prep, review of other providers' records, imaging/lab review, face-to-face time for history/examination, pt's counseling/education, ordering, prescribing,  review of results/meds/ treatment plan with patient, and care coordination.               Please note that this dictation was created using voice recognition software. I have made every reasonable attempt to correct obvious errors, but I expect that there are errors of grammar and possibly content that I did not discover before finalizing the note.    Rey Pacheco MD  Internal Medicine  [facility]

## 2024-02-24 ENCOUNTER — HOSPITAL ENCOUNTER (OUTPATIENT)
Dept: LAB | Facility: MEDICAL CENTER | Age: 39
End: 2024-02-24
Payer: COMMERCIAL

## 2024-02-24 DIAGNOSIS — I51.7 CARDIOMEGALY: ICD-10-CM

## 2024-02-24 DIAGNOSIS — J18.9 PNEUMONIA DUE TO INFECTIOUS ORGANISM, UNSPECIFIED LATERALITY, UNSPECIFIED PART OF LUNG: ICD-10-CM

## 2024-02-24 LAB
ALBUMIN SERPL BCP-MCNC: 4.1 G/DL (ref 3.2–4.9)
ALBUMIN/GLOB SERPL: 1 G/DL
ALP SERPL-CCNC: 94 U/L (ref 30–99)
ALT SERPL-CCNC: 137 U/L (ref 2–50)
ANION GAP SERPL CALC-SCNC: 12 MMOL/L (ref 7–16)
AST SERPL-CCNC: 82 U/L (ref 12–45)
BASOPHILS # BLD AUTO: 0.6 % (ref 0–1.8)
BASOPHILS # BLD: 0.03 K/UL (ref 0–0.12)
BILIRUB SERPL-MCNC: 0.5 MG/DL (ref 0.1–1.5)
BUN SERPL-MCNC: 11 MG/DL (ref 8–22)
CALCIUM ALBUM COR SERPL-MCNC: 8.6 MG/DL (ref 8.5–10.5)
CALCIUM SERPL-MCNC: 8.7 MG/DL (ref 8.5–10.5)
CHLORIDE SERPL-SCNC: 103 MMOL/L (ref 96–112)
CO2 SERPL-SCNC: 24 MMOL/L (ref 20–33)
CREAT SERPL-MCNC: 0.62 MG/DL (ref 0.5–1.4)
EOSINOPHIL # BLD AUTO: 0 K/UL (ref 0–0.51)
EOSINOPHIL NFR BLD: 0 % (ref 0–6.9)
ERYTHROCYTE [DISTWIDTH] IN BLOOD BY AUTOMATED COUNT: 43.8 FL (ref 35.9–50)
EST. AVERAGE GLUCOSE BLD GHB EST-MCNC: 105 MG/DL
GFR SERPLBLD CREATININE-BSD FMLA CKD-EPI: 127 ML/MIN/1.73 M 2
GGT SERPL-CCNC: 179 U/L (ref 7–51)
GLOBULIN SER CALC-MCNC: 4.1 G/DL (ref 1.9–3.5)
GLUCOSE SERPL-MCNC: 78 MG/DL (ref 65–99)
HBA1C MFR BLD: 5.3 % (ref 4–5.6)
HCT VFR BLD AUTO: 52.4 % (ref 42–52)
HGB BLD-MCNC: 17.2 G/DL (ref 14–18)
IMM GRANULOCYTES # BLD AUTO: 0.01 K/UL (ref 0–0.11)
IMM GRANULOCYTES NFR BLD AUTO: 0.2 % (ref 0–0.9)
INR PPP: 0.93 (ref 0.87–1.13)
IRON SATN MFR SERPL: 19 % (ref 15–55)
IRON SERPL-MCNC: 60 UG/DL (ref 50–180)
LYMPHOCYTES # BLD AUTO: 1.72 K/UL (ref 1–4.8)
LYMPHOCYTES NFR BLD: 33.8 % (ref 22–41)
MCH RBC QN AUTO: 28 PG (ref 27–33)
MCHC RBC AUTO-ENTMCNC: 32.8 G/DL (ref 32.3–36.5)
MCV RBC AUTO: 85.2 FL (ref 81.4–97.8)
MONOCYTES # BLD AUTO: 0.72 K/UL (ref 0–0.85)
MONOCYTES NFR BLD AUTO: 14.1 % (ref 0–13.4)
NEUTROPHILS # BLD AUTO: 2.61 K/UL (ref 1.82–7.42)
NEUTROPHILS NFR BLD: 51.3 % (ref 44–72)
NRBC # BLD AUTO: 0 K/UL
NRBC BLD-RTO: 0 /100 WBC (ref 0–0.2)
PLATELET # BLD AUTO: 306 K/UL (ref 164–446)
PMV BLD AUTO: 9.3 FL (ref 9–12.9)
POTASSIUM SERPL-SCNC: 3.9 MMOL/L (ref 3.6–5.5)
PROT SERPL-MCNC: 8.2 G/DL (ref 6–8.2)
PROTHROMBIN TIME: 12.6 SEC (ref 12–14.6)
RBC # BLD AUTO: 6.15 M/UL (ref 4.7–6.1)
SODIUM SERPL-SCNC: 139 MMOL/L (ref 135–145)
TIBC SERPL-MCNC: 316 UG/DL (ref 250–450)
UIBC SERPL-MCNC: 256 UG/DL (ref 110–370)
WBC # BLD AUTO: 5.1 K/UL (ref 4.8–10.8)

## 2024-02-24 PROCEDURE — 83883 ASSAY NEPHELOMETRY NOT SPEC: CPT

## 2024-02-24 PROCEDURE — 83540 ASSAY OF IRON: CPT

## 2024-02-24 PROCEDURE — 83010 ASSAY OF HAPTOGLOBIN QUANT: CPT

## 2024-02-24 PROCEDURE — 82947 ASSAY GLUCOSE BLOOD QUANT: CPT | Mod: XU

## 2024-02-24 PROCEDURE — 85025 COMPLETE CBC W/AUTO DIFF WBC: CPT

## 2024-02-24 PROCEDURE — 86015 ACTIN ANTIBODY EACH: CPT

## 2024-02-24 PROCEDURE — 86381 MITOCHONDRIAL ANTIBODY EACH: CPT

## 2024-02-24 PROCEDURE — 82728 ASSAY OF FERRITIN: CPT

## 2024-02-24 PROCEDURE — 82465 ASSAY BLD/SERUM CHOLESTEROL: CPT

## 2024-02-24 PROCEDURE — 86803 HEPATITIS C AB TEST: CPT

## 2024-02-24 PROCEDURE — 80053 COMPREHEN METABOLIC PANEL: CPT

## 2024-02-24 PROCEDURE — 84460 ALANINE AMINO (ALT) (SGPT): CPT | Mod: XU

## 2024-02-24 PROCEDURE — 82247 BILIRUBIN TOTAL: CPT | Mod: XU

## 2024-02-24 PROCEDURE — 82977 ASSAY OF GGT: CPT

## 2024-02-24 PROCEDURE — 85610 PROTHROMBIN TIME: CPT

## 2024-02-24 PROCEDURE — 86038 ANTINUCLEAR ANTIBODIES: CPT

## 2024-02-24 PROCEDURE — 36415 COLL VENOUS BLD VENIPUNCTURE: CPT

## 2024-02-24 PROCEDURE — 82104 ALPHA-1-ANTITRYPSIN PHENO: CPT

## 2024-02-24 PROCEDURE — 82103 ALPHA-1-ANTITRYPSIN TOTAL: CPT

## 2024-02-24 PROCEDURE — 83550 IRON BINDING TEST: CPT

## 2024-02-24 PROCEDURE — 87340 HEPATITIS B SURFACE AG IA: CPT

## 2024-02-24 PROCEDURE — 83036 HEMOGLOBIN GLYCOSYLATED A1C: CPT

## 2024-02-24 PROCEDURE — 84443 ASSAY THYROID STIM HORMONE: CPT

## 2024-02-24 PROCEDURE — 84478 ASSAY OF TRIGLYCERIDES: CPT

## 2024-02-24 PROCEDURE — 86704 HEP B CORE ANTIBODY TOTAL: CPT

## 2024-02-24 PROCEDURE — 86706 HEP B SURFACE ANTIBODY: CPT

## 2024-02-24 PROCEDURE — 82172 ASSAY OF APOLIPOPROTEIN: CPT

## 2024-02-24 PROCEDURE — 84439 ASSAY OF FREE THYROXINE: CPT

## 2024-02-24 PROCEDURE — 84450 TRANSFERASE (AST) (SGOT): CPT | Mod: XU

## 2024-02-24 PROCEDURE — 82390 ASSAY OF CERULOPLASMIN: CPT

## 2024-02-24 PROCEDURE — 86708 HEPATITIS A ANTIBODY: CPT

## 2024-02-24 RX ORDER — DOXYCYCLINE 100 MG/1
100 CAPSULE ORAL 2 TIMES DAILY
Qty: 14 CAPSULE | Refills: 0 | Status: SHIPPED | OUTPATIENT
Start: 2024-02-24 | End: 2024-02-24

## 2024-02-24 NOTE — PROGRESS NOTES
"Subjective     Alberto Maldonado is a 38 y.o. male who presents with Wheezing (Wheezing, just had a xray today and wants someone to interpret it for him.)            Alberto is a 38 y.o. male who presents to urgent care today for symptoms of wheezing.  Patient had a x-ray today and is wanting to go over the results of his chest x-ray.  Chest x-ray was ordered by primary care provider.  Patient was also prescribed Tessalon and prednisone from primary care provider to treat wheezing.    Cough  The cough is Productive of sputum. Associated symptoms include wheezing. Pertinent negatives include no chest pain, chills, ear pain, fever, hemoptysis, myalgias, nasal congestion, postnasal drip, sore throat or shortness of breath.       Review of Systems   Constitutional:  Negative for chills, fever and malaise/fatigue.   HENT:  Negative for congestion, ear pain, postnasal drip and sore throat.    Respiratory:  Positive for cough and wheezing. Negative for hemoptysis and shortness of breath.    Cardiovascular:  Negative for chest pain and palpitations.   Gastrointestinal:  Negative for abdominal pain, diarrhea, nausea and vomiting.   Musculoskeletal:  Negative for myalgias.              Objective     BP (!) 144/102 (BP Location: Right arm, Patient Position: Sitting, BP Cuff Size: Large adult)   Pulse 98   Temp 36.3 °C (97.4 °F) (Temporal)   Resp 20   Ht 1.6 m (5' 3\")   Wt 115 kg (254 lb)   SpO2 93%   BMI 44.99 kg/m²      Physical Exam  Vitals reviewed.   Constitutional:       General: He is not in acute distress.     Appearance: Normal appearance. He is obese. He is not toxic-appearing.   HENT:      Head: Normocephalic and atraumatic.      Nose: Nose normal.      Mouth/Throat:      Mouth: Mucous membranes are moist.      Pharynx: Oropharynx is clear.   Eyes:      Extraocular Movements: Extraocular movements intact.      Conjunctiva/sclera: Conjunctivae normal.      Pupils: Pupils are equal, round, and reactive to " light.   Cardiovascular:      Rate and Rhythm: Normal rate and regular rhythm.   Pulmonary:      Effort: No respiratory distress.      Breath sounds: No stridor. Wheezing present. No rhonchi or rales.   Skin:     General: Skin is warm and dry.   Neurological:      General: No focal deficit present.      Mental Status: He is alert and oriented to person, place, and time.                             Assessment & Plan        1. RTI (respiratory tract infection)  - azithromycin (ZITHROMAX) 250 MG Tab; Take 2 tablets (500 mg) PO on day 1 and then take 1 tablet (250 mg) daily on 2-5  Dispense: 6 Tablet; Refill: 0    2. Wheezing    3. Atelectasis  - Referral to Pulmonary and Sleep Medicine    4. Mild intermittent asthma with acute exacerbation  - Continue prednisone as prescribed by PCP.  - Continue albuterol inhaler as needed.  - Referral to Pulmonary and Sleep Medicine     DX-CHEST-2 VIEWS IMPRESSION:    1.  Mildly enlarged cardiac silhouette with changes of vascular congestion.  2.  Probable right lower lobe atelectasis with potential pneumonitis.    I personally reviewed prior external notes and test results pertinent to today's visit, including visit with PCP on 2/22/2024.    Differential diagnoses, supportive care measures and indications for immediate follow-up discussed with patient. Pathogenesis of diagnosis discussed including typical length and natural progression.  ER precautions discussed.  Follow-up with PCP.    Instructed to return to urgent care or nearest emergency department if symptoms fail to improve, for any change in condition, further concerns, or new concerning symptoms.    Patient states understanding and agrees with the plan of care and discharge instructions.      My total time spent caring for the patient on the day of the encounter was 35 minutes including reviewing prior external notes/test results pertinent to today's visit, obtaining patient history, physical exam, discussing plan of care,  supportive care measures, appropriate follow-up and indications for immediate follow-up. This does not include time spent on separately billable procedures/tests.

## 2024-02-25 LAB
FERRITIN SERPL-MCNC: 267 NG/ML (ref 22–322)
HBV CORE AB SERPL QL IA: NONREACTIVE
HBV SURFACE AB SERPL IA-ACNC: 10.87 MIU/ML (ref 0–10)
HBV SURFACE AG SER QL: NORMAL
HCV AB SER QL: NORMAL
T4 FREE SERPL-MCNC: 1.43 NG/DL (ref 0.93–1.7)
TSH SERPL DL<=0.005 MIU/L-ACNC: 2.62 UIU/ML (ref 0.38–5.33)

## 2024-02-26 LAB
CERULOPLASMIN SERPL-MCNC: 25 MG/DL (ref 15–30)
HAV AB SER QL IA: NEGATIVE
MITOCHONDRIA M2 IGG SER-ACNC: 5.4 UNITS (ref 0–24.9)
NUCLEAR IGG SER QL IA: NORMAL
SMA IGG SER-ACNC: 18 UNITS (ref 0–19)

## 2024-02-28 LAB
A1AT PHENOTYP SERPL-IMP: NORMAL
A1AT SERPL-MCNC: 132 MG/DL (ref 90–200)
A2 MACROGLOB SERPL-MCNC: 154 MG/DL (ref 110–276)
ALT SERPL W P-5'-P-CCNC: 127 IU/L (ref 0–55)
APO A-I SERPL-MCNC: 159 MG/DL (ref 101–178)
AST SERPL W P-5'-P-CCNC: 91 IU/L (ref 0–40)
BILIRUB SERPL-MCNC: 0.5 MG/DL (ref 0–1.2)
CHOLEST SERPL-MCNC: 185 MG/DL (ref 100–199)
FIBROSIS SCORING 550107: ABNORMAL
FIBROSIS STAGE SERPL QL: ABNORMAL
GGT SERPL-CCNC: 179 IU/L (ref 0–65)
GLUCOSE SERPL-MCNC: 76 MG/DL (ref 70–99)
HAPTOGLOB SERPL-MCNC: 167 MG/DL (ref 17–317)
LABORATORY COMMENT REPORT: ABNORMAL
LIVER FIBR SCORE SERPL CALC.FIBROSURE: 0.16 (ref 0–0.21)
LIVER STEATOSIS GRADE SERPL QL: ABNORMAL
LIVER STEATOSIS SCORE SERPL: 0.53 (ref 0–0.4)
NASH GRADE SERPL QL: ABNORMAL
NASH INTERPRETATION SERPL-IMP: ABNORMAL
NASH SCORE SERPL: 0.78 (ref 0–0.25)
NASH SCORING Z4789: ABNORMAL
STEATOSIS SCORING NL11718: ABNORMAL
TEST PERFORMANCE INFO SPEC: ABNORMAL
TEST PERFORMANCE INFO SPEC: ABNORMAL
TRIGL SERPL-MCNC: 94 MG/DL (ref 0–149)

## 2024-03-04 ASSESSMENT — ENCOUNTER SYMPTOMS
MYALGIAS: 0
NAUSEA: 0
FEVER: 0
VOMITING: 0
COUGH: 1
ABDOMINAL PAIN: 0
SHORTNESS OF BREATH: 0
PALPITATIONS: 0
DIARRHEA: 0
CHILLS: 0
HEMOPTYSIS: 0
SORE THROAT: 0

## 2024-03-05 ENCOUNTER — OFFICE VISIT (OUTPATIENT)
Dept: MEDICAL GROUP | Facility: PHYSICIAN GROUP | Age: 39
End: 2024-03-05
Payer: COMMERCIAL

## 2024-03-05 VITALS
OXYGEN SATURATION: 97 % | HEART RATE: 94 BPM | HEIGHT: 63 IN | DIASTOLIC BLOOD PRESSURE: 84 MMHG | WEIGHT: 254 LBS | SYSTOLIC BLOOD PRESSURE: 138 MMHG | TEMPERATURE: 98.3 F | BODY MASS INDEX: 45 KG/M2

## 2024-03-05 DIAGNOSIS — M10.9 GOUT, UNSPECIFIED CAUSE, UNSPECIFIED CHRONICITY, UNSPECIFIED SITE: Chronic | ICD-10-CM

## 2024-03-05 DIAGNOSIS — K76.0 STEATOSIS OF LIVER: Chronic | ICD-10-CM

## 2024-03-05 DIAGNOSIS — J18.9 PNEUMONIA DUE TO INFECTIOUS ORGANISM, UNSPECIFIED LATERALITY, UNSPECIFIED PART OF LUNG: ICD-10-CM

## 2024-03-05 DIAGNOSIS — R79.89 ELEVATED LFTS: Chronic | ICD-10-CM

## 2024-03-05 DIAGNOSIS — F10.20 ALCOHOLISM (HCC): Chronic | ICD-10-CM

## 2024-03-05 DIAGNOSIS — I51.7 CARDIOMEGALY: ICD-10-CM

## 2024-03-05 DIAGNOSIS — I10 ESSENTIAL HYPERTENSION: Chronic | ICD-10-CM

## 2024-03-05 DIAGNOSIS — J45.20 MILD INTERMITTENT ASTHMA WITHOUT STATUS ASTHMATICUS WITHOUT COMPLICATION: Chronic | ICD-10-CM

## 2024-03-05 DIAGNOSIS — K21.9 GASTROESOPHAGEAL REFLUX DISEASE, UNSPECIFIED WHETHER ESOPHAGITIS PRESENT: Chronic | ICD-10-CM

## 2024-03-05 DIAGNOSIS — E66.01 MORBID OBESITY (HCC): Chronic | ICD-10-CM

## 2024-03-05 PROBLEM — F10.931 ALCOHOL WITHDRAWAL DELIRIUM, ACUTE, HYPERACTIVE (HCC): Status: RESOLVED | Noted: 2023-12-02 | Resolved: 2024-03-05

## 2024-03-05 PROBLEM — R19.7 DIARRHEA: Status: RESOLVED | Noted: 2023-12-02 | Resolved: 2024-03-05

## 2024-03-05 PROBLEM — E87.8 ELECTROLYTE ABNORMALITY: Status: RESOLVED | Noted: 2023-12-02 | Resolved: 2024-03-05

## 2024-03-05 PROCEDURE — 3075F SYST BP GE 130 - 139MM HG: CPT | Performed by: INTERNAL MEDICINE

## 2024-03-05 PROCEDURE — 3079F DIAST BP 80-89 MM HG: CPT | Performed by: INTERNAL MEDICINE

## 2024-03-05 PROCEDURE — 99214 OFFICE O/P EST MOD 30 MIN: CPT | Performed by: INTERNAL MEDICINE

## 2024-03-05 ASSESSMENT — FIBROSIS 4 INDEX: FIB4 SCORE: 0.95

## 2024-03-05 NOTE — ASSESSMENT & PLAN NOTE
This is a chronic condition.  The patient is using albuterol as needed.  He denies shortness of breath or wheezing at this time.

## 2024-03-05 NOTE — ASSESSMENT & PLAN NOTE
Is a chronic condition.  The patient is taking allopurinol 30 mg daily.  Patient denies gout flareup.

## 2024-03-05 NOTE — PROGRESS NOTES
PRIMARY CARE CLINIC VISIT        Chief Complaint   Patient presents with    Follow-Up     X-ray results.      Follow-up pneumonia  Elevated LFT  Steatosis of the liver  Cardiomegaly  Alcoholism  Asthma  Hypertension  Gout  Obesity  Acid reflux    History of Present Illness     Pneumonia  New condition.  Recent chest x-ray abnormal.  Result discussed with the patient.    DX-CHEST-2 VIEWS  Narrative: 2/23/2024 4:29 PM    HISTORY/REASON FOR EXAM:  Cough and shortness of breath with central chest pain and wheezing over the past 2 weeks.    TECHNIQUE/EXAM DESCRIPTION AND NUMBER OF VIEWS:  Two views of the chest.    COMPARISON:  12/4/2023    FINDINGS:    The mediastinal and cardiac silhouette is mildly enlarged.    There is mild perihilar bronchial wall thickening. There is atherosclerosis.    There are hazy interstitial opacities in the right perihilar and lower lobe region.    There is no significant pleural effusion.    There is no visible pneumothorax.    There are no acute bony abnormalities.  Impression: 1.  Mildly enlarged cardiac silhouette with changes of vascular congestion.  2.  Probable right lower lobe atelectasis with potential pneumonitis.     Patient stated that he has completed the antibiotic azithromycin and has noted significant improvement.  The patient denies fever chills shortness of breath or wheezing.  The patient will follow-up with pulmonology service.    Elevated LFTs  This is a chronic condition.  Patient presently followed by GI service.  Patient was diagnosed with steatosis of the liver.  Patient was advised weight loss and abstain from EtOH.  Patient will see GI service again for follow-up May 14, 2024 per    Alcoholism (HCC)  Patient reported that he has stopped drinking completely since the last several weeks.  No withdrawal symptoms noted.  Patient will attend [facility] with the patient regarding referral to addiction medicine specialist.  Patient declined.    Asthma without status  asthmaticus  This is a chronic condition.  The patient is using albuterol as needed.  He denies shortness of breath or wheezing at this time.    Essential hypertension  Chronic condition.  The patient is taking amlodipine 10 mg daily.  Losartan 50 mg daily and metoprolol 25 Mg twice daily blood pressure has been well-controlled.  Patient tolerating medications well.    Gout  Is a chronic condition.  The patient is taking allopurinol 30 mg daily.  Patient denies gout flareup.    Morbid obesity (HCC)  Chronic condition.  Discussed with the patient regarding diet, exercise, and lifestyle modification to help achieve and maintain healthy weight         GERD (gastroesophageal reflux disease)  Chronic condition.  Patient presently taking omeprazole 40 Mg daily.  Patient denies nausea vomiting dysphagia or unexplained weight loss.    Current Outpatient Medications on File Prior to Visit   Medication Sig Dispense Refill    azithromycin (ZITHROMAX) 250 MG Tab Take 2 tablets (500 mg) PO on day 1 and then take 1 tablet (250 mg) daily on 2-5 6 Tablet 0    benzonatate (TESSALON) 100 MG Cap Take 1 Capsule by mouth 3 times a day as needed for Cough. 60 Capsule 0    albuterol 108 (90 Base) MCG/ACT Aero Soln inhalation aerosol Inhale 2 Puffs every 6 hours as needed for Shortness of Breath. 8.5 g 0    losartan (COZAAR) 50 MG Tab Take 1 Tablet by mouth every day.      metoprolol tartrate (LOPRESSOR) 25 MG Tab Take 1 Tablet by mouth 2 times a day. 60 Tablet 0    omeprazole (PRILOSEC) 40 MG delayed-release capsule Take 1 Capsule by mouth 2 times a day. 60 Capsule 0    allopurinol (ZYLOPRIM) 300 MG Tab Take 1 Tablet by mouth every day. 90 Tablet 3    amLODIPine (NORVASC) 10 MG Tab Take 1 Tablet by mouth every day. 90 Tablet 3     No current facility-administered medications on file prior to visit.        Allergies: Patient has no known allergies.    Current Outpatient Medications Ordered in Epic   Medication Sig Dispense Refill     "azithromycin (ZITHROMAX) 250 MG Tab Take 2 tablets (500 mg) PO on day 1 and then take 1 tablet (250 mg) daily on 2-5 6 Tablet 0    benzonatate (TESSALON) 100 MG Cap Take 1 Capsule by mouth 3 times a day as needed for Cough. 60 Capsule 0    albuterol 108 (90 Base) MCG/ACT Aero Soln inhalation aerosol Inhale 2 Puffs every 6 hours as needed for Shortness of Breath. 8.5 g 0    losartan (COZAAR) 50 MG Tab Take 1 Tablet by mouth every day.      metoprolol tartrate (LOPRESSOR) 25 MG Tab Take 1 Tablet by mouth 2 times a day. 60 Tablet 0    omeprazole (PRILOSEC) 40 MG delayed-release capsule Take 1 Capsule by mouth 2 times a day. 60 Capsule 0    allopurinol (ZYLOPRIM) 300 MG Tab Take 1 Tablet by mouth every day. 90 Tablet 3    amLODIPine (NORVASC) 10 MG Tab Take 1 Tablet by mouth every day. 90 Tablet 3     No current Epic-ordered facility-administered medications on file.       Past Medical History:   Diagnosis Date    Asthma without status asthmaticus 06/29/2017    Chickenpox     Hypertension        No past surgical history on file.    Family History   Problem Relation Age of Onset    Stroke Neg Hx     Heart Disease Neg Hx        Social History     Tobacco Use   Smoking Status Never   Smokeless Tobacco Never       Social History     Substance and Sexual Activity   Alcohol Use Yes    Alcohol/week: 7.2 oz    Types: 12 Shots of liquor per week    Comment: everyday       Review of systems.  As per HPI above. All other systems reviewed and negative.      Past Medical, Social, and Family history reviewed and updated in EPIC     Objective     /84 (BP Location: Right arm, Patient Position: Sitting, BP Cuff Size: Child)   Pulse 94   Temp 36.8 °C (98.3 °F) (Temporal)   Ht 1.6 m (5' 3\")   Wt 115 kg (254 lb)   SpO2 97%    Body mass index is 44.99 kg/m².    General: alert in no apparent distress.  Cardiovascular: regular rate and rhythm  Pulmonary: lungs : no wheezing   Gastrointestinal: BS present.   Nose no purulent " drainage oropharynx no exudates      Lab Results   Component Value Date/Time    HBA1C 5.3 02/24/2024 12:43 PM    HBA1C 5.3 06/03/2023 03:01 AM       Lab Results   Component Value Date/Time    WBC 5.1 02/24/2024 12:43 PM    HEMOGLOBIN 17.2 02/24/2024 12:43 PM    HEMATOCRIT 52.4 (H) 02/24/2024 12:43 PM    MCV 85.2 02/24/2024 12:43 PM    PLATELETCT 306 02/24/2024 12:43 PM         Lab Results   Component Value Date/Time    SODIUM 139 02/24/2024 12:43 PM    POTASSIUM 3.9 02/24/2024 12:43 PM    GLUCOSE 78 02/24/2024 12:43 PM    BUN 11 02/24/2024 12:43 PM    CREATININE 0.62 02/24/2024 12:43 PM       Lab Results   Component Value Date/Time    CHOLSTRLTOT 185 02/24/2024 12:43 PM    TRIGLYCERIDE 94 02/24/2024 12:43 PM       Lab Results   Component Value Date/Time    ALTSGPT 137 (H) 02/24/2024 12:43 PM    ALTSGPT 127 (H) 02/24/2024 12:43 PM             Assessment and Plan     1. Pneumonia due to infectious organism, unspecified laterality, unspecified part of lung  This is new condition.  Clinically resolved.  Patient presently afebrile with temperature 98.3.  O2 saturation 97%.  Patient has completed the antibiotic.  The patient felt that he is back to his previous baseline.  Advised to keep the appointment to see pulmonologist.  Recommend pneumonia vaccination.  The patient declined today      2. Elevated LFTs  3. Steatosis of liver  Chronic condition.  Uncontrolled.  Strongly advised the patient diet and exercise.  Patient reported lose weight.  Also advised the patient to abstain from EtOH.  Continue follow-up with GI service in May 2024.    4. Cardiomegaly  This is a new condition.  Noted with chest x-ray.  Echocardiogram has been ordered.  Advised the patient to follow-up after echo done.    5. Alcoholism (HCC)  Chronic condition.  The patient stated that he has stopped drinking alcohol since the last several weeks.  No signs of withdrawal on exam today.  Discussed with the patient declined addiction medicine specialist  referral.  He will continue attending AA.    6. Mild intermittent asthma without status asthmaticus without complication  Chronic stable condition.  Continue albuterol as needed for rescue treatment.    7. Essential hypertension  This is a chronic stable condition.  Continue amlodipine 10 mg daily.  Losartan 50 Mg daily and metoprolol 25 Mg twice daily.  Continue to monitor blood pressure on regular basis at home.      8. Gout, unspecified cause, unspecified chronicity, unspecified site  Chronic condition.  Continue allopurinol 300 Mg p.o. daily.  No recent gout flareup reported.    9. Morbid obesity (HCC)  This is a chronic condition.  Uncontrolled.  Recommend diet and exercise.  The patient will to lose weight.    10. Gastroesophageal reflux disease, unspecified whether esophagitis present  Chronic stable condition.  Continue omeprazole 40 Mg daily            Attestation: I spent:   36  min -  That includes time for chart review before the visit, the actual patient visit, and time spent on documentation in EMR after the visit.  Chart review/prep, review of other providers' records, imaging/lab review, face-to-face time for history/examination, pt's counseling/education, ordering, prescribing,  review of results/meds/ treatment plan with patient, and care coordination.               Please note that this dictation was created using voice recognition software. I have made every reasonable attempt to correct obvious errors, but I expect that there are errors of grammar and possibly content that I did not discover before finalizing the note.    Rey Pacheco MD  Internal Medicine  [facility]

## 2024-03-05 NOTE — ASSESSMENT & PLAN NOTE
This is a chronic condition.  Patient presently followed by GI service.  Patient was diagnosed with steatosis of the liver.  Patient was advised weight loss and abstain from EtOH.  Patient will see GI service again for follow-up May 14, 2024 per

## 2024-03-05 NOTE — ASSESSMENT & PLAN NOTE
Chronic condition.  Patient presently taking omeprazole 40 Mg daily.  Patient denies nausea vomiting dysphagia or unexplained weight loss.

## 2024-03-05 NOTE — ASSESSMENT & PLAN NOTE
New condition.  Recent chest x-ray abnormal.  Result discussed with the patient.    DX-CHEST-2 VIEWS  Narrative: 2/23/2024 4:29 PM    HISTORY/REASON FOR EXAM:  Cough and shortness of breath with central chest pain and wheezing over the past 2 weeks.    TECHNIQUE/EXAM DESCRIPTION AND NUMBER OF VIEWS:  Two views of the chest.    COMPARISON:  12/4/2023    FINDINGS:    The mediastinal and cardiac silhouette is mildly enlarged.    There is mild perihilar bronchial wall thickening. There is atherosclerosis.    There are hazy interstitial opacities in the right perihilar and lower lobe region.    There is no significant pleural effusion.    There is no visible pneumothorax.    There are no acute bony abnormalities.  Impression: 1.  Mildly enlarged cardiac silhouette with changes of vascular congestion.  2.  Probable right lower lobe atelectasis with potential pneumonitis.     Patient stated that he has completed the antibiotic azithromycin and has noted significant improvement.  The patient denies fever chills shortness of breath or wheezing.  The patient will follow-up with pulmonology service.

## 2024-03-05 NOTE — ASSESSMENT & PLAN NOTE
Patient reported that he has stopped drinking completely since the last several weeks.  No withdrawal symptoms noted.  Patient will attend AA.  Francine with the patient regarding referral to addiction medicine specialist.  Patient declined.

## 2024-03-05 NOTE — ASSESSMENT & PLAN NOTE
Chronic condition.  The patient is taking amlodipine 10 mg daily.  Losartan 50 mg daily and metoprolol 25 Mg twice daily blood pressure has been well-controlled.  Patient tolerating medications well.

## 2024-03-13 ENCOUNTER — OFFICE VISIT (OUTPATIENT)
Dept: URGENT CARE | Facility: CLINIC | Age: 39
End: 2024-03-13
Payer: COMMERCIAL

## 2024-03-13 VITALS — WEIGHT: 257 LBS | BODY MASS INDEX: 45.53 KG/M2 | OXYGEN SATURATION: 95 % | TEMPERATURE: 97.5 F | HEART RATE: 99 BPM

## 2024-03-13 DIAGNOSIS — H66.001 NON-RECURRENT ACUTE SUPPURATIVE OTITIS MEDIA OF RIGHT EAR WITHOUT SPONTANEOUS RUPTURE OF TYMPANIC MEMBRANE: ICD-10-CM

## 2024-03-13 DIAGNOSIS — M10.062 ACUTE IDIOPATHIC GOUT OF LEFT KNEE: ICD-10-CM

## 2024-03-13 PROCEDURE — 99214 OFFICE O/P EST MOD 30 MIN: CPT | Performed by: PHYSICIAN ASSISTANT

## 2024-03-13 RX ORDER — AMOXICILLIN AND CLAVULANATE POTASSIUM 875; 125 MG/1; MG/1
1 TABLET, FILM COATED ORAL 2 TIMES DAILY
Qty: 20 TABLET | Refills: 0 | Status: SHIPPED | OUTPATIENT
Start: 2024-03-13

## 2024-03-13 RX ORDER — METHYLPREDNISOLONE 4 MG/1
TABLET ORAL
Qty: 21 TABLET | Refills: 0 | Status: SHIPPED | OUTPATIENT
Start: 2024-03-13

## 2024-03-13 ASSESSMENT — ENCOUNTER SYMPTOMS
NECK PAIN: 0
DIARRHEA: 0
SHORTNESS OF BREATH: 0
FEVER: 1
CHILLS: 1
RHINORRHEA: 1
DIZZINESS: 0
HEADACHES: 1
VOMITING: 0
WHEEZING: 0
SORE THROAT: 0
MYALGIAS: 0
NAUSEA: 0
ABDOMINAL PAIN: 0
COUGH: 1

## 2024-03-13 ASSESSMENT — FIBROSIS 4 INDEX: FIB4 SCORE: 0.95

## 2024-03-13 NOTE — LETTER
March 13, 2024         Patient: Alberto Maldonado   YOB: 1985   Date of Visit: 3/13/2024           To Whom it May Concern:    Alberto Maldonado was seen in my clinic on 3/13/2024. He was evaluated for a minor left knee injury.        If you have any questions or concerns, please don't hesitate to call.        Sincerely,           Gage Paredes P.A.-C.  Electronically Signed

## 2024-03-14 NOTE — PROGRESS NOTES
Subjective     Alberto Maldonado is a very pleasant 38 y.o. male who presents with Otalgia (gout)            Otalgia   There is pain in the right ear. This is a new problem. The current episode started in the past 7 days. The problem has been gradually worsening. The maximum temperature recorded prior to his arrival was 101 - 101.9 F. The fever has been present for 1 to 2 days. Associated symptoms include coughing, headaches and rhinorrhea. Pertinent negatives include no abdominal pain, diarrhea, ear discharge, hearing loss, neck pain, sore throat or vomiting. He has tried acetaminophen and NSAIDs for the symptoms. The treatment provided mild relief. His past medical history is significant for a chronic ear infection.       Patient also having an acute gout flare of his left knee.  Chronic recurrent gout usually treated with Medrol Dosepak.  Multiple well-documented history of flares in the past and he states this is the proper medication.  No injury fall or precipitating event.  No rash or systemic symptoms pertaining to his left knee.      PMH:  has a past medical history of Asthma without status asthmaticus (06/29/2017), Chickenpox, and Hypertension.    He has no past medical history of Cancer ([facility]) or Diabetes ([facility]).  MEDS:   Current Outpatient Medications:     amoxicillin-clavulanate (AUGMENTIN) 875-125 MG Tab, Take 1 Tablet by mouth 2 times a day., Disp: 20 Tablet, Rfl: 0    methylPREDNISolone (MEDROL DOSEPAK) 4 MG Tablet Therapy Pack, Follow schedule on package instructions., Disp: 21 Tablet, Rfl: 0    azithromycin (ZITHROMAX) 250 MG Tab, Take 2 tablets (500 mg) PO on day 1 and then take 1 tablet (250 mg) daily on 2-5, Disp: 6 Tablet, Rfl: 0    benzonatate (TESSALON) 100 MG Cap, Take 1 Capsule by mouth 3 times a day as needed for Cough., Disp: 60 Capsule, Rfl: 0    albuterol 108 (90 Base) MCG/ACT Aero Soln inhalation aerosol, Inhale 2 Puffs every 6 hours as needed for Shortness of Breath., Disp: 8.5 g,  Rfl: 0    losartan (COZAAR) 50 MG Tab, Take 1 Tablet by mouth every day., Disp: , Rfl:     metoprolol tartrate (LOPRESSOR) 25 MG Tab, Take 1 Tablet by mouth 2 times a day., Disp: 60 Tablet, Rfl: 0    omeprazole (PRILOSEC) 40 MG delayed-release capsule, Take 1 Capsule by mouth 2 times a day., Disp: 60 Capsule, Rfl: 0    allopurinol (ZYLOPRIM) 300 MG Tab, Take 1 Tablet by mouth every day., Disp: 90 Tablet, Rfl: 3    amLODIPine (NORVASC) 10 MG Tab, Take 1 Tablet by mouth every day., Disp: 90 Tablet, Rfl: 3  ALLERGIES: No Known Allergies  SURGHX: No past surgical history on file.  SOCHX:  reports that he has never smoked. He has never used smokeless tobacco. He reports current alcohol use of about 7.2 oz of alcohol per week. He reports that he does not currently use drugs.  FH: family history is not on file.      Review of Systems   Constitutional:  Positive for chills, fever and malaise/fatigue.   HENT:  Positive for congestion, ear pain and rhinorrhea. Negative for ear discharge, hearing loss and sore throat.    Respiratory:  Positive for cough. Negative for shortness of breath and wheezing.    Gastrointestinal:  Negative for abdominal pain, diarrhea, nausea and vomiting.   Musculoskeletal:  Positive for joint pain. Negative for myalgias and neck pain.   Neurological:  Positive for headaches. Negative for dizziness.              Objective     Pulse 99   Temp 36.4 °C (97.5 °F) (Temporal)   Wt 117 kg (257 lb)   SpO2 95%   BMI 45.53 kg/m²      Physical Exam  Vitals and nursing note reviewed.   Constitutional:       General: He is not in acute distress.     Appearance: Normal appearance. He is well-developed. He is not ill-appearing, toxic-appearing or diaphoretic.   HENT:      Head: Normocephalic and atraumatic.      Right Ear: Ear canal and external ear normal. Tympanic membrane is erythematous and bulging.      Left Ear: Tympanic membrane, ear canal and external ear normal.      Nose: Congestion and rhinorrhea  present.      Mouth/Throat:      Mouth: Mucous membranes are moist.      Pharynx: Oropharynx is clear. No oropharyngeal exudate or posterior oropharyngeal erythema.   Eyes:      General:         Right eye: No discharge.         Left eye: No discharge.      Conjunctiva/sclera: Conjunctivae normal.   Cardiovascular:      Rate and Rhythm: Normal rate and regular rhythm.      Pulses: Normal pulses.      Heart sounds: Normal heart sounds. No murmur heard.  Pulmonary:      Effort: Pulmonary effort is normal. No respiratory distress.      Breath sounds: Normal breath sounds. No wheezing, rhonchi or rales.   Chest:      Chest wall: No tenderness.   Musculoskeletal:         General: Swelling and tenderness present.      Cervical back: Normal range of motion and neck supple.      Right lower leg: No edema.      Left lower leg: No edema.      Comments: Generalized soft tissue swelling and tenderness of the left knee.  No gross deformity erythema rash open lesions or discharge.  Full range of motion without crepitus.  Knee is stable.  Antalgic gait favoring left knee noted.   Lymphadenopathy:      Cervical: Cervical adenopathy present.   Skin:     General: Skin is warm and dry.   Neurological:      General: No focal deficit present.      Mental Status: He is alert and oriented to person, place, and time. Mental status is at baseline.   Psychiatric:         Mood and Affect: Mood normal.         Behavior: Behavior normal.         Thought Content: Thought content normal.         Judgment: Judgment normal.                             Assessment & Plan     This is a very pleasant 38-year-old male presenting with a right ear infection.  Have been dealing with viral URI for the last few days with cough, congestion, headache and fever.  Those have improved now having sharp stabbing right ear pain without discharge bleeding or hearing problems.  No dizziness or chest pain.  Eating and drinking without vomiting or diarrhea.  Vitals  normal.  Exam shows nasal congestion rhinorrhea with right TM erythema and bulging.  No perforation, discharge or mastoid tenderness.  Lungs are clear showing no lower respiratory involvement. OTC meds and conservative measures as discussed       1. Non-recurrent acute suppurative otitis media of right ear without spontaneous rupture of tympanic membrane  amoxicillin-clavulanate (AUGMENTIN) 875-125 MG Tab    methylPREDNISolone (MEDROL DOSEPAK) 4 MG Tablet Therapy Pack      2. Acute idiopathic gout of left knee  methylPREDNISolone (MEDROL DOSEPAK) 4 MG Tablet Therapy Pack              I personally reviewed prior external notes and test results pertinent to today's visit. Return to clinic or go to ED if symptoms worsen or persist. Red flag symptoms and indications for ED discussed at length. Patient/Parent/Guardian voices understanding.  AVS with post-visit instructions provided or given verbally.  Follow-up with your primary care provider in 3-5 days. All side effects and potential interactions of prescribed medication discussed including allergic response, GI upset, tendon injury, rash, sedation, OCP effectiveness, etc.    Please note that this dictation was created using voice recognition software. I have made every reasonable attempt to correct obvious errors, but I expect that there are errors of grammar and possibly content that I did not discover before finalizing the note.

## 2024-03-27 ENCOUNTER — TELEPHONE (OUTPATIENT)
Dept: HEALTH INFORMATION MANAGEMENT | Facility: OTHER | Age: 39
End: 2024-03-27
Payer: COMMERCIAL

## 2024-04-04 ENCOUNTER — HOSPITAL ENCOUNTER (EMERGENCY)
Facility: MEDICAL CENTER | Age: 39
End: 2024-04-04
Attending: EMERGENCY MEDICINE
Payer: COMMERCIAL

## 2024-04-04 ENCOUNTER — APPOINTMENT (OUTPATIENT)
Dept: RADIOLOGY | Facility: MEDICAL CENTER | Age: 39
End: 2024-04-04
Attending: EMERGENCY MEDICINE
Payer: COMMERCIAL

## 2024-04-04 VITALS
HEART RATE: 98 BPM | RESPIRATION RATE: 16 BRPM | DIASTOLIC BLOOD PRESSURE: 109 MMHG | WEIGHT: 259.7 LBS | TEMPERATURE: 98 F | BODY MASS INDEX: 46.02 KG/M2 | OXYGEN SATURATION: 93 % | HEIGHT: 63 IN | SYSTOLIC BLOOD PRESSURE: 178 MMHG

## 2024-04-04 DIAGNOSIS — K76.0 STEATOSIS OF LIVER: Chronic | ICD-10-CM

## 2024-04-04 DIAGNOSIS — R79.89 ELEVATED LFTS: Chronic | ICD-10-CM

## 2024-04-04 DIAGNOSIS — R07.9 CHEST PAIN, UNSPECIFIED TYPE: ICD-10-CM

## 2024-04-04 LAB
ALBUMIN SERPL BCP-MCNC: 4.1 G/DL (ref 3.2–4.9)
ALBUMIN/GLOB SERPL: 1.1 G/DL
ALP SERPL-CCNC: 131 U/L (ref 30–99)
ALT SERPL-CCNC: 308 U/L (ref 2–50)
ANION GAP SERPL CALC-SCNC: 16 MMOL/L (ref 7–16)
AST SERPL-CCNC: 591 U/L (ref 12–45)
BASOPHILS # BLD AUTO: 1.1 % (ref 0–1.8)
BASOPHILS # BLD: 0.08 K/UL (ref 0–0.12)
BILIRUB SERPL-MCNC: 1.2 MG/DL (ref 0.1–1.5)
BUN SERPL-MCNC: 9 MG/DL (ref 8–22)
CALCIUM ALBUM COR SERPL-MCNC: 8.1 MG/DL (ref 8.5–10.5)
CALCIUM SERPL-MCNC: 8.2 MG/DL (ref 8.5–10.5)
CHLORIDE SERPL-SCNC: 99 MMOL/L (ref 96–112)
CO2 SERPL-SCNC: 23 MMOL/L (ref 20–33)
CREAT SERPL-MCNC: 0.69 MG/DL (ref 0.5–1.4)
EKG IMPRESSION: NORMAL
EOSINOPHIL # BLD AUTO: 0.15 K/UL (ref 0–0.51)
EOSINOPHIL NFR BLD: 2 % (ref 0–6.9)
ERYTHROCYTE [DISTWIDTH] IN BLOOD BY AUTOMATED COUNT: 42.5 FL (ref 35.9–50)
GFR SERPLBLD CREATININE-BSD FMLA CKD-EPI: 123 ML/MIN/1.73 M 2
GLOBULIN SER CALC-MCNC: 3.7 G/DL (ref 1.9–3.5)
GLUCOSE SERPL-MCNC: 130 MG/DL (ref 65–99)
HCT VFR BLD AUTO: 51.7 % (ref 42–52)
HGB BLD-MCNC: 17.8 G/DL (ref 14–18)
IMM GRANULOCYTES # BLD AUTO: 0.01 K/UL (ref 0–0.11)
IMM GRANULOCYTES NFR BLD AUTO: 0.1 % (ref 0–0.9)
LYMPHOCYTES # BLD AUTO: 2.21 K/UL (ref 1–4.8)
LYMPHOCYTES NFR BLD: 29.4 % (ref 22–41)
MCH RBC QN AUTO: 28.1 PG (ref 27–33)
MCHC RBC AUTO-ENTMCNC: 34.4 G/DL (ref 32.3–36.5)
MCV RBC AUTO: 81.7 FL (ref 81.4–97.8)
MONOCYTES # BLD AUTO: 1.05 K/UL (ref 0–0.85)
MONOCYTES NFR BLD AUTO: 14 % (ref 0–13.4)
NEUTROPHILS # BLD AUTO: 4.01 K/UL (ref 1.82–7.42)
NEUTROPHILS NFR BLD: 53.4 % (ref 44–72)
NRBC # BLD AUTO: 0 K/UL
NRBC BLD-RTO: 0 /100 WBC (ref 0–0.2)
PLATELET # BLD AUTO: 320 K/UL (ref 164–446)
PMV BLD AUTO: 8.4 FL (ref 9–12.9)
POTASSIUM SERPL-SCNC: 3.5 MMOL/L (ref 3.6–5.5)
PROT SERPL-MCNC: 7.8 G/DL (ref 6–8.2)
RBC # BLD AUTO: 6.33 M/UL (ref 4.7–6.1)
SODIUM SERPL-SCNC: 138 MMOL/L (ref 135–145)
TROPONIN T SERPL-MCNC: 12 NG/L (ref 6–19)
WBC # BLD AUTO: 7.5 K/UL (ref 4.8–10.8)

## 2024-04-04 PROCEDURE — 93005 ELECTROCARDIOGRAM TRACING: CPT | Performed by: EMERGENCY MEDICINE

## 2024-04-04 PROCEDURE — 80053 COMPREHEN METABOLIC PANEL: CPT

## 2024-04-04 PROCEDURE — 99284 EMERGENCY DEPT VISIT MOD MDM: CPT

## 2024-04-04 PROCEDURE — 93005 ELECTROCARDIOGRAM TRACING: CPT

## 2024-04-04 PROCEDURE — 84484 ASSAY OF TROPONIN QUANT: CPT

## 2024-04-04 PROCEDURE — 36415 COLL VENOUS BLD VENIPUNCTURE: CPT

## 2024-04-04 PROCEDURE — 71045 X-RAY EXAM CHEST 1 VIEW: CPT

## 2024-04-04 PROCEDURE — 85025 COMPLETE CBC W/AUTO DIFF WBC: CPT

## 2024-04-04 RX ORDER — SUCRALFATE 1 G/1
1 TABLET ORAL 2 TIMES DAILY
Qty: 40 TABLET | Refills: 0 | Status: ON HOLD | OUTPATIENT
Start: 2024-04-04 | End: 2024-04-22

## 2024-04-04 RX ORDER — PANTOPRAZOLE SODIUM 40 MG/1
40 FOR SUSPENSION ORAL DAILY
Qty: 20 EACH | Refills: 0 | Status: SHIPPED | OUTPATIENT
Start: 2024-04-04 | End: 2024-04-05

## 2024-04-04 ASSESSMENT — HEART SCORE
HISTORY: SLIGHTLY SUSPICIOUS
RISK FACTORS: 1-2 RISK FACTORS
AGE: <45
HEART SCORE: 2
TROPONIN: LESS THAN OR EQUAL TO NORMAL LIMIT
ECG: NON-SPECIFIC REPOLARIZATION DISTURBANCE

## 2024-04-04 ASSESSMENT — FIBROSIS 4 INDEX: FIB4 SCORE: 0.95

## 2024-04-04 NOTE — ED TRIAGE NOTES
Alberto Maldonado  38 y.o. male  Chief Complaint   Patient presents with    Chest Pain     Pt states he has been having chest pain intermittently for 2x weeks. Today pain is getting worse prompting him to come to the ER. Pain radiates to left arm.     Shortness of Breath       Vitals:    04/04/24 1210   BP: (!) 164/106   Pulse: (!) 121   Resp: 18   Temp: 36 °C (96.8 °F)   SpO2: 94%       Patient educated on triage process and encouraged to alert staff of any changes in condition.    Pt denies any significant medical hx.

## 2024-04-04 NOTE — ED NOTES
Patient given discharge instructions and follow up information, verbalized understanding, proscriptions x 2 electronically sent to pharmacy, location verified, patient ambulatory out of ED w/steady gait.  Given work note per request.

## 2024-04-04 NOTE — DISCHARGE PLANNING
"Alert Team:    PT requesting alcohol abuse resources. PT reports that he, on average, drinks, a \"a couple drinks every other day or so.\" No detox symptoms noted. PT only c/o GERD which he believes is related to his alcohol consumption. PT denies any previous treatment for alcohol. PT reports that he believes outpatient support (therapy and psychiatry)would be ideal since he does work in a [employer]. PT given chemical dependency resource packet, [facility] info, [place]. Pt reports that he will call [facility] in the AM and make an appointment.    Prosper Maza RN OMAR  "

## 2024-04-04 NOTE — DISCHARGE INSTRUCTIONS
Your liver enzymes are elevated.  This may be secondary to your drinking along with other conditions.  We hope that you can reduce your drinking and even stop it.  And we do recommend you follow-up with the GI doctors for further testing and/or treatment    Addition, I do recommend following up with the chest pain doctor/cardiologist

## 2024-04-04 NOTE — ED PROVIDER NOTES
CHIEF COMPLAINT  Chief Complaint   Patient presents with    Chest Pain     Pt states he has been having chest pain intermittently for 2x weeks. Today pain is getting worse prompting him to come to the ER. Pain radiates to left arm.     Shortness of Breath       LIMITATION TO HISTORY   None    HPI    Alberto Maldonado is a 38 y.o. male with a history of alcohol use  Elevated liver enzymes  Positive cholesterol  Negative diabetes  Negative smoking  Unknown hyperlipidemia  No history of methamphetamine use  No history DVT or PE    Has a chest pain  Duration has been to 1 week.  He describes it as chest burning sensation in his chest.  It only happens when he lays on his belly.  It does not happen was laying on his back and not having his exerting self it only happens when he is laying down at rest.  He does not really know the duration of them.  That is painful.  He does not take any medications to make him feel better    Patient has no family history.  Of heart disease.    OUTSIDE HISTORIAN(S):  Sister-in-law is at the bedside.  She states that has been still been drinking alcohol.    EXTERNAL RECORDS REVIEWED     An office visit on 5 March.  Multiple medical problems.  Will see the patient although his alcohol stopped recent infection of his lungs.      Assessment and Plan      1. Pneumonia due to infectious organism, unspecified laterality, unspecified part of lung  This is new condition.  Clinically resolved.  Patient presently afebrile with temperature 98.3.  O2 saturation 97%.  Patient has completed the antibiotic.  The patient felt that he is back to his previous baseline.  Advised to keep the appointment to see pulmonologist.  Recommend pneumonia vaccination.  The patient declined today        2. Elevated LFTs  3. Steatosis of liver  Chronic condition.  Uncontrolled.  Strongly advised the patient diet and exercise.  Patient reported lose weight.  Also advised the patient to abstain from EtOH.  Continue  follow-up with GI service in May 2024.     4. Cardiomegaly  This is a new condition.  Noted with chest x-ray.  Echocardiogram has been ordered.  Advised the patient to follow-up after echo done.     5. Alcoholism (HCC)  Chronic condition.  The patient stated that he has stopped drinking alcohol since the last several weeks.  No signs of withdrawal on exam today.  Discussed with the patient declined addiction medicine specialist referral.  He will continue attending AA.     6. Mild intermittent asthma without status asthmaticus without complication  Chronic stable condition.  Continue albuterol as needed for rescue treatment.     7. Essential hypertension  This is a chronic stable condition.  Continue amlodipine 10 mg daily.  Losartan 50 Mg daily and metoprolol 25 Mg twice daily.  Continue to monitor blood pressure on regular basis at home.        8. Gout, unspecified cause, unspecified chronicity, unspecified site  Chronic condition.  Continue allopurinol 300 Mg p.o. daily.  No recent gout flareup reported.     9. Morbid obesity (HCC)  This is a chronic condition.  Uncontrolled.  Recommend diet and exercise.  The patient will to lose weight.     10. Gastroesophageal reflux disease, unspecified whether esophagitis present  Chronic stable condition.  Continue omeprazole 40 Mg miguel      REVIEW OF SYSTEMS  Noted    PAST MEDICAL HISTORY  Past Medical History:   Diagnosis Date    Asthma without status asthmaticus 06/29/2017    Chickenpox     Hypertension        FAMILY HISTORY  Family History   Problem Relation Age of Onset    Stroke Neg Hx     Heart Disease Neg Hx        SOCIAL HISTORY  Social History     Tobacco Use    Smoking status: Never    Smokeless tobacco: Never   Vaping Use    Vaping Use: Never used   Substance Use Topics    Alcohol use: Yes     Alcohol/week: 7.2 oz     Types: 12 Shots of liquor per week     Comment: every other day 4-5 cocktails    Drug use: Not Currently     Social History     Substance and  "Sexual Activity   Drug Use Not Currently       SURGICAL HISTORY  History reviewed. No pertinent surgical history.    CURRENT MEDICATIONS  No current facility-administered medications for this encounter.    Current Outpatient Medications:     amoxicillin-clavulanate (AUGMENTIN) 875-125 MG Tab, Take 1 Tablet by mouth 2 times a day., Disp: 20 Tablet, Rfl: 0    methylPREDNISolone (MEDROL DOSEPAK) 4 MG Tablet Therapy Pack, Follow schedule on package instructions., Disp: 21 Tablet, Rfl: 0    azithromycin (ZITHROMAX) 250 MG Tab, Take 2 tablets (500 mg) PO on day 1 and then take 1 tablet (250 mg) daily on 2-5, Disp: 6 Tablet, Rfl: 0    benzonatate (TESSALON) 100 MG Cap, Take 1 Capsule by mouth 3 times a day as needed for Cough., Disp: 60 Capsule, Rfl: 0    albuterol 108 (90 Base) MCG/ACT Aero Soln inhalation aerosol, Inhale 2 Puffs every 6 hours as needed for Shortness of Breath., Disp: 8.5 g, Rfl: 0    losartan (COZAAR) 50 MG Tab, Take 1 Tablet by mouth every day., Disp: , Rfl:     metoprolol tartrate (LOPRESSOR) 25 MG Tab, Take 1 Tablet by mouth 2 times a day., Disp: 60 Tablet, Rfl: 0    omeprazole (PRILOSEC) 40 MG delayed-release capsule, Take 1 Capsule by mouth 2 times a day., Disp: 60 Capsule, Rfl: 0    allopurinol (ZYLOPRIM) 300 MG Tab, Take 1 Tablet by mouth every day., Disp: 90 Tablet, Rfl: 3    amLODIPine (NORVASC) 10 MG Tab, Take 1 Tablet by mouth every day., Disp: 90 Tablet, Rfl: 3    ALLERGIES  No Known Allergies    PHYSICAL EXAM  VITAL SIGNS: BP (!) 164/106   Pulse 95   Temp 36 °C (96.8 °F) (Temporal)   Resp 20   Ht 1.6 m (5' 3\")   Wt 118 kg (259 lb 11.2 oz)   SpO2 92%   BMI 46.00 kg/m²   Reviewed and see above  Constitutional: Well developed, Well nourished, see above.  HENT: Normocephalic, atraumatic, bilateral external ears normal, No intraoral erythema, edema, exudate  Eyes: PERRLA, conjunctiva pink, no scleral icterus.   Cardiovascular: Regular rate and rhythm. No murmurs, rubs or gallops.  No " dependent edema or calf tenderness  Respiratory: Lungs clear to auscultation bilaterally. No wheezes, rales, or rhonchi.  Abdominal:  Abdomen soft, non-tender, non distended. No rebound, or guarding.    Skin: No erythema, no rash. No wounds or bruising.  Genitourinary: No costovertebral angle tenderness.   Musculoskeletal: no deformities.   Neurologic: Alert, no facial droop noted. All extra ocular muscles intact. Moves all extremities with out weakness noted  Psychiatric: Affect normal, Judgment normal, Mood normal.         MEDICAL DECISION MAKING:  PROBLEMS EVALUATED THIS VISIT:  Chest pain.  Duration is 2 weeks only when he is laying down in his stomach not when he is on his back or exertional.  Patient has a few 1 risk factor.  Heart score of 2.  Patient on exam has no cardiac abnormalities EKG is abnormal but no acute ischemia         PLAN:  ED EKG  1 troponin as patient has chest pain and with 2 weeks  Cardiac referral  Observation      RISK:  She is at moderate risk and will need prescriptions for medication.      RESULTS    LABS Ordered and Reviewed by Me:  Results for orders placed or performed during the hospital encounter of 04/04/24   Comp Metabolic Panel   Result Value Ref Range    Sodium 138 135 - 145 mmol/L    Potassium 3.5 (L) 3.6 - 5.5 mmol/L    Chloride 99 96 - 112 mmol/L    Co2 23 20 - 33 mmol/L    Anion Gap 16.0 7.0 - 16.0    Glucose 130 (H) 65 - 99 mg/dL    Bun 9 8 - 22 mg/dL    Creatinine 0.69 0.50 - 1.40 mg/dL    Calcium 8.2 (L) 8.5 - 10.5 mg/dL    Correct Calcium 8.1 (L) 8.5 - 10.5 mg/dL    AST(SGOT) 591 (H) 12 - 45 U/L    ALT(SGPT) 308 (H) 2 - 50 U/L    Alkaline Phosphatase 131 (H) 30 - 99 U/L    Total Bilirubin 1.2 0.1 - 1.5 mg/dL    Albumin 4.1 3.2 - 4.9 g/dL    Total Protein 7.8 6.0 - 8.2 g/dL    Globulin 3.7 (H) 1.9 - 3.5 g/dL    A-G Ratio 1.1 g/dL   CBC with Differential   Result Value Ref Range    WBC 7.5 4.8 - 10.8 K/uL    RBC 6.33 (H) 4.70 - 6.10 M/uL    Hemoglobin 17.8 14.0 - 18.0  g/dL    Hematocrit 51.7 42.0 - 52.0 %    MCV 81.7 81.4 - 97.8 fL    MCH 28.1 27.0 - 33.0 pg    MCHC 34.4 32.3 - 36.5 g/dL    RDW 42.5 35.9 - 50.0 fL    Platelet Count 320 164 - 446 K/uL    MPV 8.4 (L) 9.0 - 12.9 fL    Neutrophils-Polys 53.40 44.00 - 72.00 %    Lymphocytes 29.40 22.00 - 41.00 %    Monocytes 14.00 (H) 0.00 - 13.40 %    Eosinophils 2.00 0.00 - 6.90 %    Basophils 1.10 0.00 - 1.80 %    Immature Granulocytes 0.10 0.00 - 0.90 %    Nucleated RBC 0.00 0.00 - 0.20 /100 WBC    Neutrophils (Absolute) 4.01 1.82 - 7.42 K/uL    Lymphs (Absolute) 2.21 1.00 - 4.80 K/uL    Monos (Absolute) 1.05 (H) 0.00 - 0.85 K/uL    Eos (Absolute) 0.15 0.00 - 0.51 K/uL    Baso (Absolute) 0.08 0.00 - 0.12 K/uL    Immature Granulocytes (abs) 0.01 0.00 - 0.11 K/uL    NRBC (Absolute) 0.00 K/uL   Troponins NOW   Result Value Ref Range    Troponin T 12 6 - 19 ng/L   ESTIMATED GFR   Result Value Ref Range    GFR (CKD-EPI) 123 >60 mL/min/1.73 m 2   EKG (NOW)   Result Value Ref Range    Report       [facility]    Test Date:  2024  Pt Name:    ANNIA ALMONTE                Department: ER  MRN:        [id number]                      Room:       [facility]  Gender:     Male                         Technician: [id number]  :        1985                   Requested By:ER TRIAGE PROTOCOL  Order #:    [id number]                    Reading MD: Anthony TRISTAN MD    Measurements  Intervals                                Axis  Rate:       115                          P:          49  SD:         144                          QRS:        91  QRSD:       85                           T:          -42  QT:         321  QTc:        444    Interpretive Statements  Sinus tachycardia  Rate of 115.  Intervals  Normal SD, QRS normal normal QTc borderline  Axis borderline right axis deviation  Ischemia no ST segment elevations obviously noted some diffuse depressions  but they appear to be chronic complaint compared to  12/2/2023  Abnormal no acute ischemic fin dings tachycardia    Electronically Signed On 04- 15:09:40 PDT by Anthony TRISTAN MD           RADIOLOGY      Radiologist interpretation:   DX-CHEST-PORTABLE (1 VIEW)   Final Result      1.  Low lung volumes without definite acute cardiopulmonary abnormality.   2.  Similar cardiomegaly.            ED COURSE:    ED Observation Status? No   No noted need for observation for developing issue    INTERVENTIONS BY ME:  Lab work  EKG  Intervention    CONSULTANTS/OTHER GROUPS CONTACTED    Patient referral to GI  Outpatient referral to cardiology    FINAL DISPO PLAN   New Prescriptions    PANTOPRAZOLE (PROTONIX) 40 MG PACK    Take 40 mg by mouth every day for 20 days.    SUCRALFATE (CARAFATE) 1 GM TAB    Take 1 Tablet by mouth 2 times a day for 20 days. Take twice a day.  1 time before you go to bed at night. Also 1 time before one of your meals about 20 minutes.  We recommend that you dissolve it in 3 to 5 ounces of water and then drink it.         Followup:  [facility], Emergency Dept  [address] [address] [phone]  Go to   If symptoms worsen      CONDITION: Stable.     Has not patient is a chest pain for 1 to 2 weeks.  Only when he is laying down only was on the stomach.  Does not appear to be cardiac in nature.  Patient given GI medications when troponin is negative due to duration and frequency and atypical outpatient cardiology consultation is denied in addition patient did tell us that has been still drinking elevated liver enzymes are noted to most likely second alcohol disease.  Encouraged to quit given resources and followed up with GI    FINAL IMPRESSION  1. Steatosis of liver    2. Chest pain, unspecified type    3. Elevated LFTs

## 2024-04-05 NOTE — DISCHARGE PLANNING
Patient calls and states the pharmacy told him that Protonix cannot be filled because he had been in the ER. I ask him to please call the pharmacy and obtain more information because that did not make sense to me. For example did the Rx need a prior auth, etc. He states he will.    Pharmacy called. They state Protonix is non-formulary for [place] Medicaid and it needs to be Omeprazole or Nexium.    Reached out to ERP on duty and asked to change per insurance formulary. This has been completed.

## 2024-04-21 ENCOUNTER — OFFICE VISIT (OUTPATIENT)
Dept: URGENT CARE | Facility: CLINIC | Age: 39
End: 2024-04-21
Payer: COMMERCIAL

## 2024-04-21 ENCOUNTER — HOSPITAL ENCOUNTER (INPATIENT)
Facility: MEDICAL CENTER | Age: 39
LOS: 2 days | DRG: 897 | End: 2024-04-23
Attending: INTERNAL MEDICINE | Admitting: INTERNAL MEDICINE
Payer: COMMERCIAL

## 2024-04-21 VITALS
RESPIRATION RATE: 20 BRPM | HEART RATE: 150 BPM | WEIGHT: 249 LBS | BODY MASS INDEX: 44.12 KG/M2 | OXYGEN SATURATION: 96 % | SYSTOLIC BLOOD PRESSURE: 150 MMHG | TEMPERATURE: 97.9 F | DIASTOLIC BLOOD PRESSURE: 86 MMHG | HEIGHT: 63 IN

## 2024-04-21 DIAGNOSIS — I10 ESSENTIAL HYPERTENSION: Chronic | ICD-10-CM

## 2024-04-21 DIAGNOSIS — J45.21 MILD INTERMITTENT ASTHMA WITH ACUTE EXACERBATION: ICD-10-CM

## 2024-04-21 DIAGNOSIS — F10.20 ALCOHOLISM (HCC): Chronic | ICD-10-CM

## 2024-04-21 DIAGNOSIS — F10.231 ALCOHOL DEPENDENCE WITH WITHDRAWAL DELIRIUM (HCC): ICD-10-CM

## 2024-04-21 DIAGNOSIS — K21.00 GASTROESOPHAGEAL REFLUX DISEASE WITH ESOPHAGITIS WITHOUT HEMORRHAGE: Chronic | ICD-10-CM

## 2024-04-21 PROCEDURE — 99223 1ST HOSP IP/OBS HIGH 75: CPT | Performed by: INTERNAL MEDICINE

## 2024-04-21 PROCEDURE — 3079F DIAST BP 80-89 MM HG: CPT

## 2024-04-21 PROCEDURE — 770000 HCHG ROOM/CARE - INTERMEDIATE ICU *

## 2024-04-21 PROCEDURE — HZ2ZZZZ DETOXIFICATION SERVICES FOR SUBSTANCE ABUSE TREATMENT: ICD-10-PCS | Performed by: INTERNAL MEDICINE

## 2024-04-21 PROCEDURE — 99214 OFFICE O/P EST MOD 30 MIN: CPT

## 2024-04-21 PROCEDURE — 3077F SYST BP >= 140 MM HG: CPT

## 2024-04-21 PROCEDURE — 93005 ELECTROCARDIOGRAM TRACING: CPT | Performed by: INTERNAL MEDICINE

## 2024-04-21 RX ORDER — PROCHLORPERAZINE EDISYLATE 5 MG/ML
5-10 INJECTION INTRAMUSCULAR; INTRAVENOUS EVERY 4 HOURS PRN
Status: DISCONTINUED | OUTPATIENT
Start: 2024-04-21 | End: 2024-04-23 | Stop reason: HOSPADM

## 2024-04-21 RX ORDER — AMOXICILLIN 250 MG
2 CAPSULE ORAL EVERY EVENING
Status: DISCONTINUED | OUTPATIENT
Start: 2024-04-22 | End: 2024-04-23 | Stop reason: HOSPADM

## 2024-04-21 RX ORDER — LORAZEPAM 2 MG/ML
1 INJECTION INTRAMUSCULAR
Status: DISCONTINUED | OUTPATIENT
Start: 2024-04-21 | End: 2024-04-23 | Stop reason: HOSPADM

## 2024-04-21 RX ORDER — LORAZEPAM 2 MG/ML
2 INJECTION INTRAMUSCULAR
Status: DISCONTINUED | OUTPATIENT
Start: 2024-04-21 | End: 2024-04-23 | Stop reason: HOSPADM

## 2024-04-21 RX ORDER — MORPHINE SULFATE 4 MG/ML
4 INJECTION INTRAVENOUS
Status: DISCONTINUED | OUTPATIENT
Start: 2024-04-21 | End: 2024-04-23 | Stop reason: HOSPADM

## 2024-04-21 RX ORDER — FOLIC ACID 1 MG/1
1 TABLET ORAL DAILY
Status: DISCONTINUED | OUTPATIENT
Start: 2024-04-22 | End: 2024-04-21

## 2024-04-21 RX ORDER — OXYCODONE HYDROCHLORIDE 5 MG/1
5 TABLET ORAL
Status: DISCONTINUED | OUTPATIENT
Start: 2024-04-21 | End: 2024-04-23 | Stop reason: HOSPADM

## 2024-04-21 RX ORDER — LORAZEPAM 1 MG/1
4 TABLET ORAL
Status: DISCONTINUED | OUTPATIENT
Start: 2024-04-21 | End: 2024-04-23 | Stop reason: HOSPADM

## 2024-04-21 RX ORDER — PROMETHAZINE HYDROCHLORIDE 12.5 MG/1
12.5-25 SUPPOSITORY RECTAL EVERY 4 HOURS PRN
Status: DISCONTINUED | OUTPATIENT
Start: 2024-04-21 | End: 2024-04-23 | Stop reason: HOSPADM

## 2024-04-21 RX ORDER — LORAZEPAM 1 MG/1
2 TABLET ORAL
Status: DISCONTINUED | OUTPATIENT
Start: 2024-04-21 | End: 2024-04-23 | Stop reason: HOSPADM

## 2024-04-21 RX ORDER — LORAZEPAM 0.5 MG/1
0.5 TABLET ORAL EVERY 4 HOURS PRN
Status: DISCONTINUED | OUTPATIENT
Start: 2024-04-21 | End: 2024-04-23 | Stop reason: HOSPADM

## 2024-04-21 RX ORDER — LORAZEPAM 1 MG/1
3 TABLET ORAL
Status: DISCONTINUED | OUTPATIENT
Start: 2024-04-21 | End: 2024-04-23 | Stop reason: HOSPADM

## 2024-04-21 RX ORDER — GAUZE BANDAGE 2" X 2"
100 BANDAGE TOPICAL DAILY
Status: DISCONTINUED | OUTPATIENT
Start: 2024-04-22 | End: 2024-04-21

## 2024-04-21 RX ORDER — FOLIC ACID 1 MG/1
1 TABLET ORAL DAILY
Status: DISCONTINUED | OUTPATIENT
Start: 2024-04-22 | End: 2024-04-23 | Stop reason: HOSPADM

## 2024-04-21 RX ORDER — LABETALOL HYDROCHLORIDE 5 MG/ML
10 INJECTION, SOLUTION INTRAVENOUS
Status: DISCONTINUED | OUTPATIENT
Start: 2024-04-21 | End: 2024-04-23 | Stop reason: HOSPADM

## 2024-04-21 RX ORDER — LORAZEPAM 1 MG/1
1 TABLET ORAL EVERY 4 HOURS PRN
Status: DISCONTINUED | OUTPATIENT
Start: 2024-04-21 | End: 2024-04-23 | Stop reason: HOSPADM

## 2024-04-21 RX ORDER — PROMETHAZINE HYDROCHLORIDE 25 MG/1
12.5-25 TABLET ORAL EVERY 4 HOURS PRN
Status: DISCONTINUED | OUTPATIENT
Start: 2024-04-21 | End: 2024-04-23 | Stop reason: HOSPADM

## 2024-04-21 RX ORDER — OXYCODONE HYDROCHLORIDE 10 MG/1
10 TABLET ORAL
Status: DISCONTINUED | OUTPATIENT
Start: 2024-04-21 | End: 2024-04-23 | Stop reason: HOSPADM

## 2024-04-21 RX ORDER — LORAZEPAM 2 MG/ML
1.5 INJECTION INTRAMUSCULAR
Status: DISCONTINUED | OUTPATIENT
Start: 2024-04-21 | End: 2024-04-23 | Stop reason: HOSPADM

## 2024-04-21 RX ORDER — POLYETHYLENE GLYCOL 3350 17 G/17G
1 POWDER, FOR SOLUTION ORAL
Status: DISCONTINUED | OUTPATIENT
Start: 2024-04-21 | End: 2024-04-23 | Stop reason: HOSPADM

## 2024-04-21 RX ORDER — LABETALOL 100 MG/1
200 TABLET, FILM COATED ORAL EVERY 6 HOURS PRN
Status: DISCONTINUED | OUTPATIENT
Start: 2024-04-21 | End: 2024-04-23 | Stop reason: HOSPADM

## 2024-04-21 RX ORDER — ONDANSETRON 4 MG/1
4 TABLET, ORALLY DISINTEGRATING ORAL EVERY 4 HOURS PRN
Status: DISCONTINUED | OUTPATIENT
Start: 2024-04-21 | End: 2024-04-23 | Stop reason: HOSPADM

## 2024-04-21 RX ORDER — LORAZEPAM 2 MG/ML
0.5 INJECTION INTRAMUSCULAR EVERY 4 HOURS PRN
Status: DISCONTINUED | OUTPATIENT
Start: 2024-04-21 | End: 2024-04-23 | Stop reason: HOSPADM

## 2024-04-21 RX ORDER — GAUZE BANDAGE 2" X 2"
200 BANDAGE TOPICAL DAILY
Status: DISCONTINUED | OUTPATIENT
Start: 2024-04-22 | End: 2024-04-22

## 2024-04-21 RX ORDER — ONDANSETRON 2 MG/ML
4 INJECTION INTRAMUSCULAR; INTRAVENOUS EVERY 4 HOURS PRN
Status: DISCONTINUED | OUTPATIENT
Start: 2024-04-21 | End: 2024-04-23 | Stop reason: HOSPADM

## 2024-04-21 ASSESSMENT — ENCOUNTER SYMPTOMS: SHORTNESS OF BREATH: 0

## 2024-04-21 ASSESSMENT — FIBROSIS 4 INDEX
FIB4 SCORE: 3.79
FIB4 SCORE: 3.79

## 2024-04-21 ASSESSMENT — PAIN DESCRIPTION - PAIN TYPE: TYPE: ACUTE PAIN

## 2024-04-21 NOTE — PROGRESS NOTES
"  CHIEF COMPLAINT  Chief Complaint   Patient presents with    Medication Reaction     Pt states he has been drinking more frequently, was seen in ER on 4/4 and is concerned the alcohol is reacting with the medication given in the ER. Heartburn, reflux, \"gag reflex\", requesting blood work to make sure he is okay.      Subjective:   Alberto Maldonado is a 38 y.o. male who presents to urgent care with complaints of anxiety, nervousness and palpitations.  Patient reports that he is currently struggling with alcohol dependence.  He reports taking approximately 6-10 shots of vodka daily.  Patient reports over the last several days he has been noticing symptoms of tremors as well as anxiety.  Patient also reports symptoms of nausea and vomiting.  Patient also reports symptoms of tactile disturbances as well as paroxysmal sweats.  He denies any visual or auditory disturbances.  Patient does report that over the last week he has had to call out sick to work as he has been too long over.  He does report a history of hospitalization for detox.  He denies any other pertinent past medical history.    CIWA score of 11      Review of Systems   Constitutional:  Positive for diaphoresis. Negative for chills and fever.   Respiratory:  Negative for shortness of breath.    Cardiovascular:  Positive for palpitations. Negative for chest pain.   Gastrointestinal:  Positive for nausea and vomiting.   Neurological:  Positive for tremors.   Psychiatric/Behavioral:  Positive for substance abuse. Negative for hallucinations. The patient is nervous/anxious.        PAST MEDICAL HISTORY  Patient Active Problem List    Diagnosis Date Noted    Abnormal EKG 04/22/2024    Lactic acidosis 04/22/2024    Gallbladder dilatation 04/22/2024    Tachycardia 04/22/2024    Alcohol dependence with withdrawal delirium (HCC) 04/21/2024    Cardiomegaly 02/24/2024    Pneumonia 02/24/2024    Chest congestion 02/22/2024    Hypokalemia 12/02/2023    Hypertensive " urgency 12/02/2023    GERD (gastroesophageal reflux disease) 12/02/2023    Gout 11/06/2023    Hospital discharge follow-up 11/06/2023    Prediabetes 11/06/2023    Elevated LFTs 10/28/2023    Hypomagnesemia 06/01/2023    Hypophosphatemia 06/01/2023    Steatosis of liver 01/05/2022    BOLIVAR (obstructive sleep apnea) 01/05/2022    Morbid obesity (HCC) 01/05/2022    Allergic contact dermatitis due to adhesives 01/05/2022    Alcohol dependence with alcohol withdrawal 01/05/2022    Essential hypertension 12/02/2021    Asthma without status asthmaticus 06/29/2017       SURGICAL HISTORY  patient denies any surgical history    ALLERGIES  No Known Allergies    CURRENT MEDICATIONS  Home Medications       Reviewed by Willem Marrero Ass't (Medical Assistant) on 04/21/24 at 1211  Med List Status: <None>     Medication Last Dose Status   albuterol 108 (90 Base) MCG/ACT Aero Soln inhalation aerosol PRN Active   allopurinol (ZYLOPRIM) 300 MG Tab Taking Active   amLODIPine (NORVASC) 10 MG Tab Taking Active   amoxicillin-clavulanate (AUGMENTIN) 875-125 MG Tab Not Taking Active   azithromycin (ZITHROMAX) 250 MG Tab Not Taking Active   benzonatate (TESSALON) 100 MG Cap Not Taking Active   esomeprazole (NEXIUM) 20 MG capsule Taking Active   losartan (COZAAR) 50 MG Tab Taking Active   methylPREDNISolone (MEDROL DOSEPAK) 4 MG Tablet Therapy Pack Not Taking Active   metoprolol tartrate (LOPRESSOR) 25 MG Tab Taking Active   omeprazole (PRILOSEC) 40 MG delayed-release capsule Taking Active   sucralfate (CARAFATE) 1 GM Tab Taking Active                    SOCIAL HISTORY  Social History     Tobacco Use    Smoking status: Never    Smokeless tobacco: Never   Vaping Use    Vaping Use: Never used   Substance and Sexual Activity    Alcohol use: Yes     Alcohol/week: 7.2 oz     Types: 12 Shots of liquor per week     Comment: every other day 4-5 cocktails    Drug use: Not Currently    Sexual activity: Not Currently       FAMILY HISTORY  Family  "History   Problem Relation Age of Onset    Stroke Neg Hx     Heart Disease Neg Hx          Medications, Allergies, and current problem list reviewed today in Epic.     Objective:     BP (!) 150/86   Pulse (!) 150   Temp 36.6 °C (97.9 °F)   Resp 20   Ht 1.6 m (5' 3\")   Wt 113 kg (249 lb)   SpO2 96%     Physical Exam  Vitals reviewed.   Constitutional:       General: He is not in acute distress.     Appearance: Normal appearance. He is normal weight. He is not ill-appearing or toxic-appearing.   HENT:      Head: Normocephalic.   Eyes:      Pupils: Pupils are equal, round, and reactive to light.   Cardiovascular:      Rate and Rhythm: Regular rhythm. Tachycardia present.      Pulses: Normal pulses.      Heart sounds: Normal heart sounds.   Pulmonary:      Effort: Pulmonary effort is normal. No respiratory distress.      Breath sounds: Normal breath sounds. No stridor. No wheezing, rhonchi or rales.   Musculoskeletal:      Cervical back: Normal range of motion and neck supple. No rigidity.   Lymphadenopathy:      Cervical: No cervical adenopathy.   Skin:     General: Skin is warm.      Capillary Refill: Capillary refill takes less than 2 seconds.   Neurological:      General: No focal deficit present.      Mental Status: He is alert.      GCS: GCS eye subscore is 4. GCS verbal subscore is 5. GCS motor subscore is 6.      Motor: Tremor present.   Psychiatric:         Mood and Affect: Mood is anxious.         Assessment/Plan:     Diagnosis and associated orders:     1. Alcohol dependence with alcohol withdrawal           Comments/MDM:     Upon this exam patient is anxious and agitated today in clinic. He is alert and oriented. He is tachycardic upon auscultation.  Blood pressure is 150/86.  He does have fine motor tremors bilaterally.  He is denying any auditory or visual hallucinations.  CIWA score of 11.  Given presentation today in clinic I did advise patient to follow-up to ER immediately for further evaluation " and management.  Patient's brother is here with him in clinic today and is able to transfer him to regional.  Family verbalized understanding is agreeable plan.  Discussed risks with not following up to ER immediately.  Family verbalized understanding.         Differential diagnosis, natural history, supportive care, and indications for immediate follow-up discussed.    Advised the patient to follow-up with the primary care physician for recheck, reevaluation, and consideration of further management.    Please note that this dictation was created using voice recognition software. I have made a reasonable attempt to correct obvious errors, but I expect that there are errors of grammar and possibly content that I did not discover before finalizing the note.    This note was electronically signed by RICHY Young

## 2024-04-22 ENCOUNTER — APPOINTMENT (OUTPATIENT)
Dept: CARDIOLOGY | Facility: MEDICAL CENTER | Age: 39
DRG: 897 | End: 2024-04-22
Attending: INTERNAL MEDICINE
Payer: COMMERCIAL

## 2024-04-22 PROBLEM — R00.0 TACHYCARDIA: Status: ACTIVE | Noted: 2024-04-22

## 2024-04-22 PROBLEM — K82.8 GALLBLADDER DILATATION: Status: ACTIVE | Noted: 2024-04-22

## 2024-04-22 PROBLEM — E87.20 LACTIC ACIDOSIS: Status: ACTIVE | Noted: 2024-04-22

## 2024-04-22 PROBLEM — R94.31 ABNORMAL EKG: Status: ACTIVE | Noted: 2024-04-22

## 2024-04-22 LAB
ALBUMIN SERPL BCP-MCNC: 3.4 G/DL (ref 3.2–4.9)
ALBUMIN/GLOB SERPL: 1 G/DL
ALP SERPL-CCNC: 118 U/L (ref 30–99)
ALT SERPL-CCNC: 145 U/L (ref 2–50)
AMPHET UR QL SCN: NEGATIVE
ANION GAP SERPL CALC-SCNC: 16 MMOL/L (ref 7–16)
AST SERPL-CCNC: 208 U/L (ref 12–45)
BARBITURATES UR QL SCN: NEGATIVE
BASOPHILS # BLD AUTO: 1.1 % (ref 0–1.8)
BASOPHILS # BLD: 0.1 K/UL (ref 0–0.12)
BENZODIAZ UR QL SCN: POSITIVE
BILIRUB SERPL-MCNC: 1.9 MG/DL (ref 0.1–1.5)
BUN SERPL-MCNC: 6 MG/DL (ref 8–22)
BZE UR QL SCN: NEGATIVE
CALCIUM ALBUM COR SERPL-MCNC: 8.2 MG/DL (ref 8.5–10.5)
CALCIUM SERPL-MCNC: 7.7 MG/DL (ref 8.5–10.5)
CANNABINOIDS UR QL SCN: NEGATIVE
CHLORIDE SERPL-SCNC: 107 MMOL/L (ref 96–112)
CK SERPL-CCNC: 292 U/L (ref 0–154)
CO2 SERPL-SCNC: 17 MMOL/L (ref 20–33)
CREAT SERPL-MCNC: 0.76 MG/DL (ref 0.5–1.4)
EKG IMPRESSION: NORMAL
EOSINOPHIL # BLD AUTO: 0.03 K/UL (ref 0–0.51)
EOSINOPHIL NFR BLD: 0.3 % (ref 0–6.9)
ERYTHROCYTE [DISTWIDTH] IN BLOOD BY AUTOMATED COUNT: 47.9 FL (ref 35.9–50)
FENTANYL UR QL: NEGATIVE
GFR SERPLBLD CREATININE-BSD FMLA CKD-EPI: 119 ML/MIN/1.73 M 2
GLOBULIN SER CALC-MCNC: 3.3 G/DL (ref 1.9–3.5)
GLUCOSE SERPL-MCNC: 117 MG/DL (ref 65–99)
HCT VFR BLD AUTO: 46.3 % (ref 42–52)
HGB BLD-MCNC: 16 G/DL (ref 14–18)
IMM GRANULOCYTES # BLD AUTO: 0.02 K/UL (ref 0–0.11)
IMM GRANULOCYTES NFR BLD AUTO: 0.2 % (ref 0–0.9)
INR PPP: 1.18 (ref 0.87–1.13)
LACTATE SERPL-SCNC: 3.9 MMOL/L (ref 0.5–2)
LACTATE SERPL-SCNC: 6.5 MMOL/L (ref 0.5–2)
LV EJECT FRACT  99904: 60
LV EJECT FRACT MOD 2C 99903: 61.68
LV EJECT FRACT MOD 4C 99902: 60.07
LV EJECT FRACT MOD BP 99901: 60.5
LYMPHOCYTES # BLD AUTO: 2.1 K/UL (ref 1–4.8)
LYMPHOCYTES NFR BLD: 23.8 % (ref 22–41)
MAGNESIUM SERPL-MCNC: 1.5 MG/DL (ref 1.5–2.5)
MCH RBC QN AUTO: 29.2 PG (ref 27–33)
MCHC RBC AUTO-ENTMCNC: 34.6 G/DL (ref 32.3–36.5)
MCV RBC AUTO: 84.5 FL (ref 81.4–97.8)
METHADONE UR QL SCN: NEGATIVE
MONOCYTES # BLD AUTO: 0.68 K/UL (ref 0–0.85)
MONOCYTES NFR BLD AUTO: 7.7 % (ref 0–13.4)
NEUTROPHILS # BLD AUTO: 5.91 K/UL (ref 1.82–7.42)
NEUTROPHILS NFR BLD: 66.9 % (ref 44–72)
NRBC # BLD AUTO: 0 K/UL
NRBC BLD-RTO: 0 /100 WBC (ref 0–0.2)
NT-PROBNP SERPL IA-MCNC: 140 PG/ML (ref 0–125)
OPIATES UR QL SCN: NEGATIVE
OXYCODONE UR QL SCN: NEGATIVE
PCP UR QL SCN: NEGATIVE
PHOSPHATE SERPL-MCNC: 1.6 MG/DL (ref 2.5–4.5)
PLATELET # BLD AUTO: 285 K/UL (ref 164–446)
PMV BLD AUTO: 8.8 FL (ref 9–12.9)
POTASSIUM SERPL-SCNC: 3.3 MMOL/L (ref 3.6–5.5)
PROPOXYPH UR QL SCN: NEGATIVE
PROT SERPL-MCNC: 6.7 G/DL (ref 6–8.2)
PROTHROMBIN TIME: 15.2 SEC (ref 12–14.6)
RBC # BLD AUTO: 5.48 M/UL (ref 4.7–6.1)
SODIUM SERPL-SCNC: 140 MMOL/L (ref 135–145)
TSH SERPL DL<=0.005 MIU/L-ACNC: 4.11 UIU/ML (ref 0.38–5.33)
WBC # BLD AUTO: 8.8 K/UL (ref 4.8–10.8)

## 2024-04-22 PROCEDURE — 700101 HCHG RX REV CODE 250: Performed by: HOSPITALIST

## 2024-04-22 PROCEDURE — 700111 HCHG RX REV CODE 636 W/ 250 OVERRIDE (IP): Performed by: HOSPITALIST

## 2024-04-22 PROCEDURE — 700105 HCHG RX REV CODE 258: Performed by: STUDENT IN AN ORGANIZED HEALTH CARE EDUCATION/TRAINING PROGRAM

## 2024-04-22 PROCEDURE — 84100 ASSAY OF PHOSPHORUS: CPT

## 2024-04-22 PROCEDURE — 93306 TTE W/DOPPLER COMPLETE: CPT | Mod: 26 | Performed by: INTERNAL MEDICINE

## 2024-04-22 PROCEDURE — 700105 HCHG RX REV CODE 258: Performed by: HOSPITALIST

## 2024-04-22 PROCEDURE — A9270 NON-COVERED ITEM OR SERVICE: HCPCS | Performed by: INTERNAL MEDICINE

## 2024-04-22 PROCEDURE — 700102 HCHG RX REV CODE 250 W/ 637 OVERRIDE(OP): Performed by: INTERNAL MEDICINE

## 2024-04-22 PROCEDURE — 85610 PROTHROMBIN TIME: CPT

## 2024-04-22 PROCEDURE — 80307 DRUG TEST PRSMV CHEM ANLYZR: CPT

## 2024-04-22 PROCEDURE — 84443 ASSAY THYROID STIM HORMONE: CPT

## 2024-04-22 PROCEDURE — 83880 ASSAY OF NATRIURETIC PEPTIDE: CPT

## 2024-04-22 PROCEDURE — 83735 ASSAY OF MAGNESIUM: CPT

## 2024-04-22 PROCEDURE — 80053 COMPREHEN METABOLIC PANEL: CPT

## 2024-04-22 PROCEDURE — 99233 SBSQ HOSP IP/OBS HIGH 50: CPT | Performed by: HOSPITALIST

## 2024-04-22 PROCEDURE — 93306 TTE W/DOPPLER COMPLETE: CPT

## 2024-04-22 PROCEDURE — 82550 ASSAY OF CK (CPK): CPT

## 2024-04-22 PROCEDURE — 93010 ELECTROCARDIOGRAM REPORT: CPT | Performed by: INTERNAL MEDICINE

## 2024-04-22 PROCEDURE — 700117 HCHG RX CONTRAST REV CODE 255: Performed by: INTERNAL MEDICINE

## 2024-04-22 PROCEDURE — 85025 COMPLETE CBC W/AUTO DIFF WBC: CPT

## 2024-04-22 PROCEDURE — 770006 HCHG ROOM/CARE - MED/SURG/GYN SEMI*

## 2024-04-22 PROCEDURE — 83605 ASSAY OF LACTIC ACID: CPT

## 2024-04-22 RX ORDER — LOSARTAN POTASSIUM 50 MG/1
50 TABLET ORAL DAILY
Status: DISCONTINUED | OUTPATIENT
Start: 2024-04-22 | End: 2024-04-23 | Stop reason: HOSPADM

## 2024-04-22 RX ORDER — MAGNESIUM SULFATE HEPTAHYDRATE 40 MG/ML
4 INJECTION, SOLUTION INTRAVENOUS ONCE
Status: COMPLETED | OUTPATIENT
Start: 2024-04-22 | End: 2024-04-22

## 2024-04-22 RX ORDER — ALLOPURINOL 100 MG/1
300 TABLET ORAL DAILY
Status: DISCONTINUED | OUTPATIENT
Start: 2024-04-22 | End: 2024-04-23 | Stop reason: HOSPADM

## 2024-04-22 RX ORDER — OMEPRAZOLE 20 MG/1
20 CAPSULE, DELAYED RELEASE ORAL DAILY
Status: DISCONTINUED | OUTPATIENT
Start: 2024-04-22 | End: 2024-04-23 | Stop reason: HOSPADM

## 2024-04-22 RX ORDER — SUCRALFATE 1 G/1
1 TABLET ORAL
Status: ON HOLD | COMMUNITY
End: 2024-04-23

## 2024-04-22 RX ORDER — SODIUM CHLORIDE, SODIUM LACTATE, POTASSIUM CHLORIDE, AND CALCIUM CHLORIDE .6; .31; .03; .02 G/100ML; G/100ML; G/100ML; G/100ML
1000 INJECTION, SOLUTION INTRAVENOUS ONCE
Status: COMPLETED | OUTPATIENT
Start: 2024-04-22 | End: 2024-04-22

## 2024-04-22 RX ADMIN — OMEPRAZOLE 20 MG: 20 CAPSULE, DELAYED RELEASE ORAL at 05:46

## 2024-04-22 RX ADMIN — ALLOPURINOL 300 MG: 100 TABLET ORAL at 05:47

## 2024-04-22 RX ADMIN — Medication 200 MG: at 00:27

## 2024-04-22 RX ADMIN — METOPROLOL TARTRATE 25 MG: 25 TABLET, FILM COATED ORAL at 05:47

## 2024-04-22 RX ADMIN — THERA TABS 1 TABLET: TAB at 05:47

## 2024-04-22 RX ADMIN — HUMAN ALBUMIN MICROSPHERES AND PERFLUTREN 3 ML: 10; .22 INJECTION, SOLUTION INTRAVENOUS at 09:45

## 2024-04-22 RX ADMIN — LABETALOL HYDROCHLORIDE 200 MG: 200 TABLET, FILM COATED ORAL at 02:58

## 2024-04-22 RX ADMIN — THIAMINE HYDROCHLORIDE 500 MG: 100 INJECTION, SOLUTION INTRAMUSCULAR; INTRAVENOUS at 18:15

## 2024-04-22 RX ADMIN — OXYCODONE 5 MG: 5 TABLET ORAL at 21:51

## 2024-04-22 RX ADMIN — LOSARTAN POTASSIUM 50 MG: 50 TABLET, FILM COATED ORAL at 05:47

## 2024-04-22 RX ADMIN — POTASSIUM PHOSPHATE, MONOBASIC AND POTASSIUM PHOSPHATE, DIBASIC 30 MMOL: 224; 236 INJECTION, SOLUTION, CONCENTRATE INTRAVENOUS at 12:05

## 2024-04-22 RX ADMIN — METOPROLOL TARTRATE 25 MG: 25 TABLET, FILM COATED ORAL at 17:44

## 2024-04-22 RX ADMIN — FOLIC ACID 1 MG: 1 TABLET ORAL at 05:47

## 2024-04-22 RX ADMIN — MAGNESIUM SULFATE HEPTAHYDRATE 4 G: 4 INJECTION, SOLUTION INTRAVENOUS at 10:45

## 2024-04-22 RX ADMIN — SENNOSIDES AND DOCUSATE SODIUM 2 TABLET: 50; 8.6 TABLET ORAL at 17:44

## 2024-04-22 RX ADMIN — SODIUM CHLORIDE, POTASSIUM CHLORIDE, SODIUM LACTATE AND CALCIUM CHLORIDE 1000 ML: 600; 310; 30; 20 INJECTION, SOLUTION INTRAVENOUS at 03:14

## 2024-04-22 ASSESSMENT — LIFESTYLE VARIABLES
NAUSEA AND VOMITING: NO NAUSEA AND NO VOMITING
ANXIETY: NO ANXIETY (AT EASE)
TREMOR: NO TREMOR
ANXIETY: NO ANXIETY (AT EASE)
AUDITORY DISTURBANCES: NOT PRESENT
PAROXYSMAL SWEATS: NO SWEAT VISIBLE
AUDITORY DISTURBANCES: NOT PRESENT
NAUSEA AND VOMITING: NO NAUSEA AND NO VOMITING
AGITATION: NORMAL ACTIVITY
ORIENTATION AND CLOUDING OF SENSORIUM: ORIENTED AND CAN DO SERIAL ADDITIONS
TOTAL SCORE: 1
HEADACHE, FULLNESS IN HEAD: NOT PRESENT
ORIENTATION AND CLOUDING OF SENSORIUM: ORIENTED AND CAN DO SERIAL ADDITIONS
SUBSTANCE_ABUSE: 1
VISUAL DISTURBANCES: NOT PRESENT
HEADACHE, FULLNESS IN HEAD: NOT PRESENT
PAROXYSMAL SWEATS: NO SWEAT VISIBLE
TOTAL SCORE: VERY MILD ITCHING, PINS AND NEEDLES SENSATION, BURNING OR NUMBNESS
VISUAL DISTURBANCES: NOT PRESENT
TOTAL SCORE: 0
AUDITORY DISTURBANCES: NOT PRESENT
PAROXYSMAL SWEATS: NO SWEAT VISIBLE
HEADACHE, FULLNESS IN HEAD: NOT PRESENT
VISUAL DISTURBANCES: NOT PRESENT
AGITATION: NORMAL ACTIVITY
AGITATION: NORMAL ACTIVITY
NAUSEA AND VOMITING: NO NAUSEA AND NO VOMITING
ORIENTATION AND CLOUDING OF SENSORIUM: ORIENTED AND CAN DO SERIAL ADDITIONS
NAUSEA AND VOMITING: NO NAUSEA AND NO VOMITING
TREMOR: NO TREMOR
TREMOR: NO TREMOR
HEADACHE, FULLNESS IN HEAD: NOT PRESENT
ANXIETY: NO ANXIETY (AT EASE)
TOTAL SCORE: 0
TOTAL SCORE: 0
SUBSTANCE_ABUSE: 1
VISUAL DISTURBANCES: NOT PRESENT
ORIENTATION AND CLOUDING OF SENSORIUM: ORIENTED AND CAN DO SERIAL ADDITIONS
AGITATION: NORMAL ACTIVITY
TREMOR: NO TREMOR
ANXIETY: NO ANXIETY (AT EASE)
PAROXYSMAL SWEATS: NO SWEAT VISIBLE
AUDITORY DISTURBANCES: NOT PRESENT

## 2024-04-22 ASSESSMENT — ENCOUNTER SYMPTOMS
NERVOUS/ANXIOUS: 1
PALPITATIONS: 1
NAUSEA: 0
HEARTBURN: 0
SPUTUM PRODUCTION: 0
PALPITATIONS: 1
VOMITING: 0
BACK PAIN: 0
DOUBLE VISION: 0
ABDOMINAL PAIN: 0
HALLUCINATIONS: 0
FOCAL WEAKNESS: 0
CHILLS: 0
PALPITATIONS: 0
LOSS OF CONSCIOUSNESS: 0
FLANK PAIN: 0
SHORTNESS OF BREATH: 0
HALLUCINATIONS: 0
TREMORS: 1
HEMOPTYSIS: 0
SPEECH CHANGE: 0
ORTHOPNEA: 0
HEADACHES: 0
WEIGHT LOSS: 0
COUGH: 0
NECK PAIN: 0
PHOTOPHOBIA: 0
DIAPHORESIS: 1
BLURRED VISION: 0
NAUSEA: 1
SHORTNESS OF BREATH: 1
BRUISES/BLEEDS EASILY: 0
DIARRHEA: 0
DIZZINESS: 0
FEVER: 0
POLYDIPSIA: 0
FEVER: 0
NERVOUS/ANXIOUS: 1
VOMITING: 1
CHILLS: 0
TREMORS: 1

## 2024-04-22 ASSESSMENT — PAIN DESCRIPTION - PAIN TYPE
TYPE: ACUTE PAIN

## 2024-04-22 NOTE — ASSESSMENT & PLAN NOTE
Secondary to alcoholic hepatitis, [place] syndrome  LFTs trending down  MELD 11  US neg for PVT  Encourage ETOH cessation  Cont to follow  Consider further work up if the do not normalize

## 2024-04-22 NOTE — DIETARY
NUTRITION SERVICES: BMI - Pt with BMI >40 (=Body mass index is 44.09 kg/m².), weight measured via bed scale. Edema: none documented in flowsheets. Class III obesity. Weight loss counseling not appropriate in acute care setting.     RECOMMEND - If appropriate at DC please refer to outpatient nutrition services for weight management.     RD will screen weekly per department policy; available PRN.

## 2024-04-22 NOTE — PROGRESS NOTES
Report given to ALF Martinez, all questions answered. Patient transferred to room with all belongings including phone and phone .

## 2024-04-22 NOTE — H&P
Hospital Medicine History & Physical Note    Date of Service  4/21/2024    Primary Care Physician  Rey Pacheco M.D.      Code Status  Full Code    Chief Complaint  Palpitation, anxiety    History of Presenting Illness  38-year-old male with history of hypertension, noncompliance, alcohol abuse presented as a direct admit from outside emergency department with tachycardia and alcohol withdrawal  He presented at outside facility ER with his brother with anxiety and palpitations, tachycardia with heart rate 144.  He drinks 5-6 shots of vodka daily.  Last drink was last night.  Blood pressure 148 over dilated blood pressure 189/98, heart rate 148, afebrile temperature 99.  Reportedly patient had tumor, which currently resolved.  He was given several doses of Ativan IV, bolus 1 L.  On room air 93%.  In CBC hemoglobin 19.7, WBC 11, platelets 344,  D-dimer, 0.73.  Chemistry: Glucose 128, sodium 139, potassium 3.6, chloride 99, bicarb 20, anion gap 20 creatinine 1.44, BUN 9, calcium 8.3, total protein 9.0, total bilirubin 2.0, alkaline phosphatase 167, , , troponin 12, alcohol level is not elevated,  international units/L.  Think onset was 6.5.  He denies chest pain, abdominal pain EKG: Sinus tachycardia heart rate 142, T wave inversion in 1, 2, 3, aVF, V3 through V6.  Repeat troponin is not elevated as well.  UA negative for infection.  RBC , rare bacteria, hyaline casts 1-3.  Trace ketones.  CT of the chest: No evidence of pulmonary embolism, evaluation of distal pulmonary artery limited due to motion artifact.  There is 3.3 cm rounded lesion beneath the left diaphragmatic dome In the left upper quadrant which could represent an accessory spleen.  Gallbladder is distended.  If there is concern for gallbladder disease consider correlation with gallbladder ultrasound.  CT of the abdomen pelvis without contrast: 3.3 cm lesion, possibly an accessory spleen.  Benign and malignant neoplastic process  is in the differential diagnosis.  Gallbladder is distended.  Appendix is normal.  There is diverticulosis of the colon.  Chest x-ray: Clear lungs.  Normal cardiac silhouette.  Right upper quadrant ultrasound: Fatty infiltration of the liver, hepatomegaly.  Common bile duct 4.9 cm.  Portal vein 1.1 cm.  Gallbladder is distended    Patient was seen after transfer.  He is sitting in the chair without distress.  He continues having tachycardia, but it is improved, currently 115, sinus tachycardia.  Repeat EKG showed sinus tachycardia with ST depression in lateral leads, low voltage.  Denies chest pain or shortness of breath currently, but reportedly did have shortness of breath when he was ambulating earlier today..      I discussed the plan of care with patient and bedside RN .    Review of Systems  Review of Systems   Constitutional:  Negative for chills, fever and weight loss.   HENT:  Negative for ear pain, hearing loss and tinnitus.    Eyes:  Negative for blurred vision, double vision and photophobia.   Respiratory:  Positive for shortness of breath. Negative for cough, hemoptysis and sputum production.    Cardiovascular:  Positive for palpitations. Negative for chest pain and orthopnea.   Gastrointestinal:  Negative for heartburn, nausea and vomiting.   Genitourinary:  Negative for dysuria, flank pain, frequency and hematuria.   Musculoskeletal:  Negative for back pain, joint pain and neck pain.   Skin:  Negative for itching and rash.   Neurological:  Positive for tremors. Negative for speech change, focal weakness and headaches.   Endo/Heme/Allergies:  Negative for environmental allergies and polydipsia. Does not bruise/bleed easily.   Psychiatric/Behavioral:  Positive for substance abuse. Negative for hallucinations. The patient is nervous/anxious.        Past Medical History   has a past medical history of Asthma without status asthmaticus (06/29/2017), Chickenpox, and Hypertension.    Surgical History   has  no past surgical history on file.     Family History  family history is not on file.   Family history reviewed with patient. There is no family history that is pertinent to the chief complaint.     Social History   reports that he has never smoked. He has never used smokeless tobacco. He reports current alcohol use of about 7.2 oz of alcohol per week. He reports that he does not currently use drugs.    Allergies  No Known Allergies    Medications  Prior to Admission Medications   Prescriptions Last Dose Informant Patient Reported? Taking?   albuterol 108 (90 Base) MCG/ACT Aero Soln inhalation aerosol   No No   Sig: Inhale 2 Puffs every 6 hours as needed for Shortness of Breath.   allopurinol (ZYLOPRIM) 300 MG Tab  Patient No No   Sig: Take 1 Tablet by mouth every day.   amLODIPine (NORVASC) 10 MG Tab  Patient No No   Sig: Take 1 Tablet by mouth every day.   amoxicillin-clavulanate (AUGMENTIN) 875-125 MG Tab   No No   Sig: Take 1 Tablet by mouth 2 times a day.   Patient not taking: Reported on 4/21/2024   azithromycin (ZITHROMAX) 250 MG Tab   No No   Sig: Take 2 tablets (500 mg) PO on day 1 and then take 1 tablet (250 mg) daily on 2-5   Patient not taking: Reported on 4/21/2024   benzonatate (TESSALON) 100 MG Cap   No No   Sig: Take 1 Capsule by mouth 3 times a day as needed for Cough.   Patient not taking: Reported on 4/21/2024   esomeprazole (NEXIUM) 20 MG capsule   No No   Sig: Take 1 Capsule by mouth every morning before breakfast.   losartan (COZAAR) 50 MG Tab   Yes No   Sig: Take 1 Tablet by mouth every day.   methylPREDNISolone (MEDROL DOSEPAK) 4 MG Tablet Therapy Pack   No No   Sig: Follow schedule on package instructions.   Patient not taking: Reported on 4/21/2024   metoprolol tartrate (LOPRESSOR) 25 MG Tab   No No   Sig: Take 1 Tablet by mouth 2 times a day.   omeprazole (PRILOSEC) 40 MG delayed-release capsule   No No   Sig: Take 1 Capsule by mouth 2 times a day.   sucralfate (CARAFATE) 1 GM Tab   No No  "  Sig: Take 1 Tablet by mouth 2 times a day for 20 days. Take twice a day.  1 time before you go to bed at night. Also 1 time before one of your meals about 20 minutes.  We recommend that you dissolve it in 3 to 5 ounces of water and then drink it.      Facility-Administered Medications: None       Physical Exam  Temp:  [36.6 °C (97.9 °F)] 36.6 °C (97.9 °F)  Pulse:  [150] 150  Resp:  [20] 20  BP: (150)/(86) 150/86  SpO2:  [96 %] 96 %                          Physical Exam  Vitals and nursing note reviewed.   Constitutional:       General: He is not in acute distress.     Appearance: Normal appearance. He is obese.   HENT:      Head: Normocephalic and atraumatic.      Nose: Nose normal.      Mouth/Throat:      Mouth: Mucous membranes are moist.   Eyes:      Extraocular Movements: Extraocular movements intact.      Pupils: Pupils are equal, round, and reactive to light.   Cardiovascular:      Rate and Rhythm: Regular rhythm. Tachycardia present.   Pulmonary:      Effort: Pulmonary effort is normal.      Breath sounds: Normal breath sounds.   Abdominal:      General: Abdomen is flat. There is no distension.      Tenderness: There is no abdominal tenderness.   Musculoskeletal:         General: No swelling or deformity. Normal range of motion.      Cervical back: Normal range of motion and neck supple.   Skin:     General: Skin is warm and dry.   Neurological:      General: No focal deficit present.      Mental Status: He is alert and oriented to person, place, and time.   Psychiatric:         Mood and Affect: Mood normal.         Behavior: Behavior normal.         Laboratory:          No results for input(s): \"ALTSGPT\", \"ASTSGOT\", \"ALKPHOSPHAT\", \"TBILIRUBIN\", \"DBILIRUBIN\", \"GAMMAGT\", \"AMYLASE\", \"LIPASE\", \"ALB\", \"PREALBUMIN\", \"GLUCOSE\" in the last 72 hours.      No results for input(s): \"NTPROBNP\" in the last 72 hours.      No results for input(s): \"TROPONINT\" in the last 72 hours.    Imaging:  No orders to display "         Assessment/Plan:  Justification for Admission Status  I anticipate this patient will require at least two midnights for appropriate medical management, necessitating inpatient admission because alcohol withdrawal    Patient will need a Intermediate Care (Adult and Pediatrics) bed on MEDICAL service .  The need is secondary to alcohol withdrawal  * Alcohol dependence with alcohol withdrawal- (present on admission)  Assessment & Plan  Presented with resolved tremor, tachycardia, anxiety presumed secondary to alcohol withdrawal.  History of delirium tremens noted.  Plan: Thiamine supplementation  Lorazepam per [facility] protocol  Fall, Aspiration, seizure precautions  Repeat magnesium, phosphorus    Tachycardia  Assessment & Plan  Secondary to alcohol withdrawal, rule out underlying cardiomyopathy, electrolyte disbalance  Plan: Restart p.o. metoprolol, check TSH, magnesium, phosphorus, transthoracic echo, benzodiazepine per [facility] protocol.  Monitor on telemetry.    Gallbladder dilatation  Assessment & Plan  Gallbladder distention noted on CT of the abdomen without contrast and ultrasound.  No concern for cholecystitis at this time.  Denies abdominal pain nausea vomiting.    Lactic acidosis  Assessment & Plan  Probably secondary to sympathetic adrenal crisis secondary to alcohol withdrawal patient received 2 L of crystalloids at outside hospital  Repeat lactic acid, thiamine supplementation 200 mg once a day, p.o. metoprolol    Abnormal EKG  Assessment & Plan  EKG with low voltage, sinus tachycardia with ST depression in lateral leads.  CTA of the chest is negative for PE at outside facility.  Will obtain transthoracic echo.    GERD (gastroesophageal reflux disease)- (present on admission)  Assessment & Plan  Continue Prilosec    Elevated LFTs- (present on admission)  Assessment & Plan  Secondary to alcoholic hepatitis, [place] syndrome  Repeat CMP, INR, calculate Madrey discrimination score.    Asthma without status  asthmaticus- (present on admission)  Assessment & Plan  Currently not in exacerbation  Albuterol as needed    Morbid obesity (HCC)- (present on admission)  Assessment & Plan  BMI 44    BOLIVAR (obstructive sleep apnea)- (present on admission)  Assessment & Plan  Patient is awaiting to be evaluated for sleep apnea.    Essential hypertension- (present on admission)  Assessment & Plan  Pressure is uncontrolled.  He has been noncompliant with his medications amlodipine, losartan, metoprolol  restart losartan and metoprolol  Monitor blood pressure        VTE prophylaxis: SCDs/TEDs

## 2024-04-22 NOTE — ASSESSMENT & PLAN NOTE
Presented with resolved tremor, tachycardia, anxiety presumed secondary to alcohol withdrawal.  History of delirium tremens noted.  Plan: Thiamine supplementation  Lorazepam per [facility] protocol  Fall, Aspiration, seizure precautions  Repeat magnesium, phosphorus  Doing better today

## 2024-04-22 NOTE — ASSESSMENT & PLAN NOTE
Pressure is uncontrolled.  He has been noncompliant with his medications amlodipine, losartan, metoprolol  restart losartan and metoprolol  Monitor blood pressure

## 2024-04-22 NOTE — DISCHARGE PLANNING
Case Management Discharge Planning    Admission Date: 4/21/2024  GMLOS: 3.4  ALOS: 1    6-Clicks ADL Score:    6-Clicks Mobility Score:      Anticipated Discharge Dispo: Discharge Disposition: Discharged to home/self care (01)  Discharge Address: [address] [place] [address]   [place] [address]  Discharge Contact Phone Number:  - [phone] (Home Phone)    DME Needed: No    Action(s) Taken: DC Assessment Complete (See below) and Resources added to AVS for substance use.     Escalations Completed: None    Medically Clear: No    Barriers to Discharge: Medical clearance    Care Transition Team Assessment    Pt is a frequent readmission pt here at [facility].  Pts main support is Freddy Miller  [phone] and Mother.   Pt works full time at [employer].  Information Source  Orientation Level: Oriented X4    Readmission Evaluation  Is this a readmission?: Yes - unplanned readmission  Why do you think you were readmitted?: Drinking, withdrawl side affects  Was an appointment arranged for you prior to discharge?: No  Were there new prescriptions you were supposed to fill after you were discharged?: Yes, prescriptions filled  Did you understand your discharge instructions?: Yes  Did you have enough support after your last discharge?: Yes    Elopement Risk  Legal Hold: No  Ambulatory or Self Mobile in Wheelchair: Yes  Disoriented: No  Elopement Risk: Not at Risk for Elopement    Interdisciplinary Discharge Planning  Primary Care Physician: Rey Pacheco M.D.  Patient or legal guardian wants to designate a caregiver: No  Patient Prefers to be Discharged to:: Home with Substance Use resources  Durable Medical Equipment: Not Applicable    Discharge Preparedness  What is your plan after discharge?: Home with help  What are your discharge supports?: Sibling  Prior Functional Level: Independent with Activities of Daily Living    Functional Assesment  Prior Functional Level: Independent with  Activities of Daily Living    Finances  Financial Barriers to Discharge: No  Prescription Coverage: Yes    Advance Directive  Advance Directive?: None    Psychological Assessment  History of Substance Abuse: Alcohol  Date Last Used - Alcohol: 4/20/24    Discharge Risks or Barriers  Discharge risks or barriers?: Substance abuse, Mental health  Patient risk factors: Noncompliance, Substance abuse    Anticipated Discharge Information  Discharge Disposition: Discharged to home/self care (01)  Discharge Address: [address] [place] [address] [place] [address]  Discharge Contact Phone Number: [place] - [phone] (Home Phone)

## 2024-04-22 NOTE — ASSESSMENT & PLAN NOTE
Gallbladder distention noted on CT of the abdomen without contrast and ultrasound.  No concern for cholecystitis at this time.  Denies abdominal pain nausea vomiting.

## 2024-04-22 NOTE — ASSESSMENT & PLAN NOTE
I have personally reviewed his EKG and compared it to previous studies in our system  Agree with sinus tach  Agree with lateral ST segment depression not present on ccomparison  Disagree with ant septal infarct: low voltage makes interpretation delicate but computer interpreted q wave is in fact an s wave.  TTE benign with no WMAs  Trop normal  No chest pain

## 2024-04-22 NOTE — PROGRESS NOTES
Pt to arrive to room, request private room, private room unavailable. Pt sitting up in room at this time, Pt Mom at bedside. Getting settled in room and vs, pt has sob with ambulation with transfer but denies pain.

## 2024-04-22 NOTE — CARE PLAN
The patient is Unstable - High likelihood or risk of patient condition declining or worsening    Shift Goals  Clinical Goals: Stable hemodynamics  Patient Goals: Feel better  Family Goals: DEBBI    Progress made toward(s) clinical / shift goals:    Problem: Knowledge Deficit - Standard  Goal: Patient and family/care givers will demonstrate understanding of plan of care, disease process/condition, diagnostic tests and medications  Outcome: Progressing     Problem: Optimal Care for Alcohol Withdrawal  Goal: Optimal Care for the alcohol withdrawal patient  Outcome: Progressing     Problem: Seizure Precautions  Goal: Implementation of seizure precautions  Outcome: Progressing     Problem: Lifestyle Changes  Goal: Patient's ability to identify lifestyle changes and available resources to help reduce recurrence of condition will improve  Outcome: Progressing     Problem: Pain - Standard  Goal: Alleviation of pain or a reduction in pain to the patient’s comfort goal  Outcome: Progressing       Patient is not progressing towards the following goals:

## 2024-04-22 NOTE — CARE PLAN
The patient is Watcher - Medium risk of patient condition declining or worsening    Shift Goals  Clinical Goals: Pt will state he is comfortable with needs met in room by the end of shift.  Patient Goals: DC  Family Goals: DEBBI    Progress made toward(s) clinical / shift goals:  Pt states he is comfortable in room and doing okay, needs met throughout shift with pt need of one on one with nursing staff, prompt cares to assist with reduction/prevention of anxiety for pt.     Patient is not progressing towards the following goals:progressing.

## 2024-04-22 NOTE — PROGRESS NOTES
Hospital Medicine Daily Progress Note    Date of Service  4/22/2024    Chief Complaint  Alberto Maldonado is a 38 y.o. male admitted 4/21/2024 with anxious    Hospital Course  39yo PMHx HTN non compliant with home meds, BOLIVAR, BMI 44, GERD,  ETOH abuse.  Sent to us active ETOH withdrawal, Na 128, TBili 2.0, Alk Phos 167,, ,     Interval Problem Update  Pt states he's feeling better.  Less shaky after medication.  Some back pain from sitting in chair but no other complaints    I have discussed this patient's plan of care and discharge plan at IDT rounds today with Case Management, Nursing, Nursing leadership, and other members of the IDT team.    Consultants/Specialty      Code Status  Full Code    Disposition  The patient is not medically cleared for discharge to home or a post-acute facility.      I have placed the appropriate orders for post-discharge needs.    Review of Systems  Review of Systems   Constitutional:  Negative for chills and fever.   Respiratory:  Negative for cough and shortness of breath.    Cardiovascular:  Negative for chest pain, palpitations and leg swelling.   Gastrointestinal:  Negative for abdominal pain, diarrhea, nausea and vomiting.   Genitourinary:  Negative for dysuria and urgency.   Musculoskeletal:  Negative for back pain.   Skin:  Negative for rash.   Neurological:  Positive for tremors. Negative for dizziness, loss of consciousness and headaches.   Psychiatric/Behavioral:  The patient is nervous/anxious.         Physical Exam  Temp:  [36.6 °C (97.9 °F)-37.2 °C (99 °F)] 37.2 °C (99 °F)  Pulse:  [] 82  Resp:  [20-37] 22  BP: (133-171)/() 140/90  SpO2:  [92 %-96 %] 95 %    Physical Exam  Constitutional:       General: He is not in acute distress.     Appearance: He is well-developed. He is not diaphoretic.   HENT:      Head: Normocephalic and atraumatic.   Eyes:      Conjunctiva/sclera: Conjunctivae normal.   Neck:      Vascular: No JVD.   Cardiovascular:       Rate and Rhythm: Normal rate.      Heart sounds: No murmur heard.     No gallop.   Pulmonary:      Effort: Pulmonary effort is normal. No respiratory distress.      Breath sounds: No stridor. No wheezing or rales.   Abdominal:      Palpations: Abdomen is soft.      Tenderness: There is no abdominal tenderness. There is no guarding or rebound.   Skin:     General: Skin is warm and dry.      Findings: No rash.   Neurological:      Mental Status: He is oriented to person, place, and time.   Psychiatric:         Thought Content: Thought content normal.         Fluids    Intake/Output Summary (Last 24 hours) at 4/22/2024 1156  Last data filed at 4/22/2024 0800  Gross per 24 hour   Intake 1345.75 ml   Output 600 ml   Net 745.75 ml       Laboratory  Recent Labs     04/22/24  0147   WBC 8.8   RBC 5.48   HEMOGLOBIN 16.0   HEMATOCRIT 46.3   MCV 84.5   MCH 29.2   MCHC 34.6   RDW 47.9   PLATELETCT 285   MPV 8.8*     Recent Labs     04/22/24  0258   SODIUM 140   POTASSIUM 3.3*   CHLORIDE 107   CO2 17*   GLUCOSE 117*   BUN 6*   CREATININE 0.76   CALCIUM 7.7*     Recent Labs     04/22/24  0147   INR 1.18*               Imaging  EC-ECHOCARDIOGRAM COMPLETE W/ CONT   Final Result           Assessment/Plan  * Alcohol dependence with alcohol withdrawal- (present on admission)  Assessment & Plan  Presented with resolved tremor, tachycardia, anxiety presumed secondary to alcohol withdrawal.  History of delirium tremens noted.  Plan: Thiamine supplementation  Lorazepam per [place] protocol  Fall, Aspiration, seizure precautions  Repeat magnesium, phosphorus  Doing better today    Tachycardia  Assessment & Plan  Secondary to alcohol withdrawal  TTE benign  Adequately resuscitated  Treating ETOH withdrawal    Gallbladder dilatation  Assessment & Plan  Gallbladder distention noted on CT of the abdomen without contrast and ultrasound.  No concern for cholecystitis at this time.  Denies abdominal pain nausea vomiting.    Lactic  acidosis  Assessment & Plan  Probably secondary to sympathetic adrenal crisis secondary to alcohol withdrawal patient received 2 L of crystalloids at outside hospital  Repeat lactic acid, thiamine supplementation 200 mg once a day, p.o. metoprolol    Abnormal EKG  Assessment & Plan  I have personally reviewed his EKG and compared it to previous studies in our system  Agree with sinus tach  Agree with lateral ST segment depression not present on ccomparison  Disagree with ant septal infarct: low voltage makes interpretation delicate but computer interpreted q wave is in fact an s wave.  TTE benign with no WMAs  Trop normal  No chest pain    GERD (gastroesophageal reflux disease)- (present on admission)  Assessment & Plan  Continue Prilosec    Elevated LFTs- (present on admission)  Assessment & Plan  Secondary to alcoholic hepatitis, [place] syndrome  LFTs trending down  MELD 11  US neg for PVT  Encourage ETOH cessation  Cont to follow  Consider further work up if the do not normalize    Asthma without status asthmaticus- (present on admission)  Assessment & Plan  Currently not in exacerbation  Albuterol as needed    Morbid obesity (HCC)- (present on admission)  Assessment & Plan  BMI 44    BOLIVAR (obstructive sleep apnea)- (present on admission)  Assessment & Plan  Patient is awaiting to be evaluated for sleep apnea.    Essential hypertension- (present on admission)  Assessment & Plan  Pressure is uncontrolled.  He has been noncompliant with his medications amlodipine, losartan, metoprolol  restart losartan and metoprolol  Monitor blood pressure         VTE prophylaxis: VTE Selection    I have performed a physical exam and reviewed and updated ROS and Plan today (4/22/2024). In review of yesterday's note (4/21/2024), there are no changes except as documented above.

## 2024-04-22 NOTE — ASSESSMENT & PLAN NOTE
Probably secondary to sympathetic adrenal crisis secondary to alcohol withdrawal patient received 2 L of crystalloids at outside hospital  Repeat lactic acid, thiamine supplementation 200 mg once a day, p.o. metoprolol

## 2024-04-22 NOTE — PROGRESS NOTES
4 Eyes Skin Assessment Completed by ALF Ross and ALF Rios.    Head WDL  Ears WDL  Nose WDL  Mouth WDL  Neck WDL  Breast/Chest WDL  Shoulder Blades WDL  Spine Lower Back Birthmark   (R) Arm/Elbow/Hand Skin tear  (L) Arm/Elbow/Hand Birthmark  Abdomen WDL  Groin WDL  Scrotum/Coccyx/Buttocks WDL  (R) Leg WDL  (L) Leg WDL  (R) Heel/Foot/Toe Boggy, dry, calloused   (L) Heel/Foot/Toe Boggy, dry, calloused           Devices In Places ECG, Tele Box, Blood Pressure Cuff, and Pulse Ox      Interventions In Place Low Air Loss Mattress    Possible Skin Injury No    Pictures Uploaded Into Epic N/A  Wound Consult Placed N/A  RN Wound Prevention Protocol Ordered No

## 2024-04-22 NOTE — PROGRESS NOTES
[facility] INTENSIVIST DIRECT ADMISSION REPORT    Transferring facility: ER on [facility]  Transferring physician: Dr Jeanne Segura  Chief complaint: Alcohol withdrawal  Pertinent history & patient course: This is a 38-year-old gentleman with a history of alcohol dependence and prior alcohol withdrawal delirium, primary hypertension and BOLIVAR.  He presented to a freestanding ER this evening complaining of anxiety and palpitations.  His heart rate has been as high as 150.  He has a lactic acid of 6.5.  A CTA of the pulmonary arteries was negative for pulmonary embolism.  His bilirubin and aminotransferases are elevated.  He has received 2 L of intravenous fluids.  He has also received lorazepam.  He desires transfer to [facility] for his alcohol withdrawal as he has been hospitalized here in the past.  Pertinent imaging & lab results: As above  Code Status: Full per transferring provider, I personally verified with the transferring provider patient's code status and the transferring provider has confirmed this with the patient.  Further work up or recommendations prior to transfer: None  Consultants called prior to transfer and pertinent input from consultants: Just me  Patient accepted for transfer: Of course  Consultants to be called upon arrival: To be determined  Floor requested: IMCU  ADT order placed for accepted patient: Of course    Please inform the hospitalist upon assignment of an IMCU bed and then again on arrival of the patient.    Deo Wilson MD  Pulmonary and Critical Care Medicine

## 2024-04-23 ENCOUNTER — PHARMACY VISIT (OUTPATIENT)
Dept: PHARMACY | Facility: MEDICAL CENTER | Age: 39
End: 2024-04-23
Payer: COMMERCIAL

## 2024-04-23 VITALS
BODY MASS INDEX: 44.09 KG/M2 | HEART RATE: 90 BPM | SYSTOLIC BLOOD PRESSURE: 150 MMHG | TEMPERATURE: 98.5 F | DIASTOLIC BLOOD PRESSURE: 84 MMHG | WEIGHT: 248.9 LBS | OXYGEN SATURATION: 94 % | RESPIRATION RATE: 18 BRPM

## 2024-04-23 DIAGNOSIS — F32.A ANXIETY AND DEPRESSION: ICD-10-CM

## 2024-04-23 DIAGNOSIS — F41.9 ANXIETY AND DEPRESSION: ICD-10-CM

## 2024-04-23 DIAGNOSIS — F10.280 ALCOHOL DEPENDENCE WITH ALCOHOL-INDUCED ANXIETY DISORDER (HCC): ICD-10-CM

## 2024-04-23 PROBLEM — E87.20 LACTIC ACIDOSIS: Status: RESOLVED | Noted: 2024-04-22 | Resolved: 2024-04-23

## 2024-04-23 PROBLEM — J45.909 ASTHMA WITHOUT STATUS ASTHMATICUS: Chronic | Status: RESOLVED | Noted: 2017-06-29 | Resolved: 2024-04-23

## 2024-04-23 PROBLEM — G47.33 OSA ON CPAP: Status: ACTIVE | Noted: 2024-04-23

## 2024-04-23 LAB
ALBUMIN SERPL BCP-MCNC: 3.7 G/DL (ref 3.2–4.9)
ALBUMIN/GLOB SERPL: 1.1 G/DL
ALP SERPL-CCNC: 113 U/L (ref 30–99)
ALT SERPL-CCNC: 102 U/L (ref 2–50)
ANION GAP SERPL CALC-SCNC: 13 MMOL/L (ref 7–16)
AST SERPL-CCNC: 105 U/L (ref 12–45)
BILIRUB SERPL-MCNC: 1.6 MG/DL (ref 0.1–1.5)
BUN SERPL-MCNC: 8 MG/DL (ref 8–22)
CALCIUM ALBUM COR SERPL-MCNC: 8.5 MG/DL (ref 8.5–10.5)
CALCIUM SERPL-MCNC: 8.3 MG/DL (ref 8.5–10.5)
CHLORIDE SERPL-SCNC: 107 MMOL/L (ref 96–112)
CO2 SERPL-SCNC: 21 MMOL/L (ref 20–33)
CREAT SERPL-MCNC: 0.74 MG/DL (ref 0.5–1.4)
GFR SERPLBLD CREATININE-BSD FMLA CKD-EPI: 120 ML/MIN/1.73 M 2
GLOBULIN SER CALC-MCNC: 3.3 G/DL (ref 1.9–3.5)
GLUCOSE SERPL-MCNC: 83 MG/DL (ref 65–99)
MAGNESIUM SERPL-MCNC: 2.1 MG/DL (ref 1.5–2.5)
PHOSPHATE SERPL-MCNC: 2.8 MG/DL (ref 2.5–4.5)
POTASSIUM SERPL-SCNC: 3.8 MMOL/L (ref 3.6–5.5)
PROT SERPL-MCNC: 7 G/DL (ref 6–8.2)
SODIUM SERPL-SCNC: 141 MMOL/L (ref 135–145)

## 2024-04-23 PROCEDURE — 700102 HCHG RX REV CODE 250 W/ 637 OVERRIDE(OP): Performed by: INTERNAL MEDICINE

## 2024-04-23 PROCEDURE — 84100 ASSAY OF PHOSPHORUS: CPT

## 2024-04-23 PROCEDURE — 700111 HCHG RX REV CODE 636 W/ 250 OVERRIDE (IP): Performed by: HOSPITALIST

## 2024-04-23 PROCEDURE — RXMED WILLOW AMBULATORY MEDICATION CHARGE: Performed by: STUDENT IN AN ORGANIZED HEALTH CARE EDUCATION/TRAINING PROGRAM

## 2024-04-23 PROCEDURE — 83735 ASSAY OF MAGNESIUM: CPT

## 2024-04-23 PROCEDURE — 99239 HOSP IP/OBS DSCHRG MGMT >30: CPT | Performed by: STUDENT IN AN ORGANIZED HEALTH CARE EDUCATION/TRAINING PROGRAM

## 2024-04-23 PROCEDURE — 700102 HCHG RX REV CODE 250 W/ 637 OVERRIDE(OP): Performed by: STUDENT IN AN ORGANIZED HEALTH CARE EDUCATION/TRAINING PROGRAM

## 2024-04-23 PROCEDURE — 700105 HCHG RX REV CODE 258: Performed by: HOSPITALIST

## 2024-04-23 PROCEDURE — A9270 NON-COVERED ITEM OR SERVICE: HCPCS | Performed by: INTERNAL MEDICINE

## 2024-04-23 PROCEDURE — 80053 COMPREHEN METABOLIC PANEL: CPT

## 2024-04-23 PROCEDURE — A9270 NON-COVERED ITEM OR SERVICE: HCPCS | Performed by: STUDENT IN AN ORGANIZED HEALTH CARE EDUCATION/TRAINING PROGRAM

## 2024-04-23 RX ORDER — ALLOPURINOL 300 MG/1
300 TABLET ORAL DAILY
Qty: 30 TABLET | Refills: 1 | Status: SHIPPED | OUTPATIENT
Start: 2024-04-23

## 2024-04-23 RX ORDER — SUCRALFATE 1 G/1
1 TABLET ORAL
Qty: 60 TABLET | Refills: 1 | Status: SHIPPED | OUTPATIENT
Start: 2024-04-23

## 2024-04-23 RX ORDER — FOLIC ACID 1 MG/1
1 TABLET ORAL DAILY
Qty: 30 TABLET | Refills: 1 | Status: SHIPPED | OUTPATIENT
Start: 2024-04-24

## 2024-04-23 RX ORDER — NALTREXONE HYDROCHLORIDE 50 MG/1
TABLET, FILM COATED ORAL
Qty: 27 TABLET | Refills: 1 | Status: SHIPPED | OUTPATIENT
Start: 2024-04-23 | End: 2024-04-23

## 2024-04-23 RX ORDER — AMLODIPINE BESYLATE 10 MG/1
10 TABLET ORAL DAILY
Qty: 30 TABLET | Refills: 1 | Status: SHIPPED | OUTPATIENT
Start: 2024-04-23

## 2024-04-23 RX ORDER — NALTREXONE HYDROCHLORIDE 50 MG/1
TABLET, FILM COATED ORAL
Qty: 27 TABLET | Refills: 1 | Status: SHIPPED | OUTPATIENT
Start: 2024-04-29 | End: 2024-04-23

## 2024-04-23 RX ORDER — ALBUTEROL SULFATE 90 UG/1
2 AEROSOL, METERED RESPIRATORY (INHALATION) EVERY 6 HOURS PRN
Qty: 8.5 G | Refills: 1 | Status: SHIPPED | OUTPATIENT
Start: 2024-04-23

## 2024-04-23 RX ORDER — LOSARTAN POTASSIUM 50 MG/1
50 TABLET ORAL
Qty: 30 TABLET | Refills: 1 | Status: SHIPPED | OUTPATIENT
Start: 2024-04-23

## 2024-04-23 RX ORDER — AMLODIPINE BESYLATE 5 MG/1
2.5 TABLET ORAL
Status: DISCONTINUED | OUTPATIENT
Start: 2024-04-23 | End: 2024-04-23 | Stop reason: HOSPADM

## 2024-04-23 RX ORDER — OMEPRAZOLE 40 MG/1
40 CAPSULE, DELAYED RELEASE ORAL 2 TIMES DAILY
Qty: 60 CAPSULE | Refills: 1 | Status: SHIPPED | OUTPATIENT
Start: 2024-04-23

## 2024-04-23 RX ORDER — NALTREXONE HYDROCHLORIDE 50 MG/1
TABLET, FILM COATED ORAL
Qty: 27 TABLET | Refills: 1 | Status: SHIPPED | OUTPATIENT
Start: 2024-04-29 | End: 2024-05-29

## 2024-04-23 RX ORDER — LANOLIN ALCOHOL/MO/W.PET/CERES
100 CREAM (GRAM) TOPICAL DAILY
Qty: 30 TABLET | Refills: 1 | Status: SHIPPED | OUTPATIENT
Start: 2024-04-23

## 2024-04-23 RX ADMIN — LOSARTAN POTASSIUM 50 MG: 50 TABLET, FILM COATED ORAL at 05:34

## 2024-04-23 RX ADMIN — THERA TABS 1 TABLET: TAB at 05:34

## 2024-04-23 RX ADMIN — ALLOPURINOL 300 MG: 100 TABLET ORAL at 05:34

## 2024-04-23 RX ADMIN — AMLODIPINE BESYLATE 2.5 MG: 5 TABLET ORAL at 12:42

## 2024-04-23 RX ADMIN — FOLIC ACID 1 MG: 1 TABLET ORAL at 05:33

## 2024-04-23 RX ADMIN — THIAMINE HYDROCHLORIDE 500 MG: 100 INJECTION, SOLUTION INTRAMUSCULAR; INTRAVENOUS at 09:58

## 2024-04-23 RX ADMIN — LABETALOL HYDROCHLORIDE 200 MG: 200 TABLET, FILM COATED ORAL at 11:01

## 2024-04-23 RX ADMIN — METOPROLOL TARTRATE 25 MG: 25 TABLET, FILM COATED ORAL at 05:34

## 2024-04-23 RX ADMIN — OMEPRAZOLE 20 MG: 20 CAPSULE, DELAYED RELEASE ORAL at 05:33

## 2024-04-23 ASSESSMENT — LIFESTYLE VARIABLES
TOTAL SCORE: 0
HEADACHE, FULLNESS IN HEAD: NOT PRESENT
AUDITORY DISTURBANCES: NOT PRESENT
PAROXYSMAL SWEATS: NO SWEAT VISIBLE
VISUAL DISTURBANCES: NOT PRESENT
TREMOR: NO TREMOR
VISUAL DISTURBANCES: NOT PRESENT
NAUSEA AND VOMITING: NO NAUSEA AND NO VOMITING
ANXIETY: NO ANXIETY (AT EASE)
AUDITORY DISTURBANCES: NOT PRESENT
ORIENTATION AND CLOUDING OF SENSORIUM: ORIENTED AND CAN DO SERIAL ADDITIONS
NAUSEA AND VOMITING: NO NAUSEA AND NO VOMITING
AGITATION: NORMAL ACTIVITY
AGITATION: NORMAL ACTIVITY
ORIENTATION AND CLOUDING OF SENSORIUM: ORIENTED AND CAN DO SERIAL ADDITIONS
TREMOR: NO TREMOR
PAROXYSMAL SWEATS: NO SWEAT VISIBLE
TOTAL SCORE: 0
ANXIETY: NO ANXIETY (AT EASE)
HEADACHE, FULLNESS IN HEAD: NOT PRESENT

## 2024-04-23 NOTE — PROGRESS NOTES
Review of pt POC, plan for DC and review of pt vitals with Froilan Kruger MD. Notified of BP today/current vitals and medication given. States to wait for BP to decrease and okay to go to DC lounge, review of alcohol withdrawal and probable symptoms for pt, pt and family educated by RN with risk r/t high BP.

## 2024-04-23 NOTE — PROGRESS NOTES
Patient called to discuss his current problem.  He was admitted to the hospital for acute alcohol intoxication and dehydration.  He was discharged 4 hours ago.  He called me to discuss next steps and his anxiety and all of his concerns.  We have built a strong rapport over the last several months in the urgent care as I have seen him several times.  He was discharged with all the proper medications.  He has been having increasing alcohol abuse with associated anxiety and depression.  He did have a referral to behavioral health and alcohol withdrawal program placed.  I will add a referral to psychiatry as I do feel he has underlying mental health problems that need to be addressed.  He will take his medications, focus on fluids and abstain from alcohol intake.  He will call me tomorrow to check in on how he is feeling both physically and mentally.  ER precautions again reiterated with patient.      1. Anxiety and depression  Referral to Psychiatry      2. Alcohol dependence with alcohol-induced anxiety disorder (HCC)  Referral to Psychiatry            Gage Paredes P.A.-C.

## 2024-04-23 NOTE — DISCHARGE SUMMARY
Discharge Summary    CHIEF COMPLAINT ON ADMISSION  Palpitations and anxiety      Reason for Admission  Alcohol dependence with alcohol withdrawal    Admission Date  4/21/2024    CODE STATUS  Full Code    HPI & HOSPITAL COURSE  Alberto Maldonado is a 38 y.o. male who presented on 4/21/2024 as a direct admission from [facility] ER at [facility] emergency department with tachycardia and alcohol withdrawal.  Patient initially presented to the outside emergency room with his brother with complaints of anxiety and palpitations.  He was noted to be tachycardic with heart rate in the 140s.  Reported drinking 5-6 shots of vodka on a daily basis last drink was the night prior.  Blood pressures were elevated up to 189/98 with heart rate in the 148.  He reportedly had a tumor, which resolved on its own.  Patient was found to be in severe alcohol withdrawal requiring several boluses of intravenous lorazepam.  White count was elevated at 11 with hemoglobin of 19.7, platelets of 344.  D-dimer at 0.73.  He had a significant transaminitis with blood alcohol level that was not elevated.  CPK was mildly elevated at 304.  EKG showed sinus tachycardia heart rate of 142 with T wave inversions in several leads.  Urinalysis was negative for infection with trace ketones.  CT scan of the chest showed no evidence of pulmonary embolism.  Distal pulmonary artery was limited due to motion artifact.  There was a 3.3 cm rounded lesion beneath the left diaphragm dome in the left upper quadrant, which could represent an accessory spleen.  Gallbladder was noted to be distended.      CT scan of the abdomen and pelvis without contrast showed a 3 .3 cm lesion possibly an accessory spleen.  Benign and malignant neoplastic process was on the differential.  Appendix was normal.  Notable for diverticulosis of the colon.  Chest x-ray was clear with normal cardiac silhouette.  Right upper quadrant ultrasound revealed fatty infiltration of  the liver and hepatomegaly.  Common bile duct measuring 4.9 cm with portal vein of 1.1 cm.  Distended gallbladder noted.    Due to severity of patient's alcohol withdrawal, he was admitted to the intermediate care unit on the medical service and was started on CIWA protocol with lorazepam.  Patient's transaminitis improved along with convalescence of his alcohol withdrawal symptoms.  Ultrasound was negative for portal vein thrombus.  Patient's blood pressures began to escalate and his home blood pressure medications were resumed with improved control.  Last blood pressure was 150/84 prior to discharge.    Patient was counseled on alcohol cessation and referred to Alcoholics Anonymous for continued sustained sobriety.  He was offered naltrexone to assist with alcohol cessation, which was prescribed as per his sister-in-law's request.  Patient had an echocardiogram, which showed ejection fraction of 55 to 60% with normal diastolic function.  Right ventricle not well-visualized but grossly normal appearing.  Patient was also counseled on regular exercise and healthy diet with continued weight loss goal.  All questions were answered for the patient as well as his sister-in-law with the mother at bedside.    Therefore, he is discharged in good and stable condition to home with close outpatient follow-up.    The patient met 2-midnight criteria for an inpatient stay at the time of discharge.    Discharge Date  4/23/2024    FOLLOW UP ITEMS POST DISCHARGE  -Please follow-up with primary care provider in 3 to 5 days with labs.    -Outpatient referral for behavioral health for alcohol dependence treatment was also placed prior to discharge.    -Patient will need monitoring of his 3.3 cm lesion beneath the left direct right abdomen in the left upper quadrant.    DISCHARGE DIAGNOSES  Principal Problem:    Alcohol dependence with alcohol withdrawal (Chronic) (POA: Yes)  Active Problems:    Essential hypertension (Chronic) (POA:  Yes)    BOLIVAR (obstructive sleep apnea) (POA: Yes)      Overview: Awaiting to see sleep specialist    Morbid obesity (HCC) (Chronic) (POA: Yes)    Elevated LFTs (Chronic) (POA: Yes)      Overview: [facility] . Next appt 5.14.24    GERD (gastroesophageal reflux disease) (Chronic) (POA: Yes)    Alcohol dependence with withdrawal delirium (HCC) (POA: Yes)    Abnormal EKG (POA: Unknown)    Gallbladder dilatation (POA: Unknown)    Tachycardia (POA: Unknown)    BOLIVAR on CPAP (POA: Yes)  Resolved Problems:    Asthma without status asthmaticus (Chronic) (POA: Yes)    Lactic acidosis (POA: Unknown)      FOLLOW UP  Future Appointments   Date Time Provider Department Center   5/6/2024  1:40 PM Bakari Jorge M.D. [facility] None   5/7/2024  2:00 PM Rey Pacheco M.D. [facility]   5/17/2024  2:15 PM [facility] EXAM 12 ECHO [facility]     No follow-up provider specified.    MEDICATIONS ON DISCHARGE     Medication List        START taking these medications        Instructions   folic acid 1 MG Tabs  Start taking on: April 24, 2024  Commonly known as: Folvite   Take 1 Tablet by mouth every day.  Dose: 1 mg     multivitamin Tabs  Start taking on: April 24, 2024   Take 1 Tablet by mouth every day.  Dose: 1 Tablet     naltrexone 50 MG Tabs  Start taking on: April 29, 2024  Commonly known as: Depade   Take 0.5 Tablets by mouth every day for 6 days, THEN 1 Tablet every day for 24 days.     thiamine 100 MG tablet  Commonly known as: Thiamine   Take 1 Tablet by mouth every day.  Dose: 100 mg            CONTINUE taking these medications        Instructions   albuterol 108 (90 Base) MCG/ACT Aers inhalation aerosol   Inhale 2 Puffs every 6 hours as needed for Shortness of Breath.  Dose: 2 Puff     allopurinol 300 MG Tabs  Commonly known as: Zyloprim   Take 1 Tablet by mouth every day.  Dose: 300 mg     amLODIPine 10 MG Tabs  Commonly known as: Norvasc   Take 1 Tablet by mouth every day.  Dose: 10 mg     losartan 50 MG Tabs  Commonly known  as: Cozaar   Take 1 Tablet by mouth every day.  Dose: 50 mg     metoprolol tartrate 25 MG Tabs  Commonly known as: Lopressor   Take 1 Tablet by mouth 2 times a day.  Dose: 25 mg     omeprazole 40 MG delayed-release capsule  Commonly known as: PriLOSEC   Take 1 Capsule by mouth 2 times a day.  Dose: 40 mg     sucralfate 1 GM Tabs  Commonly known as: Carafate   Take 1 Tablet by mouth 2 times daily, before breakfast and dinner.  Dose: 1 g            STOP taking these medications      amoxicillin-clavulanate 875-125 MG Tabs  Commonly known as: Augmentin     azithromycin 250 MG Tabs  Commonly known as: Zithromax     benzonatate 100 MG Caps  Commonly known as: Tessalon     esomeprazole 20 MG capsule  Commonly known as: NexIUM     methylPREDNISolone 4 MG Tbpk  Commonly known as: Medrol Dosepak              Allergies  No Known Allergies    DIET  Orders Placed This Encounter   Procedures    Diet Order Diet: Regular     Standing Status:   Standing     Number of Occurrences:   1     Order Specific Question:   Diet:     Answer:   Regular [1]       ACTIVITY  As tolerated.  Weight bearing as tolerated    CONSULTATIONS  None    PROCEDURES  None    LABORATORY  Lab Results   Component Value Date    SODIUM 141 04/23/2024    POTASSIUM 3.8 04/23/2024    CHLORIDE 107 04/23/2024    CO2 21 04/23/2024    GLUCOSE 83 04/23/2024    BUN 8 04/23/2024    CREATININE 0.74 04/23/2024        Lab Results   Component Value Date    WBC 8.8 04/22/2024    HEMOGLOBIN 16.0 04/22/2024    HEMATOCRIT 46.3 04/22/2024    PLATELETCT 285 04/22/2024        Total time of the discharge process 49 minutes.

## 2024-04-23 NOTE — DISCHARGE PLANNING
Meds-to-Beds: Discharge prescription orders listed below delivered to patient in discharge lounge. RN notified. Patient and patient's family counseled. Payment for MVI and thiamine processed by pharmacy. Allopurinol too soon (confirmed patient has available).  Patient not sure if he has metoprolol available, requested MD to send order to [facility] per patient's request.     Current Outpatient Medications   Medication Sig Dispense Refill    omeprazole (PRILOSEC) 40 MG delayed-release capsule Take 1 Capsule by mouth 2 times a day. 60 Capsule 1    sucralfate (CARAFATE) 1 GM Tab Take 1 Tablet by mouth 2 times daily, before breakfast and dinner. 60 Tablet 1    [START ON 4/24/2024] multivitamin Tab Take 1 Tablet by mouth every day. 30 Tablet 1    [START ON 4/24/2024] folic acid (FOLVITE) 1 MG Tab Take 1 Tablet by mouth every day. 30 Tablet 1    thiamine (THIAMINE) 100 MG tablet Take 1 Tablet by mouth every day. 30 Tablet 1    losartan (COZAAR) 50 MG Tab Take 1 Tablet by mouth every day. 30 Tablet 1    amLODIPine (NORVASC) 10 MG Tab Take 1 Tablet by mouth every day. 30 Tablet 1    albuterol 108 (90 Base) MCG/ACT Aero Soln inhalation aerosol Inhale 2 Puffs every 6 hours as needed for Shortness of Breath. 8.5 g 1    [START ON 4/29/2024] naltrexone (DEPADE) 50 MG Tab Take 0.5 Tablets by mouth every day for 6 days, THEN 1 Tablet every day for 24 days. 27 Tablet 1      Kristan Salgado, PharmD

## 2024-04-23 NOTE — DISCHARGE INSTRUCTIONS
Thank you for coming to [facility].  It has been my pleasure to take care of you!    -Please follow-up with your primary care provider in 3 to 5 days with labs.

## 2024-04-23 NOTE — PROGRESS NOTES
Thiamine IV to arrive from [facility], awaiting arrival from tube station, f/u communication with RN prior to transfer and pharmacy. Medication to run after potassium which will finish in approx 20min.

## 2024-04-23 NOTE — CARE PLAN
The patient is Watcher - Medium risk of patient condition declining or worsening    Shift Goals  Clinical Goals: Patient will continue on CIWA protocols, monitor electrolytes  Patient Goals: Rest  Family Goals: DEBBI    Progress made toward(s) clinical / shift goals:  Patient alert and oriented, assessment completed. Patient stating 4/10 pain, administered medication per MAR. Patient up ambulating in hallways. Patient continues CIWA protocols, scoring 0-1, no medication needed. Patient resting comfortably, bed in lowest position, call light within reach.      Problem: Knowledge Deficit - Standard  Goal: Patient and family/care givers will demonstrate understanding of plan of care, disease process/condition, diagnostic tests and medications  Outcome: Progressing     Problem: Optimal Care for Alcohol Withdrawal  Goal: Optimal Care for the alcohol withdrawal patient  Outcome: Progressing     Problem: Pain - Standard  Goal: Alleviation of pain or a reduction in pain to the patient’s comfort goal  Outcome: Progressing     Problem: Fall Risk  Goal: Patient will remain free from falls  Outcome: Progressing       Patient is not progressing towards the following goals:

## 2024-04-27 ENCOUNTER — HOSPITAL ENCOUNTER (OUTPATIENT)
Dept: LAB | Facility: MEDICAL CENTER | Age: 39
End: 2024-04-27
Attending: STUDENT IN AN ORGANIZED HEALTH CARE EDUCATION/TRAINING PROGRAM
Payer: COMMERCIAL

## 2024-04-27 DIAGNOSIS — F10.20 ALCOHOLISM (HCC): Chronic | ICD-10-CM

## 2024-04-27 LAB
ALBUMIN SERPL BCP-MCNC: 4 G/DL (ref 3.2–4.9)
ALBUMIN/GLOB SERPL: 1.1 G/DL
ALP SERPL-CCNC: 121 U/L (ref 30–99)
ALT SERPL-CCNC: 157 U/L (ref 2–50)
ANION GAP SERPL CALC-SCNC: 11 MMOL/L (ref 7–16)
AST SERPL-CCNC: 130 U/L (ref 12–45)
BASOPHILS # BLD AUTO: 0.9 % (ref 0–1.8)
BASOPHILS # BLD: 0.06 K/UL (ref 0–0.12)
BILIRUB SERPL-MCNC: 0.6 MG/DL (ref 0.1–1.5)
BUN SERPL-MCNC: 9 MG/DL (ref 8–22)
CALCIUM ALBUM COR SERPL-MCNC: 8.8 MG/DL (ref 8.5–10.5)
CALCIUM SERPL-MCNC: 8.8 MG/DL (ref 8.5–10.5)
CHLORIDE SERPL-SCNC: 104 MMOL/L (ref 96–112)
CO2 SERPL-SCNC: 25 MMOL/L (ref 20–33)
CREAT SERPL-MCNC: 0.61 MG/DL (ref 0.5–1.4)
EOSINOPHIL # BLD AUTO: 0.32 K/UL (ref 0–0.51)
EOSINOPHIL NFR BLD: 4.6 % (ref 0–6.9)
ERYTHROCYTE [DISTWIDTH] IN BLOOD BY AUTOMATED COUNT: 51.7 FL (ref 35.9–50)
GFR SERPLBLD CREATININE-BSD FMLA CKD-EPI: 127 ML/MIN/1.73 M 2
GLOBULIN SER CALC-MCNC: 3.6 G/DL (ref 1.9–3.5)
GLUCOSE SERPL-MCNC: 139 MG/DL (ref 65–99)
HCT VFR BLD AUTO: 47.2 % (ref 42–52)
HGB BLD-MCNC: 15.2 G/DL (ref 14–18)
IMM GRANULOCYTES # BLD AUTO: 0.03 K/UL (ref 0–0.11)
IMM GRANULOCYTES NFR BLD AUTO: 0.4 % (ref 0–0.9)
LYMPHOCYTES # BLD AUTO: 1.6 K/UL (ref 1–4.8)
LYMPHOCYTES NFR BLD: 22.8 % (ref 22–41)
MAGNESIUM SERPL-MCNC: 2 MG/DL (ref 1.5–2.5)
MCH RBC QN AUTO: 28.9 PG (ref 27–33)
MCHC RBC AUTO-ENTMCNC: 32.2 G/DL (ref 32.3–36.5)
MCV RBC AUTO: 89.7 FL (ref 81.4–97.8)
MONOCYTES # BLD AUTO: 0.53 K/UL (ref 0–0.85)
MONOCYTES NFR BLD AUTO: 7.6 % (ref 0–13.4)
NEUTROPHILS # BLD AUTO: 4.47 K/UL (ref 1.82–7.42)
NEUTROPHILS NFR BLD: 63.7 % (ref 44–72)
NRBC # BLD AUTO: 0 K/UL
NRBC BLD-RTO: 0 /100 WBC (ref 0–0.2)
PLATELET # BLD AUTO: 292 K/UL (ref 164–446)
PMV BLD AUTO: 9.4 FL (ref 9–12.9)
POTASSIUM SERPL-SCNC: 4.3 MMOL/L (ref 3.6–5.5)
PROT SERPL-MCNC: 7.6 G/DL (ref 6–8.2)
RBC # BLD AUTO: 5.26 M/UL (ref 4.7–6.1)
SODIUM SERPL-SCNC: 140 MMOL/L (ref 135–145)
WBC # BLD AUTO: 7 K/UL (ref 4.8–10.8)

## 2024-04-27 PROCEDURE — 83735 ASSAY OF MAGNESIUM: CPT

## 2024-04-27 PROCEDURE — 80053 COMPREHEN METABOLIC PANEL: CPT

## 2024-04-27 PROCEDURE — 85025 COMPLETE CBC W/AUTO DIFF WBC: CPT

## 2024-04-27 PROCEDURE — 36415 COLL VENOUS BLD VENIPUNCTURE: CPT

## 2024-05-01 ENCOUNTER — OFFICE VISIT (OUTPATIENT)
Dept: MEDICAL GROUP | Facility: PHYSICIAN GROUP | Age: 39
End: 2024-05-01
Payer: COMMERCIAL

## 2024-05-01 VITALS
SYSTOLIC BLOOD PRESSURE: 118 MMHG | TEMPERATURE: 98.1 F | DIASTOLIC BLOOD PRESSURE: 86 MMHG | WEIGHT: 259.1 LBS | HEIGHT: 63 IN | HEART RATE: 86 BPM | OXYGEN SATURATION: 96 % | BODY MASS INDEX: 45.91 KG/M2

## 2024-05-01 DIAGNOSIS — E66.01 MORBID OBESITY (HCC): Chronic | ICD-10-CM

## 2024-05-01 DIAGNOSIS — R79.89 ELEVATED LFTS: Chronic | ICD-10-CM

## 2024-05-01 DIAGNOSIS — K21.9 GASTROESOPHAGEAL REFLUX DISEASE, UNSPECIFIED WHETHER ESOPHAGITIS PRESENT: Chronic | ICD-10-CM

## 2024-05-01 DIAGNOSIS — R19.00 INTRAABDOMINAL MASS: ICD-10-CM

## 2024-05-01 DIAGNOSIS — G47.33 OSA ON CPAP: ICD-10-CM

## 2024-05-01 DIAGNOSIS — F10.20 ALCOHOLISM (HCC): Chronic | ICD-10-CM

## 2024-05-01 DIAGNOSIS — I10 ESSENTIAL HYPERTENSION: Chronic | ICD-10-CM

## 2024-05-01 DIAGNOSIS — Z09 HOSPITAL DISCHARGE FOLLOW-UP: ICD-10-CM

## 2024-05-01 DIAGNOSIS — R73.9 HYPERGLYCEMIA: ICD-10-CM

## 2024-05-01 DIAGNOSIS — M10.9 GOUT, UNSPECIFIED CAUSE, UNSPECIFIED CHRONICITY, UNSPECIFIED SITE: Chronic | ICD-10-CM

## 2024-05-01 PROBLEM — I16.0 HYPERTENSIVE URGENCY: Status: RESOLVED | Noted: 2023-12-02 | Resolved: 2024-05-01

## 2024-05-01 PROBLEM — J18.9 PNEUMONIA: Status: RESOLVED | Noted: 2024-02-24 | Resolved: 2024-05-01

## 2024-05-01 PROBLEM — E83.39 HYPOPHOSPHATEMIA: Status: RESOLVED | Noted: 2023-06-01 | Resolved: 2024-05-01

## 2024-05-01 PROBLEM — F10.231 ALCOHOL DEPENDENCE WITH WITHDRAWAL DELIRIUM (HCC): Status: RESOLVED | Noted: 2024-04-21 | Resolved: 2024-05-01

## 2024-05-01 PROBLEM — R09.89 CHEST CONGESTION: Status: RESOLVED | Noted: 2024-02-22 | Resolved: 2024-05-01

## 2024-05-01 PROBLEM — R00.0 TACHYCARDIA: Status: RESOLVED | Noted: 2024-04-22 | Resolved: 2024-05-01

## 2024-05-01 PROBLEM — E83.42 HYPOMAGNESEMIA: Status: RESOLVED | Noted: 2023-06-01 | Resolved: 2024-05-01

## 2024-05-01 PROBLEM — E87.6 HYPOKALEMIA: Status: RESOLVED | Noted: 2023-12-02 | Resolved: 2024-05-01

## 2024-05-01 PROCEDURE — 99215 OFFICE O/P EST HI 40 MIN: CPT | Performed by: INTERNAL MEDICINE

## 2024-05-01 PROCEDURE — 3074F SYST BP LT 130 MM HG: CPT | Performed by: INTERNAL MEDICINE

## 2024-05-01 PROCEDURE — 3079F DIAST BP 80-89 MM HG: CPT | Performed by: INTERNAL MEDICINE

## 2024-05-01 PROCEDURE — 99417 PROLNG OP E/M EACH 15 MIN: CPT | Performed by: INTERNAL MEDICINE

## 2024-05-01 ASSESSMENT — FIBROSIS 4 INDEX: FIB4 SCORE: 1.28

## 2024-05-01 NOTE — ASSESSMENT & PLAN NOTE
Chronic condition.  Patient presently taking amlodipine losartan and metoprolol.  BP is normal today

## 2024-05-01 NOTE — ASSESSMENT & PLAN NOTE
This is a chronic and recurrent condition.  The patient was admitted to hospital recently due to tachycardia and alcohol withdrawal.    Cording to hospital record patient reported drinking 5-6 shots of vodka daily basis last drink was the night prior to hospital admission.  Blood pressures were elevated up to 189/98 with heart rate in the 140s.  The patient was noted in severe alcohol withdrawal requiring several boluses of IV lorazepam.    Patient was admitted to the hospital and was treated with lorazepam.    Events was found to be elevated and subsequently improve at the time of discharge.    Patient was negative for portal vein thrombus.  Blood pressure was 150/84 prior to discharge.    According to ER note the patient was referred to Alcoholics Anonymous and he was also refer to behavioral health for counseling.  Advised the patient to follow-up with the specialist as discussed.  Patient presently taking naltrexone.

## 2024-05-01 NOTE — PROGRESS NOTES
PRIMARY CARE CLINIC VISIT        Chief Complaint   Patient presents with    Follow-Up     Hospital follow up      Collar dependence  Elevated liver enzymes  Intra-abdominal mass  Hypertension  Acid reflux  Gout  Obesity  Hyperglycemia  Sleep apnea  Lab test results  Medication review        History of Present Illness     Intraabdominal mass  This is a new condition.  CT scan of the chest done recently showed no evidence of pulmonary embolism.  Incidentally there was a 3.3 cm rounded lesion beneath the left diaphragm dome in the left upper quadrant, which could represent an accessory spleen versus others.  Benign and malignant neoplastic process was on the differential.  Patient was referred to general surgery for consultation.    Alcohol dependence with alcohol withdrawal  This is a chronic and recurrent condition.  The patient was admitted to hospital recently due to tachycardia and alcohol withdrawal.    AcCording to hospital record patient reported drinking 5-6 shots of vodka daily basis last drink was the night prior to hospital admission.  Blood pressures were elevated up to 189/98 with heart rate in the 140s.  The patient was noted in severe alcohol withdrawal requiring several boluses of IV lorazepam.    Patient was admitted to the hospital and was treated including the use of lorazepam.    Abdominal ultrasound was done negative for portal vein thrombus.  Blood pressure was 150/84 prior to discharge.    According to ER note the patient was referred to Alcoholics Anonymous and he was also refer to behavioral health for counseling.  Advised the patient to follow-up with the specialist as discussed.  Patient presently taking naltrexone.  Reported that he has not had any alcohol since discharge from the hospital.  Patient has been attending alcoholic Anonymous meeting which seem to help.    Elevated LFTs  This is a chronic condition.  Related to EtOH.  [facility]  Lab test result discussed with the patient.  Recommend patient to repeat the lab test and to follow-up with GI specialist.  Reported that he has pending appointment next week  Stressed important to keep this appointment.     Latest Reference Range & Units 04/22/24 02:58 04/23/24 02:08 04/27/24 08:48   AST(SGOT) 12 - 45 U/L 208 (H) 105 (H) 130 (H)   ALT(SGPT) 2 - 50 U/L 145 (H) 102 (H) 157 (H)   Alkaline Phosphatase 30 - 99 U/L 118 (H) 113 (H) 121 (H)   Total Bilirubin 0.1 - 1.5 mg/dL 1.9 (H) 1.6 (H) 0.6   Albumin 3.2 - 4.9 g/dL 3.4 3.7 4.0   Total Protein 6.0 - 8.2 g/dL 6.7 7.0 7.6   Globulin 1.9 - 3.5 g/dL 3.3 3.3 3.6 (H)   A-G Ratio g/dL 1.0 1.1 1.1   Phosphorus 2.5 - 4.5 mg/dL 1.6 (L) 2.8    (H): Data is abnormally high  (L): Data is abnormally low    Essential hypertension  Chronic condition.  Patient presently taking amlodipine losartan and metoprolol.  BP is normal today    Morbid obesity (HCC)  Chronic condition.  Discussed with the patient regarding diet, exercise, and lifestyle modification to help achieve and maintain healthy weight         Gout  This is a chronic condition but the patient presently taking allopurinol.  The patient denies recent gout flare.    GERD (gastroesophageal reflux disease)  Chronic condition but the patient is being treated with omeprazole 40 Mg twice daily.    Hyperglycemia  This is a new condition.  Noted with chart review.  Glucose has been elevated when the patient was in hospital.  Patient asymptomatic.  Recommend fasting lab test to be done to include hemoglobin A1c.    BOLIVAR on CPAP  This is a chronic condition for the patient has been using CPAP.  Recommend patient to follow-up with sleep specialist as directed.  No new symptoms reported.    Current Outpatient Medications on File Prior to Visit   Medication Sig Dispense Refill    omeprazole (PRILOSEC) 40 MG delayed-release capsule Take 1 Capsule by mouth 2 times a day. 60 Capsule 1    sucralfate (CARAFATE) 1 GM  Tab Take 1 Tablet by mouth 2 times daily, before breakfast and dinner. 60 Tablet 1    multivitamin Tab Take 1 Tablet by mouth every day. 30 Tablet 1    folic acid (FOLVITE) 1 MG Tab Take 1 Tablet by mouth every day. 30 Tablet 1    thiamine (THIAMINE) 100 MG tablet Take 1 Tablet by mouth every day. 30 Tablet 1    losartan (COZAAR) 50 MG Tab Take 1 Tablet by mouth every day. 30 Tablet 1    amLODIPine (NORVASC) 10 MG Tab Take 1 Tablet by mouth every day. 30 Tablet 1    allopurinol (ZYLOPRIM) 300 MG Tab Take 1 Tablet by mouth every day. 30 Tablet 1    albuterol 108 (90 Base) MCG/ACT Aero Soln inhalation aerosol Inhale 2 Puffs every 6 hours as needed for Shortness of Breath. 8.5 g 1    naltrexone (DEPADE) 50 MG Tab Take 0.5 Tablets by mouth every day for 6 days, THEN 1 Tablet every day for 24 days. 27 Tablet 1    metoprolol tartrate (LOPRESSOR) 25 MG Tab Take 1 Tablet by mouth 2 times a day. 60 Tablet 1     No current facility-administered medications on file prior to visit.        Allergies: Patient has no known allergies.    Current Outpatient Medications Ordered in Epic   Medication Sig Dispense Refill    omeprazole (PRILOSEC) 40 MG delayed-release capsule Take 1 Capsule by mouth 2 times a day. 60 Capsule 1    sucralfate (CARAFATE) 1 GM Tab Take 1 Tablet by mouth 2 times daily, before breakfast and dinner. 60 Tablet 1    multivitamin Tab Take 1 Tablet by mouth every day. 30 Tablet 1    folic acid (FOLVITE) 1 MG Tab Take 1 Tablet by mouth every day. 30 Tablet 1    thiamine (THIAMINE) 100 MG tablet Take 1 Tablet by mouth every day. 30 Tablet 1    losartan (COZAAR) 50 MG Tab Take 1 Tablet by mouth every day. 30 Tablet 1    amLODIPine (NORVASC) 10 MG Tab Take 1 Tablet by mouth every day. 30 Tablet 1    allopurinol (ZYLOPRIM) 300 MG Tab Take 1 Tablet by mouth every day. 30 Tablet 1    albuterol 108 (90 Base) MCG/ACT Aero Soln inhalation aerosol Inhale 2 Puffs every 6 hours as needed for Shortness of Breath. 8.5 g 1     "naltrexone (DEPADE) 50 MG Tab Take 0.5 Tablets by mouth every day for 6 days, THEN 1 Tablet every day for 24 days. 27 Tablet 1    metoprolol tartrate (LOPRESSOR) 25 MG Tab Take 1 Tablet by mouth 2 times a day. 60 Tablet 1     No current Epic-ordered facility-administered medications on file.       Past Medical History:   Diagnosis Date    Asthma without status asthmaticus 06/29/2017    Chickenpox     Hypertension        History reviewed. No pertinent surgical history.    Family History   Problem Relation Age of Onset    Stroke Neg Hx     Heart Disease Neg Hx        Social History     Tobacco Use   Smoking Status Never   Smokeless Tobacco Never       Social History     Substance and Sexual Activity   Alcohol Use Yes    Alcohol/week: 7.2 oz    Types: 12 Shots of liquor per week    Comment: every other day 4-5 cocktails       Review of systems  As per HPI above. All other systems reviewed and negative.      Past Medical, Social, and Family history reviewed and updated in [facility]       LAB DATA:    Lab Results   Component Value Date/Time    HBA1C 5.3 02/24/2024 12:43 PM    HBA1C 5.3 06/03/2023 03:01 AM       Lab Results   Component Value Date/Time    WBC 7.0 04/27/2024 08:48 AM    HEMOGLOBIN 15.2 04/27/2024 08:48 AM    HEMATOCRIT 47.2 04/27/2024 08:48 AM    MCV 89.7 04/27/2024 08:48 AM    PLATELETCT 292 04/27/2024 08:48 AM       Lab Results   Component Value Date/Time    SODIUM 140 04/27/2024 08:48 AM    POTASSIUM 4.3 04/27/2024 08:48 AM    GLUCOSE 139 (H) 04/27/2024 08:48 AM    BUN 9 04/27/2024 08:48 AM    CREATININE 0.61 04/27/2024 08:48 AM       Lab Results   Component Value Date/Time    CHOLSTRLTOT 185 02/24/2024 12:43 PM    TRIGLYCERIDE 94 02/24/2024 12:43 PM       Lab Results   Component Value Date/Time    ALTSGPT 157 (H) 04/27/2024 08:48 AM          Objective     /86 (BP Location: Right arm, Patient Position: Sitting, BP Cuff Size: Adult)   Pulse 86   Temp 36.7 °C (98.1 °F) (Temporal)   Ht 1.6 m (5' 3\")   " Wt 118 kg (259 lb 1.6 oz)   SpO2 96%    Body mass index is 45.9 kg/m².    General: alert in no apparent distress.  Cardiovascular: regular rate and rhythm  Pulmonary: lungs : no wheezing   Gastrointestinal: BS present.  No rebound guarding or rigidity noted.  Cranial nerves II to XII grossly intact.  No tremors or any obvious signs of infection.    Assessment and Plan     1. Hospital discharge follow-up    2. Alcohol dependence with alcohol withdrawal  Chronic recurrent condition.  Medically patient is stable.  Blood pressure is stable.  O2 saturation 96%.  No withdrawal symptoms noted  Advised the patient to follow-up with alcoholic Anonymous.  He is also reminded to schedule an appointment with psychiatry/behavioral health for therapy.  Continue with naltrexone 50 mg daily.    3. Elevated LFTs  Chronic worsening condition.  Liver enzyme was elevated in the 500s in the hospital.  Result discussed with the patient.  Recommend patient to repeat another blood test for follow-up.    Patient stated that he has pending appointment to follow-up with GI specialist as above next week.  - HEPATIC FUNCTION PANEL; Future    4. Intraabdominal mass  This is a new condition.  Incidentally noted with recent CT scan.  The cause unclear.  Rule out benign versus malignant process.  - Referral to General Surgery for consultation.  Currently the patient asymptomatic.    5. Essential hypertension  Chronic stable condition.  Continue losartan 50 mg daily and amlodipine 10 mg daily.    6. Gastroesophageal reflux disease, unspecified whether esophagitis present  Chronic stable.  Continue omeprazole 40 Mg daily    7. Gout, unspecified cause, unspecified chronicity, unspecified site  Chronic stable.  Continue allopurinol 30 mg daily    8. Morbid obesity (HCC)  Chronic condition.  Uncontrolled.  Recommend diet and lifestyle modification.  Encouraged patient to lose weight.    9. Hyperglycemia  New condition.  Noted with chart review.   Recommend fasting glucose and A1c to be done for follow-up.  Diet and exercise advised.    - HEMOGLOBIN A1C; Future  - Basic Metabolic Panel; Future    10. BOLIVAR on CPAP  Chronic stable condition to continue with CPAP use nightly.          Attestation: I spent:   62  minutes -    That includes time for chart review before the visit, the actual patient visit, and time spent on documentation in EMR after the visit.  Chart review/prep, review of other providers' records, imaging/lab review, face-to-face time for history/examination, pt's counseling/education, ordering, prescribing,  review of results/meds/ treatment plan with patient, and care coordination.               Please note that this dictation was created using voice recognition software. I have made every reasonable attempt to correct obvious errors, but I expect that there are errors of grammar and possibly content that I did not discover before finalizing the note.    Rey Pacheco MD  Internal Medicine  [facility]

## 2024-05-01 NOTE — ASSESSMENT & PLAN NOTE
This is a new condition.  Noted with chart review.  Glucose has been elevated when the patient was in hospital.  Patient asymptomatic.  Recommend fasting lab test to be done to include hemoglobin A1c.

## 2024-05-01 NOTE — ASSESSMENT & PLAN NOTE
This is a chronic condition for the patient has been using CPAP.  Recommend patient to follow-up with sleep specialist as directed.  No new symptoms reported.

## 2024-05-01 NOTE — ASSESSMENT & PLAN NOTE
This is a new condition.  CT scan of the chest done recently showed no evidence of pulmonary embolism.  Incidentally there was a 3.3 cm rounded lesion beneath the left diaphragm dome in the left upper quadrant, which could represent an accessory spleen versus others.  Benign and malignant neoplastic process was on the differential.  Patient was referred to general surgery for consultation.

## 2024-05-01 NOTE — ASSESSMENT & PLAN NOTE
This is a chronic condition but the patient presently taking allopurinol.  The patient denies recent gout flare.

## 2024-05-17 ENCOUNTER — HOSPITAL ENCOUNTER (OUTPATIENT)
Dept: CARDIOLOGY | Facility: MEDICAL CENTER | Age: 39
End: 2024-05-17
Attending: INTERNAL MEDICINE
Payer: COMMERCIAL

## 2024-05-21 ENCOUNTER — APPOINTMENT (OUTPATIENT)
Dept: RADIOLOGY | Facility: MEDICAL CENTER | Age: 39
DRG: 896 | End: 2024-05-21
Attending: EMERGENCY MEDICINE
Payer: COMMERCIAL

## 2024-05-21 ENCOUNTER — HOSPITAL ENCOUNTER (INPATIENT)
Facility: MEDICAL CENTER | Age: 39
LOS: 1 days | DRG: 896 | End: 2024-05-22
Attending: EMERGENCY MEDICINE | Admitting: STUDENT IN AN ORGANIZED HEALTH CARE EDUCATION/TRAINING PROGRAM
Payer: COMMERCIAL

## 2024-05-21 DIAGNOSIS — F10.930 ALCOHOL WITHDRAWAL SYNDROME WITHOUT COMPLICATION (HCC): ICD-10-CM

## 2024-05-21 DIAGNOSIS — I10 ESSENTIAL HYPERTENSION: Chronic | ICD-10-CM

## 2024-05-21 DIAGNOSIS — R06.00 DYSPNEA, UNSPECIFIED TYPE: ICD-10-CM

## 2024-05-21 DIAGNOSIS — J45.21 MILD INTERMITTENT ASTHMA WITH ACUTE EXACERBATION: ICD-10-CM

## 2024-05-21 DIAGNOSIS — G47.33 OSA (OBSTRUCTIVE SLEEP APNEA): ICD-10-CM

## 2024-05-21 DIAGNOSIS — I10 HYPERTENSION, UNSPECIFIED TYPE: ICD-10-CM

## 2024-05-21 DIAGNOSIS — E87.0 HYPERNATREMIA: ICD-10-CM

## 2024-05-21 DIAGNOSIS — F10.231 ALCOHOL DEPENDENCE WITH WITHDRAWAL DELIRIUM (HCC): ICD-10-CM

## 2024-05-21 PROBLEM — R74.01 TRANSAMINITIS: Status: ACTIVE | Noted: 2024-05-21

## 2024-05-21 PROBLEM — F10.931 ALCOHOL WITHDRAWAL SYNDROME, WITH DELIRIUM (HCC): Status: ACTIVE | Noted: 2024-05-21

## 2024-05-21 PROBLEM — E83.51 HYPOCALCEMIA: Status: ACTIVE | Noted: 2024-05-21

## 2024-05-21 PROBLEM — J96.01 ACUTE RESPIRATORY FAILURE WITH HYPOXIA (HCC): Status: ACTIVE | Noted: 2024-05-21

## 2024-05-21 PROBLEM — F10.931 ALCOHOL WITHDRAWAL DELIRIUM, ACUTE, HYPERACTIVE (HCC): Status: ACTIVE | Noted: 2024-05-21

## 2024-05-21 LAB
ALBUMIN SERPL BCP-MCNC: 4 G/DL (ref 3.2–4.9)
ALBUMIN/GLOB SERPL: 1.2 G/DL
ALP SERPL-CCNC: 119 U/L (ref 30–99)
ALT SERPL-CCNC: 59 U/L (ref 2–50)
AMPHET UR QL SCN: NEGATIVE
ANION GAP SERPL CALC-SCNC: 22 MMOL/L (ref 7–16)
APPEARANCE UR: CLEAR
AST SERPL-CCNC: 73 U/L (ref 12–45)
BARBITURATES UR QL SCN: NEGATIVE
BASE EXCESS BLDV CALC-SCNC: -4 MMOL/L
BASOPHILS # BLD AUTO: 0.5 % (ref 0–1.8)
BASOPHILS # BLD: 0.04 K/UL (ref 0–0.12)
BENZODIAZ UR QL SCN: POSITIVE
BILIRUB SERPL-MCNC: 1.3 MG/DL (ref 0.1–1.5)
BILIRUB UR QL STRIP.AUTO: NEGATIVE
BODY TEMPERATURE: 36.4 CENTIGRADE
BUN SERPL-MCNC: 4 MG/DL (ref 8–22)
BZE UR QL SCN: NEGATIVE
CALCIUM ALBUM COR SERPL-MCNC: 7.9 MG/DL (ref 8.5–10.5)
CALCIUM SERPL-MCNC: 7.9 MG/DL (ref 8.5–10.5)
CANNABINOIDS UR QL SCN: NEGATIVE
CHLORIDE SERPL-SCNC: 108 MMOL/L (ref 96–112)
CO2 SERPL-SCNC: 20 MMOL/L (ref 20–33)
COLOR UR: YELLOW
CREAT SERPL-MCNC: 0.77 MG/DL (ref 0.5–1.4)
D DIMER PPP IA.FEU-MCNC: 0.41 UG/ML (FEU) (ref 0–0.5)
EKG IMPRESSION: NORMAL
EOSINOPHIL # BLD AUTO: 0.03 K/UL (ref 0–0.51)
EOSINOPHIL NFR BLD: 0.4 % (ref 0–6.9)
ERYTHROCYTE [DISTWIDTH] IN BLOOD BY AUTOMATED COUNT: 53.3 FL (ref 35.9–50)
ETHANOL BLD-MCNC: <10.1 MG/DL
FENTANYL UR QL: NEGATIVE
GFR SERPLBLD CREATININE-BSD FMLA CKD-EPI: 119 ML/MIN/1.73 M 2
GLOBULIN SER CALC-MCNC: 3.3 G/DL (ref 1.9–3.5)
GLUCOSE BLD STRIP.AUTO-MCNC: 138 MG/DL (ref 65–99)
GLUCOSE BLD STRIP.AUTO-MCNC: 139 MG/DL (ref 65–99)
GLUCOSE BLD STRIP.AUTO-MCNC: 150 MG/DL (ref 65–99)
GLUCOSE SERPL-MCNC: 129 MG/DL (ref 65–99)
GLUCOSE UR STRIP.AUTO-MCNC: NEGATIVE MG/DL
HCO3 BLDV-SCNC: 19 MMOL/L (ref 24–28)
HCT VFR BLD AUTO: 43 % (ref 42–52)
HGB BLD-MCNC: 14.5 G/DL (ref 14–18)
IMM GRANULOCYTES # BLD AUTO: 0.02 K/UL (ref 0–0.11)
IMM GRANULOCYTES NFR BLD AUTO: 0.3 % (ref 0–0.9)
INHALED O2 FLOW RATE: ABNORMAL L/MIN
INR PPP: 1.2 (ref 0.87–1.13)
KETONES UR STRIP.AUTO-MCNC: ABNORMAL MG/DL
LACTATE SERPL-SCNC: 4.5 MMOL/L (ref 0.5–2)
LACTATE SERPL-SCNC: 5 MMOL/L (ref 0.5–2)
LACTATE SERPL-SCNC: 5.6 MMOL/L (ref 0.5–2)
LACTATE SERPL-SCNC: 9.7 MMOL/L (ref 0.5–2)
LEUKOCYTE ESTERASE UR QL STRIP.AUTO: NEGATIVE
LYMPHOCYTES # BLD AUTO: 1.88 K/UL (ref 1–4.8)
LYMPHOCYTES NFR BLD: 23.8 % (ref 22–41)
MAGNESIUM SERPL-MCNC: 1.5 MG/DL (ref 1.5–2.5)
MCH RBC QN AUTO: 29.7 PG (ref 27–33)
MCHC RBC AUTO-ENTMCNC: 33.7 G/DL (ref 32.3–36.5)
MCV RBC AUTO: 88.1 FL (ref 81.4–97.8)
METHADONE UR QL SCN: NEGATIVE
MICRO URNS: ABNORMAL
MONOCYTES # BLD AUTO: 0.9 K/UL (ref 0–0.85)
MONOCYTES NFR BLD AUTO: 11.4 % (ref 0–13.4)
NEUTROPHILS # BLD AUTO: 5.04 K/UL (ref 1.82–7.42)
NEUTROPHILS NFR BLD: 63.6 % (ref 44–72)
NITRITE UR QL STRIP.AUTO: NEGATIVE
NRBC # BLD AUTO: 0 K/UL
NRBC BLD-RTO: 0 /100 WBC (ref 0–0.2)
NT-PROBNP SERPL IA-MCNC: 79 PG/ML (ref 0–125)
OPIATES UR QL SCN: NEGATIVE
OXYCODONE UR QL SCN: NEGATIVE
PCO2 BLDV: 28.2 MMHG (ref 41–51)
PCO2 TEMP ADJ BLDV: 27.5 MMHG (ref 41–51)
PCP UR QL SCN: NEGATIVE
PH BLDV: 7.45 [PH] (ref 7.31–7.45)
PH TEMP ADJ BLDV: 7.45 [PH] (ref 7.31–7.45)
PH UR STRIP.AUTO: 5.5 [PH] (ref 5–8)
PHOSPHATE SERPL-MCNC: 2.3 MG/DL (ref 2.5–4.5)
PLATELET # BLD AUTO: 268 K/UL (ref 164–446)
PMV BLD AUTO: 8.9 FL (ref 9–12.9)
PO2 BLDV: 52.5 MMHG (ref 25–40)
PO2 TEMP ADJ BLDV: 50.3 MMHG (ref 25–40)
POTASSIUM SERPL-SCNC: 3.1 MMOL/L (ref 3.6–5.5)
PROCALCITONIN SERPL-MCNC: 0.08 NG/ML
PROPOXYPH UR QL SCN: NEGATIVE
PROT SERPL-MCNC: 7.3 G/DL (ref 6–8.2)
PROT UR QL STRIP: NEGATIVE MG/DL
PROTHROMBIN TIME: 15.4 SEC (ref 12–14.6)
RBC # BLD AUTO: 4.88 M/UL (ref 4.7–6.1)
RBC UR QL AUTO: NEGATIVE
SAO2 % BLDV: 85.3 %
SODIUM SERPL-SCNC: 150 MMOL/L (ref 135–145)
SP GR UR STRIP.AUTO: 1.01
UROBILINOGEN UR STRIP.AUTO-MCNC: 0.2 MG/DL
WBC # BLD AUTO: 7.9 K/UL (ref 4.8–10.8)

## 2024-05-21 PROCEDURE — 99291 CRITICAL CARE FIRST HOUR: CPT | Performed by: STUDENT IN AN ORGANIZED HEALTH CARE EDUCATION/TRAINING PROGRAM

## 2024-05-21 PROCEDURE — 99291 CRITICAL CARE FIRST HOUR: CPT | Performed by: INTERNAL MEDICINE

## 2024-05-21 PROCEDURE — HZ2ZZZZ DETOXIFICATION SERVICES FOR SUBSTANCE ABUSE TREATMENT: ICD-10-PCS | Performed by: STUDENT IN AN ORGANIZED HEALTH CARE EDUCATION/TRAINING PROGRAM

## 2024-05-21 RX ORDER — SODIUM CHLORIDE AND POTASSIUM CHLORIDE 300; 900 MG/100ML; MG/100ML
INJECTION, SOLUTION INTRAVENOUS CONTINUOUS
Status: DISCONTINUED | OUTPATIENT
Start: 2024-05-21 | End: 2024-05-21

## 2024-05-21 RX ORDER — LABETALOL 200 MG/1
200 TABLET, FILM COATED ORAL EVERY 6 HOURS PRN
Status: DISCONTINUED | OUTPATIENT
Start: 2024-05-21 | End: 2024-05-22 | Stop reason: HOSPADM

## 2024-05-21 RX ORDER — CHLORDIAZEPOXIDE HYDROCHLORIDE 25 MG/1
25 CAPSULE, GELATIN COATED ORAL EVERY 6 HOURS
Status: DISCONTINUED | OUTPATIENT
Start: 2024-05-22 | End: 2024-05-21

## 2024-05-21 RX ORDER — LORAZEPAM 1 MG/1
0.5 TABLET ORAL EVERY 4 HOURS PRN
Status: DISCONTINUED | OUTPATIENT
Start: 2024-05-21 | End: 2024-05-21

## 2024-05-21 RX ORDER — MIDAZOLAM HYDROCHLORIDE 1 MG/ML
2 INJECTION INTRAMUSCULAR; INTRAVENOUS
Status: DISCONTINUED | OUTPATIENT
Start: 2024-05-21 | End: 2024-05-21

## 2024-05-21 RX ORDER — LORAZEPAM 2 MG/ML
1 INJECTION INTRAMUSCULAR ONCE
Status: COMPLETED | OUTPATIENT
Start: 2024-05-21 | End: 2024-05-21

## 2024-05-21 RX ORDER — AMLODIPINE BESYLATE 10 MG/1
10 TABLET ORAL DAILY
Status: DISCONTINUED | OUTPATIENT
Start: 2024-05-21 | End: 2024-05-22 | Stop reason: HOSPADM

## 2024-05-21 RX ORDER — SUCRALFATE 1 G/1
1 TABLET ORAL
Status: DISCONTINUED | OUTPATIENT
Start: 2024-05-21 | End: 2024-05-22 | Stop reason: HOSPADM

## 2024-05-21 RX ORDER — IPRATROPIUM BROMIDE AND ALBUTEROL SULFATE 2.5; .5 MG/3ML; MG/3ML
3 SOLUTION RESPIRATORY (INHALATION)
Status: COMPLETED | OUTPATIENT
Start: 2024-05-21 | End: 2024-05-21

## 2024-05-21 RX ORDER — MIDAZOLAM HYDROCHLORIDE 1 MG/ML
5 INJECTION INTRAMUSCULAR; INTRAVENOUS
Status: DISCONTINUED | OUTPATIENT
Start: 2024-05-21 | End: 2024-05-21

## 2024-05-21 RX ORDER — PROMETHAZINE HYDROCHLORIDE 25 MG/1
12.5-25 SUPPOSITORY RECTAL EVERY 4 HOURS PRN
Status: DISCONTINUED | OUTPATIENT
Start: 2024-05-21 | End: 2024-05-22 | Stop reason: HOSPADM

## 2024-05-21 RX ORDER — MIDAZOLAM HYDROCHLORIDE 1 MG/ML
5 INJECTION INTRAMUSCULAR; INTRAVENOUS
Status: DISCONTINUED | OUTPATIENT
Start: 2024-05-21 | End: 2024-05-22 | Stop reason: HOSPADM

## 2024-05-21 RX ORDER — IPRATROPIUM BROMIDE AND ALBUTEROL SULFATE 2.5; .5 MG/3ML; MG/3ML
3 SOLUTION RESPIRATORY (INHALATION)
Status: DISCONTINUED | OUTPATIENT
Start: 2024-05-21 | End: 2024-05-21

## 2024-05-21 RX ORDER — IPRATROPIUM BROMIDE AND ALBUTEROL SULFATE 2.5; .5 MG/3ML; MG/3ML
3 SOLUTION RESPIRATORY (INHALATION)
Status: DISCONTINUED | OUTPATIENT
Start: 2024-05-21 | End: 2024-05-22 | Stop reason: HOSPADM

## 2024-05-21 RX ORDER — DEXMEDETOMIDINE HYDROCHLORIDE 4 UG/ML
.1-1 INJECTION, SOLUTION INTRAVENOUS CONTINUOUS
Status: DISCONTINUED | OUTPATIENT
Start: 2024-05-21 | End: 2024-05-22

## 2024-05-21 RX ORDER — CHLORDIAZEPOXIDE HYDROCHLORIDE 25 MG/1
50 CAPSULE, GELATIN COATED ORAL EVERY 6 HOURS
Status: DISCONTINUED | OUTPATIENT
Start: 2024-05-21 | End: 2024-05-21

## 2024-05-21 RX ORDER — FOLIC ACID 1 MG/1
1 TABLET ORAL DAILY
Status: DISCONTINUED | OUTPATIENT
Start: 2024-05-21 | End: 2024-05-21

## 2024-05-21 RX ORDER — CHLORDIAZEPOXIDE HYDROCHLORIDE 25 MG/1
25 CAPSULE, GELATIN COATED ORAL EVERY 8 HOURS
Status: DISCONTINUED | OUTPATIENT
Start: 2024-05-21 | End: 2024-05-22 | Stop reason: HOSPADM

## 2024-05-21 RX ORDER — MIDAZOLAM HYDROCHLORIDE 1 MG/ML
1 INJECTION INTRAMUSCULAR; INTRAVENOUS
Status: DISCONTINUED | OUTPATIENT
Start: 2024-05-21 | End: 2024-05-21

## 2024-05-21 RX ORDER — LORAZEPAM 2 MG/ML
2 INJECTION INTRAMUSCULAR ONCE
Status: COMPLETED | OUTPATIENT
Start: 2024-05-21 | End: 2024-05-21

## 2024-05-21 RX ORDER — DEXTROSE MONOHYDRATE 25 G/50ML
25 INJECTION, SOLUTION INTRAVENOUS
Status: DISCONTINUED | OUTPATIENT
Start: 2024-05-21 | End: 2024-05-22 | Stop reason: HOSPADM

## 2024-05-21 RX ORDER — OMEPRAZOLE 20 MG/1
40 CAPSULE, DELAYED RELEASE ORAL 2 TIMES DAILY
Status: DISCONTINUED | OUTPATIENT
Start: 2024-05-21 | End: 2024-05-22 | Stop reason: HOSPADM

## 2024-05-21 RX ORDER — HEPARIN SODIUM 5000 [USP'U]/ML
5000 INJECTION, SOLUTION INTRAVENOUS; SUBCUTANEOUS EVERY 8 HOURS
Status: DISCONTINUED | OUTPATIENT
Start: 2024-05-21 | End: 2024-05-22 | Stop reason: HOSPADM

## 2024-05-21 RX ORDER — SODIUM CHLORIDE, SODIUM LACTATE, POTASSIUM CHLORIDE, AND CALCIUM CHLORIDE .6; .31; .03; .02 G/100ML; G/100ML; G/100ML; G/100ML
500 INJECTION, SOLUTION INTRAVENOUS
Status: DISCONTINUED | OUTPATIENT
Start: 2024-05-21 | End: 2024-05-21

## 2024-05-21 RX ORDER — DIAZEPAM 5 MG/ML
10 INJECTION, SOLUTION INTRAMUSCULAR; INTRAVENOUS
Status: DISCONTINUED | OUTPATIENT
Start: 2024-05-21 | End: 2024-05-21

## 2024-05-21 RX ORDER — MAGNESIUM SULFATE 1 G/100ML
1 INJECTION INTRAVENOUS ONCE
Status: COMPLETED | OUTPATIENT
Start: 2024-05-21 | End: 2024-05-21

## 2024-05-21 RX ORDER — SODIUM CHLORIDE AND POTASSIUM CHLORIDE 300; 900 MG/100ML; MG/100ML
2000 INJECTION, SOLUTION INTRAVENOUS CONTINUOUS
Status: DISCONTINUED | OUTPATIENT
Start: 2024-05-21 | End: 2024-05-21

## 2024-05-21 RX ORDER — POTASSIUM CHLORIDE 20 MEQ/1
40 TABLET, EXTENDED RELEASE ORAL EVERY 4 HOURS
Status: COMPLETED | OUTPATIENT
Start: 2024-05-21 | End: 2024-05-21

## 2024-05-21 RX ORDER — HYDRALAZINE HYDROCHLORIDE 20 MG/ML
20 INJECTION INTRAMUSCULAR; INTRAVENOUS ONCE
Status: DISCONTINUED | OUTPATIENT
Start: 2024-05-21 | End: 2024-05-21

## 2024-05-21 RX ORDER — LABETALOL HYDROCHLORIDE 5 MG/ML
10-20 INJECTION, SOLUTION INTRAVENOUS
Status: DISCONTINUED | OUTPATIENT
Start: 2024-05-21 | End: 2024-05-22 | Stop reason: HOSPADM

## 2024-05-21 RX ORDER — ONDANSETRON 4 MG/1
4 TABLET, ORALLY DISINTEGRATING ORAL EVERY 4 HOURS PRN
Status: DISCONTINUED | OUTPATIENT
Start: 2024-05-21 | End: 2024-05-22 | Stop reason: HOSPADM

## 2024-05-21 RX ORDER — LORAZEPAM 2 MG/1
2 TABLET ORAL
Status: DISCONTINUED | OUTPATIENT
Start: 2024-05-21 | End: 2024-05-21

## 2024-05-21 RX ORDER — FOLIC ACID 1 MG/1
1 TABLET ORAL DAILY
Status: DISCONTINUED | OUTPATIENT
Start: 2024-05-22 | End: 2024-05-22 | Stop reason: HOSPADM

## 2024-05-21 RX ORDER — POLYETHYLENE GLYCOL 3350 17 G/17G
1 POWDER, FOR SOLUTION ORAL
Status: DISCONTINUED | OUTPATIENT
Start: 2024-05-21 | End: 2024-05-22 | Stop reason: HOSPADM

## 2024-05-21 RX ORDER — LOSARTAN POTASSIUM 50 MG/1
50 TABLET ORAL DAILY
Status: DISCONTINUED | OUTPATIENT
Start: 2024-05-21 | End: 2024-05-22 | Stop reason: HOSPADM

## 2024-05-21 RX ORDER — LORAZEPAM 2 MG/1
4 TABLET ORAL
Status: DISCONTINUED | OUTPATIENT
Start: 2024-05-21 | End: 2024-05-21

## 2024-05-21 RX ORDER — GAUZE BANDAGE 2" X 2"
100 BANDAGE TOPICAL DAILY
Status: DISCONTINUED | OUTPATIENT
Start: 2024-05-21 | End: 2024-05-21

## 2024-05-21 RX ORDER — MIDAZOLAM HYDROCHLORIDE 1 MG/ML
2 INJECTION INTRAMUSCULAR; INTRAVENOUS
Status: DISCONTINUED | OUTPATIENT
Start: 2024-05-21 | End: 2024-05-22 | Stop reason: HOSPADM

## 2024-05-21 RX ORDER — GAUZE BANDAGE 2" X 2"
100 BANDAGE TOPICAL DAILY
Status: DISCONTINUED | OUTPATIENT
Start: 2024-05-24 | End: 2024-05-22 | Stop reason: HOSPADM

## 2024-05-21 RX ORDER — HYDRALAZINE HYDROCHLORIDE 20 MG/ML
10 INJECTION INTRAMUSCULAR; INTRAVENOUS ONCE
Status: COMPLETED | OUTPATIENT
Start: 2024-05-21 | End: 2024-05-21

## 2024-05-21 RX ORDER — ALLOPURINOL 300 MG/1
300 TABLET ORAL DAILY
Status: DISCONTINUED | OUTPATIENT
Start: 2024-05-21 | End: 2024-05-22 | Stop reason: HOSPADM

## 2024-05-21 RX ORDER — ACETAMINOPHEN 325 MG/1
650 TABLET ORAL EVERY 6 HOURS PRN
Status: DISCONTINUED | OUTPATIENT
Start: 2024-05-21 | End: 2024-05-22 | Stop reason: HOSPADM

## 2024-05-21 RX ORDER — DEXMEDETOMIDINE HYDROCHLORIDE 4 UG/ML
.1-1.5 INJECTION, SOLUTION INTRAVENOUS CONTINUOUS
Status: DISCONTINUED | OUTPATIENT
Start: 2024-05-21 | End: 2024-05-21

## 2024-05-21 RX ORDER — LABETALOL HYDROCHLORIDE 5 MG/ML
10 INJECTION, SOLUTION INTRAVENOUS EVERY 4 HOURS PRN
Status: DISCONTINUED | OUTPATIENT
Start: 2024-05-21 | End: 2024-05-21

## 2024-05-21 RX ORDER — HYDRALAZINE HYDROCHLORIDE 20 MG/ML
10 INJECTION INTRAMUSCULAR; INTRAVENOUS EVERY 6 HOURS PRN
Status: DISCONTINUED | OUTPATIENT
Start: 2024-05-21 | End: 2024-05-22 | Stop reason: HOSPADM

## 2024-05-21 RX ORDER — POTASSIUM CHLORIDE 20 MEQ/1
40 TABLET, EXTENDED RELEASE ORAL ONCE
Status: DISCONTINUED | OUTPATIENT
Start: 2024-05-21 | End: 2024-05-21

## 2024-05-21 RX ORDER — AMOXICILLIN 250 MG
2 CAPSULE ORAL EVERY EVENING
Status: DISCONTINUED | OUTPATIENT
Start: 2024-05-21 | End: 2024-05-22 | Stop reason: HOSPADM

## 2024-05-21 RX ORDER — MIDAZOLAM HYDROCHLORIDE 1 MG/ML
4 INJECTION INTRAMUSCULAR; INTRAVENOUS
Status: DISCONTINUED | OUTPATIENT
Start: 2024-05-21 | End: 2024-05-21

## 2024-05-21 RX ORDER — PROMETHAZINE HYDROCHLORIDE 25 MG/1
12.5-25 TABLET ORAL EVERY 4 HOURS PRN
Status: DISCONTINUED | OUTPATIENT
Start: 2024-05-21 | End: 2024-05-22 | Stop reason: HOSPADM

## 2024-05-21 RX ORDER — FUROSEMIDE 10 MG/ML
40 INJECTION INTRAMUSCULAR; INTRAVENOUS
Status: DISCONTINUED | OUTPATIENT
Start: 2024-05-21 | End: 2024-05-22 | Stop reason: HOSPADM

## 2024-05-21 RX ORDER — ONDANSETRON 2 MG/ML
4 INJECTION INTRAMUSCULAR; INTRAVENOUS EVERY 4 HOURS PRN
Status: DISCONTINUED | OUTPATIENT
Start: 2024-05-21 | End: 2024-05-22 | Stop reason: HOSPADM

## 2024-05-21 RX ORDER — PROCHLORPERAZINE EDISYLATE 5 MG/ML
5-10 INJECTION INTRAMUSCULAR; INTRAVENOUS EVERY 4 HOURS PRN
Status: DISCONTINUED | OUTPATIENT
Start: 2024-05-21 | End: 2024-05-22 | Stop reason: HOSPADM

## 2024-05-21 RX ORDER — ALBUTEROL SULFATE 90 UG/1
2 AEROSOL, METERED RESPIRATORY (INHALATION) EVERY 6 HOURS PRN
Status: DISCONTINUED | OUTPATIENT
Start: 2024-05-21 | End: 2024-05-21

## 2024-05-21 RX ORDER — PANTOPRAZOLE SODIUM 40 MG/10ML
40 INJECTION, POWDER, LYOPHILIZED, FOR SOLUTION INTRAVENOUS 2 TIMES DAILY
Status: DISCONTINUED | OUTPATIENT
Start: 2024-05-21 | End: 2024-05-21

## 2024-05-21 RX ORDER — CALCIUM GLUCONATE 20 MG/ML
1 INJECTION, SOLUTION INTRAVENOUS ONCE
Status: COMPLETED | OUTPATIENT
Start: 2024-05-21 | End: 2024-05-21

## 2024-05-21 RX ORDER — LORAZEPAM 1 MG/1
1 TABLET ORAL EVERY 4 HOURS PRN
Status: DISCONTINUED | OUTPATIENT
Start: 2024-05-21 | End: 2024-05-21

## 2024-05-21 RX ADMIN — THERA TABS 1 TABLET: TAB at 09:03

## 2024-05-21 RX ADMIN — POTASSIUM CHLORIDE 40 MEQ: 1500 TABLET, EXTENDED RELEASE ORAL at 17:40

## 2024-05-21 RX ADMIN — ALLOPURINOL 300 MG: 300 TABLET ORAL at 09:08

## 2024-05-21 RX ADMIN — HEPARIN SODIUM 5000 UNITS: 5000 INJECTION, SOLUTION INTRAVENOUS; SUBCUTANEOUS at 09:08

## 2024-05-21 RX ADMIN — LORAZEPAM 1 MG: 2 INJECTION INTRAMUSCULAR; INTRAVENOUS at 06:00

## 2024-05-21 RX ADMIN — HEPARIN SODIUM 5000 UNITS: 5000 INJECTION, SOLUTION INTRAVENOUS; SUBCUTANEOUS at 15:02

## 2024-05-21 RX ADMIN — DEXMEDETOMIDINE HYDROCHLORIDE 0.2 MCG/KG/HR: 100 INJECTION, SOLUTION INTRAVENOUS at 08:08

## 2024-05-21 RX ADMIN — CHLORDIAZEPOXIDE HYDROCHLORIDE 25 MG: 25 CAPSULE ORAL at 15:02

## 2024-05-21 RX ADMIN — SUCRALFATE 1 G: 1 TABLET ORAL at 17:40

## 2024-05-21 RX ADMIN — IPRATROPIUM BROMIDE AND ALBUTEROL SULFATE 3 ML: 2.5; .5 SOLUTION RESPIRATORY (INHALATION) at 07:15

## 2024-05-21 RX ADMIN — CHLORDIAZEPOXIDE HYDROCHLORIDE 25 MG: 25 CAPSULE ORAL at 21:18

## 2024-05-21 RX ADMIN — POTASSIUM CHLORIDE 40 MEQ: 1500 TABLET, EXTENDED RELEASE ORAL at 09:08

## 2024-05-21 RX ADMIN — OMEPRAZOLE 40 MG: 20 CAPSULE, DELAYED RELEASE ORAL at 17:43

## 2024-05-21 RX ADMIN — MAGNESIUM SULFATE IN DEXTROSE 1 G: 10 INJECTION, SOLUTION INTRAVENOUS at 07:48

## 2024-05-21 RX ADMIN — THIAMINE HYDROCHLORIDE 500 MG: 100 INJECTION, SOLUTION INTRAMUSCULAR; INTRAVENOUS at 09:07

## 2024-05-21 RX ADMIN — OMEPRAZOLE 40 MG: 20 CAPSULE, DELAYED RELEASE ORAL at 09:03

## 2024-05-21 RX ADMIN — LORAZEPAM 1 MG: 2 INJECTION INTRAMUSCULAR; INTRAVENOUS at 07:08

## 2024-05-21 RX ADMIN — Medication 100 MG: at 07:11

## 2024-05-21 RX ADMIN — THIAMINE HYDROCHLORIDE 1000 ML: 100 INJECTION, SOLUTION INTRAMUSCULAR; INTRAVENOUS at 05:15

## 2024-05-21 RX ADMIN — POTASSIUM CHLORIDE 40 MEQ: 1500 TABLET, EXTENDED RELEASE ORAL at 15:02

## 2024-05-21 RX ADMIN — CHLORDIAZEPOXIDE HYDROCHLORIDE 25 MG: 25 CAPSULE ORAL at 09:03

## 2024-05-21 RX ADMIN — AMLODIPINE BESYLATE 10 MG: 10 TABLET ORAL at 09:08

## 2024-05-21 RX ADMIN — METOPROLOL TARTRATE 25 MG: 25 TABLET, FILM COATED ORAL at 17:40

## 2024-05-21 RX ADMIN — RACEPINEPHRINE HYDROCHLORIDE 0.5 ML: 11.25 SOLUTION RESPIRATORY (INHALATION) at 08:24

## 2024-05-21 RX ADMIN — FUROSEMIDE 40 MG: 10 INJECTION INTRAMUSCULAR; INTRAVENOUS at 07:22

## 2024-05-21 RX ADMIN — CALCIUM GLUCONATE 1 G: 20 INJECTION, SOLUTION INTRAVENOUS at 07:41

## 2024-05-21 RX ADMIN — LOSARTAN POTASSIUM 50 MG: 50 TABLET, FILM COATED ORAL at 08:24

## 2024-05-21 RX ADMIN — FOLIC ACID 1 MG: 1 TABLET ORAL at 07:11

## 2024-05-21 RX ADMIN — POTASSIUM CHLORIDE 40 MEQ: 1500 TABLET, EXTENDED RELEASE ORAL at 07:00

## 2024-05-21 RX ADMIN — FUROSEMIDE 40 MG: 10 INJECTION INTRAMUSCULAR; INTRAVENOUS at 17:40

## 2024-05-21 RX ADMIN — HEPARIN SODIUM 5000 UNITS: 5000 INJECTION, SOLUTION INTRAVENOUS; SUBCUTANEOUS at 21:16

## 2024-05-21 RX ADMIN — THIAMINE HYDROCHLORIDE 500 MG: 100 INJECTION, SOLUTION INTRAMUSCULAR; INTRAVENOUS at 15:06

## 2024-05-21 RX ADMIN — POTASSIUM PHOSPHATE, MONOBASIC AND POTASSIUM PHOSPHATE, DIBASIC 15 MMOL: 224; 236 INJECTION, SOLUTION, CONCENTRATE INTRAVENOUS at 09:18

## 2024-05-21 RX ADMIN — THERA TABS 1 TABLET: TAB at 07:11

## 2024-05-21 RX ADMIN — THIAMINE HYDROCHLORIDE 500 MG: 100 INJECTION, SOLUTION INTRAMUSCULAR; INTRAVENOUS at 21:24

## 2024-05-21 RX ADMIN — HYDRALAZINE HYDROCHLORIDE 10 MG: 20 INJECTION, SOLUTION INTRAMUSCULAR; INTRAVENOUS at 06:18

## 2024-05-21 RX ADMIN — METOPROLOL TARTRATE 25 MG: 25 TABLET, FILM COATED ORAL at 08:24

## 2024-05-21 RX ADMIN — LORAZEPAM 2 MG: 2 INJECTION INTRAMUSCULAR; INTRAVENOUS at 05:07

## 2024-05-21 SDOH — ECONOMIC STABILITY: TRANSPORTATION INSECURITY
IN THE PAST 12 MONTHS, HAS LACK OF RELIABLE TRANSPORTATION KEPT YOU FROM MEDICAL APPOINTMENTS, MEETINGS, WORK OR FROM GETTING THINGS NEEDED FOR DAILY LIVING?: PATIENT DECLINED

## 2024-05-21 SDOH — ECONOMIC STABILITY: TRANSPORTATION INSECURITY
IN THE PAST 12 MONTHS, HAS THE LACK OF TRANSPORTATION KEPT YOU FROM MEDICAL APPOINTMENTS OR FROM GETTING MEDICATIONS?: PATIENT DECLINED

## 2024-05-21 ASSESSMENT — COGNITIVE AND FUNCTIONAL STATUS - GENERAL
CLIMB 3 TO 5 STEPS WITH RAILING: A LITTLE
MOBILITY SCORE: 23
SUGGESTED CMS G CODE MODIFIER DAILY ACTIVITY: CH
DAILY ACTIVITIY SCORE: 24
SUGGESTED CMS G CODE MODIFIER MOBILITY: CI

## 2024-05-21 ASSESSMENT — LIFESTYLE VARIABLES
HAVE PEOPLE ANNOYED YOU BY CRITICIZING YOUR DRINKING: YES
EVER HAD A DRINK FIRST THING IN THE MORNING TO STEADY YOUR NERVES TO GET RID OF A HANGOVER: NO
SUBSTANCE_ABUSE: 0
AVERAGE NUMBER OF DAYS PER WEEK YOU HAVE A DRINK CONTAINING ALCOHOL: 7
ALCOHOL_USE: YES
CONSUMPTION TOTAL: POSITIVE
ON A TYPICAL DAY WHEN YOU DRINK ALCOHOL HOW MANY DRINKS DO YOU HAVE: 5
HOW MANY TIMES IN THE PAST YEAR HAVE YOU HAD 5 OR MORE DRINKS IN A DAY: 150
DOES PATIENT WANT TO TALK TO SOMEONE ABOUT QUITTING: YES
TOTAL SCORE: 3
TOTAL SCORE: 3
EVER FELT BAD OR GUILTY ABOUT YOUR DRINKING: YES
DOES PATIENT WANT TO STOP DRINKING: YES
TOTAL SCORE: 3
HAVE YOU EVER FELT YOU SHOULD CUT DOWN ON YOUR DRINKING: YES

## 2024-05-21 ASSESSMENT — PAIN DESCRIPTION - PAIN TYPE
TYPE: ACUTE PAIN

## 2024-05-21 ASSESSMENT — ENCOUNTER SYMPTOMS
BLURRED VISION: 0
HEMOPTYSIS: 0
BRUISES/BLEEDS EASILY: 0
SPEECH CHANGE: 0
TREMORS: 1
DEPRESSION: 0
DIZZINESS: 0
MYALGIAS: 1
COUGH: 0
CHILLS: 0
SPUTUM PRODUCTION: 0
VOMITING: 1
WEAKNESS: 1
FEVER: 0
SHORTNESS OF BREATH: 1
ABDOMINAL PAIN: 1
PALPITATIONS: 0
DOUBLE VISION: 0
HEARTBURN: 1
FOCAL WEAKNESS: 0
HEADACHES: 0
WHEEZING: 1
NAUSEA: 1

## 2024-05-21 ASSESSMENT — SOCIAL DETERMINANTS OF HEALTH (SDOH)
WITHIN THE LAST YEAR, HAVE YOU BEEN HUMILIATED OR EMOTIONALLY ABUSED IN OTHER WAYS BY YOUR PARTNER OR EX-PARTNER?: NO
WITHIN THE LAST YEAR, HAVE YOU BEEN KICKED, HIT, SLAPPED, OR OTHERWISE PHYSICALLY HURT BY YOUR PARTNER OR EX-PARTNER?: NO
WITHIN THE LAST YEAR, HAVE TO BEEN RAPED OR FORCED TO HAVE ANY KIND OF SEXUAL ACTIVITY BY YOUR PARTNER OR EX-PARTNER?: NO
IN THE PAST 12 MONTHS, HAS THE ELECTRIC, GAS, OIL, OR WATER COMPANY THREATENED TO SHUT OFF SERVICE IN YOUR HOME?: PATIENT DECLINED
WITHIN THE LAST YEAR, HAVE YOU BEEN AFRAID OF YOUR PARTNER OR EX-PARTNER?: NO
WITHIN THE PAST 12 MONTHS, THE FOOD YOU BOUGHT JUST DIDN'T LAST AND YOU DIDN'T HAVE MONEY TO GET MORE: PATIENT DECLINED
WITHIN THE PAST 12 MONTHS, YOU WORRIED THAT YOUR FOOD WOULD RUN OUT BEFORE YOU GOT THE MONEY TO BUY MORE: PATIENT DECLINED

## 2024-05-21 ASSESSMENT — PATIENT HEALTH QUESTIONNAIRE - PHQ9
SUM OF ALL RESPONSES TO PHQ9 QUESTIONS 1 AND 2: 0
2. FEELING DOWN, DEPRESSED, IRRITABLE, OR HOPELESS: NOT AT ALL
1. LITTLE INTEREST OR PLEASURE IN DOING THINGS: NOT AT ALL

## 2024-05-21 ASSESSMENT — COPD QUESTIONNAIRES
DURING THE PAST 4 WEEKS HOW MUCH DID YOU FEEL SHORT OF BREATH: NONE/LITTLE OF THE TIME
DO YOU EVER COUGH UP ANY MUCUS OR PHLEGM?: NO/ONLY WITH OCCASIONAL COLDS OR INFECTIONS
COPD SCREENING SCORE: 0
HAVE YOU SMOKED AT LEAST 100 CIGARETTES IN YOUR ENTIRE LIFE: NO/DON'T KNOW

## 2024-05-21 ASSESSMENT — FIBROSIS 4 INDEX: FIB4 SCORE: 1.28

## 2024-05-21 NOTE — CONSULTS
Critical Care Consultation/H&P    Date of consult: 5/21/2024    Referring Physician  Gage Corado M.D.    Reason for Consultation  ETOH withdrawal and respiratory failure    History of Presenting Illness  38 y.o. male who presented 5/21/2024 with a past medical history significant for asthma, hypertension, obstructive sleep apnea (on home CPAP) and alcohol abuse who was brought into the emergency department early this morning complaining of shortness of breath for the last 4 days.  He also noted alcohol abuse with increase in heavy drinking recently.  He had recently been arrested for DUI and underwent medical clearance at [facility] leaving [place].  After being released from [facility], he felt short of breath, anxious, and that he was going into withdrawal prompting his evaluation in the ED today.  Of note, patient was admitted to the hospital on April 21 for alcohol withdrawal and had an echocardiogram with a normal ejection fraction on 4/22.  While in the ED, patient received 4 mg of IV Ativan, 10 mg of hydralazine for hypertension, and had increased work of breathing for which he received a DuoNeb and a racemic epinephrine without improvement.  He received an IV rally bag.  Initially was planned to be admitted to the IMCU however given his worsening respiratory status, patient was started on rescue BiPAP and will be admitted to the ICU instead.    Code Status  Full Code    Review of Systems  Review of Systems   Unable to perform ROS: Acuity of condition       Past Medical History   has a past medical history of Asthma without status asthmaticus (06/29/2017), Chickenpox, and Hypertension.    He has no past medical history of Cancer (HCC) or Diabetes (HCC).    Surgical History   has no past surgical history on file.    Family History  family history is not on file.    Social History   reports that he has never smoked. He has never used smokeless tobacco. He reports current alcohol use of about 7.2 oz of  alcohol per week. He reports that he does not currently use drugs.    Medications  Home Medications       Reviewed by Bernard Lim (Pharmacy Tech) on 05/21/24 at 0713  Med List Status: Complete     Medication Last Dose Status   albuterol 108 (90 Base) MCG/ACT Aero Soln inhalation aerosol ABOUT 1 WEEK Active   allopurinol (ZYLOPRIM) 300 MG Tab ABOUT 1 WEEK Active   amLODIPine (NORVASC) 10 MG Tab ABOUT 1 WEEK Active   folic acid (FOLVITE) 1 MG Tab ABOUT 1 WEEK Active   losartan (COZAAR) 50 MG Tab ABOUT 1 WEEK Active   metoprolol tartrate (LOPRESSOR) 25 MG Tab ABOUT 1 WEEK Active   multivitamin Tab ABOUT 1 WEEK Active   naltrexone (DEPADE) 50 MG Tab ABOUT 1 WEEK Active   omeprazole (PRILOSEC) 40 MG delayed-release capsule ABOUT 1 WEEK Active   sucralfate (CARAFATE) 1 GM Tab ABOUT 1 WEEK Active   thiamine (THIAMINE) 100 MG tablet ABOUT 1 WEEK Active                  Audit from Redirected Encounters    **Home medications have not yet been reviewed for this encounter**       Current Facility-Administered Medications   Medication Dose Route Frequency Provider Last Rate Last Admin    acetaminophen (Tylenol) tablet 650 mg  650 mg Oral Q6HRS PRN Gage Corado M.D.        ondansetron (Zofran) syringe/vial injection 4 mg  4 mg Intravenous Q4HRS PRN Gage Corado M.D.        ondansetron (Zofran ODT) dispertab 4 mg  4 mg Oral Q4HRS PRN Gage Corado M.D.        promethazine (Phenergan) tablet 12.5-25 mg  12.5-25 mg Oral Q4HRS PROSMAR Corado M.D.        promethazine (Phenergan) suppository 12.5-25 mg  12.5-25 mg Rectal Q4HRS PRN Gage Corado M.D.        prochlorperazine (Compazine) injection 5-10 mg  5-10 mg Intravenous Q4HRS PRN Gage Corado M.D.        senna-docusate (Pericolace Or Senokot S) 8.6-50 MG per tablet 2 Tablet  2 Tablet Oral Q EVENING Gage Corado M.D.        And    polyethylene glycol/lytes (Miralax) Packet 1 Packet  1 Packet Oral QDAY PRN Gage Corado M.D.        magnesium sulfate in D5W IVPB premix 1  g  1 g Intravenous Once Gage Corado M.D. 100 mL/hr at 05/21/24 0748 1 g at 05/21/24 0748    albuterol inhaler 2 Puff  2 Puff Inhalation Q6HRS PRN Gage Corado M.D.        allopurinol (Zyloprim) tablet 300 mg  300 mg Oral DAILY Gage Corado M.D.        amLODIPine (Norvasc) tablet 10 mg  10 mg Oral DAILY Gage Corado M.D.        losartan (Cozaar) tablet 50 mg  50 mg Oral DAILY Gage Corado M.D.        calcium GLUConate 1 g in NaCl IVPB premix  1 g Intravenous Once Gage Corado M.D. 50 mL/hr at 05/21/24 0741 1 g at 05/21/24 0741    insulin regular (HumuLIN R,NovoLIN R) injection  2-9 Units Subcutaneous 4X/DAY ACHIKER Corado M.D.        And    dextrose 50% (D50W) injection 25 g  25 g Intravenous Q15 MIN PRN Gage Corado M.D.        Respiratory Therapy Consult   Nebulization Continuous RT Gage Corado M.D.        ipratropium-albuterol (DUONEB) nebulizer solution  3 mL Nebulization Q4HRS (RT) Gage Corado M.D.        ipratropium-albuterol (DUONEB) nebulizer solution  3 mL Nebulization Q2HRS PRN (RT) Gage Corado M.D.        thiamine (B-1) 500 mg in dextrose 5% 100 mL IVPB  500 mg Intravenous TID Gage Corado M.D.        heparin injection 5,000 Units  5,000 Units Subcutaneous Q8HRS Gage Corado M.D.        hydrALAZINE (Apresoline) injection 10 mg  10 mg Intravenous Q6HRS PRN Gage Corado M.D.        furosemide (Lasix) injection 40 mg  40 mg Intravenous BID DIURETIC Gage Corado M.D.   40 mg at 05/21/24 0722    potassium chloride SA (Kdur) tablet 40 mEq  40 mEq Oral Q4HRS Gage Corado M.D.   40 mEq at 05/21/24 0700    racepinephrine (Micronefrin) 2.25 % nebulizer solution 0.5 mL  0.5 mL Nebulization Once Yuliet Beyer M.D.        potassium phosphate 15 mmol in dextrose 5% 250 mL ivpb  15 mmol Intravenous Once Gage Corado M.D.        MD Alert...ICU Electrolyte Replacement per Pharmacy   Other PHARMACY TO DOSE Jeremy M Gonda, M.D.        dexmedetomidine (PRECEDEX) 400 mcg/100mL NS premix infusion   0.1-1 mcg/kg/hr (Ideal) Intravenous Continuous Jeremy M Gonda, M.D.        chlordiazePOXIDE (Librium) capsule 25 mg  25 mg Oral Q8HRS Jeremy M Gonda, M.D.        midazolam (Versed) injection 2 mg  2 mg Intravenous Q HOUR PRN Jeremy M Gonda, M.D.        Or    midazolam (Versed) injection 5 mg  5 mg Intravenous Q HOUR PRN Jeremy M Gonda, M.D.        labetalol (Normodyne/Trandate) injection 10-20 mg  10-20 mg Intravenous Q HOUR PRN Jeremy M Gonda, M.D.        Or    labetalol (Normodyne) tablet 200 mg  200 mg Oral Q6HRS PRN Jeremy M Gonda, M.D.        folic acid (Folvite) tablet 1 mg  1 mg Oral DAILY Jeremy M Gonda, M.D.        metoprolol tartrate (Lopressor) tablet 25 mg  25 mg Oral BID Jeremy M Gonda, M.D.        multivitamin tablet 1 Tablet  1 Tablet Oral DAILY Jeremy M Gonda, M.D.        omeprazole (PriLOSEC) delayed-release capsule CPDR 40 mg  40 mg Oral BID Jeremy M Gonda, M.D.        sucralfate (Carafate) tablet 1 g  1 g Oral BID AC Jeremy M Gonda, M.D.        thiamine (Vitamin B-1) tablet 100 mg  100 mg Oral DAILY Jeremy M Gonda, M.D.         Current Outpatient Medications   Medication Sig Dispense Refill    omeprazole (PRILOSEC) 40 MG delayed-release capsule Take 1 Capsule by mouth 2 times a day. 60 Capsule 1    sucralfate (CARAFATE) 1 GM Tab Take 1 Tablet by mouth 2 times daily, before breakfast and dinner. 60 Tablet 1    multivitamin Tab Take 1 Tablet by mouth every day. 30 Tablet 1    folic acid (FOLVITE) 1 MG Tab Take 1 Tablet by mouth every day. 30 Tablet 1    thiamine (THIAMINE) 100 MG tablet Take 1 Tablet by mouth every day. 30 Tablet 1    losartan (COZAAR) 50 MG Tab Take 1 Tablet by mouth every day. 30 Tablet 1    amLODIPine (NORVASC) 10 MG Tab Take 1 Tablet by mouth every day. 30 Tablet 1    allopurinol (ZYLOPRIM) 300 MG Tab Take 1 Tablet by mouth every day. 30 Tablet 1    albuterol 108 (90 Base) MCG/ACT Aero Soln inhalation aerosol Inhale 2 Puffs every 6 hours as needed for Shortness of Breath. 8.5 g  1    naltrexone (DEPADE) 50 MG Tab Take 0.5 Tablets by mouth every day for 6 days, THEN 1 Tablet every day for 24 days. 27 Tablet 1    metoprolol tartrate (LOPRESSOR) 25 MG Tab Take 1 Tablet by mouth 2 times a day. 60 Tablet 1       Allergies  No Known Allergies    Vital Signs last 24 hours  Temp:  [36.4 °C (97.6 °F)] 36.4 °C (97.6 °F)  Pulse:  [103-129] 129  Resp:  [22-38] 38  BP: (156-272)/() 180/126  SpO2:  [93 %-97 %] 96 %    Physical Exam  Physical Exam  Vitals and nursing note reviewed.   Constitutional:       General: He is in acute distress.      Appearance: He is well-developed. He is morbidly obese. He is ill-appearing.      Interventions: Nasal cannula in place.   HENT:      Head: Normocephalic and atraumatic.      Nose: Nose normal.      Mouth/Throat:      Mouth: Mucous membranes are dry.      Pharynx: Oropharynx is clear. No oropharyngeal exudate.   Eyes:      General: No scleral icterus.     Conjunctiva/sclera: Conjunctivae normal.      Pupils: Pupils are equal, round, and reactive to light.   Neck:      Vascular: No JVD.      Comments: Enlarged neck circumference  Cardiovascular:      Rate and Rhythm: Regular rhythm. Tachycardia present. Occasional Extrasystoles are present.     Chest Wall: PMI is displaced.      Pulses: Normal pulses.      Heart sounds: Normal heart sounds. No murmur heard.     Comments: Hypertensive  Pulmonary:      Effort: Tachypnea, accessory muscle usage and respiratory distress present. No retractions.      Breath sounds: Transmitted upper airway sounds present. No stridor. Examination of the right-lower field reveals rales. Examination of the left-lower field reveals rales. Rales present. No wheezing or rhonchi.      Comments: Protecting airway but in respiratory distress  Abdominal:      General: Bowel sounds are normal. There is distension.      Palpations: Abdomen is soft.      Tenderness: There is no abdominal tenderness. There is no guarding or rebound.    Musculoskeletal:         General: No tenderness.      Cervical back: Neck supple. No tenderness.      Right lower leg: No edema.      Left lower leg: No edema.   Skin:     General: Skin is warm and dry.      Capillary Refill: Capillary refill takes less than 2 seconds.      Coloration: Skin is not pale.   Neurological:      General: No focal deficit present.      Mental Status: He is alert. He is disoriented.      Cranial Nerves: No cranial nerve deficit.      Sensory: No sensory deficit.      Comments: Confused, agitated at times, follows commands and moves all extremities, no facial droop   Psychiatric:         Behavior: Behavior is cooperative.         Fluids  No intake or output data in the 24 hours ending 24 0807    Laboratory  Recent Results (from the past 48 hour(s))   EKG    Collection Time: 24  4:10 AM   Result Value Ref Range    Report       [facility] Emergency Dept[facility]    Test Date:  2024  Pt Name:    ANNIA ALMONTE                Department: ER  MRN:        [id number]                      Room:  Gender:     Male                         Technician: [id number]  :        1985                   Requested By:ER TRIAGE PROTOCOL  Order #:    [id number]                    Reading MD: Yuliet Beyer MD    Measurements  Intervals                                Axis  Rate:       114                          P:          58  WV:         143                          QRS:        40  QRSD:       78                           T:          -59  QT:         353  QTc:        487    Interpretive Statements  Sinus tachycardia  Low voltage, precordial leads  Repol abnrm, severe global ischemia (LM/MVD)  Compared to ECG 2024 23:40:59  Possible ischemia now present  Myocardial infarct finding no longer present  Electronically Signed On 2024 04:34:57 PDT by Yuliet Beyer MD     CBC with Differential    Collection Time: 24  5:15 AM   Result Value Ref Range    WBC 7.9 4.8 - 10.8  K/uL    RBC 4.88 4.70 - 6.10 M/uL    Hemoglobin 14.5 14.0 - 18.0 g/dL    Hematocrit 43.0 42.0 - 52.0 %    MCV 88.1 81.4 - 97.8 fL    MCH 29.7 27.0 - 33.0 pg    MCHC 33.7 32.3 - 36.5 g/dL    RDW 53.3 (H) 35.9 - 50.0 fL    Platelet Count 268 164 - 446 K/uL    MPV 8.9 (L) 9.0 - 12.9 fL    Neutrophils-Polys 63.60 44.00 - 72.00 %    Lymphocytes 23.80 22.00 - 41.00 %    Monocytes 11.40 0.00 - 13.40 %    Eosinophils 0.40 0.00 - 6.90 %    Basophils 0.50 0.00 - 1.80 %    Immature Granulocytes 0.30 0.00 - 0.90 %    Nucleated RBC 0.00 0.00 - 0.20 /100 WBC    Neutrophils (Absolute) 5.04 1.82 - 7.42 K/uL    Lymphs (Absolute) 1.88 1.00 - 4.80 K/uL    Monos (Absolute) 0.90 (H) 0.00 - 0.85 K/uL    Eos (Absolute) 0.03 0.00 - 0.51 K/uL    Baso (Absolute) 0.04 0.00 - 0.12 K/uL    Immature Granulocytes (abs) 0.02 0.00 - 0.11 K/uL    NRBC (Absolute) 0.00 K/uL   Comp Metabolic Panel    Collection Time: 05/21/24  5:15 AM   Result Value Ref Range    Sodium 150 (H) 135 - 145 mmol/L    Potassium 3.1 (L) 3.6 - 5.5 mmol/L    Chloride 108 96 - 112 mmol/L    Co2 20 20 - 33 mmol/L    Anion Gap 22.0 (H) 7.0 - 16.0    Glucose 129 (H) 65 - 99 mg/dL    Bun 4 (L) 8 - 22 mg/dL    Creatinine 0.77 0.50 - 1.40 mg/dL    Calcium 7.9 (L) 8.5 - 10.5 mg/dL    Correct Calcium 7.9 (L) 8.5 - 10.5 mg/dL    AST(SGOT) 73 (H) 12 - 45 U/L    ALT(SGPT) 59 (H) 2 - 50 U/L    Alkaline Phosphatase 119 (H) 30 - 99 U/L    Total Bilirubin 1.3 0.1 - 1.5 mg/dL    Albumin 4.0 3.2 - 4.9 g/dL    Total Protein 7.3 6.0 - 8.2 g/dL    Globulin 3.3 1.9 - 3.5 g/dL    A-G Ratio 1.2 g/dL   proBrain Natriuretic Peptide, NT    Collection Time: 05/21/24  5:15 AM   Result Value Ref Range    NT-proBNP 79 0 - 125 pg/mL   D-DIMER    Collection Time: 05/21/24  5:15 AM   Result Value Ref Range    D-Dimer 0.41 0.00 - 0.50 ug/mL (FEU)   DIAGNOSTIC ALCOHOL    Collection Time: 05/21/24  5:15 AM   Result Value Ref Range    Diagnostic Alcohol <10.1 <10.1 mg/dL   ESTIMATED GFR    Collection Time:  05/21/24  5:15 AM   Result Value Ref Range    GFR (CKD-EPI) 119 >60 mL/min/1.73 m 2   Prothrombin time (INR)    Collection Time: 05/21/24  7:05 AM   Result Value Ref Range    PT 15.4 (H) 12.0 - 14.6 sec    INR 1.20 (H) 0.87 - 1.13   MAGNESIUM    Collection Time: 05/21/24  7:05 AM   Result Value Ref Range    Magnesium 1.5 1.5 - 2.5 mg/dL   PHOSPHORUS    Collection Time: 05/21/24  7:05 AM   Result Value Ref Range    Phosphorus 2.3 (L) 2.5 - 4.5 mg/dL   Procalcitonin    Collection Time: 05/21/24  7:05 AM   Result Value Ref Range    Procalcitonin 0.08 <0.25 ng/mL   LACTIC ACID    Collection Time: 05/21/24  7:05 AM   Result Value Ref Range    Lactic Acid 9.7 (HH) 0.5 - 2.0 mmol/L   VENOUS BLOOD GAS    Collection Time: 05/21/24  7:36 AM   Result Value Ref Range    Venous Bg Ph 7.45 7.31 - 7.45    Venous Bg Ph Temp Corrected 7.45 7.31 - 7.45    Venous Bg Pco2 28.2 (L) 41.0 - 51.0 mmHg    Venous Bg Pco2 Temp Corrected 27.5 (L) 41.0 - 51.0 mmHg    Venous Bg Po2 52.5 (H) 25.0 - 40.0 mmHg    Venous Bg Po2 Temp Corrected 50.3 (H) 25.0 - 40.0 mmHg    Venous Bg O2 Saturation 85.3 %    Venous Bg Hco3 19 (L) 24 - 28 mmol/L    Venous Bg Base Excess -4 mmol/L    Body Temp 36.4 Centigrade    O2 Therapy -    Urinalysis    Collection Time: 05/21/24  7:44 AM    Specimen: Urine   Result Value Ref Range    Color Yellow     Character Clear     Specific Gravity 1.014 <1.035    Ph 5.5 5.0 - 8.0    Glucose Negative Negative mg/dL    Ketones Trace (A) Negative mg/dL    Protein Negative Negative mg/dL    Bilirubin Negative Negative    Urobilinogen, Urine 0.2 Negative    Nitrite Negative Negative    Leukocyte Esterase Negative Negative    Occult Blood Negative Negative    Micro Urine Req see below        Imaging  DX-CHEST-PORTABLE (1 VIEW)   Final Result         1.  No acute cardiopulmonary disease.   2.  Cardiomegaly       *Personally reviewed chest x-ray showing enlarged cardiac silhouette with mild edema pattern    Assessment/Plan  * Alcohol  withdrawal delirium, acute, hyperactive (HCC)- (present on admission)  Assessment & Plan  Begin alcohol withdrawal protocol including titration of Precedex drip, scheduled Valium  Monitor RASS  As needed benzodiazepines, aspiration and seizure precautions  Eventual alcohol cessation education and resources to be provided when appropriate  Vitamin supplementation    Acute respiratory failure with hypoxia (HCC)  Assessment & Plan  Likely secondary to BOLIVAR in the setting of benzodiazepines, mild pulmonary edema, obesity  Begin rescue BiPAP 10/5 titrating FiO2 to goal SpO2 greater than 92%  RT/O2 protocol  Begin diuresis  Limit respiratory sedation causing medications as able  Eventually encourage incentive spirometry, mobilization when stabilized  Hold off on antibiotics as no obvious signs of infection causing respiratory failure    Transaminitis  Assessment & Plan  Likely due to alcohol abuse  Avoid hepatotoxins and monitor    BOLIVAR on CPAP- (present on admission)  Assessment & Plan  Nocturnal CPAP once no longer needs rescue BiPAP  Outpatient pulmonary follow-up    Lactic acidosis- (present on admission)  Assessment & Plan  Secondary to respiratory failure and alcohol withdrawal  Monitor    GERD (gastroesophageal reflux disease)- (present on admission)  Assessment & Plan  Continue outpatient PPI    Asthma- (present on admission)  Assessment & Plan  Without acute exacerbation  As needed bronchodilators  RT protocol    Morbid obesity (HCC)- (present on admission)  Assessment & Plan  Encourage behavioral and dietary modification for weight loss    Essential hypertension- (present on admission)  Assessment & Plan  Uncontrolled, driven by alcohol withdrawal and respiratory failure  Resume outpatient amlodipine, metoprolol, losartan  As needed clonidine, hydralazine, labetalol for goal SBP less than 160    Hypernatremia  Assessment & Plan  Mild hypervolemia hypernatremia  Monitor with diuresis    Electrolyte abnormality-  (present on admission)  Assessment & Plan  Replete low potassium, magnesium, phosphorus, calcium and monitor        Discussed patient condition and risk of morbidity and/or mortality with Hospitalist, RN, RT, Charge nurse / hot rounds, Patient, and emergency physician .    The patient remains critically ill.  Critical care time = 40 minutes in directly providing and coordinating critical care and extensive data review.  No time overlap and excludes procedures.    Please note that this dictation was created using voice recognition software. I have made every reasonable attempt to correct obvious errors, but there may be errors of grammar and possibly content that I did not discover before finalizing the note.

## 2024-05-21 NOTE — PROGRESS NOTES
Pt admitted to T922. Pt A&Ox3. Pt pleasant and forgetful, asking same questions repeatedly. Pt educated on importance of keeping BiBAP mask on.    4 Eyes Skin Assessment Completed by Celina RN and Mila RN.    Head WDL  Ears WDL  Nose WDL  Mouth WDL  Neck WDL  Breast/Chest WDL  Shoulder Blades WDL  Spine WDL  (R) Arm/Elbow/Hand WDL  (L) Arm/Elbow/Hand WDL  Abdomen WDL  Groin WDL  Scrotum/Coccyx/Buttocks Discoloration  (R) Leg WDL  (L) Leg WDL  (R) Heel/Foot/Toe WDL  (L) Heel/Foot/Toe WDL          Devices In Places ECG, Blood Pressure Cuff, Pulse Ox, SCD's, and Bipap      Interventions In Place Pillows, Q2 Turns, and Low Air Loss Mattress    Possible Skin Injury No    Pictures Uploaded Into Epic N/A  Wound Consult Placed N/A  RN Wound Prevention Protocol Ordered No

## 2024-05-21 NOTE — ED NOTES
Assumed pt care. Pt wheezing and SOB. Resp at bedside. Pt not improving. Attempted to contact hospitalist who is not on voalte. ERP reporting pt is admitted to ICU. Dr. Gonda texted about pt. Rapid response team called for pt

## 2024-05-21 NOTE — ASSESSMENT & PLAN NOTE
Improving  Continue outpatient amlodipine, metoprolol, losartan  As needed clonidine, hydralazine, labetalol for goal SBP less than 160

## 2024-05-21 NOTE — ASSESSMENT & PLAN NOTE
SBP>200  Treat alcohol withdrawal  IV lasix  Continue home losartan, amlodipine  As needed IV hydralazine, IV labetalol

## 2024-05-21 NOTE — ASSESSMENT & PLAN NOTE
Likely secondary to BOLIVAR in the setting of benzodiazepines, mild pulmonary edema, obesity --> improved  D/c BiPAP  RT/O2 protocol  S/p diuresis  Encourage incentive spirometry, mobilization

## 2024-05-21 NOTE — ASSESSMENT & PLAN NOTE
S/p alcohol withdrawal protocol including titration of Precedex drip, scheduled Valium  Monitor RASS  As needed benzodiazepines, aspiration and seizure precautions  Alcohol cessation education and resources provided (pt decline inpatient rehab)  Vitamin supplementation

## 2024-05-21 NOTE — ED NOTES
Med Rec completed per patient and home pharmacy ([facility])   Allergies reviewed  No ORAL antibiotics in last 30 days    Patient is not taking anticoagulants

## 2024-05-21 NOTE — ED PROVIDER NOTES
ED Provider Note    Scribed for Yuliet Beyer M.D. by Buddy Barboza. 5/21/2024, 4:34 AM.    Primary care provider: Rey Pacheco M.D.  Means of arrival: Walk-in  History obtained from: Patient  History limited by: None    CHIEF COMPLAINT  Chief Complaint   Patient presents with    Shortness of Breath     Pt c/o dyspnea at rest beginning 4 days ago, pt denies CP or hx of asthma.        HPI/ROS  Alberto Maldonado is a 38 y.o. male who presents to the Emergency Department for shortness of breath. Patient states he has a history of chronic alcohol abuse. He began drinking heavily on Sunday, and was briefly arrested for a DUI. While he was in custody he reports feeling increasingly short of breath, and was brought to [place] for medical clearance.  He states that he got claustrophobic in the back of the police car and felt like he was having a panic attack.  He went to [facility] and felt improved after being taken out of the police car and subsequently left AGAINST MEDICAL ADVICE.  He was taken to longterm and was released at 1 AM this morning.  After he was  released from longterm, he continued to have episodes of shortness of breath as well as anxiety and feels that he is withdrawing from alcohol. He denies drinking more since he was released from longterm.    EXTERNAL RECORDS REVIEWED  Patient was evaluated at [facility] emergency department yesterday after a DUI and was found to have tachypnea and tachycardia, he was advised to stay for further evaluation but elected to leave AGAINST MEDICAL ADVICE.    Patient last hospitalized on 4/21/2024 for alcohol withdrawal.  Patient last had echocardiogram on 4/22/2024 which demonstrated ejection fraction of 55 to 60% and normal diastolic function.    LIMITATION TO HISTORY   Select: : None    OUTSIDE HISTORIAN(S):  None      PAST MEDICAL HISTORY   has a past medical history of Asthma without status asthmaticus (06/29/2017), Chickenpox, and Hypertension.    SURGICAL  "HISTORY  patient denies any surgical history    SOCIAL HISTORY  Social History     Tobacco Use    Smoking status: Never    Smokeless tobacco: Never   Vaping Use    Vaping status: Never Used   Substance Use Topics    Alcohol use: Yes     Alcohol/week: 7.2 oz     Types: 12 Shots of liquor per week     Comment: every other day 4-5 cocktails    Drug use: Not Currently      Social History     Substance and Sexual Activity   Drug Use Not Currently       FAMILY HISTORY  Family History   Problem Relation Age of Onset    Stroke Neg Hx     Heart Disease Neg Hx        CURRENT MEDICATIONS  Home Medications       Reviewed by Bernard Lim (Pharmacy Tech) on 05/21/24 at 0713  Med List Status: Complete     Medication Last Dose Status   albuterol 108 (90 Base) MCG/ACT Aero Soln inhalation aerosol ABOUT 1 WEEK Active   allopurinol (ZYLOPRIM) 300 MG Tab ABOUT 1 WEEK Active   amLODIPine (NORVASC) 10 MG Tab ABOUT 1 WEEK Active   folic acid (FOLVITE) 1 MG Tab ABOUT 1 WEEK Active   losartan (COZAAR) 50 MG Tab ABOUT 1 WEEK Active   metoprolol tartrate (LOPRESSOR) 25 MG Tab ABOUT 1 WEEK Active   multivitamin Tab ABOUT 1 WEEK Active   naltrexone (DEPADE) 50 MG Tab ABOUT 1 WEEK Active   omeprazole (PRILOSEC) 40 MG delayed-release capsule ABOUT 1 WEEK Active   sucralfate (CARAFATE) 1 GM Tab ABOUT 1 WEEK Active   thiamine (THIAMINE) 100 MG tablet ABOUT 1 WEEK Active                  Audit from Redirected Encounters    **Home medications have not yet been reviewed for this encounter**         ALLERGIES  No Known Allergies    PHYSICAL EXAM  VITAL SIGNS: BP (!) 201/113   Pulse (!) 119   Temp 36.4 °C (97.6 °F) (Temporal)   Resp (!) 24   Ht 1.6 m (5' 3\")   Wt 117 kg (258 lb 13.1 oz)   SpO2 97%   BMI 45.85 kg/m²   Vitals reviewed by myself.  Physical Exam  Nursing note and vitals reviewed.  Constitutional: Well-developed and well-nourished.  Moderate distress  HENT: Head is normocephalic and atraumatic.  Eyes: extra-ocular movements " intact  Cardiovascular: Tachycardic rate and regular rhythm. No murmur heard.  Pulmonary/Chest: Appears tachypneic with transmitted upper airway sounds. No rales.   Abdominal: Soft and non-tender. No distention.    Musculoskeletal: Extremities exhibit normal range of motion without edema or tenderness.   Neurological: Awake and alert  Skin: Skin is warm and dry. No rash.       DIAGNOSTIC STUDIES:  LABS  Labs Reviewed   CBC WITH DIFFERENTIAL - Abnormal; Notable for the following components:       Result Value    RDW 53.3 (*)     MPV 8.9 (*)     Monos (Absolute) 0.90 (*)     All other components within normal limits   COMP METABOLIC PANEL - Abnormal; Notable for the following components:    Sodium 150 (*)     Potassium 3.1 (*)     Anion Gap 22.0 (*)     Glucose 129 (*)     Bun 4 (*)     Calcium 7.9 (*)     Correct Calcium 7.9 (*)     AST(SGOT) 73 (*)     ALT(SGPT) 59 (*)     Alkaline Phosphatase 119 (*)     All other components within normal limits   LACTIC ACID - Abnormal; Notable for the following components:    Lactic Acid 9.7 (*)     All other components within normal limits   PROBRAIN NATRIURETIC PEPTIDE, NT   D-DIMER   DIAGNOSTIC ALCOHOL   ESTIMATED GFR   PROTHROMBIN TIME   URINALYSIS   MAGNESIUM   PHOSPHORUS   URINE DRUG SCREEN   PROCALCITONIN   VENOUS BLOOD GAS       All labs reviewed and independently interpreted by myself    EKG Interpretation:    12 Lead EKG independently interpreted by myself to show:  EKG at 4:10 AM: Sinus tachycardia, heart rate 114, normal axis, normal intervals, , QRS 78, QTc 487, no acute ST-T segment changes, no evidence of acute arrhythmia or ischemia per my independent interpretation    RADIOLOGY  Images independently interpreted by myself prior to radiologist review: No acute cardiopulmonary processes, cardiomegaly  Final interpretation by radiology demonstrates:    DX-CHEST-PORTABLE (1 VIEW)   Final Result         1.  No acute cardiopulmonary disease.   2.  Cardiomegaly         The radiologist's interpretation of all radiological studies have been reviewed by me.      COURSE & MEDICAL DECISION MAKING      INITIAL ASSESSMENT, ED COURSE AND PLAN    Patient is a 38-year-old male who presents for evaluation of shortness of breath and alcohol withdrawal.  Differential diagnosis includes alcohol withdrawal, dehydration, electrolyte derangement, asthma, fluid overload.  Diagnostic workup includes labs, EKG, chest x-ray.    Patient's initial vitals notable for tachycardia, tachypnea and hypertension.  Hypertension tachycardia likely related to alcohol withdrawal.  Regarding his tachypnea patient does have upper airway noises that are transmitted when he is falling asleep, appears to be consistent with likely sleep apnea.  He has no wheezing in his lungs and he is not hypoxic, low suspicion for pulmonary pathology.  EKG is done due to tachycardia and demonstrates no acute arrhythmia or ischemia.    Chest x-ray demonstrates cardiomegaly, poor aeration.  No acute abnormalities.  Patient did have recent echocardiogram which was normal. Labs returned and are independently interpreted by myself to demonstrate:  -BNP within normal limits at 79  -D-dimer is within normal limits making pulmonary embolism unlikely  -No leukocytosis, patient is not having any cough or infectious symptoms, low suspicion for underlying pneumonia  -LFTs are elevated consistent with alcohol use  -Anion gap is mildly elevated at 22  -Patient has electrolyte derangements of hyponatremia and hypokalemia likely related to alcohol withdrawal  -Labs otherwise unremarkable    Patient was treated with Ativan for alcohol withdrawal.  He felt improved after treatment however his work of breathing worsened.  Initially was going to hospitalize to telemetry him for alcohol withdrawal however after hospitalist saw him thought he was more appropriate for IMCU.  Patient did become more sleepy and I had a discussion with him regarding  whether or not he wears CPAP at night as he has breathing pattern consistent with obstructive sleep apnea.  Patient initially denied wearing CPAP, advised him he will likely need outpatient sleep study.    Unfortunately during emergency department course patient's breathing worsened.  It was transmitted upper respiratory noises consistent with likely obstructive sleep apnea.  I was called to bedside and is not with his upper airway noises elected to start him on CPAP and trial a dose of racemic epi through CPAP.  Patient greatly improved after initiating CPAP.  Case was discussed with Dr. Gonda who came down to evaluate patient and agrees with plan of care, he will be placed in the ICU.  Venous blood gas was obtained and pCO2 was low however pH was within normal limits.    During ER course patient also hypertensive which was thought to be secondary to alcohol withdrawal.  He remained persistently hypertensive even with Ativan and he does have underlying hypertension therefore he was also given a dose of hydralazine.         REASSESSMENTS   4:34 AM - Patient seen and examined at bedside. Discussed plan of care, including ordering labs, imaging, medications. Patient agrees to the plan of care.      5:40 AM - Patient reevaluate at bedside; he is feeling somewhat improved.     6:14 AM - Patient's BP measured 272/161; he was reassessed at bedside and repeat measurement showed his pressure to be 190's systolic prior to intervention. Previous reading likely due to error. Ordered for hydralazine.     6:28 AM - I discussed the patient's case and the above findings with Dr. Corado (Hospitalist) who agrees to evaluate the patient for hospitalization.     6:38 AM - Spoke with Dr. Corado; he recommends patient be admitted to [facility].     7:29 AM - Patient reassessed at bedside; he wheezing/stridor has worsened despite breathing treatment. Ordered for racemic epinephrine and CPAP.  It seems that patient has sleep apnea and is likely  worsening in the setting of sedation with Ativan for his alcohol withdrawal    7:40 AM - I discussed the patient's case and the above findings with Dr. Gonda (Intensivist) who agrees to evaluate the patient for hospitalization.    7:57 AM - Patient started on CPAP; his respiratory distress appeared to had resolved afterwards.     HYDRATION: Based on the patient's presentation of Tachycardia the patient was given IV fluids. IV Hydration was used because oral hydration was not adequate alone. Upon recheck following hydration, the patient was improving.    DISPOSITION AND DISCUSSIONS  I have discussed management of the patient with the following physicians and BATSHEVA's:  Dr. Corado (Hospitalist), Dr. Gonda (Intensivist).     Discussion of management with other [facility] or appropriate source(s): Respiratory Therapy for breathing treatments, BIPAP.      Escalation of care considered, and ultimately not performed:see above    Barriers to care at this time, including but not limited to: none.     Decision tools and prescription drugs considered including, but not limited to: see above.    Please see review of records as noted above    DISPOSITION:  Patient will be hospitalized by Dr. Gonda in critical condition.    CRITICAL CARE  The very real possibilty of a deterioration of this patient's condition required the highest level of my preparedness for sudden, emergent intervention.  I provided critical care services, which included medication orders, frequent reevaluations of the patient's condition and response to treatment, ordering and reviewing test results, and discussing the case with various consultants.  The critical care time associated with the care of the patient was 55 minutes. Review chart for interventions. This time is exclusive of any other billable procedures.     FINAL IMPRESSION  1. Alcohol withdrawal syndrome without complication (HCC)    2. Hypertension, unspecified type    3. Dyspnea, unspecified type    4.  Hypernatremia          Buddy LOPES (Padminiibbecca), am scribing for, and in the presence of, Yuliet Beyer M.D..    Electronically signed by: Buddy Barboza (Sheryl), 5/21/2024    Yuliet LOPES M.D. personally performed the services described in this documentation, as scribed by Buddy Barboza in my presence, and it is both accurate and complete.    The note accurately reflects work and decisions made by me.  Yuliet Beyer M.D.  5/21/2024  8:23 AM

## 2024-05-21 NOTE — ASSESSMENT & PLAN NOTE
Last alcohol drink 5 days ago  Absolute alcohol abstinence advised  Detox bag  Multivitamins  High-dose IV thiamine  CIWA protocol    Due to high CIWA score, initiated on Precedex drip  Wean off Precedex drip as tolerated  ICU consulted, agreeable for IMCU admission  Admit to IMCU for close hemodynamic monitoring

## 2024-05-21 NOTE — ED TRIAGE NOTES
Chief Complaint   Patient presents with    Shortness of Breath     Pt c/o dyspnea at rest beginning 4 days ago, pt denies CP or hx of asthma.      Pt escorted to triage via WC with brother for above complaint.      Pt is alert/oriented and follows commands. Pt speaking in full sentences and responds appropriately to questions. No acute distress noted in triage and respirations rapid but pt able to speak clearly. spO2 in high 90% on RA.    Pt placed in lobby and educated on triage process. Pt encouraged to alert staff for any changes in condition.

## 2024-05-21 NOTE — PROGRESS NOTES
I have been contacted regarding appropriate disposition of Alberto Maldonado. I have reviewed the patient's case with hospitalist including chart review, conversation with the provider. This patient is deemed safe to be admitted to the [facility] for ETOH withdrawal and SOB and remains under the care of hospitalist (who all questions and concerns should be relayed to).  While a critical care consultation has not been requested, we are immediately available if the patient requires critical care in the future.

## 2024-05-21 NOTE — H&P
Hospital Medicine History & Physical Note    Date of Service  5/21/2024    Primary Care Physician  Rey Pacheco M.D.    Consultants  ICU (Dr. Gonda) - to IMCU    Code Status  Full Code    Chief Complaint  Chief Complaint   Patient presents with    Shortness of Breath     Pt c/o dyspnea at rest beginning 4 days ago, pt denies CP or hx of asthma.        History of Presenting Illness  Alberto Maldonado is a 38 y.o. morbidly obese male with history of alcohol abuse, hypertension, diabetes, asthma, BOLIVAR who presented 5/21/2024 with evaluation for alcohol withdrawal.  Patient reported his last drink was 5 days ago.  In ER, found to be tremulous, tachycardic, hypertensive.  Admission requested by ERP.  At time of my evaluation, patient appears to be in acute distress, audible wheezing and in respiratory distress.  I consulted ICU, agreeable for IMCU admission.  Therefore, admitted to medicine service for further evaluation treatment.    I discussed the plan of care with patient, bedside RN, pharmacy, and critical care.    Review of Systems  Review of Systems   Constitutional:  Negative for chills and fever.   HENT:  Negative for hearing loss and tinnitus.    Eyes:  Negative for blurred vision and double vision.   Respiratory:  Positive for shortness of breath and wheezing. Negative for cough, hemoptysis and sputum production.    Cardiovascular:  Negative for chest pain and palpitations.   Gastrointestinal:  Positive for abdominal pain, heartburn, nausea and vomiting.   Genitourinary:  Negative for dysuria and urgency.   Musculoskeletal:  Positive for myalgias. Negative for joint pain.   Skin:  Negative for itching and rash.   Neurological:  Positive for tremors and weakness. Negative for dizziness, speech change, focal weakness and headaches.   Endo/Heme/Allergies:  Negative for environmental allergies. Does not bruise/bleed easily.   Psychiatric/Behavioral:  Negative for depression and substance abuse.    All other  systems reviewed and are negative.      Past Medical History   has a past medical history of Asthma without status asthmaticus (06/29/2017), Chickenpox, and Hypertension.    Surgical History   has no past surgical history on file.     Family History  family history is not on file.   Family history reviewed with patient. There is no family history that is pertinent to the chief complaint.     Social History   reports that he has never smoked. He has never used smokeless tobacco. He reports current alcohol use of about 7.2 oz of alcohol per week. He reports that he does not currently use drugs.    Allergies  No Known Allergies    Medications  Prior to Admission Medications   Prescriptions Last Dose Informant Patient Reported? Taking?   albuterol 108 (90 Base) MCG/ACT Aero Soln inhalation aerosol   No No   Sig: Inhale 2 Puffs every 6 hours as needed for Shortness of Breath.   allopurinol (ZYLOPRIM) 300 MG Tab   No No   Sig: Take 1 Tablet by mouth every day.   amLODIPine (NORVASC) 10 MG Tab   No No   Sig: Take 1 Tablet by mouth every day.   folic acid (FOLVITE) 1 MG Tab   No No   Sig: Take 1 Tablet by mouth every day.   losartan (COZAAR) 50 MG Tab   No No   Sig: Take 1 Tablet by mouth every day.   metoprolol tartrate (LOPRESSOR) 25 MG Tab   No No   Sig: Take 1 Tablet by mouth 2 times a day.   multivitamin Tab   No No   Sig: Take 1 Tablet by mouth every day.   naltrexone (DEPADE) 50 MG Tab   No No   Sig: Take 0.5 Tablets by mouth every day for 6 days, THEN 1 Tablet every day for 24 days.   omeprazole (PRILOSEC) 40 MG delayed-release capsule   No No   Sig: Take 1 Capsule by mouth 2 times a day.   sucralfate (CARAFATE) 1 GM Tab   No No   Sig: Take 1 Tablet by mouth 2 times daily, before breakfast and dinner.   thiamine (THIAMINE) 100 MG tablet   No No   Sig: Take 1 Tablet by mouth every day.      Facility-Administered Medications: None       Physical Exam  Temp:  [36.4 °C (97.6 °F)] 36.4 °C (97.6 °F)  Pulse:  [103-119]  103  Resp:  [22-24] 22  BP: (193-272)/(113-161) 272/161  SpO2:  [93 %-97 %] 93 %  Blood Pressure: (!) 272/161   Temperature: 36.4 °C (97.6 °F)   Pulse: (!) 103   Respiration: (!) 22   Pulse Oximetry: 93 %       Physical Exam  Vitals and nursing note reviewed.   Constitutional:       General: He is in acute distress.      Appearance: He is obese. He is ill-appearing.   HENT:      Head: Normocephalic and atraumatic.      Nose: Nose normal.      Mouth/Throat:      Mouth: Mucous membranes are moist.      Pharynx: Oropharynx is clear.   Eyes:      General: No scleral icterus.     Extraocular Movements: Extraocular movements intact.   Cardiovascular:      Rate and Rhythm: Regular rhythm. Tachycardia present.      Pulses: Normal pulses.      Heart sounds:      No friction rub.   Pulmonary:      Effort: No respiratory distress.      Breath sounds: No stridor. Wheezing and rales present.   Chest:      Chest wall: No tenderness.   Abdominal:      General: There is distension.      Tenderness: There is no abdominal tenderness. There is no guarding or rebound.   Musculoskeletal:         General: Normal range of motion.      Cervical back: Neck supple. No tenderness.      Comments: Trace LE edema   Skin:     General: Skin is warm and dry.      Capillary Refill: Capillary refill takes less than 2 seconds.   Neurological:      General: No focal deficit present.      Mental Status: He is alert and oriented to person, place, and time.   Psychiatric:         Mood and Affect: Mood normal.         Laboratory:  Recent Labs     05/21/24  0515   WBC 7.9   RBC 4.88   HEMOGLOBIN 14.5   HEMATOCRIT 43.0   MCV 88.1   MCH 29.7   MCHC 33.7   RDW 53.3*   PLATELETCT 268   MPV 8.9*     Recent Labs     05/21/24  0515   SODIUM 150*   POTASSIUM 3.1*   CHLORIDE 108   CO2 20   GLUCOSE 129*   BUN 4*   CREATININE 0.77   CALCIUM 7.9*     Recent Labs     05/21/24  0515   ALTSGPT 59*   ASTSGOT 73*   ALKPHOSPHAT 119*   TBILIRUBIN 1.3   GLUCOSE 129*        "  Recent Labs     05/21/24  0515   NTPROBNP 79         No results for input(s): \"TROPONINT\" in the last 72 hours.    Imaging:  DX-CHEST-PORTABLE (1 VIEW)   Final Result         1.  No acute cardiopulmonary disease.   2.  Cardiomegaly                X-Ray:  I have personally reviewed the images and compared with prior images.  EKG:  I have personally reviewed the images and compared with prior images.    Assessment/Plan:  Justification for Admission Status  I anticipate this patient will require at least 2 midnights hospitalization, therefore appropriate for inpatient status.      * Alcohol withdrawal delirium, acute, hyperactive (HCC)- (present on admission)  Assessment & Plan  Last alcohol drink 5 days ago  Absolute alcohol abstinence advised  Detox bag  Multivitamins  High-dose IV thiamine  CIWA protocol    Due to high CIWA score, initiated on Precedex drip  Wean off Precedex drip as tolerated  ICU consulted, agreeable for IMCU admission  Admit to IMCU for close hemodynamic monitoring    Hypertensive urgency- (present on admission)  Assessment & Plan  SBP>200  Treat alcohol withdrawal  IV lasix  Continue home losartan, amlodipine  As needed IV hydralazine, IV labetalol    Hypokalemia- (present on admission)  Assessment & Plan  Replace  Replace Mg    Asthma- (present on admission)  Assessment & Plan  Nebs, pulmonary hygiene    Hypocalcemia  Assessment & Plan  Replace    GERD (gastroesophageal reflux disease)- (present on admission)  Assessment & Plan  PPI    Morbid obesity (HCC)- (present on admission)  Assessment & Plan  Diet and lifestyle modification  Body mass index is 45.85 kg/m².      BOLIVAR (obstructive sleep apnea)- (present on admission)  Assessment & Plan  CPAP        VTE prophylaxis: heparin ppx    Critical care time: 45 minutes spent in chart review, medical management, coordinate care with patient/ER staff/RN/pharmacy/consulting provider, frequent reevaluation of patient clinical response to treatment.  "

## 2024-05-21 NOTE — DISCHARGE PLANNING
IP Consult to Case Management for pt with recent admission within 30 days. Pt was recently admitted to [facility] [place] on 4/21/2024 as a direct admission from [facility] ER w/ tachycardia and ETOH withdrawal. Due to severity of patient's alcohol withdrawal, he was admitted to the [facility] and was started on CIWA protocol with lorazepam. Patient was counseled on alcohol cessation and referred to Alcoholics Anonymous for continued sustained sobriety.  He was offered naltrexone to assist with alcohol cessation, which was prescribed as per his sister-in-law's request.     Pt was discharged home on 4/23/24 and the following assessment was completed on 4/22/24:      Care Transition Team Assessment     Pt is a frequent readmission pt here at [facility].  Pts main support is BrotherFreddy  [phone] and Mother.   Pt works full time at [employer].  Information Source  Orientation Level: Oriented X4     Readmission Evaluation  Is this a readmission?: Yes - unplanned readmission  Why do you think you were readmitted?: Drinking, withdrawl side affects  Was an appointment arranged for you prior to discharge?: No  Were there new prescriptions you were supposed to fill after you were discharged?: Yes, prescriptions filled  Did you understand your discharge instructions?: Yes  Did you have enough support after your last discharge?: Yes     Elopement Risk  Legal Hold: No  Ambulatory or Self Mobile in Wheelchair: Yes  Disoriented: No  Elopement Risk: Not at Risk for Elopement     Interdisciplinary Discharge Planning  Primary Care Physician: Rey Pacheco M.D.  Patient or legal guardian wants to designate a caregiver: No  Patient Prefers to be Discharged to:: Home with Substance Use resources  Durable Medical Equipment: Not Applicable     Discharge Preparedness  What is your plan after discharge?: Home with help  What are your discharge supports?: Sibling  Prior Functional Level: Independent with  Activities of Daily Living     Functional Assesment  Prior Functional Level: Independent with Activities of Daily Living     Finances  Financial Barriers to Discharge: No  Prescription Coverage: Yes     Advance Directive  Advance Directive?: None     Psychological Assessment  History of Substance Abuse: Alcohol  Date Last Used - Alcohol: 4/20/24     Discharge Risks or Barriers  Discharge risks or barriers?: Substance abuse, Mental health  Patient risk factors: Noncompliance, Substance abuse     Anticipated Discharge Information  Discharge Disposition: Discharged to home/self care (01)  Discharge Address: [address] [place] [address] [address] [place] [address]  Discharge Contact Phone Number: Brother - [phone] (Home Phone)

## 2024-05-21 NOTE — ED NOTES
Hospitalist at bedside, orders obtained for lasix and 1mg of ativan. Per hospitalist pt to be admitted to IMCU.

## 2024-05-21 NOTE — ED NOTES
Pt medicated per MAR. Pt with continued tachypnea and refusing to stay in bed. Pt set up in chair at bedside with call light in reach.

## 2024-05-21 NOTE — DISCHARGE PLANNING
"Medical Social Work    SW notified that pt has a court hearing tomorrow and family requesting a \"proof of hospitalization\" letter to provide to the court. LMSW met w/ pt's family at bedside and letter provided to pt's brother.     [facility] also provided with contact information for pt's Pretrial Services Officer, Gabriel Forte ([phone]). SW called and left a VM.   "

## 2024-05-21 NOTE — ED NOTES
Pt with tachypnea increased WOB. Pt assisted into bed. ERP aware via voalte.     ERP at bedside with pt.

## 2024-05-22 ENCOUNTER — OFFICE VISIT (OUTPATIENT)
Dept: URGENT CARE | Facility: CLINIC | Age: 39
End: 2024-05-22
Payer: COMMERCIAL

## 2024-05-22 ENCOUNTER — PHARMACY VISIT (OUTPATIENT)
Dept: PHARMACY | Facility: MEDICAL CENTER | Age: 39
End: 2024-05-22
Payer: COMMERCIAL

## 2024-05-22 VITALS
BODY MASS INDEX: 44.84 KG/M2 | HEIGHT: 63 IN | SYSTOLIC BLOOD PRESSURE: 146 MMHG | TEMPERATURE: 97.5 F | OXYGEN SATURATION: 95 % | HEART RATE: 96 BPM | RESPIRATION RATE: 29 BRPM | DIASTOLIC BLOOD PRESSURE: 79 MMHG | WEIGHT: 253.09 LBS

## 2024-05-22 VITALS
WEIGHT: 253.1 LBS | BODY MASS INDEX: 46.57 KG/M2 | SYSTOLIC BLOOD PRESSURE: 139 MMHG | RESPIRATION RATE: 16 BRPM | OXYGEN SATURATION: 96 % | TEMPERATURE: 98.3 F | DIASTOLIC BLOOD PRESSURE: 82 MMHG | HEIGHT: 62 IN | HEART RATE: 112 BPM

## 2024-05-22 DIAGNOSIS — F10.280 ALCOHOL DEPENDENCE WITH ALCOHOL-INDUCED ANXIETY DISORDER (HCC): ICD-10-CM

## 2024-05-22 LAB
ALBUMIN SERPL BCP-MCNC: 3.8 G/DL (ref 3.2–4.9)
ALBUMIN/GLOB SERPL: 1.2 G/DL
ALP SERPL-CCNC: 124 U/L (ref 30–99)
ALT SERPL-CCNC: 42 U/L (ref 2–50)
ANION GAP SERPL CALC-SCNC: 16 MMOL/L (ref 7–16)
AST SERPL-CCNC: 42 U/L (ref 12–45)
BASOPHILS # BLD AUTO: 0.8 % (ref 0–1.8)
BASOPHILS # BLD: 0.04 K/UL (ref 0–0.12)
BILIRUB SERPL-MCNC: 1.3 MG/DL (ref 0.1–1.5)
BUN SERPL-MCNC: 9 MG/DL (ref 8–22)
CALCIUM ALBUM COR SERPL-MCNC: 8.1 MG/DL (ref 8.5–10.5)
CALCIUM SERPL-MCNC: 7.9 MG/DL (ref 8.5–10.5)
CHLORIDE SERPL-SCNC: 106 MMOL/L (ref 96–112)
CO2 SERPL-SCNC: 24 MMOL/L (ref 20–33)
CREAT SERPL-MCNC: 0.69 MG/DL (ref 0.5–1.4)
EOSINOPHIL # BLD AUTO: 0.37 K/UL (ref 0–0.51)
EOSINOPHIL NFR BLD: 7.7 % (ref 0–6.9)
ERYTHROCYTE [DISTWIDTH] IN BLOOD BY AUTOMATED COUNT: 56.3 FL (ref 35.9–50)
GFR SERPLBLD CREATININE-BSD FMLA CKD-EPI: 123 ML/MIN/1.73 M 2
GLOBULIN SER CALC-MCNC: 3.1 G/DL (ref 1.9–3.5)
GLUCOSE BLD STRIP.AUTO-MCNC: 126 MG/DL (ref 65–99)
GLUCOSE BLD STRIP.AUTO-MCNC: 134 MG/DL (ref 65–99)
GLUCOSE SERPL-MCNC: 104 MG/DL (ref 65–99)
HCT VFR BLD AUTO: 41 % (ref 42–52)
HGB BLD-MCNC: 13.7 G/DL (ref 14–18)
IMM GRANULOCYTES # BLD AUTO: 0.01 K/UL (ref 0–0.11)
IMM GRANULOCYTES NFR BLD AUTO: 0.2 % (ref 0–0.9)
LYMPHOCYTES # BLD AUTO: 1.74 K/UL (ref 1–4.8)
LYMPHOCYTES NFR BLD: 36.3 % (ref 22–41)
MAGNESIUM SERPL-MCNC: 1.8 MG/DL (ref 1.5–2.5)
MCH RBC QN AUTO: 29.9 PG (ref 27–33)
MCHC RBC AUTO-ENTMCNC: 33.4 G/DL (ref 32.3–36.5)
MCV RBC AUTO: 89.5 FL (ref 81.4–97.8)
MONOCYTES # BLD AUTO: 0.45 K/UL (ref 0–0.85)
MONOCYTES NFR BLD AUTO: 9.4 % (ref 0–13.4)
NEUTROPHILS # BLD AUTO: 2.19 K/UL (ref 1.82–7.42)
NEUTROPHILS NFR BLD: 45.6 % (ref 44–72)
NRBC # BLD AUTO: 0 K/UL
NRBC BLD-RTO: 0 /100 WBC (ref 0–0.2)
PHOSPHATE SERPL-MCNC: 3.4 MG/DL (ref 2.5–4.5)
PLATELET # BLD AUTO: 232 K/UL (ref 164–446)
PMV BLD AUTO: 8.9 FL (ref 9–12.9)
POTASSIUM SERPL-SCNC: 3.4 MMOL/L (ref 3.6–5.5)
PROT SERPL-MCNC: 6.9 G/DL (ref 6–8.2)
RBC # BLD AUTO: 4.58 M/UL (ref 4.7–6.1)
SODIUM SERPL-SCNC: 146 MMOL/L (ref 135–145)
WBC # BLD AUTO: 4.8 K/UL (ref 4.8–10.8)

## 2024-05-22 PROCEDURE — 99233 SBSQ HOSP IP/OBS HIGH 50: CPT | Performed by: INTERNAL MEDICINE

## 2024-05-22 PROCEDURE — 99239 HOSP IP/OBS DSCHRG MGMT >30: CPT | Performed by: HOSPITALIST

## 2024-05-22 PROCEDURE — RXMED WILLOW AMBULATORY MEDICATION CHARGE: Performed by: HOSPITALIST

## 2024-05-22 PROCEDURE — 99214 OFFICE O/P EST MOD 30 MIN: CPT | Performed by: PHYSICIAN ASSISTANT

## 2024-05-22 PROCEDURE — 3075F SYST BP GE 130 - 139MM HG: CPT | Performed by: PHYSICIAN ASSISTANT

## 2024-05-22 PROCEDURE — 3079F DIAST BP 80-89 MM HG: CPT | Performed by: PHYSICIAN ASSISTANT

## 2024-05-22 RX ORDER — DIAZEPAM 2 MG/1
2 TABLET ORAL EVERY 6 HOURS PRN
Qty: 20 TABLET | Refills: 0 | Status: SHIPPED | OUTPATIENT
Start: 2024-05-22 | End: 2024-05-27

## 2024-05-22 RX ORDER — MAGNESIUM SULFATE HEPTAHYDRATE 40 MG/ML
2 INJECTION, SOLUTION INTRAVENOUS ONCE
Status: COMPLETED | OUTPATIENT
Start: 2024-05-22 | End: 2024-05-22

## 2024-05-22 RX ORDER — LOSARTAN POTASSIUM 50 MG/1
50 TABLET ORAL
Qty: 30 TABLET | Refills: 2 | Status: SHIPPED | OUTPATIENT
Start: 2024-05-22

## 2024-05-22 RX ORDER — ALBUTEROL SULFATE 90 UG/1
2 AEROSOL, METERED RESPIRATORY (INHALATION) EVERY 6 HOURS PRN
Qty: 8.5 G | Refills: 2 | Status: SHIPPED | OUTPATIENT
Start: 2024-05-22

## 2024-05-22 RX ORDER — POTASSIUM CHLORIDE 20 MEQ/1
60 TABLET, EXTENDED RELEASE ORAL ONCE
Status: COMPLETED | OUTPATIENT
Start: 2024-05-22 | End: 2024-05-22

## 2024-05-22 RX ORDER — AMLODIPINE BESYLATE 10 MG/1
10 TABLET ORAL DAILY
Qty: 30 TABLET | Refills: 2 | Status: SHIPPED | OUTPATIENT
Start: 2024-05-22

## 2024-05-22 RX ADMIN — AMLODIPINE BESYLATE 10 MG: 10 TABLET ORAL at 05:18

## 2024-05-22 RX ADMIN — IPRATROPIUM BROMIDE AND ALBUTEROL SULFATE 3 ML: 2.5; .5 SOLUTION RESPIRATORY (INHALATION) at 03:47

## 2024-05-22 RX ADMIN — ALLOPURINOL 300 MG: 300 TABLET ORAL at 05:18

## 2024-05-22 RX ADMIN — THERA TABS 1 TABLET: TAB at 05:18

## 2024-05-22 RX ADMIN — FOLIC ACID 1 MG: 1 TABLET ORAL at 05:18

## 2024-05-22 RX ADMIN — CHLORDIAZEPOXIDE HYDROCHLORIDE 25 MG: 25 CAPSULE ORAL at 05:18

## 2024-05-22 RX ADMIN — LOSARTAN POTASSIUM 50 MG: 50 TABLET, FILM COATED ORAL at 05:19

## 2024-05-22 RX ADMIN — MAGNESIUM SULFATE HEPTAHYDRATE 2 G: 2 INJECTION, SOLUTION INTRAVENOUS at 09:37

## 2024-05-22 RX ADMIN — POTASSIUM CHLORIDE 60 MEQ: 1500 TABLET, EXTENDED RELEASE ORAL at 09:03

## 2024-05-22 RX ADMIN — Medication 1 APPLICATOR: at 05:20

## 2024-05-22 RX ADMIN — SUCRALFATE 1 G: 1 TABLET ORAL at 07:21

## 2024-05-22 RX ADMIN — HEPARIN SODIUM 5000 UNITS: 5000 INJECTION, SOLUTION INTRAVENOUS; SUBCUTANEOUS at 05:20

## 2024-05-22 RX ADMIN — FUROSEMIDE 40 MG: 10 INJECTION INTRAMUSCULAR; INTRAVENOUS at 05:20

## 2024-05-22 RX ADMIN — METOPROLOL TARTRATE 25 MG: 25 TABLET, FILM COATED ORAL at 05:18

## 2024-05-22 RX ADMIN — OMEPRAZOLE 40 MG: 20 CAPSULE, DELAYED RELEASE ORAL at 05:18

## 2024-05-22 RX ADMIN — THIAMINE HYDROCHLORIDE 500 MG: 100 INJECTION, SOLUTION INTRAMUSCULAR; INTRAVENOUS at 09:28

## 2024-05-22 ASSESSMENT — ENCOUNTER SYMPTOMS
SHORTNESS OF BREATH: 0
RESPIRATORY NEGATIVE: 1
NERVOUS/ANXIOUS: 0
CHILLS: 0
SENSORY CHANGE: 0
COUGH: 0
SORE THROAT: 0
MYALGIAS: 0
PALPITATIONS: 0
VOMITING: 0
SPEECH CHANGE: 0
SPUTUM PRODUCTION: 0
NEUROLOGICAL NEGATIVE: 1
INSOMNIA: 0
DIZZINESS: 0
BLURRED VISION: 0
HALLUCINATIONS: 0
CARDIOVASCULAR NEGATIVE: 1
BACK PAIN: 0
GASTROINTESTINAL NEGATIVE: 1
CONSTITUTIONAL NEGATIVE: 1
DEPRESSION: 0
FEVER: 0
NAUSEA: 0
BRUISES/BLEEDS EASILY: 0
HEADACHES: 0
ABDOMINAL PAIN: 0

## 2024-05-22 ASSESSMENT — PAIN DESCRIPTION - PAIN TYPE
TYPE: ACUTE PAIN

## 2024-05-22 ASSESSMENT — LIFESTYLE VARIABLES: SUBSTANCE_ABUSE: 1

## 2024-05-22 ASSESSMENT — FIBROSIS 4 INDEX
FIB4 SCORE: 1.01
FIB4 SCORE: 1.01

## 2024-05-22 NOTE — LETTER
May 22, 2024         Patient: Alberto Maldonado   YOB: 1985   Date of Visit: 5/22/2024           To Whom it May Concern:    Alberto Maldonado was seen in my clinic on 5/22/2024. He may return to work on Tuesday May 28th.    If you have any questions or concerns, please don't hesitate to call.        Sincerely,           Gage Paredes P.A.-C.  Electronically Signed

## 2024-05-22 NOTE — DIETARY
NUTRITION SERVICES: BMI - Pt with BMI >40 (=Body mass index is 44.84 kg/m².), Class III obesity. Weight loss counseling not appropriate in acute care setting. RECOMMEND - If appropriate at DC please refer to outpatient nutrition services for weight management.

## 2024-05-22 NOTE — PROGRESS NOTES
Discharge instructions given to patient. Prescriptions given to patient. Discharge instructions given to patient at bedside, verbalizes understanding and states plans for follow-up with PCP. New and home medication review, post-discharge activity level and worsening of symptoms needing follow-up care discussed. Telemetry monitor/IV cathlon removed. All belongings accounted for, all questions answered at this time. Patient placed in wheelchair and escorted out.

## 2024-05-22 NOTE — CARE PLAN
Problem: Ventilation  Goal: Ability to achieve and maintain unassisted ventilation or tolerate decreased levels of ventilator support  Description: Target End Date:  4 days     Document on Vent flowsheet    1.  Support and monitor invasive and noninvasive mechanical ventilation  2.  Monitor ventilator weaning response  3.  Perform ventilator associated pneumonia prevention interventions  4.  Manage ventilation therapy by monitoring diagnostic test results  Outcome: Progressing   Bipap: [place] ST rate 15, Pinsp 10, PEEP 5, 30%  Off around 2 pm. Bipap on standby monitoring patient.

## 2024-05-22 NOTE — CARE PLAN
Problem: Optimal Care for Alcohol Withdrawal  Goal: Optimal Care for the alcohol withdrawal patient  Description: Target End Date:  1 to 3 days    1.  Alcohol history screening done on admission  2.  CIWA-AR score assessment (includes assessment of nausea/vomiting, tremor, sweats, anxiety, agitation, tactile, visual and auditory disturbances, headache and orientation/sensorium).  Document on CIWA flowsheet.  3.  Follow CIWA-AR score protocol  4.  Frequent reorientation  5.  Monitor for fluid and electrolyte imbalance.  6.  Assess for respiratory depression due to sedation (pulse ox)  7.  Consider thiamine, multivitamins, folic acid and magnesium sulfate per provider order  8.  Collaborate with Social Workers/Case Management  9.  Collaborate with mental health  Outcome: Progressing     Problem: Hemodynamics  Goal: Patient's hemodynamics, fluid balance and neurologic status will be stable or improve  Description: Target End Date:  Prior to discharge or change in level of care    Document on Assessment and I/O flowsheet templates    1.  Monitor vital signs, pulse oximetry and cardiac monitor per provider order and/or policy  2.  Maintain blood pressure per provider order  3.  Hemodynamic monitoring per provider order  4.  Manage IV fluids and IV infusions  5.  Monitor intake and output  6.  Daily weights per unit policy or provider order  7.  Assess peripheral pulses and capillary refill  8.  Assess color and body temperature  9.  Position patient for maximum circulation/cardiac output  10. Monitor for signs/symptoms of excessive bleeding  11. Assess mental status, restlessness and changes in level of consciousness  12. Monitor temperature and report fever or hypothermia to provider immediately. Consideration of targeted temperature management.  Outcome: Progressing   The patient is Watcher - Medium risk of patient condition declining or worsening    Shift Goals  Clinical Goals: Hemodynamic stability  Patient Goals:  Go home  Family Goals: Updates    Progress made toward(s) clinical / shift goals:  monitor hemodynamics, discharge prep    Patient is not progressing towards the following goals:

## 2024-05-22 NOTE — HOSPITAL COURSE
38 y.o. male who presented 5/21/2024 with a past medical history significant for asthma, hypertension, obstructive sleep apnea (on home CPAP) and alcohol abuse who was brought into the emergency department early this morning complaining of shortness of breath for the last 4 days.  He also noted alcohol abuse with increase in heavy drinking recently.  He had recently been arrested for DUI and underwent medical clearance at [facility] leaving [place].  After being released from snf, he felt short of breath, anxious, and that he was going into withdrawal prompting his evaluation in the ED today.  Of note, patient was admitted to the hospital on April 21 for alcohol withdrawal and had an echocardiogram with a normal ejection fraction on 4/22.  While in the ED, patient received 4 mg of IV Ativan, 10 mg of hydralazine for hypertension, and had increased work of breathing for which he received a DuoNeb and a racemic epinephrine without improvement.  He received an IV rally bag.  Initially was planned to be admitted to the IMCU however given his worsening respiratory status, patient was started on rescue BiPAP and will be admitted to the ICU instead.

## 2024-05-22 NOTE — CARE PLAN
The patient is Watcher - Medium risk of patient condition declining or worsening    Shift Goals  Clinical Goals: monitor resp, pulmonary hygiene  Patient Goals: go home  Family Goals: none    Progress made toward(s) clinical / shift goals:    Problem: Knowledge Deficit - Standard  Goal: Patient and family/care givers will demonstrate understanding of plan of care, disease process/condition, diagnostic tests and medications  Description: Target End Date:  1-3 days or as soon as patient condition allows    Document in Patient Education    1.  Patient and family/caregiver oriented to unit, equipment, visitation policy and means for communicating concern  2.  Complete/review Learning Assessment  3.  Assess knowledge level of disease process/condition, treatment plan, diagnostic tests and medications  4.  Explain disease process/condition, treatment plan, diagnostic tests and medications  Outcome: Progressing     Problem: Knowledge Deficit - COPD  Goal: Patient/significant other demonstrates understanding of disease process, utilization of the Action Plan, medications and discharge instruction  Description: Target End Date:  1-3 days or as soon as patient condition allows    Document in Patient Education    1.  Discuss the importance of medical follow-up care, periodic chest x-rays, sputum cultures  2.  Review worsening signs and symptoms of COPD flare and exacerbation and its management  3.  Discuss respiratory medications, side effects, adverse reactions  4.  Demonstrate technique for using a metered-dose inhaler (MDI) and utilization of a spacer  5.  Review the COPD Action Plan  6.  Instruct and reinforce the rationale for breathing exercises, coughing effectively, and general conditioning exercises  7.  Stress importance of oral care and dental hygiene  8.  Discuss the importance of avoiding people with active respiratory infections and need for routine influenza and pneumococcal vaccinations  9.  Discuss factors  that may trigger condition and encourage patient/significant other to explore ways to control these factors in and around the home and work setting  10. Review the harmful effects of smoking and advise cessation of smoking  11. Provide information about activity limitations and alternating activities with rest periods to prevent fatigue  12. Instruct asthmatic patient of use of peak flow meter, as appropriate  13. Review oxygen requirements and dosage, safe use of oxygen, and refer to the supplier as indicated  14. Educate patient/family/caregiver on the use of Nasal Intermittent Positive Pressure Ventilation (NIPPV) and possible adverse reactions  Outcome: Progressing     Problem: Risk for Infection - COPD  Goal: Patient will remain free from signs and symptoms of infection  Description: Target End Date:  Prior to discharge or change in level of care    1.  Infection prevention measures  2.  Instruct patient regarding signs and symptoms of infection  3.  Review the importance of breathing exercises, effective cough, frequent position changes, and adequate fluid intake  4.  Recommend rinsing mouth with water and spitting, not swallowing, or use of a spacer on the mouthpiece of inhaled corticosteroids  Outcome: Progressing     Problem: Nutrition - Advanced  Goal: Patient will display progressive weight gain toward goal have adequate food and fluid intake  Description: Target End Date:  Prior to discharge or change in level of care    1.  Daily weights  2.  Monitor dietary intake  3.  Promote systemic fluid hydration within cardiac tolerance  4.  Albumin and PreAlbumin per provider order  5.  Enteral and parenteral nutrition per provider order  6.  Calorie count  7.  Provide oral care  8.  Collaborate with Clinical Dietician  9.  Consider Speech Therapy consult for swallowing difficulties  10. Administer supplemental oxygen during meals as indicated  Outcome: Progressing     Problem: Ineffective Airway  Clearance  Goal: Patient will maintain patent airway with clear/clearing breath sounds  Description: Target End Date:  Prior to discharge or change in level of care    1.   Position head midline with flexion appropriate for age and/or condition  2.   Assist patient to assume a position of comfort  3.   Assess and monitor breath sounds  4.   Encourage abdominal or pursed-lip breathing exercises  5.   Assist with measures to improve effectiveness of cough effort  6.   Demonstrate effective coughing and deep-breathing techniques  7.   Assist patient to turn every 2 hours  8.   Encourage patient to ambulate as tolerated  9.   Keep environmental pollution to a minimum  10. Airway suctioning as needed  11. Collaborate with RT to administer medications/treatments per order  12. Promote systemic fluid hydration within cardiac tolerance  13. Provide warm or tepid liquids  Outcome: Progressing     Problem: Impaired Gas Exchange  Goal: Patient will demonstrate improved ventilation and adequate oxygenation and participate in treatment regimen within the level of ability/situation.  Description: Target End Date:  Prior to discharge or change in level of care    1.   Assess/monitor rate/rhythm/depth of effort of respirations  2.   Assess oxygenation as ordered  3.   Administer/titrate oxygen as ordered  4.   Position patient for maximum ventilatory efficiency  5.   Turn, cough, and deep breathe  6.   Vital signs, pulse oximetry  7.   Assess color and body temperature  8.   Assess and monitor breath sounds  9.   Encourage deep-slow or pursed-lip breathing exercises  10. Monitor changes in the level of consciousness and mental status  11. Encourage expectoration of sputum; airway suctioning  12. Elevate the head of the bed and position patient for maximum ventilatory efficiency  13. Provide a calm, quiet environment  14. Limit patient's activity during the acute phase and have patient resume activity gradually and increase as  individually tolerated  15. Evaluate sleep patterns and limit stimulants such as caffeine  16. Collaborate with RT to administer medications/treatments as ordered  Outcome: Progressing     Problem: Risk for Aspiration  Goal: Patient's risk for aspiration will be absent or decrease  Description: Target End Date:  Prior to discharge or change in level of care    1.   Complete dysphagia screening on admission  2.   NPO until dysphagia screening complete or medically cleared  3.   Collaborate with Speech Therapy, Clinical Dietitian and interdisciplinary team  4.   Implement aspiration precautions  5.   Assist patient up to chair for meals  6.   Elevate head of bed 90 degrees if patient is unable to get out of bed  7.   Encourage small bites  8.   Ensure foods/liquids are of appropriate consistency  9.   Assess for any signs/symptoms of aspiration  10. Assess breath sounds and vital signs after oral intake  Outcome: Progressing     Problem: Self Care  Goal: Patient will have the ability to perform ADLs independently or with assistance (bathe, groom, dress, toilet and feed)  Description: Target End Date:  Prior to discharge or change in level of care    Document on ADL flowsheet    1.  Assess the capability and level of deficiency to perform ADLs  2.  Encourage family/care giver involvement  3.  Provide assistive devices  4.  Consider PT/OT evaluations  5.  Maintain support, give positive feedback, encourage self-care allowing extra time and verbal cuing as needed  6.  Avoid doing something for patients they can do themselves, but provide assistance as needed  7.  Assist in anticipating/planning individual needs  8.  Collaborate with Case Management and  to meet discharge needs  Outcome: Progressing     Problem: Optimal Care for Alcohol Withdrawal  Goal: Optimal Care for the alcohol withdrawal patient  Description: Target End Date:  1 to 3 days    1.  Alcohol history screening done on admission  2.  RAMEZ  score assessment (includes assessment of nausea/vomiting, tremor, sweats, anxiety, agitation, tactile, visual and auditory disturbances, headache and orientation/sensorium).  Document on CIWA flowsheet.  3.  Follow CIWA-AR score protocol  4.  Frequent reorientation  5.  Monitor for fluid and electrolyte imbalance.  6.  Assess for respiratory depression due to sedation (pulse ox)  7.  Consider thiamine, multivitamins, folic acid and magnesium sulfate per provider order  8.  Collaborate with Social Workers/Case Management  9.  Collaborate with mental health  Outcome: Progressing     Problem: Seizure Precautions  Goal: Implementation of seizure precautions  Description: Target End Date:  Prior to discharge or change in level of care    1.  Padded side rails up at all times  2.  Suction equipment and oxygen delivery system at bedside  3.  Continuous pulse oximeter in use  4.  Implement fall precautions, bed alarm on, bed in lowest position  5.  IV access (per order)  6.  Provide low stimulus environment, avoid exposure to triggers  7.  Instruct patient to use call light/seizure button if having warning signs of impending seizure  Outcome: Progressing     Problem: Lifestyle Changes  Goal: Patient's ability to identify lifestyle changes and available resources to help reduce recurrence of condition will improve  Description: Target End Date:  1 to 3 days    1.  Discuss recommended lifestyle changes  2.  Identify available resources and support systems  3.  Consider referral to substance abuse program  Outcome: Progressing     Problem: Psychosocial  Goal: Patient's level of anxiety will decrease  Description: Target End Date:  1-3 days or as soon as patient condition allows    1.  Collaborate with patient and family/caregiver to identify triggers and develop strategies to cope with anxiety  2.  Implement stimuli reduction, calming techniques  3.  Pharmacologic management per provider order  4.  Encourage  patient/family/care giver participation  5.  Collaborate with interdisciplinary team including Psychologist or Behavioral Health Team as needed  Outcome: Progressing  Goal: Spiritual and cultural needs incorporated into hospitalization  Description: Target End Date:  End of day 1    1.  Encourage verbalization of feelings, concerns, expectations and needs  2.  Collaborate with Case Management/  3.  Collaborate with Pastoral Care to meet spiritual needs  Outcome: Progressing     Problem: Hemodynamics  Goal: Patient's hemodynamics, fluid balance and neurologic status will be stable or improve  Description: Target End Date:  Prior to discharge or change in level of care    Document on Assessment and I/O flowsheet templates    1.  Monitor vital signs, pulse oximetry and cardiac monitor per provider order and/or policy  2.  Maintain blood pressure per provider order  3.  Hemodynamic monitoring per provider order  4.  Manage IV fluids and IV infusions  5.  Monitor intake and output  6.  Daily weights per unit policy or provider order  7.  Assess peripheral pulses and capillary refill  8.  Assess color and body temperature  9.  Position patient for maximum circulation/cardiac output  10. Monitor for signs/symptoms of excessive bleeding  11. Assess mental status, restlessness and changes in level of consciousness  12. Monitor temperature and report fever or hypothermia to provider immediately. Consideration of targeted temperature management.  Outcome: Progressing     Problem: Fluid Volume  Goal: Fluid volume balance will be maintained  Description: Target End Date:  Prior to discharge or change in level of care    Document on I/O flowsheet    1.  Monitor intake and output as ordered  2.  Promote oral intake as appropriate  3.  Report inadequate intake or output to physician  4.  Administer IV therapy as ordered  5.  Weights per provider order  6.  Assess for signs and symptoms of bleeding  7.  Monitor for  signs of fluid overload (respiratory changes, edema, weight gain, increased abdominal girth)  8.  Monitor of signs for inadequate fluid volume (poor skin turgor, dry mucous membranes)  9.  Instruct patient on adherence to fluid restrictions  Outcome: Progressing     Problem: Urinary - Renal Perfusion  Goal: Ability to achieve and maintain adequate renal perfusion and functioning will improve  Description: Target End Date:  Prior to discharge or change in level of care    Document on I/O and Assessment flowsheet    1.  Urine output will remain greater than 0.5ml/Kg/HR  2.  Monitor amount and/or characteristics of urine per order/policy. Specific gravity per order/policy  3.  Assess signs and symptoms of renal dysfunction  Outcome: Progressing     Problem: Respiratory  Goal: Patient will achieve/maintain optimum respiratory ventilation and gas exchange  Description: Target End Date:  Prior to discharge or change in level of care    Document on Assessment flowsheet    1.  Assess and monitor rate, rhythm, depth and effort of respiration  2.  Breath sounds assessed qshift and/or as needed  3.  Assess O2 saturation, administer/titrate oxygen as ordered  4.  Position patient for maximum ventilatory efficiency  5.  Turn, cough, and deep breath with splinting to improve effectiveness  6.  Collaborate with RT to administer medication/treatments per order  7.  Encourage use of incentive spirometer and encourage patient to cough after use and utilize splinting techniques if applicable  8.  Airway suctioning  9.  Monitor sputum production for changes in color, consistency and frequency  10. Perform frequent oral hygiene  11. Alternate physical activity with rest periods  Outcome: Progressing     Problem: Mechanical Ventilation  Goal: Safe management of artificial airway and ventilation  Description: Target End Date:  when vent discontinued    Document on VAP flowsheet and Airway LDA    1.  Daily awakening trials per provider  order/policy  2.  Suctioning and care of ET/Trach tube (document on LDA)  3.  Collaborate with RT to administer medications/treatments  4.  Ambu bag at bedside and available for transport  5.  Trach patient - replacement trach at bedside  6.  Provide communication tools if applicable  Outcome: Progressing  Goal: Successful weaning off mechanical ventilator, spontaneously maintains adequate gas exchange  Description: Target End Date:  when vent discontinued    1.  Follow universal weaning protocol for patients on mechanical ventilation per order  2.  Review contraindication list.  Obtain provider order to wean in presence of contraindication.  3.  Obtain Nitesh Sedation-Agitation Score  4.  Nitesh Score 1-2:  sedation vacation  5.  Nitesh Score 3-4:  Collaborate with provider and/or RT to determine readiness for trial  6.  Begin 2 hour trial of weaning following protocol  7.  Evaluate for fatigue parameters per protocol  8.  Fatigue parameters triggered:  Stop wean and return to previous ASV% setting or increase % minute volume to offset work or breathing  Outcome: Progressing  Goal: Patient will be able to express needs and understand communication  Description: Target End Date:  when vent discontinued    1.  Assess ability to communicate and understand  2.  Provide communication tools  3.  Collaborate with Speech Therapy for [facility]  Outcome: Progressing     Problem: Physical Regulation  Goal: Diagnostic test results will improve  Description: Target End Date:  Prior to discharge or change in level of care    1.  Monitor lactic acid levels  2.  Monitor ABG's  3.  Monitor diagnostic test results  Outcome: Progressing  Goal: Signs and symptoms of infection will decrease  Description: Target End Date:  Prior to discharge or change in level of care    1.  Remove potential routes of infection, such as central lines and urinary catheter  2.  Follow facility protocol for changing IV tubing and sites  3.  Collaborate with  Infectious Disease  4.  Antibiotic therapy per provider order  5.  Note drug effects and monitor for antibiotic toxicity  Outcome: Progressing     Problem: Skin Integrity  Goal: Skin integrity is maintained or improved  Description: Target End Date:  Prior to discharge or change in level of care    Document interventions on Skin Risk/Osmani flowsheet groups and corresponding LDA    1.  Assess and monitor skin integrity, appearance and/or temperature  2.  Assess risk factors for impaired skin integrity and/or pressures ulcers  3.  Implement precautions to protect skin integrity in collaboration with interdisciplinary team  4.  Implement pressure ulcer prevention protocol if at risk for skin breakdown  5.  Confirm wound care consult if at risk for skin breakdown  6.  Ensure patient use of pressure relieving devices  (Low air loss bed, waffle overlay, heel protectors, ROHO cushion, etc)  Outcome: Progressing     Problem: Fall Risk  Goal: Patient will remain free from falls  Description: Target End Date:  Prior to discharge or change in level of care    Document interventions on the Nona Krishnamurthy Fall Risk Assessment    1.  Assess for fall risk factors  2.  Implement fall precautions  Outcome: Progressing       Patient is not progressing towards the following goals:

## 2024-05-22 NOTE — PROGRESS NOTES
Critical Care Progress Note    Date of admission  5/21/2024    Chief Complaint  38 y.o. male admitted 5/21/2024 with SOB, alcohol [facility]    Hospital Course  38 y.o. male who presented 5/21/2024 with a past medical history significant for asthma, hypertension, obstructive sleep apnea (on home CPAP) and alcohol abuse who was brought into the emergency department early this morning complaining of shortness of breath for the last 4 days.  He also noted alcohol abuse with increase in heavy drinking recently.  He had recently been arrested for DUI and underwent medical clearance at [facility] leaving [place].  After being released from longterm, he felt short of breath, anxious, and that he was going into withdrawal prompting his evaluation in the ED today.  Of note, patient was admitted to the hospital on April 21 for alcohol withdrawal and had an echocardiogram with a normal ejection fraction on 4/22.  While in the ED, patient received 4 mg of IV Ativan, 10 mg of hydralazine for hypertension, and had increased work of breathing for which he received a DuoNeb and a racemic epinephrine without improvement.  He received an IV rally bag.  Initially was planned to be admitted to the IMCU however given his worsening respiratory status, patient was started on rescue BiPAP and will be admitted to the ICU instead.     Interval Problem Update  Reviewed last 24 hour events:   - weaned off BiPAP and precedex gtt yesterday   - librium   - AAOx4, no tremors nor hallucinations   - AF, nl WBC   - NSR, -145   - Hgb down to 13   - Na down to 146   - low K, Ca, Mag   - LA improved   - raine diet, good UOP    Review of Systems  Review of Systems   Constitutional:  Negative for chills, fever and malaise/fatigue.   HENT:  Negative for congestion and sore throat.    Eyes:  Negative for blurred vision.   Respiratory:  Negative for cough, sputum production and shortness of breath.    Cardiovascular:  Negative for chest pain,  palpitations and leg swelling.   Gastrointestinal:  Negative for abdominal pain, nausea and vomiting.   Genitourinary:  Negative for dysuria.   Musculoskeletal:  Negative for back pain and myalgias.   Skin:  Negative for rash.   Neurological:  Negative for dizziness, sensory change, speech change and headaches.   Endo/Heme/Allergies:  Does not bruise/bleed easily.   Psychiatric/Behavioral:  Positive for substance abuse. Negative for depression and hallucinations. The patient is not nervous/anxious and does not have insomnia.    All other systems reviewed and are negative.       Vital Signs for last 24 hours   Temp:  [36.2 °C (97.2 °F)-36.7 °C (98.1 °F)] 36.4 °C (97.6 °F)  Pulse:  [] 91  Resp:  [13-41] 31  BP: ()/() 127/76  SpO2:  [91 %-99 %] 91 %    Respiratory Information for the last 24 hours  Vent Mode: NIV-ST  Rate (breaths/min): 15  PEEP/CPAP: 5  P Support: 10 --> room air    Physical Exam   Physical Exam  Vitals and nursing note reviewed.   Constitutional:       General: He is awake. He is not in acute distress.     Appearance: He is well-developed. He is morbidly obese. He is not ill-appearing.      Comments: Sitting up in chair awaiting breakfast   HENT:      Head: Normocephalic and atraumatic.      Nose: Nose normal.      Mouth/Throat:      Mouth: Mucous membranes are moist.      Pharynx: Oropharynx is clear. No oropharyngeal exudate.   Eyes:      General: No scleral icterus.     Conjunctiva/sclera: Conjunctivae normal.      Pupils: Pupils are equal, round, and reactive to light.   Neck:      Vascular: No JVD.   Cardiovascular:      Rate and Rhythm: Normal rate and regular rhythm.      Pulses: Normal pulses.      Heart sounds: Normal heart sounds. No murmur heard.  Pulmonary:      Effort: Pulmonary effort is normal. No respiratory distress.      Breath sounds: Normal breath sounds. No stridor. No wheezing.   Abdominal:      General: Bowel sounds are normal. There is no distension.       Palpations: Abdomen is soft.      Tenderness: There is no abdominal tenderness. There is no guarding or rebound.   Musculoskeletal:         General: No tenderness.      Cervical back: Neck supple. No tenderness.      Right lower leg: No edema.      Left lower leg: No edema.   Skin:     General: Skin is warm and dry.      Capillary Refill: Capillary refill takes less than 2 seconds.      Coloration: Skin is not pale.   Neurological:      General: No focal deficit present.      Mental Status: He is alert and oriented to person, place, and time.      Cranial Nerves: No cranial nerve deficit.      Sensory: No sensory deficit.      Motor: No weakness.      Comments: No tremor   Psychiatric:         Mood and Affect: Mood normal.         Behavior: Behavior normal. Behavior is cooperative.         Thought Content: Thought content normal.      Comments: Odd affect         Medications  Current Facility-Administered Medications   Medication Dose Route Frequency Provider Last Rate Last Admin    Nozin nasal  swab  1 Applicator Each Nostril BID Jeremy M Gonda, M.D.   1 Applicator at 05/22/24 0520    acetaminophen (Tylenol) tablet 650 mg  650 mg Oral Q6HRS PRN Gage Corado M.D.        ondansetron (Zofran) syringe/vial injection 4 mg  4 mg Intravenous Q4HRS PROSMAR Corado M.D.        ondansetron (Zofran ODT) dispertab 4 mg  4 mg Oral Q4HRS PROSMAR Corado M.D.        promethazine (Phenergan) tablet 12.5-25 mg  12.5-25 mg Oral Q4HRS PROSMAR Corado M.D.        promethazine (Phenergan) suppository 12.5-25 mg  12.5-25 mg Rectal Q4HRS PROSMAR Corado M.D.        prochlorperazine (Compazine) injection 5-10 mg  5-10 mg Intravenous Q4HRS PROSMAR Corado M.D.        senna-docusate (Pericolace Or Senokot S) 8.6-50 MG per tablet 2 Tablet  2 Tablet Oral Q EVENING Gage Corado M.D.        And    polyethylene glycol/lytes (Miralax) Packet 1 Packet  1 Packet Oral QDAY PRN Gage Corado M.D.        allopurinol (Zyloprim)  tablet 300 mg  300 mg Oral DAILY Gage Corado M.D.   300 mg at 05/22/24 0518    amLODIPine (Norvasc) tablet 10 mg  10 mg Oral DAILY Gage Corado M.D.   10 mg at 05/22/24 0518    losartan (Cozaar) tablet 50 mg  50 mg Oral DAILY Gage Corado M.D.   50 mg at 05/22/24 0519    insulin regular (HumuLIN R,NovoLIN R) injection  2-9 Units Subcutaneous 4X/DAY ACHS Gage Corado M.D.        And    dextrose 50% (D50W) injection 25 g  25 g Intravenous Q15 MIN PRN Gage Corado M.D.        thiamine (B-1) 500 mg in dextrose 5% 100 mL IVPB  500 mg Intravenous TID Gage Corado M.D. 200 mL/hr at 05/21/24 2124 500 mg at 05/21/24 2124    heparin injection 5,000 Units  5,000 Units Subcutaneous Q8HRS Gage Corado M.D.   5,000 Units at 05/22/24 0520    hydrALAZINE (Apresoline) injection 10 mg  10 mg Intravenous Q6HRS PRN Gage Corado M.D.        furosemide (Lasix) injection 40 mg  40 mg Intravenous BID DIURETIC Gage Corado M.D.   40 mg at 05/22/24 0520    MD Alert...ICU Electrolyte Replacement per Pharmacy   Other PHARMACY TO DOSE Jeremy M Gonda, M.D.        chlordiazePOXIDE (Librium) capsule 25 mg  25 mg Oral Q8HRS Jeremy M Gonda, M.D.   25 mg at 05/22/24 0518    midazolam (Versed) injection 2 mg  2 mg Intravenous Q HOUR PRN Jeremy M Gonda, M.D.        Or    midazolam (Versed) injection 5 mg  5 mg Intravenous Q HOUR PRN Jeremy M Gonda, M.D.        labetalol (Normodyne/Trandate) injection 10-20 mg  10-20 mg Intravenous Q HOUR PRN Jeremy M Gonda, M.D.        Or    labetalol (Normodyne) tablet 200 mg  200 mg Oral Q6HRS PRN Jeremy M Gonda, M.D.        folic acid (Folvite) tablet 1 mg  1 mg Oral DAILY Jeremy M Gonda, M.D.   1 mg at 05/22/24 0518    metoprolol tartrate (Lopressor) tablet 25 mg  25 mg Oral BID Jeremy M Gonda, M.D.   25 mg at 05/22/24 0518    multivitamin tablet 1 Tablet  1 Tablet Oral DAILY Jeremy M Gonda, M.D.   1 Tablet at 05/22/24 0518    omeprazole (PriLOSEC) capsule 40 mg  40 mg Oral BID Jeremy M Gonda, M.D.   40  mg at 05/22/24 0518    sucralfate (Carafate) tablet 1 g  1 g Oral BID AC Jeremy M Gonda, M.D.   1 g at 05/21/24 1740    [START ON 5/24/2024] thiamine (Vitamin B-1) tablet 100 mg  100 mg Oral DAILY Jeremy M Gonda, M.D.        ipratropium-albuterol (DUONEB) nebulizer solution  3 mL Nebulization Q4H PRN (RT) Jeremy M Gonda, M.D.   3 mL at 05/22/24 0347    Respiratory Therapy Consult   Nebulization Continuous RT Jeremy M Gonda, M.D.           Fluids    Intake/Output Summary (Last 24 hours) at 5/22/2024 0624  Last data filed at 5/21/2024 2200  Gross per 24 hour   Intake 1590.78 ml   Output 800 ml   Net 790.78 ml       Laboratory  Recent Labs     05/21/24  0736   [id number] -         Recent Labs     05/21/24  0515 05/21/24  0705 05/22/24  0413   SODIUM 150*  --  146*   POTASSIUM 3.1*  --  3.4*   CHLORIDE 108  --  106   CO2 20  --  24   BUN 4*  --  9   CREATININE 0.77  --  0.69   MAGNESIUM  --  1.5 1.8   PHOSPHORUS  --  2.3* 3.4   CALCIUM 7.9*  --  7.9*     Recent Labs     05/21/24  0515 05/22/24  0413   ALTSGPT 59* 42   ASTSGOT 73* 42   ALKPHOSPHAT 119* 124*   TBILIRUBIN 1.3 1.3   GLUCOSE 129* 104*     Recent Labs     05/21/24  0515 05/22/24  0413   WBC 7.9 4.8   NEUTSPOLYS 63.60 45.60   LYMPHOCYTES 23.80 36.30   MONOCYTES 11.40 9.40   EOSINOPHILS 0.40 7.70*   BASOPHILS 0.50 0.80   ASTSGOT 73* 42   ALTSGPT 59* 42   ALKPHOSPHAT 119* 124*   TBILIRUBIN 1.3 1.3     Recent Labs     05/21/24  0515 05/21/24  0705 05/22/24  0413   RBC 4.88  --  4.58*   HEMOGLOBIN 14.5  --  13.7*   HEMATOCRIT 43.0  --  41.0*   PLATELETCT 268  --  232   PROTHROMBTM  --  15.4*  --    INR  --  1.20*  --        Imaging  X-Ray:  No film today    Assessment/Plan  * Alcohol withdrawal delirium, acute, hyperactive (HCC)- (present on admission)  Assessment & Plan  S/p alcohol withdrawal protocol including titration of Precedex drip, scheduled Valium  Monitor RASS  As needed benzodiazepines, aspiration and seizure precautions  Alcohol cessation education and  resources provided (pt decline inpatient rehab)  Vitamin supplementation    Acute respiratory failure with hypoxia (HCC)  Assessment & Plan  Likely secondary to BOLIVAR in the setting of benzodiazepines, mild pulmonary edema, obesity --> improved  D/c BiPAP  RT/O2 protocol  S/p diuresis  Encourage incentive spirometry, mobilization    Transaminitis  Assessment & Plan  Likely due to alcohol abuse - improving  Avoid hepatotoxins    BOLIVAR on CPAP- (present on admission)  Assessment & Plan  Nocturnal CPAP, outpatient pulmonary f/u    Lactic acidosis- (present on admission)  Assessment & Plan  Secondary to respiratory failure and alcohol withdrawal - improving    GERD (gastroesophageal reflux disease)- (present on admission)  Assessment & Plan  Continue outpatient PPI    Asthma- (present on admission)  Assessment & Plan  Without acute exacerbation  As needed bronchodilators  RT protocol    Morbid obesity (HCC)- (present on admission)  Assessment & Plan  Encourage behavioral and dietary modification for weight loss    Essential hypertension- (present on admission)  Assessment & Plan  Improving  Continue outpatient amlodipine, metoprolol, losartan  As needed clonidine, hydralazine, labetalol for goal SBP less than 160    Hypernatremia  Assessment & Plan  Mild hypervolemia hypernatremia - improving    Electrolyte abnormality- (present on admission)  Assessment & Plan  Repleted low potassium, magnesium, phosphorus, calcium         VTE:  Lovenox  Ulcer: PPI - home medication  Lines: None    I have performed a physical exam and reviewed and updated ROS and Plan today (5/22/2024). In review of yesterday's note (5/21/2024), there are no changes except as documented above.     Discussed patient condition and risk of morbidity and/or mortality with Hospitalist, Family, RN, RT, Pharmacy, Charge nurse / hot rounds, and Patient. Critical care services will sign off at this time.  Please call with any questions or concerns.    Please note  that this dictation was created using voice recognition software. I have made every reasonable attempt to correct obvious errors, but there may be errors of grammar and possibly content that I did not discover before finalizing the note.

## 2024-05-22 NOTE — DISCHARGE SUMMARY
Discharge Summary    CHIEF COMPLAINT ON ADMISSION  Chief Complaint   Patient presents with    Shortness of Breath     Pt c/o dyspnea at rest beginning 4 days ago, pt denies CP or hx of asthma.        Reason for Admission  SOB, alcoholism    Admission Date  5/21/2024    CODE STATUS  Full Code    HPI & HOSPITAL COURSE  This is a 38 y.o. male here with a past medical history of asthma, hypertension, morbid obesity with a BMI of 45, sleep apnea, currently not on CPAP secondary to insurance issues, alcohol abuse, brought into the emergency department complaining of dyspnea for the last 4 days, the patient was noted to have had ongoing alcohol abuse with increasing heavy drinking recently, despite having had a DUI recently and apparently having been arrested for that, the patient was evaluated at [facility] where he left AMA, he apparently was released from assisted and felt progressively more short of breath, anxious and felt he was going into alcohol drawl, prompting him to come to the emergency department, patient required significant amounts of Ativan for control in the emergency room and medication for hypertension, he had increased work of breathing, he was given breathing treatments and racemic epinephrine and eventually moved to the ICU level of care where he was placed on a short term rescue BiPAP treatment.  On 5/22 the patient felt significantly improved, he was weaned off BiPAP, he was on room air, he was on Precedex but that was stopped, he was started on oral medication for his withdrawal, he felt overall much improved, he only wanted to take a shower and leave the hospital with some ongoing medication prescription.  He stated that they had been in contact with alcohol treatment programs and was intending and engaging further.  The patient asked to be referred to her primary care physician as well as pulmonary medicine to reestablish and obtain a CPAP device.  The patient was otherwise medically  stabilized, received some potassium prior to discharge for replenishment, overall the patient appeared stable for discharge and requested discharge and gave a reasonable plan for outpatient follow-up.        Therefore, he is discharged in fair and stable condition to home with close outpatient follow-up.    The patient recovered much more quickly than anticipated on admission.    Discharge Date  5/22/2024    FOLLOW UP ITEMS POST DISCHARGE  Follow closely with primary care, pulmonary medicine as well as addiction medicine on an outpatient basis    DISCHARGE DIAGNOSES  Principal Problem:    Alcohol withdrawal delirium, acute, hyperactive (HCC) (POA: Yes)  Active Problems:    Essential hypertension (Chronic) (POA: Yes)    Morbid obesity (HCC) (Chronic) (POA: Yes)    Asthma (POA: Yes)    Electrolyte abnormality (POA: Yes)    GERD (gastroesophageal reflux disease) (Chronic) (POA: Yes)    Lactic acidosis (POA: Yes)    BOLIVAR on CPAP (Chronic) (POA: Yes)    Acute respiratory failure with hypoxia (HCC) (POA: Unknown)    Hypernatremia (POA: Unknown)    Transaminitis (POA: Unknown)  Resolved Problems:    * No resolved hospital problems. *      FOLLOW UP  Future Appointments   Date Time Provider Department Center   5/30/2024  3:00 PM SERGEY Recinos [address] [facility]   7/1/2024  3:20 PM Rey Pacheco M.D. [facility]   7/17/2024  2:00 PM MARBIN Galeana [facility] [address] [facility]   8/22/2024  2:00 PM Lashae Hagan M.D. [facility] [address]         MEDICATIONS ON DISCHARGE     Medication List        START taking these medications        Instructions   diazePAM 2 MG Tabs  Commonly known as: Valium   Take 1 Tablet by mouth every 6 hours as needed for Anxiety for up to 5 days.  Dose: 2 mg            CONTINUE taking these medications        Instructions   albuterol 108 (90 Base) MCG/ACT Aers inhalation aerosol   Inhale 2 Puffs every 6 hours as needed for Shortness of Breath.  Dose: 2 Puff     allopurinol 300 MG Tabs  Commonly known  as: Zyloprim   Take 1 Tablet by mouth every day.  Dose: 300 mg     amLODIPine 10 MG Tabs  Commonly known as: Norvasc   Take 1 Tablet by mouth every day.  Dose: 10 mg     folic acid 1 MG Tabs  Commonly known as: Folvite   Take 1 Tablet by mouth every day.  Dose: 1 mg     losartan 50 MG Tabs  Commonly known as: Cozaar   Take 1 Tablet by mouth every day.  Dose: 50 mg     metoprolol tartrate 25 MG Tabs  Commonly known as: Lopressor   Take 1 Tablet by mouth 2 times a day.  Dose: 25 mg     naltrexone 50 MG Tabs  Start taking on: April 29, 2024  Commonly known as: Depade   Take 0.5 Tablets by mouth every day for 6 days, THEN 1 Tablet every day for 24 days.     omeprazole 40 MG delayed-release capsule  Commonly known as: PriLOSEC   Take 1 Capsule by mouth 2 times a day.  Dose: 40 mg     One-Daily Multi-Vitamin Tabs   Take 1 Tablet by mouth every day.  Dose: 1 Tablet     thiamine 100 MG tablet  Commonly known as: Thiamine   Take 1 Tablet by mouth every day.  Dose: 100 mg            STOP taking these medications      sucralfate 1 GM Tabs  Commonly known as: Carafate              Allergies  No Known Allergies    DIET  Orders Placed This Encounter   Procedures    Diet Order Diet: Regular     Standing Status:   Standing     Number of Occurrences:   1     Order Specific Question:   Diet:     Answer:   Regular [1]    Discontinue Diet Tray     Standing Status:   Standing     Number of Occurrences:   1       ACTIVITY  As tolerated.  Weight bearing as tolerated    CONSULTATIONS  Critical care    PROCEDURES  No invasive procedures    LABORATORY  Lab Results   Component Value Date    SODIUM 146 (H) 05/22/2024    POTASSIUM 3.4 (L) 05/22/2024    CHLORIDE 106 05/22/2024    CO2 24 05/22/2024    GLUCOSE 104 (H) 05/22/2024    BUN 9 05/22/2024    CREATININE 0.69 05/22/2024        Lab Results   Component Value Date    WBC 4.8 05/22/2024    HEMOGLOBIN 13.7 (L) 05/22/2024    HEMATOCRIT 41.0 (L) 05/22/2024    PLATELETCT 232 05/22/2024      The  patient is strongly advised to take his medication as currently prescribed,  The patient is strongly advised to refrain from further alcohol use and refer himself to outpatient alcohol dependence treatment resources  The patient was referred to primary care as well as pulmonary medicine for close follow-up  The patient is to follow a regular healthy diet    Total time of the discharge process to the extent of 55 minutes.

## 2024-05-22 NOTE — CARE PLAN
The patient is Watcher - Medium risk of patient condition declining or worsening         Progress made toward(s) clinical / shift goals:        Problem: Knowledge Deficit - Standard  Goal: Patient and family/care givers will demonstrate understanding of plan of care, disease process/condition, diagnostic tests and medications  Outcome: Progressing     Problem: Nutrition - Advanced  Goal: Patient will display progressive weight gain toward goal have adequate food and fluid intake  Outcome: Progressing     Problem: Optimal Care for Alcohol Withdrawal  Goal: Optimal Care for the alcohol withdrawal patient  Outcome: Progressing     Problem: Psychosocial  Goal: Patient's level of anxiety will decrease  Outcome: Progressing     Problem: Respiratory  Goal: Patient will achieve/maintain optimum respiratory ventilation and gas exchange  Outcome: Progressing     Problem: Skin Integrity  Goal: Skin integrity is maintained or improved  Outcome: Progressing     Problem: Fall Risk  Goal: Patient will remain free from falls  Outcome: Progressing

## 2024-05-23 ENCOUNTER — HOSPITAL ENCOUNTER (OUTPATIENT)
Dept: LAB | Facility: MEDICAL CENTER | Age: 39
End: 2024-05-23
Payer: COMMERCIAL

## 2024-05-23 NOTE — PROGRESS NOTES
Subjective     Alberto Maldonado is a very pleasant 38 y.o. male who presents with Other (Work note to return back to work, referrals)            HPI  Patient discharged from the hospital today.  Spent 2 days admitted for alcohol withdrawals.  He was arrested on DUI suspicion on Monday.  He is feeling better and is taking his medications as directed.  He is here requesting a note for work.  Denies abdominal pain, vomiting, fever, chest pain, shortness of breath, leg swelling, calf tenderness, headache or dizziness.    PMH:  has a past medical history of Asthma without status asthmaticus (06/29/2017), Chickenpox, and Hypertension.    He has no past medical history of Cancer (HCC) or Diabetes (HCC).  MEDS:   Current Outpatient Medications:     albuterol 108 (90 Base) MCG/ACT Aero Soln inhalation aerosol, Inhale 2 Puffs every 6 hours as needed for Shortness of Breath., Disp: 8.5 g, Rfl: 2    amLODIPine (NORVASC) 10 MG Tab, Take 1 Tablet by mouth every day., Disp: 30 Tablet, Rfl: 2    losartan (COZAAR) 50 MG Tab, Take 1 Tablet by mouth every day., Disp: 30 Tablet, Rfl: 2    metoprolol tartrate (LOPRESSOR) 25 MG Tab, Take 1 Tablet by mouth 2 times a day., Disp: 60 Tablet, Rfl: 2    diazePAM (VALIUM) 2 MG Tab, Take 1 Tablet by mouth every 6 hours as needed for Anxiety for up to 5 days., Disp: 20 Tablet, Rfl: 0    omeprazole (PRILOSEC) 40 MG delayed-release capsule, Take 1 Capsule by mouth 2 times a day., Disp: 60 Capsule, Rfl: 1    multivitamin Tab, Take 1 Tablet by mouth every day., Disp: 30 Tablet, Rfl: 1    folic acid (FOLVITE) 1 MG Tab, Take 1 Tablet by mouth every day., Disp: 30 Tablet, Rfl: 1    thiamine (THIAMINE) 100 MG tablet, Take 1 Tablet by mouth every day., Disp: 30 Tablet, Rfl: 1    allopurinol (ZYLOPRIM) 300 MG Tab, Take 1 Tablet by mouth every day., Disp: 30 Tablet, Rfl: 1    naltrexone (DEPADE) 50 MG Tab, Take 0.5 Tablets by mouth every day for 6 days, THEN 1 Tablet every day for 24 days., Disp: 27  "Tablet, Rfl: 1  ALLERGIES: No Known Allergies  SURGHX: History reviewed. No pertinent surgical history.  SOCHX:  reports that he has never smoked. He has never used smokeless tobacco. He reports current alcohol use of about 7.2 oz of alcohol per week. He reports that he does not currently use drugs.  FH: family history is not on file.        Review of Systems   Constitutional: Negative.    HENT: Negative.     Respiratory: Negative.     Cardiovascular: Negative.    Gastrointestinal: Negative.    Genitourinary: Negative.    Neurological: Negative.        Medications, Allergies, and current problem list reviewed today in Epic           Objective     BP (!) 140/82 (BP Location: Right arm, Patient Position: Sitting, BP Cuff Size: Large adult long)   Pulse (!) 112   Temp 36.8 °C (98.3 °F) (Temporal)   Resp 16   Ht 1.575 m (5' 2\")   Wt 115 kg (253 lb 1.6 oz)   SpO2 96%   BMI 46.29 kg/m²      Physical Exam  Vitals and nursing note reviewed.   Constitutional:       General: He is not in acute distress.     Appearance: Normal appearance. He is well-developed. He is not diaphoretic.   HENT:      Head: Normocephalic.      Right Ear: Hearing and external ear normal.      Left Ear: Hearing and external ear normal.      Nose: Nose normal.      Mouth/Throat:      Mouth: Mucous membranes are moist.   Eyes:      General:         Right eye: No discharge.         Left eye: No discharge.      Conjunctiva/sclera: Conjunctivae normal.   Cardiovascular:      Rate and Rhythm: Normal rate and regular rhythm.      Pulses: Normal pulses.      Heart sounds: Normal heart sounds.   Pulmonary:      Effort: Pulmonary effort is normal. No respiratory distress.      Breath sounds: Normal breath sounds. No stridor. No wheezing, rhonchi or rales.   Musculoskeletal:      Cervical back: Normal range of motion and neck supple. No rigidity or tenderness.      Right lower leg: No edema.      Left lower leg: No edema.   Lymphadenopathy:      Cervical: " No cervical adenopathy.   Skin:     General: Skin is warm and dry.      Findings: No rash.   Neurological:      General: No focal deficit present.      Mental Status: He is alert and oriented to person, place, and time. Mental status is at baseline.   Psychiatric:         Mood and Affect: Mood normal.         Behavior: Behavior normal.         Thought Content: Thought content normal.         Judgment: Judgment normal.                             Assessment & Plan        1. Alcohol dependence with alcohol-induced anxiety disorder (HCC)          ER inpatient notes reviewed.  He was given printouts of all of his approved referrals to psychiatry and behavioral health.  Vitals at baseline and exam unremarkable.  Patient is currently asymptomatic with no concerns.  He is taking his medications as directed.  A note for work has been provided.  ER precautions discussed at length.       I personally reviewed prior external notes and test results pertinent to today's visit. Return to clinic or go to ED if symptoms worsen or persist. Red flag symptoms and indications for ED discussed at length. Patient/Parent/Guardian voices understanding.  AVS with post-visit instructions printed and provided or given verbally.  Follow-up with your primary care provider in 3-5 days. All side effects and potential interactions of prescribed medication discussed including allergic response, GI upset, tendon injury, rash, sedation, OCP effectiveness, etc.    Please note that this dictation was created using voice recognition software. I have made every reasonable attempt to correct obvious errors, but I expect that there are errors of grammar and possibly content that I did not discover before finalizing the note.

## 2024-05-24 LAB
ALBUMIN SERPL BCP-MCNC: 4 G/DL (ref 3.2–4.9)
ALP SERPL-CCNC: 136 U/L (ref 30–99)
ALT SERPL-CCNC: 37 U/L (ref 2–50)
AST SERPL-CCNC: 46 U/L (ref 12–45)
BILIRUB CONJ SERPL-MCNC: 0.3 MG/DL (ref 0.1–0.5)
BILIRUB INDIRECT SERPL-MCNC: 0.4 MG/DL (ref 0–1)
BILIRUB SERPL-MCNC: 0.7 MG/DL (ref 0.1–1.5)
PROT SERPL-MCNC: 7 G/DL (ref 6–8.2)

## 2024-05-30 ENCOUNTER — HOSPITAL ENCOUNTER (OUTPATIENT)
Dept: BEHAVIORAL HEALTH | Facility: MEDICAL CENTER | Age: 39
End: 2024-05-30
Attending: FAMILY MEDICINE
Payer: COMMERCIAL

## 2024-05-30 VITALS
BODY MASS INDEX: 47.63 KG/M2 | HEIGHT: 63 IN | RESPIRATION RATE: 20 BRPM | SYSTOLIC BLOOD PRESSURE: 120 MMHG | HEART RATE: 101 BPM | OXYGEN SATURATION: 91 % | WEIGHT: 268.8 LBS | DIASTOLIC BLOOD PRESSURE: 84 MMHG

## 2024-05-30 DIAGNOSIS — F10.21 ALCOHOL USE DISORDER, MODERATE, IN EARLY REMISSION (HCC): ICD-10-CM

## 2024-05-30 RX ORDER — NALTREXONE HYDROCHLORIDE 50 MG/1
50 TABLET, FILM COATED ORAL DAILY
Qty: 30 TABLET | Refills: 0 | Status: SHIPPED | OUTPATIENT
Start: 2024-05-30

## 2024-05-30 ASSESSMENT — ENCOUNTER SYMPTOMS
DIZZINESS: 0
PALPITATIONS: 0
DEPRESSION: 0
BLURRED VISION: 0
CARDIOVASCULAR NEGATIVE: 1
DOUBLE VISION: 0
HEARTBURN: 0
HEMOPTYSIS: 0
MUSCULOSKELETAL NEGATIVE: 1
CHILLS: 0
EYES NEGATIVE: 1
CONSTITUTIONAL NEGATIVE: 1
MYALGIAS: 0
GASTROINTESTINAL NEGATIVE: 1
BRUISES/BLEEDS EASILY: 0
HEADACHES: 0
NEUROLOGICAL NEGATIVE: 1
NAUSEA: 0
COUGH: 0
RESPIRATORY NEGATIVE: 1
TINGLING: 0
FEVER: 0

## 2024-05-30 ASSESSMENT — ANXIETY QUESTIONNAIRES
IF YOU CHECKED OFF ANY PROBLEMS ON THIS QUESTIONNAIRE, HOW DIFFICULT HAVE THESE PROBLEMS MADE IT FOR YOU TO DO YOUR WORK, TAKE CARE OF THINGS AT HOME, OR GET ALONG WITH OTHER PEOPLE: SOMEWHAT DIFFICULT
6. BECOMING EASILY ANNOYED OR IRRITABLE: MORE THAN HALF THE DAYS
4. TROUBLE RELAXING: SEVERAL DAYS
1. FEELING NERVOUS, ANXIOUS, OR ON EDGE: SEVERAL DAYS
GAD7 TOTAL SCORE: 9
3. WORRYING TOO MUCH ABOUT DIFFERENT THINGS: SEVERAL DAYS
7. FEELING AFRAID AS IF SOMETHING AWFUL MIGHT HAPPEN: MORE THAN HALF THE DAYS
2. NOT BEING ABLE TO STOP OR CONTROL WORRYING: SEVERAL DAYS
5. BEING SO RESTLESS THAT IT IS HARD TO SIT STILL: SEVERAL DAYS

## 2024-05-30 ASSESSMENT — PATIENT HEALTH QUESTIONNAIRE - PHQ9
5. POOR APPETITE OR OVEREATING: 1 - SEVERAL DAYS
SUM OF ALL RESPONSES TO PHQ QUESTIONS 1-9: 10
CLINICAL INTERPRETATION OF PHQ2 SCORE: 2

## 2024-05-30 ASSESSMENT — LIFESTYLE VARIABLES: SUBSTANCE_ABUSE: 1

## 2024-05-30 ASSESSMENT — FIBROSIS 4 INDEX: FIB4 SCORE: 1.17

## 2024-05-30 NOTE — PROGRESS NOTES
Psychiatric Progress Note               Author: Kane Zaragoza M.D. Date & Time created: 5/30/2024  3:09 PM     Interval History:  39 y/o male with a history of AUD. Reports he stopped alcohol use 10 days ago but before that 3-5 days per week when off work would drink 5-8 shots of vodka per night. Recent er visit for withdrawal and DUI  Has never stopped before and would like help to do so  Is going to  and has a sponsor  Not interested in IOP due to time constaints  No h/o dt's or sz  Would like counseling also  Long discussion and will do trial of depade and f/u in 4 weeks for efficacy  Patient has a history of substance abuse and was counseled extensively on avoidance of people,places, and things that may trigger relapses      Review of Systems:  Review of Systems   Constitutional: Negative.  Negative for chills and fever.   HENT: Negative.  Negative for hearing loss.    Eyes: Negative.  Negative for blurred vision and double vision.   Respiratory: Negative.  Negative for cough and hemoptysis.    Cardiovascular: Negative.  Negative for chest pain and palpitations.   Gastrointestinal: Negative.  Negative for heartburn and nausea.   Genitourinary: Negative.  Negative for dysuria.   Musculoskeletal: Negative.  Negative for myalgias.   Skin: Negative.  Negative for rash.   Neurological: Negative.  Negative for dizziness, tingling and headaches.   Endo/Heme/Allergies: Negative.  Does not bruise/bleed easily.   Psychiatric/Behavioral:  Positive for substance abuse. Negative for depression and suicidal ideas.    All other systems reviewed and are negative.      Physical Exam:  Physical Exam  Vitals reviewed.   Constitutional:       Appearance: Normal appearance.   HENT:      Head: Normocephalic.   Eyes:      Extraocular Movements: Extraocular movements intact.   Neurological:      Mental Status: He is alert and oriented to person, place, and time.   Psychiatric:         Mood and Affect: Mood normal.         Thought  Content: Thought content normal.         Judgment: Judgment normal.         Labs:  No results found for this or any previous visit (from the past 24 hour(s)).    Hemodynamics:  No data recorded.     Pulse  Av  Min: 101  Max: 101  Blood Pressure: 120/84     Respiratory:    Respiration: 20, Pulse Oximetry: 91 %           Fluids:  No intake or output data in the 24 hours ending 24 1509  Weight: 122 kg (268 lb 12.8 oz)  GI/Nutrition:  No orders of the defined types were placed in this encounter.    Medications:  Current Outpatient Medications   Medication    naltrexone (DEPADE) 50 MG Tab    albuterol 108 (90 Base) MCG/ACT Aero Soln inhalation aerosol    amLODIPine (NORVASC) 10 MG Tab    losartan (COZAAR) 50 MG Tab    metoprolol tartrate (LOPRESSOR) 25 MG Tab    omeprazole (PRILOSEC) 40 MG delayed-release capsule    multivitamin Tab    folic acid (FOLVITE) 1 MG Tab    thiamine (THIAMINE) 100 MG tablet    allopurinol (ZYLOPRIM) 300 MG Tab     No current facility-administered medications for this encounter.     Medical Decision Making, by Problem:  There are no active hospital problems to display for this patient.    Plan:  1. Alcohol use disorder, moderate, in early remission (HCC)  naltrexone (DEPADE) 50 MG Tab    Referral to Psychology

## 2024-06-07 NOTE — PROGRESS NOTES
Subjective:   6/12/2024 12:24 PM  Primary care physician: Rey Pacheco M.D.  Referring Provider: Rey Pacheco M.D.     Chief Complaint:   Chief Complaint   Patient presents with    New Patient     INTRAB MASS  CT CHEST?  3 CM        Diagnosis:   1. Intraabdominal mass  CT-ABDOMEN-PELVIS WITH      2. Transaminitis        3. Gallbladder dilatation        4. Elevated LFTs        5. Gastroesophageal reflux disease without esophagitis        6. Steatosis of liver        7. Morbid obesity (HCC)          History of presenting illness:    Alberto Maldonado is a pleasant 38 y.o. male here with a past medical history of asthma, hypertension, morbid obesity with a BMI of 45, sleep apnea, currently not on CPAP secondary to insurance issues, alcohol abuse.     He was recently seen in ED complaining of dyspnea for the last 4 days, the patient was noted to have had ongoing alcohol abuse with increasing heavy drinking recently, despite having had a DUI recently and apparently having been arrested for that, the patient was evaluated at [facility] where he left AMA, he apparently was released from [facility] and felt progressively more short of breath, anxious and felt he was going into alcohol drawl, prompting him to come to the emergency department, patient required significant amounts of Ativan for control in the emergency room and medication for hypertension, he had increased work of breathing, he was given breathing treatments and racemic epinephrine and eventually moved to the ICU level of care where he was placed on a short term rescue BiPAP treatment.     CT CHEST with incidental 3.3 cm rounded lesion beneath the left diaphragm dome in the left upper quadrant, which could represent an accessory spleen versus others.     He is here today for further evaluation.  He is currently feeling fine    Past Medical History:   Diagnosis Date    Asthma without status asthmaticus  06/29/2017    Chickenpox     Hypertension      History reviewed. No pertinent surgical history.  No Known Allergies  Outpatient Encounter Medications as of 6/12/2024   Medication Sig Dispense Refill    benzonatate (TESSALON) 100 MG Cap Take 1 Capsule by mouth 3 times a day as needed for Cough. 30 Capsule 0    naltrexone (DEPADE) 50 MG Tab Take 1 Tablet by mouth every day. 30 Tablet 0    albuterol 108 (90 Base) MCG/ACT Aero Soln inhalation aerosol Inhale 2 Puffs every 6 hours as needed for Shortness of Breath. 8.5 g 2    losartan (COZAAR) 50 MG Tab Take 1 Tablet by mouth every day. 30 Tablet 2    [DISCONTINUED] amLODIPine (NORVASC) 10 MG Tab Take 1 Tablet by mouth every day. (Patient not taking: Reported on 6/10/2024) 30 Tablet 2    [DISCONTINUED] metoprolol tartrate (LOPRESSOR) 25 MG Tab Take 1 Tablet by mouth 2 times a day. (Patient not taking: Reported on 6/10/2024) 60 Tablet 2    omeprazole (PRILOSEC) 40 MG delayed-release capsule Take 1 Capsule by mouth 2 times a day. 60 Capsule 1    multivitamin Tab Take 1 Tablet by mouth every day. 30 Tablet 1    folic acid (FOLVITE) 1 MG Tab Take 1 Tablet by mouth every day. 30 Tablet 1    thiamine (THIAMINE) 100 MG tablet Take 1 Tablet by mouth every day. 30 Tablet 1    allopurinol (ZYLOPRIM) 300 MG Tab Take 1 Tablet by mouth every day. 30 Tablet 1     No facility-administered encounter medications on file as of 6/12/2024.     Social History     Socioeconomic History    Marital status: Single     Spouse name: Not on file    Number of children: Not on file    Years of education: Not on file    Highest education level: Not on file   Occupational History    Not on file   Tobacco Use    Smoking status: Never    Smokeless tobacco: Never   Vaping Use    Vaping status: Never Used   Substance and Sexual Activity    Alcohol use: Yes     Alcohol/week: 7.2 oz     Types: 12 Shots of liquor per week     Comment: every other day 4-5 cocktails    Drug use: Not Currently    Sexual activity:  Not Currently   Other Topics Concern    Not on file   Social History Narrative    Works in [employer]    Lives at home with mom, brother, TRAM and nieces and nephews     Social Determinants of Health     Financial Resource Strain: Not on file   Food Insecurity: Patient Declined (5/21/2024)    Hunger Vital Sign     Worried About Running Out of Food in the Last Year: Patient declined     Ran Out of Food in the Last Year: Patient declined   Transportation Needs: Patient Declined (5/21/2024)    PRAPARE - Transportation     Lack of Transportation (Medical): Patient declined     Lack of Transportation (Non-Medical): Patient declined   Physical Activity: Not on file   Stress: Not on file   Social Connections: Not on file   Intimate Partner Violence: Not At Risk (5/21/2024)    Humiliation, Afraid, Rape, and Kick questionnaire     Fear of Current or Ex-Partner: No     Emotionally Abused: No     Physically Abused: No     Sexually Abused: No   Housing Stability: Patient Declined (5/21/2024)    Housing Stability Vital Sign     Unable to Pay for Housing in the Last Year: Patient declined     Number of Places Lived in the Last Year: 1     Unstable Housing in the Last Year: Patient declined      Social History     Tobacco Use   Smoking Status Never   Smokeless Tobacco Never     Social History     Substance and Sexual Activity   Alcohol Use Yes    Alcohol/week: 7.2 oz    Types: 12 Shots of liquor per week    Comment: every other day 4-5 cocktails     Social History     Substance and Sexual Activity   Drug Use Not Currently      Family History   Problem Relation Age of Onset    Stroke Neg Hx     Heart Disease Neg Hx        Review of Systems   Constitutional: Negative.  Negative for chills and fever.   HENT: Negative.  Negative for hearing loss.    Eyes: Negative.  Negative for blurred vision and double vision.   Respiratory: Negative.  Negative for cough and hemoptysis.    Cardiovascular: Negative.  Negative  "for chest pain and palpitations.   Gastrointestinal: Negative.  Negative for heartburn and nausea.   Genitourinary: Negative.  Negative for dysuria.   Musculoskeletal: Negative.  Negative for myalgias.   Skin: Negative.  Negative for rash.   Neurological: Negative.  Negative for dizziness, tingling and headaches.   Endo/Heme/Allergies: Negative.  Does not bruise/bleed easily.   Psychiatric/Behavioral:  Positive for substance abuse. Negative for depression and suicidal ideas.    All other systems reviewed and are negative.       Objective:   /80 (BP Location: Left arm, Patient Position: Sitting, BP Cuff Size: Small adult)   Pulse 65   Temp 36.8 °C (98.3 °F) (Temporal)   Ht 1.6 m (5' 3\")   Wt 120 kg (265 lb)   SpO2 90%   BMI 46.94 kg/m²     Physical Exam  Constitutional:       Appearance: Normal appearance. He is obese.   HENT:      Head: Normocephalic and atraumatic.   Abdominal:      Palpations: Abdomen is soft.   Neurological:      Mental Status: He is alert.         Labs   Latest Reference Range & Units 05/22/24 04:13   WBC 4.8 - 10.8 K/uL 4.8   RBC 4.70 - 6.10 M/uL 4.58 (L)   Hemoglobin 14.0 - 18.0 g/dL 13.7 (L)   Hematocrit 42.0 - 52.0 % 41.0 (L)   MCV 81.4 - 97.8 fL 89.5   MCH 27.0 - 33.0 pg 29.9   MCHC 32.3 - 36.5 g/dL 33.4   RDW 35.9 - 50.0 fL 56.3 (H)   Platelet Count 164 - 446 K/uL 232   MPV 9.0 - 12.9 fL 8.9 (L)   (L): Data is abnormally low  (H): Data is abnormally high     Latest Reference Range & Units 05/22/24 04:13 05/23/24 08:50   Sodium 135 - 145 mmol/L 146 (H)    Potassium 3.6 - 5.5 mmol/L 3.4 (L)    Chloride 96 - 112 mmol/L 106    Co2 20 - 33 mmol/L 24    Anion Gap 7.0 - 16.0  16.0    Glucose 65 - 99 mg/dL 104 (H)    Bun 8 - 22 mg/dL 9    Creatinine 0.50 - 1.40 mg/dL 0.69    GFR (CKD-EPI) >60 mL/min/1.73 m 2 123    Calcium 8.5 - 10.5 mg/dL 7.9 (L)    Correct Calcium 8.5 - 10.5 mg/dL 8.1 (L)    AST(SGOT) 12 - 45 U/L 42 46 (H)   ALT(SGPT) 2 - 50 U/L 42 37   Alkaline Phosphatase 30 - 99 " U/L 124 (H) 136 (H)   Total Bilirubin 0.1 - 1.5 mg/dL 1.3 0.7   Direct Bilirubin 0.1 - 0.5 mg/dL  0.3   Indirect Bilirubin 0.0 - 1.0 mg/dL  0.4   Albumin 3.2 - 4.9 g/dL 3.8 4.0   Total Protein 6.0 - 8.2 g/dL 6.9 7.0   Globulin 1.9 - 3.5 g/dL 3.1    A-G Ratio g/dL 1.2    Phosphorus 2.5 - 4.5 mg/dL 3.4    Magnesium 1.5 - 2.5 mg/dL 1.8    (H): Data is abnormally high  (L): Data is abnormally low         Pathology  N/A    Procedures  N/A    Diagnosis:     1. Intraabdominal mass  CT-ABDOMEN-PELVIS WITH      2. Transaminitis        3. Gallbladder dilatation        4. Elevated LFTs        5. Gastroesophageal reflux disease without esophagitis        6. Steatosis of liver        7. Morbid obesity (HCC)            Medical Decision Making:  Today's Assessment / Status / Plan:     The patient had a mass seen below his left diaphragm on outside imaging.  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  The patient is completely without symptoms.    I will obtain a CAT scan of his abdomen and pelvis to further characterize this lesion and if it does turn out to be an accessory spleen and then no further therapy is warranted.    The patient will come back and see me once his CAT scan is done.    Greater than 45 minutes were spent with the patient.  This included review of outside records and imaging, counseling of the patient and coordination of care.    I, Dr Richmond, have entered, reviewed and confirmed the above diagnoses related to this patient on this date of service, as per the time and date noted at top of this note.

## 2024-06-10 ENCOUNTER — APPOINTMENT (OUTPATIENT)
Dept: RADIOLOGY | Facility: IMAGING CENTER | Age: 39
End: 2024-06-10
Attending: PHYSICIAN ASSISTANT
Payer: COMMERCIAL

## 2024-06-10 ENCOUNTER — OFFICE VISIT (OUTPATIENT)
Dept: URGENT CARE | Facility: CLINIC | Age: 39
End: 2024-06-10
Payer: COMMERCIAL

## 2024-06-10 ENCOUNTER — APPOINTMENT (OUTPATIENT)
Dept: URGENT CARE | Facility: CLINIC | Age: 39
End: 2024-06-10
Payer: COMMERCIAL

## 2024-06-10 VITALS
WEIGHT: 265 LBS | SYSTOLIC BLOOD PRESSURE: 128 MMHG | TEMPERATURE: 98.4 F | BODY MASS INDEX: 46.95 KG/M2 | OXYGEN SATURATION: 93 % | DIASTOLIC BLOOD PRESSURE: 70 MMHG | HEART RATE: 114 BPM | RESPIRATION RATE: 22 BRPM | HEIGHT: 63 IN

## 2024-06-10 DIAGNOSIS — R06.02 SOB (SHORTNESS OF BREATH): ICD-10-CM

## 2024-06-10 DIAGNOSIS — J98.8 RTI (RESPIRATORY TRACT INFECTION): ICD-10-CM

## 2024-06-10 DIAGNOSIS — E66.01 MORBID OBESITY (HCC): ICD-10-CM

## 2024-06-10 PROCEDURE — 3078F DIAST BP <80 MM HG: CPT | Performed by: PHYSICIAN ASSISTANT

## 2024-06-10 PROCEDURE — 71046 X-RAY EXAM CHEST 2 VIEWS: CPT | Mod: TC | Performed by: PHYSICIAN ASSISTANT

## 2024-06-10 PROCEDURE — 3074F SYST BP LT 130 MM HG: CPT | Performed by: PHYSICIAN ASSISTANT

## 2024-06-10 PROCEDURE — 99214 OFFICE O/P EST MOD 30 MIN: CPT | Performed by: PHYSICIAN ASSISTANT

## 2024-06-10 RX ORDER — BENZONATATE 100 MG/1
100 CAPSULE ORAL 3 TIMES DAILY PRN
Qty: 30 CAPSULE | Refills: 0 | Status: SHIPPED | OUTPATIENT
Start: 2024-06-10

## 2024-06-10 ASSESSMENT — ENCOUNTER SYMPTOMS
FEVER: 0
SORE THROAT: 0
VOMITING: 0
COUGH: 1
DIARRHEA: 0
MYALGIAS: 0
HEADACHES: 0
WHEEZING: 0
NAUSEA: 1
SHORTNESS OF BREATH: 1
CARDIOVASCULAR NEGATIVE: 1
RHINORRHEA: 1

## 2024-06-10 ASSESSMENT — FIBROSIS 4 INDEX: FIB4 SCORE: 1.17

## 2024-06-10 NOTE — LETTER
Sandy 10, 2024         Patient: Alberto Maldonado   YOB: 1985   Date of Visit: 6/10/2024           To Whom it May Concern:    Alberto Maldonado was seen in my clinic on 6/10/2024. We recommend light duty (less than 20lbs lifting/pushing/pulling) for 1 week due to acute condition.    If you have any questions or concerns, please don't hesitate to call.        Sincerely,           Gage Paredes P.A.-C.  Electronically Signed

## 2024-06-11 ENCOUNTER — TELEPHONE (OUTPATIENT)
Dept: URGENT CARE | Facility: CLINIC | Age: 39
End: 2024-06-11
Payer: COMMERCIAL

## 2024-06-11 NOTE — PROGRESS NOTES
Subjective     Alberto Maldonado is a very pleasant 38 y.o. male who presents with Congestion (X3days cough)            Cough  This is a new problem. The current episode started in the past 7 days. The problem has been gradually worsening. The problem occurs every few minutes. The cough is Productive of sputum. Associated symptoms include nasal congestion, rhinorrhea and shortness of breath. Pertinent negatives include no chest pain, ear congestion, ear pain, fever, headaches, myalgias, postnasal drip, sore throat or wheezing. He has tried OTC cough suppressant and a beta-agonist inhaler (NSAISD) for the symptoms. The treatment provided mild relief. His past medical history is significant for asthma and pneumonia.       PMH:  has a past medical history of Asthma without status asthmaticus (06/29/2017), Chickenpox, and Hypertension.    He has no past medical history of Cancer ([facility]) or Diabetes ([facility]).  MEDS:   Current Outpatient Medications:     benzonatate (TESSALON) 100 MG Cap, Take 1 Capsule by mouth 3 times a day as needed for Cough., Disp: 30 Capsule, Rfl: 0    naltrexone (DEPADE) 50 MG Tab, Take 1 Tablet by mouth every day., Disp: 30 Tablet, Rfl: 0    albuterol 108 (90 Base) MCG/ACT Aero Soln inhalation aerosol, Inhale 2 Puffs every 6 hours as needed for Shortness of Breath., Disp: 8.5 g, Rfl: 2    losartan (COZAAR) 50 MG Tab, Take 1 Tablet by mouth every day., Disp: 30 Tablet, Rfl: 2    omeprazole (PRILOSEC) 40 MG delayed-release capsule, Take 1 Capsule by mouth 2 times a day., Disp: 60 Capsule, Rfl: 1    multivitamin Tab, Take 1 Tablet by mouth every day., Disp: 30 Tablet, Rfl: 1    folic acid (FOLVITE) 1 MG Tab, Take 1 Tablet by mouth every day., Disp: 30 Tablet, Rfl: 1    thiamine (THIAMINE) 100 MG tablet, Take 1 Tablet by mouth every day., Disp: 30 Tablet, Rfl: 1    allopurinol (ZYLOPRIM) 300 MG Tab, Take 1 Tablet by mouth every day., Disp: 30 Tablet, Rfl: 1  ALLERGIES: No Known Allergies  SURGHX:  "History reviewed. No pertinent surgical history.  SOCHX:  reports that he has never smoked. He has never used smokeless tobacco. He reports current alcohol use of about 7.2 oz of alcohol per week. He reports that he does not currently use drugs.  FH: family history is not on file.      Review of Systems   Constitutional:  Negative for fever.   HENT:  Positive for congestion and rhinorrhea. Negative for ear pain, postnasal drip and sore throat.    Respiratory:  Positive for cough and shortness of breath. Negative for wheezing.    Cardiovascular: Negative.  Negative for chest pain.   Gastrointestinal:  Positive for nausea. Negative for diarrhea and vomiting.   Musculoskeletal:  Negative for myalgias.   Neurological:  Negative for headaches.       Medications, Allergies, and current problem list reviewed today in Epic           Objective     /70   Pulse (!) 114   Temp 36.9 °C (98.4 °F) (Temporal)   Resp (!) 22   Ht 1.6 m (5' 3\")   Wt 120 kg (265 lb)   SpO2 93%   BMI 46.94 kg/m²      Physical Exam  Vitals and nursing note reviewed.   Constitutional:       General: He is not in acute distress.     Appearance: Normal appearance. He is well-developed. He is not ill-appearing, toxic-appearing or diaphoretic.   HENT:      Head: Normocephalic and atraumatic.      Right Ear: Tympanic membrane, ear canal and external ear normal.      Left Ear: Tympanic membrane, ear canal and external ear normal.      Nose: Congestion and rhinorrhea present.      Mouth/Throat:      Mouth: Mucous membranes are moist.      Pharynx: Oropharynx is clear. No oropharyngeal exudate or posterior oropharyngeal erythema.   Eyes:      General:         Right eye: No discharge.         Left eye: No discharge.      Conjunctiva/sclera: Conjunctivae normal.   Cardiovascular:      Rate and Rhythm: Regular rhythm. Tachycardia present.      Pulses: Normal pulses.      Heart sounds: Normal heart sounds. No murmur heard.  Pulmonary:      Effort: " Pulmonary effort is normal. No respiratory distress.      Breath sounds: Normal breath sounds. No stridor. No wheezing, rhonchi or rales.   Musculoskeletal:         General: No swelling or tenderness.      Cervical back: Normal range of motion and neck supple.      Right lower leg: No edema.      Left lower leg: No edema.   Lymphadenopathy:      Cervical: No cervical adenopathy.   Skin:     General: Skin is warm and dry.   Neurological:      General: No focal deficit present.      Mental Status: He is alert and oriented to person, place, and time. Mental status is at baseline.   Psychiatric:         Mood and Affect: Mood normal.         Behavior: Behavior normal.         Thought Content: Thought content normal.         Judgment: Judgment normal.                             Assessment & Plan     1. SOB (shortness of breath)  DX-CHEST-2 VIEWS    benzonatate (TESSALON) 100 MG Cap      2. RTI (respiratory tract infection)  benzonatate (TESSALON) 100 MG Cap      3. Morbid obesity (HCC)  Referral to Nutrition Services      4. Body mass index 45.0-49.9, adult (HCC)  Referral to Nutrition Services        This is a very pleasant 38-year-old male patient presenting with 3 days of cough, congestion, fatigue.  He denies fever chills or bodyaches.  Tight chest but no shortness of breath, wheezing, chest pain or leg swelling.  Patient does have history of asthma using his normal medications.  He is slightly tachycardic but this is his baseline.  SpO2 is 93% otherwise vital signs appropriate.  Nasal congestion and rhinorrhea otherwise ENT exam benign.  Lungs are clear without wheezing, rhonchi, rales or stridor.  Remainder of exam reassuring.  Chest x-ray ordered which was negative for acute cardiopulmonary pathology.  No clinical symptoms/signs of focal bacterial infection.  Patient will be treated for self-limiting viral URI with OTC meds, conservative measures, and symptomatic relief.  ER and red flag symptoms discussed at  length.    Patient also requesting referral to nutrition for his morbid obesity.  He has been making some lifestyle changes.  He is currently undergoing alcohol treatment via behavioral health and .      I personally reviewed prior external notes and test results pertinent to today's visit. Return to clinic or go to ED if symptoms worsen or persist. Red flag symptoms and indications for ED discussed at length. Patient/Parent/Guardian voices understanding.  AVS with post-visit instructions printed and provided or given verbally.  Follow-up with your primary care provider in 3-5 days. All side effects and potential interactions of prescribed medication discussed including allergic response, GI upset, tendon injury, rash, sedation, OCP effectiveness, etc.    Please note that this dictation was created using voice recognition software. I have made every reasonable attempt to correct obvious errors, but I expect that there are errors of grammar and possibly content that I did not discover before finalizing the note.

## 2024-06-12 ENCOUNTER — OFFICE VISIT (OUTPATIENT)
Dept: SURGICAL ONCOLOGY | Facility: MEDICAL CENTER | Age: 39
End: 2024-06-12
Payer: COMMERCIAL

## 2024-06-12 VITALS
HEIGHT: 63 IN | OXYGEN SATURATION: 90 % | TEMPERATURE: 98.3 F | HEART RATE: 65 BPM | SYSTOLIC BLOOD PRESSURE: 122 MMHG | WEIGHT: 265 LBS | DIASTOLIC BLOOD PRESSURE: 80 MMHG | BODY MASS INDEX: 46.95 KG/M2

## 2024-06-12 DIAGNOSIS — R79.89 ELEVATED LFTS: Chronic | ICD-10-CM

## 2024-06-12 DIAGNOSIS — R19.00 INTRAABDOMINAL MASS: ICD-10-CM

## 2024-06-12 DIAGNOSIS — K82.8 GALLBLADDER DILATATION: ICD-10-CM

## 2024-06-12 DIAGNOSIS — E66.01 MORBID OBESITY (HCC): Chronic | ICD-10-CM

## 2024-06-12 DIAGNOSIS — R74.01 TRANSAMINITIS: ICD-10-CM

## 2024-06-12 DIAGNOSIS — K76.0 STEATOSIS OF LIVER: Chronic | ICD-10-CM

## 2024-06-12 DIAGNOSIS — K21.9 GASTROESOPHAGEAL REFLUX DISEASE WITHOUT ESOPHAGITIS: Chronic | ICD-10-CM

## 2024-06-12 PROCEDURE — 99204 OFFICE O/P NEW MOD 45 MIN: CPT | Performed by: SURGERY

## 2024-06-12 PROCEDURE — 3079F DIAST BP 80-89 MM HG: CPT | Performed by: SURGERY

## 2024-06-12 PROCEDURE — 3074F SYST BP LT 130 MM HG: CPT | Performed by: SURGERY

## 2024-06-12 ASSESSMENT — ENCOUNTER SYMPTOMS
HEADACHES: 0
MYALGIAS: 0
DIZZINESS: 0
PALPITATIONS: 0
FEVER: 0
CONSTITUTIONAL NEGATIVE: 1
RESPIRATORY NEGATIVE: 1
HEMOPTYSIS: 0
CARDIOVASCULAR NEGATIVE: 1
CHILLS: 0
HEARTBURN: 0
TINGLING: 0
MUSCULOSKELETAL NEGATIVE: 1
DOUBLE VISION: 0
BRUISES/BLEEDS EASILY: 0
NAUSEA: 0
DEPRESSION: 0
EYES NEGATIVE: 1
BLURRED VISION: 0
COUGH: 0
NEUROLOGICAL NEGATIVE: 1
GASTROINTESTINAL NEGATIVE: 1

## 2024-06-12 ASSESSMENT — FIBROSIS 4 INDEX: FIB4 SCORE: 1.17

## 2024-06-12 ASSESSMENT — LIFESTYLE VARIABLES: SUBSTANCE_ABUSE: 1

## 2024-06-14 ENCOUNTER — OFFICE VISIT (OUTPATIENT)
Dept: SLEEP MEDICINE | Facility: MEDICAL CENTER | Age: 39
End: 2024-06-14
Attending: STUDENT IN AN ORGANIZED HEALTH CARE EDUCATION/TRAINING PROGRAM
Payer: COMMERCIAL

## 2024-06-14 VITALS
RESPIRATION RATE: 16 BRPM | HEART RATE: 100 BPM | WEIGHT: 264.4 LBS | OXYGEN SATURATION: 90 % | DIASTOLIC BLOOD PRESSURE: 72 MMHG | HEIGHT: 63 IN | SYSTOLIC BLOOD PRESSURE: 130 MMHG | BODY MASS INDEX: 46.85 KG/M2

## 2024-06-14 DIAGNOSIS — G47.19 EXCESSIVE DAYTIME SLEEPINESS: ICD-10-CM

## 2024-06-14 DIAGNOSIS — E66.01 CLASS 3 SEVERE OBESITY DUE TO EXCESS CALORIES WITH SERIOUS COMORBIDITY AND BODY MASS INDEX (BMI) OF 45.0 TO 49.9 IN ADULT (HCC): ICD-10-CM

## 2024-06-14 DIAGNOSIS — G47.33 OSA (OBSTRUCTIVE SLEEP APNEA): ICD-10-CM

## 2024-06-14 PROCEDURE — 3078F DIAST BP <80 MM HG: CPT | Performed by: STUDENT IN AN ORGANIZED HEALTH CARE EDUCATION/TRAINING PROGRAM

## 2024-06-14 PROCEDURE — 99213 OFFICE O/P EST LOW 20 MIN: CPT | Performed by: STUDENT IN AN ORGANIZED HEALTH CARE EDUCATION/TRAINING PROGRAM

## 2024-06-14 PROCEDURE — 3075F SYST BP GE 130 - 139MM HG: CPT | Performed by: STUDENT IN AN ORGANIZED HEALTH CARE EDUCATION/TRAINING PROGRAM

## 2024-06-14 ASSESSMENT — FIBROSIS 4 INDEX: FIB4 SCORE: 1.17

## 2024-06-14 NOTE — PROGRESS NOTES
[facility] Follow-up Visit    CC: Follow-up regarding management of obstructive sleep apnea      HPI:  Alberto Maldonado is a 38 y.o.male  with GERD, morbid obesity, hypertension, gout, excessive daytime sleepiness and severe obstructive sleep apnea and nocturnal hypoxia.  Presents today to follow-up regarding management of obstructive sleep apnea.      He states that his insurance has changed and he has had difficulty getting his CPAP machine.  He would like to start on CPAP as soon as possible.  He continues to be sleepy and not rested.  He is hopeful that getting on a CPAP machine will help his quality of sleep and his energy level during the day.    Sleep History  1/4/2024 [facility] Watch[facility] study showed severe obstructive sleep apnea with an overall AHI of 41.1 events an hour, minimum oxygen saturation of 53%, time out of below 88% saturation of 54 minutes.  Significant clustering seen on night of study suspicion for worsening during REM sleep.       Patient Active Problem List    Diagnosis Date Noted    Alcohol withdrawal delirium, acute, hyperactive (HCC) 05/21/2024    Hypocalcemia 05/21/2024    Alcohol withdrawal syndrome, with delirium (HCC) 05/21/2024    Acute respiratory failure with hypoxia (HCC) 05/21/2024    Hypernatremia 05/21/2024    Transaminitis 05/21/2024    Intraabdominal mass 05/01/2024    Hyperglycemia 05/01/2024    BOLIVAR on CPAP 04/23/2024    Abnormal EKG 04/22/2024    Lactic acidosis 04/22/2024    Gallbladder dilatation 04/22/2024    Cardiomegaly 02/24/2024    Electrolyte abnormality 12/02/2023    Hypokalemia 12/02/2023    Hypertensive urgency 12/02/2023    GERD (gastroesophageal reflux disease) 12/02/2023    Gout 11/06/2023    Hospital discharge follow-up 11/06/2023    Prediabetes 11/06/2023    Elevated LFTs 10/28/2023    Steatosis of liver 01/05/2022    BOLIVAR (obstructive sleep apnea) 01/05/2022    Morbid obesity (HCC) 01/05/2022    Allergic contact dermatitis due to adhesives  01/05/2022    Alcohol dependence with alcohol withdrawal 01/05/2022    Essential hypertension 12/02/2021    Asthma 06/29/2017       Past Medical History:   Diagnosis Date    Asthma without status asthmaticus 06/29/2017    Chickenpox     Hypertension         No past surgical history on file.    Family History   Problem Relation Age of Onset    Stroke Neg Hx     Heart Disease Neg Hx        Social History     Socioeconomic History    Marital status: Single     Spouse name: Not on file    Number of children: Not on file    Years of education: Not on file    Highest education level: Not on file   Occupational History    Not on file   Tobacco Use    Smoking status: Never    Smokeless tobacco: Never   Vaping Use    Vaping status: Never Used   Substance and Sexual Activity    Alcohol use: Yes     Alcohol/week: 7.2 oz     Types: 12 Shots of liquor per week     Comment: every other day 4-5 cocktails    Drug use: Not Currently    Sexual activity: Not Currently   Other Topics Concern    Not on file   Social History Narrative    Works in [employer]    Lives at home with mom, brother, TRAM and nieces and nephews     Social Determinants of Health     Financial Resource Strain: Not on file   Food Insecurity: Patient Declined (5/21/2024)    Hunger Vital Sign     Worried About Running Out of Food in the Last Year: Patient declined     Ran Out of Food in the Last Year: Patient declined   Transportation Needs: Patient Declined (5/21/2024)    PRAPARE - Transportation     Lack of Transportation (Medical): Patient declined     Lack of Transportation (Non-Medical): Patient declined   Physical Activity: Not on file   Stress: Not on file   Social Connections: Not on file   Intimate Partner Violence: Not At Risk (5/21/2024)    Humiliation, Afraid, Rape, and Kick questionnaire     Fear of Current or Ex-Partner: No     Emotionally Abused: No     Physically Abused: No     Sexually Abused: No   Housing Stability: Patient  "Declined (5/21/2024)    Housing Stability Vital Sign     Unable to Pay for Housing in the Last Year: Patient declined     Number of Places Lived in the Last Year: 1     Unstable Housing in the Last Year: Patient declined       Current Outpatient Medications   Medication Sig Dispense Refill    benzonatate (TESSALON) 100 MG Cap Take 1 Capsule by mouth 3 times a day as needed for Cough. 30 Capsule 0    naltrexone (DEPADE) 50 MG Tab Take 1 Tablet by mouth every day. 30 Tablet 0    albuterol 108 (90 Base) MCG/ACT Aero Soln inhalation aerosol Inhale 2 Puffs every 6 hours as needed for Shortness of Breath. 8.5 g 2    losartan (COZAAR) 50 MG Tab Take 1 Tablet by mouth every day. 30 Tablet 2    omeprazole (PRILOSEC) 40 MG delayed-release capsule Take 1 Capsule by mouth 2 times a day. 60 Capsule 1    multivitamin Tab Take 1 Tablet by mouth every day. 30 Tablet 1    folic acid (FOLVITE) 1 MG Tab Take 1 Tablet by mouth every day. 30 Tablet 1    thiamine (THIAMINE) 100 MG tablet Take 1 Tablet by mouth every day. 30 Tablet 1    allopurinol (ZYLOPRIM) 300 MG Tab Take 1 Tablet by mouth every day. 30 Tablet 1     No current facility-administered medications for this visit.        ALLERGIES: Patient has no known allergies.    ROS  Constitutional: Denies fevers, Denies weight changes  Ears/Nose/Throat/Mouth: Denies nasal congestion or sore throat   Cardiovascular: Denies chest pain  Respiratory: Denies shortness of breath, Denies cough  Gastrointestinal/Hepatic: Denies nausea, vomiting  Sleep: see HPI      PHYSICAL EXAM  /72 (BP Location: Left arm, Patient Position: Sitting, BP Cuff Size: Adult)   Pulse 100   Resp 16   Ht 1.6 m (5' 3\")   Wt 120 kg (264 lb 6.4 oz)   SpO2 90%   BMI 46.84 kg/m²   Appearance: Well-nourished, well-developed, no acute distress  Eyes:  No scleral icterus , EOMI  Musculoskeletal:  Grossly normal; gait and station normal; digits and nails normal  Skin:  No rashes, petechiae, cyanosis  Neurologic: " without focal signs; oriented to person, time, place, and purpose; judgement intact      Medical Decision Making   Assessment and Plan  Alberto Maldonado is a 38 y.o.male  with GERD, morbid obesity, hypertension, gout, excessive daytime sleepiness and severe obstructive sleep apnea and nocturnal hypoxia.  Presents today to follow-up regarding management of obstructive sleep apnea.      The medical record was reviewed.    Obstructive sleep apnea  He continues to not been started on therapy.  He would like to start on therapy as soon as possible but has had disruptions with your insurance.  His sleep study earlier this year did indicate severe obstructive sleep apnea with nocturnal hypoxia.  He would benefit from getting on CPAP therapy at soonest availability.    PLAN:   -Order placed for auto CPAP 5-15 cmH2O  -Advised to reach out via MyChart with questions     Has been advised to continue the current CPAP, clean equipment frequently, and get new mask and supplies as allowed by insurance and DME. Recommend an earlier appointment, if significant treatment barriers develop.    Patients with BOLIVAR are at increased risk of cardiovascular disease including coronary artery disease, systemic arterial hypertension, pulmonary arterial hypertension, cardiac arrythmias, and stroke. The patient was advised to avoid driving a motor vehicle when drowsy.    Positive airway pressure will favorably impact many of the adverse conditions and effects provoked by BOLIVAR.    Have advised the patient to follow up with the appropriate healthcare practitioners for all other medical problems and issues.    Return in about 3 months (around 9/14/2024).      Please note portions of this record was created using voice recognition software. I have made every reasonable attempt to correct obvious errors, but I expect that there are errors of grammar and possibly content I did not discover before finalizing the note.

## 2024-07-01 ENCOUNTER — APPOINTMENT (OUTPATIENT)
Dept: MEDICAL GROUP | Facility: PHYSICIAN GROUP | Age: 39
End: 2024-07-01
Payer: COMMERCIAL

## 2024-07-14 ENCOUNTER — OFFICE VISIT (OUTPATIENT)
Dept: URGENT CARE | Facility: CLINIC | Age: 39
End: 2024-07-14
Payer: COMMERCIAL

## 2024-07-14 VITALS
SYSTOLIC BLOOD PRESSURE: 132 MMHG | BODY MASS INDEX: 48.2 KG/M2 | DIASTOLIC BLOOD PRESSURE: 88 MMHG | TEMPERATURE: 98.1 F | OXYGEN SATURATION: 93 % | HEART RATE: 122 BPM | HEIGHT: 63 IN | RESPIRATION RATE: 21 BRPM | WEIGHT: 272 LBS

## 2024-07-14 DIAGNOSIS — R14.1 GAS PAIN: ICD-10-CM

## 2024-07-14 DIAGNOSIS — R19.7 DIARRHEA, UNSPECIFIED TYPE: ICD-10-CM

## 2024-07-14 PROCEDURE — 3079F DIAST BP 80-89 MM HG: CPT

## 2024-07-14 PROCEDURE — 3075F SYST BP GE 130 - 139MM HG: CPT

## 2024-07-14 PROCEDURE — 99213 OFFICE O/P EST LOW 20 MIN: CPT

## 2024-07-14 RX ORDER — SIMETHICONE 80 MG
80 TABLET,CHEWABLE ORAL EVERY 6 HOURS PRN
Qty: 30 TABLET | Refills: 3 | Status: SHIPPED | OUTPATIENT
Start: 2024-07-14

## 2024-07-14 ASSESSMENT — ENCOUNTER SYMPTOMS
CHILLS: 1
NAUSEA: 0
HEARTBURN: 0
FLATUS: 0
ABDOMINAL PAIN: 0
FEVER: 0
VOMITING: 0
MYALGIAS: 0
DIARRHEA: 1
ARTHRALGIAS: 0
COUGH: 0
BLOATING: 1
WEIGHT LOSS: 0
BLOOD IN STOOL: 0
CONSTIPATION: 0
SWEATS: 0
HEADACHES: 0

## 2024-07-14 ASSESSMENT — FIBROSIS 4 INDEX: FIB4 SCORE: 1.17

## 2024-07-21 ENCOUNTER — OFFICE VISIT (OUTPATIENT)
Dept: URGENT CARE | Facility: CLINIC | Age: 39
End: 2024-07-21
Payer: COMMERCIAL

## 2024-07-21 VITALS
SYSTOLIC BLOOD PRESSURE: 146 MMHG | HEART RATE: 111 BPM | TEMPERATURE: 99.2 F | RESPIRATION RATE: 16 BRPM | DIASTOLIC BLOOD PRESSURE: 96 MMHG | OXYGEN SATURATION: 94 % | WEIGHT: 272 LBS | HEIGHT: 63 IN | BODY MASS INDEX: 48.2 KG/M2

## 2024-07-21 DIAGNOSIS — M79.10 MYALGIA: ICD-10-CM

## 2024-07-21 PROCEDURE — 99213 OFFICE O/P EST LOW 20 MIN: CPT | Performed by: NURSE PRACTITIONER

## 2024-07-21 PROCEDURE — 3080F DIAST BP >= 90 MM HG: CPT | Performed by: NURSE PRACTITIONER

## 2024-07-21 PROCEDURE — 3077F SYST BP >= 140 MM HG: CPT | Performed by: NURSE PRACTITIONER

## 2024-07-21 RX ORDER — ALBUTEROL SULFATE 90 UG/1
1 AEROSOL, METERED RESPIRATORY (INHALATION) 4 TIMES DAILY
COMMUNITY

## 2024-07-21 ASSESSMENT — ENCOUNTER SYMPTOMS
FEVER: 0
SHORTNESS OF BREATH: 0
COUGH: 0
SORE THROAT: 0
MYALGIAS: 1
CHILLS: 0

## 2024-07-21 ASSESSMENT — FIBROSIS 4 INDEX: FIB4 SCORE: 1.17

## 2024-07-22 ENCOUNTER — OFFICE VISIT (OUTPATIENT)
Dept: URGENT CARE | Facility: CLINIC | Age: 39
End: 2024-07-22
Payer: COMMERCIAL

## 2024-07-22 VITALS
DIASTOLIC BLOOD PRESSURE: 88 MMHG | SYSTOLIC BLOOD PRESSURE: 142 MMHG | OXYGEN SATURATION: 98 % | BODY MASS INDEX: 48.18 KG/M2 | HEART RATE: 94 BPM | RESPIRATION RATE: 18 BRPM | WEIGHT: 272 LBS | TEMPERATURE: 97.6 F

## 2024-07-22 DIAGNOSIS — M79.89 BILATERAL SWELLING OF FEET: ICD-10-CM

## 2024-07-22 PROCEDURE — 99214 OFFICE O/P EST MOD 30 MIN: CPT

## 2024-07-22 PROCEDURE — 3077F SYST BP >= 140 MM HG: CPT

## 2024-07-22 PROCEDURE — 3079F DIAST BP 80-89 MM HG: CPT

## 2024-07-22 RX ORDER — METHYLPREDNISOLONE 4 MG/1
TABLET ORAL
Qty: 21 TABLET | Refills: 0 | Status: SHIPPED | OUTPATIENT
Start: 2024-07-22

## 2024-07-22 ASSESSMENT — FIBROSIS 4 INDEX: FIB4 SCORE: 1.17

## 2024-07-23 ASSESSMENT — ENCOUNTER SYMPTOMS
FEVER: 0
PALPITATIONS: 0
SHORTNESS OF BREATH: 0

## 2024-08-05 ENCOUNTER — OFFICE VISIT (OUTPATIENT)
Dept: URGENT CARE | Facility: CLINIC | Age: 39
End: 2024-08-05
Payer: COMMERCIAL

## 2024-08-05 ENCOUNTER — TELEPHONE (OUTPATIENT)
Dept: URGENT CARE | Facility: CLINIC | Age: 39
End: 2024-08-05

## 2024-08-05 VITALS
DIASTOLIC BLOOD PRESSURE: 60 MMHG | SYSTOLIC BLOOD PRESSURE: 100 MMHG | WEIGHT: 288.4 LBS | HEART RATE: 127 BPM | RESPIRATION RATE: 30 BRPM | HEIGHT: 63 IN | TEMPERATURE: 98.4 F | BODY MASS INDEX: 51.1 KG/M2 | OXYGEN SATURATION: 92 %

## 2024-08-05 DIAGNOSIS — R60.0 BILATERAL LOWER EXTREMITY EDEMA: ICD-10-CM

## 2024-08-05 DIAGNOSIS — R06.02 SHORTNESS OF BREATH: ICD-10-CM

## 2024-08-05 PROCEDURE — 99284 EMERGENCY DEPT VISIT MOD MDM: CPT

## 2024-08-05 PROCEDURE — 99214 OFFICE O/P EST MOD 30 MIN: CPT | Performed by: PHYSICIAN ASSISTANT

## 2024-08-05 ASSESSMENT — FIBROSIS 4 INDEX: FIB4 SCORE: 1.17

## 2024-08-06 ENCOUNTER — HOSPITAL ENCOUNTER (EMERGENCY)
Facility: MEDICAL CENTER | Age: 39
End: 2024-08-06
Attending: EMERGENCY MEDICINE
Payer: COMMERCIAL

## 2024-08-06 ENCOUNTER — APPOINTMENT (OUTPATIENT)
Dept: RADIOLOGY | Facility: MEDICAL CENTER | Age: 39
End: 2024-08-06
Attending: EMERGENCY MEDICINE
Payer: COMMERCIAL

## 2024-08-06 VITALS
BODY MASS INDEX: 50.78 KG/M2 | WEIGHT: 286.6 LBS | DIASTOLIC BLOOD PRESSURE: 91 MMHG | HEIGHT: 63 IN | HEART RATE: 108 BPM | TEMPERATURE: 97.4 F | RESPIRATION RATE: 23 BRPM | SYSTOLIC BLOOD PRESSURE: 178 MMHG | OXYGEN SATURATION: 94 %

## 2024-08-06 DIAGNOSIS — R06.02 SHORTNESS OF BREATH: ICD-10-CM

## 2024-08-06 DIAGNOSIS — M79.89 SWELLING OF LOWER EXTREMITY: ICD-10-CM

## 2024-08-06 LAB
ALBUMIN SERPL BCP-MCNC: 3.9 G/DL (ref 3.2–4.9)
ALBUMIN/GLOB SERPL: 1.1 G/DL
ALP SERPL-CCNC: 153 U/L (ref 30–99)
ALT SERPL-CCNC: 20 U/L (ref 2–50)
ANION GAP SERPL CALC-SCNC: 18 MMOL/L (ref 7–16)
ANISOCYTOSIS BLD QL SMEAR: ABNORMAL
AST SERPL-CCNC: 35 U/L (ref 12–45)
BASOPHILS # BLD AUTO: 0.6 % (ref 0–1.8)
BASOPHILS # BLD: 0.07 K/UL (ref 0–0.12)
BILIRUB SERPL-MCNC: 0.9 MG/DL (ref 0.1–1.5)
BUN SERPL-MCNC: 6 MG/DL (ref 8–22)
CALCIUM ALBUM COR SERPL-MCNC: 8.7 MG/DL (ref 8.5–10.5)
CALCIUM SERPL-MCNC: 8.6 MG/DL (ref 8.5–10.5)
CHLORIDE SERPL-SCNC: 103 MMOL/L (ref 96–112)
CO2 SERPL-SCNC: 22 MMOL/L (ref 20–33)
COMMENT 1642: NORMAL
CREAT SERPL-MCNC: 0.72 MG/DL (ref 0.5–1.4)
EKG IMPRESSION: NORMAL
EOSINOPHIL # BLD AUTO: 0.34 K/UL (ref 0–0.51)
EOSINOPHIL NFR BLD: 2.9 % (ref 0–6.9)
ERYTHROCYTE [DISTWIDTH] IN BLOOD BY AUTOMATED COUNT: 67.7 FL (ref 35.9–50)
GFR SERPLBLD CREATININE-BSD FMLA CKD-EPI: 121 ML/MIN/1.73 M 2
GLOBULIN SER CALC-MCNC: 3.7 G/DL (ref 1.9–3.5)
GLUCOSE SERPL-MCNC: 123 MG/DL (ref 65–99)
HCT VFR BLD AUTO: 43.2 % (ref 42–52)
HGB BLD-MCNC: 14 G/DL (ref 14–18)
IMM GRANULOCYTES # BLD AUTO: 0.05 K/UL (ref 0–0.11)
IMM GRANULOCYTES NFR BLD AUTO: 0.4 % (ref 0–0.9)
LYMPHOCYTES # BLD AUTO: 1.87 K/UL (ref 1–4.8)
LYMPHOCYTES NFR BLD: 15.7 % (ref 22–41)
MACROCYTES BLD QL SMEAR: ABNORMAL
MCH RBC QN AUTO: 30.8 PG (ref 27–33)
MCHC RBC AUTO-ENTMCNC: 32.4 G/DL (ref 32.3–36.5)
MCV RBC AUTO: 95.2 FL (ref 81.4–97.8)
MICROCYTES BLD QL SMEAR: ABNORMAL
MONOCYTES # BLD AUTO: 0.78 K/UL (ref 0–0.85)
MONOCYTES NFR BLD AUTO: 6.5 % (ref 0–13.4)
MORPHOLOGY BLD-IMP: NORMAL
NEUTROPHILS # BLD AUTO: 8.81 K/UL (ref 1.82–7.42)
NEUTROPHILS NFR BLD: 73.9 % (ref 44–72)
NRBC # BLD AUTO: 0 K/UL
NRBC BLD-RTO: 0 /100 WBC (ref 0–0.2)
NT-PROBNP SERPL IA-MCNC: 92 PG/ML (ref 0–125)
PLATELET # BLD AUTO: 287 K/UL (ref 164–446)
PLATELET BLD QL SMEAR: NORMAL
PMV BLD AUTO: 8.8 FL (ref 9–12.9)
POIKILOCYTOSIS BLD QL SMEAR: NORMAL
POTASSIUM SERPL-SCNC: 3.5 MMOL/L (ref 3.6–5.5)
PROT SERPL-MCNC: 7.6 G/DL (ref 6–8.2)
RBC # BLD AUTO: 4.54 M/UL (ref 4.7–6.1)
RBC BLD AUTO: PRESENT
SODIUM SERPL-SCNC: 143 MMOL/L (ref 135–145)
STOMATOCYTES BLD QL SMEAR: NORMAL
TROPONIN T SERPL-MCNC: 16 NG/L (ref 6–19)
TROPONIN T SERPL-MCNC: 18 NG/L (ref 6–19)
TSH SERPL DL<=0.005 MIU/L-ACNC: 2.83 UIU/ML (ref 0.38–5.33)
WBC # BLD AUTO: 11.9 K/UL (ref 4.8–10.8)

## 2024-08-06 PROCEDURE — 80053 COMPREHEN METABOLIC PANEL: CPT

## 2024-08-06 PROCEDURE — 93005 ELECTROCARDIOGRAM TRACING: CPT | Performed by: EMERGENCY MEDICINE

## 2024-08-06 PROCEDURE — 85025 COMPLETE CBC W/AUTO DIFF WBC: CPT

## 2024-08-06 PROCEDURE — 84484 ASSAY OF TROPONIN QUANT: CPT

## 2024-08-06 PROCEDURE — 93005 ELECTROCARDIOGRAM TRACING: CPT

## 2024-08-06 PROCEDURE — 83880 ASSAY OF NATRIURETIC PEPTIDE: CPT

## 2024-08-06 PROCEDURE — 71045 X-RAY EXAM CHEST 1 VIEW: CPT

## 2024-08-06 PROCEDURE — 36415 COLL VENOUS BLD VENIPUNCTURE: CPT

## 2024-08-06 PROCEDURE — 84443 ASSAY THYROID STIM HORMONE: CPT

## 2024-08-06 ASSESSMENT — FIBROSIS 4 INDEX: FIB4 SCORE: 1.17

## 2024-08-06 ASSESSMENT — PAIN DESCRIPTION - PAIN TYPE: TYPE: ACUTE PAIN

## 2024-08-06 NOTE — TELEPHONE ENCOUNTER
Pt called to see if provider is working today. I told pt that we're unable to provide that information. Pt requested for provider Gage to call him. Pt would would like to talk about medical issues. Please assist, thank you.

## 2024-08-06 NOTE — ED PROVIDER NOTES
"                                                        ED Provider Note    CHIEF COMPLAINT  Chief Complaint   Patient presents with    Leg Swelling     Sent by  for bilateral foot swelling x1 week, denies CHF. Hx of HTN. Denies chest pain.     Shortness of Breath     States \"it's normal for me because I'm overweight.\"         \[facility]\""    Primary care provider: Rey Pacheco M.D.   History obtained from: Patient and brother  History limited by: None     Alberto Maldonado is a 38 y.o. male who presents to the ED with father after being referred from urgent care for evaluation of bilateral lower extremity swelling for about a week.  Patient reports that he has chronic swelling and it is not worse than usual.  He also reports he has chronic shortness of breath \"because I am overweight.\"  Patient states that he has struggled with his weight ever since he was young.  He has been trying to exercise and eat healthier.  Brother states that patient also drinks too much alcohol and drinks daily.  Patient states that he is trying to cut back on alcohol.  He denies fever/nausea/vomiting/diarrhea/dysuria.  Patient also noted to be hypertensive and reports that he is on blood pressure medicine.    REVIEW OF SYSTEMS  Please see HPI for pertinent positives/negatives.  All other systems reviewed and are negative.     PAST MEDICAL HISTORY  Past Medical History:   Diagnosis Date    Asthma without status asthmaticus 06/29/2017    Chickenpox     Hypertension         SURGICAL HISTORY  History reviewed. No pertinent surgical history.     SOCIAL HISTORY  Social History     Tobacco Use    Smoking status: Never    Smokeless tobacco: Never   Vaping Use    Vaping status: Never Used   Substance and Sexual Activity    Alcohol use: Yes     Alcohol/week: 7.2 oz     Types: 12 Shots of liquor per week     Comment: every other day 4-5 cocktails    Drug use: Not Currently    Sexual activity: Not Currently        FAMILY HISTORY  Family History " "  Problem Relation Age of Onset    Stroke Neg Hx     Heart Disease Neg Hx         CURRENT MEDICATIONS  Home Medications       Reviewed by Taylor Zapata R.N. (Registered Nurse) on 08/06/24 at 0011  Med List Status: Partial     Medication Last Dose Status   albuterol 108 (90 Base) MCG/ACT Aero Soln inhalation aerosol  Active   allopurinol (ZYLOPRIM) 300 MG Tab  Active   folic acid (FOLVITE) 1 MG Tab  Active   losartan (COZAAR) 50 MG Tab  Active   methylPREDNISolone (MEDROL DOSEPAK) 4 MG Tablet Therapy Pack  Active   multivitamin Tab  Active   naltrexone (DEPADE) 50 MG Tab  Active   omeprazole (PRILOSEC) 40 MG delayed-release capsule  Active   simethicone (MYLICON) 80 MG Chew Tab  Active   thiamine (THIAMINE) 100 MG tablet  Active                     ALLERGIES  No Known Allergies     PHYSICAL EXAM  VITAL SIGNS: BP (!) 178/91   Pulse (!) 108   Temp 36.3 °C (97.4 °F) (Temporal)   Resp (!) 23   Ht 1.6 m (5' 3\")   Wt (!) 130 kg (286 lb 9.6 oz)   SpO2 94%   BMI 50.77 kg/m²  @CHENCHO[[id number]::@     Pulse ox interpretation: 93% I interpret this pulse ox as normal     Cardiac monitor interpretation: Sinus tachycardia with heart rate in the 100s as interpreted by me.  The patient presented with tachycardia and cardiac monitor was ordered to monitor for dysrhythmia.    Constitutional: Well developed, well nourished, alert in no apparent distress, nontoxic appearance    HENT: No external signs of trauma, normocephalic  Eyes: PERRL, conjunctiva without erythema, no discharge, no icterus    Neck: Soft and supple, trachea midline, no stridor, no tenderness, no LAD, good ROM    Cardiovascular: Regular and tachycardic, no murmurs/rubs/gallops, strong distal pulses and good perfusion    Thorax & Lungs: No respiratory distress, CTAB    Abdomen: Soft, nontender, nondistended, no guarding, no rebound, normal BS    Back: No CVAT     Extremities: No cyanosis, bilateral lower extremity nonpitting edema with chronic appearing skin " changes, no gross deformity, good ROM, intact distal pulses with brisk cap refill    Skin: Warm, dry, no pallor/cyanosis, no rash noted except as above  Neuro: A/O times 3, no focal deficits noted    Psychiatric: Cooperative, normal mood and affect, normal judgement, appropriate for clinical situation      DIAGNOSTIC STUDIES / PROCEDURES    EKG  12 Lead EKG obtained at 0034 and interpreted by me:   Rate: 110   Rhythm: Sinus tachycardia  Ectopy: None  Intervals: Normal   Axis: Normal   QRS: Normal   ST segments: Minimal ST depression inferior and lateral leads  T Waves: Normal    Clinical Impression: Sinus tachycardia with nonspecific ST changes  Compared to May 21, 2024 without significant change      LABS  All labs reviewed by me.     Results for orders placed or performed during the hospital encounter of 08/06/24   CBC with Differential   Result Value Ref Range    WBC 11.9 (H) 4.8 - 10.8 K/uL    RBC 4.54 (L) 4.70 - 6.10 M/uL    Hemoglobin 14.0 14.0 - 18.0 g/dL    Hematocrit 43.2 42.0 - 52.0 %    MCV 95.2 81.4 - 97.8 fL    MCH 30.8 27.0 - 33.0 pg    MCHC 32.4 32.3 - 36.5 g/dL    RDW 67.7 (H) 35.9 - 50.0 fL    Platelet Count 287 164 - 446 K/uL    MPV 8.8 (L) 9.0 - 12.9 fL    Neutrophils-Polys 73.90 (H) 44.00 - 72.00 %    Lymphocytes 15.70 (L) 22.00 - 41.00 %    Monocytes 6.50 0.00 - 13.40 %    Eosinophils 2.90 0.00 - 6.90 %    Basophils 0.60 0.00 - 1.80 %    Immature Granulocytes 0.40 0.00 - 0.90 %    Nucleated RBC 0.00 0.00 - 0.20 /100 WBC    Neutrophils (Absolute) 8.81 (H) 1.82 - 7.42 K/uL    Lymphs (Absolute) 1.87 1.00 - 4.80 K/uL    Monos (Absolute) 0.78 0.00 - 0.85 K/uL    Eos (Absolute) 0.34 0.00 - 0.51 K/uL    Baso (Absolute) 0.07 0.00 - 0.12 K/uL    Immature Granulocytes (abs) 0.05 0.00 - 0.11 K/uL    NRBC (Absolute) 0.00 K/uL    Anisocytosis 1+     Macrocytosis 1+     Microcytosis 1+    Complete Metabolic Panel (CMP)   Result Value Ref Range    Sodium 143 135 - 145 mmol/L    Potassium 3.5 (L) 3.6 - 5.5  mmol/L    Chloride 103 96 - 112 mmol/L    Co2 22 20 - 33 mmol/L    Anion Gap 18.0 (H) 7.0 - 16.0    Glucose 123 (H) 65 - 99 mg/dL    Bun 6 (L) 8 - 22 mg/dL    Creatinine 0.72 0.50 - 1.40 mg/dL    Calcium 8.6 8.5 - 10.5 mg/dL    Correct Calcium 8.7 8.5 - 10.5 mg/dL    AST(SGOT) 35 12 - 45 U/L    ALT(SGPT) 20 2 - 50 U/L    Alkaline Phosphatase 153 (H) 30 - 99 U/L    Total Bilirubin 0.9 0.1 - 1.5 mg/dL    Albumin 3.9 3.2 - 4.9 g/dL    Total Protein 7.6 6.0 - 8.2 g/dL    Globulin 3.7 (H) 1.9 - 3.5 g/dL    A-G Ratio 1.1 g/dL   Troponins NOW   Result Value Ref Range    Troponin T 18 6 - 19 ng/L   Troponins in two (2) hours   Result Value Ref Range    Troponin T 16 6 - 19 ng/L   ESTIMATED GFR   Result Value Ref Range    GFR (CKD-EPI) 121 >60 mL/min/1.73 m 2   PLATELET ESTIMATE   Result Value Ref Range    Plt Estimation Normal    MORPHOLOGY   Result Value Ref Range    RBC Morphology Present     Poikilocytosis 1+     Stomatocytes 1+    PERIPHERAL SMEAR REVIEW   Result Value Ref Range    Peripheral Smear Review see below    DIFFERENTIAL COMMENT   Result Value Ref Range    Comments-Diff see below    TSH WITH REFLEX TO FT4   Result Value Ref Range    TSH 2.830 0.380 - 5.330 uIU/mL   proBrain Natriuretic Peptide, NT   Result Value Ref Range    NT-proBNP 92 0 - 125 pg/mL   EKG   Result Value Ref Range    Report       [facility] Emergency Dept.    Test Date:  2024  Pt Name:    ANNIA ALMONTE                Department: ER  MRN:        [id number]                      Room:  Gender:     Male                         Technician: [id number]  :        1985                   Requested By:ER TRIAGE PROTOCOL  Order #:    [id number]                    Reading MD:    Measurements  Intervals                                Axis  Rate:       110                          P:          75  MA:         135                          QRS:        34  QRSD:       77                           T:          -43  QT:         320  QTc:         433    Interpretive Statements  Sinus tachycardia  Low voltage, precordial leads  Borderline repolarization abnormality  Compared to ECG 05/21/2024 04:10:21  Possible ischemia no longer present          RADIOLOGY  I have independently interpreted the diagnostic imaging associated with this visit and am waiting the final reading from the radiologist.   My preliminary interpretation is as follows: Cardiomegaly without acute findings.    DX-CHEST-PORTABLE (1 VIEW)   Final Result      Cardiomegaly.             COURSE & MEDICAL DECISION MAKING  Nursing notes, VS, PMSFHx reviewed in chart.     Review of past medical records shows the patient was seen at urgent care [facility] regarding bilateral lower extremity swelling and patient was referred to the ED for further care.  Patient was previously seen in the office August 2, 2024 regarding alcohol use disorder, peripheral edema, high BMI.  Patient was seen in urgent care on July 22, 2024 regarding bilateral swelling of feet and then urgent care on July 21, 2024 regarding myalgia.  Last cardiac echo on April 22, 2024 had the following findings:    Grossly normal left ventricular systolic function.  The left ventricular ejection fraction is visually estimated to be 55-  60%.  Normal diastolic function.  The right ventricule is not well visualized, but appears grossly normal   in size and systolic function.  Unable to estimate right ventricular systolic pressure due to an   inadequate tricuspid regurgitant jet.  No evidence of valvular abnormality based on Doppler evaluation.   The inferior vena cava is not well visualized.     Compared to the prior study on 10/29/2023, there are no significant   changes.      Differential diagnoses considered include but are not limited to: Lymphedema, fluid overload, CHF, peripheral vascular disease, electrolyte abnormality, thyroid dysfunction      ED Observation Status? No; Patient does not meet criteria for ED Observation.        Discussion of management with other [facility] or appropriate source(s): None     Escalation of care considered, and ultimately not performed: acute inpatient care management, however at this time, the patient is most appropriate for outpatient management.     Decision tools and prescription drugs considered including, but not limited to: Medication modification   .        History and physical exam as above.  This is a 38-year-old male patient with medical history including hypertension and asthma who was referred from urgent care for bilateral lower extremity edema perhaps worsening over the past week and chronic shortness of breath that patient attributes to being overweight.  EKG shows sinus tachycardia with nonspecific ST changes although without significant change compared to prior and troponin x 2 without elevation to suggest ACS.  BNP without elevation and this is unlikely to be decompensated CHF although patient does have cardiomegaly on chest x-ray likely from his continued uncontrolled hypertension.  His last cardiac echo on April 22, 2024 Findings as above.  He has mild hypokalemia without other electrolyte derangement and mild anion gap without acidosis.  No evidence for renal dysfunction or acute hepatic dysfunction.  He has chronic mild elevation of alk phos slightly higher today.  I discussed the findings with patient and brother.  This is a very pleasant patient in no acute distress and nontoxic in appearance.  I believe his lower extremity swelling is unlikely to be DVT given the bilateral nature.  He does not have any evidence for acute infectious process.  At this time, no convincing evidence for emergent pathology or indications for admission.  Record review shows that many of these issues are chronic.  He was counseled on adopting healthy lifestyle including healthy diet, regular exercise and keeping his weight under control.  He was also advised to keep blood pressure journal for outpatient  follow-up for management of his hypertension.  Return to ED precautions given.  Patient and brother verbalized understanding and agreed with plan of care with no further questions or concerns.      The patient is referred to a primary physician for blood pressure management, diabetic screening, and for all other preventative health concerns.       FINAL IMPRESSION  1. Swelling of lower extremity Acute   2. Shortness of breath Active          DISPOSITION  Patient will be discharged home in stable condition.       FOLLOW UP  Rey Pacheco M.D.  [address]  [facility] [address]  [phone]    Call today      [facility], Emergency Dept  [facility] [facility] [id number] [phone]    If symptoms worsen         OUTPATIENT MEDICATIONS  Discharge Medication List as of 8/6/2024  4:31 AM             Electronically signed by: Prasad Patterson D.O., 8/6/2024 2:38 AM      Portions of this record were made with voice recognition software.  Despite my review, errors may remain.  Please interpret this chart in the appropriate context.

## 2024-08-06 NOTE — ED NOTES
Checked on bed, connected to monitor,  with unlabored respirations. Vital monitoring in progress.   Denied any new complaints. No current needs identified.  Gurney in low position, side rail up for pt safety. Call light within reach.

## 2024-08-06 NOTE — ED NOTES
Pt discharged home. GCS 15. IV discontinued and gauze placed, pt in possession of belongings. Pt provided discharge education and information pertaining to follow up appointments. Pt received copy of discharge instructions and verbalized understanding.     Vitals:    08/06/24 0424   BP: (!) 178/91   Pulse: (!) 108   Resp: (!) 23   Temp: 36.3 °C (97.4 °F)   SpO2: 94%

## 2024-08-06 NOTE — PROGRESS NOTES
Subjective:   Alberto Maldonado is a 38 y.o. male who presents for Other (X1 week having legs swollen , doesn't indicate other symptoms just concerned why its swollen )        Pt presents with concerns of bilateral LE edema, SOB, abdominal fullness and increased weight gain over the last week. Overall sx are worsening. Denies CP, palpitations, headaches, fevers, chills. Hx significant for ETOH abuse. He admits that he has started using ETOH again, but continues to attend AA.      Review of Systems   Constitutional:  Negative for chills and fever.   Respiratory:  Positive for shortness of breath.    Cardiovascular:  Positive for leg swelling. Negative for chest pain and palpitations.       PMH:  has a past medical history of Asthma without status asthmaticus (06/29/2017), Chickenpox, and Hypertension.    He has no past medical history of Cancer (HCC) or Diabetes (HCC).  MEDS:   Current Outpatient Medications:     methylPREDNISolone (MEDROL DOSEPAK) 4 MG Tablet Therapy Pack, Follow schedule on package instructions., Disp: 21 Tablet, Rfl: 0    albuterol 108 (90 Base) MCG/ACT Aero Soln inhalation aerosol, Inhale 1 Puff 4 times a day., Disp: , Rfl:     losartan (COZAAR) 50 MG Tab, Take 1 Tablet by mouth every day., Disp: 30 Tablet, Rfl: 2    folic acid (FOLVITE) 1 MG Tab, Take 1 Tablet by mouth every day., Disp: 30 Tablet, Rfl: 1    allopurinol (ZYLOPRIM) 300 MG Tab, Take 1 Tablet by mouth every day., Disp: 30 Tablet, Rfl: 1    simethicone (MYLICON) 80 MG Chew Tab, Chew 1 Tablet every 6 hours as needed for Flatulence. (Patient not taking: Reported on 7/21/2024), Disp: 30 Tablet, Rfl: 3    naltrexone (DEPADE) 50 MG Tab, Take 1 Tablet by mouth every day. (Patient not taking: Reported on 7/21/2024), Disp: 30 Tablet, Rfl: 0    omeprazole (PRILOSEC) 40 MG delayed-release capsule, Take 1 Capsule by mouth 2 times a day. (Patient not taking: Reported on 7/21/2024), Disp: 60 Capsule, Rfl: 1    multivitamin Tab, Take 1  "Tablet by mouth every day. (Patient not taking: Reported on 7/22/2024), Disp: 30 Tablet, Rfl: 1    thiamine (THIAMINE) 100 MG tablet, Take 1 Tablet by mouth every day. (Patient not taking: Reported on 7/21/2024), Disp: 30 Tablet, Rfl: 1  ALLERGIES: No Known Allergies  SURGHX: History reviewed. No pertinent surgical history.  SOCHX:  reports that he has never smoked. He has never used smokeless tobacco. He reports current alcohol use of about 7.2 oz of alcohol per week. He reports that he does not currently use drugs.  FH: Family history was reviewed, no pertinent findings to report   Objective:   /60   Pulse (!) 127   Temp 36.9 °C (98.4 °F)   Resp (!) 30   Ht 1.6 m (5' 3\")   Wt (!) 131 kg (288 lb 6.4 oz)   SpO2 92%   BMI 51.09 kg/m²   Physical Exam  Vitals reviewed.   Constitutional:       Appearance: Normal appearance. He is well-developed. He is not toxic-appearing.   HENT:      Head: Normocephalic and atraumatic.      Right Ear: External ear normal.      Left Ear: External ear normal.      Nose: Nose normal.   Cardiovascular:      Rate and Rhythm: Regular rhythm. Tachycardia present.      Comments: Bilateral 2+ pitting edema. PT pulses 2+  Pulmonary:      Effort: Tachypnea (mildly) present. No respiratory distress.      Breath sounds: No stridor.   Abdominal:      Comments: Protuberant abdomen. NTTP.   Skin:     General: Skin is dry.   Neurological:      Comments: Alert and oriented.    Psychiatric:         Speech: Speech normal.         Behavior: Behavior normal.           Assessment/Plan:   1. Bilateral lower extremity edema    2. Shortness of breath      Considerations include but not limited to acute CHF, EDIN, hepatorenal syndrome, alcoholic hepatitis, DVT, dependent edema. Recommend immediate reevaluation at a higher level of care. Pt's brother will drive him directly.    Transfer center contacted to inform of pt disposition and impending arrival.  "

## 2024-08-06 NOTE — ED NOTES
Pt wheeled to the room, changed into hospital gown. Pt connected to the monitor, vitals updated. Pt refused to sit in [facility] stated he can't sit, he wants to sit on chair.

## 2024-08-06 NOTE — ED TRIAGE NOTES
"Chief Complaint   Patient presents with    Leg Swelling     Sent by  for bilateral foot swelling x1 week, denies CHF. Hx of HTN. Denies chest pain.     Shortness of Breath     States \"it's normal for me because I'm overweight.\"        Pt ambulatory to triage. Pt A&Ox4, room air.     Pt to phlebotomy . Pt educated on alerting staff in changes to condition. Pt verbalized understanding. Protocol ordered.     BP (!) 205/123   Pulse (!) 119   Temp 36.7 °C (98 °F) (Temporal)   Resp 17   Ht 1.6 m (5' 3\")   Wt (!) 130 kg (286 lb 9.6 oz)   SpO2 93%   BMI 50.77 kg/m²     "

## 2024-08-07 ENCOUNTER — HOSPITAL ENCOUNTER (OUTPATIENT)
Dept: LAB | Facility: MEDICAL CENTER | Age: 39
End: 2024-08-07
Payer: COMMERCIAL

## 2024-08-07 LAB
BASOPHILS # BLD AUTO: 0.3 % (ref 0–1.8)
BASOPHILS # BLD: 0.05 K/UL (ref 0–0.12)
EOSINOPHIL # BLD AUTO: 0.01 K/UL (ref 0–0.51)
EOSINOPHIL NFR BLD: 0.1 % (ref 0–6.9)
ERYTHROCYTE [DISTWIDTH] IN BLOOD BY AUTOMATED COUNT: 69 FL (ref 35.9–50)
HCT VFR BLD AUTO: 40.1 % (ref 42–52)
HGB BLD-MCNC: 13.2 G/DL (ref 14–18)
IMM GRANULOCYTES # BLD AUTO: 0.1 K/UL (ref 0–0.11)
IMM GRANULOCYTES NFR BLD AUTO: 0.7 % (ref 0–0.9)
LYMPHOCYTES # BLD AUTO: 0.89 K/UL (ref 1–4.8)
LYMPHOCYTES NFR BLD: 5.9 % (ref 22–41)
MCH RBC QN AUTO: 31.9 PG (ref 27–33)
MCHC RBC AUTO-ENTMCNC: 32.9 G/DL (ref 32.3–36.5)
MCV RBC AUTO: 96.9 FL (ref 81.4–97.8)
MONOCYTES # BLD AUTO: 0.97 K/UL (ref 0–0.85)
MONOCYTES NFR BLD AUTO: 6.5 % (ref 0–13.4)
NEUTROPHILS # BLD AUTO: 12.99 K/UL (ref 1.82–7.42)
NEUTROPHILS NFR BLD: 86.5 % (ref 44–72)
NRBC # BLD AUTO: 0.02 K/UL
NRBC BLD-RTO: 0.1 /100 WBC (ref 0–0.2)
PLATELET # BLD AUTO: 294 K/UL (ref 164–446)
PMV BLD AUTO: 9.6 FL (ref 9–12.9)
RBC # BLD AUTO: 4.14 M/UL (ref 4.7–6.1)
WBC # BLD AUTO: 15 K/UL (ref 4.8–10.8)

## 2024-08-07 PROCEDURE — 85025 COMPLETE CBC W/AUTO DIFF WBC: CPT

## 2024-08-07 PROCEDURE — 36415 COLL VENOUS BLD VENIPUNCTURE: CPT

## 2024-08-09 ENCOUNTER — HOSPITAL ENCOUNTER (OUTPATIENT)
Dept: LAB | Facility: MEDICAL CENTER | Age: 39
End: 2024-08-09
Payer: COMMERCIAL

## 2024-08-09 LAB
BASOPHILS # BLD AUTO: 0.4 % (ref 0–1.8)
BASOPHILS # BLD: 0.05 K/UL (ref 0–0.12)
CRP SERPL HS-MCNC: 8.47 MG/DL (ref 0–0.75)
EOSINOPHIL # BLD AUTO: 0.01 K/UL (ref 0–0.51)
EOSINOPHIL NFR BLD: 0.1 % (ref 0–6.9)
ERYTHROCYTE [DISTWIDTH] IN BLOOD BY AUTOMATED COUNT: 69 FL (ref 35.9–50)
ERYTHROCYTE [SEDIMENTATION RATE] IN BLOOD BY WESTERGREN METHOD: 76 MM/HOUR (ref 0–20)
HCT VFR BLD AUTO: 41.4 % (ref 42–52)
HGB BLD-MCNC: 13.1 G/DL (ref 14–18)
IMM GRANULOCYTES # BLD AUTO: 0.09 K/UL (ref 0–0.11)
IMM GRANULOCYTES NFR BLD AUTO: 0.7 % (ref 0–0.9)
LYMPHOCYTES # BLD AUTO: 1.12 K/UL (ref 1–4.8)
LYMPHOCYTES NFR BLD: 9.1 % (ref 22–41)
MCH RBC QN AUTO: 31.4 PG (ref 27–33)
MCHC RBC AUTO-ENTMCNC: 31.6 G/DL (ref 32.3–36.5)
MCV RBC AUTO: 99.3 FL (ref 81.4–97.8)
MONOCYTES # BLD AUTO: 0.68 K/UL (ref 0–0.85)
MONOCYTES NFR BLD AUTO: 5.5 % (ref 0–13.4)
NEUTROPHILS # BLD AUTO: 10.39 K/UL (ref 1.82–7.42)
NEUTROPHILS NFR BLD: 84.2 % (ref 44–72)
NRBC # BLD AUTO: 0 K/UL
NRBC BLD-RTO: 0 /100 WBC (ref 0–0.2)
PLATELET # BLD AUTO: 384 K/UL (ref 164–446)
PMV BLD AUTO: 9.4 FL (ref 9–12.9)
RBC # BLD AUTO: 4.17 M/UL (ref 4.7–6.1)
WBC # BLD AUTO: 12.3 K/UL (ref 4.8–10.8)

## 2024-08-09 PROCEDURE — 86140 C-REACTIVE PROTEIN: CPT

## 2024-08-09 PROCEDURE — 36415 COLL VENOUS BLD VENIPUNCTURE: CPT

## 2024-08-09 PROCEDURE — 85652 RBC SED RATE AUTOMATED: CPT

## 2024-08-09 PROCEDURE — 85025 COMPLETE CBC W/AUTO DIFF WBC: CPT

## 2024-08-10 ASSESSMENT — ENCOUNTER SYMPTOMS
SHORTNESS OF BREATH: 1
PALPITATIONS: 0
FEVER: 0
CHILLS: 0

## 2024-08-22 ENCOUNTER — OFFICE VISIT (OUTPATIENT)
Dept: BEHAVIORAL HEALTH | Facility: CLINIC | Age: 39
End: 2024-08-22
Payer: COMMERCIAL

## 2024-08-22 DIAGNOSIS — F41.9 ANXIETY: ICD-10-CM

## 2024-08-22 DIAGNOSIS — F10.20 ALCOHOL USE DISORDER, SEVERE, DEPENDENCE (HCC): ICD-10-CM

## 2024-08-22 PROBLEM — E87.20 LACTIC ACIDOSIS: Status: RESOLVED | Noted: 2024-04-22 | Resolved: 2024-08-22

## 2024-08-22 PROBLEM — I16.0 HYPERTENSIVE URGENCY: Status: RESOLVED | Noted: 2023-12-02 | Resolved: 2024-08-22

## 2024-08-22 PROBLEM — J96.01 ACUTE RESPIRATORY FAILURE WITH HYPOXIA (HCC): Status: RESOLVED | Noted: 2024-05-21 | Resolved: 2024-08-22

## 2024-08-22 PROBLEM — F10.931 ALCOHOL WITHDRAWAL SYNDROME, WITH DELIRIUM (HCC): Status: RESOLVED | Noted: 2024-05-21 | Resolved: 2024-08-22

## 2024-08-22 PROBLEM — E87.6 HYPOKALEMIA: Status: RESOLVED | Noted: 2023-12-02 | Resolved: 2024-08-22

## 2024-08-22 PROBLEM — E78.1 HYPERTRIGLYCERIDEMIA: Status: ACTIVE | Noted: 2024-08-22

## 2024-08-22 PROBLEM — E83.51 HYPOCALCEMIA: Status: RESOLVED | Noted: 2024-05-21 | Resolved: 2024-08-22

## 2024-08-22 PROBLEM — E87.0 HYPERNATREMIA: Status: RESOLVED | Noted: 2024-05-21 | Resolved: 2024-08-22

## 2024-08-22 PROBLEM — F10.931 ALCOHOL WITHDRAWAL DELIRIUM, ACUTE, HYPERACTIVE (HCC): Status: RESOLVED | Noted: 2024-05-21 | Resolved: 2024-08-22

## 2024-08-22 PROBLEM — Z09 HOSPITAL DISCHARGE FOLLOW-UP: Status: RESOLVED | Noted: 2023-11-06 | Resolved: 2024-08-22

## 2024-08-22 PROCEDURE — 99204 OFFICE O/P NEW MOD 45 MIN: CPT | Performed by: PSYCHIATRY & NEUROLOGY

## 2024-08-22 PROCEDURE — 96127 BRIEF EMOTIONAL/BEHAV ASSMT: CPT | Performed by: PSYCHIATRY & NEUROLOGY

## 2024-08-22 RX ORDER — NALTREXONE HYDROCHLORIDE 50 MG/1
50 TABLET, FILM COATED ORAL DAILY
Qty: 90 TABLET | Refills: 0 | Status: SHIPPED | OUTPATIENT
Start: 2024-08-22

## 2024-08-22 RX ORDER — FUROSEMIDE 40 MG
1 TABLET ORAL
COMMUNITY
Start: 2024-08-01

## 2024-08-22 RX ORDER — COLCHICINE 0.6 MG/1
TABLET ORAL
COMMUNITY
Start: 2024-08-16

## 2024-08-22 RX ORDER — POTASSIUM CHLORIDE 1500 MG/1
20 TABLET, EXTENDED RELEASE ORAL DAILY
COMMUNITY
Start: 2024-08-02

## 2024-08-22 ASSESSMENT — ANXIETY QUESTIONNAIRES
1. FEELING NERVOUS, ANXIOUS, OR ON EDGE: MORE THAN HALF THE DAYS
GAD7 TOTAL SCORE: 10
3. WORRYING TOO MUCH ABOUT DIFFERENT THINGS: SEVERAL DAYS
4. TROUBLE RELAXING: MORE THAN HALF THE DAYS
6. BECOMING EASILY ANNOYED OR IRRITABLE: SEVERAL DAYS
5. BEING SO RESTLESS THAT IT IS HARD TO SIT STILL: MORE THAN HALF THE DAYS
2. NOT BEING ABLE TO STOP OR CONTROL WORRYING: SEVERAL DAYS
7. FEELING AFRAID AS IF SOMETHING AWFUL MIGHT HAPPEN: SEVERAL DAYS
IF YOU CHECKED OFF ANY PROBLEMS ON THIS QUESTIONNAIRE, HOW DIFFICULT HAVE THESE PROBLEMS MADE IT FOR YOU TO DO YOUR WORK, TAKE CARE OF THINGS AT HOME, OR GET ALONG WITH OTHER PEOPLE: SOMEWHAT DIFFICULT

## 2024-08-22 ASSESSMENT — PATIENT HEALTH QUESTIONNAIRE - PHQ9
5. POOR APPETITE OR OVEREATING: 1 - SEVERAL DAYS
CLINICAL INTERPRETATION OF PHQ2 SCORE: 2
SUM OF ALL RESPONSES TO PHQ QUESTIONS 1-9: 10

## 2024-08-22 NOTE — PROGRESS NOTES
INITIAL PSYCHIATRY EVALUATION    Chief Complaint   Patient presents with    Alcohol Problem     History Of Present Illness:  Alberto Maldonado is a 38 y.o. male with history of alcohol addiction, GERD, hypertension, sleep apnea, prediabetes, gout referred by Gage Paredes P.A.-C for evaluation of depression, anxiety and alcohol addiction.  He was accompanied by his sister-in-law.  He has been struggling with heavy alcohol use for the last several years.  He got a DUI in May 2004 and is having several health issues related to alcohol and would like to get serious about his alcohol use.  He started drinking alcohol when he was in high school at the age of 18 and use became excessive and more regular after his father  in .  He has had 1 year long sobriety back in .  He has had multiple ER visits and hospitalizations for alcohol withdrawal.  He describes his alcohol withdrawal as feeling extremely anxious, chest discomfort, feeling restless, mild headaches and tremors which get resolved with drinking alcohol.  He mentions that he would drink alcohol before going to work and at work, compared his morning alcohol use to other people's caffeine use in the morning.  He enjoys drinking vodka and at the heaviest was drinking about 5-10 mini bottles of vodka on a daily basis.  He mentions that in the last few months he has cut down on his intake and is now drinking 3-4 bottles 1-2 times a week.  He has been going to AA meetings even though he is drinking alcohol.  He is also attending classes as instructed by court in regards to the DUI he got the summer.  He did have an AA sponsor but there was a disagreement and they are not working with each other anymore.  He has lost multiple jobs because of his alcohol addiction.  He is currently unemployed and wants to take care of his health before finding another job.  He endorses anxiety and depression primarily related to his alcohol.  He denies any prior  struggles with anxiety or depression or when he has been sober for a little bit.  He does enjoy playing sports bets at [employer] which adds to his drinking.  He saw Dr. Zaragoza in May and was prescribed naltrexone, he is not sure of the efficacy as he has not taken it consistently ever.  He denies symptoms consistent with bipolar mood disorder.  He denies having thoughts of wanting to hurt himself.    Current psychiatric medications - Naltrexone     Past Psychiatric History:  He denies history of suicide attempt or prior inpatient psychiatry hospitalization.  Previous medication trials - None    Current Safety/Relapse Assessment:       Suicidal: Low       Homicidal: Low       Self-Harm: Low       Relapse: Not applicable       Crisis Safety Plan: Not Indicated    Past Medical/Surgical History:  Past Medical History:   Diagnosis Date    Asthma without status asthmaticus 06/29/2017    Chickenpox     Hypertension      No past surgical history on file.    Family Psychiatric History:  Denies    Substance Use/Addiction History:  Alcohol - See HPI for details  Nicotine - Denies  Cannabis - Denies  Illicit drugs - Denies    Social History:  He is single, never , no kids, unemployed, lives with family (brother, sister-in-law, mother) in [place].    Allergies:  Patient has no known allergies.    Medications:  Current Outpatient Medications   Medication Sig Dispense Refill    furosemide (LASIX) 40 MG Tab Take 1 Tablet by mouth every day.      potassium chloride SA (KDUR) 20 MEQ Tab CR Take 20 mEq by mouth every day.      colchicine (COLCRYS) 0.6 MG Tab TAKE 1 TABLET BY MOUTH TWICE A DAY FOR ACUTE GOUT FLARE      albuterol 108 (90 Base) MCG/ACT Aero Soln inhalation aerosol Inhale 1 Puff 4 times a day.      naltrexone (DEPADE) 50 MG Tab Take 1 Tablet by mouth every day. 30 Tablet 0    losartan (COZAAR) 50 MG Tab Take 1 Tablet by mouth every day. 30 Tablet 2    allopurinol (ZYLOPRIM) 300 MG Tab Take 1 Tablet by mouth every  "day. 30 Tablet 1     No current facility-administered medications for this visit.     Review of Symptoms:  Constitutional - Positive for fatigue  Psychiatric - Positive for depression, alcohol use, anxiety    Physical Examination:  Vital signs: There were no vitals taken for this visit.    Musculoskeletal: Slow gait. No abnormal movements.     Mental Status Evaluation:   General: Young male, dressed in casual attire, good grooming and hygiene, in no apparent distress, calm and cooperative, good eye contact, no psychomotor agitation or retardation  Orientation: Alert and oriented to person, place and time  Recent and remote memory: Intact  Attention span and concentration: Intact  Speech: Spontaneous, normal rate, rhythm and tone  Thought Process: Linear, logical and goal directed  Thought Content: Denies suicidal or homicidal ideations, intent or plan  Perception: Denies auditory or visual hallucinations. No delusions noted  Associations: Intact  Language: Appropriate  Fund of knowledge and vocabulary: Adequate  Mood: \"alright\"  Affect: Euthymic, mood congruent  Insight: Partial  Judgment: Fair    Depression screenin/21/2024     9:30 PM 2024     2:28 PM 2024     2:00 PM   Depression Screen (PHQ-2/PHQ-9)   PHQ-2 Total Score 0     PHQ-2 Total Score  2 2   PHQ-9 Total Score  10 10     Interpretation of PHQ-9 Total Score   Score Severity   1-4 No Depression   5-9 Mild Depression   10-14 Moderate Depression   15-19 Moderately Severe Depression   20-27 Severe Depression    Anxiety screenin/30/2024     2:59 PM 2024     2:11 PM   FLORENTIN 7   FLORENTIN-7 Total Score 9 10     Interpretation of FLORENTIN 7 Total Score   Score Severity:  0-4 No Anxiety   5-9 Mild Anxiety  10-14 Moderate Anxiety  15-21 Severe Anxiety    Medical Records/Labs/Diagnostic Tests Reviewed:  [place] PDMP records - one Valium prescription in the last 2 years    Impression:  1.  Alcohol use disorder, severe   2.  Anxiety    Plan:  Discussed " his alcohol use and how alcohol affects both physical and mental health.  He has cut back on his alcohol use since he got a DUI in May 2024 but he does not have any goals in regards to sobriety.  It does not appear that he is ready to quit alcohol completely.  He was advised to get another AA sponsor and to work with AA philosophy more seriously.  Discussed medications for alcohol addiction including Naltrexone, Antabuse and Neurontin.  He was prescribed Naltrexone in May 2024 by Dr. Zaragoza but he has not taken it consistently.  He should notice benefit from consistently taking Naltrexone as he does have cravings for alcohol.  Discussed adding Neurontin as he is endorsing some nonspecific anxiety symptoms but he is hesitant and would like to improve compliance with Naltrexone first.  Continue Naltrexone 50 mg daily for alcohol use disorder.  He would like to monitor his anxiety without medications for now.    Return to clinic in 2 months or sooner if symptoms worsen    The proposed treatment plan was discussed with the patient who was provided the opportunity to ask questions and make suggestions regarding alternative treatment. Patient verbalized understanding and expressed agreement with the plan.     Total time spent on the day of encounter: 50 minutes.  Reviewing chart, talking to patient, discussing alcohol addiction, developing treatment plan and chart completion.    Thank you for allowing me to participate in the care of this patient.    Lashae Hagan M.D.  08/22/24    CC:   Gage Paredes P.A.-C.    This note was created using voice recognition software (Dragon). The accuracy of the dictation is limited by the abilities of the software. I have reviewed the note prior to signing, however some errors in grammar and context are still possible. If you have any questions related to this note please do not hesitate to contact our office.

## 2024-10-21 ENCOUNTER — TELEPHONE (OUTPATIENT)
Dept: SLEEP MEDICINE | Facility: MEDICAL CENTER | Age: 39
End: 2024-10-21
Payer: COMMERCIAL

## 2024-11-06 ENCOUNTER — HOSPITAL ENCOUNTER (OUTPATIENT)
Dept: LAB | Facility: MEDICAL CENTER | Age: 39
End: 2024-11-06
Attending: STUDENT IN AN ORGANIZED HEALTH CARE EDUCATION/TRAINING PROGRAM
Payer: COMMERCIAL

## 2024-11-06 LAB
ALBUMIN SERPL BCP-MCNC: 4.2 G/DL (ref 3.2–4.9)
ALBUMIN/GLOB SERPL: 1.2 G/DL
ALP SERPL-CCNC: 185 U/L (ref 30–99)
ALT SERPL-CCNC: 19 U/L (ref 2–50)
ANION GAP SERPL CALC-SCNC: 17 MMOL/L (ref 7–16)
ANISOCYTOSIS BLD QL SMEAR: ABNORMAL
AST SERPL-CCNC: 71 U/L (ref 12–45)
BASOPHILS # BLD AUTO: 1.4 % (ref 0–1.8)
BASOPHILS # BLD: 0.08 K/UL (ref 0–0.12)
BILIRUB SERPL-MCNC: 1.5 MG/DL (ref 0.1–1.5)
BUN SERPL-MCNC: 5 MG/DL (ref 8–22)
CALCIUM ALBUM COR SERPL-MCNC: 8.8 MG/DL (ref 8.5–10.5)
CALCIUM SERPL-MCNC: 9 MG/DL (ref 8.5–10.5)
CHLORIDE SERPL-SCNC: 98 MMOL/L (ref 96–112)
CHOLEST SERPL-MCNC: 165 MG/DL (ref 100–199)
CO2 SERPL-SCNC: 26 MMOL/L (ref 20–33)
COMMENT 1642: NORMAL
CREAT SERPL-MCNC: 0.5 MG/DL (ref 0.5–1.4)
CRP SERPL HS-MCNC: <0.3 MG/DL (ref 0–0.75)
EOSINOPHIL # BLD AUTO: 0.2 K/UL (ref 0–0.51)
EOSINOPHIL NFR BLD: 3.6 % (ref 0–6.9)
ERYTHROCYTE [DISTWIDTH] IN BLOOD BY AUTOMATED COUNT: 69.4 FL (ref 35.9–50)
ERYTHROCYTE [SEDIMENTATION RATE] IN BLOOD BY WESTERGREN METHOD: 10 MM/HOUR (ref 0–20)
EST. AVERAGE GLUCOSE BLD GHB EST-MCNC: 146 MG/DL
GFR SERPLBLD CREATININE-BSD FMLA CKD-EPI: 135 ML/MIN/1.73 M 2
GLOBULIN SER CALC-MCNC: 3.5 G/DL (ref 1.9–3.5)
GLUCOSE SERPL-MCNC: 164 MG/DL (ref 65–99)
HBA1C MFR BLD: 6.7 % (ref 4–5.6)
HCT VFR BLD AUTO: 41.5 % (ref 42–52)
HDLC SERPL-MCNC: ABNORMAL MG/DL
HGB BLD-MCNC: 14.2 G/DL (ref 14–18)
IMM GRANULOCYTES # BLD AUTO: 0.02 K/UL (ref 0–0.11)
IMM GRANULOCYTES NFR BLD AUTO: 0.4 % (ref 0–0.9)
LDLC SERPL CALC-MCNC: ABNORMAL MG/DL
LYMPHOCYTES # BLD AUTO: 1.83 K/UL (ref 1–4.8)
LYMPHOCYTES NFR BLD: 33.2 % (ref 22–41)
MCH RBC QN AUTO: 31.4 PG (ref 27–33)
MCHC RBC AUTO-ENTMCNC: 34.2 G/DL (ref 32.3–36.5)
MCV RBC AUTO: 91.8 FL (ref 81.4–97.8)
MICROCYTES BLD QL SMEAR: ABNORMAL
MONOCYTES # BLD AUTO: 0.4 K/UL (ref 0–0.85)
MONOCYTES NFR BLD AUTO: 7.2 % (ref 0–13.4)
MORPHOLOGY BLD-IMP: NORMAL
NEUTROPHILS # BLD AUTO: 2.99 K/UL (ref 1.82–7.42)
NEUTROPHILS NFR BLD: 54.2 % (ref 44–72)
NRBC # BLD AUTO: 0 K/UL
NRBC BLD-RTO: 0 /100 WBC (ref 0–0.2)
PLATELET # BLD AUTO: 283 K/UL (ref 164–446)
PLATELET BLD QL SMEAR: NORMAL
PMV BLD AUTO: 8.4 FL (ref 9–12.9)
POIKILOCYTOSIS BLD QL SMEAR: NORMAL
POTASSIUM SERPL-SCNC: 3.9 MMOL/L (ref 3.6–5.5)
PROT SERPL-MCNC: 7.7 G/DL (ref 6–8.2)
RBC # BLD AUTO: 4.52 M/UL (ref 4.7–6.1)
RBC BLD AUTO: PRESENT
SODIUM SERPL-SCNC: 141 MMOL/L (ref 135–145)
STOMATOCYTES BLD QL SMEAR: NORMAL
TRIGL SERPL-MCNC: 1219 MG/DL (ref 0–149)
TSH SERPL DL<=0.005 MIU/L-ACNC: 3.52 UIU/ML (ref 0.38–5.33)
URATE SERPL-MCNC: 10.8 MG/DL (ref 2.5–8.3)
WBC # BLD AUTO: 5.5 K/UL (ref 4.8–10.8)

## 2024-11-06 PROCEDURE — 83036 HEMOGLOBIN GLYCOSYLATED A1C: CPT

## 2024-11-06 PROCEDURE — 84443 ASSAY THYROID STIM HORMONE: CPT

## 2024-11-06 PROCEDURE — 85652 RBC SED RATE AUTOMATED: CPT

## 2024-11-06 PROCEDURE — 80061 LIPID PANEL: CPT

## 2024-11-06 PROCEDURE — 85025 COMPLETE CBC W/AUTO DIFF WBC: CPT

## 2024-11-06 PROCEDURE — 84550 ASSAY OF BLOOD/URIC ACID: CPT

## 2024-11-06 PROCEDURE — 80053 COMPREHEN METABOLIC PANEL: CPT

## 2024-11-06 PROCEDURE — 86140 C-REACTIVE PROTEIN: CPT

## 2024-11-06 PROCEDURE — 36415 COLL VENOUS BLD VENIPUNCTURE: CPT

## 2024-11-06 PROCEDURE — 83721 ASSAY OF BLOOD LIPOPROTEIN: CPT

## 2024-11-13 LAB — LDLC SERPL-MCNC: 46 MG/DL (ref 0–129)

## 2024-11-20 ENCOUNTER — OFFICE VISIT (OUTPATIENT)
Dept: URGENT CARE | Facility: CLINIC | Age: 39
End: 2024-11-20
Payer: COMMERCIAL

## 2024-11-20 VITALS
HEART RATE: 106 BPM | SYSTOLIC BLOOD PRESSURE: 172 MMHG | DIASTOLIC BLOOD PRESSURE: 100 MMHG | RESPIRATION RATE: 24 BRPM | OXYGEN SATURATION: 92 % | HEIGHT: 63 IN | BODY MASS INDEX: 46.78 KG/M2 | TEMPERATURE: 98.2 F | WEIGHT: 264 LBS

## 2024-11-20 DIAGNOSIS — H60.502 ACUTE OTITIS EXTERNA OF LEFT EAR, UNSPECIFIED TYPE: ICD-10-CM

## 2024-11-20 DIAGNOSIS — R03.0 ELEVATED BLOOD PRESSURE READING: ICD-10-CM

## 2024-11-20 DIAGNOSIS — R00.0 CHRONIC TACHYCARDIA: ICD-10-CM

## 2024-11-20 DIAGNOSIS — M10.9 ACUTE GOUT OF RIGHT FOOT, UNSPECIFIED CAUSE: ICD-10-CM

## 2024-11-20 PROCEDURE — 3080F DIAST BP >= 90 MM HG: CPT | Performed by: NURSE PRACTITIONER

## 2024-11-20 PROCEDURE — 99214 OFFICE O/P EST MOD 30 MIN: CPT | Performed by: NURSE PRACTITIONER

## 2024-11-20 PROCEDURE — 3077F SYST BP >= 140 MM HG: CPT | Performed by: NURSE PRACTITIONER

## 2024-11-20 RX ORDER — METOPROLOL SUCCINATE 25 MG/1
25 TABLET, EXTENDED RELEASE ORAL DAILY
COMMUNITY

## 2024-11-20 RX ORDER — PREDNISONE 10 MG/1
TABLET ORAL
Qty: 20 TABLET | Refills: 0 | Status: SHIPPED | OUTPATIENT
Start: 2024-11-20

## 2024-11-20 RX ORDER — OFLOXACIN 3 MG/ML
5 SOLUTION AURICULAR (OTIC) 2 TIMES DAILY
Qty: 14 ML | Refills: 0 | Status: SHIPPED | OUTPATIENT
Start: 2024-11-20 | End: 2024-11-27

## 2024-11-20 RX ORDER — DIAZEPAM 2 MG/1
2 TABLET ORAL EVERY 6 HOURS PRN
COMMUNITY

## 2024-11-20 ASSESSMENT — FIBROSIS 4 INDEX: FIB4 SCORE: 2.07

## 2024-11-20 NOTE — PROGRESS NOTES
Chief Complaint   Patient presents with    Gout     R foot. L ear pain       HISTORY OF PRESENT ILLNESS: Patient is a pleasant 38 y.o. male with a history of hypertension and gout who presents to urgent care today with concerns of right foot pain over the last 2 days.  Patient has a history of gout, this pain feels exactly similar.  Denies any injury or trauma.  Denies any fever, chills, malaise.  He does take allopurinol.  He has not tried a medication for symptom relief since onset.  Furthermore, reports left ear pain over the past several days.  Pain occurs when he is listening to music.    Patient Active Problem List    Diagnosis Date Noted    Hypertriglyceridemia 08/22/2024    Anxiety 08/22/2024    Transaminitis 05/21/2024    Intraabdominal mass 05/01/2024    Hyperglycemia 05/01/2024    BOLIVAR on CPAP 04/23/2024    Abnormal EKG 04/22/2024    Gallbladder dilatation 04/22/2024    Cardiomegaly 02/24/2024    Electrolyte abnormality 12/02/2023    GERD (gastroesophageal reflux disease) 12/02/2023    Gout 11/06/2023    Prediabetes 11/06/2023    Elevated LFTs 10/28/2023    Steatosis of liver 01/05/2022    BOLIVAR (obstructive sleep apnea) 01/05/2022    Morbid obesity (HCC) 01/05/2022    Allergic contact dermatitis due to adhesives 01/05/2022    Alcohol use disorder, severe, dependence (HCC) 01/05/2022    Essential hypertension 12/02/2021    Asthma 06/29/2017       Allergies:Patient has no known allergies.    Current Outpatient Medications Ordered in Epic   Medication Sig Dispense Refill    metoprolol SR (TOPROL XL) 25 MG TABLET SR 24 HR Take 25 mg by mouth every day.      diazePAM (VALIUM) 2 MG Tab Take 2 mg by mouth every 6 hours as needed for Anxiety.      predniSONE (DELTASONE) 10 MG Tab 40 mg x 2 days, then 30 mg x 2 days, then 20 mg x 2 days, then 10 mg x 2 days. Take with food. 20 Tablet 0    ofloxacin otic sol (FLOXIN OTIC) 0.3 % Solution Administer 5 Drops into affected ear(s) 2 times a day for 7 days. 14 mL 0     furosemide (LASIX) 40 MG Tab Take 1 Tablet by mouth every day.      colchicine (COLCRYS) 0.6 MG Tab TAKE 1 TABLET BY MOUTH TWICE A DAY FOR ACUTE GOUT FLARE      albuterol 108 (90 Base) MCG/ACT Aero Soln inhalation aerosol Inhale 1 Puff 4 times a day.      losartan (COZAAR) 50 MG Tab Take 1 Tablet by mouth every day. 30 Tablet 2    allopurinol (ZYLOPRIM) 300 MG Tab Take 1 Tablet by mouth every day. 30 Tablet 1     No current Epic-ordered facility-administered medications on file.       Past Medical History:   Diagnosis Date    Asthma without status asthmaticus 2017    Chickenpox     Hypertension        Social History     Tobacco Use    Smoking status: Never    Smokeless tobacco: Never   Vaping Use    Vaping status: Never Used   Substance Use Topics    Alcohol use: Yes     Alcohol/week: 7.2 oz     Types: 12 Shots of liquor per week     Comment: 3-4 vodka mini bottles 1-2 times/week    Drug use: Not Currently       Family Status   Relation Name Status    Mo  Alive    Fa      Bro older Alive    Bro older Alive    Neg Hx  (Not Specified)   No partnership data on file     Family History   Problem Relation Age of Onset    Stroke Neg Hx     Heart Disease Neg Hx        ROS:  Review of Systems   Constitutional: Negative for fever, chills, weight loss, malaise, and fatigue.   HENT: Positive for ear pain.  Negative for nosebleeds, congestion, sore throat and neck pain.    Eyes: Negative for vision changes.   Neuro: Negative for headache, sensory changes, weakness, seizure, LOC.   Cardiovascular: Negative for chest pain, palpitations, orthopnea and leg swelling.   Respiratory: Negative for cough, sputum production, shortness of breath and wheezing.   Gastrointestinal: Negative for abdominal pain, nausea, vomiting or diarrhea.   Genitourinary: Negative for dysuria, urgency and frequency.  Musculoskeletal: Positive for right foot pain.  Negative for falls, neck pain, back pain, myalgias.   Skin: Negative for rash,  "diaphoresis.     Exam:  BP (!) 172/100   Pulse (!) 106   Temp 36.8 °C (98.2 °F) (Temporal)   Resp (!) 24   Ht 1.6 m (5' 3\")   Wt 120 kg (264 lb)   SpO2 92%   General: well-nourished, well-developed male in NAD  Head: normocephalic, atraumatic  Eyes: PERRLA, no conjunctival injection, acuity grossly intact, lids normal.  Ears: normal shape and symmetry, no tenderness, no discharge.  Right external canal is without any significant edema or erythema.  Left external canal is edematous and erythematous.  Tympanic membranes are without any inflammation, no effusion. Gross auditory acuity is intact.  Nose: symmetrical without tenderness, no discharge.  Mouth/Throat: reasonable hygiene, no erythema, exudates or tonsillar enlargement.  Neck: no masses, range of motion within normal limits, no tracheal deviation. No obvious thyroid enlargement.   Lymph: no cervical adenopathy. No supraclavicular adenopathy.   Neuro: alert and oriented. Cranial nerves 1-12 grossly intact. No sensory deficit.   Cardiovascular: Tachycardic rate and regular rhythm. No edema.  Pulmonary: no distress. Chest is symmetrical with respiration, no wheezes, crackles, or rhonchi.   Musculoskeletal: no clubbing, appropriate muscle tone, patient in wheelchair.  Right foot: There is diffuse swelling and tenderness over the lateral and distal aspect of foot, patient states he typically gets gout in this region.  Skin: warm, dry, intact, no clubbing, no cyanosis, no rashes.   Psych: appropriate mood, affect, judgement.         Assessment/Plan:  1. Acute gout of right foot, unspecified cause  predniSONE (DELTASONE) 10 MG Tab      2. Acute otitis externa of left ear, unspecified type  ofloxacin otic sol (FLOXIN OTIC) 0.3 % Solution      3. Elevated blood pressure reading        4. Chronic tachycardia                Patient presents with gout flare to right foot.  Prednisone as directed.  Instructed to rest and elevate foot.  Regarding left ear pain, " presentation consistent with otitis externa.  Ofloxacin as directed.  Patient is found to be tachycardic with elevated blood pressure reading today.  Upon review of his chart, patient is normally tachycardic.  Patient states he has yet to take his blood pressure medication today, states he will follow-up with his PCP regarding.  Supportive care, differential diagnoses, and indications for immediate follow-up discussed with patient.   Pathogenesis of diagnosis discussed including typical length and natural progression.   Instructed to return to clinic or nearest emergency department for any change in condition, further concerns, or worsening of symptoms.  Patient states understanding of the plan of care and discharge instructions.  Instructed to make an appointment, for follow up, with his primary care provider.        Please note that this dictation was created using voice recognition software. I have made every reasonable attempt to correct obvious errors, but I expect that there are errors of grammar and possibly content that I did not discover before finalizing the note.      JORDAN Krishna.

## 2024-12-20 ENCOUNTER — HOSPITAL ENCOUNTER (INPATIENT)
Facility: MEDICAL CENTER | Age: 39
LOS: 2 days | DRG: 896 | End: 2024-12-23
Attending: EMERGENCY MEDICINE | Admitting: STUDENT IN AN ORGANIZED HEALTH CARE EDUCATION/TRAINING PROGRAM
Payer: COMMERCIAL

## 2024-12-20 ENCOUNTER — APPOINTMENT (OUTPATIENT)
Dept: RADIOLOGY | Facility: MEDICAL CENTER | Age: 39
DRG: 896 | End: 2024-12-20
Attending: EMERGENCY MEDICINE
Payer: COMMERCIAL

## 2024-12-20 DIAGNOSIS — F10.930 ALCOHOL WITHDRAWAL, UNCOMPLICATED (HCC): ICD-10-CM

## 2024-12-20 DIAGNOSIS — E66.01 MORBID OBESITY (HCC): Chronic | ICD-10-CM

## 2024-12-20 DIAGNOSIS — J02.0 ACUTE STREPTOCOCCAL PHARYNGITIS: ICD-10-CM

## 2024-12-20 DIAGNOSIS — K21.9 GASTROESOPHAGEAL REFLUX DISEASE WITHOUT ESOPHAGITIS: Chronic | ICD-10-CM

## 2024-12-20 DIAGNOSIS — E78.1 HYPERTRIGLYCERIDEMIA: ICD-10-CM

## 2024-12-20 DIAGNOSIS — F10.930 ALCOHOL WITHDRAWAL SYNDROME WITHOUT COMPLICATION (HCC): ICD-10-CM

## 2024-12-20 DIAGNOSIS — R73.03 PREDIABETES: ICD-10-CM

## 2024-12-20 DIAGNOSIS — J45.20 MILD INTERMITTENT ASTHMA WITHOUT COMPLICATION: ICD-10-CM

## 2024-12-20 DIAGNOSIS — G47.33 OSA (OBSTRUCTIVE SLEEP APNEA): ICD-10-CM

## 2024-12-20 DIAGNOSIS — N17.9 ACUTE RENAL FAILURE, UNSPECIFIED ACUTE RENAL FAILURE TYPE (HCC): ICD-10-CM

## 2024-12-20 DIAGNOSIS — K76.0 STEATOSIS OF LIVER: Chronic | ICD-10-CM

## 2024-12-20 DIAGNOSIS — G47.33 OSA ON CPAP: Chronic | ICD-10-CM

## 2024-12-20 DIAGNOSIS — R73.9 HYPERGLYCEMIA: ICD-10-CM

## 2024-12-20 DIAGNOSIS — L23.1 ALLERGIC CONTACT DERMATITIS DUE TO ADHESIVES: ICD-10-CM

## 2024-12-20 DIAGNOSIS — R74.01 TRANSAMINITIS: ICD-10-CM

## 2024-12-20 DIAGNOSIS — E87.6 HYPOKALEMIA: ICD-10-CM

## 2024-12-20 DIAGNOSIS — I51.7 CARDIOMEGALY: ICD-10-CM

## 2024-12-20 DIAGNOSIS — F10.20 ALCOHOL USE DISORDER, SEVERE, DEPENDENCE (HCC): ICD-10-CM

## 2024-12-20 DIAGNOSIS — F41.9 ANXIETY: ICD-10-CM

## 2024-12-20 DIAGNOSIS — I10 ESSENTIAL HYPERTENSION: Chronic | ICD-10-CM

## 2024-12-20 DIAGNOSIS — E83.42 HYPOMAGNESEMIA: ICD-10-CM

## 2024-12-20 DIAGNOSIS — K82.8 GALLBLADDER DILATATION: ICD-10-CM

## 2024-12-20 DIAGNOSIS — E83.51 HYPOCALCEMIA: ICD-10-CM

## 2024-12-20 DIAGNOSIS — R79.89 ELEVATED LFTS: Chronic | ICD-10-CM

## 2024-12-20 DIAGNOSIS — M10.9 GOUT, UNSPECIFIED CAUSE, UNSPECIFIED CHRONICITY, UNSPECIFIED SITE: Chronic | ICD-10-CM

## 2024-12-20 DIAGNOSIS — R19.00 INTRAABDOMINAL MASS: ICD-10-CM

## 2024-12-20 DIAGNOSIS — E87.8 ELECTROLYTE ABNORMALITY: ICD-10-CM

## 2024-12-20 DIAGNOSIS — R94.31 ABNORMAL EKG: ICD-10-CM

## 2024-12-20 LAB
ALBUMIN SERPL BCP-MCNC: 3.9 G/DL (ref 3.2–4.9)
ALBUMIN/GLOB SERPL: 1.1 G/DL
ALP SERPL-CCNC: 196 U/L (ref 30–99)
ALT SERPL-CCNC: 30 U/L (ref 2–50)
ANION GAP SERPL CALC-SCNC: 17 MMOL/L (ref 7–16)
AST SERPL-CCNC: 76 U/L (ref 12–45)
BASOPHILS # BLD AUTO: 0.5 % (ref 0–1.8)
BASOPHILS # BLD: 0.06 K/UL (ref 0–0.12)
BILIRUB SERPL-MCNC: 1.7 MG/DL (ref 0.1–1.5)
BUN SERPL-MCNC: 3 MG/DL (ref 8–22)
CALCIUM ALBUM COR SERPL-MCNC: 7.8 MG/DL (ref 8.5–10.5)
CALCIUM SERPL-MCNC: 7.7 MG/DL (ref 8.5–10.5)
CHLORIDE SERPL-SCNC: 99 MMOL/L (ref 96–112)
CO2 SERPL-SCNC: 24 MMOL/L (ref 20–33)
CREAT SERPL-MCNC: 0.72 MG/DL (ref 0.5–1.4)
EOSINOPHIL # BLD AUTO: 0.18 K/UL (ref 0–0.51)
EOSINOPHIL NFR BLD: 1.6 % (ref 0–6.9)
ERYTHROCYTE [DISTWIDTH] IN BLOOD BY AUTOMATED COUNT: 63 FL (ref 35.9–50)
ETHANOL BLD-MCNC: <10.1 MG/DL
FLUAV RNA SPEC QL NAA+PROBE: NEGATIVE
FLUBV RNA SPEC QL NAA+PROBE: NEGATIVE
GFR SERPLBLD CREATININE-BSD FMLA CKD-EPI: 121 ML/MIN/1.73 M 2
GLOBULIN SER CALC-MCNC: 3.6 G/DL (ref 1.9–3.5)
GLUCOSE SERPL-MCNC: 138 MG/DL (ref 65–99)
HCT VFR BLD AUTO: 42.7 % (ref 42–52)
HGB BLD-MCNC: 14.4 G/DL (ref 14–18)
IMM GRANULOCYTES # BLD AUTO: 0.04 K/UL (ref 0–0.11)
IMM GRANULOCYTES NFR BLD AUTO: 0.4 % (ref 0–0.9)
LIPASE SERPL-CCNC: 35 U/L (ref 11–82)
LYMPHOCYTES # BLD AUTO: 0.95 K/UL (ref 1–4.8)
LYMPHOCYTES NFR BLD: 8.5 % (ref 22–41)
MCH RBC QN AUTO: 30.9 PG (ref 27–33)
MCHC RBC AUTO-ENTMCNC: 33.7 G/DL (ref 32.3–36.5)
MCV RBC AUTO: 91.6 FL (ref 81.4–97.8)
MONOCYTES # BLD AUTO: 0.81 K/UL (ref 0–0.85)
MONOCYTES NFR BLD AUTO: 7.3 % (ref 0–13.4)
NEUTROPHILS # BLD AUTO: 9.08 K/UL (ref 1.82–7.42)
NEUTROPHILS NFR BLD: 81.7 % (ref 44–72)
NRBC # BLD AUTO: 0.03 K/UL
NRBC BLD-RTO: 0.3 /100 WBC (ref 0–0.2)
PLATELET # BLD AUTO: 247 K/UL (ref 164–446)
PMV BLD AUTO: 8.4 FL (ref 9–12.9)
POTASSIUM SERPL-SCNC: 2.9 MMOL/L (ref 3.6–5.5)
PROT SERPL-MCNC: 7.5 G/DL (ref 6–8.2)
RBC # BLD AUTO: 4.66 M/UL (ref 4.7–6.1)
RSV RNA SPEC QL NAA+PROBE: NEGATIVE
SARS-COV-2 RNA RESP QL NAA+PROBE: NOTDETECTED
SODIUM SERPL-SCNC: 140 MMOL/L (ref 135–145)
WBC # BLD AUTO: 11.1 K/UL (ref 4.8–10.8)

## 2024-12-20 PROCEDURE — 80053 COMPREHEN METABOLIC PANEL: CPT

## 2024-12-20 PROCEDURE — 96375 TX/PRO/DX INJ NEW DRUG ADDON: CPT

## 2024-12-20 PROCEDURE — 99285 EMERGENCY DEPT VISIT HI MDM: CPT

## 2024-12-20 PROCEDURE — 85025 COMPLETE CBC W/AUTO DIFF WBC: CPT

## 2024-12-20 PROCEDURE — 0241U HCHG SARS-COV-2 COVID-19 NFCT DS RESP RNA 4 TRGT ED POC: CPT

## 2024-12-20 PROCEDURE — 700111 HCHG RX REV CODE 636 W/ 250 OVERRIDE (IP): Mod: UD | Performed by: EMERGENCY MEDICINE

## 2024-12-20 PROCEDURE — 36415 COLL VENOUS BLD VENIPUNCTURE: CPT

## 2024-12-20 PROCEDURE — 83690 ASSAY OF LIPASE: CPT

## 2024-12-20 PROCEDURE — 82077 ASSAY SPEC XCP UR&BREATH IA: CPT

## 2024-12-20 PROCEDURE — 71045 X-RAY EXAM CHEST 1 VIEW: CPT

## 2024-12-20 PROCEDURE — 83735 ASSAY OF MAGNESIUM: CPT

## 2024-12-20 RX ORDER — POTASSIUM CHLORIDE 7.45 MG/ML
10 INJECTION INTRAVENOUS ONCE
Status: COMPLETED | OUTPATIENT
Start: 2024-12-20 | End: 2024-12-21

## 2024-12-20 RX ORDER — POTASSIUM CHLORIDE 1500 MG/1
40 TABLET, EXTENDED RELEASE ORAL ONCE
Status: COMPLETED | OUTPATIENT
Start: 2024-12-20 | End: 2024-12-21

## 2024-12-20 RX ORDER — LORAZEPAM 2 MG/ML
1 INJECTION INTRAMUSCULAR ONCE
Status: COMPLETED | OUTPATIENT
Start: 2024-12-20 | End: 2024-12-20

## 2024-12-20 RX ADMIN — LORAZEPAM 1 MG: 2 INJECTION INTRAMUSCULAR; INTRAVENOUS at 22:57

## 2024-12-20 ASSESSMENT — PAIN DESCRIPTION - PAIN TYPE: TYPE: ACUTE PAIN

## 2024-12-20 ASSESSMENT — FIBROSIS 4 INDEX: FIB4 SCORE: 2.13

## 2024-12-21 PROBLEM — F10.930 ALCOHOL WITHDRAWAL, UNCOMPLICATED (HCC): Status: ACTIVE | Noted: 2024-12-21

## 2024-12-21 PROBLEM — J06.9 URI (UPPER RESPIRATORY INFECTION): Status: ACTIVE | Noted: 2024-12-21

## 2024-12-21 LAB
ANISOCYTOSIS BLD QL SMEAR: ABNORMAL
BASOPHILS # BLD AUTO: 0.4 % (ref 0–1.8)
BASOPHILS # BLD: 0.04 K/UL (ref 0–0.12)
COMMENT 1642: NORMAL
EKG IMPRESSION: NORMAL
EOSINOPHIL # BLD AUTO: 0.33 K/UL (ref 0–0.51)
EOSINOPHIL NFR BLD: 3.4 % (ref 0–6.9)
ERYTHROCYTE [DISTWIDTH] IN BLOOD BY AUTOMATED COUNT: 66.5 FL (ref 35.9–50)
HCT VFR BLD AUTO: 41.5 % (ref 42–52)
HGB BLD-MCNC: 13.9 G/DL (ref 14–18)
IMM GRANULOCYTES # BLD AUTO: 0.03 K/UL (ref 0–0.11)
IMM GRANULOCYTES NFR BLD AUTO: 0.3 % (ref 0–0.9)
LYMPHOCYTES # BLD AUTO: 0.77 K/UL (ref 1–4.8)
LYMPHOCYTES NFR BLD: 8 % (ref 22–41)
MAGNESIUM SERPL-MCNC: 0.9 MG/DL (ref 1.5–2.5)
MCH RBC QN AUTO: 31.8 PG (ref 27–33)
MCHC RBC AUTO-ENTMCNC: 33.5 G/DL (ref 32.3–36.5)
MCV RBC AUTO: 95 FL (ref 81.4–97.8)
MICROCYTES BLD QL SMEAR: ABNORMAL
MONOCYTES # BLD AUTO: 0.44 K/UL (ref 0–0.85)
MONOCYTES NFR BLD AUTO: 4.6 % (ref 0–13.4)
MORPHOLOGY BLD-IMP: NORMAL
NEUTROPHILS # BLD AUTO: 7.99 K/UL (ref 1.82–7.42)
NEUTROPHILS NFR BLD: 83.3 % (ref 44–72)
NRBC # BLD AUTO: 0.03 K/UL
NRBC BLD-RTO: 0.3 /100 WBC (ref 0–0.2)
PLATELET # BLD AUTO: 247 K/UL (ref 164–446)
PLATELET BLD QL SMEAR: NORMAL
PMV BLD AUTO: 8.6 FL (ref 9–12.9)
RBC # BLD AUTO: 4.37 M/UL (ref 4.7–6.1)
RBC BLD AUTO: PRESENT
S PYO DNA SPEC NAA+PROBE: DETECTED
WBC # BLD AUTO: 9.6 K/UL (ref 4.8–10.8)

## 2024-12-21 PROCEDURE — 99291 CRITICAL CARE FIRST HOUR: CPT | Performed by: STUDENT IN AN ORGANIZED HEALTH CARE EDUCATION/TRAINING PROGRAM

## 2024-12-21 PROCEDURE — 36415 COLL VENOUS BLD VENIPUNCTURE: CPT

## 2024-12-21 PROCEDURE — 96366 THER/PROPH/DIAG IV INF ADDON: CPT

## 2024-12-21 PROCEDURE — 700102 HCHG RX REV CODE 250 W/ 637 OVERRIDE(OP): Performed by: EMERGENCY MEDICINE

## 2024-12-21 PROCEDURE — 93005 ELECTROCARDIOGRAM TRACING: CPT | Mod: TC | Performed by: STUDENT IN AN ORGANIZED HEALTH CARE EDUCATION/TRAINING PROGRAM

## 2024-12-21 PROCEDURE — 700111 HCHG RX REV CODE 636 W/ 250 OVERRIDE (IP): Mod: JZ | Performed by: EMERGENCY MEDICINE

## 2024-12-21 PROCEDURE — A9270 NON-COVERED ITEM OR SERVICE: HCPCS | Performed by: EMERGENCY MEDICINE

## 2024-12-21 PROCEDURE — 700105 HCHG RX REV CODE 258: Performed by: STUDENT IN AN ORGANIZED HEALTH CARE EDUCATION/TRAINING PROGRAM

## 2024-12-21 PROCEDURE — 85025 COMPLETE CBC W/AUTO DIFF WBC: CPT

## 2024-12-21 PROCEDURE — 96365 THER/PROPH/DIAG IV INF INIT: CPT

## 2024-12-21 PROCEDURE — 96375 TX/PRO/DX INJ NEW DRUG ADDON: CPT

## 2024-12-21 PROCEDURE — 770020 HCHG ROOM/CARE - TELE (206)

## 2024-12-21 PROCEDURE — 96368 THER/DIAG CONCURRENT INF: CPT

## 2024-12-21 PROCEDURE — 700111 HCHG RX REV CODE 636 W/ 250 OVERRIDE (IP): Performed by: STUDENT IN AN ORGANIZED HEALTH CARE EDUCATION/TRAINING PROGRAM

## 2024-12-21 PROCEDURE — A9270 NON-COVERED ITEM OR SERVICE: HCPCS | Performed by: STUDENT IN AN ORGANIZED HEALTH CARE EDUCATION/TRAINING PROGRAM

## 2024-12-21 PROCEDURE — 96367 TX/PROPH/DG ADDL SEQ IV INF: CPT

## 2024-12-21 PROCEDURE — 96372 THER/PROPH/DIAG INJ SC/IM: CPT

## 2024-12-21 PROCEDURE — 700102 HCHG RX REV CODE 250 W/ 637 OVERRIDE(OP): Performed by: STUDENT IN AN ORGANIZED HEALTH CARE EDUCATION/TRAINING PROGRAM

## 2024-12-21 PROCEDURE — 87651 STREP A DNA AMP PROBE: CPT

## 2024-12-21 RX ORDER — HYDROMORPHONE HYDROCHLORIDE 1 MG/ML
0.25 INJECTION, SOLUTION INTRAMUSCULAR; INTRAVENOUS; SUBCUTANEOUS
Status: DISCONTINUED | OUTPATIENT
Start: 2024-12-21 | End: 2024-12-22

## 2024-12-21 RX ORDER — MAGNESIUM SULFATE HEPTAHYDRATE 40 MG/ML
2 INJECTION, SOLUTION INTRAVENOUS ONCE
Status: COMPLETED | OUTPATIENT
Start: 2024-12-21 | End: 2024-12-21

## 2024-12-21 RX ORDER — LORAZEPAM 1 MG/1
0.5 TABLET ORAL EVERY 4 HOURS PRN
Status: DISCONTINUED | OUTPATIENT
Start: 2024-12-21 | End: 2024-12-22

## 2024-12-21 RX ORDER — LORAZEPAM 2 MG/1
2 TABLET ORAL
Status: DISCONTINUED | OUTPATIENT
Start: 2024-12-21 | End: 2024-12-22

## 2024-12-21 RX ORDER — ENOXAPARIN SODIUM 100 MG/ML
40 INJECTION SUBCUTANEOUS DAILY
Status: DISCONTINUED | OUTPATIENT
Start: 2024-12-21 | End: 2024-12-22

## 2024-12-21 RX ORDER — OXYCODONE HYDROCHLORIDE 5 MG/1
2.5 TABLET ORAL
Status: DISCONTINUED | OUTPATIENT
Start: 2024-12-21 | End: 2024-12-22

## 2024-12-21 RX ORDER — METOPROLOL TARTRATE 25 MG/1
50 TABLET, FILM COATED ORAL 2 TIMES DAILY
Status: DISCONTINUED | OUTPATIENT
Start: 2024-12-21 | End: 2024-12-23 | Stop reason: HOSPADM

## 2024-12-21 RX ORDER — ONDANSETRON 4 MG/1
4 TABLET, ORALLY DISINTEGRATING ORAL EVERY 4 HOURS PRN
Status: DISCONTINUED | OUTPATIENT
Start: 2024-12-21 | End: 2024-12-23 | Stop reason: HOSPADM

## 2024-12-21 RX ORDER — LOSARTAN POTASSIUM 50 MG/1
50 TABLET ORAL DAILY
Status: DISCONTINUED | OUTPATIENT
Start: 2024-12-21 | End: 2024-12-23 | Stop reason: HOSPADM

## 2024-12-21 RX ORDER — OXYCODONE HYDROCHLORIDE 5 MG/1
5 TABLET ORAL
Status: DISCONTINUED | OUTPATIENT
Start: 2024-12-21 | End: 2024-12-22

## 2024-12-21 RX ORDER — LORAZEPAM 1 MG/1
1 TABLET ORAL EVERY 4 HOURS PRN
Status: DISCONTINUED | OUTPATIENT
Start: 2024-12-21 | End: 2024-12-22

## 2024-12-21 RX ORDER — AZITHROMYCIN 250 MG/1
250 TABLET, FILM COATED ORAL DAILY
Status: ON HOLD | COMMUNITY
End: 2024-12-22

## 2024-12-21 RX ORDER — THIAMINE HYDROCHLORIDE 100 MG/ML
100 INJECTION, SOLUTION INTRAMUSCULAR; INTRAVENOUS DAILY
Status: COMPLETED | OUTPATIENT
Start: 2024-12-21 | End: 2024-12-23

## 2024-12-21 RX ORDER — METOPROLOL TARTRATE 50 MG
50 TABLET ORAL 2 TIMES DAILY
COMMUNITY

## 2024-12-21 RX ORDER — LORAZEPAM 2 MG/ML
1.5 INJECTION INTRAMUSCULAR
Status: DISCONTINUED | OUTPATIENT
Start: 2024-12-21 | End: 2024-12-22

## 2024-12-21 RX ORDER — BENZONATATE 100 MG/1
200 CAPSULE ORAL 3 TIMES DAILY PRN
COMMUNITY

## 2024-12-21 RX ORDER — AMLODIPINE BESYLATE 10 MG/1
10 TABLET ORAL DAILY
COMMUNITY

## 2024-12-21 RX ORDER — GAUZE BANDAGE 2" X 2"
100 BANDAGE TOPICAL DAILY
Status: DISCONTINUED | OUTPATIENT
Start: 2024-12-21 | End: 2024-12-21

## 2024-12-21 RX ORDER — LORAZEPAM 2 MG/1
4 TABLET ORAL
Status: DISCONTINUED | OUTPATIENT
Start: 2024-12-21 | End: 2024-12-22

## 2024-12-21 RX ORDER — MAGNESIUM SULFATE HEPTAHYDRATE 40 MG/ML
4 INJECTION, SOLUTION INTRAVENOUS ONCE
Status: COMPLETED | OUTPATIENT
Start: 2024-12-21 | End: 2024-12-21

## 2024-12-21 RX ORDER — PROMETHAZINE HYDROCHLORIDE 25 MG/1
12.5-25 SUPPOSITORY RECTAL EVERY 4 HOURS PRN
Status: DISCONTINUED | OUTPATIENT
Start: 2024-12-21 | End: 2024-12-23 | Stop reason: HOSPADM

## 2024-12-21 RX ORDER — LORAZEPAM 2 MG/ML
0.5 INJECTION INTRAMUSCULAR EVERY 4 HOURS PRN
Status: DISCONTINUED | OUTPATIENT
Start: 2024-12-21 | End: 2024-12-22

## 2024-12-21 RX ORDER — SODIUM CHLORIDE 9 MG/ML
INJECTION, SOLUTION INTRAVENOUS CONTINUOUS
Status: DISCONTINUED | OUTPATIENT
Start: 2024-12-21 | End: 2024-12-22

## 2024-12-21 RX ORDER — LABETALOL HYDROCHLORIDE 5 MG/ML
10 INJECTION, SOLUTION INTRAVENOUS EVERY 4 HOURS PRN
Status: DISCONTINUED | OUTPATIENT
Start: 2024-12-21 | End: 2024-12-22

## 2024-12-21 RX ORDER — LORAZEPAM 2 MG/ML
2 INJECTION INTRAMUSCULAR
Status: DISCONTINUED | OUTPATIENT
Start: 2024-12-21 | End: 2024-12-22

## 2024-12-21 RX ORDER — PROMETHAZINE HYDROCHLORIDE 25 MG/1
12.5-25 TABLET ORAL EVERY 4 HOURS PRN
Status: DISCONTINUED | OUTPATIENT
Start: 2024-12-21 | End: 2024-12-23 | Stop reason: HOSPADM

## 2024-12-21 RX ORDER — ONDANSETRON 2 MG/ML
4 INJECTION INTRAMUSCULAR; INTRAVENOUS EVERY 4 HOURS PRN
Status: DISCONTINUED | OUTPATIENT
Start: 2024-12-21 | End: 2024-12-23 | Stop reason: HOSPADM

## 2024-12-21 RX ORDER — PROCHLORPERAZINE EDISYLATE 5 MG/ML
5-10 INJECTION INTRAMUSCULAR; INTRAVENOUS EVERY 4 HOURS PRN
Status: DISCONTINUED | OUTPATIENT
Start: 2024-12-21 | End: 2024-12-22

## 2024-12-21 RX ORDER — FOLIC ACID 1 MG/1
1 TABLET ORAL DAILY
Status: DISCONTINUED | OUTPATIENT
Start: 2024-12-21 | End: 2024-12-23 | Stop reason: HOSPADM

## 2024-12-21 RX ORDER — LORAZEPAM 2 MG/ML
1 INJECTION INTRAMUSCULAR
Status: DISCONTINUED | OUTPATIENT
Start: 2024-12-21 | End: 2024-12-22

## 2024-12-21 RX ORDER — POTASSIUM CHLORIDE 7.45 MG/ML
10 INJECTION INTRAVENOUS
Status: COMPLETED | OUTPATIENT
Start: 2024-12-21 | End: 2024-12-21

## 2024-12-21 RX ADMIN — FOLIC ACID 1 MG: 1 TABLET ORAL at 05:58

## 2024-12-21 RX ADMIN — MAGNESIUM SULFATE HEPTAHYDRATE 4 G: 4 INJECTION, SOLUTION INTRAVENOUS at 02:03

## 2024-12-21 RX ADMIN — BENZOCAINE AND MENTHOL 1 LOZENGE: 15; 3.6 LOZENGE ORAL at 22:26

## 2024-12-21 RX ADMIN — ENOXAPARIN SODIUM 40 MG: 100 INJECTION SUBCUTANEOUS at 18:18

## 2024-12-21 RX ADMIN — SODIUM CHLORIDE: 9 INJECTION, SOLUTION INTRAVENOUS at 00:53

## 2024-12-21 RX ADMIN — AMPICILLIN AND SULBACTAM 3 G: 1; 2 INJECTION, POWDER, FOR SOLUTION INTRAMUSCULAR; INTRAVENOUS at 04:03

## 2024-12-21 RX ADMIN — METOPROLOL TARTRATE 50 MG: 25 TABLET, FILM COATED ORAL at 18:18

## 2024-12-21 RX ADMIN — BENZOCAINE AND MENTHOL 1 LOZENGE: 15; 3.6 LOZENGE ORAL at 02:39

## 2024-12-21 RX ADMIN — POTASSIUM CHLORIDE 10 MEQ: 7.45 INJECTION INTRAVENOUS at 02:39

## 2024-12-21 RX ADMIN — ENOXAPARIN SODIUM 40 MG: 100 INJECTION SUBCUTANEOUS at 02:39

## 2024-12-21 RX ADMIN — THIAMINE HYDROCHLORIDE 100 MG: 100 INJECTION, SOLUTION INTRAMUSCULAR; INTRAVENOUS at 05:59

## 2024-12-21 RX ADMIN — AMPICILLIN AND SULBACTAM 3 G: 1; 2 INJECTION, POWDER, FOR SOLUTION INTRAMUSCULAR; INTRAVENOUS at 12:43

## 2024-12-21 RX ADMIN — POTASSIUM CHLORIDE 10 MEQ: 7.45 INJECTION INTRAVENOUS at 04:13

## 2024-12-21 RX ADMIN — POTASSIUM CHLORIDE 40 MEQ: 1500 TABLET, EXTENDED RELEASE ORAL at 00:49

## 2024-12-21 RX ADMIN — SODIUM CHLORIDE: 9 INJECTION, SOLUTION INTRAVENOUS at 12:04

## 2024-12-21 RX ADMIN — POTASSIUM CHLORIDE 10 MEQ: 7.45 INJECTION INTRAVENOUS at 00:50

## 2024-12-21 RX ADMIN — AMPICILLIN AND SULBACTAM 3 G: 1; 2 INJECTION, POWDER, FOR SOLUTION INTRAMUSCULAR; INTRAVENOUS at 18:19

## 2024-12-21 RX ADMIN — METOPROLOL TARTRATE 50 MG: 25 TABLET, FILM COATED ORAL at 05:58

## 2024-12-21 RX ADMIN — AMPICILLIN AND SULBACTAM 3 G: 1; 2 INJECTION, POWDER, FOR SOLUTION INTRAMUSCULAR; INTRAVENOUS at 23:18

## 2024-12-21 RX ADMIN — MAGNESIUM SULFATE HEPTAHYDRATE 2 G: 2 INJECTION, SOLUTION INTRAVENOUS at 05:59

## 2024-12-21 SDOH — ECONOMIC STABILITY: TRANSPORTATION INSECURITY
IN THE PAST 12 MONTHS, HAS THE LACK OF TRANSPORTATION KEPT YOU FROM MEDICAL APPOINTMENTS OR FROM GETTING MEDICATIONS?: NO

## 2024-12-21 SDOH — ECONOMIC STABILITY: TRANSPORTATION INSECURITY
IN THE PAST 12 MONTHS, HAS LACK OF RELIABLE TRANSPORTATION KEPT YOU FROM MEDICAL APPOINTMENTS, MEETINGS, WORK OR FROM GETTING THINGS NEEDED FOR DAILY LIVING?: NO

## 2024-12-21 ASSESSMENT — LIFESTYLE VARIABLES
HAVE YOU EVER FELT YOU SHOULD CUT DOWN ON YOUR DRINKING: YES
AGITATION: NORMAL ACTIVITY
AUDITORY DISTURBANCES: NOT PRESENT
AGITATION: NORMAL ACTIVITY
TOTAL SCORE: 4
ON A TYPICAL DAY WHEN YOU DRINK ALCOHOL HOW MANY DRINKS DO YOU HAVE: 10
VISUAL DISTURBANCES: NOT PRESENT
TREMOR: *
EVER FELT BAD OR GUILTY ABOUT YOUR DRINKING: YES
PAROXYSMAL SWEATS: NO SWEAT VISIBLE
ANXIETY: NO ANXIETY (AT EASE)
PAROXYSMAL SWEATS: NO SWEAT VISIBLE
ANXIETY: NO ANXIETY (AT EASE)
ORIENTATION AND CLOUDING OF SENSORIUM: ORIENTED AND CAN DO SERIAL ADDITIONS
HEADACHE, FULLNESS IN HEAD: NOT PRESENT
HEADACHE, FULLNESS IN HEAD: NOT PRESENT
CONSUMPTION TOTAL: POSITIVE
ORIENTATION AND CLOUDING OF SENSORIUM: ORIENTED AND CAN DO SERIAL ADDITIONS
TREMOR: MODERATE TREMOR WITH ARMS EXTENDED
AGITATION: NORMAL ACTIVITY
ANXIETY: NO ANXIETY (AT EASE)
TOTAL SCORE: 6
TREMOR: TREMOR NOT VISIBLE BUT CAN BE FELT, FINGERTIP TO FINGERTIP
NAUSEA AND VOMITING: NO NAUSEA AND NO VOMITING
DOES PATIENT WANT TO STOP DRINKING: YES
HEADACHE, FULLNESS IN HEAD: VERY MILD
HEADACHE, FULLNESS IN HEAD: VERY MILD
VISUAL DISTURBANCES: NOT PRESENT
EVER HAD A DRINK FIRST THING IN THE MORNING TO STEADY YOUR NERVES TO GET RID OF A HANGOVER: YES
TOTAL SCORE: 2
NAUSEA AND VOMITING: NO NAUSEA AND NO VOMITING
HAVE PEOPLE ANNOYED YOU BY CRITICIZING YOUR DRINKING: YES
TREMOR: TREMOR NOT VISIBLE BUT CAN BE FELT, FINGERTIP TO FINGERTIP
AUDITORY DISTURBANCES: NOT PRESENT
VISUAL DISTURBANCES: NOT PRESENT
HOW MANY TIMES IN THE PAST YEAR HAVE YOU HAD 5 OR MORE DRINKS IN A DAY: 170
AUDITORY DISTURBANCES: NOT PRESENT
TOTAL SCORE: 4
NAUSEA AND VOMITING: NO NAUSEA AND NO VOMITING
VISUAL DISTURBANCES: NOT PRESENT
TOTAL SCORE: 2
PAROXYSMAL SWEATS: NO SWEAT VISIBLE
NAUSEA AND VOMITING: NO NAUSEA AND NO VOMITING
ANXIETY: NO ANXIETY (AT EASE)
AUDITORY DISTURBANCES: NOT PRESENT
PAROXYSMAL SWEATS: BARELY PERCEPTIBLE SWEATING. PALMS MOIST
AVERAGE NUMBER OF DAYS PER WEEK YOU HAVE A DRINK CONTAINING ALCOHOL: 70
ORIENTATION AND CLOUDING OF SENSORIUM: ORIENTED AND CAN DO SERIAL ADDITIONS
ALCOHOL_USE: YES
AGITATION: NORMAL ACTIVITY
ORIENTATION AND CLOUDING OF SENSORIUM: ORIENTED AND CAN DO SERIAL ADDITIONS
TOTAL SCORE: 4
TOTAL SCORE: 1

## 2024-12-21 ASSESSMENT — COGNITIVE AND FUNCTIONAL STATUS - GENERAL
MOBILITY SCORE: 24
SUGGESTED CMS G CODE MODIFIER DAILY ACTIVITY: CH
DAILY ACTIVITIY SCORE: 24
SUGGESTED CMS G CODE MODIFIER MOBILITY: CH

## 2024-12-21 ASSESSMENT — SOCIAL DETERMINANTS OF HEALTH (SDOH)
WITHIN THE PAST 12 MONTHS, THE FOOD YOU BOUGHT JUST DIDN'T LAST AND YOU DIDN'T HAVE MONEY TO GET MORE: NEVER TRUE
WITHIN THE LAST YEAR, HAVE TO BEEN RAPED OR FORCED TO HAVE ANY KIND OF SEXUAL ACTIVITY BY YOUR PARTNER OR EX-PARTNER?: NO
WITHIN THE LAST YEAR, HAVE YOU BEEN AFRAID OF YOUR PARTNER OR EX-PARTNER?: NO
IN THE PAST 12 MONTHS, HAS THE ELECTRIC, GAS, OIL, OR WATER COMPANY THREATENED TO SHUT OFF SERVICE IN YOUR HOME?: NO
WITHIN THE LAST YEAR, HAVE YOU BEEN KICKED, HIT, SLAPPED, OR OTHERWISE PHYSICALLY HURT BY YOUR PARTNER OR EX-PARTNER?: NO
WITHIN THE LAST YEAR, HAVE YOU BEEN AFRAID OF YOUR PARTNER OR EX-PARTNER?: NO
WITHIN THE LAST YEAR, HAVE YOU BEEN HUMILIATED OR EMOTIONALLY ABUSED IN OTHER WAYS BY YOUR PARTNER OR EX-PARTNER?: NO
WITHIN THE LAST YEAR, HAVE YOU BEEN HUMILIATED OR EMOTIONALLY ABUSED IN OTHER WAYS BY YOUR PARTNER OR EX-PARTNER?: NO
WITHIN THE LAST YEAR, HAVE YOU BEEN KICKED, HIT, SLAPPED, OR OTHERWISE PHYSICALLY HURT BY YOUR PARTNER OR EX-PARTNER?: NO
WITHIN THE PAST 12 MONTHS, YOU WORRIED THAT YOUR FOOD WOULD RUN OUT BEFORE YOU GOT THE MONEY TO BUY MORE: NEVER TRUE

## 2024-12-21 ASSESSMENT — ENCOUNTER SYMPTOMS
CARDIOVASCULAR NEGATIVE: 1
WEAKNESS: 1
RESPIRATORY NEGATIVE: 1
TREMORS: 1
CONSTITUTIONAL NEGATIVE: 1
MUSCULOSKELETAL NEGATIVE: 1
PSYCHIATRIC NEGATIVE: 1
NAUSEA: 1
EYES NEGATIVE: 1

## 2024-12-21 ASSESSMENT — FIBROSIS 4 INDEX: FIB4 SCORE: 2.08

## 2024-12-21 ASSESSMENT — PATIENT HEALTH QUESTIONNAIRE - PHQ9
6. FEELING BAD ABOUT YOURSELF - OR THAT YOU ARE A FAILURE OR HAVE LET YOURSELF OR YOUR FAMILY DOWN: NOT AL ALL
5. POOR APPETITE OR OVEREATING: NOT AT ALL
SUM OF ALL RESPONSES TO PHQ QUESTIONS 1-9: 5
4. FEELING TIRED OR HAVING LITTLE ENERGY: SEVERAL DAYS
2. FEELING DOWN, DEPRESSED, IRRITABLE, OR HOPELESS: SEVERAL DAYS
1. LITTLE INTEREST OR PLEASURE IN DOING THINGS: SEVERAL DAYS
9. THOUGHTS THAT YOU WOULD BE BETTER OFF DEAD, OR OF HURTING YOURSELF: NOT AT ALL
7. TROUBLE CONCENTRATING ON THINGS, SUCH AS READING THE NEWSPAPER OR WATCHING TELEVISION: SEVERAL DAYS
8. MOVING OR SPEAKING SO SLOWLY THAT OTHER PEOPLE COULD HAVE NOTICED. OR THE OPPOSITE, BEING SO FIGETY OR RESTLESS THAT YOU HAVE BEEN MOVING AROUND A LOT MORE THAN USUAL: NOT AT ALL
SUM OF ALL RESPONSES TO PHQ9 QUESTIONS 1 AND 2: 2
3. TROUBLE FALLING OR STAYING ASLEEP OR SLEEPING TOO MUCH: SEVERAL DAYS

## 2024-12-21 ASSESSMENT — PAIN DESCRIPTION - PAIN TYPE
TYPE: ACUTE PAIN
TYPE: ACUTE PAIN

## 2024-12-21 NOTE — PROGRESS NOTES
4 Eyes Skin Assessment Completed by ALF Lopes and ALF Sandhu.    Head WDL  Ears WDL  Nose WDL  Mouth WDL  Neck WDL  Breast/Chest WDL  Shoulder Blades WDL  Spine WDL  (R) Arm/Elbow/Hand Edema  (L) Arm/Elbow/Hand Edema  Abdomen WDL  Groin WDL  Scrotum/Coccyx/Buttocks WDL  (R) Leg WDL  (L) Leg WDL  (R) Heel/Foot/Toe Blanching and Scab  (L) Heel/Foot/Toe Blanching and Scab          Devices In Places Tele Box, Blood Pressure Cuff, Pulse Ox, and Silver      Interventions In Place N/A    Possible Skin Injury No    Pictures Uploaded Into Epic N/A  Wound Consult Placed N/A  RN Wound Prevention Protocol Ordered No

## 2024-12-21 NOTE — ED PROVIDER NOTES
"ED Provider Note    CHIEF COMPLAINT  Chief Complaint   Patient presents with    Detox     Pt states his last drink was 2 days ago on 12/18, pt states he typically drinks 10-15 shots/day, pt states he has been on \"a binger lately\"       EXTERNAL RECORDS REVIEWED  Outpatient Notes urgent care note 11/20/2024 reviewed.  Patient seen for gout.    HPI/ROS  LIMITATION TO HISTORY   Select: : None  OUTSIDE HISTORIAN(S):  Family brother at bedside    Alberto Maldonado is a 39 y.o. male who presents to the emergency department after not consuming alcohol for roughly 24 hours.  States that he typically drinks several between 10 to 15 small bottles of alcohol per day.  Does not smoke.  No intent to harm self.  Does want stop drinking.  Additionally he has ongoing cough, cold, congestion.  Was seen at outpatient urgent care where he was started on azithromycin and Tessalon.  He does believe that the Tessalon does help with his cough but does not feel like the antibiotics have worked.  He states that the provider had given him the antibiotic for \"a virus \".      PAST MEDICAL HISTORY   has a past medical history of Asthma without status asthmaticus (06/29/2017), Chickenpox, and Hypertension.    SURGICAL HISTORY  patient denies any surgical history    FAMILY HISTORY  Family History   Problem Relation Age of Onset    Stroke Neg Hx     Heart Disease Neg Hx        SOCIAL HISTORY  Social History     Tobacco Use    Smoking status: Never    Smokeless tobacco: Never   Vaping Use    Vaping status: Never Used   Substance and Sexual Activity    Alcohol use: Yes     Alcohol/week: 7.2 oz     Types: 12 Shots of liquor per week     Comment: 10-15 vodka mini bottles per day    Drug use: Not Currently    Sexual activity: Not Currently       CURRENT MEDICATIONS  Home Medications       Reviewed by Celina Johnson R.N. (Registered Nurse) on 12/20/24 at 1951  Med List Status: Partial     Medication Last Dose Status   albuterol 108 (90 Base) " "MCG/ACT Aero Soln inhalation aerosol  Active   allopurinol (ZYLOPRIM) 300 MG Tab  Active   colchicine (COLCRYS) 0.6 MG Tab  Active   diazePAM (VALIUM) 2 MG Tab  Active   furosemide (LASIX) 40 MG Tab  Active   losartan (COZAAR) 50 MG Tab  Active   metoprolol SR (TOPROL XL) 25 MG TABLET SR 24 HR  Active   predniSONE (DELTASONE) 10 MG Tab  Active                  Audit from Redirected Encounters    **Home medications have not yet been reviewed for this encounter**         ALLERGIES  No Known Allergies    PHYSICAL EXAM  VITAL SIGNS: /74   Pulse (!) 110   Temp 36.9 °C (98.5 °F) (Temporal)   Resp 16   Ht 1.6 m (5' 3\")   Wt 114 kg (250 lb 7.1 oz)   SpO2 91%   BMI 44.36 kg/m²      Pulse ox interpretation: I interpret this pulse ox as normal.  Constitutional: Alert in no apparent distress.  HENT: No signs of trauma, Bilateral external ears normal, Nose normal.   Eyes: Pupils are equal and reactive  Neck: Normal range of motion, No tenderness, Supple  Cardiovascular: Tachycardia, regular rate and rhythm, no murmurs.   Thorax & Lungs: Normal breath sounds, No respiratory distress  Skin: Warm, Dry, No erythema, No rash.   Musculoskeletal: Good range of motion in all major joints. No tenderness to palpation or major deformities noted.   Neurologic: Alert , Normal motor function, Normal sensory function, No focal deficits noted.   Psychiatric: Affect normal, Judgment normal, Mood normal.         EKG/LABS  Results for orders placed or performed during the hospital encounter of 12/20/24   CBC WITH DIFFERENTIAL    Collection Time: 12/20/24 10:32 PM   Result Value Ref Range    WBC 11.1 (H) 4.8 - 10.8 K/uL    RBC 4.66 (L) 4.70 - 6.10 M/uL    Hemoglobin 14.4 14.0 - 18.0 g/dL    Hematocrit 42.7 42.0 - 52.0 %    MCV 91.6 81.4 - 97.8 fL    MCH 30.9 27.0 - 33.0 pg    MCHC 33.7 32.3 - 36.5 g/dL    RDW 63.0 (H) 35.9 - 50.0 fL    Platelet Count 247 164 - 446 K/uL    MPV 8.4 (L) 9.0 - 12.9 fL    Neutrophils-Polys 81.70 (H) 44.00 - " 72.00 %    Lymphocytes 8.50 (L) 22.00 - 41.00 %    Monocytes 7.30 0.00 - 13.40 %    Eosinophils 1.60 0.00 - 6.90 %    Basophils 0.50 0.00 - 1.80 %    Immature Granulocytes 0.40 0.00 - 0.90 %    Nucleated RBC 0.30 (H) 0.00 - 0.20 /100 WBC    Neutrophils (Absolute) 9.08 (H) 1.82 - 7.42 K/uL    Lymphs (Absolute) 0.95 (L) 1.00 - 4.80 K/uL    Monos (Absolute) 0.81 0.00 - 0.85 K/uL    Eos (Absolute) 0.18 0.00 - 0.51 K/uL    Baso (Absolute) 0.06 0.00 - 0.12 K/uL    Immature Granulocytes (abs) 0.04 0.00 - 0.11 K/uL    NRBC (Absolute) 0.03 K/uL   COMP METABOLIC PANEL    Collection Time: 12/20/24 10:32 PM   Result Value Ref Range    Sodium 140 135 - 145 mmol/L    Potassium 2.9 (L) 3.6 - 5.5 mmol/L    Chloride 99 96 - 112 mmol/L    Co2 24 20 - 33 mmol/L    Anion Gap 17.0 (H) 7.0 - 16.0    Glucose 138 (H) 65 - 99 mg/dL    Bun 3 (L) 8 - 22 mg/dL    Creatinine 0.72 0.50 - 1.40 mg/dL    Calcium 7.7 (L) 8.5 - 10.5 mg/dL    Correct Calcium 7.8 (L) 8.5 - 10.5 mg/dL    AST(SGOT) 76 (H) 12 - 45 U/L    ALT(SGPT) 30 2 - 50 U/L    Alkaline Phosphatase 196 (H) 30 - 99 U/L    Total Bilirubin 1.7 (H) 0.1 - 1.5 mg/dL    Albumin 3.9 3.2 - 4.9 g/dL    Total Protein 7.5 6.0 - 8.2 g/dL    Globulin 3.6 (H) 1.9 - 3.5 g/dL    A-G Ratio 1.1 g/dL   LIPASE    Collection Time: 12/20/24 10:32 PM   Result Value Ref Range    Lipase 35 11 - 82 U/L   DIAGNOSTIC ALCOHOL    Collection Time: 12/20/24 10:32 PM   Result Value Ref Range    Diagnostic Alcohol <10.1 <10.1 mg/dL   ESTIMATED GFR    Collection Time: 12/20/24 10:32 PM   Result Value Ref Range    GFR (CKD-EPI) 121 >60 mL/min/1.73 m 2   POC CoV-2, FLU A/B, RSV by PCR    Collection Time: 12/20/24 10:40 PM   Result Value Ref Range    POC Influenza A RNA, PCR Negative Negative    POC Influenza B RNA, PCR Negative Negative    POC RSV, by PCR Negative Negative    POC SARS-CoV-2, PCR NotDetected NotDetected       I have independently interpreted this EKG    RADIOLOGY/PROCEDURES   I have independently  interpreted the diagnostic imaging associated with this visit and am waiting the final reading from the radiologist.   My preliminary interpretation is as follows: No consolidative process    Radiologist interpretation:  DX-CHEST-PORTABLE (1 VIEW)   Final Result         1.  No acute cardiopulmonary disease.   2.  Cardiomegaly          COURSE & MEDICAL DECISION MAKING    ASSESSMENT, COURSE AND PLAN  Care Narrative: Patient presenting with multiple complaints to include alcohol withdrawal as well as likely viral URI symptoms.  Will complete initial labs, viral panel, chest x-ray.  Will treat withdrawal symptoms with Ativan.    DISPOSITION AND DISCUSSIONS  I have discussed management of the patient with the following physicians and BATSHEVA's: Hospitalist    Discussion of management with other QHP or appropriate source(s): None     39-year-old male presenting to the emerged part with above presentation.  Patient and likely withdrawal given symptomatology and findings to include tachycardia.  Labs significant for electrolyte derangement as well as slight hyperglycemia.  Viral panel negative.  Chest x-ray negative for pneumonia.  I continue to have a high suspicion that the patient's URI symptoms are of viral etiology especially given the lack of improvement on azithromycin thus far.  Will admit to the hospital service for ongoing inpatient care.      FINAL DIAGNOSIS  1. Alcohol withdrawal syndrome without complication (HCC)    2. Hypokalemia    3. Hyperglycemia         Electronically signed by: Melecio Grimm M.D., 12/20/2024 10:17 PM

## 2024-12-21 NOTE — PROGRESS NOTES
Hospital medicine progress note after midnight:     Mr.George Leighton Maldonado is a 39 y.o. male with a history of hypertension, hyperlipidemia, gout, alcohol abuse, who presented 12/20/2024 with alcohol withdrawal.      For the last few days, patient has been having cough, chills, fullness sensation in his left ear, fatigue.  He went to see his urgent care doctor and he was prescribed azithromycin.  His symptoms not improved, he decided to come back to urgent care for evaluation.  He has noted to be tremulous, he was recommended to come into the ER for management of alcohol withdrawal.  Patient states that he drinks about 10-15 shots a day, his last alcoholic beverage was about 24 hours ago.     At [facility] ED, patient found to have hypokalemia hypomagnesia.  He is currently withdrawing from alcohol.  Admitted for alcohol withdrawal and electrolyte replacement. Patient also is having some upper respiratory issues of which she is requiring some oxygen.    Plan of care: Continue CIWA protocol and monitor for any signs of withdrawal; continue alcohol cessation counseling and education; monitor oxygen level    Disposition: Patient currently patient status as he is anticipated to stay 2-3 midnights for management of alcohol withdrawal and upper respiratory infection    Problem list:   Alcohol abuse  Alcohol withdrawal  Obesity  Upper respiratory infection  Hypokalemia  Hypomagnesemia    Please note that this dictation was created using voice recognition software. I have made every reasonable attempt to correct obvious errors, but there may be errors of grammar and possibly content that I did not discover before finalizing the note.    Electronically signed by:  Dr. MAIA Aquino, DNP, APRN, FNP-C  Hospitalist Services  [facility]  [phone]  [email or web address]  12/21/24                 [id number]         Injected the flexor tendon sheath with Kenalog and lidocaine. Did inform the patient that we would not be able to do this injection in the future. He should seek orthopedic intervention should it become problematic once again.

## 2024-12-21 NOTE — ED NOTES
Pt is awake at this time, sitting on recliner, medications given as ordered, no fresh complaints at this time.

## 2024-12-21 NOTE — ED TRIAGE NOTES
"Chief Complaint   Patient presents with    Detox     Pt states his last drink was 2 days ago on 12/18, pt states he typically drinks 10-15 shots/day, pt states he has been on \"a binger lately\"     Pt ambulatory to triage for above complaint, A+O x 4, GCS 15, answering questions appropriately and calm/cooperative for staff   "

## 2024-12-21 NOTE — ED NOTES
Pt medicated with Ativan per MAR and tolerated well. Pt sitting up in chair and talking to brother at bedside.

## 2024-12-21 NOTE — ED NOTES
Report given to Irma ALVAREZ RN. At this time pt is sitting up in chair beside bed with even and unlabored breaths, in no apparent distress. Pt is connected to monitoring devices and is on room air, tolerating well. Pt's brother still at bedside. This RN removed from care.

## 2024-12-21 NOTE — ED NOTES
Medication history reviewed with patient.  Med rec is complete  Allergies reviewed.    Anticoagulants) taken in the last 14 days? none      Olga Ronquillo PhT

## 2024-12-21 NOTE — ED NOTES
from Lab called with critical result of Magnesium at 0.9. Critical lab result read back to .   Dr. Pacheco notified of critical lab result at 0118H.  Critical lab result read back by Dr. Pacheco.

## 2024-12-21 NOTE — ED NOTES
Pt ambulated from lobby to [place] [facility] without assistance, tolerated well. Pt is now sitting up in bed talking to family at bedside with even and unlabored breaths, in no apparent distress at this time. Will continue to monitor pt while awaiting orders.

## 2024-12-21 NOTE — ED NOTES
Bedside report received from off going RN/tech: Rosemary MILNER, assumed care of patient.  POC discussed with patient. Call light within reach, all needs addressed at this time.       Fall risk interventions in place: Patient's personal possessions are with in their safe reach, Place socks on patient, Place fall risk sign on patient's door, Give patient urinal if applicable, and Keep floor surfaces clean and dry (all applicable per [facility] Fall risk assessment)   Continuous monitoring: Not Applicable   IVF/IV medications: Not Applicable   Oxygen: Room Air  Bedside sitter: Not Applicable   Isolation: Not Applicable

## 2024-12-21 NOTE — ED NOTES
Spo2 87-88% room air, pt placed on nasal cannula at 2lpm.  Pt reported that he does not sleep on a bed, and uses a recliner.  Recliner placed in room for pt comfort. Pt is attached to cardiac monitor, call bell in reach.

## 2024-12-21 NOTE — H&P
"Hospital Medicine History & Physical Note    Date of Service  12/21/2024    Primary Care Physician  ANGELIA Lincoln    Consultants  None    Code Status  Full Code    Chief Complaint  Chief Complaint   Patient presents with    Detox     Pt states his last drink was 2 days ago on 12/18, pt states he typically drinks 10-15 shots/day, pt states he has been on \"a binger lately\"       History of Presenting Illness  Alberto Maldonado is a 39 y.o. male who presented 12/20/2024 with alcohol withdrawal.  Patient has a history of hypertension, hyperlipidemia, gout, alcohol use.  For the last few days, patient has been having cough, chills, fullness sensation in his left ear, fatigue.  He went to see his urgent care doctor and he was prescribed azithromycin.  His symptoms not improved, he decided to come back to urgent care for evaluation.  He has noted to be tremulous, he was recommended to come into the ER for management of alcohol withdrawal.  Patient states that he drinks about 10-15 shots a day, his last alcoholic beverage was about 24 hours ago.    At [facility] ED, patient found to have hypokalemia hypomagnesia.  He is currently withdrawing from alcohol.  Admitted for alcohol withdrawal and electrolyte replacement.    I discussed the plan of care with patient.    Review of Systems  Review of Systems   Constitutional: Negative.    HENT: Negative.     Eyes: Negative.    Respiratory: Negative.     Cardiovascular: Negative.    Gastrointestinal:  Positive for nausea.   Genitourinary: Negative.    Musculoskeletal: Negative.    Skin: Negative.    Neurological:  Positive for tremors and weakness.   Endo/Heme/Allergies: Negative.    Psychiatric/Behavioral: Negative.         Past Medical History   has a past medical history of Asthma without status asthmaticus (06/29/2017), Chickenpox, and Hypertension.    Surgical History   has no past surgical history on file.     Family History  family history is not on file.   Family " history reviewed with patient. There is no family history that is pertinent to the chief complaint.     Social History   reports that he has never smoked. He has never used smokeless tobacco. He reports current alcohol use of about 7.2 oz of alcohol per week. He reports that he does not currently use drugs.    Allergies  No Known Allergies    Medications  Prior to Admission Medications   Prescriptions Last Dose Informant Patient Reported? Taking?   albuterol 108 (90 Base) MCG/ACT Aero Soln inhalation aerosol   Yes No   Sig: Inhale 1 Puff 4 times a day.   allopurinol (ZYLOPRIM) 300 MG Tab  Patient, Patient's Home Pharmacy No No   Sig: Take 1 Tablet by mouth every day.   colchicine (COLCRYS) 0.6 MG Tab   Yes No   Sig: TAKE 1 TABLET BY MOUTH TWICE A DAY FOR ACUTE GOUT FLARE   diazePAM (VALIUM) 2 MG Tab   Yes No   Sig: Take 2 mg by mouth every 6 hours as needed for Anxiety.   furosemide (LASIX) 40 MG Tab   Yes No   Sig: Take 1 Tablet by mouth every day.   losartan (COZAAR) 50 MG Tab   No No   Sig: Take 1 Tablet by mouth every day.   metoprolol SR (TOPROL XL) 25 MG TABLET SR 24 HR   Yes No   Sig: Take 25 mg by mouth every day.   predniSONE (DELTASONE) 10 MG Tab   No No   Si mg x 2 days, then 30 mg x 2 days, then 20 mg x 2 days, then 10 mg x 2 days. Take with food.      Facility-Administered Medications: None       Physical Exam  Temp:  [36.9 °C (98.5 °F)] 36.9 °C (98.5 °F)  Pulse:  [110-123] 110  Resp:  [16-18] 16  BP: (118-166)/() 119/74  SpO2:  [89 %-96 %] 91 %  Blood Pressure: 119/74   Temperature: 36.9 °C (98.5 °F)   Pulse: (!) 110   Respiration: 16   Pulse Oximetry: 91 %       Physical Exam  Constitutional:       Appearance: Normal appearance. He is obese.   HENT:      Head: Normocephalic.      Nose: Nose normal.      Mouth/Throat:      Mouth: Mucous membranes are moist.   Eyes:      Pupils: Pupils are equal, round, and reactive to light.   Cardiovascular:      Rate and Rhythm: Normal rate and regular  "rhythm.      Pulses: Normal pulses.   Pulmonary:      Effort: Pulmonary effort is normal.      Breath sounds: Rhonchi present.   Abdominal:      General: Abdomen is flat. Bowel sounds are normal.      Palpations: Abdomen is soft.   Musculoskeletal:      Cervical back: Neck supple.      Comments: Tremors noted in upper extremities bilaterally   Skin:     General: Skin is warm.   Neurological:      General: No focal deficit present.      Mental Status: He is alert and oriented to person, place, and time. Mental status is at baseline.   Psychiatric:         Mood and Affect: Mood normal.         Behavior: Behavior normal.         Thought Content: Thought content normal.         Judgment: Judgment normal.         Laboratory:  Recent Labs     12/20/24 2232   WBC 11.1*   RBC 4.66*   HEMOGLOBIN 14.4   HEMATOCRIT 42.7   MCV 91.6   MCH 30.9   MCHC 33.7   RDW 63.0*   PLATELETCT 247   MPV 8.4*     Recent Labs     12/20/24 2232   SODIUM 140   POTASSIUM 2.9*   CHLORIDE 99   CO2 24   GLUCOSE 138*   BUN 3*   CREATININE 0.72   CALCIUM 7.7*     Recent Labs     12/20/24 2232   ALTSGPT 30   ASTSGOT 76*   ALKPHOSPHAT 196*   TBILIRUBIN 1.7*   LIPASE 35   GLUCOSE 138*         No results for input(s): \"NTPROBNP\" in the last 72 hours.      No results for input(s): \"TROPONINT\" in the last 72 hours.    Imaging:  DX-CHEST-PORTABLE (1 VIEW)   Final Result         1.  No acute cardiopulmonary disease.   2.  Cardiomegaly          X-Ray:  I have personally reviewed the images and compared with prior images.    Assessment/Plan:  Justification for Admission Status  I anticipate this patient will require at least two midnights for appropriate medical management, necessitating inpatient admission because patient has alcohol withdrawal    Patient will need a Telemetry bed on MEDICAL service .  The need is secondary to alcohol withdrawal.    * Alcohol withdrawal, uncomplicated (HCC)- (present on admission)  Assessment & Plan  Has not drink for the " last 24 hours, currently tachycardic and tremulous  CIWA protocol  Folic acid, IV thiamine  Monitor for DTs    URI (upper respiratory infection)  Assessment & Plan  Suspect the patient has some sort of viral URI, did not respond to a [facility] outpatient  COVID/flu/RSV negative  Send viral respiratory panel  Oxygen prn    Hypokalemia- (present on admission)  Assessment & Plan  Replace    Hypomagnesemia- (present on admission)  Assessment & Plan  Found to have severe hypomagnesia from alcohol.  0.9  Telemetry monitor  Will give 6 g of magnesium  Recheck magnesium in the morning      Patient is critically ill.   The patient continues to have: Alcohol withdrawal with hypomagnesia and hypokalemia, Group A Strep  The vital organ system that is affected is the: Heart, cardiovascular system  If untreated there is a high chance of deterioration into: Delirium tremens  And eventually death.   The critical care that I am providing today is: IV thiamine, 6 g of magnesium, IV potassium, CIWA protocol, IV unasyn  The critical that has been undertaken is medically complex.   There has been no overlap in critical care time.   Critical Care Time not including procedures: 34      VTE prophylaxis: enoxaparin ppx

## 2024-12-22 ENCOUNTER — APPOINTMENT (OUTPATIENT)
Dept: RADIOLOGY | Facility: MEDICAL CENTER | Age: 39
DRG: 896 | End: 2024-12-22
Attending: STUDENT IN AN ORGANIZED HEALTH CARE EDUCATION/TRAINING PROGRAM
Payer: COMMERCIAL

## 2024-12-22 ENCOUNTER — APPOINTMENT (OUTPATIENT)
Dept: RADIOLOGY | Facility: MEDICAL CENTER | Age: 39
DRG: 896 | End: 2024-12-22
Payer: COMMERCIAL

## 2024-12-22 PROBLEM — N17.9 ACUTE RENAL FAILURE (ARF) (HCC): Status: ACTIVE | Noted: 2024-12-22

## 2024-12-22 PROBLEM — J02.0 ACUTE STREPTOCOCCAL PHARYNGITIS: Status: ACTIVE | Noted: 2024-12-21

## 2024-12-22 LAB
ALBUMIN SERPL BCP-MCNC: 3.4 G/DL (ref 3.2–4.9)
ALBUMIN/GLOB SERPL: 1 G/DL
ALP SERPL-CCNC: 159 U/L (ref 30–99)
ALT SERPL-CCNC: 25 U/L (ref 2–50)
ANION GAP SERPL CALC-SCNC: 15 MMOL/L (ref 7–16)
APPEARANCE UR: CLEAR
AST SERPL-CCNC: 99 U/L (ref 12–45)
BACTERIA #/AREA URNS HPF: ABNORMAL /HPF
BILIRUB SERPL-MCNC: 1.7 MG/DL (ref 0.1–1.5)
BILIRUB UR QL STRIP.AUTO: ABNORMAL
BUN SERPL-MCNC: 16 MG/DL (ref 8–22)
CALCIUM ALBUM COR SERPL-MCNC: 8 MG/DL (ref 8.5–10.5)
CALCIUM SERPL-MCNC: 7.5 MG/DL (ref 8.5–10.5)
CASTS URNS QL MICRO: ABNORMAL /LPF (ref 0–2)
CHLORIDE SERPL-SCNC: 103 MMOL/L (ref 96–112)
CO2 SERPL-SCNC: 22 MMOL/L (ref 20–33)
COLOR UR: ABNORMAL
CREAT SERPL-MCNC: 1.61 MG/DL (ref 0.5–1.4)
EPITHELIAL CELLS 1715: ABNORMAL /HPF (ref 0–5)
ERYTHROCYTE [DISTWIDTH] IN BLOOD BY AUTOMATED COUNT: 66.9 FL (ref 35.9–50)
GFR SERPLBLD CREATININE-BSD FMLA CKD-EPI: 56 ML/MIN/1.73 M 2
GLOBULIN SER CALC-MCNC: 3.4 G/DL (ref 1.9–3.5)
GLUCOSE SERPL-MCNC: 154 MG/DL (ref 65–99)
GLUCOSE UR STRIP.AUTO-MCNC: NEGATIVE MG/DL
HCT VFR BLD AUTO: 37.9 % (ref 42–52)
HGB BLD-MCNC: 12.4 G/DL (ref 14–18)
INR PPP: 1.09 (ref 0.87–1.13)
KETONES UR STRIP.AUTO-MCNC: ABNORMAL MG/DL
LEUKOCYTE ESTERASE UR QL STRIP.AUTO: ABNORMAL
MAGNESIUM SERPL-MCNC: 2.3 MG/DL (ref 1.5–2.5)
MCH RBC QN AUTO: 31.2 PG (ref 27–33)
MCHC RBC AUTO-ENTMCNC: 32.7 G/DL (ref 32.3–36.5)
MCV RBC AUTO: 95.2 FL (ref 81.4–97.8)
MICRO URNS: ABNORMAL
NITRITE UR QL STRIP.AUTO: NEGATIVE
PH UR STRIP.AUTO: 6 [PH] (ref 5–8)
PLATELET # BLD AUTO: 232 K/UL (ref 164–446)
PMV BLD AUTO: 8.8 FL (ref 9–12.9)
POTASSIUM SERPL-SCNC: 2.9 MMOL/L (ref 3.6–5.5)
PROT SERPL-MCNC: 6.8 G/DL (ref 6–8.2)
PROT UR QL STRIP: NEGATIVE MG/DL
PROTHROMBIN TIME: 14.1 SEC (ref 12–14.6)
PTH-INTACT SERPL-MCNC: 116 PG/ML (ref 14–72)
RBC # BLD AUTO: 3.98 M/UL (ref 4.7–6.1)
RBC # URNS HPF: ABNORMAL /HPF (ref 0–2)
RBC UR QL AUTO: NEGATIVE
SODIUM SERPL-SCNC: 140 MMOL/L (ref 135–145)
SP GR UR STRIP.AUTO: 1.02
UROBILINOGEN UR STRIP.AUTO-MCNC: 1 EU/DL
WBC # BLD AUTO: 12.9 K/UL (ref 4.8–10.8)
WBC #/AREA URNS HPF: ABNORMAL /HPF

## 2024-12-22 PROCEDURE — 83970 ASSAY OF PARATHORMONE: CPT

## 2024-12-22 PROCEDURE — 700102 HCHG RX REV CODE 250 W/ 637 OVERRIDE(OP): Performed by: STUDENT IN AN ORGANIZED HEALTH CARE EDUCATION/TRAINING PROGRAM

## 2024-12-22 PROCEDURE — RXMED WILLOW AMBULATORY MEDICATION CHARGE: Performed by: STUDENT IN AN ORGANIZED HEALTH CARE EDUCATION/TRAINING PROGRAM

## 2024-12-22 PROCEDURE — 770020 HCHG ROOM/CARE - TELE (206)

## 2024-12-22 PROCEDURE — A9270 NON-COVERED ITEM OR SERVICE: HCPCS | Performed by: STUDENT IN AN ORGANIZED HEALTH CARE EDUCATION/TRAINING PROGRAM

## 2024-12-22 PROCEDURE — 99233 SBSQ HOSP IP/OBS HIGH 50: CPT | Performed by: STUDENT IN AN ORGANIZED HEALTH CARE EDUCATION/TRAINING PROGRAM

## 2024-12-22 PROCEDURE — 76700 US EXAM ABDOM COMPLETE: CPT

## 2024-12-22 PROCEDURE — 700105 HCHG RX REV CODE 258: Performed by: STUDENT IN AN ORGANIZED HEALTH CARE EDUCATION/TRAINING PROGRAM

## 2024-12-22 PROCEDURE — 85610 PROTHROMBIN TIME: CPT

## 2024-12-22 PROCEDURE — 700102 HCHG RX REV CODE 250 W/ 637 OVERRIDE(OP)

## 2024-12-22 PROCEDURE — 83735 ASSAY OF MAGNESIUM: CPT

## 2024-12-22 PROCEDURE — 81001 URINALYSIS AUTO W/SCOPE: CPT

## 2024-12-22 PROCEDURE — 700111 HCHG RX REV CODE 636 W/ 250 OVERRIDE (IP): Mod: JZ | Performed by: STUDENT IN AN ORGANIZED HEALTH CARE EDUCATION/TRAINING PROGRAM

## 2024-12-22 PROCEDURE — 71045 X-RAY EXAM CHEST 1 VIEW: CPT

## 2024-12-22 PROCEDURE — A9270 NON-COVERED ITEM OR SERVICE: HCPCS

## 2024-12-22 PROCEDURE — 700111 HCHG RX REV CODE 636 W/ 250 OVERRIDE (IP): Performed by: STUDENT IN AN ORGANIZED HEALTH CARE EDUCATION/TRAINING PROGRAM

## 2024-12-22 PROCEDURE — 80053 COMPREHEN METABOLIC PANEL: CPT

## 2024-12-22 PROCEDURE — 85027 COMPLETE CBC AUTOMATED: CPT

## 2024-12-22 RX ORDER — HEPARIN SODIUM 5000 [USP'U]/ML
5000 INJECTION, SOLUTION INTRAVENOUS; SUBCUTANEOUS EVERY 8 HOURS
Status: DISCONTINUED | OUTPATIENT
Start: 2024-12-22 | End: 2024-12-23 | Stop reason: HOSPADM

## 2024-12-22 RX ORDER — HYDRALAZINE HYDROCHLORIDE 20 MG/ML
20 INJECTION INTRAMUSCULAR; INTRAVENOUS EVERY 6 HOURS PRN
Status: DISCONTINUED | OUTPATIENT
Start: 2024-12-22 | End: 2024-12-23 | Stop reason: HOSPADM

## 2024-12-22 RX ORDER — AMOXICILLIN 500 MG/1
1000 CAPSULE ORAL DAILY
Qty: 16 CAPSULE | Refills: 0 | Status: ACTIVE | OUTPATIENT
Start: 2024-12-22 | End: 2024-12-31

## 2024-12-22 RX ORDER — CALCIUM CARBONATE 500 MG/1
500 TABLET, CHEWABLE ORAL
Status: DISCONTINUED | OUTPATIENT
Start: 2024-12-22 | End: 2024-12-23 | Stop reason: HOSPADM

## 2024-12-22 RX ORDER — ALBUTEROL SULFATE 90 UG/1
2 INHALANT RESPIRATORY (INHALATION)
Status: DISCONTINUED | OUTPATIENT
Start: 2024-12-22 | End: 2024-12-22

## 2024-12-22 RX ORDER — POTASSIUM CHLORIDE 1500 MG/1
20 TABLET, EXTENDED RELEASE ORAL DAILY
Qty: 2 TABLET | Refills: 0 | Status: SHIPPED | OUTPATIENT
Start: 2024-12-22 | End: 2024-12-25

## 2024-12-22 RX ORDER — IPRATROPIUM BROMIDE AND ALBUTEROL SULFATE 2.5; .5 MG/3ML; MG/3ML
3 SOLUTION RESPIRATORY (INHALATION)
Status: DISCONTINUED | OUTPATIENT
Start: 2024-12-22 | End: 2024-12-22

## 2024-12-22 RX ORDER — CALCIUM CARBONATE 500 MG/1
500 TABLET, CHEWABLE ORAL
Qty: 30 TABLET | Refills: 0 | Status: SHIPPED | OUTPATIENT
Start: 2024-12-22

## 2024-12-22 RX ORDER — LEVALBUTEROL INHALATION SOLUTION 1.25 MG/3ML
1.25 SOLUTION RESPIRATORY (INHALATION)
Status: DISCONTINUED | OUTPATIENT
Start: 2024-12-22 | End: 2024-12-23

## 2024-12-22 RX ORDER — POTASSIUM CHLORIDE 1500 MG/1
40 TABLET, EXTENDED RELEASE ORAL EVERY 4 HOURS
Status: COMPLETED | OUTPATIENT
Start: 2024-12-22 | End: 2024-12-22

## 2024-12-22 RX ORDER — AMOXICILLIN 500 MG/1
1000 CAPSULE ORAL DAILY
Status: DISCONTINUED | OUTPATIENT
Start: 2024-12-22 | End: 2024-12-23 | Stop reason: HOSPADM

## 2024-12-22 RX ADMIN — HEPARIN SODIUM 5000 UNITS: 5000 INJECTION, SOLUTION INTRAVENOUS; SUBCUTANEOUS at 21:34

## 2024-12-22 RX ADMIN — POTASSIUM CHLORIDE 40 MEQ: 1500 TABLET, EXTENDED RELEASE ORAL at 06:08

## 2024-12-22 RX ADMIN — THIAMINE HYDROCHLORIDE 100 MG: 100 INJECTION, SOLUTION INTRAMUSCULAR; INTRAVENOUS at 04:40

## 2024-12-22 RX ADMIN — BENZOCAINE AND MENTHOL 1 LOZENGE: 15; 3.6 LOZENGE ORAL at 21:34

## 2024-12-22 RX ADMIN — POTASSIUM CHLORIDE 40 MEQ: 1500 TABLET, EXTENDED RELEASE ORAL at 03:38

## 2024-12-22 RX ADMIN — ANTACID TABLETS 500 MG: 500 TABLET, CHEWABLE ORAL at 13:22

## 2024-12-22 RX ADMIN — AMPICILLIN AND SULBACTAM 3 G: 1; 2 INJECTION, POWDER, FOR SOLUTION INTRAMUSCULAR; INTRAVENOUS at 06:42

## 2024-12-22 RX ADMIN — ANTACID TABLETS 500 MG: 500 TABLET, CHEWABLE ORAL at 16:48

## 2024-12-22 RX ADMIN — AMOXICILLIN 1000 MG: 500 CAPSULE ORAL at 13:22

## 2024-12-22 RX ADMIN — FOLIC ACID 1 MG: 1 TABLET ORAL at 04:40

## 2024-12-22 RX ADMIN — BENZOCAINE AND MENTHOL 1 LOZENGE: 15; 3.6 LOZENGE ORAL at 04:40

## 2024-12-22 RX ADMIN — SODIUM CHLORIDE: 9 INJECTION, SOLUTION INTRAVENOUS at 08:49

## 2024-12-22 ASSESSMENT — LIFESTYLE VARIABLES
AUDITORY DISTURBANCES: NOT PRESENT
NAUSEA AND VOMITING: NO NAUSEA AND NO VOMITING
HEADACHE, FULLNESS IN HEAD: NOT PRESENT
VISUAL DISTURBANCES: NOT PRESENT
PAROXYSMAL SWEATS: NO SWEAT VISIBLE
AGITATION: NORMAL ACTIVITY
TOTAL SCORE: 2
TREMOR: TREMOR NOT VISIBLE BUT CAN BE FELT, FINGERTIP TO FINGERTIP
ANXIETY: NO ANXIETY (AT EASE)
HEADACHE, FULLNESS IN HEAD: NOT PRESENT
PAROXYSMAL SWEATS: NO SWEAT VISIBLE
ANXIETY: NO ANXIETY (AT EASE)
NAUSEA AND VOMITING: NO NAUSEA AND NO VOMITING
DOES PATIENT WANT TO STOP DRINKING: NO
AUDITORY DISTURBANCES: NOT PRESENT
AUDITORY DISTURBANCES: NOT PRESENT
TREMOR: *
ANXIETY: NO ANXIETY (AT EASE)
ORIENTATION AND CLOUDING OF SENSORIUM: ORIENTED AND CAN DO SERIAL ADDITIONS
TOTAL SCORE: 2
TOTAL SCORE: 2
TOTAL SCORE: 1
VISUAL DISTURBANCES: NOT PRESENT
NAUSEA AND VOMITING: NO NAUSEA AND NO VOMITING
AUDITORY DISTURBANCES: NOT PRESENT
AGITATION: NORMAL ACTIVITY
ORIENTATION AND CLOUDING OF SENSORIUM: ORIENTED AND CAN DO SERIAL ADDITIONS
VISUAL DISTURBANCES: NOT PRESENT
PAROXYSMAL SWEATS: NO SWEAT VISIBLE
ORIENTATION AND CLOUDING OF SENSORIUM: ORIENTED AND CAN DO SERIAL ADDITIONS
NAUSEA AND VOMITING: NO NAUSEA AND NO VOMITING
TREMOR: *
PAROXYSMAL SWEATS: NO SWEAT VISIBLE
DOES PATIENT WANT TO TALK TO SOMEONE ABOUT QUITTING: NO
HEADACHE, FULLNESS IN HEAD: NOT PRESENT
ORIENTATION AND CLOUDING OF SENSORIUM: ORIENTED AND CAN DO SERIAL ADDITIONS
ANXIETY: NO ANXIETY (AT EASE)
HEADACHE, FULLNESS IN HEAD: NOT PRESENT
AGITATION: NORMAL ACTIVITY
VISUAL DISTURBANCES: NOT PRESENT
TREMOR: *
AGITATION: NORMAL ACTIVITY

## 2024-12-22 ASSESSMENT — FIBROSIS 4 INDEX: FIB4 SCORE: 2.08

## 2024-12-22 ASSESSMENT — ENCOUNTER SYMPTOMS
CARDIOVASCULAR NEGATIVE: 1
GASTROINTESTINAL NEGATIVE: 1
RESPIRATORY NEGATIVE: 1
EYES NEGATIVE: 1
MUSCULOSKELETAL NEGATIVE: 1
PSYCHIATRIC NEGATIVE: 1
WEAKNESS: 1

## 2024-12-22 ASSESSMENT — PAIN DESCRIPTION - PAIN TYPE
TYPE: ACUTE PAIN
TYPE: ACUTE PAIN

## 2024-12-22 NOTE — PROGRESS NOTES
Report received from NOC RN, bedside report completed and eyes on the pt. Lab results and notes have been reviewed. The pt is resting in bed and all needs are met at this time. The bed is locked/lowered,and the call light is within reach. Will continue to monitor for any changes.

## 2024-12-22 NOTE — ASSESSMENT & PLAN NOTE
Creatinine more than doubled today  At 1.6 from 0.7 yesterday  Hold losartan and metoprolol for now  Pending abdominal ultrasound  Repeat echocardiogram  Labs in a.m.

## 2024-12-22 NOTE — ASSESSMENT & PLAN NOTE
AST trending up  Secondary to acute alcoholic hepatitis  Maddry score low, no steroids  Pending abdominal ultrasound

## 2024-12-22 NOTE — PROGRESS NOTES
Cardiac rhythm: SR/ST  Rate range (shift):     Ectopy: none     AR: 0.12  QRS: 0.09  QT:  0.36

## 2024-12-22 NOTE — CARE PLAN
The patient is Stable - Low risk of patient condition declining or worsening    Shift Goals  Clinical Goals: CIWA, IVF, ABX  Patient Goals: Plan of care  Family Goals: DEBBI    Progress made toward(s) clinical / shift goals:    Problem: Pain - Standard  Goal: Alleviation of pain or a reduction in pain to the patient’s comfort goal  Outcome: Progressing     Problem: Knowledge Deficit - Standard  Goal: Patient and family/care givers will demonstrate understanding of plan of care, disease process/condition, diagnostic tests and medications  Outcome: Progressing     Problem: Optimal Care for Alcohol Withdrawal  Goal: Optimal Care for the alcohol withdrawal patient  Outcome: Progressing       Patient is not progressing towards the following goals:

## 2024-12-22 NOTE — CARE PLAN
The patient is Stable - Low risk of patient condition declining or worsening    Shift Goals  Clinical Goals: Abx, moniotr VS, labs, withdrawal  Patient Goals: rest  Family Goals: DEBBI    Progress made toward(s) clinical / shift goals:    Problem: Pain - Standard  Goal: Alleviation of pain or a reduction in pain to the patient’s comfort goal  Description: Target End Date:  Prior to discharge or change in level of care    Document on Vitals flowsheet    1.  Document pain using the appropriate pain scale per order or unit policy  2.  Educate and implement non-pharmacologic comfort measures (i.e. relaxation, distraction, massage, cold/heat therapy, etc.)  3.  Pain management medications as ordered  4.  Reassess pain after pain med administration per policy  5.  If opiods administered assess patient's response to pain medication is appropriate per POSS sedation scale  6.  Follow pain management plan developed in collaboration with patient and interdisciplinary team (including palliative care or pain specialists if applicable)  Outcome: Progressing  Note: Pain in throat manageable with warm tea, and lozenges Q 2 hours, which Pt requested twice during night.      Problem: Optimal Care for Alcohol Withdrawal  Goal: Optimal Care for the alcohol withdrawal patient  Description: Target End Date:  1 to 3 days    1.  Alcohol history screening done on admission  2.  CIWA-AR score assessment (includes assessment of nausea/vomiting, tremor, sweats, anxiety, agitation, tactile, visual and auditory disturbances, headache and orientation/sensorium).  Document on CIWA flowsheet.  3.  Follow CIWA-AR score protocol  4.  Frequent reorientation  5.  Monitor for fluid and electrolyte imbalance.  6.  Assess for respiratory depression due to sedation (pulse ox)  7.  Consider thiamine, multivitamins, folic acid and magnesium sulfate per provider order  8.  Collaborate with Social Workers/Case Management  9.  Collaborate with mental  health  Outcome: Progressing  Note: CIWA 2 during the night, pt did not require Ativan.      Problem: Respiratory  Goal: Patient will achieve/maintain optimum respiratory ventilation and gas exchange  Description: Target End Date:  Prior to discharge or change in level of care    Document on Assessment flowsheet    1.  Assess and monitor rate, rhythm, depth and effort of respiration  2.  Breath sounds assessed qshift and/or as needed  3.  Assess O2 saturation, administer/titrate oxygen as ordered  4.  Position patient for maximum ventilatory efficiency  5.  Turn, cough, and deep breath with splinting to improve effectiveness  6.  Collaborate with RT to administer medication/treatments per order  7.  Encourage use of incentive spirometer and encourage patient to cough after use and utilize splinting techniques if applicable  8.  Airway suctioning  9.  Monitor sputum production for changes in color, consistency and frequency  10. Perform frequent oral hygiene  11. Alternate physical activity with rest periods  Outcome: Progressing  Note: SpO2 88% on RA during the early morning, SpO2 on 1L 94%. Pt self-motivated to do incentive spirometry. Lungs sounded wheezing, pt refused albuterol spay. Pt reported no SOB, ambulated to bathroom and tolerated well.

## 2024-12-22 NOTE — PROGRESS NOTES
Hospital Medicine Daily Progress Note    Date of Service  12/22/2024    Chief Complaint  Alberto Maldonado is a 39 y.o. male admitted 12/20/2024 with ETOH withdrawal    Hospital Course  39 y.o. male with a past medical history of asthma, hypertension, morbid obesity with a BMI of 45, sleep apnea, currently not on CPAP secondary to insurance issues, and alcohol abuse was admitted on 12/20/2024 for alcohol withdrawal and acute hypoxic respiratory failure later on noted to be secondary to group A Streptococcus pharyngitis.  He was started on broad-spectrum antibiotics and was placed on CIWA monitoring with as needed evidence.      Group A strep PCR positive    Interval Problem Update  Evaluated at bedside  Reporting improvement with withdrawal symptoms  CIWA low, has not required Ativan, DC protocol  CBC with leukocytosis at 12.9  Normocytic anemia with hemoglobin at 12.4  Cr doubled from yesterday  AST trending up slowly  Bili 1.7 and PT 14  Alb stable  Maddry score low, no steroids  Switch to amoxicillin  Hold antihypertensives for now  Pending ABD US (rule out hydronephrosis investigate liver, biliary system and portal veins)  TTE  Labs on AM     I have discussed this patient's plan of care and discharge plan at IDT rounds today with Case Management, Nursing, Nursing leadership, and other members of the IDT team.    Consultants/Specialty  None    Code Status  Full Code    Disposition  The patient is not medically cleared for discharge to home or a post-acute facility.      I have placed the appropriate orders for post-discharge needs.    Review of Systems  Review of Systems   Constitutional:  Positive for malaise/fatigue.   HENT: Negative.     Eyes: Negative.    Respiratory: Negative.     Cardiovascular: Negative.    Gastrointestinal: Negative.    Genitourinary: Negative.    Musculoskeletal: Negative.    Skin: Negative.    Neurological:  Positive for weakness.   Endo/Heme/Allergies: Negative.     Psychiatric/Behavioral: Negative.          Physical Exam  Temp:  [36 °C (96.8 °F)-36.3 °C (97.3 °F)] 36.1 °C (97 °F)  Pulse:  [] 108  Resp:  [18-20] 18  BP: (100-118)/(59-78) 118/66  SpO2:  [88 %-93 %] 91 %    Physical Exam  Constitutional:       General: He is not in acute distress.     Appearance: Normal appearance.   HENT:      Head: Normocephalic and atraumatic.      Nose: Nose normal. No congestion.      Mouth/Throat:      Mouth: Mucous membranes are moist.   Eyes:      Extraocular Movements: Extraocular movements intact.      Pupils: Pupils are equal, round, and reactive to light.   Cardiovascular:      Rate and Rhythm: Regular rhythm. Tachycardia present.      Pulses: Normal pulses.      Heart sounds: Normal heart sounds.   Pulmonary:      Effort: Pulmonary effort is normal.      Breath sounds: Normal breath sounds.   Abdominal:      General: Bowel sounds are normal.      Palpations: Abdomen is soft.      Tenderness: There is no abdominal tenderness.   Musculoskeletal:         General: No swelling. Normal range of motion.      Cervical back: Normal range of motion and neck supple.      Right lower leg: Edema present.      Left lower leg: Edema present.   Skin:     General: Skin is warm.      Coloration: Skin is not jaundiced.   Neurological:      General: No focal deficit present.      Mental Status: He is alert and oriented to person, place, and time. Mental status is at baseline.      Cranial Nerves: No cranial nerve deficit.   Psychiatric:         Mood and Affect: Mood normal.         Behavior: Behavior normal.         Thought Content: Thought content normal.         Judgment: Judgment normal.         Fluids    Intake/Output Summary (Last 24 hours) at 12/22/2024 1540  Last data filed at 12/22/2024 0800  Gross per 24 hour   Intake 4400 ml   Output 0 ml   Net 4400 ml        Laboratory  Recent Labs     12/20/24  2232 12/21/24  0610 12/22/24  0147   WBC 11.1* 9.6 12.9*   RBC 4.66* 4.37* 3.98*    HEMOGLOBIN 14.4 13.9* 12.4*   HEMATOCRIT 42.7 41.5* 37.9*   MCV 91.6 95.0 95.2   MCH 30.9 31.8 31.2   MCHC 33.7 33.5 32.7   RDW 63.0* 66.5* 66.9*   PLATELETCT 247 247 232   MPV 8.4* 8.6* 8.8*     Recent Labs     12/20/24  2232 12/22/24  0147   SODIUM 140 140   POTASSIUM 2.9* 2.9*   CHLORIDE 99 103   CO2 24 22   GLUCOSE 138* 154*   BUN 3* 16   CREATININE 0.72 1.61*   CALCIUM 7.7* 7.5*     Recent Labs     12/22/24  1308   INR 1.09               Imaging  DX-CHEST-PORTABLE (1 VIEW)   Final Result         1.  No acute cardiopulmonary disease.   2.  Cardiomegaly      EC-ECHOCARDIOGRAM COMPLETE W/O CONT    (Results Pending)   US-ABDOMEN COMPLETE SURVEY    (Results Pending)        Assessment/Plan  * Acute renal failure (ARF) (HCC)  Assessment & Plan  Creatinine more than doubled today  At 1.6 from 0.7 yesterday  Hold losartan and metoprolol for now  Pending abdominal ultrasound  Repeat echocardiogram  Labs in a.m.    Acute streptococcal pharyngitis  Assessment & Plan  GAS positive  Amoxicillin    Alcohol withdrawal, uncomplicated (HCC)- (present on admission)  Assessment & Plan  Resolved    Hypocalcemia- (present on admission)  Assessment & Plan  Replete    Hypokalemia- (present on admission)  Assessment & Plan  Replete as necessary    Elevated LFTs- (present on admission)  Assessment & Plan  AST trending up  Secondary to acute alcoholic hepatitis  Maddry score low, no steroids  Pending abdominal ultrasound    Hypomagnesemia- (present on admission)  Assessment & Plan  Replete as necessary    Essential hypertension- (present on admission)  Assessment & Plan  Hold antihypertensives for now  As needed antihypertensives       VTE prophylaxis: Heparin    I have performed a physical exam and reviewed and updated ROS and Plan today (12/22/2024). In review of yesterday's note (12/21/2024), there are no changes except as documented above.    Greater than 51 minutes spent prepping to see patient (e.g. review of tests) obtaining  and/or reviewing separately obtained history. Performing a medically appropriate examination and/ evaluation.  Counseling and educating the patient/family/caregiver.  Ordering medications, tests, or procedures.  Referring and communicating with other health care professionals.  Documenting clinical information in EPIC.  Independently interpreting results and communicating results to patient/family/caregiver.  Care coordination

## 2024-12-23 ENCOUNTER — PHARMACY VISIT (OUTPATIENT)
Dept: PHARMACY | Facility: MEDICAL CENTER | Age: 39
End: 2024-12-23
Payer: MEDICARE

## 2024-12-23 VITALS
SYSTOLIC BLOOD PRESSURE: 135 MMHG | HEART RATE: 100 BPM | DIASTOLIC BLOOD PRESSURE: 78 MMHG | HEIGHT: 63 IN | RESPIRATION RATE: 22 BRPM | WEIGHT: 258.16 LBS | OXYGEN SATURATION: 94 % | TEMPERATURE: 96.8 F | BODY MASS INDEX: 45.74 KG/M2

## 2024-12-23 LAB
ALBUMIN SERPL BCP-MCNC: 3.4 G/DL (ref 3.2–4.9)
ALBUMIN/GLOB SERPL: 1 G/DL
ALP SERPL-CCNC: 145 U/L (ref 30–99)
ALT SERPL-CCNC: 25 U/L (ref 2–50)
ANION GAP SERPL CALC-SCNC: 13 MMOL/L (ref 7–16)
AST SERPL-CCNC: 98 U/L (ref 12–45)
BASOPHILS # BLD AUTO: 0.1 % (ref 0–1.8)
BASOPHILS # BLD: 0.01 K/UL (ref 0–0.12)
BILIRUB SERPL-MCNC: 1.1 MG/DL (ref 0.1–1.5)
BUN SERPL-MCNC: 12 MG/DL (ref 8–22)
CALCIUM ALBUM COR SERPL-MCNC: 8.2 MG/DL (ref 8.5–10.5)
CALCIUM SERPL-MCNC: 7.7 MG/DL (ref 8.5–10.5)
CHLORIDE SERPL-SCNC: 103 MMOL/L (ref 96–112)
CO2 SERPL-SCNC: 23 MMOL/L (ref 20–33)
CREAT SERPL-MCNC: 0.76 MG/DL (ref 0.5–1.4)
EOSINOPHIL # BLD AUTO: 0.64 K/UL (ref 0–0.51)
EOSINOPHIL NFR BLD: 6.2 % (ref 0–6.9)
ERYTHROCYTE [DISTWIDTH] IN BLOOD BY AUTOMATED COUNT: 66.6 FL (ref 35.9–50)
GFR SERPLBLD CREATININE-BSD FMLA CKD-EPI: 119 ML/MIN/1.73 M 2
GLOBULIN SER CALC-MCNC: 3.4 G/DL (ref 1.9–3.5)
GLUCOSE SERPL-MCNC: 116 MG/DL (ref 65–99)
HCT VFR BLD AUTO: 35.9 % (ref 42–52)
HGB BLD-MCNC: 11.8 G/DL (ref 14–18)
IMM GRANULOCYTES # BLD AUTO: 0.12 K/UL (ref 0–0.11)
IMM GRANULOCYTES NFR BLD AUTO: 1.2 % (ref 0–0.9)
LYMPHOCYTES # BLD AUTO: 1.02 K/UL (ref 1–4.8)
LYMPHOCYTES NFR BLD: 9.8 % (ref 22–41)
MAGNESIUM SERPL-MCNC: 2.2 MG/DL (ref 1.5–2.5)
MCH RBC QN AUTO: 31.2 PG (ref 27–33)
MCHC RBC AUTO-ENTMCNC: 32.9 G/DL (ref 32.3–36.5)
MCV RBC AUTO: 95 FL (ref 81.4–97.8)
MONOCYTES # BLD AUTO: 0.73 K/UL (ref 0–0.85)
MONOCYTES NFR BLD AUTO: 7 % (ref 0–13.4)
NEUTROPHILS # BLD AUTO: 7.87 K/UL (ref 1.82–7.42)
NEUTROPHILS NFR BLD: 75.7 % (ref 44–72)
NRBC # BLD AUTO: 0.03 K/UL
NRBC BLD-RTO: 0.3 /100 WBC (ref 0–0.2)
NT-PROBNP SERPL IA-MCNC: 43 PG/ML (ref 0–125)
NT-PROBNP SERPL IA-MCNC: <36 PG/ML (ref 0–125)
PHOSPHATE SERPL-MCNC: 2.7 MG/DL (ref 2.5–4.5)
PLATELET # BLD AUTO: 243 K/UL (ref 164–446)
PMV BLD AUTO: 8.9 FL (ref 9–12.9)
POTASSIUM SERPL-SCNC: 3.4 MMOL/L (ref 3.6–5.5)
PROT SERPL-MCNC: 6.8 G/DL (ref 6–8.2)
RBC # BLD AUTO: 3.78 M/UL (ref 4.7–6.1)
SODIUM SERPL-SCNC: 139 MMOL/L (ref 135–145)
WBC # BLD AUTO: 10.4 K/UL (ref 4.8–10.8)

## 2024-12-23 PROCEDURE — 83880 ASSAY OF NATRIURETIC PEPTIDE: CPT

## 2024-12-23 PROCEDURE — RXMED WILLOW AMBULATORY MEDICATION CHARGE: Performed by: STUDENT IN AN ORGANIZED HEALTH CARE EDUCATION/TRAINING PROGRAM

## 2024-12-23 PROCEDURE — 85025 COMPLETE CBC W/AUTO DIFF WBC: CPT

## 2024-12-23 PROCEDURE — 80053 COMPREHEN METABOLIC PANEL: CPT

## 2024-12-23 PROCEDURE — 84100 ASSAY OF PHOSPHORUS: CPT

## 2024-12-23 PROCEDURE — 83735 ASSAY OF MAGNESIUM: CPT

## 2024-12-23 PROCEDURE — 94660 CPAP INITIATION&MGMT: CPT

## 2024-12-23 PROCEDURE — 700111 HCHG RX REV CODE 636 W/ 250 OVERRIDE (IP): Performed by: STUDENT IN AN ORGANIZED HEALTH CARE EDUCATION/TRAINING PROGRAM

## 2024-12-23 PROCEDURE — 700102 HCHG RX REV CODE 250 W/ 637 OVERRIDE(OP): Performed by: STUDENT IN AN ORGANIZED HEALTH CARE EDUCATION/TRAINING PROGRAM

## 2024-12-23 PROCEDURE — A9270 NON-COVERED ITEM OR SERVICE: HCPCS | Performed by: STUDENT IN AN ORGANIZED HEALTH CARE EDUCATION/TRAINING PROGRAM

## 2024-12-23 PROCEDURE — 99239 HOSP IP/OBS DSCHRG MGMT >30: CPT | Performed by: STUDENT IN AN ORGANIZED HEALTH CARE EDUCATION/TRAINING PROGRAM

## 2024-12-23 RX ORDER — LOSARTAN POTASSIUM 50 MG/1
50 TABLET ORAL
Qty: 30 TABLET | Refills: 2 | Status: SHIPPED | OUTPATIENT
Start: 2024-12-23

## 2024-12-23 RX ORDER — POTASSIUM CHLORIDE 1500 MG/1
40 TABLET, EXTENDED RELEASE ORAL ONCE
Status: COMPLETED | OUTPATIENT
Start: 2024-12-23 | End: 2024-12-23

## 2024-12-23 RX ADMIN — AMOXICILLIN 1000 MG: 500 CAPSULE ORAL at 04:32

## 2024-12-23 RX ADMIN — HEPARIN SODIUM 5000 UNITS: 5000 INJECTION, SOLUTION INTRAVENOUS; SUBCUTANEOUS at 04:32

## 2024-12-23 RX ADMIN — ANTACID TABLETS 500 MG: 500 TABLET, CHEWABLE ORAL at 11:53

## 2024-12-23 RX ADMIN — THIAMINE HYDROCHLORIDE 100 MG: 100 INJECTION, SOLUTION INTRAMUSCULAR; INTRAVENOUS at 04:32

## 2024-12-23 RX ADMIN — FOLIC ACID 1 MG: 1 TABLET ORAL at 04:32

## 2024-12-23 RX ADMIN — POTASSIUM CHLORIDE 40 MEQ: 1500 TABLET, EXTENDED RELEASE ORAL at 08:49

## 2024-12-23 RX ADMIN — ANTACID TABLETS 500 MG: 500 TABLET, CHEWABLE ORAL at 09:29

## 2024-12-23 ASSESSMENT — PAIN DESCRIPTION - PAIN TYPE: TYPE: ACUTE PAIN

## 2024-12-23 ASSESSMENT — FIBROSIS 4 INDEX: FIB4 SCORE: 2.98

## 2024-12-23 NOTE — PROGRESS NOTES
Cardiac rhythm: ST  Rate range (shift): 104-126    Ectopy: PAC, PVC  Frequency: rare     TX: 0.15  QRS: 0.09  QT:  0.29

## 2024-12-23 NOTE — PROGRESS NOTES
Monitor Summary:  Rhythm: sinus rhythm/ tach  Rate: 100-111 bpm  Ectopy: none    .15/.08/.45

## 2024-12-23 NOTE — PROGRESS NOTES
PT discharged from discharge lounge. All discharge instructions reviewed and verbalized understanding. Medications delivered from meds to beds.

## 2024-12-23 NOTE — CARE PLAN
The patient is Stable - Low risk of patient condition declining or worsening    Shift Goals  Clinical Goals: ABX, monitor labs, renal elimination, UA  Patient Goals: rest, sleep, take lozenges for throat discomfort  Family Goals: DEBBI    Progress made toward(s) clinical / shift goals:    Problem: Respiratory  Goal: Patient will achieve/maintain optimum respiratory ventilation and gas exchange  Description: Target End Date:  Prior to discharge or change in level of care    Document on Assessment flowsheet    1.  Assess and monitor rate, rhythm, depth and effort of respiration  2.  Breath sounds assessed qshift and/or as needed  3.  Assess O2 saturation, administer/titrate oxygen as ordered  4.  Position patient for maximum ventilatory efficiency  5.  Turn, cough, and deep breath with splinting to improve effectiveness  6.  Collaborate with RT to administer medication/treatments per order  7.  Encourage use of incentive spirometer and encourage patient to cough after use and utilize splinting techniques if applicable  8.  Airway suctioning  9.  Monitor sputum production for changes in color, consistency and frequency  10. Perform frequent oral hygiene  11. Alternate physical activity with rest periods  Outcome: Progressing  Note: Pt reported avoiding albuterol because it makes him light-headed. PRN breathing treatment changed to levalbuterol. Pt did not request PRN breathing treatment over night. SpO2 94% on RA.      Problem: Fluid Volume  Goal: Fluid volume balance will be maintained  Description: Target End Date:  Prior to discharge or change in level of care    Document on I/O flowsheet    1.  Monitor intake and output as ordered  2.  Promote oral intake as appropriate  3.  Report inadequate intake or output to physician  4.  Administer IV therapy as ordered  5.  Weights per provider order  6.  Assess for signs and symptoms of bleeding  7.  Monitor for signs of fluid overload (respiratory changes, edema, weight  gain, increased abdominal girth)  8.  Monitor of signs for inadequate fluid volume (poor skin turgor, dry mucous membranes)  9.  Instruct patient on adherence to fluid restrictions  Outcome: Progressing  Note: Pt off continuous fluids, CHX showed cardiomegaly but no acute process. +1 edema in both lower extremities. Kidney labs back to normal, pt urinating sufficiently, bladder scan during day shift showed no post-void fluid.

## 2024-12-23 NOTE — DIETARY
NUTRITION SERVICES: BMI - Pt with BMI >40 (=Body mass index is 45.73 kg/m².), morbid obesity. Weight loss counseling not appropriate in acute care setting.     RECOMMEND - If appropriate at DC please refer to outpatient nutrition services for weight management.

## 2024-12-23 NOTE — PROGRESS NOTES
NOC HOSPITALIST CROSS COVER    Notified by RN regarding patient having shortness of breath with associated wheezing, but was refusing albuterol. His pulse oximetry was stable on room air. CXR obtained and showed cardiomegaly, but otherwise no acute cardiopulmonary abnormalities. Change duo-neb treatments to Xopenex.    -----------------------------------------------------------------------------------------------------------    Electronically signed by:  Cassy Santizo, STAN, APRN, PRINCEP-BC  Hospitalist Services

## 2024-12-23 NOTE — PROGRESS NOTES
Discharge orders received. PIV removed. All belongings gathered and sent with patient. All questions and concerns addressed at this time. Discharge lounge orders placed.

## 2024-12-24 NOTE — DISCHARGE SUMMARY
"Discharge Summary    CHIEF COMPLAINT ON ADMISSION  Chief Complaint   Patient presents with    Detox     Pt states his last drink was 2 days ago on 12/18, pt states he typically drinks 10-15 shots/day, pt states he has been on \"a binger lately\"       Reason for Admission  Detox     Admission Date  12/20/2024    CODE STATUS  Prior    HPI & HOSPITAL COURSE  39 y.o. male with a past medical history of asthma, hypertension, morbid obesity with a BMI of 45, sleep apnea, currently not on CPAP secondary to insurance issues, and alcohol abuse was admitted on 12/20/2024 for alcohol withdrawal and acute hypoxic respiratory failure later on noted to be secondary to group A Streptococcus pharyngitis.  He was started on broad-spectrum antibiotics and was ultimately transition to amoxicillin antibiotic regimen which he is to complete in the outpatient setting.  Ultimately she was resolved and he was titrated off of Ativan's.  Through hospital stay, patient noted for AC elevation as well as bilirubin and PT all of which improved with supportive management.  Madrey score low for which patient did not qualify for steroids for acute hepatitis.  His dyspnea improved and h his hypoxia ultimately resolved.  Patient was counseled extensively on importance of alcohol cessation which she voiced understanding and agreement.  Stable patient with chronic condition was discharged home on amoxicillin and instructed to follow-up with his primary care provider in the outpatient setting.    All results and plan of action were discussed with the patient for which he voiced understanding and agreement with the primary care team.  Patient was instructed to return to emergency department symptoms were to worsen.        Therefore, he is discharged in good and stable condition to home with close outpatient follow-up.    The patient met 2-midnight criteria for an inpatient stay at the time of discharge.    Discharge Date  12/23/2024    FOLLOW UP ITEMS " POST DISCHARGE  Primary care provider follow posthospital discharge care    DISCHARGE DIAGNOSES  Principal Problem:    Acute renal failure (ARF) (HCC) (POA: No)  Active Problems:    Acute streptococcal pharyngitis (POA: No)    Essential hypertension (Chronic) (POA: Yes)    Hypomagnesemia (POA: Yes)    Elevated LFTs (Chronic) (POA: Yes)      Overview: [facility] . Next appt 5.14.24    Hypokalemia (POA: Yes)    Hypocalcemia (POA: Yes)    Alcohol withdrawal, uncomplicated (HCC) (POA: Yes)  Resolved Problems:    * No resolved hospital problems. *      FOLLOW UP  No future appointments.  ANGELIA Lincoln  [address] [facility] [address]  [phone]    Follow up  Follow up within 1 week      MEDICATIONS ON DISCHARGE     Medication List        START taking these medications        Instructions   amoxicillin 500 MG Caps  Commonly known as: Amoxil   Take 2 Capsules by mouth every day for 8 days.  Dose: 1,000 mg     Antacid Calcium 500 MG Chew  Generic drug: calcium carbonate   Chew 1 Tablet 3 times a day with meals.  Dose: 500 mg     potassium chloride SA 20 MEQ Tbcr  Commonly known as: Kdur   Take 1 Tablet by mouth every day for 2 days.  Dose: 20 mEq            CONTINUE taking these medications        Instructions   allopurinol 300 MG Tabs  Commonly known as: Zyloprim   Take 1 Tablet by mouth every day.  Dose: 300 mg     amLODIPine 10 MG Tabs  Commonly known as: Norvasc   Take 10 mg by mouth every day.  Dose: 10 mg     benzonatate 100 MG Caps  Commonly known as: Tessalon   Take 200 mg by mouth 3 times a day as needed for Cough. 200 mg = capsules  Dose: 200 mg     losartan 50 MG Tabs  Commonly known as: Cozaar   Doctor's comments: Hold if SBP < 120  Take 1 Tablet by mouth every day.  Dose: 50 mg     metoprolol tartrate 50 MG Tabs  Commonly known as: Lopressor   Take 50 mg by mouth 2 times a day.  Dose: 50 mg            STOP taking these medications      azithromycin 250 MG Tabs  Commonly known as: Zithromax               Allergies  Allergies   Allergen Reactions    Bloodless        DIET  No orders of the defined types were placed in this encounter.      ACTIVITY  As tolerated.  Weight bearing as tolerated    CONSULTATIONS  None    PROCEDURES  None    LABORATORY  Lab Results   Component Value Date    SODIUM 139 12/23/2024    POTASSIUM 3.4 (L) 12/23/2024    CHLORIDE 103 12/23/2024    CO2 23 12/23/2024    GLUCOSE 116 (H) 12/23/2024    BUN 12 12/23/2024    CREATININE 0.76 12/23/2024        Lab Results   Component Value Date    WBC 10.4 12/23/2024    HEMOGLOBIN 11.8 (L) 12/23/2024    HEMATOCRIT 35.9 (L) 12/23/2024    PLATELETCT 243 12/23/2024        Total time of the discharge process exceeds 37 minutes.

## 2024-12-25 NOTE — DOCUMENTATION QUERY
[facility]                                                                       Query Response Note      PATIENT:               ANNIA ALMONTE  ACCT #:                  [id number]  MRN:                     [id number]  :                      1985  ADMIT DATE:       2024 9:26 PM  DISCH DATE:        2024 2:01 PM  RESPONDING  PROVIDER #:        [id number]           QUERY TEXT:    Please provide additional clinical indicators supportive of the documented diagnosis of acute hypoxic respiratory failure documented on , 2 days after arrival. ED RN note - Spo2 87-88% room air, placed on NC 2lpm.   Vitals on arrival ( @ ): RR 18 ->  @ 0100 RR 19, SpO2 91% on 2L O2 NC. ED provider note and H&P respiratory assessment negative.  RR 20 or less, except x2 during the encounter.    Note: If you agree with a diagnosis listed, please remember to include it in your concurrent daily documentation and onto the Discharge Summary.    The patient's Clinical Indicators include:  Vitals on arrival ( @ ): RR 18 ->  @ 0100 RR 19, SpO2 91% on 2L O2 NC    ED RN note - Spo2 87-88% room air, placed on NC 2lpm  ED provider note - ongoing cough, congestion...Normal breath sounds, No respiratory distress  H&P - Respiratory: Negative   PN & DC Summary - admitted for alcohol withdrawal and acute hypoxic respiratory failure later on noted to be secondary to group A Streptococcus pharyngitis    Treatment - Supplemental O2 up to 2L NC    Risk factors - cough/congestion    Thank you,  Rosemary DDOGE  Clinical   Connect via Appinions  Options provided:   -- Condition of acute hypoxic respiratory failure exists, (please document additional clinical indicators)   -- Condition of acute hypoxic respiratory failure does not exist and amended documentation provided in the medical record   -- Other  explanation, (please specify other explanation)      Query created by: Rosemary Lopez on 12/23/2024 6:00 PM    RESPONSE TEXT:    Condition of acute hypoxic respiratory failure exists -          Electronically signed by:  CHLOE SINGLETARY MD 12/25/2024 7:51 AM

## 2025-01-01 ENCOUNTER — APPOINTMENT (OUTPATIENT)
Dept: RADIOLOGY | Facility: MEDICAL CENTER | Age: 40
DRG: 432 | End: 2025-01-01
Attending: STUDENT IN AN ORGANIZED HEALTH CARE EDUCATION/TRAINING PROGRAM
Payer: COMMERCIAL

## 2025-01-01 ENCOUNTER — APPOINTMENT (OUTPATIENT)
Dept: RADIOLOGY | Facility: MEDICAL CENTER | Age: 40
DRG: 432 | End: 2025-01-01
Attending: INTERNAL MEDICINE
Payer: COMMERCIAL

## 2025-01-01 ENCOUNTER — APPOINTMENT (OUTPATIENT)
Dept: CARDIOLOGY | Facility: MEDICAL CENTER | Age: 40
DRG: 432 | End: 2025-01-01
Attending: INTERNAL MEDICINE
Payer: COMMERCIAL

## 2025-01-01 ENCOUNTER — APPOINTMENT (OUTPATIENT)
Dept: RADIOLOGY | Facility: MEDICAL CENTER | Age: 40
DRG: 432 | End: 2025-01-01
Payer: COMMERCIAL

## 2025-01-01 PROCEDURE — 74018 RADEX ABDOMEN 1 VIEW: CPT

## 2025-01-01 PROCEDURE — 71045 X-RAY EXAM CHEST 1 VIEW: CPT

## 2025-01-01 PROCEDURE — 74183 MRI ABD W/O CNTR FLWD CNTR: CPT

## 2025-01-01 PROCEDURE — 76705 ECHO EXAM OF ABDOMEN: CPT

## 2025-01-01 PROCEDURE — 74170 CT ABD WO CNTRST FLWD CNTRST: CPT

## 2025-01-01 PROCEDURE — 93970 EXTREMITY STUDY: CPT

## 2025-03-02 ENCOUNTER — APPOINTMENT (OUTPATIENT)
Dept: RADIOLOGY | Facility: MEDICAL CENTER | Age: 40
DRG: 897 | End: 2025-03-02
Attending: EMERGENCY MEDICINE
Payer: COMMERCIAL

## 2025-03-02 ENCOUNTER — HOSPITAL ENCOUNTER (INPATIENT)
Facility: MEDICAL CENTER | Age: 40
LOS: 4 days | DRG: 897 | End: 2025-03-06
Attending: EMERGENCY MEDICINE | Admitting: HOSPITALIST
Payer: COMMERCIAL

## 2025-03-02 DIAGNOSIS — I10 ESSENTIAL HYPERTENSION: Chronic | ICD-10-CM

## 2025-03-02 DIAGNOSIS — D72.829 LEUKOCYTOSIS, UNSPECIFIED TYPE: ICD-10-CM

## 2025-03-02 DIAGNOSIS — F10.930 ALCOHOL WITHDRAWAL, UNCOMPLICATED (HCC): ICD-10-CM

## 2025-03-02 DIAGNOSIS — R00.0 TACHYCARDIA: ICD-10-CM

## 2025-03-02 DIAGNOSIS — E11.65 CONTROLLED TYPE 2 DIABETES MELLITUS WITH HYPERGLYCEMIA, WITHOUT LONG-TERM CURRENT USE OF INSULIN (HCC): ICD-10-CM

## 2025-03-02 DIAGNOSIS — E83.42 HYPOMAGNESEMIA: ICD-10-CM

## 2025-03-02 DIAGNOSIS — E87.20 ACIDOSIS: ICD-10-CM

## 2025-03-02 DIAGNOSIS — F10.939 ALCOHOL WITHDRAWAL SYNDROME WITH COMPLICATION (HCC): ICD-10-CM

## 2025-03-02 DIAGNOSIS — E87.6 HYPOKALEMIA: ICD-10-CM

## 2025-03-02 LAB
ALBUMIN SERPL BCP-MCNC: 4.3 G/DL (ref 3.2–4.9)
ALBUMIN/GLOB SERPL: 0.8 G/DL
ALP SERPL-CCNC: 142 U/L (ref 30–99)
ALT SERPL-CCNC: 99 U/L (ref 2–50)
ANION GAP SERPL CALC-SCNC: 30 MMOL/L (ref 7–16)
ANISOCYTOSIS BLD QL SMEAR: ABNORMAL
AST SERPL-CCNC: 207 U/L (ref 12–45)
B-OH-BUTYR SERPL-MCNC: 0.55 MMOL/L (ref 0.02–0.27)
BASOPHILS # BLD AUTO: 0.9 % (ref 0–1.8)
BASOPHILS # BLD: 0.13 K/UL (ref 0–0.12)
BILIRUB SERPL-MCNC: 2 MG/DL (ref 0.1–1.5)
BUN SERPL-MCNC: 4 MG/DL (ref 8–22)
CALCIUM ALBUM COR SERPL-MCNC: 8.5 MG/DL (ref 8.5–10.5)
CALCIUM SERPL-MCNC: 8.7 MG/DL (ref 8.5–10.5)
CHLORIDE SERPL-SCNC: 92 MMOL/L (ref 96–112)
CO2 SERPL-SCNC: 14 MMOL/L (ref 20–33)
CREAT SERPL-MCNC: 1.19 MG/DL (ref 0.5–1.4)
EKG IMPRESSION: NORMAL
EKG IMPRESSION: NORMAL
EOSINOPHIL # BLD AUTO: 0 K/UL (ref 0–0.51)
EOSINOPHIL NFR BLD: 0 % (ref 0–6.9)
ERYTHROCYTE [DISTWIDTH] IN BLOOD BY AUTOMATED COUNT: 55.1 FL (ref 35.9–50)
ETHANOL BLD-MCNC: <10.1 MG/DL
GFR SERPLBLD CREATININE-BSD FMLA CKD-EPI: 81 ML/MIN/1.73 M 2
GLOBULIN SER CALC-MCNC: 5.3 G/DL (ref 1.9–3.5)
GLUCOSE SERPL-MCNC: 176 MG/DL (ref 65–99)
HCT VFR BLD AUTO: 48.8 % (ref 42–52)
HGB BLD-MCNC: 16.6 G/DL (ref 14–18)
LIPASE SERPL-CCNC: 29 U/L (ref 11–82)
LYMPHOCYTES # BLD AUTO: 0.38 K/UL (ref 1–4.8)
LYMPHOCYTES NFR BLD: 2.6 % (ref 22–41)
MAGNESIUM SERPL-MCNC: 1.3 MG/DL (ref 1.5–2.5)
MANUAL DIFF BLD: NORMAL
MCH RBC QN AUTO: 29.1 PG (ref 27–33)
MCHC RBC AUTO-ENTMCNC: 34 G/DL (ref 32.3–36.5)
MCV RBC AUTO: 85.5 FL (ref 81.4–97.8)
MONOCYTES # BLD AUTO: 0.5 K/UL (ref 0–0.85)
MONOCYTES NFR BLD AUTO: 3.4 % (ref 0–13.4)
MORPHOLOGY BLD-IMP: NORMAL
NEUTROPHILS # BLD AUTO: 13.69 K/UL (ref 1.82–7.42)
NEUTROPHILS NFR BLD: 93.1 % (ref 44–72)
NRBC # BLD AUTO: 0.02 K/UL
NRBC BLD-RTO: 0.1 /100 WBC (ref 0–0.2)
OSMOLALITY SERPL: 292 MOSM/KG H2O (ref 278–298)
PHOSPHATE SERPL-MCNC: 1.2 MG/DL (ref 2.5–4.5)
PLATELET # BLD AUTO: 332 K/UL (ref 164–446)
PLATELET BLD QL SMEAR: NORMAL
PMV BLD AUTO: 8.5 FL (ref 9–12.9)
POTASSIUM SERPL-SCNC: 2.7 MMOL/L (ref 3.6–5.5)
PROT SERPL-MCNC: 9.6 G/DL (ref 6–8.2)
RBC # BLD AUTO: 5.71 M/UL (ref 4.7–6.1)
RBC BLD AUTO: PRESENT
SODIUM SERPL-SCNC: 136 MMOL/L (ref 135–145)
TSH SERPL-ACNC: 2 UIU/ML (ref 0.35–5.5)
WBC # BLD AUTO: 14.7 K/UL (ref 4.8–10.8)

## 2025-03-02 PROCEDURE — 99222 1ST HOSP IP/OBS MODERATE 55: CPT | Performed by: HOSPITALIST

## 2025-03-02 PROCEDURE — 93005 ELECTROCARDIOGRAM TRACING: CPT | Mod: TC | Performed by: EMERGENCY MEDICINE

## 2025-03-02 PROCEDURE — 83690 ASSAY OF LIPASE: CPT

## 2025-03-02 PROCEDURE — 36415 COLL VENOUS BLD VENIPUNCTURE: CPT

## 2025-03-02 PROCEDURE — 770020 HCHG ROOM/CARE - TELE (206)

## 2025-03-02 PROCEDURE — 82010 KETONE BODYS QUAN: CPT

## 2025-03-02 PROCEDURE — HZ2ZZZZ DETOXIFICATION SERVICES FOR SUBSTANCE ABUSE TREATMENT: ICD-10-PCS | Performed by: HOSPITALIST

## 2025-03-02 PROCEDURE — A9270 NON-COVERED ITEM OR SERVICE: HCPCS | Performed by: HOSPITALIST

## 2025-03-02 PROCEDURE — 71045 X-RAY EXAM CHEST 1 VIEW: CPT

## 2025-03-02 PROCEDURE — 83735 ASSAY OF MAGNESIUM: CPT

## 2025-03-02 PROCEDURE — 700101 HCHG RX REV CODE 250: Performed by: HOSPITALIST

## 2025-03-02 PROCEDURE — 93005 ELECTROCARDIOGRAM TRACING: CPT | Mod: TC

## 2025-03-02 PROCEDURE — 99285 EMERGENCY DEPT VISIT HI MDM: CPT

## 2025-03-02 PROCEDURE — 85027 COMPLETE CBC AUTOMATED: CPT

## 2025-03-02 PROCEDURE — 80053 COMPREHEN METABOLIC PANEL: CPT

## 2025-03-02 PROCEDURE — 85007 BL SMEAR W/DIFF WBC COUNT: CPT

## 2025-03-02 PROCEDURE — 82077 ASSAY SPEC XCP UR&BREATH IA: CPT

## 2025-03-02 PROCEDURE — 700105 HCHG RX REV CODE 258: Mod: UD | Performed by: EMERGENCY MEDICINE

## 2025-03-02 PROCEDURE — 700105 HCHG RX REV CODE 258: Performed by: HOSPITALIST

## 2025-03-02 PROCEDURE — 700102 HCHG RX REV CODE 250 W/ 637 OVERRIDE(OP): Performed by: HOSPITALIST

## 2025-03-02 PROCEDURE — 96375 TX/PRO/DX INJ NEW DRUG ADDON: CPT

## 2025-03-02 PROCEDURE — 700111 HCHG RX REV CODE 636 W/ 250 OVERRIDE (IP): Performed by: HOSPITALIST

## 2025-03-02 PROCEDURE — 84443 ASSAY THYROID STIM HORMONE: CPT

## 2025-03-02 PROCEDURE — 83930 ASSAY OF BLOOD OSMOLALITY: CPT

## 2025-03-02 PROCEDURE — 84100 ASSAY OF PHOSPHORUS: CPT

## 2025-03-02 PROCEDURE — 96365 THER/PROPH/DIAG IV INF INIT: CPT

## 2025-03-02 PROCEDURE — 700111 HCHG RX REV CODE 636 W/ 250 OVERRIDE (IP): Mod: UD | Performed by: EMERGENCY MEDICINE

## 2025-03-02 RX ORDER — ADENOSINE 3 MG/ML
12 INJECTION, SOLUTION INTRAVENOUS ONCE
Status: COMPLETED | OUTPATIENT
Start: 2025-03-02 | End: 2025-03-02

## 2025-03-02 RX ORDER — MAGNESIUM SULFATE HEPTAHYDRATE 40 MG/ML
2 INJECTION, SOLUTION INTRAVENOUS ONCE
Status: COMPLETED | OUTPATIENT
Start: 2025-03-02 | End: 2025-03-02

## 2025-03-02 RX ORDER — POTASSIUM CHLORIDE 7.45 MG/ML
10 INJECTION INTRAVENOUS
Status: DISCONTINUED | OUTPATIENT
Start: 2025-03-02 | End: 2025-03-02

## 2025-03-02 RX ORDER — ENOXAPARIN SODIUM 100 MG/ML
40 INJECTION SUBCUTANEOUS DAILY
Status: DISCONTINUED | OUTPATIENT
Start: 2025-03-02 | End: 2025-03-04

## 2025-03-02 RX ORDER — LORAZEPAM 2 MG/1
2 TABLET ORAL
Status: DISCONTINUED | OUTPATIENT
Start: 2025-03-02 | End: 2025-03-06 | Stop reason: HOSPADM

## 2025-03-02 RX ORDER — LABETALOL HYDROCHLORIDE 5 MG/ML
10 INJECTION, SOLUTION INTRAVENOUS EVERY 4 HOURS PRN
Status: DISCONTINUED | OUTPATIENT
Start: 2025-03-02 | End: 2025-03-06 | Stop reason: HOSPADM

## 2025-03-02 RX ORDER — GAUZE BANDAGE 2" X 2"
100 BANDAGE TOPICAL DAILY
Status: COMPLETED | OUTPATIENT
Start: 2025-03-02 | End: 2025-03-05

## 2025-03-02 RX ORDER — CHLORDIAZEPOXIDE HYDROCHLORIDE 25 MG/1
25 CAPSULE, GELATIN COATED ORAL EVERY 6 HOURS
Status: DISCONTINUED | OUTPATIENT
Start: 2025-03-03 | End: 2025-03-03

## 2025-03-02 RX ORDER — LORAZEPAM 2 MG/ML
0.5 INJECTION INTRAMUSCULAR EVERY 4 HOURS PRN
Status: DISCONTINUED | OUTPATIENT
Start: 2025-03-02 | End: 2025-03-06 | Stop reason: HOSPADM

## 2025-03-02 RX ORDER — INDOMETHACIN 25 MG/1
25 CAPSULE ORAL 2 TIMES DAILY PRN
Status: ON HOLD | COMMUNITY
Start: 2025-01-14 | End: 2025-03-06

## 2025-03-02 RX ORDER — LORAZEPAM 2 MG/1
4 TABLET ORAL
Status: DISCONTINUED | OUTPATIENT
Start: 2025-03-02 | End: 2025-03-06 | Stop reason: HOSPADM

## 2025-03-02 RX ORDER — LORAZEPAM 1 MG/1
1 TABLET ORAL EVERY 4 HOURS PRN
Status: DISCONTINUED | OUTPATIENT
Start: 2025-03-02 | End: 2025-03-06 | Stop reason: HOSPADM

## 2025-03-02 RX ORDER — LOSARTAN POTASSIUM 100 MG/1
100 TABLET ORAL DAILY
Status: ON HOLD | COMMUNITY
End: 2025-03-06

## 2025-03-02 RX ORDER — LORAZEPAM 2 MG/ML
1 INJECTION INTRAMUSCULAR
Status: DISCONTINUED | OUTPATIENT
Start: 2025-03-02 | End: 2025-03-06 | Stop reason: HOSPADM

## 2025-03-02 RX ORDER — ACETAMINOPHEN 325 MG/1
650 TABLET ORAL EVERY 6 HOURS PRN
Status: DISCONTINUED | OUTPATIENT
Start: 2025-03-02 | End: 2025-03-06 | Stop reason: HOSPADM

## 2025-03-02 RX ORDER — LORAZEPAM 2 MG/ML
2 INJECTION INTRAMUSCULAR ONCE
Status: COMPLETED | OUTPATIENT
Start: 2025-03-02 | End: 2025-03-02

## 2025-03-02 RX ORDER — LORAZEPAM 0.5 MG/1
0.5 TABLET ORAL EVERY 4 HOURS PRN
Status: DISCONTINUED | OUTPATIENT
Start: 2025-03-02 | End: 2025-03-06 | Stop reason: HOSPADM

## 2025-03-02 RX ORDER — SODIUM CHLORIDE 9 MG/ML
1000 INJECTION, SOLUTION INTRAVENOUS ONCE
Status: COMPLETED | OUTPATIENT
Start: 2025-03-02 | End: 2025-03-02

## 2025-03-02 RX ORDER — LORAZEPAM 2 MG/ML
2 INJECTION INTRAMUSCULAR
Status: DISCONTINUED | OUTPATIENT
Start: 2025-03-02 | End: 2025-03-06 | Stop reason: HOSPADM

## 2025-03-02 RX ORDER — LOSARTAN POTASSIUM 50 MG/1
100 TABLET ORAL DAILY
Status: DISCONTINUED | OUTPATIENT
Start: 2025-03-02 | End: 2025-03-06 | Stop reason: HOSPADM

## 2025-03-02 RX ORDER — TRIAMCINOLONE ACETONIDE 0.25 MG/G
CREAM TOPICAL 2 TIMES DAILY
Status: DISCONTINUED | OUTPATIENT
Start: 2025-03-02 | End: 2025-03-06 | Stop reason: HOSPADM

## 2025-03-02 RX ORDER — DILTIAZEM HYDROCHLORIDE 5 MG/ML
20 INJECTION INTRAVENOUS ONCE
Status: COMPLETED | OUTPATIENT
Start: 2025-03-02 | End: 2025-03-02

## 2025-03-02 RX ORDER — SODIUM CHLORIDE, SODIUM LACTATE, POTASSIUM CHLORIDE, CALCIUM CHLORIDE 600; 310; 30; 20 MG/100ML; MG/100ML; MG/100ML; MG/100ML
INJECTION, SOLUTION INTRAVENOUS CONTINUOUS
Status: DISCONTINUED | OUTPATIENT
Start: 2025-03-02 | End: 2025-03-03

## 2025-03-02 RX ORDER — FOLIC ACID 1 MG/1
1 TABLET ORAL DAILY
Status: COMPLETED | OUTPATIENT
Start: 2025-03-02 | End: 2025-03-05

## 2025-03-02 RX ORDER — CHLORDIAZEPOXIDE HYDROCHLORIDE 25 MG/1
50 CAPSULE, GELATIN COATED ORAL EVERY 6 HOURS
Status: COMPLETED | OUTPATIENT
Start: 2025-03-02 | End: 2025-03-03

## 2025-03-02 RX ORDER — CARVEDILOL 6.25 MG/1
6.25 TABLET ORAL 2 TIMES DAILY WITH MEALS
Status: DISCONTINUED | OUTPATIENT
Start: 2025-03-02 | End: 2025-03-06 | Stop reason: HOSPADM

## 2025-03-02 RX ORDER — MAGNESIUM SULFATE HEPTAHYDRATE 40 MG/ML
4 INJECTION, SOLUTION INTRAVENOUS ONCE
Status: COMPLETED | OUTPATIENT
Start: 2025-03-02 | End: 2025-03-02

## 2025-03-02 RX ORDER — LORAZEPAM 2 MG/ML
1.5 INJECTION INTRAMUSCULAR
Status: DISCONTINUED | OUTPATIENT
Start: 2025-03-02 | End: 2025-03-06 | Stop reason: HOSPADM

## 2025-03-02 RX ADMIN — POTASSIUM CHLORIDE 10 MEQ: 7.46 INJECTION, SOLUTION INTRAVENOUS at 09:55

## 2025-03-02 RX ADMIN — CHLORDIAZEPOXIDE HYDROCHLORIDE 50 MG: 25 CAPSULE ORAL at 17:40

## 2025-03-02 RX ADMIN — ADENOSINE 6 MG: 3 INJECTION INTRAVENOUS at 08:42

## 2025-03-02 RX ADMIN — LORAZEPAM 0.5 MG: 0.5 TABLET ORAL at 20:53

## 2025-03-02 RX ADMIN — FOLIC ACID 1 MG: 1 TABLET ORAL at 10:52

## 2025-03-02 RX ADMIN — CARVEDILOL 6.25 MG: 6.25 TABLET, FILM COATED ORAL at 17:41

## 2025-03-02 RX ADMIN — TRIAMCINOLONE ACETONIDE: 0.25 CREAM TOPICAL at 12:34

## 2025-03-02 RX ADMIN — MAGNESIUM SULFATE HEPTAHYDRATE 4 G: 4 INJECTION, SOLUTION INTRAVENOUS at 12:31

## 2025-03-02 RX ADMIN — LORAZEPAM 2 MG: 2 TABLET ORAL at 10:53

## 2025-03-02 RX ADMIN — CHLORDIAZEPOXIDE HYDROCHLORIDE 50 MG: 25 CAPSULE ORAL at 12:32

## 2025-03-02 RX ADMIN — MAGNESIUM SULFATE HEPTAHYDRATE 2 G: 2 INJECTION, SOLUTION INTRAVENOUS at 09:55

## 2025-03-02 RX ADMIN — LOSARTAN POTASSIUM 100 MG: 50 TABLET, FILM COATED ORAL at 10:52

## 2025-03-02 RX ADMIN — POTASSIUM PHOSPHATE, MONOBASIC AND POTASSIUM PHOSPHATE, DIBASIC 30 MMOL: 224; 236 INJECTION, SOLUTION, CONCENTRATE INTRAVENOUS at 12:30

## 2025-03-02 RX ADMIN — TRIAMCINOLONE ACETONIDE: 0.25 CREAM TOPICAL at 17:42

## 2025-03-02 RX ADMIN — ENOXAPARIN SODIUM 40 MG: 100 INJECTION SUBCUTANEOUS at 17:42

## 2025-03-02 RX ADMIN — ADENOSINE 12 MG: 3 INJECTION INTRAVENOUS at 08:45

## 2025-03-02 RX ADMIN — CARVEDILOL 6.25 MG: 6.25 TABLET, FILM COATED ORAL at 10:52

## 2025-03-02 RX ADMIN — Medication 100 MG: at 10:53

## 2025-03-02 RX ADMIN — SODIUM CHLORIDE, POTASSIUM CHLORIDE, SODIUM LACTATE AND CALCIUM CHLORIDE: 600; 310; 30; 20 INJECTION, SOLUTION INTRAVENOUS at 11:09

## 2025-03-02 RX ADMIN — SODIUM CHLORIDE 1000 ML: 9 INJECTION, SOLUTION INTRAVENOUS at 08:54

## 2025-03-02 RX ADMIN — THERA TABS 1 TABLET: TAB at 10:53

## 2025-03-02 RX ADMIN — ACETAMINOPHEN 650 MG: 325 TABLET ORAL at 20:51

## 2025-03-02 RX ADMIN — LORAZEPAM 2 MG: 2 INJECTION INTRAMUSCULAR; INTRAVENOUS at 08:38

## 2025-03-02 RX ADMIN — DILTIAZEM HYDROCHLORIDE 20 MG: 5 INJECTION, SOLUTION INTRAVENOUS at 08:54

## 2025-03-02 RX ADMIN — SODIUM CHLORIDE, POTASSIUM CHLORIDE, SODIUM LACTATE AND CALCIUM CHLORIDE: 600; 310; 30; 20 INJECTION, SOLUTION INTRAVENOUS at 21:50

## 2025-03-02 ASSESSMENT — ENCOUNTER SYMPTOMS
NAUSEA: 1
TREMORS: 1
NERVOUS/ANXIOUS: 1
FEVER: 0
VOMITING: 1
PALPITATIONS: 1

## 2025-03-02 ASSESSMENT — LIFESTYLE VARIABLES
ORIENTATION AND CLOUDING OF SENSORIUM: ORIENTED AND CAN DO SERIAL ADDITIONS
VISUAL DISTURBANCES: NOT PRESENT
TOTAL SCORE: 4
TOTAL SCORE: 13
CONSUMPTION TOTAL: POSITIVE
TOTAL SCORE: 4
ORIENTATION AND CLOUDING OF SENSORIUM: ORIENTED AND CAN DO SERIAL ADDITIONS
NAUSEA AND VOMITING: NO NAUSEA AND NO VOMITING
AUDITORY DISTURBANCES: NOT PRESENT
AGITATION: NORMAL ACTIVITY
AUDITORY DISTURBANCES: NOT PRESENT
AGITATION: NORMAL ACTIVITY
NAUSEA AND VOMITING: MILD NAUSEA WITH NO VOMITING
ANXIETY: *
AVERAGE NUMBER OF DAYS PER WEEK YOU HAVE A DRINK CONTAINING ALCOHOL: 7
NAUSEA AND VOMITING: NO NAUSEA AND NO VOMITING
TREMOR: TREMOR NOT VISIBLE BUT CAN BE FELT, FINGERTIP TO FINGERTIP
TREMOR: TREMOR NOT VISIBLE BUT CAN BE FELT, FINGERTIP TO FINGERTIP
HAVE YOU EVER FELT YOU SHOULD CUT DOWN ON YOUR DRINKING: YES
ANXIETY: *
VISUAL DISTURBANCES: NOT PRESENT
TOTAL SCORE: 2
ORIENTATION AND CLOUDING OF SENSORIUM: ORIENTED AND CAN DO SERIAL ADDITIONS
DOES PATIENT WANT TO STOP DRINKING: YES
ANXIETY: NO ANXIETY (AT EASE)
TREMOR: MODERATE TREMOR WITH ARMS EXTENDED
PAROXYSMAL SWEATS: *
PAROXYSMAL SWEATS: NO SWEAT VISIBLE
HEADACHE, FULLNESS IN HEAD: NOT PRESENT
VISUAL DISTURBANCES: NOT PRESENT
ANXIETY: NO ANXIETY (AT EASE)
ALCOHOL_USE: YES
TOTAL SCORE: 5
TOTAL SCORE: 4
ON A TYPICAL DAY WHEN YOU DRINK ALCOHOL HOW MANY DRINKS DO YOU HAVE: 15
HEADACHE, FULLNESS IN HEAD: MILD
AUDITORY DISTURBANCES: NOT PRESENT
EVER HAD A DRINK FIRST THING IN THE MORNING TO STEADY YOUR NERVES TO GET RID OF A HANGOVER: YES
TREMOR: *
HOW MANY TIMES IN THE PAST YEAR HAVE YOU HAD 5 OR MORE DRINKS IN A DAY: 120
HEADACHE, FULLNESS IN HEAD: VERY MILD
DOES PATIENT WANT TO TALK TO SOMEONE ABOUT QUITTING: YES
PAROXYSMAL SWEATS: BARELY PERCEPTIBLE SWEATING. PALMS MOIST
AGITATION: NORMAL ACTIVITY
PAROXYSMAL SWEATS: *
HEADACHE, FULLNESS IN HEAD: NOT PRESENT
NAUSEA AND VOMITING: NO NAUSEA AND NO VOMITING
AGITATION: *
VISUAL DISTURBANCES: NOT PRESENT
ORIENTATION AND CLOUDING OF SENSORIUM: ORIENTED AND CAN DO SERIAL ADDITIONS
HAVE PEOPLE ANNOYED YOU BY CRITICIZING YOUR DRINKING: YES
EVER FELT BAD OR GUILTY ABOUT YOUR DRINKING: YES
TOTAL SCORE: 4
AUDITORY DISTURBANCES: NOT PRESENT

## 2025-03-02 ASSESSMENT — COGNITIVE AND FUNCTIONAL STATUS - GENERAL
CLIMB 3 TO 5 STEPS WITH RAILING: A LITTLE
MOBILITY SCORE: 18
DRESSING REGULAR LOWER BODY CLOTHING: A LITTLE
SUGGESTED CMS G CODE MODIFIER DAILY ACTIVITY: CI
TURNING FROM BACK TO SIDE WHILE IN FLAT BAD: A LITTLE
MOVING TO AND FROM BED TO CHAIR: A LITTLE
DAILY ACTIVITIY SCORE: 23
STANDING UP FROM CHAIR USING ARMS: A LITTLE
MOVING FROM LYING ON BACK TO SITTING ON SIDE OF FLAT BED: A LITTLE
WALKING IN HOSPITAL ROOM: A LITTLE
SUGGESTED CMS G CODE MODIFIER MOBILITY: CK

## 2025-03-02 ASSESSMENT — SOCIAL DETERMINANTS OF HEALTH (SDOH)
IN THE PAST 12 MONTHS, HAS THE ELECTRIC, GAS, OIL, OR WATER COMPANY THREATENED TO SHUT OFF SERVICE IN YOUR HOME?: NO
WITHIN THE PAST 12 MONTHS, YOU WORRIED THAT YOUR FOOD WOULD RUN OUT BEFORE YOU GOT THE MONEY TO BUY MORE: NEVER TRUE
WITHIN THE LAST YEAR, HAVE YOU BEEN AFRAID OF YOUR PARTNER OR EX-PARTNER?: NO
WITHIN THE LAST YEAR, HAVE YOU BEEN HUMILIATED OR EMOTIONALLY ABUSED IN OTHER WAYS BY YOUR PARTNER OR EX-PARTNER?: NO
WITHIN THE LAST YEAR, HAVE TO BEEN RAPED OR FORCED TO HAVE ANY KIND OF SEXUAL ACTIVITY BY YOUR PARTNER OR EX-PARTNER?: NO
WITHIN THE LAST YEAR, HAVE YOU BEEN KICKED, HIT, SLAPPED, OR OTHERWISE PHYSICALLY HURT BY YOUR PARTNER OR EX-PARTNER?: NO
WITHIN THE PAST 12 MONTHS, THE FOOD YOU BOUGHT JUST DIDN'T LAST AND YOU DIDN'T HAVE MONEY TO GET MORE: NEVER TRUE

## 2025-03-02 ASSESSMENT — PAIN DESCRIPTION - PAIN TYPE
TYPE: ACUTE PAIN

## 2025-03-02 ASSESSMENT — FIBROSIS 4 INDEX
FIB4 SCORE: 2.98
FIB4 SCORE: 2.32

## 2025-03-02 NOTE — PROGRESS NOTES
Report received from Annie REINA Patient transported to room  Patient placed on tele. On room air. A&O x4. Oriented to room. Educated on fall risk, all fall precautions in place. Call light within reach, bed locked and in lowest position, denied other needs at this time.

## 2025-03-02 NOTE — ED NOTES
Medication history reviewed with PT at bedside with PT's medication bottles. PT confirms that 2 medication bottles were returned to him.    Med rec is complete per PT reporting    Allergies reviewed.     Patient denies any outpatient antibiotics in the last 30 days.     Patient is not taking anticoagulants.    Preferred pharmacy for this visit - [facility] on [address] ([phone])

## 2025-03-02 NOTE — ED TRIAGE NOTES
"Chief Complaint   Patient presents with    Detox    Tachycardia     Patient wheeled into triage. States his last drink was yesterday could not tell me what time.   States he drinks 10 little shot bottles a day. Does not know how long he has been drinking.   Heart rate in the 150  Has not been taking his medication. He want to quit drinking.   Denies SI/HI  Patient would like someone to look at his penis because he thinks it might be \"sucked in\".    Patient is anxious but agreeable during triage.   A+Ox4.   EKG preformed.   "

## 2025-03-02 NOTE — ASSESSMENT & PLAN NOTE
IVF  Monitor for symptoms of withdrawal.  [facility] protocol  Tele monitoring  MV, folate and VitB1  Seizure, aspiration and fall precautions  Patient was counseled on alcohol cessation  Social service consult for enrollment alcohol rehabilitation program or attend Alcoholics Anonymous

## 2025-03-02 NOTE — ED NOTES
Bedside report received from off going RN/tech: Ángel, assumed care of patient.  Dr. Xavi Sparrow at bedside. POC discussed with patient. Call light within reach, all needs addressed at this time.       Fall risk interventions in place: Patient's personal possessions are with in their safe reach, Place socks on patient, Give patient urinal if applicable, Keep floor surfaces clean and dry, and Accompanied to restroom (all applicable per [place] Fall risk assessment)   Continuous monitoring: Cardiac Leads, Pulse Ox, or Blood Pressure  IVF/IV medications: Infusion per MAR (List Med(s)) NS, Kcl, and Magnesium Sulfate  Oxygen: Room Air  Bedside sitter: Not Applicable   Isolation: Not Applicable

## 2025-03-02 NOTE — ASSESSMENT & PLAN NOTE
Likely reactive secondary to alcohol withdrawal and electrolyte abnormalities  IV hydration replete electrolytes  Treat alcohol withdrawal  Reviewed echocardiogram from 4/22/2024 with normal EF

## 2025-03-02 NOTE — ASSESSMENT & PLAN NOTE
Hypokalemia  Hypomagnesemia  Hypophosphatemia    I ordered IV K-Phos magnesium sulfate  I ordered repeat electrolytes

## 2025-03-02 NOTE — ED PROVIDER NOTES
"ED Provider Note    CHIEF COMPLAINT  Chief Complaint   Patient presents with    Detox    Tachycardia       EXTERNAL RECORDS REVIEWED  Inpatient Notes I reviewed the patient's discharge summary from December 23, 2024 when he was admitted to the hospital for alcohol detox    HPI/ROS  LIMITATION TO HISTORY   Select: : None  OUTSIDE HISTORIAN(S):  Brother    Alberto Maldonado is a 39 y.o. male who presents with past medical history significant for asthma and hypertension, he has alcohol dependence, he reports that his last drink was yesterday morning and has had some vomiting and feels shaky.    PAST MEDICAL HISTORY   has a past medical history of Asthma without status asthmaticus (06/29/2017), Chickenpox, and Hypertension.    SURGICAL HISTORY  patient denies any surgical history    FAMILY HISTORY  Family History   Problem Relation Age of Onset    Stroke Neg Hx     Heart Disease Neg Hx        SOCIAL HISTORY  Social History     Tobacco Use    Smoking status: Never    Smokeless tobacco: Never   Vaping Use    Vaping status: Never Used   Substance and Sexual Activity    Alcohol use: Yes     Alcohol/week: 7.2 oz     Types: 12 Shots of liquor per week     Comment: 10-15 vodka mini bottles per day    Drug use: Not Currently    Sexual activity: Not Currently       CURRENT MEDICATIONS  Home Medications       Reviewed by Parvin Dye R.N. (Registered Nurse) on 03/02/25 at 0746  Med List Status: Partial     Medication Last Dose Status   allopurinol (ZYLOPRIM) 300 MG Tab  Active   amLODIPine (NORVASC) 10 MG Tab  Active   benzonatate (TESSALON) 100 MG Cap  Active   calcium carbonate (TUMS) 500 MG Chew Tab  Active   losartan (COZAAR) 50 MG Tab  Active   metoprolol tartrate (LOPRESSOR) 50 MG Tab  Active                         ALLERGIES  No Known Allergies    PHYSICAL EXAM  VITAL SIGNS: BP (!) 174/95   Pulse (!) 153   Temp 36.6 °C (97.8 °F) (Temporal)   Resp (!) 26   Ht 1.6 m (5' 3\")   Wt 117 kg (258 lb)   SpO2 94%   " BMI 45.70 kg/m²      Constitutional: Alert.  HENT: No signs of trauma, Bilateral external ears normal, Nose normal.   Eyes: Pupils are equal and reactive, Conjunctiva normal, Non-icteric.   Neck: Normal range of motion, No tenderness, Supple, No stridor.   Lymphatic: No lymphadenopathy noted.   Cardiovascular: Tachycardic, regular.  Thorax & Lungs: Normal breath sounds, No respiratory distress, No wheezing, No chest tenderness.   Abdomen: Bowel sounds normal, Soft, No tenderness, No peritoneal signs, No masses, No pulsatile masses.   Skin: Warm, Dry, No erythema, No rash.   Back: No bony tenderness, No CVA tenderness.   Extremities: Intact distal pulses, No edema, No tenderness, No cyanosis.  Musculoskeletal: Good range of motion in all major joints. No tenderness to palpation or major deformities noted.   Neurologic: Alert , Normal motor function, Normal sensory function, No focal deficits noted.   Psychiatric: Affect normal, Judgment normal, Mood normal.   Genitourinary: The patient has a penis that is surrounded by his adipose tissue, there is no abnormality.    EKG/LABS    Sinus or ectopic atrial tachycardia rate 154, likely atrial flutter  axis normal  Prolonged QT interval 511  nonspecific st t wave changes  Compared to ECG 12/21/2024 02:28:25  Nonspecific changes, my impression of this EKG, sinus tachycardia, does not meet STEMI criteria at this time    I have independently interpreted this EKG    After diltiazem 20 mg IV was given repeat EKG was performed.  This is sinus tachycardia at a rate of 136.  The axis is normal.  The intervals are normal.  There are nonspecific ST-T wave changes.  The computer is read this as an acute MI however this does not represent STEMI criteria.  My impression of this EKG, nonspecific changes, does not meet STEMI criteria at this time.  Does not indicate atrial flutter.    I have independently interpreted this EKG    RADIOLOGY/PROCEDURES   I have independently interpreted the  diagnostic imaging associated with this visit and am waiting the final reading from the radiologist.   My preliminary interpretation is as follows: Chest x-ray negative for acute disease    Radiologist interpretation:  DX-CHEST-PORTABLE (1 VIEW)   Final Result         1. No acute cardiopulmonary abnormalities identified.                         COURSE & MEDICAL DECISION MAKING    ASSESSMENT, COURSE AND PLAN  Care Narrative:     The patient presents with a heart rate of 150 that is likely atrial flutter.  EKG was ordered, labs were ordered, chest x-ray was ordered.      EKG is likely a flutter.  It is not certain.  I gave him adenosine 6 mg and then 12 mg IV and this did not reveal decreased heart rate or flutter waves.  At this point I will rate control the patient with 20 mg IV diltiazem and give him 1 L NS IV.    The patient CO2 is 14 and his anion gap is 30.  His LFTs are elevated.  He has an elevated white blood count that is likely an acute phase reactant, he does not appear to be infected.  He likely is an alcoholic ketoacidosis.  I will add serum ketones.    After diltiazem and IV fluids the patient's heart rate has improved to 130 but it still appears that it is sinus tachycardia not atrial flutter.  His labs indicate hypokalemia and hypomagnesemia.  I have ordered 10 mill equivalents IV potassium x 2 as well as 2 mg IV magnesium.  At this time the patient will be assessed by the hospitalist for hospitalization.            ADDITIONAL PROBLEMS MANAGED  Hypokalemia, hypomagnesemia, tachycardia, alcohol dependence    DISPOSITION AND DISCUSSIONS  I have discussed management of the patient with the following physicians and BATSHEVA's: I spoke with [facility] hospitalist to assess the patient for hospitalization    Discussion of management with other [facility] or appropriate source(s): Pharmacy discussed   magnesium and potassium repletion    Escalation of care considered, and ultimately not performed: Patient is not having a  STEMI does not need the Cath Lab emergently, he does not require cardioversion, he is in sinus tachycardia not a flutter    Barriers to care at this time, including but not limited to:  Patient is dependent on alcohol .     Decision tools and prescription drugs considered including, but not limited to:  Patient rate controlled with Cardizem .        CRITICAL CARE  The very real possibilty of a deterioration of this patient's condition required the highest level of my preparedness for sudden, emergent intervention.  I provided critical care services, which included medication orders, frequent reevaluations of the patient's condition and response to treatment, ordering and reviewing test results, and discussing the case with various consultants.  The critical care time associated with the care of the patient was 40 minutes. Review chart for interventions. This time is exclusive of any other billable procedures.           FINAL DIAGNOSIS  1. Alcohol withdrawal syndrome with complication (HCC)    2. Tachycardia    3. Hypokalemia    4. Hypomagnesemia    5. Acidosis    6. Leukocytosis, unspecified type        Total critical care time 40 minutes as outlined above     Electronically signed by: Augustin Castanon M.D., 3/2/2025 8:20 AM

## 2025-03-02 NOTE — H&P
Hospital Medicine History & Physical Note    Date of Service  3/2/2025    Primary Care Physician  ANGELIA Lincoln    Consultants       Code Status  Full Code    Chief Complaint  Chief Complaint   Patient presents with    Detox    Tachycardia       History of Presenting Illness  Alberto Maldonado is a 39 y.o. male who presented 3/2/2025 with history of alcohol dependence reports that he has relapsed and for the past 6 weeks has been drinking 10-15 shots daily his last drink was yesterday.  This morning he started having tremors and palpitations and decided to present to the emergency department.  He has prior history of alcohol withdrawal requiring hospitalization previously was in AA meetings however relapsed 6 weeks ago.  He reports nausea and vomiting.  Has a history of sleep apnea recently provided with CPAP mask and is in the process of starting CPAP therapy.  On presentation he was noted to be tachycardic he received adenosine and Cardizem noted to be in sinus tachycardia.  He denies any chest pain no fever no chills.  He was noted to have severe hypokalemia hypophosphatemia and hypomagnesemia.      I discussed the plan of care with patient and ER physician .    Review of Systems  Review of Systems   Constitutional:  Negative for fever.   Cardiovascular:  Positive for palpitations.   Gastrointestinal:  Positive for nausea and vomiting.   Skin:  Positive for rash (Right foot).   Neurological:  Positive for tremors.   Psychiatric/Behavioral:  The patient is nervous/anxious.        Past Medical History   has a past medical history of Asthma without status asthmaticus (06/29/2017), Chickenpox, and Hypertension.    Surgical History  Reviewed and not pertinent to the presenting problem    Family History    Family history reviewed with patient. There is no family history that is pertinent to the chief complaint.     Social History   reports that he has never smoked. He has never used smokeless tobacco. He  reports current alcohol use of about 7.2 oz of alcohol per week. He reports that he does not currently use drugs.    Allergies  No Known Allergies    Medications  Prior to Admission Medications   Prescriptions Last Dose Informant Patient Reported? Taking?   indomethacin (INDOCIN) 25 MG Cap 2/16/2025 Patient, Rx Bottle (For Med Information) Yes Yes   Sig: Take 25 mg by mouth 2 times a day as needed (gout flare).   losartan (COZAAR) 100 MG Tab 2/20/2025 Morning Patient, Rx Bottle (For Med Information) Yes Yes   Sig: Take 100 mg by mouth every day.      Facility-Administered Medications: None       Physical Exam  Temp:  [36.6 °C (97.8 °F)-36.6 °C (97.9 °F)] 36.6 °C (97.9 °F)  Pulse:  [146-156] 152  Resp:  [20-26] 22  BP: (145-174)/(95-99) 147/99  SpO2:  [93 %-95 %] 95 %  Blood Pressure: (!) 147/99   Temperature: 36.6 °C (97.9 °F)   Pulse: (!) 152   Respiration: (!) 22   Pulse Oximetry: 95 %       Physical Exam  Constitutional:       Appearance: He is obese.   HENT:      Head: Normocephalic and atraumatic.   Eyes:      General:         Right eye: No discharge.         Left eye: No discharge.   Cardiovascular:      Rate and Rhythm: Tachycardia present.      Heart sounds: No murmur heard.     No friction rub.   Pulmonary:      Effort: Pulmonary effort is normal.      Breath sounds: Normal breath sounds.   Abdominal:      Palpations: Abdomen is soft.      Tenderness: There is no abdominal tenderness. There is no guarding.   Musculoskeletal:         General: No swelling.   Skin:     Comments: Dry scaly rash right foot   Neurological:      General: No focal deficit present.      Mental Status: He is alert.      Motor: No weakness.   Psychiatric:         Mood and Affect: Mood is anxious.         Laboratory:  Recent Labs     03/02/25  0805   WBC 14.7*   RBC 5.71   HEMOGLOBIN 16.6   HEMATOCRIT 48.8   MCV 85.5   MCH 29.1   MCHC 34.0   RDW 55.1*   PLATELETCT 332   MPV 8.5*     Recent Labs     03/02/25  0805   SODIUM 136  "  POTASSIUM 2.7*   CHLORIDE 92*   CO2 14*   GLUCOSE 176*   BUN 4*   CREATININE 1.19   CALCIUM 8.7     Recent Labs     03/02/25  0805   ALTSGPT 99*   ASTSGOT 207*   ALKPHOSPHAT 142*   TBILIRUBIN 2.0*   LIPASE 29   GLUCOSE 176*         No results for input(s): \"NTPROBNP\" in the last 72 hours.      No results for input(s): \"TROPONINT\" in the last 72 hours.    Imaging:  DX-CHEST-PORTABLE (1 VIEW)   Final Result         1. No acute cardiopulmonary abnormalities identified.                         Assessment/Plan:  Justification for Admission Status  I anticipate this patient will require at least two midnights for appropriate medical management, necessitating inpatient admission because alcohol withdrawal and severe electrolyte abnormalities    Patient will need a Telemetry bed on MEDICAL service .  The need is secondary to tachycardia and severe electrolyte abnormalities.    * Alcohol withdrawal syndrome with complication (HCC)- (present on admission)  Assessment & Plan  Patient will be admitted started on IV fluids for hydration  I ordered tapering dose of Librium  I ordered lorazepam per [facility] protocol  I ordered thiamine and folic acid supplementation  I counseled the patient on the importance of alcohol cessation encouraged to return to his AA meetings  Monitor replete electrolytes    Sinus tachycardia- (present on admission)  Assessment & Plan  Likely reactive secondary to alcohol withdrawal and electrolyte abnormalities  IV hydration replete electrolytes  Treat alcohol withdrawal  Reviewed echocardiogram from 4/22/2024 with normal EF    Metabolic acidosis- (present on admission)  Assessment & Plan  Likely secondary to dehydration  IV fluids and trend lactic acid    Electrolyte abnormality- (present on admission)  Assessment & Plan  Hypokalemia  Hypomagnesemia  Hypophosphatemia    I ordered IV K-Phos magnesium sulfate  I ordered repeat electrolytes    Controlled type 2 diabetes mellitus with hyperglycemia, without " long-term current use of insulin (HCC)- (present on admission)  Assessment & Plan  Diet controlled hemoglobin A1c 6.7  Reinforced lifestyle changes    Asthma- (present on admission)  Assessment & Plan  No acute exacerbation      Morbid obesity (HCC)- (present on admission)  Assessment & Plan  Body mass index is 43.43 kg/m².     BOLIVAR (obstructive sleep apnea)- (present on admission)  Assessment & Plan  Nocturnal CPAP    Steatosis of liver- (present on admission)  Assessment & Plan  Secondary to alcohol dependence with alcoholic hepatitis  Previous hepatitis panel negative  Reviewed previous right upper quadrant ultrasound from 12/22/2024 with hepatic steatosis  Monitor LFTs  Reviewed with patient importance of alcohol cessation    Essential hypertension- (present on admission)  Assessment & Plan  Uncontrolled continue losartan  Start carvedilol  Monitor blood pressure adjust accordingly        VTE prophylaxis: enoxaparin ppx

## 2025-03-02 NOTE — PROGRESS NOTES
4 Eyes Skin Assessment Completed by ALF Langston and ALF Browning.    Head WDL  Ears WDL  Nose WDL  Mouth WDL  Neck WDL  Breast/Chest WDL  Shoulder Blades WDL  Spine Discoloration lower back  (R) Arm/Elbow/Hand Discoloration elbow  (L) Arm/Elbow/Hand Discoloration elbow  Abdomen WDL  Groin WDL  Scrotum/Coccyx/Buttocks WDL  (R) Leg WDL  (L) Leg WDL  (R) Heel/Foot/Toe Rash top of foot  (L) Heel/Foot/Toe WDL          Devices In Places Tele Box, Blood Pressure Cuff, and Pulse Ox      Interventions In Place Pillows    Possible Skin Injury No    Pictures Uploaded Into Epic N/A  Wound Consult Placed N/A  RN Wound Prevention Protocol Ordered No

## 2025-03-02 NOTE — ASSESSMENT & PLAN NOTE
Secondary to alcohol dependence with alcoholic hepatitis  Previous hepatitis panel negative  Reviewed previous right upper quadrant ultrasound from 12/22/2024 with hepatic steatosis  Monitor LFTs  Reviewed with patient importance of alcohol cessation

## 2025-03-03 LAB
ALBUMIN SERPL BCP-MCNC: 3.8 G/DL (ref 3.2–4.9)
ALBUMIN/GLOB SERPL: 0.9 G/DL
ALP SERPL-CCNC: 115 U/L (ref 30–99)
ALT SERPL-CCNC: 78 U/L (ref 2–50)
ANION GAP SERPL CALC-SCNC: 11 MMOL/L (ref 7–16)
ANION GAP SERPL CALC-SCNC: 16 MMOL/L (ref 7–16)
AST SERPL-CCNC: 172 U/L (ref 12–45)
BILIRUB SERPL-MCNC: 2.9 MG/DL (ref 0.1–1.5)
BUN SERPL-MCNC: 7 MG/DL (ref 8–22)
BUN SERPL-MCNC: 9 MG/DL (ref 8–22)
CALCIUM ALBUM COR SERPL-MCNC: 8.5 MG/DL (ref 8.5–10.5)
CALCIUM SERPL-MCNC: 8.3 MG/DL (ref 8.5–10.5)
CALCIUM SERPL-MCNC: 9 MG/DL (ref 8.5–10.5)
CHLORIDE SERPL-SCNC: 102 MMOL/L (ref 96–112)
CHLORIDE SERPL-SCNC: 99 MMOL/L (ref 96–112)
CO2 SERPL-SCNC: 21 MMOL/L (ref 20–33)
CO2 SERPL-SCNC: 24 MMOL/L (ref 20–33)
CREAT SERPL-MCNC: 1.02 MG/DL (ref 0.5–1.4)
CREAT SERPL-MCNC: 1.32 MG/DL (ref 0.5–1.4)
ERYTHROCYTE [DISTWIDTH] IN BLOOD BY AUTOMATED COUNT: 57.2 FL (ref 35.9–50)
GFR SERPLBLD CREATININE-BSD FMLA CKD-EPI: 71 ML/MIN/1.73 M 2
GFR SERPLBLD CREATININE-BSD FMLA CKD-EPI: 97 ML/MIN/1.73 M 2
GLOBULIN SER CALC-MCNC: 4.3 G/DL (ref 1.9–3.5)
GLUCOSE SERPL-MCNC: 106 MG/DL (ref 65–99)
GLUCOSE SERPL-MCNC: 119 MG/DL (ref 65–99)
HCT VFR BLD AUTO: 44 % (ref 42–52)
HGB BLD-MCNC: 14.3 G/DL (ref 14–18)
MAGNESIUM SERPL-MCNC: 2.4 MG/DL (ref 1.5–2.5)
MCH RBC QN AUTO: 28.8 PG (ref 27–33)
MCHC RBC AUTO-ENTMCNC: 32.5 G/DL (ref 32.3–36.5)
MCV RBC AUTO: 88.7 FL (ref 81.4–97.8)
PHOSPHATE SERPL-MCNC: 3 MG/DL (ref 2.5–4.5)
PLATELET # BLD AUTO: 221 K/UL (ref 164–446)
PMV BLD AUTO: 8.8 FL (ref 9–12.9)
POTASSIUM SERPL-SCNC: 2.8 MMOL/L (ref 3.6–5.5)
POTASSIUM SERPL-SCNC: 4 MMOL/L (ref 3.6–5.5)
PROT SERPL-MCNC: 8.1 G/DL (ref 6–8.2)
RBC # BLD AUTO: 4.96 M/UL (ref 4.7–6.1)
SODIUM SERPL-SCNC: 136 MMOL/L (ref 135–145)
SODIUM SERPL-SCNC: 137 MMOL/L (ref 135–145)
WBC # BLD AUTO: 6.7 K/UL (ref 4.8–10.8)

## 2025-03-03 PROCEDURE — A9270 NON-COVERED ITEM OR SERVICE: HCPCS | Performed by: HOSPITALIST

## 2025-03-03 PROCEDURE — 99233 SBSQ HOSP IP/OBS HIGH 50: CPT | Performed by: STUDENT IN AN ORGANIZED HEALTH CARE EDUCATION/TRAINING PROGRAM

## 2025-03-03 PROCEDURE — 84100 ASSAY OF PHOSPHORUS: CPT

## 2025-03-03 PROCEDURE — 700111 HCHG RX REV CODE 636 W/ 250 OVERRIDE (IP): Mod: JZ | Performed by: HOSPITALIST

## 2025-03-03 PROCEDURE — 80048 BASIC METABOLIC PNL TOTAL CA: CPT

## 2025-03-03 PROCEDURE — 36415 COLL VENOUS BLD VENIPUNCTURE: CPT

## 2025-03-03 PROCEDURE — 85027 COMPLETE CBC AUTOMATED: CPT

## 2025-03-03 PROCEDURE — 700105 HCHG RX REV CODE 258: Performed by: HOSPITALIST

## 2025-03-03 PROCEDURE — 700102 HCHG RX REV CODE 250 W/ 637 OVERRIDE(OP): Performed by: HOSPITALIST

## 2025-03-03 PROCEDURE — 770020 HCHG ROOM/CARE - TELE (206)

## 2025-03-03 PROCEDURE — 700102 HCHG RX REV CODE 250 W/ 637 OVERRIDE(OP)

## 2025-03-03 PROCEDURE — 83735 ASSAY OF MAGNESIUM: CPT

## 2025-03-03 PROCEDURE — 80053 COMPREHEN METABOLIC PANEL: CPT

## 2025-03-03 PROCEDURE — A9270 NON-COVERED ITEM OR SERVICE: HCPCS

## 2025-03-03 RX ORDER — POTASSIUM CHLORIDE 1500 MG/1
40 TABLET, EXTENDED RELEASE ORAL EVERY 4 HOURS
Status: COMPLETED | OUTPATIENT
Start: 2025-03-03 | End: 2025-03-03

## 2025-03-03 RX ADMIN — LOSARTAN POTASSIUM 100 MG: 50 TABLET, FILM COATED ORAL at 05:14

## 2025-03-03 RX ADMIN — SODIUM CHLORIDE, POTASSIUM CHLORIDE, SODIUM LACTATE AND CALCIUM CHLORIDE: 600; 310; 30; 20 INJECTION, SOLUTION INTRAVENOUS at 09:23

## 2025-03-03 RX ADMIN — ENOXAPARIN SODIUM 40 MG: 100 INJECTION SUBCUTANEOUS at 17:39

## 2025-03-03 RX ADMIN — POTASSIUM CHLORIDE 40 MEQ: 1500 TABLET, EXTENDED RELEASE ORAL at 05:16

## 2025-03-03 RX ADMIN — CARVEDILOL 6.25 MG: 6.25 TABLET, FILM COATED ORAL at 17:39

## 2025-03-03 RX ADMIN — CHLORDIAZEPOXIDE HYDROCHLORIDE 50 MG: 25 CAPSULE ORAL at 00:51

## 2025-03-03 RX ADMIN — Medication 100 MG: at 05:15

## 2025-03-03 RX ADMIN — FOLIC ACID 1 MG: 1 TABLET ORAL at 05:15

## 2025-03-03 RX ADMIN — CARVEDILOL 6.25 MG: 6.25 TABLET, FILM COATED ORAL at 07:52

## 2025-03-03 RX ADMIN — CHLORDIAZEPOXIDE HYDROCHLORIDE 50 MG: 25 CAPSULE ORAL at 05:12

## 2025-03-03 RX ADMIN — TRIAMCINOLONE ACETONIDE: 0.25 CREAM TOPICAL at 17:39

## 2025-03-03 RX ADMIN — POTASSIUM CHLORIDE 40 MEQ: 1500 TABLET, EXTENDED RELEASE ORAL at 03:16

## 2025-03-03 RX ADMIN — TRIAMCINOLONE ACETONIDE: 0.25 CREAM TOPICAL at 05:17

## 2025-03-03 RX ADMIN — LORAZEPAM 0.5 MG: 0.5 TABLET ORAL at 17:47

## 2025-03-03 RX ADMIN — THERA TABS 1 TABLET: TAB at 05:14

## 2025-03-03 ASSESSMENT — LIFESTYLE VARIABLES
AGITATION: NORMAL ACTIVITY
ORIENTATION AND CLOUDING OF SENSORIUM: ORIENTED AND CAN DO SERIAL ADDITIONS
ANXIETY: MILDLY ANXIOUS
ORIENTATION AND CLOUDING OF SENSORIUM: ORIENTED AND CAN DO SERIAL ADDITIONS
TREMOR: NO TREMOR
TREMOR: NO TREMOR
AUDITORY DISTURBANCES: NOT PRESENT
TOTAL SCORE: 4
ORIENTATION AND CLOUDING OF SENSORIUM: ORIENTED AND CAN DO SERIAL ADDITIONS
VISUAL DISTURBANCES: NOT PRESENT
ORIENTATION AND CLOUDING OF SENSORIUM: ORIENTED AND CAN DO SERIAL ADDITIONS
ANXIETY: *
NAUSEA AND VOMITING: MILD NAUSEA WITH NO VOMITING
HEADACHE, FULLNESS IN HEAD: MILD
PAROXYSMAL SWEATS: NO SWEAT VISIBLE
AUDITORY DISTURBANCES: NOT PRESENT
AGITATION: NORMAL ACTIVITY
ORIENTATION AND CLOUDING OF SENSORIUM: ORIENTED AND CAN DO SERIAL ADDITIONS
AUDITORY DISTURBANCES: NOT PRESENT
PAROXYSMAL SWEATS: NO SWEAT VISIBLE
ANXIETY: MILDLY ANXIOUS
VISUAL DISTURBANCES: NOT PRESENT
ANXIETY: NO ANXIETY (AT EASE)
VISUAL DISTURBANCES: NOT PRESENT
AUDITORY DISTURBANCES: NOT PRESENT
TREMOR: TREMOR NOT VISIBLE BUT CAN BE FELT, FINGERTIP TO FINGERTIP
PAROXYSMAL SWEATS: NO SWEAT VISIBLE
AGITATION: SOMEWHAT MORE THAN NORMAL ACTIVITY
VISUAL DISTURBANCES: NOT PRESENT
TOTAL SCORE: 3
HEADACHE, FULLNESS IN HEAD: NOT PRESENT
ANXIETY: MILDLY ANXIOUS
AUDITORY DISTURBANCES: NOT PRESENT
PAROXYSMAL SWEATS: NO SWEAT VISIBLE
NAUSEA AND VOMITING: MILD NAUSEA WITH NO VOMITING
AGITATION: NORMAL ACTIVITY
HEADACHE, FULLNESS IN HEAD: VERY MILD
NAUSEA AND VOMITING: MILD NAUSEA WITH NO VOMITING
TOTAL SCORE: 3
PAROXYSMAL SWEATS: NO SWEAT VISIBLE
HEADACHE, FULLNESS IN HEAD: MILD
TOTAL SCORE: 3
AGITATION: NORMAL ACTIVITY
TOTAL SCORE: 6
TREMOR: TREMOR NOT VISIBLE BUT CAN BE FELT, FINGERTIP TO FINGERTIP
NAUSEA AND VOMITING: NO NAUSEA AND NO VOMITING
ORIENTATION AND CLOUDING OF SENSORIUM: ORIENTED AND CAN DO SERIAL ADDITIONS
VISUAL DISTURBANCES: NOT PRESENT
HEADACHE, FULLNESS IN HEAD: VERY MILD
PAROXYSMAL SWEATS: NO SWEAT VISIBLE
NAUSEA AND VOMITING: NO NAUSEA AND NO VOMITING
ANXIETY: NO ANXIETY (AT EASE)
TOTAL SCORE: VERY MILD ITCHING, PINS AND NEEDLES SENSATION, BURNING OR NUMBNESS
NAUSEA AND VOMITING: MILD NAUSEA WITH NO VOMITING
TOTAL SCORE: 4
TREMOR: TREMOR NOT VISIBLE BUT CAN BE FELT, FINGERTIP TO FINGERTIP
HEADACHE, FULLNESS IN HEAD: MILD
AGITATION: SOMEWHAT MORE THAN NORMAL ACTIVITY
AUDITORY DISTURBANCES: NOT PRESENT
VISUAL DISTURBANCES: NOT PRESENT
TREMOR: NO TREMOR

## 2025-03-03 ASSESSMENT — ENCOUNTER SYMPTOMS
PALPITATIONS: 1
WEAKNESS: 1
NERVOUS/ANXIOUS: 1

## 2025-03-03 ASSESSMENT — PAIN DESCRIPTION - PAIN TYPE: TYPE: ACUTE PAIN

## 2025-03-03 ASSESSMENT — FIBROSIS 4 INDEX: FIB4 SCORE: 3.26

## 2025-03-03 NOTE — PROGRESS NOTES
Hospital Medicine Daily Progress Note    Date of Service  3/3/2025    Chief Complaint  Alberto Maldonado is a 39 y.o. male admitted 3/2/2025 with ETOH withdrawal     Hospital Course   39 y.o. male with history of alcohol dependence reports that he has relapsed and for the past 6 weeks has been drinking 10-15 shots daily, who presented 3/2/2025 with detox and tachycardia.  Last drink the day prior admit. He has prior history of alcohol withdrawal requiring hospitalization previously was in  meetings however relapsed 6 weeks ago.  He reports nausea and vomiting.  Has a history of sleep apnea recently provided with CPAP mask and is in the process of starting CPAP therapy.      [facility] protocol    Interval Problem Update  -Notes were extensively reviewed from primary team, radiology, ED, surgery, specialists, etc.   -Reviewed labs to date, microbiology for current admit and prior  -Imaging was independently reviewed and interpreted    Seen patient at bedside  On [facility] protocol  Still anxious and tremors noted  Dc IVF, able to tolerate diet  Advance diet as tolerated  Labs reviewed K 2.8, , ALT 78, total bilirubin 2.9. replaced with K dur.   Continue multivitamin, folate acid and thiamine  I have reviewed the chest x-ray no acute intrathoracic abnormality per my read    I have discussed this patient's plan of care and discharge plan at IDT rounds today with Case Management, Nursing, Nursing leadership, and other members of the IDT team.    Consultants/Specialty  na    Code Status  Full Code    Disposition  The patient is not medically cleared for discharge to home or a post-acute facility.      I have placed the appropriate orders for post-discharge needs.    Review of Systems  Review of Systems   Constitutional:  Positive for malaise/fatigue.   Cardiovascular:  Positive for palpitations.   Neurological:  Positive for weakness.   Psychiatric/Behavioral:  The patient is nervous/anxious.    All other systems  reviewed and are negative.       Physical Exam  Temp:  [36.5 °C (97.7 °F)-36.7 °C (98.1 °F)] 36.6 °C (97.9 °F)  Pulse:  [] 103  Resp:  [18-22] 21  BP: (121-140)/(82-89) 121/84  SpO2:  [92 %-95 %] 95 %    Physical Exam  Vitals and nursing note reviewed.   Constitutional:       Appearance: He is obese. He is ill-appearing.   HENT:      Head: Normocephalic and atraumatic.      Mouth/Throat:      Pharynx: Oropharynx is clear.   Eyes:      Pupils: Pupils are equal, round, and reactive to light.   Neck:      Vascular: No carotid bruit.   Cardiovascular:      Rate and Rhythm: Regular rhythm. Tachycardia present.   Pulmonary:      Effort: Pulmonary effort is normal.      Breath sounds: Normal breath sounds.   Abdominal:      General: Abdomen is flat. Bowel sounds are normal.      Palpations: Abdomen is soft. There is no mass.   Musculoskeletal:         General: Normal range of motion.      Cervical back: Neck supple.   Skin:     General: Skin is warm and dry.      Comments: Dry scaly rash right foot    Neurological:      General: No focal deficit present.      Mental Status: He is alert and oriented to person, place, and time.   Psychiatric:         Behavior: Behavior normal.      Comments: anxious         Fluids    Intake/Output Summary (Last 24 hours) at 3/3/2025 1558  Last data filed at 3/3/2025 1401  Gross per 24 hour   Intake 360 ml   Output 300 ml   Net 60 ml        Laboratory  Recent Labs     03/02/25  0805 03/03/25  0136   WBC 14.7* 6.7   RBC 5.71 4.96   HEMOGLOBIN 16.6 14.3   HEMATOCRIT 48.8 44.0   MCV 85.5 88.7   MCH 29.1 28.8   MCHC 34.0 32.5   RDW 55.1* 57.2*   PLATELETCT 332 221   MPV 8.5* 8.8*     Recent Labs     03/02/25  0805 03/03/25  0136 03/03/25  1225   SODIUM 136 136 137   POTASSIUM 2.7* 2.8* 4.0   CHLORIDE 92* 99 102   CO2 14* 21 24   GLUCOSE 176* 119* 106*   BUN 4* 7* 9   CREATININE 1.19 1.02 1.32   CALCIUM 8.7 8.3* 9.0                   Imaging  DX-CHEST-PORTABLE (1 VIEW)   Final Result          1. No acute cardiopulmonary abnormalities identified.                          Assessment/Plan  * Alcohol withdrawal syndrome with complication (HCC)- (present on admission)  Assessment & Plan  IVF  Monitor for symptoms of withdrawal.  [facility] protocol  Tele monitoring  MV, folate and VitB1  Seizure, aspiration and fall precautions  Patient was counseled on alcohol cessation  Social service consult for enrollment alcohol rehabilitation program or attend Alcoholics Anonymous      Sinus tachycardia- (present on admission)  Assessment & Plan  Likely reactive secondary to alcohol withdrawal and electrolyte abnormalities  IV hydration replete electrolytes  Treat alcohol withdrawal  Reviewed echocardiogram from 4/22/2024 with normal EF    Metabolic acidosis- (present on admission)  Assessment & Plan  Likely secondary to dehydration  IV fluids and trend lactic acid    Hypokalemia- (present on admission)  Assessment & Plan  Critical low, replaced as indicated  Check Mag, phos  Repeat in pm    Electrolyte abnormality- (present on admission)  Assessment & Plan  Hypokalemia  Hypomagnesemia  Hypophosphatemia    I ordered IV K-Phos magnesium sulfate  I ordered repeat electrolytes    Controlled type 2 diabetes mellitus with hyperglycemia, without long-term current use of insulin (HCC)- (present on admission)  Assessment & Plan  Diet controlled hemoglobin A1c 6.7  Reinforced lifestyle changes    Asthma- (present on admission)  Assessment & Plan  No acute exacerbation      Morbid obesity (HCC)- (present on admission)  Assessment & Plan  Body mass index is 43.43 kg/m².     BOLIVAR (obstructive sleep apnea)- (present on admission)  Assessment & Plan  Nocturnal CPAP    Steatosis of liver- (present on admission)  Assessment & Plan  Secondary to alcohol dependence with alcoholic hepatitis  Previous hepatitis panel negative  Reviewed previous right upper quadrant ultrasound from 12/22/2024 with hepatic steatosis  Monitor LFTs  Reviewed with  patient importance of alcohol cessation    Essential hypertension- (present on admission)  Assessment & Plan  Uncontrolled continue losartan  Start carvedilol  Monitor blood pressure adjust accordingly         VTE prophylaxis: lovenox    I have performed a physical exam and reviewed and updated ROS and Plan today (3/3/2025). In review of yesterday's note (3/2/2025), there are no changes except as documented above.    Patient is has a high medical complexity, complex decision making and is at high risk for complication, morbidity, and mortality.  I spent 51 minutes, reviewing the chart, obtaining and/or reviewing separately obtained history. Performing a medically appropriate examination and evaluation.  Counseling and educating the patient. Ordering and reviewing medications, tests, or procedures.   Documenting clinical information in EPIC. Independently interpreting results and communicating results to patient. Discussing future disposition of care with patient, RN and case management.  This does not include time spent on separately billable procedures/tests.

## 2025-03-03 NOTE — PROGRESS NOTES
Received report from RN. Assumed pt care. Patient on tele box and on 2L NC. Patient A&O x4. Fall precautions in place, call light and belongings within reach, bed in lowest position. No signs of distress.

## 2025-03-03 NOTE — DIETARY
"Nutrition Services: Initial Assessment     Day 1 of admit. Alberto Maldonado is 39 y.o., male with admitting DX of Alcohol withdrawal syndrome with complication (HCC) [F10.939].    Consult Received for: MST - Malnutrition Screening Tool    Current Hospital Problems List: ETOH withdraw with complication, Metabolic acidosis, Sinus tachycardia, Electrolyte abnormality. Controlled type 2. Steatosis of liver        Nutrition Assessment:      Height: 160 cm (5' 2.99\")  Weight: 113 kg (248 lb 0.3 oz)  Weight taken via: Stand Up Scale  BMI Calculated: 43.95  BMI Classification: Morbid Obesity       Weight Readings from Last 5 encounters:   Wt Readings from Last 5 Encounters:   03/03/25 113 kg (248 lb 0.3 oz) - Stand up scale    12/23/24 117 kg (258 lb 2.5 oz)    11/20/24 120 kg (264 lb)   08/06/24 (!) 130 kg (286 lb 9.6 oz) - stand up    08/05/24 (!) 131 kg (288 lb 6.4 oz)     Noted weight loss of 38 lbs (13.2%) weight loss via stand up x 7 months. Not consider significant     Objective:   Pertinent Medical Hx: ETOH withdraw   Pertinent Labs: Potassium 2.9. Glucose 154, Creatinine 1.61, GFR 56, correct calcium 8, AST 99, Alkaline phosphate 159,    Pertinent Meds: Ativan, Thiamine, folic acid   Skin/Wounds:  n/a   Food Allergies: NKFA   Last BM:  Type 7: Watery, no solid pieces-entirely liquid  03/02/25       Current Diet Order/Intake:   Consistent CHO diet    Subjective:     See the patient at the bedside for MST. The patient was admitted for alcohol withdrawal. He stated that he had previously attended AA but has been relapsing, reporting drinking 10-15 shots of hard liquor daily prior to hospital admission. Electrolytes were replenished with current supplements on thiamine and folic acid. The patient endorsed inadequate PO 2/2 to heavy drinking, stating a decreased appetite with only snacking (beef jerky, chips, banana) throughout the day. Patient denied n/v/abd pain No fat or muscle loss observed during the NFPE " inspection.    Patient reported UBW:  240 lbs   Dietary Recall/Energy Intake:The patient stated inadequate oral intake prior to admission due to ETOH abuse. He reported drinking 10-15 shots daily and consuming only snacks, without eating a full meal in a day <  = 1 month     This indicates Insufficient energy intake.       Nutrition Focused Physical Exam (NFPE)  Weight Loss: Weight loss not consider significant x 7 months   Muscle Mass: Well Nourished  Subcutaneous Fat: Well Nourished  Fluid Accumulation: n/a   Reduced  Strength: N/A in acute care setting.    Nutrition Diagnosis:      Inadequate Oral Intake related to ETOH abuse as evidenced by Patient stated drinking 10 - 15 shots daily. Estimated < 75% of energy needs < = 1 month.     based on RD assessment at this time, Patient does not meet criteria in congruence with ASPEN/Academy guidelines for malnutrition    Nutrition Interventions:      Patient aware of active plan of care as appropriate.       Nutrition Monitoring and Evaluation:      Monitor nutrition POC, goal for > 50 % intake from meals and supplements.  Additional fluids per MD/DO  Monitor vital signs pertinent to nutrition.    RD following and will provide updated recommendations as indicated.      [facility] OG/AND CRITERIA FOR MALNUTRITION

## 2025-03-03 NOTE — CARE PLAN
The patient is Stable - Low risk of patient condition declining or worsening    Shift Goals  Clinical Goals: CIWA. monitor labs and vitals, safety  Patient Goals: get better, rest  Family Goals: aide    Progress made toward(s) clinical / shift goals:        Problem: Knowledge Deficit - Standard  Goal: Patient and family/care givers will demonstrate understanding of plan of care, disease process/condition, diagnostic tests and medications  Outcome: Progressing     Problem: Optimal Care for Alcohol Withdrawal  Goal: Optimal Care for the alcohol withdrawal patient  Outcome: Progressing     Problem: Seizure Precautions  Goal: Implementation of seizure precautions  Outcome: Progressing     Problem: Lifestyle Changes  Goal: Patient's ability to identify lifestyle changes and available resources to help reduce recurrence of condition will improve  Outcome: Progressing     Problem: Psychosocial  Goal: Patient's level of anxiety will decrease  Outcome: Progressing     Problem: Risk for Aspiration  Goal: Patient's risk for aspiration will be absent or decrease  Outcome: Progressing     Problem: Fall Risk  Goal: Patient will remain free from falls  Outcome: Progressing     Problem: Pain - Standard  Goal: Alleviation of pain or a reduction in pain to the patient’s comfort goal  Outcome: Progressing

## 2025-03-03 NOTE — PROGRESS NOTES
Lab called with critical result of Potassium 2.8  at 0253. Noc hospitalist aware. PO potassium ordered.

## 2025-03-04 LAB
ANION GAP SERPL CALC-SCNC: 12 MMOL/L (ref 7–16)
BUN SERPL-MCNC: 19 MG/DL (ref 8–22)
C DIFF TOX GENS STL QL NAA+PROBE: NEGATIVE
CALCIUM SERPL-MCNC: 8.8 MG/DL (ref 8.5–10.5)
CHLORIDE SERPL-SCNC: 106 MMOL/L (ref 96–112)
CO2 SERPL-SCNC: 20 MMOL/L (ref 20–33)
CREAT SERPL-MCNC: 2.24 MG/DL (ref 0.5–1.4)
ERYTHROCYTE [DISTWIDTH] IN BLOOD BY AUTOMATED COUNT: 58.1 FL (ref 35.9–50)
GFR SERPLBLD CREATININE-BSD FMLA CKD-EPI: 38 ML/MIN/1.73 M 2
GLUCOSE SERPL-MCNC: 106 MG/DL (ref 65–99)
HCT VFR BLD AUTO: 42.8 % (ref 42–52)
HGB BLD-MCNC: 13.6 G/DL (ref 14–18)
MAGNESIUM SERPL-MCNC: 2.1 MG/DL (ref 1.5–2.5)
MCH RBC QN AUTO: 28.9 PG (ref 27–33)
MCHC RBC AUTO-ENTMCNC: 31.8 G/DL (ref 32.3–36.5)
MCV RBC AUTO: 91.1 FL (ref 81.4–97.8)
PHOSPHATE SERPL-MCNC: 4 MG/DL (ref 2.5–4.5)
PLATELET # BLD AUTO: 203 K/UL (ref 164–446)
PMV BLD AUTO: 9 FL (ref 9–12.9)
POTASSIUM SERPL-SCNC: 3.6 MMOL/L (ref 3.6–5.5)
RBC # BLD AUTO: 4.7 M/UL (ref 4.7–6.1)
SODIUM SERPL-SCNC: 138 MMOL/L (ref 135–145)
WBC # BLD AUTO: 8.2 K/UL (ref 4.8–10.8)

## 2025-03-04 PROCEDURE — 700105 HCHG RX REV CODE 258: Performed by: STUDENT IN AN ORGANIZED HEALTH CARE EDUCATION/TRAINING PROGRAM

## 2025-03-04 PROCEDURE — 84100 ASSAY OF PHOSPHORUS: CPT

## 2025-03-04 PROCEDURE — 99233 SBSQ HOSP IP/OBS HIGH 50: CPT | Performed by: STUDENT IN AN ORGANIZED HEALTH CARE EDUCATION/TRAINING PROGRAM

## 2025-03-04 PROCEDURE — 700102 HCHG RX REV CODE 250 W/ 637 OVERRIDE(OP): Performed by: HOSPITALIST

## 2025-03-04 PROCEDURE — 700111 HCHG RX REV CODE 636 W/ 250 OVERRIDE (IP): Performed by: STUDENT IN AN ORGANIZED HEALTH CARE EDUCATION/TRAINING PROGRAM

## 2025-03-04 PROCEDURE — 770020 HCHG ROOM/CARE - TELE (206)

## 2025-03-04 PROCEDURE — 36415 COLL VENOUS BLD VENIPUNCTURE: CPT

## 2025-03-04 PROCEDURE — 83735 ASSAY OF MAGNESIUM: CPT

## 2025-03-04 PROCEDURE — 85027 COMPLETE CBC AUTOMATED: CPT

## 2025-03-04 PROCEDURE — 87493 C DIFF AMPLIFIED PROBE: CPT

## 2025-03-04 PROCEDURE — 80048 BASIC METABOLIC PNL TOTAL CA: CPT

## 2025-03-04 PROCEDURE — A9270 NON-COVERED ITEM OR SERVICE: HCPCS | Performed by: HOSPITALIST

## 2025-03-04 PROCEDURE — 51798 US URINE CAPACITY MEASURE: CPT

## 2025-03-04 RX ORDER — SODIUM CHLORIDE, SODIUM LACTATE, POTASSIUM CHLORIDE, CALCIUM CHLORIDE 600; 310; 30; 20 MG/100ML; MG/100ML; MG/100ML; MG/100ML
INJECTION, SOLUTION INTRAVENOUS CONTINUOUS
Status: DISCONTINUED | OUTPATIENT
Start: 2025-03-04 | End: 2025-03-05

## 2025-03-04 RX ORDER — HEPARIN SODIUM 5000 [USP'U]/ML
5000 INJECTION, SOLUTION INTRAVENOUS; SUBCUTANEOUS EVERY 8 HOURS
Status: DISCONTINUED | OUTPATIENT
Start: 2025-03-04 | End: 2025-03-06 | Stop reason: HOSPADM

## 2025-03-04 RX ADMIN — TRIAMCINOLONE ACETONIDE: 0.25 CREAM TOPICAL at 05:04

## 2025-03-04 RX ADMIN — SODIUM CHLORIDE, POTASSIUM CHLORIDE, SODIUM LACTATE AND CALCIUM CHLORIDE: 600; 310; 30; 20 INJECTION, SOLUTION INTRAVENOUS at 18:51

## 2025-03-04 RX ADMIN — CARVEDILOL 6.25 MG: 6.25 TABLET, FILM COATED ORAL at 08:46

## 2025-03-04 RX ADMIN — CARVEDILOL 6.25 MG: 6.25 TABLET, FILM COATED ORAL at 17:40

## 2025-03-04 RX ADMIN — LORAZEPAM 0.5 MG: 0.5 TABLET ORAL at 22:57

## 2025-03-04 RX ADMIN — HEPARIN SODIUM 5000 UNITS: 5000 INJECTION, SOLUTION INTRAVENOUS; SUBCUTANEOUS at 22:56

## 2025-03-04 RX ADMIN — LOSARTAN POTASSIUM 100 MG: 50 TABLET, FILM COATED ORAL at 05:04

## 2025-03-04 RX ADMIN — HEPARIN SODIUM 5000 UNITS: 5000 INJECTION, SOLUTION INTRAVENOUS; SUBCUTANEOUS at 08:46

## 2025-03-04 RX ADMIN — Medication 100 MG: at 05:04

## 2025-03-04 RX ADMIN — TRIAMCINOLONE ACETONIDE: 0.25 CREAM TOPICAL at 17:40

## 2025-03-04 RX ADMIN — THERA TABS 1 TABLET: TAB at 05:04

## 2025-03-04 RX ADMIN — FOLIC ACID 1 MG: 1 TABLET ORAL at 05:04

## 2025-03-04 RX ADMIN — SODIUM CHLORIDE, POTASSIUM CHLORIDE, SODIUM LACTATE AND CALCIUM CHLORIDE: 600; 310; 30; 20 INJECTION, SOLUTION INTRAVENOUS at 08:46

## 2025-03-04 RX ADMIN — HEPARIN SODIUM 5000 UNITS: 5000 INJECTION, SOLUTION INTRAVENOUS; SUBCUTANEOUS at 15:12

## 2025-03-04 ASSESSMENT — LIFESTYLE VARIABLES
TOTAL SCORE: MILD ITCHING, PINS AND NEEDLES SENSATION, BURNING OR NUMBNESS
TOTAL SCORE: 1
ANXIETY: NO ANXIETY (AT EASE)
VISUAL DISTURBANCES: NOT PRESENT
ANXIETY: MILDLY ANXIOUS
TOTAL SCORE: VERY MILD ITCHING, PINS AND NEEDLES SENSATION, BURNING OR NUMBNESS
VISUAL DISTURBANCES: NOT PRESENT
AUDITORY DISTURBANCES: NOT PRESENT
AUDITORY DISTURBANCES: NOT PRESENT
ORIENTATION AND CLOUDING OF SENSORIUM: ORIENTED AND CAN DO SERIAL ADDITIONS
TOTAL SCORE: 0
VISUAL DISTURBANCES: NOT PRESENT
AGITATION: NORMAL ACTIVITY
AUDITORY DISTURBANCES: NOT PRESENT
PAROXYSMAL SWEATS: BARELY PERCEPTIBLE SWEATING. PALMS MOIST
AGITATION: SOMEWHAT MORE THAN NORMAL ACTIVITY
VISUAL DISTURBANCES: NOT PRESENT
AGITATION: SOMEWHAT MORE THAN NORMAL ACTIVITY
TOTAL SCORE: 2
AUDITORY DISTURBANCES: NOT PRESENT
NAUSEA AND VOMITING: NO NAUSEA AND NO VOMITING
AGITATION: NORMAL ACTIVITY
AGITATION: NORMAL ACTIVITY
NAUSEA AND VOMITING: MILD NAUSEA WITH NO VOMITING
ORIENTATION AND CLOUDING OF SENSORIUM: ORIENTED AND CAN DO SERIAL ADDITIONS
TOTAL SCORE: 4
PAROXYSMAL SWEATS: NO SWEAT VISIBLE
HEADACHE, FULLNESS IN HEAD: NOT PRESENT
PAROXYSMAL SWEATS: NO SWEAT VISIBLE
AUDITORY DISTURBANCES: NOT PRESENT
ANXIETY: MILDLY ANXIOUS
PAROXYSMAL SWEATS: NO SWEAT VISIBLE
TREMOR: NO TREMOR
PAROXYSMAL SWEATS: NO SWEAT VISIBLE
VISUAL DISTURBANCES: NOT PRESENT
NAUSEA AND VOMITING: MILD NAUSEA WITH NO VOMITING
ORIENTATION AND CLOUDING OF SENSORIUM: ORIENTED AND CAN DO SERIAL ADDITIONS
NAUSEA AND VOMITING: NO NAUSEA AND NO VOMITING
ORIENTATION AND CLOUDING OF SENSORIUM: ORIENTED AND CAN DO SERIAL ADDITIONS
HEADACHE, FULLNESS IN HEAD: NOT PRESENT
VISUAL DISTURBANCES: NOT PRESENT
ORIENTATION AND CLOUDING OF SENSORIUM: ORIENTED AND CAN DO SERIAL ADDITIONS
HEADACHE, FULLNESS IN HEAD: VERY MILD
ANXIETY: NO ANXIETY (AT EASE)
TREMOR: NO TREMOR
TOTAL SCORE: VERY MILD ITCHING, PINS AND NEEDLES SENSATION, BURNING OR NUMBNESS
NAUSEA AND VOMITING: NO NAUSEA AND NO VOMITING
AGITATION: NORMAL ACTIVITY
TREMOR: NO TREMOR
ORIENTATION AND CLOUDING OF SENSORIUM: ORIENTED AND CAN DO SERIAL ADDITIONS
TREMOR: TREMOR NOT VISIBLE BUT CAN BE FELT, FINGERTIP TO FINGERTIP
TOTAL SCORE: 5
TREMOR: NO TREMOR
NAUSEA AND VOMITING: NO NAUSEA AND NO VOMITING
HEADACHE, FULLNESS IN HEAD: NOT PRESENT
ANXIETY: MILDLY ANXIOUS
PAROXYSMAL SWEATS: NO SWEAT VISIBLE
ORIENTATION AND CLOUDING OF SENSORIUM: ORIENTED AND CAN DO SERIAL ADDITIONS
AGITATION: NORMAL ACTIVITY
AUDITORY DISTURBANCES: NOT PRESENT
HEADACHE, FULLNESS IN HEAD: NOT PRESENT
NAUSEA AND VOMITING: NO NAUSEA AND NO VOMITING
HEADACHE, FULLNESS IN HEAD: NOT PRESENT
HEADACHE, FULLNESS IN HEAD: NOT PRESENT
TREMOR: NO TREMOR
TOTAL SCORE: 4
TOTAL SCORE: 2
VISUAL DISTURBANCES: NOT PRESENT
PAROXYSMAL SWEATS: BARELY PERCEPTIBLE SWEATING. PALMS MOIST
ANXIETY: MILDLY ANXIOUS
ANXIETY: MILDLY ANXIOUS
AUDITORY DISTURBANCES: NOT PRESENT
TREMOR: TREMOR NOT VISIBLE BUT CAN BE FELT, FINGERTIP TO FINGERTIP

## 2025-03-04 ASSESSMENT — PAIN DESCRIPTION - PAIN TYPE: TYPE: ACUTE PAIN

## 2025-03-04 ASSESSMENT — FIBROSIS 4 INDEX: FIB4 SCORE: 3.55

## 2025-03-04 ASSESSMENT — ENCOUNTER SYMPTOMS
PALPITATIONS: 1
WEAKNESS: 1
NERVOUS/ANXIOUS: 1

## 2025-03-04 NOTE — CARE PLAN
The patient is Stable - Low risk of patient condition declining or worsening    Shift Goals  Clinical Goals: CIWA, monitor labs  Patient Goals: sleep  Family Goals: DEBBI    Progress made toward(s) clinical / shift goals:    Problem: Knowledge Deficit - Standard  Goal: Patient and family/care givers will demonstrate understanding of plan of care, disease process/condition, diagnostic tests and medications  Outcome: Progressing     Problem: Optimal Care for Alcohol Withdrawal  Goal: Optimal Care for the alcohol withdrawal patient  Outcome: Progressing     Problem: Seizure Precautions  Goal: Implementation of seizure precautions  Outcome: Progressing     Problem: Fall Risk  Goal: Patient will remain free from falls  Outcome: Progressing     Problem: Pain - Standard  Goal: Alleviation of pain or a reduction in pain to the patient’s comfort goal  Outcome: Progressing       Patient is not progressing towards the following goals:

## 2025-03-04 NOTE — PROGRESS NOTES
Received bedside report from RN, pt care assumed, VSS, pt assessment complete. Pt AAOx4, with complaints of generalized aches. Plan of care discussed with pt and verbalizes no questions. Pt denies any additional needs at this time. Bed locked/in lowest position, chair alarm on, pt educated on fall risk and verbalized understanding, call light within reach, hourly rounding initiated.

## 2025-03-04 NOTE — PROGRESS NOTES
Bedside report received, assumed care of patient at change of shift. Chart, labs, and orders reviewed. Pt resting in chair, breathing even and unlabored, denies pain and in no acute distress. A&O x4. Tele monitoring in place. Fall precautions including bed alarm in place and education provided. Call light within reach, bed locked and in lowest position, denies other needs at this time.

## 2025-03-05 ENCOUNTER — PATIENT OUTREACH (OUTPATIENT)
Dept: HEALTH INFORMATION MANAGEMENT | Facility: OTHER | Age: 40
End: 2025-03-05

## 2025-03-05 ENCOUNTER — APPOINTMENT (OUTPATIENT)
Dept: RADIOLOGY | Facility: MEDICAL CENTER | Age: 40
DRG: 897 | End: 2025-03-05
Attending: STUDENT IN AN ORGANIZED HEALTH CARE EDUCATION/TRAINING PROGRAM
Payer: COMMERCIAL

## 2025-03-05 LAB
ALBUMIN SERPL BCP-MCNC: 3.7 G/DL (ref 3.2–4.9)
BUN SERPL-MCNC: 29 MG/DL (ref 8–22)
CALCIUM ALBUM COR SERPL-MCNC: 9.1 MG/DL (ref 8.5–10.5)
CALCIUM SERPL-MCNC: 8.9 MG/DL (ref 8.5–10.5)
CHLORIDE SERPL-SCNC: 107 MMOL/L (ref 96–112)
CK SERPL-CCNC: 294 U/L (ref 0–154)
CO2 SERPL-SCNC: 23 MMOL/L (ref 20–33)
CREAT SERPL-MCNC: 2.84 MG/DL (ref 0.5–1.4)
CREAT UR-MCNC: 69.1 MG/DL
ERYTHROCYTE [DISTWIDTH] IN BLOOD BY AUTOMATED COUNT: 57.5 FL (ref 35.9–50)
GFR SERPLBLD CREATININE-BSD FMLA CKD-EPI: 28 ML/MIN/1.73 M 2
GLUCOSE SERPL-MCNC: 106 MG/DL (ref 65–99)
HCT VFR BLD AUTO: 40 % (ref 42–52)
HGB BLD-MCNC: 12.8 G/DL (ref 14–18)
MCH RBC QN AUTO: 29.2 PG (ref 27–33)
MCHC RBC AUTO-ENTMCNC: 32 G/DL (ref 32.3–36.5)
MCV RBC AUTO: 91.1 FL (ref 81.4–97.8)
MICROALBUMIN UR-MCNC: <1.2 MG/DL
MICROALBUMIN/CREAT UR: NORMAL MG/G (ref 0–30)
PHOSPHATE SERPL-MCNC: 5.1 MG/DL (ref 2.5–4.5)
PLATELET # BLD AUTO: 212 K/UL (ref 164–446)
PMV BLD AUTO: 9.3 FL (ref 9–12.9)
POTASSIUM SERPL-SCNC: 3.6 MMOL/L (ref 3.6–5.5)
PROT UR-MCNC: 6.4 MG/DL (ref 0–15)
RBC # BLD AUTO: 4.39 M/UL (ref 4.7–6.1)
SODIUM SERPL-SCNC: 143 MMOL/L (ref 135–145)
SODIUM UR-SCNC: 35 MMOL/L
WBC # BLD AUTO: 7.6 K/UL (ref 4.8–10.8)

## 2025-03-05 PROCEDURE — 76775 US EXAM ABDO BACK WALL LIM: CPT

## 2025-03-05 PROCEDURE — 700105 HCHG RX REV CODE 258: Performed by: STUDENT IN AN ORGANIZED HEALTH CARE EDUCATION/TRAINING PROGRAM

## 2025-03-05 PROCEDURE — 82550 ASSAY OF CK (CPK): CPT

## 2025-03-05 PROCEDURE — 85027 COMPLETE CBC AUTOMATED: CPT

## 2025-03-05 PROCEDURE — 700102 HCHG RX REV CODE 250 W/ 637 OVERRIDE(OP): Performed by: HOSPITALIST

## 2025-03-05 PROCEDURE — A9270 NON-COVERED ITEM OR SERVICE: HCPCS | Performed by: INTERNAL MEDICINE

## 2025-03-05 PROCEDURE — 770020 HCHG ROOM/CARE - TELE (206)

## 2025-03-05 PROCEDURE — 36415 COLL VENOUS BLD VENIPUNCTURE: CPT

## 2025-03-05 PROCEDURE — 84156 ASSAY OF PROTEIN URINE: CPT

## 2025-03-05 PROCEDURE — 84300 ASSAY OF URINE SODIUM: CPT

## 2025-03-05 PROCEDURE — 82043 UR ALBUMIN QUANTITATIVE: CPT

## 2025-03-05 PROCEDURE — 700102 HCHG RX REV CODE 250 W/ 637 OVERRIDE(OP): Performed by: INTERNAL MEDICINE

## 2025-03-05 PROCEDURE — 700111 HCHG RX REV CODE 636 W/ 250 OVERRIDE (IP): Performed by: STUDENT IN AN ORGANIZED HEALTH CARE EDUCATION/TRAINING PROGRAM

## 2025-03-05 PROCEDURE — A9270 NON-COVERED ITEM OR SERVICE: HCPCS | Performed by: HOSPITALIST

## 2025-03-05 PROCEDURE — 82570 ASSAY OF URINE CREATININE: CPT

## 2025-03-05 PROCEDURE — 99254 IP/OBS CNSLTJ NEW/EST MOD 60: CPT | Performed by: INTERNAL MEDICINE

## 2025-03-05 PROCEDURE — 80069 RENAL FUNCTION PANEL: CPT

## 2025-03-05 PROCEDURE — 99233 SBSQ HOSP IP/OBS HIGH 50: CPT | Performed by: STUDENT IN AN ORGANIZED HEALTH CARE EDUCATION/TRAINING PROGRAM

## 2025-03-05 RX ORDER — LOPERAMIDE HYDROCHLORIDE 2 MG/1
2 CAPSULE ORAL 4 TIMES DAILY PRN
Status: DISCONTINUED | OUTPATIENT
Start: 2025-03-05 | End: 2025-03-06 | Stop reason: HOSPADM

## 2025-03-05 RX ORDER — AMLODIPINE BESYLATE 5 MG/1
5 TABLET ORAL
Status: DISCONTINUED | OUTPATIENT
Start: 2025-03-05 | End: 2025-03-06 | Stop reason: HOSPADM

## 2025-03-05 RX ADMIN — HEPARIN SODIUM 5000 UNITS: 5000 INJECTION, SOLUTION INTRAVENOUS; SUBCUTANEOUS at 21:20

## 2025-03-05 RX ADMIN — LORAZEPAM 0.5 MG: 0.5 TABLET ORAL at 05:10

## 2025-03-05 RX ADMIN — CARVEDILOL 6.25 MG: 6.25 TABLET, FILM COATED ORAL at 17:00

## 2025-03-05 RX ADMIN — FOLIC ACID 1 MG: 1 TABLET ORAL at 05:10

## 2025-03-05 RX ADMIN — HEPARIN SODIUM 5000 UNITS: 5000 INJECTION, SOLUTION INTRAVENOUS; SUBCUTANEOUS at 05:10

## 2025-03-05 RX ADMIN — HEPARIN SODIUM 5000 UNITS: 5000 INJECTION, SOLUTION INTRAVENOUS; SUBCUTANEOUS at 15:16

## 2025-03-05 RX ADMIN — Medication 100 MG: at 05:10

## 2025-03-05 RX ADMIN — TRIAMCINOLONE ACETONIDE: 0.25 CREAM TOPICAL at 17:00

## 2025-03-05 RX ADMIN — SODIUM CHLORIDE, POTASSIUM CHLORIDE, SODIUM LACTATE AND CALCIUM CHLORIDE: 600; 310; 30; 20 INJECTION, SOLUTION INTRAVENOUS at 07:46

## 2025-03-05 RX ADMIN — ACETAMINOPHEN 650 MG: 325 TABLET ORAL at 07:53

## 2025-03-05 RX ADMIN — ACETAMINOPHEN 650 MG: 325 TABLET ORAL at 21:19

## 2025-03-05 RX ADMIN — CARVEDILOL 6.25 MG: 6.25 TABLET, FILM COATED ORAL at 07:45

## 2025-03-05 RX ADMIN — AMLODIPINE BESYLATE 5 MG: 5 TABLET ORAL at 15:16

## 2025-03-05 RX ADMIN — TRIAMCINOLONE ACETONIDE: 0.25 CREAM TOPICAL at 05:11

## 2025-03-05 RX ADMIN — THERA TABS 1 TABLET: TAB at 05:10

## 2025-03-05 ASSESSMENT — LIFESTYLE VARIABLES
TREMOR: TREMOR NOT VISIBLE BUT CAN BE FELT, FINGERTIP TO FINGERTIP
AUDITORY DISTURBANCES: NOT PRESENT
AUDITORY DISTURBANCES: NOT PRESENT
PAROXYSMAL SWEATS: NO SWEAT VISIBLE
TREMOR: NO TREMOR
ANXIETY: *
AUDITORY DISTURBANCES: NOT PRESENT
ANXIETY: MILDLY ANXIOUS
AGITATION: NORMAL ACTIVITY
AUDITORY DISTURBANCES: NOT PRESENT
AUDITORY DISTURBANCES: NOT PRESENT
HEADACHE, FULLNESS IN HEAD: NOT PRESENT
VISUAL DISTURBANCES: NOT PRESENT
VISUAL DISTURBANCES: NOT PRESENT
TOTAL SCORE: 3
NAUSEA AND VOMITING: NO NAUSEA AND NO VOMITING
PAROXYSMAL SWEATS: BARELY PERCEPTIBLE SWEATING. PALMS MOIST
TOTAL SCORE: 4
HEADACHE, FULLNESS IN HEAD: NOT PRESENT
ORIENTATION AND CLOUDING OF SENSORIUM: ORIENTED AND CAN DO SERIAL ADDITIONS
PAROXYSMAL SWEATS: NO SWEAT VISIBLE
TOTAL SCORE: 3
AGITATION: SOMEWHAT MORE THAN NORMAL ACTIVITY
AGITATION: NORMAL ACTIVITY
VISUAL DISTURBANCES: NOT PRESENT
NAUSEA AND VOMITING: NO NAUSEA AND NO VOMITING
HEADACHE, FULLNESS IN HEAD: NOT PRESENT
TOTAL SCORE: 4
TREMOR: NO TREMOR
TOTAL SCORE: 3
HEADACHE, FULLNESS IN HEAD: NOT PRESENT
AGITATION: SOMEWHAT MORE THAN NORMAL ACTIVITY
AGITATION: NORMAL ACTIVITY
NAUSEA AND VOMITING: NO NAUSEA AND NO VOMITING
ANXIETY: MILDLY ANXIOUS
HEADACHE, FULLNESS IN HEAD: VERY MILD
PAROXYSMAL SWEATS: BARELY PERCEPTIBLE SWEATING. PALMS MOIST
NAUSEA AND VOMITING: MILD NAUSEA WITH NO VOMITING
TREMOR: NO TREMOR
ORIENTATION AND CLOUDING OF SENSORIUM: ORIENTED AND CAN DO SERIAL ADDITIONS
ANXIETY: MILDLY ANXIOUS
NAUSEA AND VOMITING: MILD NAUSEA WITH NO VOMITING
AGITATION: NORMAL ACTIVITY
NAUSEA AND VOMITING: MILD NAUSEA WITH NO VOMITING
TREMOR: NO TREMOR
VISUAL DISTURBANCES: NOT PRESENT
HEADACHE, FULLNESS IN HEAD: NOT PRESENT
ORIENTATION AND CLOUDING OF SENSORIUM: ORIENTED AND CAN DO SERIAL ADDITIONS
ANXIETY: *
AUDITORY DISTURBANCES: NOT PRESENT
PAROXYSMAL SWEATS: BARELY PERCEPTIBLE SWEATING. PALMS MOIST
PAROXYSMAL SWEATS: BARELY PERCEPTIBLE SWEATING. PALMS MOIST
ORIENTATION AND CLOUDING OF SENSORIUM: ORIENTED AND CAN DO SERIAL ADDITIONS
TOTAL SCORE: VERY MILD ITCHING, PINS AND NEEDLES SENSATION, BURNING OR NUMBNESS
ANXIETY: *
VISUAL DISTURBANCES: NOT PRESENT
VISUAL DISTURBANCES: NOT PRESENT
TOTAL SCORE: 5
ORIENTATION AND CLOUDING OF SENSORIUM: ORIENTED AND CAN DO SERIAL ADDITIONS
TREMOR: TREMOR NOT VISIBLE BUT CAN BE FELT, FINGERTIP TO FINGERTIP
ORIENTATION AND CLOUDING OF SENSORIUM: ORIENTED AND CAN DO SERIAL ADDITIONS

## 2025-03-05 ASSESSMENT — ENCOUNTER SYMPTOMS
WEAKNESS: 1
PALPITATIONS: 1
NERVOUS/ANXIOUS: 1

## 2025-03-05 ASSESSMENT — PAIN DESCRIPTION - PAIN TYPE
TYPE: ACUTE PAIN
TYPE: ACUTE PAIN

## 2025-03-05 NOTE — CARE PLAN
The patient is Stable - Low risk of patient condition declining or worsening    Shift Goals  Clinical Goals: CIWA, monitor labs and vitals  Patient Goals: Comfort  Family Goals: DEBBI    Progress made toward(s) clinical / shift goals:    Problem: Knowledge Deficit - Standard  Goal: Patient and family/care givers will demonstrate understanding of plan of care, disease process/condition, diagnostic tests and medications  Outcome: Progressing     Problem: Optimal Care for Alcohol Withdrawal  Goal: Optimal Care for the alcohol withdrawal patient  Outcome: Progressing     Problem: Seizure Precautions  Goal: Implementation of seizure precautions  Outcome: Progressing     Problem: Fall Risk  Goal: Patient will remain free from falls  Outcome: Progressing     Problem: Pain - Standard  Goal: Alleviation of pain or a reduction in pain to the patient’s comfort goal  Outcome: Progressing       Patient is not progressing towards the following goals:

## 2025-03-05 NOTE — PROGRESS NOTES
Monitor Summary:     Rhythm: SR-ST  Rate:   Ectopy: (F) PVC (R) trigem  Measurements: 0.13/0.07/0.36                  12 Hour Chart Check

## 2025-03-05 NOTE — PROGRESS NOTES
Hospital Medicine Daily Progress Note    Date of Service  3/4/2025    Chief Complaint  Alberto Maldonado is a 39 y.o. male admitted 3/2/2025 with ETOH withdrawal     Hospital Course   39 y.o. male with history of alcohol dependence reports that he has relapsed and for the past 6 weeks has been drinking 10-15 shots daily, who presented 3/2/2025 with detox and tachycardia.  Last drink the day prior admit. He has prior history of alcohol withdrawal requiring hospitalization previously was in AA meetings however relapsed 6 weeks ago.  He reports nausea and vomiting.  Has a history of sleep apnea recently provided with CPAP mask and is in the process of starting CPAP therapy.      Ciwa protocol    EDIN, ivf, bladder scan    Interval Problem Update  Seen patient at bedside  No acute overnight events  On ciwa protocol  Noted diarrhea, checked c diffi  Isolation  EDIN with Cr 2.2, ordered IVF and bladder scan   Hold losartan  Continue multivitamin, folate acid and thiamine  I have reviewed the chest x-ray no acute intrathoracic abnormality per my read    I have discussed this patient's plan of care and discharge plan at IDT rounds today with Case Management, Nursing, Nursing leadership, and other members of the IDT team.    Consultants/Specialty  na    Code Status  Full Code    Disposition  The patient is not medically cleared for discharge to home or a post-acute facility.      I have placed the appropriate orders for post-discharge needs.    Review of Systems  Review of Systems   Constitutional:  Positive for malaise/fatigue.   Cardiovascular:  Positive for palpitations.   Neurological:  Positive for weakness.   Psychiatric/Behavioral:  The patient is nervous/anxious.    All other systems reviewed and are negative.       Physical Exam  Temp:  [36.6 °C (97.9 °F)-36.7 °C (98.1 °F)] 36.6 °C (97.9 °F)  Pulse:  [] 103  Resp:  [18-24] 18  BP: (116-161)/(72-84) 127/82  SpO2:  [92 %-96 %] 92 %    Physical Exam  Vitals  and nursing note reviewed.   Constitutional:       Appearance: He is obese. He is ill-appearing.   HENT:      Head: Normocephalic and atraumatic.      Mouth/Throat:      Pharynx: Oropharynx is clear.   Eyes:      Pupils: Pupils are equal, round, and reactive to light.   Neck:      Vascular: No carotid bruit.   Cardiovascular:      Rate and Rhythm: Regular rhythm. Tachycardia present.   Pulmonary:      Effort: Pulmonary effort is normal.      Breath sounds: Normal breath sounds.   Abdominal:      General: Abdomen is flat. Bowel sounds are normal.      Palpations: Abdomen is soft. There is no mass.   Musculoskeletal:         General: Normal range of motion.      Cervical back: Neck supple.   Skin:     General: Skin is warm and dry.      Comments: Dry scaly rash right foot    Neurological:      General: No focal deficit present.      Mental Status: He is alert and oriented to person, place, and time.   Psychiatric:         Behavior: Behavior normal.      Comments: anxious         Fluids    Intake/Output Summary (Last 24 hours) at 3/4/2025 1929  Last data filed at 3/4/2025 0852  Gross per 24 hour   Intake 500 ml   Output 0 ml   Net 500 ml        Laboratory  Recent Labs     03/02/25  0805 03/03/25  0136 03/04/25  0302   WBC 14.7* 6.7 8.2   RBC 5.71 4.96 4.70   HEMOGLOBIN 16.6 14.3 13.6*   HEMATOCRIT 48.8 44.0 42.8   MCV 85.5 88.7 91.1   MCH 29.1 28.8 28.9   MCHC 34.0 32.5 31.8*   RDW 55.1* 57.2* 58.1*   PLATELETCT 332 221 203   MPV 8.5* 8.8* 9.0     Recent Labs     03/03/25  0136 03/03/25  1225 03/04/25  0302   SODIUM 136 137 138   POTASSIUM 2.8* 4.0 3.6   CHLORIDE 99 102 106   CO2 21 24 20   GLUCOSE 119* 106* 106*   BUN 7* 9 19   CREATININE 1.02 1.32 2.24*   CALCIUM 8.3* 9.0 8.8                   Imaging  DX-CHEST-PORTABLE (1 VIEW)   Final Result         1. No acute cardiopulmonary abnormalities identified.                          Assessment/Plan  * Alcohol withdrawal syndrome with complication (HCC)- (present on  admission)  Assessment & Plan  IVF  Monitor for symptoms of withdrawal.  [facility] protocol  Tele monitoring  MV, folate and VitB1  Seizure, aspiration and fall precautions  Patient was counseled on alcohol cessation  Social service consult for enrollment alcohol rehabilitation program or attend Alcoholics Anonymous      Acute renal failure (ARF) (HCC)- (present on admission)  Assessment & Plan  Bladder scan, IVF  Avoid nephrotoxins  Renal dosing meds  Continue monitoring     Sinus tachycardia- (present on admission)  Assessment & Plan  Likely reactive secondary to alcohol withdrawal and electrolyte abnormalities  IV hydration replete electrolytes  Treat alcohol withdrawal  Reviewed echocardiogram from 4/22/2024 with normal EF    Metabolic acidosis- (present on admission)  Assessment & Plan  Likely secondary to dehydration  IV fluids and trend lactic acid    Hypokalemia- (present on admission)  Assessment & Plan  Critical low, replaced as indicated  Check Mag, phos  Repeat in pm    Electrolyte abnormality- (present on admission)  Assessment & Plan  Hypokalemia  Hypomagnesemia  Hypophosphatemia    I ordered IV K-Phos magnesium sulfate  I ordered repeat electrolytes    Controlled type 2 diabetes mellitus with hyperglycemia, without long-term current use of insulin (HCC)- (present on admission)  Assessment & Plan  Diet controlled hemoglobin A1c 6.7  Reinforced lifestyle changes    Asthma- (present on admission)  Assessment & Plan  No acute exacerbation      Morbid obesity (HCC)- (present on admission)  Assessment & Plan  Body mass index is 43.43 kg/m².     BOLIVAR (obstructive sleep apnea)- (present on admission)  Assessment & Plan  Nocturnal CPAP    Steatosis of liver- (present on admission)  Assessment & Plan  Secondary to alcohol dependence with alcoholic hepatitis  Previous hepatitis panel negative  Reviewed previous right upper quadrant ultrasound from 12/22/2024 with hepatic steatosis  Monitor LFTs  Reviewed with patient  importance of alcohol cessation    Essential hypertension- (present on admission)  Assessment & Plan  Uncontrolled continue losartan  Start carvedilol  Monitor blood pressure adjust accordingly         VTE prophylaxis: heparin subq  I have performed a physical exam and reviewed and updated ROS and Plan today (3/4/2025). In review of yesterday's note (3/3/2025), there are no changes except as documented above.    Patient is has a high medical complexity, complex decision making and is at high risk for complication, morbidity, and mortality.  I spent 52 minutes, reviewing the chart, obtaining and/or reviewing separately obtained history. Performing a medically appropriate examination and evaluation.  Counseling and educating the patient. Ordering and reviewing medications, tests, or procedures.   Documenting clinical information in EPIC. Independently interpreting results and communicating results to patient. Discussing future disposition of care with patient, RN and case management.  This does not include time spent on separately billable procedures/tests.

## 2025-03-05 NOTE — DISCHARGE PLANNING
Case Management Discharge Planning    Admission Date: 3/2/2025  GMLOS: 3.3  ALOS: 3    6-Clicks ADL Score: 23  6-Clicks Mobility Score: 18      Anticipated Discharge Dispo: Discharge Disposition: Discharged to home/self care (01)    DME Needed: No    Action(s) Taken: Updated Provider/Nurse on Discharge Plan and DC Assessment Complete (See below)    1045, Pt was visited at room to obtain assessment information and discuss discharge planning. Pt was provided Alcohol treatment program resources. RN ALICIA informed CHW Giselle CHRISTIANSON to visit Pt to provide additional community Alcohol program resources.    Escalations Completed: None    Medically Clear: No    Next Steps: CM will continue to assist Pt with discharge needs.      Barriers to Discharge: Medical clearance    Is the patient up for discharge tomorrow: No      Care Transition Team Assessment  RN ALICIA met with Pt at bedside to obtain assessment informations. Demographics verified. RN CM introduced self and purpose of visit.   Pt lives with mother and brother in a 2 story house with 5 steps to main entrance.  Pt has  mother and brother for support.  Pt has independent for PCP  Pt was independent with ADLs and IADLs prior to hospitalization.      Information Source  Orientation Level: Oriented X4  Information Given By: Patient    Elopement Risk  Legal Hold: No  Ambulatory or Self Mobile in Wheelchair: Yes  Disoriented: No  Psychiatric Symptoms: None  History of Wandering: No  Elopement this Admit: No  Vocalizing Wanting to Leave: No  Displays Behaviors, Body Language Wanting to Leave: No-Not at Risk for Elopement  Elopement Risk: Not at Risk for Elopement    Interdisciplinary Discharge Planning  Primary Care Physician: Cyrus Tolliver  Lives with - Patient's Self Care Capacity: Parents, Sibling, Related Adult  Patient or legal guardian wants to designate a caregiver: No  Support Systems: Spouse / Significant Other, Family Member(s)  Housing / Facility: 2 Story House (with 5  steps to main entrance)    Discharge Preparedness  What is your plan after discharge?: Home with help  What are your discharge supports?: Parent, Sibling  Prior Functional Level: Independent with Activities of Daily Living, Independent with Medication Management    Functional Assesment  Prior Functional Level: Independent with Activities of Daily Living, Independent with Medication Management    Finances  Financial Barriers to Discharge: No  Prescription Coverage: Yes    Vision / Hearing Impairment  Vision Impairment : No  Hearing Impairment : No    Domestic Abuse  Have you ever been the victim of abuse or violence?: No  Possible Abuse/Neglect Reported to:: Not Applicable    Psychological Assessment  History of Substance Abuse: Alcohol  Date Last Used - Alcohol: 03/01/2025  Substance Abuse Comments: Pt drinks 10-15 shots of hard liquor per day  History of Psychiatric Problems: No         Anticipated Discharge Information  Discharge Disposition: Discharged to home/self care (01)

## 2025-03-05 NOTE — DISCHARGE PLANNING
Community Health Worker Intake    Identified barriers to addiction treatment.  Resources provided to addiction resources.  Contact information provided to Alberto Maldonado   Has hospital follow up appointment scheduled for 3/13/25   Inpatient assessment completed.    CHW met with pt at bedside to introduce Community Care Management per referral from ALICIA Pacheco. Pt states he lives in a 2 story home with his family, who is good support for him. Pt reports no barriers at home to food. Pt has a working vehicle and is an active  so he does not face any barriers to transportation. Pt is active with PCP ANGELIA Lincoln and missed a PCP appointment due to being hospitalized. Pt is agreeable to CHW making him a hospital follow up follow up appointment as long as it is in the morning. Pt has both a cane and a walker at home that he uses only as needed. Pt states he does not face any barriers to getting his medications, but he does neglect to take them when he is drinking. Pt states he is afraid of the effects his meds could have while he is also consuming alcohol. Pt expresses a want to quit drinking and states he was sober for a while, but recently relapsed. Pt state he has worked with multiple sponsors through [employer], but has not found one who is good support for him.     Plan:  CHW provided pt with resources for addiction treatment other than AA  CHW contacted [facility] and made pt a hospital follow up appointment for 3/13/25 as well as an appointment with pt's PCP to replace missed appointment on 3/18/25.  Appointment reminders put in follow up section of AVS.  CHW will follow up with pt post discharge.

## 2025-03-05 NOTE — CARE PLAN
The patient is Stable - Low risk of patient condition declining or worsening    Shift Goals  Clinical Goals: CIWA, monitor labs and vitals  Patient Goals: Comfort  Family Goals: DEBBI    Progress made toward(s) clinical / shift goals:    Problem: Optimal Care for Alcohol Withdrawal  Goal: Optimal Care for the alcohol withdrawal patient  Outcome: Progressing     Problem: Seizure Precautions  Goal: Implementation of seizure precautions  Outcome: Progressing     Problem: Lifestyle Changes  Goal: Patient's ability to identify lifestyle changes and available resources to help reduce recurrence of condition will improve  Outcome: Progressing     Problem: Risk for Aspiration  Goal: Patient's risk for aspiration will be absent or decrease  Outcome: Progressing     Problem: Fall Risk  Goal: Patient will remain free from falls  Outcome: Progressing     Problem: Pain - Standard  Goal: Alleviation of pain or a reduction in pain to the patient’s comfort goal  Outcome: Progressing       Patient is not progressing towards the following goals:

## 2025-03-05 NOTE — PROGRESS NOTES
Hospital Medicine Daily Progress Note    Date of Service  3/5/2025    Chief Complaint  Alberto Maldonado is a 39 y.o. male admitted 3/2/2025 with ETOH withdrawal     Hospital Course   39 y.o. male with history of alcohol dependence reports that he has relapsed and for the past 6 weeks has been drinking 10-15 shots daily, who presented 3/2/2025 with detox and tachycardia.  Last drink the day prior admit. He has prior history of alcohol withdrawal requiring hospitalization previously was in AA meetings however relapsed 6 weeks ago.  He reports nausea and vomiting.  Has a history of sleep apnea recently provided with CPAP mask and is in the process of starting CPAP therapy.      [facility] protocol    EDIN, ivf, bladder scan and US renal. Nephrology consulted    Interval Problem Update  Seen patient at bedside  Family at bedside  Reports diarrhea improving, c diffi negative  Noted worsening renal function  On IVF  Check US renal   I have consulted and discussed with nephrology   Continue multivitamin, folate acid and thiamine    I have discussed this patient's plan of care and discharge plan at IDT rounds today with Case Management, Nursing, Nursing leadership, and other members of the IDT team.    Consultants/Specialty  nephrology    Code Status  Full Code    Disposition  The patient is not medically cleared for discharge to home or a post-acute facility.      I have placed the appropriate orders for post-discharge needs.    Review of Systems  Review of Systems   Constitutional:  Positive for malaise/fatigue.   Cardiovascular:  Positive for palpitations.   Neurological:  Positive for weakness.   Psychiatric/Behavioral:  The patient is nervous/anxious.    All other systems reviewed and are negative.       Physical Exam  Temp:  [36.5 °C (97.7 °F)-37.1 °C (98.8 °F)] 36.7 °C (98.1 °F)  Pulse:  [] 95  Resp:  [18] 18  BP: (127-176)/() 155/98  SpO2:  [90 %-92 %] 91 %    Physical Exam  Vitals and nursing note  reviewed.   Constitutional:       Appearance: He is obese. He is ill-appearing.   HENT:      Head: Normocephalic and atraumatic.      Mouth/Throat:      Pharynx: Oropharynx is clear.   Eyes:      Pupils: Pupils are equal, round, and reactive to light.   Neck:      Vascular: No carotid bruit.   Cardiovascular:      Rate and Rhythm: Regular rhythm. Tachycardia present.   Pulmonary:      Effort: Pulmonary effort is normal.      Breath sounds: Normal breath sounds.   Abdominal:      General: Abdomen is flat. Bowel sounds are normal.      Palpations: Abdomen is soft. There is no mass.   Musculoskeletal:         General: Normal range of motion.      Cervical back: Neck supple.   Skin:     General: Skin is warm and dry.      Comments: Dry scaly rash right foot    Neurological:      General: No focal deficit present.      Mental Status: He is alert and oriented to person, place, and time.   Psychiatric:         Behavior: Behavior normal.      Comments: anxious         Fluids    Intake/Output Summary (Last 24 hours) at 3/5/2025 1438  Last data filed at 3/5/2025 0426  Gross per 24 hour   Intake 100 ml   Output 1030 ml   Net -930 ml        Laboratory  Recent Labs     03/03/25  0136 03/04/25  0302 03/05/25  0153   WBC 6.7 8.2 7.6   RBC 4.96 4.70 4.39*   HEMOGLOBIN 14.3 13.6* 12.8*   HEMATOCRIT 44.0 42.8 40.0*   MCV 88.7 91.1 91.1   MCH 28.8 28.9 29.2   MCHC 32.5 31.8* 32.0*   RDW 57.2* 58.1* 57.5*   PLATELETCT 221 203 212   MPV 8.8* 9.0 9.3     Recent Labs     03/03/25  1225 03/04/25  0302 03/05/25  0153   SODIUM 137 138 143   POTASSIUM 4.0 3.6 3.6   CHLORIDE 102 106 107   CO2 24 20 23   GLUCOSE 106* 106* 106*   BUN 9 19 29*   CREATININE 1.32 2.24* 2.84*   CALCIUM 9.0 8.8 8.9                   Imaging  DX-CHEST-PORTABLE (1 VIEW)   Final Result         1. No acute cardiopulmonary abnormalities identified.                     US-RENAL    (Results Pending)        Assessment/Plan  * Alcohol withdrawal syndrome with complication  (HCC)- (present on admission)  Assessment & Plan  IVF  Monitor for symptoms of withdrawal.  [facility] protocol  Tele monitoring  MV, folate and VitB1  Seizure, aspiration and fall precautions  Patient was counseled on alcohol cessation  Social service consult for enrollment alcohol rehabilitation program or attend Alcoholics Anonymous      Acute renal failure (ARF) (HCC)- (present on admission)  Assessment & Plan  Unclear etiology  Bladder scan, IVF  US renal  Avoid nephrotoxins  Renal dosing meds  Continue monitoring  Nephrology consulted      Sinus tachycardia- (present on admission)  Assessment & Plan  Likely reactive secondary to alcohol withdrawal and electrolyte abnormalities  IV hydration replete electrolytes  Treat alcohol withdrawal  Reviewed echocardiogram from 4/22/2024 with normal EF    Metabolic acidosis- (present on admission)  Assessment & Plan  Likely secondary to dehydration  IV fluids and trend lactic acid    Hypokalemia- (present on admission)  Assessment & Plan  Critical low, replaced as indicated  Check Mag, phos  Repeat in pm    Electrolyte abnormality- (present on admission)  Assessment & Plan  Hypokalemia  Hypomagnesemia  Hypophosphatemia    I ordered IV K-Phos magnesium sulfate  I ordered repeat electrolytes    Controlled type 2 diabetes mellitus with hyperglycemia, without long-term current use of insulin (HCC)- (present on admission)  Assessment & Plan  Diet controlled hemoglobin A1c 6.7  Reinforced lifestyle changes    Asthma- (present on admission)  Assessment & Plan  No acute exacerbation      Morbid obesity (HCC)- (present on admission)  Assessment & Plan  Body mass index is 43.43 kg/m².     BOLIVAR (obstructive sleep apnea)- (present on admission)  Assessment & Plan  Nocturnal CPAP    Steatosis of liver- (present on admission)  Assessment & Plan  Secondary to alcohol dependence with alcoholic hepatitis  Previous hepatitis panel negative  Reviewed previous right upper quadrant ultrasound from  12/22/2024 with hepatic steatosis  Monitor LFTs  Reviewed with patient importance of alcohol cessation    Essential hypertension- (present on admission)  Assessment & Plan  Uncontrolled continue losartan  Start carvedilol  Monitor blood pressure adjust accordingly         VTE prophylaxis: heparin subq  I have performed a physical exam and reviewed and updated ROS and Plan today (3/5/2025). In review of yesterday's note (3/4/2025), there are no changes except as documented above.    Patient is has a high medical complexity, complex decision making and is at high risk for complication, morbidity, and mortality.  I spent 51 minutes, reviewing the chart, obtaining and/or reviewing separately obtained history. Performing a medically appropriate examination and evaluation.  Counseling and educating the patient. Ordering and reviewing medications, tests, or procedures.   Documenting clinical information in EPIC. Independently interpreting results and communicating results to patient. Discussing future disposition of care with patient, RN and case management.  This does not include time spent on separately billable procedures/tests.

## 2025-03-05 NOTE — ASSESSMENT & PLAN NOTE
Unclear etiology  Bladder scan, IVF  US renal  Avoid nephrotoxins  Renal dosing meds  Continue monitoring  Nephrology consulted

## 2025-03-05 NOTE — CONSULTS
DATE OF SERVICE:  03/05/2025     REQUESTING PHYSICIAN:  Dr. Mee Patterson.     REASON FOR CONSULTATION:  Acute kidney injury.     The patient seen and examined, medical record reviewed.     HISTORY OF PRESENT ILLNESS:  The patient is a 39-year-old gentleman with a   past medical history significant for alcohol abuse, who has been drinking   heavy alcohol amount for the last 6 weeks.  The patient presented to the   hospital on 03/02/2025 with tremors and palpitations.  The patient has been   admitted for potential alcohol withdrawal and DT. During this hospitalization,   the patient developed acute kidney injury with a creatinine on 03/03 was 1.3   and today is up to 2.84.     The patient had no recent exposure to IV contrast or NSAIDs use.     The patient has no hematuria or dysuria.     PAST MEDICAL HISTORY:  Significant for:  1.  Alcohol abuse.  2.  Asthma.  3.  Hypertension.     ALLERGIES:  No known drug allergies.     SOCIAL HISTORY:  The patient has no smoking history.  He drinks about 7 ounces   of alcohol per week.     FAMILY HISTORY:  No known renal disease.     MEDICATIONS:  Reviewed.     REVIEW OF SYSTEMS:  The patient has no chest pain or shortness of breath.  He   is very anxious.  All other review of system is negative except outlined as in   the history of present illness.     PHYSICAL EXAMINATION:  GENERAL:  The patient appears ill, but no apparent distress.  VITAL SIGNS:  Showed blood pressure 155/98, heart rate was 95, respiratory   rate was 18.  HEENT:  Normocephalic, atraumatic.  Sclerae are anicteric.  Pupils are   reactive.  Nose normal.  Mucous membrane is moist.  NECK:  No lymphadenopathy, no JVD, no thyroid mass.  CHEST:  Normal.  LUNGS:  Clear to auscultation.  HEART:  S1, S2.  ABDOMEN:  Soft, nontender.  No hepatosplenomegaly.  There is no inguinal   lymphadenopathy.  EXTREMITIES:  There is trace lower extremity edema.  NEUROLOGIC:  The patient is alert and oriented x3.  MOOD:  The patient  is anxious.     LABORATORY DATA:  His recent labs from today were reviewed.     DIAGNOSTIC DATA:  The patient had a chest x-ray on admission on 03/02.  I   reviewed the image myself, showed no pulmonary edema.     ASSESSMENT:  1.  Acute kidney injury, the etiology is not very clear, most likely acute   tubular necrosis.  2.  Uncontrolled hypertension.  3.  Alcohol abuse.  4.  Anemia.     PLAN:  1.  There is no acute need for renal placement therapy.  2.  Check urine protein to creatinine ratio.  3.  Discontinue IV fluid.  4.  Start amlodipine.  5.  Renal diet.  6.  Daily labs.  7.  If the patient has significant proteinuria, we will plan on kidney biopsy.  8.  Avoid nephrotoxin like NSAIDs.  9.  Prognosis is guarded.     Plan discussed in detail with Dr. Patterson.        ______________________________  FADI NAJJAR, MD FN/PRESTON    DD:  03/05/2025 14:31  DT:  03/05/2025 15:39    Job#:  [id number]

## 2025-03-06 ENCOUNTER — PHARMACY VISIT (OUTPATIENT)
Dept: PHARMACY | Facility: MEDICAL CENTER | Age: 40
End: 2025-03-06
Payer: COMMERCIAL

## 2025-03-06 VITALS
HEIGHT: 63 IN | TEMPERATURE: 98.6 F | SYSTOLIC BLOOD PRESSURE: 160 MMHG | RESPIRATION RATE: 18 BRPM | OXYGEN SATURATION: 92 % | BODY MASS INDEX: 45.62 KG/M2 | HEART RATE: 110 BPM | WEIGHT: 257.5 LBS | DIASTOLIC BLOOD PRESSURE: 111 MMHG

## 2025-03-06 LAB
ALBUMIN SERPL BCP-MCNC: 3.6 G/DL (ref 3.2–4.9)
ANION GAP SERPL CALC-SCNC: 11 MMOL/L (ref 7–16)
ANION GAP SERPL CALC-SCNC: 13 MMOL/L (ref 7–16)
BUN SERPL-MCNC: 24 MG/DL (ref 8–22)
BUN SERPL-MCNC: 27 MG/DL (ref 8–22)
CALCIUM ALBUM COR SERPL-MCNC: 8.9 MG/DL (ref 8.5–10.5)
CALCIUM SERPL-MCNC: 8.5 MG/DL (ref 8.5–10.5)
CALCIUM SERPL-MCNC: 8.6 MG/DL (ref 8.5–10.5)
CHLORIDE SERPL-SCNC: 111 MMOL/L (ref 96–112)
CHLORIDE SERPL-SCNC: 113 MMOL/L (ref 96–112)
CO2 SERPL-SCNC: 20 MMOL/L (ref 20–33)
CO2 SERPL-SCNC: 22 MMOL/L (ref 20–33)
CREAT SERPL-MCNC: 1.97 MG/DL (ref 0.5–1.4)
CREAT SERPL-MCNC: 2.39 MG/DL (ref 0.5–1.4)
ERYTHROCYTE [DISTWIDTH] IN BLOOD BY AUTOMATED COUNT: 58.9 FL (ref 35.9–50)
GFR SERPLBLD CREATININE-BSD FMLA CKD-EPI: 35 ML/MIN/1.73 M 2
GFR SERPLBLD CREATININE-BSD FMLA CKD-EPI: 44 ML/MIN/1.73 M 2
GLUCOSE SERPL-MCNC: 92 MG/DL (ref 65–99)
GLUCOSE SERPL-MCNC: 98 MG/DL (ref 65–99)
HCT VFR BLD AUTO: 38.8 % (ref 42–52)
HGB BLD-MCNC: 12.3 G/DL (ref 14–18)
MCH RBC QN AUTO: 29.7 PG (ref 27–33)
MCHC RBC AUTO-ENTMCNC: 31.7 G/DL (ref 32.3–36.5)
MCV RBC AUTO: 93.7 FL (ref 81.4–97.8)
PHOSPHATE SERPL-MCNC: 4.9 MG/DL (ref 2.5–4.5)
PLATELET # BLD AUTO: 222 K/UL (ref 164–446)
PMV BLD AUTO: 9.3 FL (ref 9–12.9)
POTASSIUM SERPL-SCNC: 3.8 MMOL/L (ref 3.6–5.5)
POTASSIUM SERPL-SCNC: 4 MMOL/L (ref 3.6–5.5)
RBC # BLD AUTO: 4.14 M/UL (ref 4.7–6.1)
SODIUM SERPL-SCNC: 144 MMOL/L (ref 135–145)
SODIUM SERPL-SCNC: 146 MMOL/L (ref 135–145)
WBC # BLD AUTO: 6.4 K/UL (ref 4.8–10.8)

## 2025-03-06 PROCEDURE — 80069 RENAL FUNCTION PANEL: CPT

## 2025-03-06 PROCEDURE — 700102 HCHG RX REV CODE 250 W/ 637 OVERRIDE(OP): Performed by: INTERNAL MEDICINE

## 2025-03-06 PROCEDURE — 85027 COMPLETE CBC AUTOMATED: CPT

## 2025-03-06 PROCEDURE — 99232 SBSQ HOSP IP/OBS MODERATE 35: CPT | Performed by: INTERNAL MEDICINE

## 2025-03-06 PROCEDURE — 99239 HOSP IP/OBS DSCHRG MGMT >30: CPT | Performed by: STUDENT IN AN ORGANIZED HEALTH CARE EDUCATION/TRAINING PROGRAM

## 2025-03-06 PROCEDURE — 700102 HCHG RX REV CODE 250 W/ 637 OVERRIDE(OP): Performed by: HOSPITALIST

## 2025-03-06 PROCEDURE — RXMED WILLOW AMBULATORY MEDICATION CHARGE: Performed by: STUDENT IN AN ORGANIZED HEALTH CARE EDUCATION/TRAINING PROGRAM

## 2025-03-06 PROCEDURE — A9270 NON-COVERED ITEM OR SERVICE: HCPCS | Performed by: INTERNAL MEDICINE

## 2025-03-06 PROCEDURE — 36415 COLL VENOUS BLD VENIPUNCTURE: CPT

## 2025-03-06 PROCEDURE — 80048 BASIC METABOLIC PNL TOTAL CA: CPT

## 2025-03-06 PROCEDURE — A9270 NON-COVERED ITEM OR SERVICE: HCPCS | Performed by: HOSPITALIST

## 2025-03-06 PROCEDURE — 700111 HCHG RX REV CODE 636 W/ 250 OVERRIDE (IP): Performed by: STUDENT IN AN ORGANIZED HEALTH CARE EDUCATION/TRAINING PROGRAM

## 2025-03-06 RX ORDER — CARVEDILOL 6.25 MG/1
6.25 TABLET ORAL 2 TIMES DAILY WITH MEALS
Qty: 60 TABLET | Refills: 0 | Status: SHIPPED | OUTPATIENT
Start: 2025-03-07

## 2025-03-06 RX ORDER — AMLODIPINE BESYLATE 5 MG/1
5 TABLET ORAL DAILY
Qty: 100 TABLET | Refills: 3 | Status: SHIPPED | OUTPATIENT
Start: 2025-03-07 | End: 2026-04-11

## 2025-03-06 RX ORDER — FOLIC ACID 1 MG/1
1 TABLET ORAL DAILY
Qty: 30 TABLET | Refills: 0 | Status: SHIPPED | OUTPATIENT
Start: 2025-03-06

## 2025-03-06 RX ORDER — LANOLIN ALCOHOL/MO/W.PET/CERES
100 CREAM (GRAM) TOPICAL DAILY
Qty: 30 TABLET | Refills: 0 | Status: SHIPPED | OUTPATIENT
Start: 2025-03-06

## 2025-03-06 RX ADMIN — AMLODIPINE BESYLATE 5 MG: 5 TABLET ORAL at 04:27

## 2025-03-06 RX ADMIN — CARVEDILOL 6.25 MG: 6.25 TABLET, FILM COATED ORAL at 07:32

## 2025-03-06 RX ADMIN — CARVEDILOL 6.25 MG: 6.25 TABLET, FILM COATED ORAL at 17:15

## 2025-03-06 RX ADMIN — TRIAMCINOLONE ACETONIDE: 0.25 CREAM TOPICAL at 04:27

## 2025-03-06 RX ADMIN — TRIAMCINOLONE ACETONIDE: 0.25 CREAM TOPICAL at 17:15

## 2025-03-06 RX ADMIN — HEPARIN SODIUM 5000 UNITS: 5000 INJECTION, SOLUTION INTRAVENOUS; SUBCUTANEOUS at 15:20

## 2025-03-06 RX ADMIN — HEPARIN SODIUM 5000 UNITS: 5000 INJECTION, SOLUTION INTRAVENOUS; SUBCUTANEOUS at 04:27

## 2025-03-06 ASSESSMENT — LIFESTYLE VARIABLES
TREMOR: NO TREMOR
AGITATION: NORMAL ACTIVITY
TOTAL SCORE: 1
PAROXYSMAL SWEATS: BARELY PERCEPTIBLE SWEATING. PALMS MOIST
ANXIETY: *
TOTAL SCORE: 2
TREMOR: NO TREMOR
AUDITORY DISTURBANCES: NOT PRESENT
NAUSEA AND VOMITING: MILD NAUSEA WITH NO VOMITING
ANXIETY: *
AGITATION: NORMAL ACTIVITY
AGITATION: NORMAL ACTIVITY
PAROXYSMAL SWEATS: BARELY PERCEPTIBLE SWEATING. PALMS MOIST
NAUSEA AND VOMITING: NO NAUSEA AND NO VOMITING
AUDITORY DISTURBANCES: NOT PRESENT
PAROXYSMAL SWEATS: BARELY PERCEPTIBLE SWEATING. PALMS MOIST
PAROXYSMAL SWEATS: BARELY PERCEPTIBLE SWEATING. PALMS MOIST
HEADACHE, FULLNESS IN HEAD: NOT PRESENT
ANXIETY: MILDLY ANXIOUS
AGITATION: NORMAL ACTIVITY
TREMOR: NO TREMOR
TOTAL SCORE: 3
VISUAL DISTURBANCES: NOT PRESENT
HEADACHE, FULLNESS IN HEAD: NOT PRESENT
PAROXYSMAL SWEATS: NO SWEAT VISIBLE
VISUAL DISTURBANCES: NOT PRESENT
NAUSEA AND VOMITING: NO NAUSEA AND NO VOMITING
TOTAL SCORE: 4
HEADACHE, FULLNESS IN HEAD: NOT PRESENT
AUDITORY DISTURBANCES: NOT PRESENT
VISUAL DISTURBANCES: NOT PRESENT
ORIENTATION AND CLOUDING OF SENSORIUM: ORIENTED AND CAN DO SERIAL ADDITIONS
ORIENTATION AND CLOUDING OF SENSORIUM: ORIENTED AND CAN DO SERIAL ADDITIONS
ANXIETY: MILDLY ANXIOUS
HEADACHE, FULLNESS IN HEAD: NOT PRESENT
NAUSEA AND VOMITING: NO NAUSEA AND NO VOMITING
TOTAL SCORE: 4
TREMOR: NO TREMOR
TREMOR: NO TREMOR
NAUSEA AND VOMITING: NO NAUSEA AND NO VOMITING
ORIENTATION AND CLOUDING OF SENSORIUM: ORIENTED AND CAN DO SERIAL ADDITIONS
VISUAL DISTURBANCES: NOT PRESENT
ORIENTATION AND CLOUDING OF SENSORIUM: ORIENTED AND CAN DO SERIAL ADDITIONS
ORIENTATION AND CLOUDING OF SENSORIUM: ORIENTED AND CAN DO SERIAL ADDITIONS
ANXIETY: *
HEADACHE, FULLNESS IN HEAD: NOT PRESENT
VISUAL DISTURBANCES: NOT PRESENT
AUDITORY DISTURBANCES: NOT PRESENT
AUDITORY DISTURBANCES: NOT PRESENT
AGITATION: NORMAL ACTIVITY

## 2025-03-06 ASSESSMENT — ENCOUNTER SYMPTOMS
SHORTNESS OF BREATH: 0
VOMITING: 0
NAUSEA: 0
FEVER: 0
COUGH: 0
CHILLS: 0

## 2025-03-06 ASSESSMENT — FIBROSIS 4 INDEX: FIB4 SCORE: 3.25

## 2025-03-06 NOTE — CARE PLAN
The patient is Stable - Low risk of patient condition declining or worsening    Shift Goals  Clinical Goals: CIWA, safety, monitor labs  Patient Goals: rest, food  Family Goals: DEBBI    Progress made toward(s) clinical / shift goals:    Problem: Knowledge Deficit - Standard  Goal: Patient and family/care givers will demonstrate understanding of plan of care, disease process/condition, diagnostic tests and medications  Outcome: Progressing     Problem: Fall Risk  Goal: Patient will remain free from falls  Outcome: Progressing     Problem: Pain - Standard  Goal: Alleviation of pain or a reduction in pain to the patient’s comfort goal  Outcome: Progressing       Patient is not progressing towards the following goals:

## 2025-03-06 NOTE — PROGRESS NOTES
Nephrology Daily Progress Note    Date of Service  3/6/2025    Chief Complaint  39 y.o. male admitted 3/2/2025 with alcohol withdrawal syndrome, developed EDIN    Interval Problem Update  Patient has no chest pain or shortness of breath    Review of Systems  Review of Systems   Constitutional:  Negative for chills, fever and malaise/fatigue.   Respiratory:  Negative for cough and shortness of breath.    Cardiovascular:  Negative for chest pain and leg swelling.   Gastrointestinal:  Negative for nausea and vomiting.   Genitourinary:  Negative for dysuria, frequency and urgency.        Physical Exam  Temp:  [36.6 °C (97.9 °F)-37 °C (98.6 °F)] 37 °C (98.6 °F)  Pulse:  [] 99  Resp:  [16-20] 18  BP: (136-163)/() 136/84  SpO2:  [92 %-94 %] 92 %    Physical Exam  Vitals and nursing note reviewed.   Constitutional:       General: He is awake.      Appearance: He is not ill-appearing.   HENT:      Head: Normocephalic and atraumatic.      Right Ear: External ear normal.      Left Ear: External ear normal.      Nose: Nose normal.      Mouth/Throat:      Pharynx: No oropharyngeal exudate or posterior oropharyngeal erythema.   Eyes:      General:         Right eye: No discharge.         Left eye: No discharge.      Conjunctiva/sclera: Conjunctivae normal.   Cardiovascular:      Rate and Rhythm: Normal rate and regular rhythm.   Pulmonary:      Effort: Pulmonary effort is normal. No respiratory distress.      Breath sounds: Normal breath sounds. No wheezing.   Abdominal:      General: Abdomen is flat. Bowel sounds are normal.   Musculoskeletal:         General: No tenderness.      Cervical back: No rigidity. No muscular tenderness.      Right lower leg: No edema.      Left lower leg: No edema.   Skin:     General: Skin is warm and dry.      Coloration: Skin is not jaundiced.      Findings: No lesion or rash.   Neurological:      General: No focal deficit present.      Mental Status: He is alert and oriented to person,  "place, and time. Mental status is at baseline.   Psychiatric:         Mood and Affect: Mood normal.         Behavior: Behavior normal.         Thought Content: Thought content normal.         Fluids    Intake/Output Summary (Last 24 hours) at 3/6/2025 1330  Last data filed at 3/6/2025 0354  Gross per 24 hour   Intake 0 ml   Output 0 ml   Net 0 ml       Laboratory  Recent Labs     03/04/25  0302 03/05/25  0153 03/06/25  0037   WBC 8.2 7.6 6.4   RBC 4.70 4.39* 4.14*   HEMOGLOBIN 13.6* 12.8* 12.3*   HEMATOCRIT 42.8 40.0* 38.8*   MCV 91.1 91.1 93.7   MCH 28.9 29.2 29.7   MCHC 31.8* 32.0* 31.7*   RDW 58.1* 57.5* 58.9*   PLATELETCT 203 212 222   MPV 9.0 9.3 9.3     Recent Labs     03/04/25 0302 03/05/25 0153 03/06/25  0037   SODIUM 138 143 144   POTASSIUM 3.6 3.6 3.8   CHLORIDE 106 107 111   CO2 20 23 20   GLUCOSE 106* 106* 92   BUN 19 29* 27*   CREATININE 2.24* 2.84* 2.39*   CALCIUM 8.8 8.9 8.6         No results for input(s): \"NTPROBNP\" in the last 72 hours.        Imaging  US-RENAL   Final Result         1.  Normal renal ultrasound.   2.  Echogenic liver, compatible with fatty change versus fibrosis      DX-CHEST-PORTABLE (1 VIEW)   Final Result         1. No acute cardiopulmonary abnormalities identified.                           Assessment/Plan  1 EDNI: Most likely ATN, no uremic symptoms  2 uncontrolled hypertension  3 anemia   4 alcohol abuse  no acute need for HD  Renal diet  Daily BMP, CBC.  Renal dose all meds  Avoid nephrotoxins like NSAIDs.  Prognosis guarded.                 "

## 2025-03-06 NOTE — PROGRESS NOTES
Handoff report received from night shift nurse. Pt care assumed. Pt is currently resting in bed. POC discussed with Pt and Pt verbalizes no questions at this time. Pt is AAOx4, on Ra, on Tele monitoring, and VSS. Call light and belongings within reach, bed in lowest and locked position, and Pt educated on use of call light. Hourly rounding initiated.

## 2025-03-06 NOTE — CARE PLAN
The patient is Stable - Low risk of patient condition declining or worsening    Shift Goals  Clinical Goals: CIWA, safety  Patient Goals: comfort  Family Goals: DEBBI    Progress made toward(s) clinical / shift goals:    Problem: Knowledge Deficit - Standard  Goal: Patient and family/care givers will demonstrate understanding of plan of care, disease process/condition, diagnostic tests and medications  Outcome: Progressing     Problem: Optimal Care for Alcohol Withdrawal  Goal: Optimal Care for the alcohol withdrawal patient  Outcome: Progressing     Problem: Psychosocial  Goal: Patient's level of anxiety will decrease  Outcome: Progressing     Problem: Fall Risk  Goal: Patient will remain free from falls  Outcome: Progressing     Problem: Pain - Standard  Goal: Alleviation of pain or a reduction in pain to the patient’s comfort goal  Outcome: Progressing       Patient is not progressing towards the following goals:

## 2025-03-07 NOTE — CARE PLAN
The patient is Stable - Low risk of patient condition declining or worsening    Shift Goals  Clinical Goals: CIWA, monitor kidney function  Patient Goals: rest  Family Goals: DEBBI    Progress made toward(s) clinical / shift goals:    Problem: Knowledge Deficit - Standard  Goal: Patient and family/care givers will demonstrate understanding of plan of care, disease process/condition, diagnostic tests and medications  Outcome: Progressing     Problem: Optimal Care for Alcohol Withdrawal  Goal: Optimal Care for the alcohol withdrawal patient  Outcome: Progressing     Problem: Psychosocial  Goal: Patient's level of anxiety will decrease  Outcome: Progressing     Problem: Fall Risk  Goal: Patient will remain free from falls  Outcome: Progressing     Problem: Pain - Standard  Goal: Alleviation of pain or a reduction in pain to the patient’s comfort goal  Outcome: Progressing       Patient is not progressing towards the following goals:

## 2025-03-07 NOTE — DISCHARGE INSTRUCTIONS
Discharge Instructions per Mee Patterson M.D.    Please follow-up with PCP as outpatient.  Repeat renal function (kidney labs) in one week  Keep hydration  Avoid any NSAIDs such as ibuprofen, Aleve, Motrin or Naproxen. Take tylenol over the counter if have mild headache or mild pain.   Stop alcohol intake is very important  Your blood pressure is elevated. Please stop losartan. Take amlodipine and coreg as prescribed  Follow up with your primary care for hypertension management       Return to ER in the event of new or worsening symptoms. Please note importance of compliance and the patient has agreed to proceed with all medical recommendations and follow up plan indicated above. All medications come with benefits and risks. Risks may include permanent injury or death and these risks can be minimized with close reassessment and monitoring. Please make it to your scheduled follow ups with PCP

## 2025-03-07 NOTE — DISCHARGE SUMMARY
Discharge Summary    CHIEF COMPLAINT ON ADMISSION  Chief Complaint   Patient presents with    Detox    Tachycardia       Reason for Admission  detox     Admission Date  3/2/2025    CODE STATUS  Full Code    HPI & HOSPITAL COURSE  This is a 39 y.o. male with history of alcohol dependence reports that he has relapsed and for the past 6 weeks has been drinking 10-15 shots daily, who presented 3/2/2025 with detox and tachycardia.  Last drink the day prior admit. He has prior history of alcohol withdrawal requiring hospitalization previously was in AA meetings however relapsed 6 weeks ago.  He reports nausea and vomiting.  Has a history of sleep apnea recently provided with CPAP mask and is in the process of starting CPAP therapy.       Here he was started with ciwa protocol. Started with MV, folate and B1.     Hospital course complicated with EDIN. Unclear etiology. US renal negative. Nephrology was consulted. Urine      EDIN, ivf, bladder scan and US renal. Nephrology consulted. Urine protein normal. Renal function slowly improving. On the day of discharge, Cr 1.99. discussed with nephrology, clear for discharge and outpatient follow up with repeat renal function in one week.      Uncontrolled HTN. Home losartan was discontinued. He is started with amlodipine and coreg. He is advised to follow up with PCP in one week.     On the day of discharge, he is hemodynamically stable. He is advised to stop alcohol intake, keep hydration and avoid NSAID. Follow up with PCP in one week to repeat renal function and HTN managements. He voiced understanding.     Therefore, he is discharged in good and stable condition to home with close outpatient follow-up.    The patient met 2-midnight criteria for an inpatient stay at the time of discharge.    Discharge Date  3/6/2025    FOLLOW UP ITEMS POST DISCHARGE  PCP in one week     DISCHARGE DIAGNOSES  Principal Problem:    Alcohol withdrawal syndrome with complication (HCC) (POA:  Yes)  Active Problems:    Essential hypertension (Chronic) (POA: Yes)    Steatosis of liver (Chronic) (POA: Yes)    BOLIVAR (obstructive sleep apnea) (POA: Yes)      Overview: Awaiting to see sleep specialist    Morbid obesity (HCC) (Chronic) (POA: Yes)    Asthma (POA: Yes)    Controlled type 2 diabetes mellitus with hyperglycemia, without long-term current use of insulin (HCC) (POA: Yes)    Electrolyte abnormality (POA: Yes)    Hypokalemia (POA: Yes)    Metabolic acidosis (POA: Yes)    Sinus tachycardia (POA: Yes)    Acute renal failure (ARF) (HCC) (POA: Yes)  Resolved Problems:    * No resolved hospital problems. *      FOLLOW UP  Future Appointments   Date Time Provider Department Center   6/12/2025  4:00 PM [address]     ANGELIA Lincoln  [address]  [place] [address]  [phone]    Go on 3/18/2025  Please go to your rescheduled primary care appointment with ANGELIA Lincoln on 3/18/25 at 7:30 AM. Please arrive 15 minutes early for check in and bring your ID, insurance information, as well as any medications you are taking.    Jose Goode D.O.  [address] [place] [address]  [phone]    Go on 3/13/2025  Please go to your hospital follow up appointment with Jose Goode DO on 3/13/25 at 7:10 AM. Please arrive 15 minutes early for check in and bring your ID, insurance information, as well as any medications you are taking.    Cyrus Tolliver P.A.-C.  [address] [place] [address]  [phone]    Follow up in 1 week(s)        MEDICATIONS ON DISCHARGE     Medication List        START taking these medications        Instructions   amLODIPine 5 MG Tabs  Start taking on: March 7, 2025  Commonly known as: Norvasc   Take 1 Tablet by mouth every day.  Dose: 5 mg     carvedilol 6.25 MG Tabs  Start taking on: March 7, 2025  Commonly known as: Coreg   Take 1 Tablet by mouth 2 times a day with meals.  Dose: 6.25 mg     folic acid 1 MG Tabs  Commonly known as:  Folvite   Take 1 Tablet by mouth every day.  Dose: 1 mg     One-Daily Multi-Vitamin Tabs   Take 1 Tablet by mouth every day.  Dose: 1 Tablet     thiamine 100 MG tablet  Commonly known as: Thiamine   Take 1 Tablet by mouth every day.  Dose: 100 mg            STOP taking these medications      indomethacin 25 MG Caps  Commonly known as: Indocin     losartan 100 MG Tabs  Commonly known as: Cozaar              Allergies  No Known Allergies    DIET  Orders Placed This Encounter   Procedures    Diet Order Diet: Consistent CHO (Diabetic); Second Modifier: (optional): Renal     Standing Status:   Standing     Number of Occurrences:   1     Diet::   Consistent CHO (Diabetic) [4]     Second Modifier: (optional):   Renal [8]       ACTIVITY  As tolerated.  Weight bearing as tolerated    CONSULTATIONS  Nephrology     PROCEDURES  na    LABORATORY  Lab Results   Component Value Date    SODIUM 146 (H) 03/06/2025    POTASSIUM 4.0 03/06/2025    CHLORIDE 113 (H) 03/06/2025    CO2 22 03/06/2025    GLUCOSE 98 03/06/2025    BUN 24 (H) 03/06/2025    CREATININE 1.97 (H) 03/06/2025        Lab Results   Component Value Date    WBC 6.4 03/06/2025    HEMOGLOBIN 12.3 (L) 03/06/2025    HEMATOCRIT 38.8 (L) 03/06/2025    PLATELETCT 222 03/06/2025      US-RENAL   Final Result         1.  Normal renal ultrasound.   2.  Echogenic liver, compatible with fatty change versus fibrosis      DX-CHEST-PORTABLE (1 VIEW)   Final Result         1. No acute cardiopulmonary abnormalities identified.                         Total time of the discharge process 35 minutes.

## 2025-03-10 NOTE — PROGRESS NOTES
Community Health Worker Intake    Identified no barriers.  Resources not provided.  Contact information provided to Alberto Maldonado   Has PCP appointment scheduled for 3/13/25  Outpatient assessment completed.  Did the patient receive medications post discharge: Yes    CHW called pt to follow up post discharge. Pt states he has been doing well and taking his medications as prescribed. Pt called [facility] and has confirmed his upcoming appointments and intends to get his repeat blood work done this week as well. CHW provided pt with [address] lab hours. Pt confirmed he has CHW's contact information and states he will reach out if he needs anything.    Plan:  CHW will follow up with pt next week to insure pt does not need any further assistance after his hospital follow up appointment.

## 2025-04-03 ENCOUNTER — APPOINTMENT (OUTPATIENT)
Dept: RADIOLOGY | Facility: MEDICAL CENTER | Age: 40
DRG: 432 | End: 2025-04-03
Attending: EMERGENCY MEDICINE
Payer: COMMERCIAL

## 2025-04-03 ENCOUNTER — HOSPITAL ENCOUNTER (INPATIENT)
Facility: MEDICAL CENTER | Age: 40
LOS: 9 days | DRG: 432 | End: 2025-04-12
Attending: EMERGENCY MEDICINE | Admitting: HOSPITALIST
Payer: COMMERCIAL

## 2025-04-03 DIAGNOSIS — K70.9 ALCOHOLIC LIVER DISEASE (HCC): ICD-10-CM

## 2025-04-03 DIAGNOSIS — R33.9 URINARY RETENTION: ICD-10-CM

## 2025-04-03 DIAGNOSIS — Z29.89 NEED FOR PNEUMOCYSTIS PROPHYLAXIS: ICD-10-CM

## 2025-04-03 DIAGNOSIS — E83.42 HYPOMAGNESEMIA: ICD-10-CM

## 2025-04-03 DIAGNOSIS — D64.9 ANEMIA, UNSPECIFIED TYPE: ICD-10-CM

## 2025-04-03 DIAGNOSIS — E66.01 MORBID OBESITY (HCC): Chronic | ICD-10-CM

## 2025-04-03 DIAGNOSIS — F10.939 ALCOHOL WITHDRAWAL SYNDROME WITH COMPLICATION (HCC): ICD-10-CM

## 2025-04-03 DIAGNOSIS — S20.212A SUPERFICIAL BRUISING OF CHEST WALL, LEFT, INITIAL ENCOUNTER: ICD-10-CM

## 2025-04-03 DIAGNOSIS — E87.6 HYPOKALEMIA: ICD-10-CM

## 2025-04-03 DIAGNOSIS — F41.9 ANXIETY: ICD-10-CM

## 2025-04-03 DIAGNOSIS — I10 ESSENTIAL HYPERTENSION: Chronic | ICD-10-CM

## 2025-04-03 DIAGNOSIS — F10.930 ALCOHOL WITHDRAWAL SYNDROME WITHOUT COMPLICATION (HCC): ICD-10-CM

## 2025-04-03 PROBLEM — F10.931 ALCOHOL WITHDRAWAL DELIRIUM (HCC): Status: ACTIVE | Noted: 2025-04-03

## 2025-04-03 PROBLEM — K70.30 ALCOHOLIC CIRRHOSIS (HCC): Status: ACTIVE | Noted: 2025-04-03

## 2025-04-03 LAB
ALBUMIN SERPL BCP-MCNC: 3.3 G/DL (ref 3.2–4.9)
ALBUMIN/GLOB SERPL: 0.8 G/DL
ALP SERPL-CCNC: 275 U/L (ref 30–99)
ALT SERPL-CCNC: 290 U/L (ref 2–50)
AMMONIA PLAS-SCNC: 47 UMOL/L (ref 11–45)
ANION GAP SERPL CALC-SCNC: 20 MMOL/L (ref 7–16)
ANISOCYTOSIS BLD QL SMEAR: ABNORMAL
AST SERPL-CCNC: 522 U/L (ref 12–45)
BASOPHILS # BLD AUTO: 0.9 % (ref 0–1.8)
BASOPHILS # BLD: 0.07 K/UL (ref 0–0.12)
BILIRUB SERPL-MCNC: 22.3 MG/DL (ref 0.1–1.5)
BUN SERPL-MCNC: 5 MG/DL (ref 8–22)
CALCIUM ALBUM COR SERPL-MCNC: 8.7 MG/DL (ref 8.5–10.5)
CALCIUM SERPL-MCNC: 8.1 MG/DL (ref 8.5–10.5)
CHLORIDE SERPL-SCNC: 96 MMOL/L (ref 96–112)
CO2 SERPL-SCNC: 16 MMOL/L (ref 20–33)
CREAT SERPL-MCNC: 1 MG/DL (ref 0.5–1.4)
EKG IMPRESSION: NORMAL
EOSINOPHIL # BLD AUTO: 0 K/UL (ref 0–0.51)
EOSINOPHIL NFR BLD: 0 % (ref 0–6.9)
ERYTHROCYTE [DISTWIDTH] IN BLOOD BY AUTOMATED COUNT: 62.2 FL (ref 35.9–50)
ETHANOL BLD-MCNC: <10.1 MG/DL
GFR SERPLBLD CREATININE-BSD FMLA CKD-EPI: 99 ML/MIN/1.73 M 2
GLOBULIN SER CALC-MCNC: 4.2 G/DL (ref 1.9–3.5)
GLUCOSE BLD STRIP.AUTO-MCNC: 163 MG/DL (ref 65–99)
GLUCOSE BLD STRIP.AUTO-MCNC: 171 MG/DL (ref 65–99)
GLUCOSE BLD STRIP.AUTO-MCNC: 199 MG/DL (ref 65–99)
GLUCOSE SERPL-MCNC: 164 MG/DL (ref 65–99)
HCT VFR BLD AUTO: 43.2 % (ref 42–52)
HGB BLD-MCNC: 15.3 G/DL (ref 14–18)
INR PPP: 1.31 (ref 0.87–1.13)
LIPASE SERPL-CCNC: 29 U/L (ref 11–82)
LYMPHOCYTES # BLD AUTO: 0.26 K/UL (ref 1–4.8)
LYMPHOCYTES NFR BLD: 3.4 % (ref 22–41)
MACROCYTES BLD QL SMEAR: ABNORMAL
MAGNESIUM SERPL-MCNC: 1 MG/DL (ref 1.5–2.5)
MANUAL DIFF BLD: NORMAL
MCH RBC QN AUTO: 30.8 PG (ref 27–33)
MCHC RBC AUTO-ENTMCNC: 35.4 G/DL (ref 32.3–36.5)
MCV RBC AUTO: 86.9 FL (ref 81.4–97.8)
MONOCYTES # BLD AUTO: 0.2 K/UL (ref 0–0.85)
MONOCYTES NFR BLD AUTO: 2.6 % (ref 0–13.4)
MORPHOLOGY BLD-IMP: NORMAL
NEUTROPHILS # BLD AUTO: 7.08 K/UL (ref 1.82–7.42)
NEUTROPHILS NFR BLD: 93.1 % (ref 44–72)
NRBC # BLD AUTO: 0 K/UL
NRBC BLD-RTO: 0 /100 WBC (ref 0–0.2)
PLATELET # BLD AUTO: 147 K/UL (ref 164–446)
PLATELET BLD QL SMEAR: NORMAL
PMV BLD AUTO: 10.6 FL (ref 9–12.9)
POIKILOCYTOSIS BLD QL SMEAR: NORMAL
POTASSIUM SERPL-SCNC: 3.1 MMOL/L (ref 3.6–5.5)
PROT SERPL-MCNC: 7.5 G/DL (ref 6–8.2)
PROTHROMBIN TIME: 16.3 SEC (ref 12–14.6)
RBC # BLD AUTO: 4.97 M/UL (ref 4.7–6.1)
RBC BLD AUTO: PRESENT
SODIUM SERPL-SCNC: 132 MMOL/L (ref 135–145)
TARGETS BLD QL SMEAR: NORMAL
WBC # BLD AUTO: 7.6 K/UL (ref 4.8–10.8)

## 2025-04-03 PROCEDURE — 700111 HCHG RX REV CODE 636 W/ 250 OVERRIDE (IP): Mod: JZ,UD | Performed by: EMERGENCY MEDICINE

## 2025-04-03 PROCEDURE — 36415 COLL VENOUS BLD VENIPUNCTURE: CPT

## 2025-04-03 PROCEDURE — 99285 EMERGENCY DEPT VISIT HI MDM: CPT

## 2025-04-03 PROCEDURE — 82962 GLUCOSE BLOOD TEST: CPT | Mod: 91

## 2025-04-03 PROCEDURE — 700102 HCHG RX REV CODE 250 W/ 637 OVERRIDE(OP): Performed by: HOSPITALIST

## 2025-04-03 PROCEDURE — 700111 HCHG RX REV CODE 636 W/ 250 OVERRIDE (IP): Mod: JZ | Performed by: HOSPITALIST

## 2025-04-03 PROCEDURE — 99223 1ST HOSP IP/OBS HIGH 75: CPT | Performed by: HOSPITALIST

## 2025-04-03 PROCEDURE — 96374 THER/PROPH/DIAG INJ IV PUSH: CPT

## 2025-04-03 PROCEDURE — 700105 HCHG RX REV CODE 258: Mod: UD | Performed by: EMERGENCY MEDICINE

## 2025-04-03 PROCEDURE — 85007 BL SMEAR W/DIFF WBC COUNT: CPT

## 2025-04-03 PROCEDURE — 93005 ELECTROCARDIOGRAM TRACING: CPT | Mod: TC | Performed by: EMERGENCY MEDICINE

## 2025-04-03 PROCEDURE — 770006 HCHG ROOM/CARE - MED/SURG/GYN SEMI*

## 2025-04-03 PROCEDURE — 51798 US URINE CAPACITY MEASURE: CPT

## 2025-04-03 PROCEDURE — 85610 PROTHROMBIN TIME: CPT

## 2025-04-03 PROCEDURE — 700102 HCHG RX REV CODE 250 W/ 637 OVERRIDE(OP): Mod: UD | Performed by: EMERGENCY MEDICINE

## 2025-04-03 PROCEDURE — A9270 NON-COVERED ITEM OR SERVICE: HCPCS | Mod: UD | Performed by: EMERGENCY MEDICINE

## 2025-04-03 PROCEDURE — 85027 COMPLETE CBC AUTOMATED: CPT

## 2025-04-03 PROCEDURE — HZ2ZZZZ DETOXIFICATION SERVICES FOR SUBSTANCE ABUSE TREATMENT: ICD-10-PCS | Performed by: HOSPITALIST

## 2025-04-03 PROCEDURE — 83735 ASSAY OF MAGNESIUM: CPT

## 2025-04-03 PROCEDURE — 80053 COMPREHEN METABOLIC PANEL: CPT

## 2025-04-03 PROCEDURE — 82140 ASSAY OF AMMONIA: CPT

## 2025-04-03 PROCEDURE — 82077 ASSAY SPEC XCP UR&BREATH IA: CPT

## 2025-04-03 PROCEDURE — 76705 ECHO EXAM OF ABDOMEN: CPT

## 2025-04-03 PROCEDURE — 83690 ASSAY OF LIPASE: CPT

## 2025-04-03 PROCEDURE — A9270 NON-COVERED ITEM OR SERVICE: HCPCS | Performed by: HOSPITALIST

## 2025-04-03 RX ORDER — PHENOBARBITAL SODIUM 130 MG/ML
260 INJECTION, SOLUTION INTRAMUSCULAR; INTRAVENOUS ONCE
Status: COMPLETED | OUTPATIENT
Start: 2025-04-03 | End: 2025-04-03

## 2025-04-03 RX ORDER — PREDNISOLONE SODIUM PHOSPHATE 15 MG/5ML
40 SOLUTION ORAL DAILY
Status: DISCONTINUED | OUTPATIENT
Start: 2025-04-03 | End: 2025-04-04

## 2025-04-03 RX ORDER — LORAZEPAM 0.5 MG/1
0.5 TABLET ORAL EVERY 4 HOURS PRN
Status: DISCONTINUED | OUTPATIENT
Start: 2025-04-03 | End: 2025-04-06

## 2025-04-03 RX ORDER — INSULIN LISPRO 100 [IU]/ML
2-9 INJECTION, SOLUTION INTRAVENOUS; SUBCUTANEOUS
Status: DISCONTINUED | OUTPATIENT
Start: 2025-04-03 | End: 2025-04-12 | Stop reason: HOSPADM

## 2025-04-03 RX ORDER — ONDANSETRON 4 MG/1
4 TABLET, ORALLY DISINTEGRATING ORAL EVERY 4 HOURS PRN
Status: DISCONTINUED | OUTPATIENT
Start: 2025-04-03 | End: 2025-04-12 | Stop reason: HOSPADM

## 2025-04-03 RX ORDER — FOLIC ACID 1 MG/1
1 TABLET ORAL ONCE
Status: COMPLETED | OUTPATIENT
Start: 2025-04-03 | End: 2025-04-03

## 2025-04-03 RX ORDER — CARVEDILOL 12.5 MG/1
12.5 TABLET ORAL 2 TIMES DAILY WITH MEALS
Status: DISCONTINUED | OUTPATIENT
Start: 2025-04-03 | End: 2025-04-12 | Stop reason: HOSPADM

## 2025-04-03 RX ORDER — ALBUTEROL SULFATE 5 MG/ML
2.5 SOLUTION RESPIRATORY (INHALATION)
Status: DISCONTINUED | OUTPATIENT
Start: 2025-04-03 | End: 2025-04-12 | Stop reason: HOSPADM

## 2025-04-03 RX ORDER — PROCHLORPERAZINE EDISYLATE 5 MG/ML
5-10 INJECTION INTRAMUSCULAR; INTRAVENOUS EVERY 4 HOURS PRN
Status: DISCONTINUED | OUTPATIENT
Start: 2025-04-03 | End: 2025-04-12 | Stop reason: HOSPADM

## 2025-04-03 RX ORDER — ONDANSETRON 2 MG/ML
4 INJECTION INTRAMUSCULAR; INTRAVENOUS EVERY 4 HOURS PRN
Status: DISCONTINUED | OUTPATIENT
Start: 2025-04-03 | End: 2025-04-12 | Stop reason: HOSPADM

## 2025-04-03 RX ORDER — FOLIC ACID 1 MG/1
1 TABLET ORAL DAILY
Status: DISCONTINUED | OUTPATIENT
Start: 2025-04-04 | End: 2025-04-12 | Stop reason: HOSPADM

## 2025-04-03 RX ORDER — LORAZEPAM 1 MG/1
1 TABLET ORAL EVERY 4 HOURS PRN
Status: DISCONTINUED | OUTPATIENT
Start: 2025-04-03 | End: 2025-04-06

## 2025-04-03 RX ORDER — LORAZEPAM 2 MG/1
2 TABLET ORAL
Status: DISCONTINUED | OUTPATIENT
Start: 2025-04-03 | End: 2025-04-06

## 2025-04-03 RX ORDER — LORAZEPAM 2 MG/1
4 TABLET ORAL
Status: DISCONTINUED | OUTPATIENT
Start: 2025-04-03 | End: 2025-04-06

## 2025-04-03 RX ORDER — DIAZEPAM 10 MG/2ML
10 INJECTION, SOLUTION INTRAMUSCULAR; INTRAVENOUS
Status: DISCONTINUED | OUTPATIENT
Start: 2025-04-03 | End: 2025-04-06

## 2025-04-03 RX ORDER — SODIUM CHLORIDE, SODIUM LACTATE, POTASSIUM CHLORIDE, AND CALCIUM CHLORIDE .6; .31; .03; .02 G/100ML; G/100ML; G/100ML; G/100ML
1000 INJECTION, SOLUTION INTRAVENOUS ONCE
Status: COMPLETED | OUTPATIENT
Start: 2025-04-03 | End: 2025-04-03

## 2025-04-03 RX ORDER — PROMETHAZINE HYDROCHLORIDE 25 MG/1
12.5-25 TABLET ORAL EVERY 4 HOURS PRN
Status: DISCONTINUED | OUTPATIENT
Start: 2025-04-03 | End: 2025-04-12 | Stop reason: HOSPADM

## 2025-04-03 RX ORDER — PROMETHAZINE HYDROCHLORIDE 25 MG/1
12.5-25 SUPPOSITORY RECTAL EVERY 4 HOURS PRN
Status: DISCONTINUED | OUTPATIENT
Start: 2025-04-03 | End: 2025-04-12 | Stop reason: HOSPADM

## 2025-04-03 RX ORDER — GAUZE BANDAGE 2" X 2"
100 BANDAGE TOPICAL ONCE
Status: COMPLETED | OUTPATIENT
Start: 2025-04-03 | End: 2025-04-03

## 2025-04-03 RX ORDER — POTASSIUM CHLORIDE 1500 MG/1
40 TABLET, EXTENDED RELEASE ORAL ONCE
Status: COMPLETED | OUTPATIENT
Start: 2025-04-03 | End: 2025-04-03

## 2025-04-03 RX ORDER — LANOLIN ALCOHOL/MO/W.PET/CERES
400 CREAM (GRAM) TOPICAL ONCE
Status: COMPLETED | OUTPATIENT
Start: 2025-04-03 | End: 2025-04-03

## 2025-04-03 RX ORDER — AMLODIPINE BESYLATE 5 MG/1
5 TABLET ORAL DAILY
Status: ON HOLD | COMMUNITY
End: 2025-04-12

## 2025-04-03 RX ORDER — ENOXAPARIN SODIUM 100 MG/ML
40 INJECTION SUBCUTANEOUS DAILY
Status: DISCONTINUED | OUTPATIENT
Start: 2025-04-03 | End: 2025-04-06

## 2025-04-03 RX ORDER — CHLORDIAZEPOXIDE HYDROCHLORIDE 25 MG/1
25 CAPSULE, GELATIN COATED ORAL EVERY 6 HOURS
Status: COMPLETED | OUTPATIENT
Start: 2025-04-04 | End: 2025-04-06

## 2025-04-03 RX ORDER — DEXTROSE MONOHYDRATE 25 G/50ML
25 INJECTION, SOLUTION INTRAVENOUS
Status: DISCONTINUED | OUTPATIENT
Start: 2025-04-03 | End: 2025-04-12 | Stop reason: HOSPADM

## 2025-04-03 RX ORDER — GAUZE BANDAGE 2" X 2"
100 BANDAGE TOPICAL DAILY
Status: DISCONTINUED | OUTPATIENT
Start: 2025-04-04 | End: 2025-04-12 | Stop reason: HOSPADM

## 2025-04-03 RX ORDER — CHLORDIAZEPOXIDE HYDROCHLORIDE 25 MG/1
50 CAPSULE, GELATIN COATED ORAL EVERY 6 HOURS
Status: COMPLETED | OUTPATIENT
Start: 2025-04-03 | End: 2025-04-04

## 2025-04-03 RX ADMIN — Medication 400 MG: at 06:36

## 2025-04-03 RX ADMIN — Medication 100 MG: at 06:36

## 2025-04-03 RX ADMIN — POTASSIUM CHLORIDE 40 MEQ: 1500 TABLET, EXTENDED RELEASE ORAL at 09:26

## 2025-04-03 RX ADMIN — ENOXAPARIN SODIUM 40 MG: 100 INJECTION SUBCUTANEOUS at 17:59

## 2025-04-03 RX ADMIN — SODIUM CHLORIDE, POTASSIUM CHLORIDE, SODIUM LACTATE AND CALCIUM CHLORIDE 1000 ML: 600; 310; 30; 20 INJECTION, SOLUTION INTRAVENOUS at 06:36

## 2025-04-03 RX ADMIN — CHLORDIAZEPOXIDE HYDROCHLORIDE 50 MG: 25 CAPSULE ORAL at 23:14

## 2025-04-03 RX ADMIN — LORAZEPAM 2 MG: 2 TABLET ORAL at 11:41

## 2025-04-03 RX ADMIN — PREDNISOLONE SODIUM PHOSPHATE 39.9 MG: 15 SOLUTION ORAL at 12:28

## 2025-04-03 RX ADMIN — INSULIN LISPRO 2 UNITS: 100 INJECTION, SOLUTION INTRAVENOUS; SUBCUTANEOUS at 23:07

## 2025-04-03 RX ADMIN — CARVEDILOL 12.5 MG: 12.5 TABLET, FILM COATED ORAL at 18:00

## 2025-04-03 RX ADMIN — INSULIN LISPRO 2 UNITS: 100 INJECTION, SOLUTION INTRAVENOUS; SUBCUTANEOUS at 17:43

## 2025-04-03 RX ADMIN — LORAZEPAM 0.5 MG: 0.5 TABLET ORAL at 18:00

## 2025-04-03 RX ADMIN — MULTIVITAMIN TABLET 1 TABLET: TABLET at 06:36

## 2025-04-03 RX ADMIN — CHLORDIAZEPOXIDE HYDROCHLORIDE 50 MG: 25 CAPSULE ORAL at 11:40

## 2025-04-03 RX ADMIN — FOLIC ACID 1 MG: 1 TABLET ORAL at 06:36

## 2025-04-03 RX ADMIN — PHENOBARBITAL SODIUM 260 MG: 130 INJECTION INTRAMUSCULAR; INTRAVENOUS at 07:02

## 2025-04-03 RX ADMIN — ONDANSETRON 4 MG: 2 INJECTION INTRAMUSCULAR; INTRAVENOUS at 11:41

## 2025-04-03 RX ADMIN — CARVEDILOL 12.5 MG: 12.5 TABLET, FILM COATED ORAL at 11:40

## 2025-04-03 RX ADMIN — CHLORDIAZEPOXIDE HYDROCHLORIDE 50 MG: 25 CAPSULE ORAL at 17:59

## 2025-04-03 RX ADMIN — INSULIN LISPRO 2 UNITS: 100 INJECTION, SOLUTION INTRAVENOUS; SUBCUTANEOUS at 12:32

## 2025-04-03 ASSESSMENT — LIFESTYLE VARIABLES
AUDITORY DISTURBANCES: NOT PRESENT
TOTAL SCORE: 4
TOTAL SCORE: 13
HEADACHE, FULLNESS IN HEAD: NOT PRESENT
TOTAL SCORE: VERY MILD ITCHING, PINS AND NEEDLES SENSATION, BURNING OR NUMBNESS
CONSUMPTION TOTAL: POSITIVE
DOES PATIENT WANT TO STOP DRINKING: YES
EVER FELT BAD OR GUILTY ABOUT YOUR DRINKING: YES
TOTAL SCORE: 2
AUDITORY DISTURBANCES: NOT PRESENT
ANXIETY: *
ON A TYPICAL DAY WHEN YOU DRINK ALCOHOL HOW MANY DRINKS DO YOU HAVE: 12
TOTAL SCORE: 4
ORIENTATION AND CLOUDING OF SENSORIUM: ORIENTED AND CAN DO SERIAL ADDITIONS
AGITATION: NORMAL ACTIVITY
HAVE YOU EVER FELT YOU SHOULD CUT DOWN ON YOUR DRINKING: YES
PAROXYSMAL SWEATS: NO SWEAT VISIBLE
ORIENTATION AND CLOUDING OF SENSORIUM: ORIENTED AND CAN DO SERIAL ADDITIONS
HAVE PEOPLE ANNOYED YOU BY CRITICIZING YOUR DRINKING: YES
ANXIETY: NO ANXIETY (AT EASE)
NAUSEA AND VOMITING: NO NAUSEA AND NO VOMITING
PAROXYSMAL SWEATS: NO SWEAT VISIBLE
AUDITORY DISTURBANCES: NOT PRESENT
AUDITORY DISTURBANCES: VERY MILD HARSHNESS OR ABILITY TO FRIGHTEN
VISUAL DISTURBANCES: NOT PRESENT
AGITATION: NORMAL ACTIVITY
TREMOR: NO TREMOR
TOTAL SCORE: 4
AGITATION: SOMEWHAT MORE THAN NORMAL ACTIVITY
NAUSEA AND VOMITING: NO NAUSEA AND NO VOMITING
VISUAL DISTURBANCES: MILD SENSITIVITY
TOTAL SCORE: 4
ANXIETY: MILDLY ANXIOUS
ORIENTATION AND CLOUDING OF SENSORIUM: ORIENTED AND CAN DO SERIAL ADDITIONS
PAROXYSMAL SWEATS: NO SWEAT VISIBLE
NAUSEA AND VOMITING: *
AGITATION: NORMAL ACTIVITY
HOW MANY TIMES IN THE PAST YEAR HAVE YOU HAD 5 OR MORE DRINKS IN A DAY: 365
VISUAL DISTURBANCES: VERY MILD SENSITIVITY
AVERAGE NUMBER OF DAYS PER WEEK YOU HAVE A DRINK CONTAINING ALCOHOL: 7
ANXIETY: *
VISUAL DISTURBANCES: NOT PRESENT
PAROXYSMAL SWEATS: NO SWEAT VISIBLE
NAUSEA AND VOMITING: NO NAUSEA AND NO VOMITING
DOES PATIENT WANT TO TALK TO SOMEONE ABOUT QUITTING: YES
ORIENTATION AND CLOUDING OF SENSORIUM: ORIENTED AND CAN DO SERIAL ADDITIONS
HEADACHE, FULLNESS IN HEAD: NOT PRESENT
EVER HAD A DRINK FIRST THING IN THE MORNING TO STEADY YOUR NERVES TO GET RID OF A HANGOVER: YES
HEADACHE, FULLNESS IN HEAD: MILD
TREMOR: *
TREMOR: *
ALCOHOL_USE: YES
HEADACHE, FULLNESS IN HEAD: VERY MILD
TREMOR: *
TOTAL SCORE: 8

## 2025-04-03 ASSESSMENT — COGNITIVE AND FUNCTIONAL STATUS - GENERAL
SUGGESTED CMS G CODE MODIFIER MOBILITY: CH
DAILY ACTIVITIY SCORE: 22
MOBILITY SCORE: 24
SUGGESTED CMS G CODE MODIFIER DAILY ACTIVITY: CJ
DRESSING REGULAR LOWER BODY CLOTHING: A LOT

## 2025-04-03 ASSESSMENT — COPD QUESTIONNAIRES
DO YOU EVER COUGH UP ANY MUCUS OR PHLEGM?: NO/ONLY WITH OCCASIONAL COLDS OR INFECTIONS
COPD SCREENING SCORE: 2
DURING THE PAST 4 WEEKS HOW MUCH DID YOU FEEL SHORT OF BREATH: NONE/LITTLE OF THE TIME
HAVE YOU SMOKED AT LEAST 100 CIGARETTES IN YOUR ENTIRE LIFE: YES

## 2025-04-03 ASSESSMENT — PAIN DESCRIPTION - PAIN TYPE
TYPE: ACUTE PAIN

## 2025-04-03 ASSESSMENT — ENCOUNTER SYMPTOMS
DIZZINESS: 0
ABDOMINAL PAIN: 0
COUGH: 0
BACK PAIN: 0
DIARRHEA: 0
LOSS OF CONSCIOUSNESS: 0
FEVER: 0
PALPITATIONS: 0
NAUSEA: 0
SHORTNESS OF BREATH: 0
CHILLS: 0
HEADACHES: 0
VOMITING: 0

## 2025-04-03 ASSESSMENT — PATIENT HEALTH QUESTIONNAIRE - PHQ9
SUM OF ALL RESPONSES TO PHQ9 QUESTIONS 1 AND 2: 0
1. LITTLE INTEREST OR PLEASURE IN DOING THINGS: NOT AT ALL
2. FEELING DOWN, DEPRESSED, IRRITABLE, OR HOPELESS: NOT AT ALL

## 2025-04-03 ASSESSMENT — FIBROSIS 4 INDEX: FIB4 SCORE: 3.25

## 2025-04-03 ASSESSMENT — SOCIAL DETERMINANTS OF HEALTH (SDOH)
WITHIN THE PAST 12 MONTHS, THE FOOD YOU BOUGHT JUST DIDN'T LAST AND YOU DIDN'T HAVE MONEY TO GET MORE: NEVER TRUE
WITHIN THE LAST YEAR, HAVE YOU BEEN HUMILIATED OR EMOTIONALLY ABUSED IN OTHER WAYS BY YOUR PARTNER OR EX-PARTNER?: NO
WITHIN THE LAST YEAR, HAVE YOU BEEN AFRAID OF YOUR PARTNER OR EX-PARTNER?: NO
WITHIN THE PAST 12 MONTHS, YOU WORRIED THAT YOUR FOOD WOULD RUN OUT BEFORE YOU GOT THE MONEY TO BUY MORE: NEVER TRUE
IN THE PAST 12 MONTHS, HAS THE ELECTRIC, GAS, OIL, OR WATER COMPANY THREATENED TO SHUT OFF SERVICE IN YOUR HOME?: NO
WITHIN THE LAST YEAR, HAVE YOU BEEN KICKED, HIT, SLAPPED, OR OTHERWISE PHYSICALLY HURT BY YOUR PARTNER OR EX-PARTNER?: NO
WITHIN THE LAST YEAR, HAVE TO BEEN RAPED OR FORCED TO HAVE ANY KIND OF SEXUAL ACTIVITY BY YOUR PARTNER OR EX-PARTNER?: NO

## 2025-04-03 NOTE — PROGRESS NOTES
4 Eyes Skin Assessment Completed by ALF goldman and ALF mandujano.    Head WDL  Ears WDL  Nose WDL   Mouth WDL  Neck WDL  Breast/Chest WDL  Shoulder Blades WDL  Spine WDL (birthmark to lower back)  (R) Arm/Elbow/Hand WDL  (L) Arm/Elbow/Hand WDL  Abdomen Redness and excortication under left pannus  Groin swelling  Scrotum/Coccyx/Buttocks Redness, Excoriation, and Abrasion to right side  (R) Leg Edema  (L) Leg Edema  (R) Heel/Foot/Toe Rash top of foot  (L) Heel/Foot/Toe WDL          Devices In Places PIV      Interventions In Place Pressure Redistribution Mattress, pillows    Possible Skin Injury No    Pictures Uploaded Into Epic Yes  Wound Consult Placed N/A  RN Wound Prevention Protocol Ordered No

## 2025-04-03 NOTE — ED TRIAGE NOTES
"Chief Complaint   Patient presents with    Detox     Pt states he help to detox off alcohol; pt states he drinks 10-15 \"shot bottles\" a day; last drink was yesterday around 1500    Jaundice     Yellow sclera of eyes x2 days     Pt ambulatory to triage with brother for above complaint. Pt and brother state that pt was hospitalized here multiple times with kidney failure and liver failure, most recently one month ago. Pt also c/o pain to penis x2 days as well as \"shrinkage.\" Abdominal pain protocol ordered d/t jaundice.     Pt is alert/oriented and follows commands. Pt speaking in full sentences and responds appropriately to questions. No acute distress noted in triage and respirations are even and unlabored.     Pt placed in lobby and educated on triage process. Pt and brother encouraged to alert staff for any changes in condition.  "

## 2025-04-03 NOTE — PROGRESS NOTES
Pt arrived to floor from ED. Assumed patient care at 1115. Patient is A&Ox 4, on RA, and awake and alert . Patient reporting a pain level of 5/10, will medicate per MAR. Call light within reach and bed in lowest position. Reinforced the need to call for assistance. Plan of care discussed and patient does not have any further needs at this time.

## 2025-04-03 NOTE — ASSESSMENT & PLAN NOTE
4/11/2025  Liver enzymes have more than doubled in the last month  Right upper quadrant ultrasound shows cirrhotic changes, but no evidence of acute obstruction, no ascites  Follow CMP: If she shows response to steroids and we would continue for 28 days.  If she does not, then we would stop it short.  Encourage [facility]  Hepatitis panel from February of last year showed evidence of hepatitis B vaccination, but no active viral infection.  Increase Coreg in part to treat his hypertension but also for management of ascites  MELD-Na - 29  Maddrey's 46.4 --> 35.2 --> 30  T.bili 22.3 --> 26.4 --> 25.1 --> 21.6    T.bili 22.3 --> 26.4 --> 25.1 --> 21.6 --> 19.7  MELD-Na - 29  Maddrey's 46.4 --> 35.2-->30  Prednisolone 40mg daily  Consult to GI  Nephrology recs  100g albumin today

## 2025-04-03 NOTE — ASSESSMENT & PLAN NOTE
4/11/2025  Patient has been given 1 dose of IV phenobarbital in the ED  Admit to the floor on a benzodiazepine withdrawal protocol  Start scheduled p.o. Librium  Continue thiamine folate and MVI  Low threshold to escalate care depended upon his CIWA scores    Completed EtOH withdrawal

## 2025-04-03 NOTE — DIETARY
"Nutrition Services: Initial Assessment     Day 0 of admit. Alberto Maldonado is 39 y.o., male with admitting DX of Alcohol withdrawal delirium.    Consult Received for: MST - Malnutrition Screening Tool, weight loss of 14-23 lbs over the past month and poor appetite.    Current Hospital Problems List:    Active Problems:    Essential hypertension (Chronic)     BOLIVAR (obstructive sleep apnea)       Overview: Awaiting to see sleep specialist    Asthma     Alcohol use disorder, severe, dependence     Controlled type 2 diabetes mellitus with hyperglycemia, without long-term current use of insulin     Alcohol withdrawal syndrome with complication     Alcoholic cirrhosis     Nutrition Assessment:      Height: 160 cm (5' 3\")  Weight: 111 kg (243 lb 13.3 oz)  Weight taken via: Stand Up Scale  BMI Calculated: 43.19  BMI Classification: Morbid Obesity       Weight Readings from Last 5 encounters:   Wt Readings from Last 5 Encounters:   04/03/25 111 kg (243 lb 13.3 oz)   03/06/25 117 kg (257 lb 8 oz)   12/23/24 117 kg (258 lb 2.5 oz)   11/20/24 120 kg (264 lb)     Objective:   Pt admitted for alcohol withdrawal delirium.   Pertinent Medical Hx: Alcohol abuse, asthma without status asthmaticus, chickenpox, and hypertension.   Pertinent Labs: Sodium 132, Potassium 3.1, Glucose 164, Bun 5, , , Phosphorus 4.2, Magnesium 1.0, lactic acid 5.0.   Pertinent Meds: Folvite, Insulin, Ativan, Multivitamin, Zofran, Compazine, Phenergan, Thiamine.   Skin/Wounds:  No pressure injuries documented   Food Allergies: None documented   Last BM:     04/03/25       Current Diet Order/Intake:   Regular diet, recorded PO 50-75% for one meal.       Subjective:     RD attempted to visit with patient via bedside. Patient was sleeping then woke up and did not want to participate in interview. RD will follow up as able with patient participation.     Nutrition Focused Physical Exam (NFPE)  Weight Loss: -9 kg (7.5%) over the past 6 " months. RD Suspects this change related to alcohol abuse  Muscle Mass: Well Nourished  Subcutaneous Fat: Well Nourished  Fluid Accumulation: 1+ trace bilateral lower edema.   Reduced  Strength: N/A in acute care setting.    Nutrition Diagnosis:      Inadequate Oral Intake related to alcohol abuse as evidenced by patient reports of consuming 10-15 shots daily and weight loss of 9 kg (7.5%) over the past 6 months.      Based on RD assessment at this time, Patient does not meet criteria in congruence with ASPEN/Academy guidelines for malnutrition    Nutrition Interventions:      Encourage intake of >75% meals.   Patient aware of active plan of care as appropriate.     Nutrition Monitoring and Evaluation:      Monitor nutrition POC  Additional fluids per MD/DO  Monitor K+, Mg, Phos, replete PRN.   Monitor vital signs pertinent to nutrition.    RD following and will provide updated recommendations as indicated.      Naya Mcmahan R.D.                                         ASPEN/AND CRITERIA FOR MALNUTRITION

## 2025-04-03 NOTE — ASSESSMENT & PLAN NOTE
4/11/2025  Patient is maintained on amlodipine and Coreg  Given the presence of advancing cirrhosis and ascites, we will DC his amlodipine in order to bump up his Coreg in part to treat his blood pressure but also to address portal venous hypertension

## 2025-04-03 NOTE — ED NOTES
from Lab called with critical result of magnesium 1.0 and total bilirubin 22.3 at 0719. Critical lab result read back to .   Dr. Gupta notified of critical lab result at 0720.  Critical lab result read back by Dr. Gupta.

## 2025-04-03 NOTE — ED PROVIDER NOTES
"ED Provider Note    ED PHYSICIAN NOTE    CHIEF COMPLAINT  Chief Complaint   Patient presents with    Detox     Pt states he help to detox off alcohol; pt states he drinks 10-15 \"shot bottles\" a day; last drink was yesterday around 1500    Jaundice     Yellow sclera of eyes x2 days       EXTERNAL RECORDS REVIEWED  Inpatient Notes patient has not had a hip March Sackett 2025 for alcohol withdrawal,    HPI/ROS  LIMITATION TO HISTORY   Select: : None  OUTSIDE HISTORIAN(S):  none    Alberto Maldonado is a 39 y.o. male who presents with concerns of alcohol withdrawal patient was recently mated for similar, states he typically drinks 10-15 shots a day, his last drink was yesterday afternoon.  He states he wants to stop drinking because he noticed his eyes were getting yellow over the last 2 days.  He does report some vague upper abdominal pain that is more crampy in nature, as well as some nausea.  He reports no vomiting, no diarrhea, no blood in his stool or dark tarry stool.  He reports no fevers or chills or cough or congestion.  No chest pain or shortness of breath.  He does feel anxious and shaky, no seizures    PAST MEDICAL HISTORY  Past Medical History:   Diagnosis Date    Alcohol abuse     Asthma without status asthmaticus 06/29/2017    Chickenpox     Hypertension        SOCIAL HISTORY  Social History     Tobacco Use    Smoking status: Former     Types: Cigarettes    Smokeless tobacco: Never   Vaping Use    Vaping status: Never Used   Substance Use Topics    Alcohol use: Yes     Alcohol/week: 7.2 oz     Types: 12 Shots of liquor per week     Comment: \"10-15 vodka mini bottles per day\"    Drug use: Not Currently       CURRENT MEDICATIONS  Home Medications       Reviewed by Bernard Roger (Pharmacy Tech) on 04/03/25 at 0736  Med List Status: Complete     Medication Last Dose Status   amLODIPine (NORVASC) 5 MG Tab 3/14/2025 Active   carvedilol (COREG) 6.25 MG Tab 3/14/2025 Active   folic acid (FOLVITE) 1 MG " "Tab 3/14/2025 Active   multivitamin Tab 3/14/2025 Active   thiamine (THIAMINE) 100 MG tablet 3/14/2025 Active                  Audit from Redirected Encounters    **Home medications have not yet been reviewed for this encounter**         ALLERGIES  No Known Allergies    PHYSICAL EXAM  VITAL SIGNS: BP (!) 136/98   Pulse (!) 120   Temp 36.2 °C (97.1 °F) (Temporal)   Resp 20   Ht 1.6 m (5' 3\")   Wt 111 kg (243 lb 13.3 oz)   SpO2 94%   BMI 43.19 kg/m²    Constitutional: Awake and alert anxious  HENT: Normal inspection, no signs of trauma  Eyes: Normal inspection, Pupils equal, scleral icterus  Neck: Grossly normal range of motion. No stridor  Cardiovascular: Tachycardic rhythm, no murmurs.  Symmetric peripheral pulses at radial  Thorax & Lungs: No respiratory distress, No wheezing, No rales, No rhonchi, No chest tenderness.   Abdomen:  soft, non-distended, nontender, no mass  Skin: No obvious rash. Warm. Dry.   Back: No tenderness, No CVA tenderness.   Extremities: No cyanosis, trace bilateral lower extremity edema  Neurologic: AO3, tremulous, no tongue fasciculations otherwise grossly normal  Psychiatric: Normal affect for situation        DIAGNOSTIC STUDIES / PROCEDURES  LABS/EKG  Results for orders placed or performed during the hospital encounter of 04/03/25   CBC WITH DIFFERENTIAL    Collection Time: 04/03/25  5:47 AM   Result Value Ref Range    WBC 7.6 4.8 - 10.8 K/uL    RBC 4.97 4.70 - 6.10 M/uL    Hemoglobin 15.3 14.0 - 18.0 g/dL    Hematocrit 43.2 42.0 - 52.0 %    MCV 86.9 81.4 - 97.8 fL    MCH 30.8 27.0 - 33.0 pg    MCHC 35.4 32.3 - 36.5 g/dL    RDW 62.2 (H) 35.9 - 50.0 fL    Platelet Count 147 (L) 164 - 446 K/uL    MPV 10.6 9.0 - 12.9 fL    Neutrophils-Polys 93.10 (H) 44.00 - 72.00 %    Lymphocytes 3.40 (L) 22.00 - 41.00 %    Monocytes 2.60 0.00 - 13.40 %    Eosinophils 0.00 0.00 - 6.90 %    Basophils 0.90 0.00 - 1.80 %    Nucleated RBC 0.00 0.00 - 0.20 /100 WBC    Neutrophils (Absolute) 7.08 1.82 - " 7.42 K/uL    Lymphs (Absolute) 0.26 (L) 1.00 - 4.80 K/uL    Monos (Absolute) 0.20 0.00 - 0.85 K/uL    Eos (Absolute) 0.00 0.00 - 0.51 K/uL    Baso (Absolute) 0.07 0.00 - 0.12 K/uL    NRBC (Absolute) 0.00 K/uL    Anisocytosis 1+     Macrocytosis 3+ (A)    PT/INR    Collection Time: 04/03/25  5:47 AM   Result Value Ref Range    PT 16.3 (H) 12.0 - 14.6 sec    INR 1.31 (H) 0.87 - 1.13   DIFFERENTIAL MANUAL    Collection Time: 04/03/25  5:47 AM   Result Value Ref Range    Manual Diff Status PERFORMED    PERIPHERAL SMEAR REVIEW    Collection Time: 04/03/25  5:47 AM   Result Value Ref Range    Peripheral Smear Review see below    PLATELET ESTIMATE    Collection Time: 04/03/25  5:47 AM   Result Value Ref Range    Plt Estimation Decreased    MORPHOLOGY    Collection Time: 04/03/25  5:47 AM   Result Value Ref Range    RBC Morphology Present     Poikilocytosis 1+     Target Cells 1+    COMP METABOLIC PANEL    Collection Time: 04/03/25  6:14 AM   Result Value Ref Range    Sodium 132 (L) 135 - 145 mmol/L    Potassium 3.1 (L) 3.6 - 5.5 mmol/L    Chloride 96 96 - 112 mmol/L    Co2 16 (L) 20 - 33 mmol/L    Anion Gap 20.0 (H) 7.0 - 16.0    Glucose 164 (H) 65 - 99 mg/dL    Bun 5 (L) 8 - 22 mg/dL    Creatinine 1.00 0.50 - 1.40 mg/dL    Calcium 8.1 (L) 8.5 - 10.5 mg/dL    Correct Calcium 8.7 8.5 - 10.5 mg/dL    AST(SGOT) 522 (H) 12 - 45 U/L    ALT(SGPT) 290 (H) 2 - 50 U/L    Alkaline Phosphatase 275 (H) 30 - 99 U/L    Total Bilirubin 22.3 (H) 0.1 - 1.5 mg/dL    Albumin 3.3 3.2 - 4.9 g/dL    Total Protein 7.5 6.0 - 8.2 g/dL    Globulin 4.2 (H) 1.9 - 3.5 g/dL    A-G Ratio 0.8 g/dL   LIPASE    Collection Time: 04/03/25  6:14 AM   Result Value Ref Range    Lipase 29 11 - 82 U/L   DIAGNOSTIC ALCOHOL    Collection Time: 04/03/25  6:14 AM   Result Value Ref Range    Diagnostic Alcohol <10.1 <10.1 mg/dL   MAGNESIUM    Collection Time: 04/03/25  6:14 AM   Result Value Ref Range    Magnesium 1.0 (L) 1.5 - 2.5 mg/dL   ESTIMATED GFR    Collection  Time: 25  6:14 AM   Result Value Ref Range    GFR (CKD-EPI) 99 >60 mL/min/1.73 m 2   AMMONIA    Collection Time: 25  6:23 AM   Result Value Ref Range    Ammonia 47 (H) 11 - 45 umol/L   EKG    Collection Time: 25  9:08 AM   Result Value Ref Range    Report       [facility] Emergency Dept.    Test Date:  2025  Pt Name:    ANNIA ALMONTE                Department: ER  MRN:        [id number]                      Room:        01  Gender:     Male                         Technician: [id number]  :        1985                   Requested By:TRACY HERNANDEZ  Order #:    [id number]                    Reading MD: TRACY HERNANDEZ MD    Measurements  Intervals                                Axis  Rate:       132                          P:          17  TX:         130                          QRS:        39  QRSD:       92                           T:          243  QT:         291  QTc:        431    Interpretive Statements  Sinus tachycardia  Anteroseptal infarct, old  Nonspecific repol abnormality, diffuse leads  Compared to ECG 2025 09:03:13  Early repolarization now present  ST (T wave) deviation no longer present  Electronically Signed On 2025 09:08:13 PDT by TRACY HERNANDEZ MD         RADIOLOGY  I have independently interpreted the diagnostic imaging associated with this visit and am waiting the final reading from the radiologist.   My preliminary interpretation is as follows: No free fluid    US-RUQ   Final Result      1.  Hepatic steatosis and hepatomegaly.   2.  Thickened gallbladder wall without cholelithiasis. This may be related to decompression, reactive, or acalculus cholecystitis. Correlate with symptoms.            COURSE & MEDICAL DECISION MAKING    INITIAL ASSESSMENT, COURSE AND PLAN  Care Narrative: 6:11 AM  Patient presents with anxiety and tremors consistent with alcohol withdrawal.  He does have some jaundice as well concerning for progressive  cirrhosis and liver failure.  Crampy abdominal pain potential for gastritis, pancreatitis, electrolyte metabolic derangement, renal insufficiency, dehydration.  He does have a benign abdominal exam without any tenderness or new pain suggestive of SBP.  I ordered for diagnostic labs, IV fluid, p.o. rally bag, as well as phenobarbital    9:03 AM  Patient is reevaluated.  Updated on all results of his testing and plan for hospitalization.  Patient is still tachycardic in the 1 teens although he has no tremors and is actually a little sleepy so I do not see an indication for benzodiazepines or additional alcohol drawl medications at this time      Case is discussed with hospitalist for admission    Interventions  Medications   thiamine (Vitamin B-1) tablet 100 mg (100 mg Oral Given 4/3/25 0636)     And   folic acid (Folvite) tablet 1 mg (1 mg Oral Given 4/3/25 0636)     And   multivitamin tablet 1 Tablet (1 Tablet Oral Given 4/3/25 0636)     And   magnesium oxide tablet 400 mg (400 mg Oral Given 4/3/25 0636)   LR (Bolus) infusion 1,000 mL (0 mL Intravenous Stopped 4/3/25 0735)   PHENObarbital injection bolus 260 mg (260 mg Intravenous Given 4/3/25 0702)       Measures  Hydration: Based on the patient's presentation of CIWA the patient was given IV fluids. IV Hydration was used because oral hydration was not as rapid as required. Upon recheck following hydration, the patient was stable.       PROBLEM LIST    # Alcohol abuse with acute alcohol withdrawal.  Patient tachycardic with tremors here, was given phenobarbital with certainly improvement in his withdrawal symptoms, no more tremor noted and is actually little sleepy after this although still tachycardic.  Will plan for hospitalization for ongoing treatment    # Alcoholic liver disease.  Patient now with significant elevated liver enzymes and bilirubin.  Ultrasound shows no biliary obstructive process or ascites    # Hypomagnesemia.  Started on repletion    #  Hypokalemia.  Started on repletion        DISPOSITION AND DISCUSSIONS  I have discussed management of the patient with the following physicians and BATSHEVA's:  as noted above    Patient is admitted in stable condition    FINAL DIAGNOSIS  1. Alcohol withdrawal syndrome without complication (HCC)        2. Hypomagnesemia        3. Hypokalemia        4. Alcoholic liver disease (HCC)               This dictation was created using voice recognition software. The accuracy of the dictation is limited to the abilities of the software. I expect there may be some errors of grammar and possibly content. The nursing notes were reviewed and certain aspects of this information were incorporated into this note.    Electronically signed by: Jair Gupta M.D., 4/3/2025

## 2025-04-03 NOTE — ASSESSMENT & PLAN NOTE
4/11/2025  A1c 6.7, 4 months ago  Placed on sliding scale particularly in light of initiation of glucocorticoids for acute alcohol hepatitis

## 2025-04-03 NOTE — ED NOTES
Taken patient from triage waiting room, ambulatory with steady gait, alert/ oriented x 4.Verified patient identification.  Assumed patient care.   Placed on patient room. Changed clothes to hospital gown. Connected to cardiac monitor.   Given the call light and instructed to call for any assistance needed/ or concerns.   Bed on lowest position, side rails up, breaks locked. Awaiting for ERP.      Patient refused to lay in the bed and insist to sit on the chair and bends forward to the edge of the bed for comfort

## 2025-04-03 NOTE — H&P
"Hospital Medicine History & Physical Note    Date of Service  4/3/2025    Primary Care Physician  Cyrus Tolliver P.A.-C.    Consultants      Specialist Names:     Code Status  Full Code    Chief Complaint  Chief Complaint   Patient presents with    Detox     Pt states he help to detox off alcohol; pt states he drinks 10-15 \"shot bottles\" a day; last drink was yesterday around 1500    Jaundice     Yellow sclera of eyes x2 days       History of Presenting Illness  Alberto Maldonado is a 39 y.o. male who presented 4/3/2025 with history of hypertension, alcohol abuse, and borderline diabetes.    Patient presents today after he noticed that his eyes were yellow.  He reports that he drinks very heavily, 10 shots or more daily.  His last drink was yesterday evening.  His other complaint, his inability to achieve an erection, he has noticed this for several weeks.    I discussed the plan of care with patient.    Review of Systems  Review of Systems   Constitutional:  Negative for chills and fever.   Respiratory:  Negative for cough and shortness of breath.    Cardiovascular:  Negative for chest pain, palpitations and leg swelling.   Gastrointestinal:  Negative for abdominal pain, diarrhea, nausea and vomiting.   Genitourinary:  Negative for dysuria and urgency.        Unable to get an erection   Musculoskeletal:  Negative for back pain.   Skin:  Positive for rash.   Neurological:  Negative for dizziness, loss of consciousness and headaches.       Past Medical History   has a past medical history of Alcohol abuse, Asthma without status asthmaticus (06/29/2017), Chickenpox, and Hypertension.    Surgical History   has a past surgical history that includes other orthopedic surgery (Right).     Family History  family history is not on file.   Family history reviewed with patient. There is no family history that is pertinent to the chief complaint.     Social History   reports that he has quit smoking. His smoking use " included cigarettes. He has never used smokeless tobacco. He reports current alcohol use of about 7.2 oz of alcohol per week. He reports that he does not currently use drugs.    Allergies  No Known Allergies    Medications  Prior to Admission Medications   Prescriptions Last Dose Informant Patient Reported? Taking?   carvedilol (COREG) 6.25 MG Tab 3/14/2025 Patient's Home Pharmacy No Yes   Sig: Take 1 Tablet by mouth 2 times a day with meals.   folic acid (FOLVITE) 1 MG Tab 3/14/2025 Patient's Home Pharmacy No Yes   Sig: Take 1 Tablet by mouth every day.   multivitamin Tab 3/14/2025 Patient's Home Pharmacy No Yes   Sig: Take 1 Tablet by mouth every day.   thiamine (THIAMINE) 100 MG tablet 3/14/2025 Patient's Home Pharmacy No Yes   Sig: Take 1 Tablet by mouth every day.      Facility-Administered Medications: None       Physical Exam  Temp:  [36.2 °C (97.1 °F)] 36.2 °C (97.1 °F)  Pulse:  [118-143] 118  Resp:  [15-20] 16  BP: (129-179)/() 151/88  SpO2:  [91 %-97 %] 93 %  Blood Pressure: (!) 151/88   Temperature: 36.2 °C (97.1 °F)   Pulse: (!) 118   Respiration: 16   Pulse Oximetry: 93 %       Physical Exam  Constitutional:       General: He is not in acute distress.     Appearance: He is well-developed. He is obese. He is not diaphoretic.   HENT:      Head: Normocephalic and atraumatic.   Eyes:      General: Scleral icterus present.      Conjunctiva/sclera: Conjunctivae normal.   Neck:      Vascular: No JVD.   Cardiovascular:      Rate and Rhythm: Normal rate.      Heart sounds: No murmur heard.     No gallop.   Pulmonary:      Effort: Pulmonary effort is normal. No respiratory distress.      Breath sounds: No stridor. No wheezing or rales.   Abdominal:      General: There is distension.      Palpations: Abdomen is soft.      Tenderness: There is no abdominal tenderness. There is no guarding or rebound.   Musculoskeletal:      Right lower leg: No edema.      Left lower leg: No edema.   Skin:     General: Skin is  "warm and dry.      Capillary Refill: Capillary refill takes less than 2 seconds.      Coloration: Skin is jaundiced.      Findings: No rash.   Neurological:      Mental Status: He is oriented to person, place, and time.   Psychiatric:         Mood and Affect: Mood normal.         Behavior: Behavior normal.         Thought Content: Thought content normal.         Laboratory:  Recent Labs     04/03/25  0547   WBC 7.6   RBC 4.97   HEMOGLOBIN 15.3   HEMATOCRIT 43.2   MCV 86.9   MCH 30.8   MCHC 35.4   RDW 62.2*   PLATELETCT 147*   MPV 10.6     Recent Labs     04/03/25  0614   SODIUM 132*   POTASSIUM 3.1*   CHLORIDE 96   CO2 16*   GLUCOSE 164*   BUN 5*   CREATININE 1.00   CALCIUM 8.1*     Recent Labs     04/03/25  0614   ALTSGPT 290*   ASTSGOT 522*   ALKPHOSPHAT 275*   TBILIRUBIN 22.3*   LIPASE 29   GLUCOSE 164*     Recent Labs     04/03/25  0547   INR 1.31*     No results for input(s): \"NTPROBNP\" in the last 72 hours.      No results for input(s): \"TROPONINT\" in the last 72 hours.    Imaging:  US-RUQ   Final Result      1.  Hepatic steatosis and hepatomegaly.   2.  Thickened gallbladder wall without cholelithiasis. This may be related to decompression, reactive, or acalculus cholecystitis. Correlate with symptoms.              Assessment/Plan:  Justification for Admission Status  I anticipate this patient will require at least two midnights for appropriate medical management, necessitating inpatient admission because acute alcohol hepatitis    Patient will need a Med/Surg bed on MEDICAL service .  The need is secondary to acute alcoholic hepatitis.    Alcoholic cirrhosis (HCC)  Assessment & Plan  Liver enzymes have more than doubled in the last month  MELD 25  Rivera's 46.4  INR 1.3  Based on above scores, we will start the patient on prednisolone  Right upper quadrant ultrasound shows cirrhotic changes, but no evidence of acute obstruction  Follow CMP: If she shows response to steroids and we would continue for 28 " days.  If she does not, then we would stop it short.  Encourage sobriety  Hepatitis panel from February of last year showed evidence of hepatitis B vaccination, but no active viral infection.  Increase Coreg in part to treat his hypertension but also for management of ascites    Alcohol withdrawal syndrome with complication (HCC)- (present on admission)  Assessment & Plan  Patient has been given 1 dose of IV phenobarbital in the ED  Admit to the floor on a benzodiazepine withdrawal protocol  Start scheduled p.o. Librium  Continue thiamine folate and MVI  Low threshold to escalate care depended upon his CIWA scores    Controlled type 2 diabetes mellitus with hyperglycemia, without long-term current use of insulin (HCC)- (present on admission)  Assessment & Plan  A1c 6.7, 4 months ago  Placed on sliding scale particularly in light of initiation of glucocorticoids for acute alcohol hepatitis    Alcohol use disorder, severe, dependence (HCC)- (present on admission)  Assessment & Plan  Encourage sobriety  Treat withdrawal as noted    Asthma- (present on admission)  Assessment & Plan  Lung exam is benign  Placed on O2 and RT protocols    Essential hypertension- (present on admission)  Assessment & Plan  Patient is maintained on amlodipine and Coreg  Given the presence of advancing cirrhosis and ascites, we will DC his amlodipine in order to bump up his Coreg in part to treat his blood pressure but also to address portal venous hypertension        VTE prophylaxis: Xarelto 10 mg daily as prophylaxis

## 2025-04-03 NOTE — CARE PLAN
The patient is Stable - Low risk of patient condition declining or worsening    Shift Goals  Clinical Goals: pt will report a pain level of less than 5/10 and will remain free from falls  Patient Goals: sleep    Progress made toward(s) clinical / shift goals:    Pt remains free from seizures and reported a pain level of less than 5/10    Patient is not progressing towards the following goals:

## 2025-04-03 NOTE — ED NOTES
Pharmacy Medication Reconciliation      ~Medication reconciliation updated and complete per patient at bedside & patient home pharmacy   ~Allergies have been verified   ~No oral ABX within the last 30 days  ~Is dispense history available in EPIC: yes   ~Patient home pharmacy :  [facility]       ~Anticoagulants (rivaroxaban, apixaban, edoxaban, dabigatran, warfarin, enoxaparin) taken in the last 14 days? No

## 2025-04-04 PROBLEM — N17.9 AKI (ACUTE KIDNEY INJURY) (HCC): Status: ACTIVE | Noted: 2025-04-04

## 2025-04-04 LAB
ALBUMIN SERPL BCP-MCNC: 3.2 G/DL (ref 3.2–4.9)
ALBUMIN/GLOB SERPL: 0.8 G/DL
ALP SERPL-CCNC: 258 U/L (ref 30–99)
ALT SERPL-CCNC: 216 U/L (ref 2–50)
ANION GAP SERPL CALC-SCNC: 16 MMOL/L (ref 7–16)
AST SERPL-CCNC: 336 U/L (ref 12–45)
BILIRUB SERPL-MCNC: 26.4 MG/DL (ref 0.1–1.5)
BUN SERPL-MCNC: 10 MG/DL (ref 8–22)
CALCIUM ALBUM COR SERPL-MCNC: 8.6 MG/DL (ref 8.5–10.5)
CALCIUM SERPL-MCNC: 8 MG/DL (ref 8.5–10.5)
CHLORIDE SERPL-SCNC: 99 MMOL/L (ref 96–112)
CO2 SERPL-SCNC: 17 MMOL/L (ref 20–33)
CREAT SERPL-MCNC: 1.58 MG/DL (ref 0.5–1.4)
ERYTHROCYTE [DISTWIDTH] IN BLOOD BY AUTOMATED COUNT: 67 FL (ref 35.9–50)
GFR SERPLBLD CREATININE-BSD FMLA CKD-EPI: 57 ML/MIN/1.73 M 2
GLOBULIN SER CALC-MCNC: 3.9 G/DL (ref 1.9–3.5)
GLUCOSE BLD STRIP.AUTO-MCNC: 142 MG/DL (ref 65–99)
GLUCOSE BLD STRIP.AUTO-MCNC: 155 MG/DL (ref 65–99)
GLUCOSE BLD STRIP.AUTO-MCNC: 176 MG/DL (ref 65–99)
GLUCOSE BLD STRIP.AUTO-MCNC: 204 MG/DL (ref 65–99)
GLUCOSE SERPL-MCNC: 171 MG/DL (ref 65–99)
HCT VFR BLD AUTO: 40.7 % (ref 42–52)
HGB BLD-MCNC: 13.7 G/DL (ref 14–18)
MAGNESIUM SERPL-MCNC: 1.1 MG/DL (ref 1.5–2.5)
MCH RBC QN AUTO: 30.8 PG (ref 27–33)
MCHC RBC AUTO-ENTMCNC: 33.7 G/DL (ref 32.3–36.5)
MCV RBC AUTO: 91.5 FL (ref 81.4–97.8)
PHOSPHATE SERPL-MCNC: 0.7 MG/DL (ref 2.5–4.5)
PLATELET # BLD AUTO: 123 K/UL (ref 164–446)
PMV BLD AUTO: 10.9 FL (ref 9–12.9)
POTASSIUM SERPL-SCNC: 4.2 MMOL/L (ref 3.6–5.5)
PROT SERPL-MCNC: 7.1 G/DL (ref 6–8.2)
RBC # BLD AUTO: 4.45 M/UL (ref 4.7–6.1)
SODIUM SERPL-SCNC: 132 MMOL/L (ref 135–145)
WBC # BLD AUTO: 8.3 K/UL (ref 4.8–10.8)

## 2025-04-04 PROCEDURE — 51798 US URINE CAPACITY MEASURE: CPT

## 2025-04-04 PROCEDURE — 83735 ASSAY OF MAGNESIUM: CPT

## 2025-04-04 PROCEDURE — 84300 ASSAY OF URINE SODIUM: CPT

## 2025-04-04 PROCEDURE — 700111 HCHG RX REV CODE 636 W/ 250 OVERRIDE (IP): Mod: JZ | Performed by: HOSPITALIST

## 2025-04-04 PROCEDURE — 770006 HCHG ROOM/CARE - MED/SURG/GYN SEMI*

## 2025-04-04 PROCEDURE — 700102 HCHG RX REV CODE 250 W/ 637 OVERRIDE(OP): Performed by: HOSPITALIST

## 2025-04-04 PROCEDURE — 700111 HCHG RX REV CODE 636 W/ 250 OVERRIDE (IP): Performed by: INTERNAL MEDICINE

## 2025-04-04 PROCEDURE — 700101 HCHG RX REV CODE 250: Performed by: NURSE PRACTITIONER

## 2025-04-04 PROCEDURE — 87086 URINE CULTURE/COLONY COUNT: CPT

## 2025-04-04 PROCEDURE — 85027 COMPLETE CBC AUTOMATED: CPT

## 2025-04-04 PROCEDURE — 700105 HCHG RX REV CODE 258: Performed by: NURSE PRACTITIONER

## 2025-04-04 PROCEDURE — 36415 COLL VENOUS BLD VENIPUNCTURE: CPT

## 2025-04-04 PROCEDURE — 80053 COMPREHEN METABOLIC PANEL: CPT

## 2025-04-04 PROCEDURE — 84100 ASSAY OF PHOSPHORUS: CPT

## 2025-04-04 PROCEDURE — 82570 ASSAY OF URINE CREATININE: CPT

## 2025-04-04 PROCEDURE — 82962 GLUCOSE BLOOD TEST: CPT | Mod: 91

## 2025-04-04 PROCEDURE — 700105 HCHG RX REV CODE 258: Performed by: HOSPITALIST

## 2025-04-04 PROCEDURE — 99233 SBSQ HOSP IP/OBS HIGH 50: CPT | Performed by: INTERNAL MEDICINE

## 2025-04-04 PROCEDURE — A9270 NON-COVERED ITEM OR SERVICE: HCPCS | Performed by: HOSPITALIST

## 2025-04-04 PROCEDURE — 81001 URINALYSIS AUTO W/SCOPE: CPT

## 2025-04-04 RX ORDER — MAGNESIUM SULFATE HEPTAHYDRATE 40 MG/ML
4 INJECTION, SOLUTION INTRAVENOUS ONCE
Status: COMPLETED | OUTPATIENT
Start: 2025-04-04 | End: 2025-04-04

## 2025-04-04 RX ORDER — SODIUM CHLORIDE 9 MG/ML
INJECTION, SOLUTION INTRAVENOUS CONTINUOUS
Status: DISCONTINUED | OUTPATIENT
Start: 2025-04-04 | End: 2025-04-04

## 2025-04-04 RX ADMIN — FOLIC ACID 1 MG: 1 TABLET ORAL at 05:19

## 2025-04-04 RX ADMIN — CHLORDIAZEPOXIDE HYDROCHLORIDE 50 MG: 25 CAPSULE ORAL at 05:19

## 2025-04-04 RX ADMIN — POTASSIUM PHOSPHATE, MONOBASIC AND POTASSIUM PHOSPHATE, DIBASIC 30 MMOL: 224; 236 INJECTION, SOLUTION, CONCENTRATE INTRAVENOUS at 02:17

## 2025-04-04 RX ADMIN — INSULIN LISPRO 2 UNITS: 100 INJECTION, SOLUTION INTRAVENOUS; SUBCUTANEOUS at 08:48

## 2025-04-04 RX ADMIN — MULTIVITAMIN TABLET 1 TABLET: TABLET at 05:19

## 2025-04-04 RX ADMIN — Medication 100 MG: at 05:19

## 2025-04-04 RX ADMIN — PREDNISOLONE SODIUM PHOSPHATE 39.9 MG: 15 SOLUTION ORAL at 05:19

## 2025-04-04 RX ADMIN — MAGNESIUM SULFATE HEPTAHYDRATE 4 G: 4 INJECTION, SOLUTION INTRAVENOUS at 08:42

## 2025-04-04 RX ADMIN — CHLORDIAZEPOXIDE HYDROCHLORIDE 25 MG: 25 CAPSULE ORAL at 12:07

## 2025-04-04 RX ADMIN — CHLORDIAZEPOXIDE HYDROCHLORIDE 25 MG: 25 CAPSULE ORAL at 17:08

## 2025-04-04 RX ADMIN — ENOXAPARIN SODIUM 40 MG: 100 INJECTION SUBCUTANEOUS at 17:08

## 2025-04-04 RX ADMIN — SODIUM CHLORIDE: 9 INJECTION, SOLUTION INTRAVENOUS at 02:17

## 2025-04-04 RX ADMIN — LORAZEPAM 1 MG: 1 TABLET ORAL at 00:52

## 2025-04-04 RX ADMIN — INSULIN LISPRO 3 UNITS: 100 INJECTION, SOLUTION INTRAVENOUS; SUBCUTANEOUS at 12:11

## 2025-04-04 RX ADMIN — CARVEDILOL 12.5 MG: 12.5 TABLET, FILM COATED ORAL at 17:08

## 2025-04-04 RX ADMIN — CARVEDILOL 12.5 MG: 12.5 TABLET, FILM COATED ORAL at 08:41

## 2025-04-04 RX ADMIN — INSULIN LISPRO 2 UNITS: 100 INJECTION, SOLUTION INTRAVENOUS; SUBCUTANEOUS at 17:13

## 2025-04-04 ASSESSMENT — LIFESTYLE VARIABLES
VISUAL DISTURBANCES: NOT PRESENT
TOTAL SCORE: 3
HEADACHE, FULLNESS IN HEAD: NOT PRESENT
VISUAL DISTURBANCES: NOT PRESENT
ORIENTATION AND CLOUDING OF SENSORIUM: ORIENTED AND CAN DO SERIAL ADDITIONS
NAUSEA AND VOMITING: MILD NAUSEA WITH NO VOMITING
PAROXYSMAL SWEATS: NO SWEAT VISIBLE
ANXIETY: MILDLY ANXIOUS
VISUAL DISTURBANCES: NOT PRESENT
TOTAL SCORE: 8
AUDITORY DISTURBANCES: NOT PRESENT
PAROXYSMAL SWEATS: NO SWEAT VISIBLE
AGITATION: NORMAL ACTIVITY
NAUSEA AND VOMITING: MILD NAUSEA WITH NO VOMITING
HEADACHE, FULLNESS IN HEAD: NOT PRESENT
TOTAL SCORE: MILD ITCHING, PINS AND NEEDLES SENSATION, BURNING OR NUMBNESS
ANXIETY: *
ORIENTATION AND CLOUDING OF SENSORIUM: ORIENTED AND CAN DO SERIAL ADDITIONS
AUDITORY DISTURBANCES: NOT PRESENT
TREMOR: *
VISUAL DISTURBANCES: NOT PRESENT
TOTAL SCORE: 2
PAROXYSMAL SWEATS: NO SWEAT VISIBLE
TREMOR: TREMOR NOT VISIBLE BUT CAN BE FELT, FINGERTIP TO FINGERTIP
HEADACHE, FULLNESS IN HEAD: NOT PRESENT
TOTAL SCORE: 5
PAROXYSMAL SWEATS: NO SWEAT VISIBLE
VISUAL DISTURBANCES: NOT PRESENT
ANXIETY: MILDLY ANXIOUS
PAROXYSMAL SWEATS: NO SWEAT VISIBLE
AGITATION: NORMAL ACTIVITY
AUDITORY DISTURBANCES: NOT PRESENT
TREMOR: TREMOR NOT VISIBLE BUT CAN BE FELT, FINGERTIP TO FINGERTIP
NAUSEA AND VOMITING: NO NAUSEA AND NO VOMITING
NAUSEA AND VOMITING: NO NAUSEA AND NO VOMITING
HEADACHE, FULLNESS IN HEAD: NOT PRESENT
TREMOR: NO TREMOR
HEADACHE, FULLNESS IN HEAD: NOT PRESENT
AUDITORY DISTURBANCES: NOT PRESENT
ORIENTATION AND CLOUDING OF SENSORIUM: ORIENTED AND CAN DO SERIAL ADDITIONS
AGITATION: NORMAL ACTIVITY
SUBSTANCE_ABUSE: 1
ANXIETY: MILDLY ANXIOUS
HEADACHE, FULLNESS IN HEAD: NOT PRESENT
NAUSEA AND VOMITING: MILD NAUSEA WITH NO VOMITING
AGITATION: NORMAL ACTIVITY
NAUSEA AND VOMITING: NO NAUSEA AND NO VOMITING
TOTAL SCORE: 0
ANXIETY: NO ANXIETY (AT EASE)
ORIENTATION AND CLOUDING OF SENSORIUM: ORIENTED AND CAN DO SERIAL ADDITIONS
PAROXYSMAL SWEATS: NO SWEAT VISIBLE
VISUAL DISTURBANCES: NOT PRESENT
TOTAL SCORE: 2
AUDITORY DISTURBANCES: NOT PRESENT
AUDITORY DISTURBANCES: NOT PRESENT
AGITATION: NORMAL ACTIVITY
ORIENTATION AND CLOUDING OF SENSORIUM: ORIENTED AND CAN DO SERIAL ADDITIONS
ANXIETY: MILDLY ANXIOUS
TREMOR: *
ORIENTATION AND CLOUDING OF SENSORIUM: ORIENTED AND CAN DO SERIAL ADDITIONS
TOTAL SCORE: VERY MILD ITCHING, PINS AND NEEDLES SENSATION, BURNING OR NUMBNESS
TREMOR: NO TREMOR
AGITATION: NORMAL ACTIVITY

## 2025-04-04 ASSESSMENT — ENCOUNTER SYMPTOMS
PALPITATIONS: 0
CHILLS: 0
SORE THROAT: 0
COUGH: 0
FEVER: 0
VOMITING: 0
BACK PAIN: 0
DOUBLE VISION: 0
SHORTNESS OF BREATH: 0
NAUSEA: 0
HEADACHES: 0
BLURRED VISION: 0

## 2025-04-04 ASSESSMENT — PAIN DESCRIPTION - PAIN TYPE
TYPE: ACUTE PAIN

## 2025-04-04 NOTE — PROGRESS NOTES
Report received from NOC RN and assumed patient care at 0700. Patient is A&Ox 4, on RA . Patient reporting a pain level of 0/10. Call light within reach and bed in lowest position. Reinforced the need to call for assistance. Plan of care discussed and patient does not have any further needs at this time.

## 2025-04-04 NOTE — PROGRESS NOTES
Pt scoring as moderate fall risk following critical phosphorus result of 0.7. Patient currently sitting in chair. Bedside RN educated patient on his increased risk for falls and the risks of not having bed/chair alarm in place. Following education pt still refused bed/chair alarm. Charge RN notified by bedside RN of patient's refusal following education.

## 2025-04-04 NOTE — PROGRESS NOTES
Report received from Day RN Paige and assumed patient care at 1900. Patient is A&Ox 4, on RA, and awake and alert . Patient reporting a pain level of 0/10. Call light within reach and bed in lowest position. Reinforced the need to call for assistance. Plan of care discussed and patient does not have any further needs at this time.

## 2025-04-04 NOTE — PROGRESS NOTES
Lab called with critical result of phosphorous at 0.7 at 0128. Critical lab result read back to lab.  Dr. Whitlock notified of critical lab result at 0131.  Critical lab result read back by Dr. Whitlock.

## 2025-04-04 NOTE — PROGRESS NOTES
Lab called with critical result of Total Bilirubin  at 26.4. Critical lab result read back.   Dr. Fidel Barrera notified of critical lab result at 1000.  Critical lab result read back by Dr. Fidel Barrera.

## 2025-04-04 NOTE — PROGRESS NOTES
Pt refusing bed alarm & chair alar. Pt is moderate fall risk. Pt education provided and Pt continues to refuse. Charge RN notified.

## 2025-04-04 NOTE — PROGRESS NOTES
Patient bladder/ abdomen distended and tender. Patient reports not urinating for a day. Bladder scan done, results of 38 MD notified and MD ordered straight cath.    RN did straight cath, 100mL of output, urine red and brown. MD notified.

## 2025-04-04 NOTE — PROGRESS NOTES
[facility] Medicine Daily Progress Note    Date of Service  4/4/2025    Chief Complaint  Alberto Maldonado is a 39 y.o. male admitted 4/3/2025 with abdominal distension    Hospital Course  No notes on file    Interval Problem Update  Patient was seen and examined at bedside.  No acute events overnight. Patient is resting comfortably in bed and in no acute distress.     T.bili 22.3 --> 26.4  Prednisolone  Monitor CMP  Minimal urine output  Cr 1.0 --> 1.58    I have discussed this patient's plan of care and discharge plan at IDT rounds today with Case Management, Nursing, Nursing leadership, and other members of the IDT team.      Code Status  Full Code    Disposition  Medically Cleared  I have placed the appropriate orders for post-discharge needs.    Review of Systems  Review of Systems   Constitutional:  Negative for chills and fever.   HENT:  Negative for congestion and sore throat.    Eyes:  Negative for blurred vision and double vision.   Respiratory:  Negative for cough and shortness of breath.    Cardiovascular:  Negative for chest pain and palpitations.   Gastrointestinal:  Negative for nausea and vomiting.   Genitourinary:  Negative for dysuria and frequency.   Musculoskeletal:  Negative for back pain.   Neurological:  Negative for headaches.   Psychiatric/Behavioral:  Positive for substance abuse.         Physical Exam  Temp:  [35.9 °C (96.6 °F)-36.2 °C (97.1 °F)] (P) 35.9 °C (96.6 °F)  Pulse:  [76-89] (P) 77  Resp:  [20-25] (P) 20  BP: (108-113)/(65-81) (P) 106/72  SpO2:  [92 %-96 %] (P) 95 %    Physical Exam  Vitals reviewed.   HENT:      Right Ear: External ear normal.      Left Ear: External ear normal.      Nose: No congestion.      Mouth/Throat:      Pharynx: No oropharyngeal exudate.   Eyes:      General: Scleral icterus present.   Cardiovascular:      Rate and Rhythm: Normal rate.   Pulmonary:      Breath sounds: No wheezing.   Abdominal:      Tenderness: There is no abdominal tenderness. There  is no guarding or rebound.   Skin:     Coloration: Skin is jaundiced.   Neurological:      General: No focal deficit present.      Mental Status: He is alert.      Motor: No weakness.   Psychiatric:         Mood and Affect: Mood normal.         Behavior: Behavior normal.         Fluids    Intake/Output Summary (Last 24 hours) at 4/4/2025 1631  Last data filed at 4/4/2025 1311  Gross per 24 hour   Intake 420 ml   Output 100 ml   Net 320 ml        Laboratory  Recent Labs     04/03/25  0547 04/04/25  0015   WBC 7.6 8.3   RBC 4.97 4.45*   HEMOGLOBIN 15.3 13.7*   HEMATOCRIT 43.2 40.7*   MCV 86.9 91.5   MCH 30.8 30.8   MCHC 35.4 33.7   RDW 62.2* 67.0*   PLATELETCT 147* 123*   MPV 10.6 10.9     Recent Labs     04/03/25  0614 04/04/25  0857   SODIUM 132* 132*   POTASSIUM 3.1* 4.2   CHLORIDE 96 99   CO2 16* 17*   GLUCOSE 164* 171*   BUN 5* 10   CREATININE 1.00 1.58*   CALCIUM 8.1* 8.0*     Recent Labs     04/03/25  0547   INR 1.31*               Imaging  US-RUQ   Final Result      1.  Hepatic steatosis and hepatomegaly.   2.  Thickened gallbladder wall without cholelithiasis. This may be related to decompression, reactive, or acalculus cholecystitis. Correlate with symptoms.           Assessment/Plan  EDIN (acute kidney injury) (HCC)  Assessment & Plan  Suspect ATN  Minimal urine output  Cr 1.0 --> 1.58  Renal diet  Check UA to assess proteinuria  Urine Cr/Urine sodium ordered    Alcoholic cirrhosis (HCC)  Assessment & Plan  Liver enzymes have more than doubled in the last month  MELD 25  Maddrey's 46.4  INR 1.3  Based on above scores, we will start the patient on prednisolone  Right upper quadrant ultrasound shows cirrhotic changes, but no evidence of acute obstruction, no ascites  Follow CMP: If she shows response to steroids and we would continue for 28 days.  If she does not, then we would stop it short.  Encourage sobriety  Hepatitis panel from February of last year showed evidence of hepatitis B vaccination, but no  active viral infection.  Increase Coreg in part to treat his hypertension but also for management of ascites  T.bili 22.3 --> 26.4    Alcohol use disorder, severe, dependence (HCC)- (present on admission)  Assessment & Plan  Drinks 15 shots daily  Monitor electrolytes  CIWA    Alcohol withdrawal syndrome with complication (HCC)- (present on admission)  Assessment & Plan  Patient has been given 1 dose of IV phenobarbital in the ED  Admit to the floor on a benzodiazepine withdrawal protocol  Start scheduled p.o. Librium  Continue thiamine folate and MVI  Low threshold to escalate care depended upon his CIWA scores    Asthma- (present on admission)  Assessment & Plan  Lung exam is benign  Placed on O2 and RT protocols    Controlled type 2 diabetes mellitus with hyperglycemia, without long-term current use of insulin (HCC)- (present on admission)  Assessment & Plan  A1c 6.7, 4 months ago  Placed on sliding scale particularly in light of initiation of glucocorticoids for acute alcohol hepatitis    Morbid obesity (HCC)- (present on admission)  Assessment & Plan  BMI 43    Essential hypertension- (present on admission)  Assessment & Plan  Patient is maintained on amlodipine and Coreg  Given the presence of advancing cirrhosis and ascites, we will DC his amlodipine in order to bump up his Coreg in part to treat his blood pressure but also to address portal venous hypertension         VTE prophylaxis: VTE Selection    I have performed a physical exam and reviewed and updated ROS and Plan today (4/4/2025). In review of yesterday's note (4/3/2025), there are no changes except as documented above.    Greater than 51 minutes spent prepping to see patient (e.g. review of tests) obtaining and/or reviewing separately obtained history. Performing a medically appropriate examination and/ evaluation.  Counseling and educating the patient/family/caregiver.  Ordering medications, tests, or procedures.  Referring and communicating with  other health care professionals.  Documenting clinical information in EPIC.  Independently interpreting results and communicating results to patient/family/caregiver.  Care coordination.

## 2025-04-04 NOTE — CARE PLAN
The patient is Stable - Low risk of patient condition declining or worsening    Shift Goals  Clinical Goals: Pt will report adquate urine output. CIWA scores will remain less than 5 throughout shift.  Patient Goals: Rest  Family Goals: DEBBI    Pt is A&Ox4, on RA and stable. Pt has reported increase urine output. CIWA scores were not under 5 throughout shift. Recent CIWA scoring was an 8. All needs are met and questions answered at this time.    Progress made toward(s) clinical / shift goals:    Problem: Optimal Care for Alcohol Withdrawal  Goal: Optimal Care for the alcohol withdrawal patient  Outcome: Progressing  Note: Pt has been compliant with CIWA scoring and med administration, refer to MAR.      Problem: Pain - Standard  Goal: Alleviation of pain or a reduction in pain to the patient’s comfort goal  Outcome: Progressing  Note: Pt pain has been managed utilizing heat packs and rest.      Problem: Fall Risk  Goal: Patient will remain free from falls  Outcome: Progressing  Note: Pt has remained free from falls throughout shift as a moderate fall risk.       Patient is not progressing towards the following goals:

## 2025-04-04 NOTE — CARE PLAN
The patient is Stable - Low risk of patient condition declining or worsening    Shift Goals  Clinical Goals: Pt CIWA will remain less than 5 this shift. Pt will report pain 5/10 or less thi shift.  Patient Goals: Rest  Family Goals: DEBBI    Progress made toward(s) clinical / shift goals:  Pt CIWA score has remained less than 5 this shift. Pt has reported pain 5/10 or less this shift. Pt has remained free from falls this shift.     Problem: Optimal Care for Alcohol Withdrawal  Goal: Optimal Care for the alcohol withdrawal patient  Outcome: Progressing     Problem: Pain - Standard  Goal: Alleviation of pain or a reduction in pain to the patient’s comfort goal  Outcome: Progressing     Problem: Fall Risk  Goal: Patient will remain free from falls  Outcome: Progressing       Patient is not progressing towards the following goals:

## 2025-04-05 PROBLEM — D64.9 ANEMIA: Status: ACTIVE | Noted: 2025-04-05

## 2025-04-05 LAB
ALBUMIN SERPL BCP-MCNC: 3 G/DL (ref 3.2–4.9)
ALBUMIN SERPL BCP-MCNC: 3.3 G/DL (ref 3.2–4.9)
ALBUMIN/GLOB SERPL: 0.8 G/DL
ALBUMIN/GLOB SERPL: 0.9 G/DL
ALP SERPL-CCNC: 285 U/L (ref 30–99)
ALP SERPL-CCNC: 288 U/L (ref 30–99)
ALT SERPL-CCNC: 182 U/L (ref 2–50)
ALT SERPL-CCNC: 183 U/L (ref 2–50)
ANION GAP SERPL CALC-SCNC: 14 MMOL/L (ref 7–16)
ANION GAP SERPL CALC-SCNC: 15 MMOL/L (ref 7–16)
APPEARANCE UR: ABNORMAL
APPEARANCE UR: ABNORMAL
AST SERPL-CCNC: 253 U/L (ref 12–45)
AST SERPL-CCNC: 270 U/L (ref 12–45)
BACTERIA #/AREA URNS HPF: ABNORMAL /HPF
BACTERIA #/AREA URNS HPF: ABNORMAL /HPF
BASOPHILS # BLD AUTO: 0.2 % (ref 0–1.8)
BASOPHILS # BLD: 0.02 K/UL (ref 0–0.12)
BILIRUB SERPL-MCNC: 24.2 MG/DL (ref 0.1–1.5)
BILIRUB SERPL-MCNC: 25.1 MG/DL (ref 0.1–1.5)
BILIRUB UR QL STRIP.AUTO: ABNORMAL
BUN SERPL-MCNC: 14 MG/DL (ref 8–22)
BUN SERPL-MCNC: 16 MG/DL (ref 8–22)
CALCIUM ALBUM COR SERPL-MCNC: 9 MG/DL (ref 8.5–10.5)
CALCIUM ALBUM COR SERPL-MCNC: 9 MG/DL (ref 8.5–10.5)
CALCIUM SERPL-MCNC: 8.2 MG/DL (ref 8.5–10.5)
CALCIUM SERPL-MCNC: 8.4 MG/DL (ref 8.5–10.5)
CASTS URNS QL MICRO: >20 /LPF (ref 0–2)
CASTS URNS QL MICRO: ABNORMAL /LPF (ref 0–2)
CHLORIDE SERPL-SCNC: 100 MMOL/L (ref 96–112)
CHLORIDE SERPL-SCNC: 98 MMOL/L (ref 96–112)
CO2 SERPL-SCNC: 19 MMOL/L (ref 20–33)
CO2 SERPL-SCNC: 19 MMOL/L (ref 20–33)
COLOR UR: ABNORMAL
COLOR UR: ABNORMAL
CREAT SERPL-MCNC: 1.87 MG/DL (ref 0.5–1.4)
CREAT SERPL-MCNC: 1.97 MG/DL (ref 0.5–1.4)
CREAT UR-MCNC: 381 MG/DL
CREAT UR-MCNC: 383 MG/DL
CREAT UR-MCNC: 460 MG/DL
EOSINOPHIL # BLD AUTO: 0.03 K/UL (ref 0–0.51)
EOSINOPHIL NFR BLD: 0.3 % (ref 0–6.9)
EPITHELIAL CELLS 1715: ABNORMAL /HPF (ref 0–5)
EPITHELIAL CELLS 1715: ABNORMAL /HPF (ref 0–5)
EPITHELIAL CELLS RENAL  1715R: PRESENT /HPF
EPITHELIAL CELLS RENAL  1715R: PRESENT /HPF
ERYTHROCYTE [DISTWIDTH] IN BLOOD BY AUTOMATED COUNT: 69.2 FL (ref 35.9–50)
GFR SERPLBLD CREATININE-BSD FMLA CKD-EPI: 44 ML/MIN/1.73 M 2
GFR SERPLBLD CREATININE-BSD FMLA CKD-EPI: 47 ML/MIN/1.73 M 2
GLOBULIN SER CALC-MCNC: 3.7 G/DL (ref 1.9–3.5)
GLOBULIN SER CALC-MCNC: 3.8 G/DL (ref 1.9–3.5)
GLUCOSE BLD STRIP.AUTO-MCNC: 117 MG/DL (ref 65–99)
GLUCOSE BLD STRIP.AUTO-MCNC: 122 MG/DL (ref 65–99)
GLUCOSE BLD STRIP.AUTO-MCNC: 145 MG/DL (ref 65–99)
GLUCOSE BLD STRIP.AUTO-MCNC: 99 MG/DL (ref 65–99)
GLUCOSE SERPL-MCNC: 123 MG/DL (ref 65–99)
GLUCOSE SERPL-MCNC: 158 MG/DL (ref 65–99)
GLUCOSE UR STRIP.AUTO-MCNC: ABNORMAL MG/DL
GRANULAR CASTS  1716G: PRESENT /LPF
GRANULAR CASTS  1716G: PRESENT /LPF
HCT VFR BLD AUTO: 34.8 % (ref 42–52)
HCT VFR BLD AUTO: 35.7 % (ref 42–52)
HGB BLD-MCNC: 11.5 G/DL (ref 14–18)
HGB BLD-MCNC: 11.7 G/DL (ref 14–18)
HYALINE CAST   1831: PRESENT /LPF
HYALINE CAST   1831: PRESENT /LPF
IMM GRANULOCYTES # BLD AUTO: 0.13 K/UL (ref 0–0.11)
IMM GRANULOCYTES NFR BLD AUTO: 1.2 % (ref 0–0.9)
INR PPP: 1.21 (ref 0.87–1.13)
KETONES UR STRIP.AUTO-MCNC: ABNORMAL MG/DL
LEUKOCYTE ESTERASE UR QL STRIP.AUTO: ABNORMAL
LYMPHOCYTES # BLD AUTO: 1.42 K/UL (ref 1–4.8)
LYMPHOCYTES NFR BLD: 13.1 % (ref 22–41)
MAGNESIUM SERPL-MCNC: 2.2 MG/DL (ref 1.5–2.5)
MCH RBC QN AUTO: 30.4 PG (ref 27–33)
MCHC RBC AUTO-ENTMCNC: 32.8 G/DL (ref 32.3–36.5)
MCV RBC AUTO: 92.7 FL (ref 81.4–97.8)
MICRO URNS: ABNORMAL
MICRO URNS: ABNORMAL
MONOCYTES # BLD AUTO: 0.77 K/UL (ref 0–0.85)
MONOCYTES NFR BLD AUTO: 7.1 % (ref 0–13.4)
NEUTROPHILS # BLD AUTO: 8.46 K/UL (ref 1.82–7.42)
NEUTROPHILS NFR BLD: 78.1 % (ref 44–72)
NITRITE UR QL STRIP.AUTO: ABNORMAL
NRBC # BLD AUTO: 0.03 K/UL
NRBC BLD-RTO: 0.3 /100 WBC (ref 0–0.2)
PH UR STRIP.AUTO: ABNORMAL [PH] (ref 5–8)
PHOSPHATE SERPL-MCNC: 2.6 MG/DL (ref 2.5–4.5)
PLATELET # BLD AUTO: 121 K/UL (ref 164–446)
PMV BLD AUTO: 11 FL (ref 9–12.9)
POTASSIUM SERPL-SCNC: 3.4 MMOL/L (ref 3.6–5.5)
POTASSIUM SERPL-SCNC: 3.7 MMOL/L (ref 3.6–5.5)
PROT SERPL-MCNC: 6.8 G/DL (ref 6–8.2)
PROT SERPL-MCNC: 7 G/DL (ref 6–8.2)
PROT UR QL STRIP: ABNORMAL MG/DL
PROT UR-MCNC: 96.4 MG/DL (ref 0–15)
PROT/CREAT UR: 252 MG/G (ref 15–68)
PROTHROMBIN TIME: 15.4 SEC (ref 12–14.6)
RBC # BLD AUTO: 3.85 M/UL (ref 4.7–6.1)
RBC # URNS HPF: ABNORMAL /HPF (ref 0–2)
RBC # URNS HPF: ABNORMAL /HPF (ref 0–2)
RBC UR QL AUTO: ABNORMAL
SODIUM SERPL-SCNC: 131 MMOL/L (ref 135–145)
SODIUM SERPL-SCNC: 134 MMOL/L (ref 135–145)
SODIUM UR-SCNC: <20 MMOL/L
SODIUM UR-SCNC: <20 MMOL/L
SP GR UR STRIP.AUTO: ABNORMAL
SP GR UR STRIP.AUTO: ABNORMAL
UROBILINOGEN UR STRIP.AUTO-MCNC: ABNORMAL EU/DL
WBC # BLD AUTO: 10.8 K/UL (ref 4.8–10.8)
WBC #/AREA URNS HPF: ABNORMAL /HPF
WBC #/AREA URNS HPF: ABNORMAL /HPF

## 2025-04-05 PROCEDURE — 770006 HCHG ROOM/CARE - MED/SURG/GYN SEMI*

## 2025-04-05 PROCEDURE — 81001 URINALYSIS AUTO W/SCOPE: CPT

## 2025-04-05 PROCEDURE — 83735 ASSAY OF MAGNESIUM: CPT

## 2025-04-05 PROCEDURE — 84100 ASSAY OF PHOSPHORUS: CPT

## 2025-04-05 PROCEDURE — A9270 NON-COVERED ITEM OR SERVICE: HCPCS | Performed by: HOSPITALIST

## 2025-04-05 PROCEDURE — 82962 GLUCOSE BLOOD TEST: CPT | Mod: 91

## 2025-04-05 PROCEDURE — 80053 COMPREHEN METABOLIC PANEL: CPT

## 2025-04-05 PROCEDURE — 99233 SBSQ HOSP IP/OBS HIGH 50: CPT | Performed by: INTERNAL MEDICINE

## 2025-04-05 PROCEDURE — 700102 HCHG RX REV CODE 250 W/ 637 OVERRIDE(OP): Performed by: INTERNAL MEDICINE

## 2025-04-05 PROCEDURE — 700111 HCHG RX REV CODE 636 W/ 250 OVERRIDE (IP): Mod: JZ | Performed by: HOSPITALIST

## 2025-04-05 PROCEDURE — 84156 ASSAY OF PROTEIN URINE: CPT

## 2025-04-05 PROCEDURE — 36415 COLL VENOUS BLD VENIPUNCTURE: CPT

## 2025-04-05 PROCEDURE — 85025 COMPLETE CBC W/AUTO DIFF WBC: CPT

## 2025-04-05 PROCEDURE — 51798 US URINE CAPACITY MEASURE: CPT

## 2025-04-05 PROCEDURE — 84300 ASSAY OF URINE SODIUM: CPT

## 2025-04-05 PROCEDURE — 82570 ASSAY OF URINE CREATININE: CPT | Mod: 91

## 2025-04-05 PROCEDURE — 99254 IP/OBS CNSLTJ NEW/EST MOD 60: CPT | Performed by: STUDENT IN AN ORGANIZED HEALTH CARE EDUCATION/TRAINING PROGRAM

## 2025-04-05 PROCEDURE — 700102 HCHG RX REV CODE 250 W/ 637 OVERRIDE(OP): Performed by: HOSPITALIST

## 2025-04-05 PROCEDURE — 85018 HEMOGLOBIN: CPT

## 2025-04-05 PROCEDURE — A9270 NON-COVERED ITEM OR SERVICE: HCPCS | Performed by: INTERNAL MEDICINE

## 2025-04-05 PROCEDURE — 85610 PROTHROMBIN TIME: CPT

## 2025-04-05 PROCEDURE — 85014 HEMATOCRIT: CPT

## 2025-04-05 RX ORDER — TAMSULOSIN HYDROCHLORIDE 0.4 MG/1
0.4 CAPSULE ORAL
Status: DISCONTINUED | OUTPATIENT
Start: 2025-04-05 | End: 2025-04-10

## 2025-04-05 RX ADMIN — MULTIVITAMIN TABLET 1 TABLET: TABLET at 05:48

## 2025-04-05 RX ADMIN — TAMSULOSIN HYDROCHLORIDE 0.4 MG: 0.4 CAPSULE ORAL at 10:28

## 2025-04-05 RX ADMIN — CARVEDILOL 12.5 MG: 12.5 TABLET, FILM COATED ORAL at 07:37

## 2025-04-05 RX ADMIN — CARVEDILOL 12.5 MG: 12.5 TABLET, FILM COATED ORAL at 17:30

## 2025-04-05 RX ADMIN — CHLORDIAZEPOXIDE HYDROCHLORIDE 25 MG: 25 CAPSULE ORAL at 17:30

## 2025-04-05 RX ADMIN — LORAZEPAM 1 MG: 1 TABLET ORAL at 08:02

## 2025-04-05 RX ADMIN — Medication 100 MG: at 05:48

## 2025-04-05 RX ADMIN — CHLORDIAZEPOXIDE HYDROCHLORIDE 25 MG: 25 CAPSULE ORAL at 00:13

## 2025-04-05 RX ADMIN — CHLORDIAZEPOXIDE HYDROCHLORIDE 25 MG: 25 CAPSULE ORAL at 11:54

## 2025-04-05 RX ADMIN — LORAZEPAM 0.5 MG: 0.5 TABLET ORAL at 12:13

## 2025-04-05 RX ADMIN — CHLORDIAZEPOXIDE HYDROCHLORIDE 25 MG: 25 CAPSULE ORAL at 05:48

## 2025-04-05 RX ADMIN — ENOXAPARIN SODIUM 40 MG: 100 INJECTION SUBCUTANEOUS at 17:30

## 2025-04-05 RX ADMIN — FOLIC ACID 1 MG: 1 TABLET ORAL at 05:48

## 2025-04-05 ASSESSMENT — LIFESTYLE VARIABLES
SUBSTANCE_ABUSE: 1
TOTAL SCORE: 8
NAUSEA AND VOMITING: NO NAUSEA AND NO VOMITING
HEADACHE, FULLNESS IN HEAD: NOT PRESENT
AUDITORY DISTURBANCES: NOT PRESENT
AGITATION: NORMAL ACTIVITY
ANXIETY: MILDLY ANXIOUS
TREMOR: *
ORIENTATION AND CLOUDING OF SENSORIUM: ORIENTED AND CAN DO SERIAL ADDITIONS
ORIENTATION AND CLOUDING OF SENSORIUM: ORIENTED AND CAN DO SERIAL ADDITIONS
AGITATION: NORMAL ACTIVITY
HEADACHE, FULLNESS IN HEAD: NOT PRESENT
TREMOR: NO TREMOR
PAROXYSMAL SWEATS: NO SWEAT VISIBLE
TREMOR: TREMOR NOT VISIBLE BUT CAN BE FELT, FINGERTIP TO FINGERTIP
TOTAL SCORE: 2
NAUSEA AND VOMITING: NO NAUSEA AND NO VOMITING
ORIENTATION AND CLOUDING OF SENSORIUM: ORIENTED AND CAN DO SERIAL ADDITIONS
NAUSEA AND VOMITING: NO NAUSEA AND NO VOMITING
TREMOR: NO TREMOR
PAROXYSMAL SWEATS: NO SWEAT VISIBLE
AUDITORY DISTURBANCES: NOT PRESENT
VISUAL DISTURBANCES: NOT PRESENT
NAUSEA AND VOMITING: NO NAUSEA AND NO VOMITING
ANXIETY: MILDLY ANXIOUS
NAUSEA AND VOMITING: NO NAUSEA AND NO VOMITING
TOTAL SCORE: 1
TOTAL SCORE: VERY MILD ITCHING, PINS AND NEEDLES SENSATION, BURNING OR NUMBNESS
VISUAL DISTURBANCES: NOT PRESENT
ANXIETY: MILDLY ANXIOUS
VISUAL DISTURBANCES: NOT PRESENT
AUDITORY DISTURBANCES: NOT PRESENT
ANXIETY: *
ANXIETY: MILDLY ANXIOUS
PAROXYSMAL SWEATS: NO SWEAT VISIBLE
AGITATION: NORMAL ACTIVITY
AGITATION: NORMAL ACTIVITY
TOTAL SCORE: 6
PAROXYSMAL SWEATS: NO SWEAT VISIBLE
HEADACHE, FULLNESS IN HEAD: MODERATELY SEVERE
ANXIETY: MODERATELY ANXIOUS OR GUARDED, SO ANXIETY IS INFERRED
AGITATION: NORMAL ACTIVITY
HEADACHE, FULLNESS IN HEAD: NOT PRESENT
AUDITORY DISTURBANCES: NOT PRESENT
PAROXYSMAL SWEATS: NO SWEAT VISIBLE
TREMOR: NO TREMOR
TOTAL SCORE: 1
NAUSEA AND VOMITING: MILD NAUSEA WITH NO VOMITING
VISUAL DISTURBANCES: NOT PRESENT
AGITATION: NORMAL ACTIVITY
HEADACHE, FULLNESS IN HEAD: MODERATELY SEVERE
ORIENTATION AND CLOUDING OF SENSORIUM: ORIENTED AND CAN DO SERIAL ADDITIONS
PAROXYSMAL SWEATS: NO SWEAT VISIBLE
VISUAL DISTURBANCES: NOT PRESENT
TREMOR: NO TREMOR
VISUAL DISTURBANCES: NOT PRESENT
AUDITORY DISTURBANCES: NOT PRESENT
ORIENTATION AND CLOUDING OF SENSORIUM: ORIENTED AND CAN DO SERIAL ADDITIONS
TOTAL SCORE: 5
ORIENTATION AND CLOUDING OF SENSORIUM: ORIENTED AND CAN DO SERIAL ADDITIONS
AUDITORY DISTURBANCES: NOT PRESENT
HEADACHE, FULLNESS IN HEAD: NOT PRESENT

## 2025-04-05 ASSESSMENT — ENCOUNTER SYMPTOMS
CHILLS: 0
BLURRED VISION: 0
PALPITATIONS: 0
NAUSEA: 0
MYALGIAS: 0
DOUBLE VISION: 0
DIZZINESS: 0
FEVER: 0
BACK PAIN: 0
VOMITING: 0
HEMOPTYSIS: 0
EYE PAIN: 0
ABDOMINAL PAIN: 0
COUGH: 0
DEPRESSION: 0
NAUSEA: 1
SHORTNESS OF BREATH: 0
HEADACHES: 0
DIARRHEA: 0
SORE THROAT: 0

## 2025-04-05 ASSESSMENT — PAIN DESCRIPTION - PAIN TYPE
TYPE: ACUTE PAIN
TYPE: ACUTE PAIN

## 2025-04-05 NOTE — PROGRESS NOTES
Report received from NOC RN and assumed patient care at 0700. Patient is A&Ox 4, on RA, and bed and chair alarm are on. Pt currently sitting up in chair, He states he was having right sided rib pain last night and it was difficult to sleep in bed. He is also reporting tingling in his left rib this morning. He reports a headache of 4/10 and moderate anxiety, CIWA 8 medicated per MAR. Pt states he is having a hard time urinating, he states it is difficult to urinate and burns. Call light within reach and bed in lowest position. Reinforced the need to call for assistance. Plan of care discussed and patient does not have any further needs at this time.

## 2025-04-05 NOTE — CARE PLAN
The patient is Stable - Low risk of patient condition declining or worsening    Shift Goals  Clinical Goals: Monitor CIWA, pt will remain free from falls, monitor abnormal labs  Patient Goals: rest, snacks, go for walk  Family Goals: DEBBI    Progress made toward(s) clinical / shift goals:    Problem: Knowledge Deficit - Standard  Goal: Patient and family/care givers will demonstrate understanding of plan of care, disease process/condition, diagnostic tests and medications  Outcome: Progressing     Problem: Optimal Care for Alcohol Withdrawal  Goal: Optimal Care for the alcohol withdrawal patient  Outcome: Progressing     Problem: Seizure Precautions  Goal: Implementation of seizure precautions  Outcome: Progressing     Problem: Lifestyle Changes  Goal: Patient's ability to identify lifestyle changes and available resources to help reduce recurrence of condition will improve  Outcome: Progressing     Problem: Psychosocial  Goal: Patient's level of anxiety will decrease  Outcome: Progressing  Goal: Spiritual and cultural needs incorporated into hospitalization  Outcome: Progressing     Problem: Risk for Aspiration  Goal: Patient's risk for aspiration will be absent or decrease  Outcome: Progressing     Problem: Pain - Standard  Goal: Alleviation of pain or a reduction in pain to the patient’s comfort goal  Outcome: Progressing     Problem: Fall Risk  Goal: Patient will remain free from falls  Outcome: Progressing       Patient is not progressing towards the following goals:

## 2025-04-05 NOTE — CONSULTS
Nephrology Consultation    Date of Service  4/5/2025    Referring Physician  Fidel Barrera D.O.    Consulting Physician  Everette Salinas M.D.    Reason for Consultation  EDIN     History of Presenting Illness  39 y.o. male who presented 4/3/2025 with hypertension, alcohol abuse, morbid obesity, borderline diabetes.  He was admitted once in March from 3/2-3/6/25 for alcohol detox. He had EDIN from ATN which improved during the admission.   He has been drinking heavily again ~ 10-15 shot bottles everyday. He only ate chips or small cocktail sausages. He has jaundice and nausea.  He is now admitted for detox again. He was found to have elevated creatinine 1.58 mg/dL  He has no recent IV contrast exposure.     Review of Systems  Review of Systems   Constitutional:  Negative for chills and fever.   HENT:  Negative for congestion and hearing loss.    Eyes:  Negative for double vision and pain.   Respiratory:  Negative for cough and hemoptysis.    Cardiovascular:  Negative for chest pain, palpitations and leg swelling.   Gastrointestinal:  Positive for nausea. Negative for abdominal pain and diarrhea.   Genitourinary:  Negative for dysuria and urgency.   Musculoskeletal:  Negative for back pain and myalgias.   Skin:  Negative for itching and rash.   Neurological:  Negative for dizziness and headaches.   Psychiatric/Behavioral:  Negative for depression and suicidal ideas.        Past Medical History  Past Medical History:   Diagnosis Date    Alcohol abuse     Asthma without status asthmaticus 06/29/2017    Chickenpox     Hypertension        Surgical History  Past Surgical History:   Procedure Laterality Date    OTHER ORTHOPEDIC SURGERY Right     broken hand       Family History  Family History   Problem Relation Age of Onset    Stroke Neg Hx     Heart Disease Neg Hx        Social History  Social History     Socioeconomic History    Marital status: Single     Spouse name: Not on file    Number of children: Not on file  "   Years of education: Not on file    Highest education level: Not on file   Occupational History    Not on file   Tobacco Use    Smoking status: Former     Types: Cigarettes    Smokeless tobacco: Never   Vaping Use    Vaping status: Never Used   Substance and Sexual Activity    Alcohol use: Yes     Alcohol/week: 7.2 oz     Types: 12 Shots of liquor per week     Comment: \"10-15 vodka mini bottles per day\"    Drug use: Not Currently    Sexual activity: Not Currently   Other Topics Concern    Not on file   Social History Narrative    Works in [place]    Lives at home with mom, brother, TRAM and nieces and nephews     Social Drivers of Health     Financial Resource Strain: Not on file   Food Insecurity: No Food Insecurity (4/3/2025)    Hunger Vital Sign     Worried About Running Out of Food in the Last Year: Never true     Ran Out of Food in the Last Year: Never true   Transportation Needs: No Transportation Needs (4/3/2025)    PRAPARE - Transportation     Lack of Transportation (Medical): No     Lack of Transportation (Non-Medical): No   Physical Activity: Not on file   Stress: Not on file   Social Connections: Not on file   Intimate Partner Violence: Not At Risk (4/3/2025)    Humiliation, Afraid, Rape, and Kick questionnaire     Fear of Current or Ex-Partner: No     Emotionally Abused: No     Physically Abused: No     Sexually Abused: No   Housing Stability: Low Risk  (4/3/2025)    Housing Stability Vital Sign     Unable to Pay for Housing in the Last Year: No     Number of Times Moved in the Last Year: 0     Homeless in the Last Year: No   Recent Concern: Housing Stability - High Risk (3/2/2025)    Housing Stability Vital Sign     Unable to Pay for Housing in the Last Year: Yes     Number of Times Moved in the Last Year: 0     Homeless in the Last Year: No       Medications  Current Facility-Administered Medications   Medication Dose Route Frequency Provider Last Rate Last Admin    " tamsulosin (Flomax) capsule 0.4 mg  0.4 mg Oral AFTER BREAKFAST Fidel Barrera D.O.   0.4 mg at 04/05/25 1028    carvedilol (Coreg) tablet 12.5 mg  12.5 mg Oral BID WITH MEALS Zhou López D.O.   12.5 mg at 04/05/25 0737    folic acid (Folvite) tablet 1 mg  1 mg Oral DAILY Zhou López D.O.   1 mg at 04/05/25 0548    multivitamin tablet 1 Tablet  1 Tablet Oral DAILY Zhou López, D.O.   1 Tablet at 04/05/25 0548    thiamine (Vitamin B-1) tablet 100 mg  100 mg Oral DAILY Zhou López, D.O.   100 mg at 04/05/25 0548    Respiratory Therapy Consult   Nebulization Continuous RT FELICIANO Ruth.O.        ondansetron (Zofran) syringe/vial injection 4 mg  4 mg Intravenous Q4HRS PRN Zhou López D.O.   4 mg at 04/03/25 1141    ondansetron (Zofran ODT) dispertab 4 mg  4 mg Oral Q4HRS PRN FELICIANO Ruth.O.        promethazine (Phenergan) tablet 12.5-25 mg  12.5-25 mg Oral Q4HRS PRN Zhou López D.O.        promethazine (Phenergan) suppository 12.5-25 mg  12.5-25 mg Rectal Q4HRS PRN FELICIANO Ruth.O.        prochlorperazine (Compazine) injection 5-10 mg  5-10 mg Intravenous Q4HRS PRN FELICIANO Ruth.O.        LORazepam (Ativan) tablet 0.5 mg  0.5 mg Oral Q4HRS PRN Zhou López, D.O.   0.5 mg at 04/03/25 1800    LORazepam (Ativan) tablet 1 mg  1 mg Oral Q4HRS PRN Zhou López D.O.   1 mg at 04/05/25 0802    LORazepam (Ativan) tablet 2 mg  2 mg Oral Q2HRS PRN FELICIANO Ruth.O.   2 mg at 04/03/25 1141    LORazepam (Ativan) tablet 3 mg  3 mg Oral Q HOUR PRN FELICIANO Ruth.OIesha        LORazepam (Ativan) tablet 4 mg  4 mg Oral Q15 MIN PRN FELICIANO Ruth.OIesha        Or    diazePAM (Valium) injection 10 mg  10 mg Intravenous Q15 MIN PRN EDWAR RuthOIesha        chlordiazePOXIDE (Librium) capsule 25 mg  25 mg Oral Q6HRS FELICIANO Ruth.O.   25 mg at 04/05/25  0548    insulin lispro (HumaLOG,AdmeLOG) subcutaneous injection  2-9 Units Subcutaneous 4X/DAY ACHS Zhou López D.O.   2 Units at 04/04/25 1713    And    dextrose 50 % (D50W) injection 25 g  25 g Intravenous Q15 MIN PRN Zhou López D.O.        enoxaparin (Lovenox) inj 40 mg  40 mg Subcutaneous DAILY AT 1800 EDWAR RuthOIesha   40 mg at 04/04/25 1708    albuterol (Proventil) 2.5mg/0.5ml nebulizer solution 2.5 mg  2.5 mg Nebulization Q2HRS PRN (RT) Zhou López D.O.           Allergies  No Known Allergies    Physical Exam  Temp:  [35.9 °C (96.6 °F)-36.1 °C (96.9 °F)] 36.1 °C (96.9 °F)  Pulse:  [77-89] 82  Resp:  [20-24] 21  BP: (102-136)/(63-94) 117/69  SpO2:  [92 %-95 %] 95 %    Physical Exam  Constitutional:       Appearance: He is obese.   HENT:      Head: Normocephalic.   Eyes:      General: Scleral icterus present.      Pupils: Pupils are equal, round, and reactive to light.   Cardiovascular:      Rate and Rhythm: Normal rate and regular rhythm.      Heart sounds: No murmur heard.     No friction rub. No gallop.   Pulmonary:      Effort: No respiratory distress.      Breath sounds: Normal breath sounds. No wheezing or rales.   Abdominal:      General: There is distension.      Palpations: Abdomen is soft.      Tenderness: There is no abdominal tenderness. There is no guarding.   Musculoskeletal:         General: No swelling or tenderness.   Skin:     Coloration: Skin is jaundiced.   Neurological:      General: No focal deficit present.      Mental Status: He is alert and oriented to person, place, and time.         Fluids      Laboratory  Labs reviewed, pertinent labs below.  Recent Labs     04/03/25  0547 04/04/25  0015 04/05/25  0048   WBC 7.6 8.3 10.8   RBC 4.97 4.45* 3.85*   HEMOGLOBIN 15.3 13.7* 11.7*   HEMATOCRIT 43.2 40.7* 35.7*   MCV 86.9 91.5 92.7   MCH 30.8 30.8 30.4   MCHC 35.4 33.7 32.8   RDW 62.2* 67.0* 69.2*   PLATELETCT 147* 123* 121*   MPV 10.6 10.9  11.0     Recent Labs     04/04/25  0857 04/05/25  0048 04/05/25  0854   SODIUM 132* 131* 134*   POTASSIUM 4.2 3.4* 3.7   CHLORIDE 99 98 100   CO2 17* 19* 19*   GLUCOSE 171* 158* 123*   BUN 10 14 16   CREATININE 1.58* 1.97* 1.87*   CALCIUM 8.0* 8.2* 8.4*     Recent Labs     04/03/25  0547 04/05/25  0048   INR 1.31* 1.21*          Imaging interpreted by radiologist. Imaging reports reviewed with pertinent findings below  US-RUQ   Final Result      1.  Hepatic steatosis and hepatomegaly.   2.  Thickened gallbladder wall without cholelithiasis. This may be related to decompression, reactive, or acalculus cholecystitis. Correlate with symptoms.            Assessment/Plan  39 years old male with hypertension, borderline diabetes, morbid obesity , alcohol dependence with ?acute alcoholic hepatitis    Acute kidney injury  Highly suspected for ATN given cast finding.   - UA from 4/4/25 showed epithelial renal cells with granular cast  - previous urine Na was <20 from 4/4/25  Recommendations  - will recheck Urine Na, urine creatinine , UPCR  - keep MAP > 70 mmHg  - pt appears euvolemic currently  - monitor renal function panel daily  - avoid NSAIDs and nephrotoxic agents  - renal dose all meds   - no need for dialysis at this time    2. Metabolic acidosis mild  - monitor renal function panel    3. Anemia NCNC mild       4. Alcoholic dependence with fatty liver       Elevated AST, ALT   - recommend alcohol cessation   - monitor for withdrawal symptoms        Everette landis MD  Transplant Nephrology, on call for [facility]

## 2025-04-05 NOTE — ASSESSMENT & PLAN NOTE
4/11/2025  Hb stable   Infusion Nursing Note:  Leland Pearson presents today for C1D2 vidaza.    Patient seen by provider today: No   present during visit today: Not Applicable.    Note: Patient having N/V later yesterday, called provider and an antiemetic will be called in. Instructed patient to take med one hour prior to coming in for Vidaza and prn then if needed. Labs reviewed with patient, ANC pending as of discharge time.      Intravenous Access:  Lab draw site rac, Needle type bf, Gauge 23.  Labs drawn without difficulty.    Treatment Conditions:  Lab Results   Component Value Date    HGB 12.9 (L) 05/21/2024    WBC 4.0 05/21/2024    ANEU 0.6 (L) 05/21/2024    PLT 72 (L) 05/21/2024        Lab Results   Component Value Date     (L) 05/21/2024    POTASSIUM 4.3 05/21/2024    MAG 2.1 05/08/2024    CR 0.96 05/21/2024    HERBERT 9.6 05/21/2024    BILITOTAL 1.0 05/21/2024    ALBUMIN 3.9 05/21/2024    ALT 31 05/21/2024    AST 41 05/21/2024       Results reviewed, labs MET treatment parameters, ok to proceed with treatment.      Post Infusion Assessment:  Patient tolerated injection without incident.  Site patent and intact, free from redness, edema or discomfort.       Discharge Plan:   AVS to patient via MYCHART.  Patient will return 5/22 for next appointment.   Patient discharged in stable condition accompanied by: wife.  Departure Mode: Ambulatory.      Doug Cervantes RN

## 2025-04-05 NOTE — PROGRESS NOTES
Pt is confused in conversation, drifting to sleep in the chair, and refusing to get in bed. Pt was educated on his fall risk status and the risks of his refusal.

## 2025-04-05 NOTE — CARE PLAN
Problem: Knowledge Deficit - Standard  Goal: Patient and family/care givers will demonstrate understanding of plan of care, disease process/condition, diagnostic tests and medications  Outcome: Progressing     Problem: Optimal Care for Alcohol Withdrawal  Goal: Optimal Care for the alcohol withdrawal patient  Outcome: Progressing     Problem: Seizure Precautions  Goal: Implementation of seizure precautions  Outcome: Progressing     Problem: Pain - Standard  Goal: Alleviation of pain or a reduction in pain to the patient’s comfort goal  Outcome: Progressing     Problem: Fall Risk  Goal: Patient will remain free from falls  Outcome: Progressing   The patient is Stable - Low risk of patient condition declining or worsening    Shift Goals  Clinical Goals: CIWA less than 5, Safety  Patient Goals: Comfort  Family Goals: DEBBI    Progress made toward(s) clinical / shift goals:  Yes    Patient is not progressing towards the following goals:

## 2025-04-05 NOTE — PROGRESS NOTES
EMI from Lab called with critical result of Bilirubin 25.1 at 0941. Critical lab result read back to EMI.   Dr. Barrera notified of critical lab result at 0942.  Critical lab result read back by Dr. Barrera.

## 2025-04-05 NOTE — PROGRESS NOTES
Assumed care of patient at 1900. Received bedside report from day RN Indigo. Patient A&Ox4 with some confusion during conversation, on RA, Reporting a pain level of 0. Call light within reach, belongings within reach, fall precautions in place, bed in lowest position. Patient does not have any other needs at this time.     POC was discussed with patient. All questions were answered. Patient verbalized understanding.

## 2025-04-05 NOTE — PROGRESS NOTES
NOC ApRN CROSS COVER NOTE    Notified by bedside RN of patient at with BM mixed with rae red blood.  A.m. labs do show a 2 point drop in hemoglobin, will continue to monitor.    Vannessa Whitlock, MSN, APRN  [facility]    Please note this dictation was created using voice recognition software.  I have made every reasonable attempt to correct obvious errors, but there may be errors of grammar and possibly content that I did not discover before finalizing the note.

## 2025-04-05 NOTE — PROGRESS NOTES
[facility] Medicine Daily Progress Note    Date of Service  4/5/2025    Chief Complaint  Alberto Maldonado is a 39 y.o. male admitted 4/3/2025 with abdominal distension    Hospital Course  No notes on file    Interval Problem Update  Patient was seen and examined at bedside.  No acute events overnight. Patient is resting comfortably in bed and in no acute distress.     T.bili 22.3 --> 26.4 --> 25.1  MELD-Na - 30  Maddrey's 46.4 --> 35.2  Prednisolone  Monitor CMP  Cr 1.87  Consult to nephrology  Trend Hb    I have discussed this patient's plan of care and discharge plan at IDT rounds today with Case Management, Nursing, Nursing leadership, and other members of the IDT team.      Code Status  Full Code    Disposition  Medically Cleared  I have placed the appropriate orders for post-discharge needs.    Review of Systems  Review of Systems   Constitutional:  Negative for chills and fever.   HENT:  Negative for congestion and sore throat.    Eyes:  Negative for blurred vision and double vision.   Respiratory:  Negative for cough and shortness of breath.    Cardiovascular:  Negative for chest pain and palpitations.   Gastrointestinal:  Negative for nausea and vomiting.   Genitourinary:  Negative for dysuria and frequency.   Musculoskeletal:  Negative for back pain.   Neurological:  Negative for headaches.   Psychiatric/Behavioral:  Positive for substance abuse.         Physical Exam  Temp:  [35.9 °C (96.6 °F)-36.1 °C (96.9 °F)] 36.1 °C (96.9 °F)  Pulse:  [77-89] 82  Resp:  [20-24] 21  BP: (102-136)/(63-94) 117/69  SpO2:  [92 %-95 %] 95 %    Physical Exam  Vitals reviewed.   HENT:      Right Ear: External ear normal.      Left Ear: External ear normal.      Nose: No congestion.      Mouth/Throat:      Pharynx: No oropharyngeal exudate.   Eyes:      General: Scleral icterus present.   Cardiovascular:      Rate and Rhythm: Normal rate.   Pulmonary:      Breath sounds: No wheezing.   Abdominal:      Tenderness: There is  no abdominal tenderness. There is no guarding or rebound.   Skin:     Coloration: Skin is jaundiced.   Neurological:      General: No focal deficit present.      Mental Status: He is alert.      Motor: No weakness.   Psychiatric:         Mood and Affect: Mood normal.         Behavior: Behavior normal.         Fluids  No intake or output data in the 24 hours ending 04/05/25 1411       Laboratory  Recent Labs     04/03/25  0547 04/04/25  0015 04/05/25  0048   WBC 7.6 8.3 10.8   RBC 4.97 4.45* 3.85*   HEMOGLOBIN 15.3 13.7* 11.7*   HEMATOCRIT 43.2 40.7* 35.7*   MCV 86.9 91.5 92.7   MCH 30.8 30.8 30.4   MCHC 35.4 33.7 32.8   RDW 62.2* 67.0* 69.2*   PLATELETCT 147* 123* 121*   MPV 10.6 10.9 11.0     Recent Labs     04/04/25  0857 04/05/25  0048 04/05/25  0854   SODIUM 132* 131* 134*   POTASSIUM 4.2 3.4* 3.7   CHLORIDE 99 98 100   CO2 17* 19* 19*   GLUCOSE 171* 158* 123*   BUN 10 14 16   CREATININE 1.58* 1.97* 1.87*   CALCIUM 8.0* 8.2* 8.4*     Recent Labs     04/03/25  0547 04/05/25  0048   INR 1.31* 1.21*               Imaging  US-RUQ   Final Result      1.  Hepatic steatosis and hepatomegaly.   2.  Thickened gallbladder wall without cholelithiasis. This may be related to decompression, reactive, or acalculus cholecystitis. Correlate with symptoms.           Assessment/Plan  EDIN (acute kidney injury) (HCC)  Assessment & Plan  Suspect ATN  Minimal urine output  Cr 1.87  Consult to nephrology  Renal diet  Check UA to assess proteinuria  Urine Cr/Urine sodium ordered    Alcoholic cirrhosis (HCC)  Assessment & Plan  Liver enzymes have more than doubled in the last month  MELD-Na - 30  Maddrey's 46.4 --> 35.2  INR 1.3  Based on above scores, we will start the patient on prednisolone  Right upper quadrant ultrasound shows cirrhotic changes, but no evidence of acute obstruction, no ascites  Follow CMP: If she shows response to steroids and we would continue for 28 days.  If she does not, then we would stop it short.  Encourage  sobriety  Hepatitis panel from February of last year showed evidence of hepatitis B vaccination, but no active viral infection.  Increase Coreg in part to treat his hypertension but also for management of ascites  T.bili 22.3 --> 26.4 --> 25.1    Alcohol use disorder, severe, dependence (HCC)- (present on admission)  Assessment & Plan  Drinks 15 shots daily  Monitor electrolytes  CIWA    Anemia  Assessment & Plan  Trend Hb  Reported to have BRBPR x1 over night  Patient denies other episodes    Alcohol withdrawal syndrome with complication (HCC)- (present on admission)  Assessment & Plan  Patient has been given 1 dose of IV phenobarbital in the ED  Admit to the floor on a benzodiazepine withdrawal protocol  Start scheduled p.o. Librium  Continue thiamine folate and MVI  Low threshold to escalate care depended upon his CIWA scores    Asthma- (present on admission)  Assessment & Plan  Lung exam is benign  Placed on O2 and RT protocols    Controlled type 2 diabetes mellitus with hyperglycemia, without long-term current use of insulin (HCC)- (present on admission)  Assessment & Plan  A1c 6.7, 4 months ago  Placed on sliding scale particularly in light of initiation of glucocorticoids for acute alcohol hepatitis    Morbid obesity (HCC)- (present on admission)  Assessment & Plan  BMI 43    Essential hypertension- (present on admission)  Assessment & Plan  Patient is maintained on amlodipine and Coreg  Given the presence of advancing cirrhosis and ascites, we will DC his amlodipine in order to bump up his Coreg in part to treat his blood pressure but also to address portal venous hypertension         VTE prophylaxis: VTE Selection    I have performed a physical exam and reviewed and updated ROS and Plan today (4/5/2025). In review of yesterday's note (4/4/2025), there are no changes except as documented above.    Greater than 55 minutes spent prepping to see patient (e.g. review of tests) obtaining and/or reviewing  separately obtained history. Performing a medically appropriate examination and/ evaluation.  Counseling and educating the patient/family/caregiver.  Ordering medications, tests, or procedures.  Referring and communicating with other health care professionals.  Documenting clinical information in EPIC.  Independently interpreting results and communicating results to patient/family/caregiver.  Care coordination.

## 2025-04-05 NOTE — PROGRESS NOTES
Koko from Lab called with critical result of bilirubin at 24.2. Critical lab result read back to Koko.   Vannessa HOOPER notified of critical lab result at 0226.  Critical lab result read back by Vannessa HOOPER.

## 2025-04-06 PROBLEM — S20.212A: Status: ACTIVE | Noted: 2025-04-06

## 2025-04-06 LAB
ALBUMIN SERPL BCP-MCNC: 2.9 G/DL (ref 3.2–4.9)
ALBUMIN SERPL BCP-MCNC: 3.1 G/DL (ref 3.2–4.9)
ALBUMIN/GLOB SERPL: 0.8 G/DL
ALBUMIN/GLOB SERPL: 0.9 G/DL
ALP SERPL-CCNC: 255 U/L (ref 30–99)
ALP SERPL-CCNC: 273 U/L (ref 30–99)
ALT SERPL-CCNC: 151 U/L (ref 2–50)
ALT SERPL-CCNC: 151 U/L (ref 2–50)
AMMONIA PLAS-SCNC: 71 UMOL/L (ref 11–45)
ANION GAP SERPL CALC-SCNC: 10 MMOL/L (ref 7–16)
ANION GAP SERPL CALC-SCNC: 12 MMOL/L (ref 7–16)
AST SERPL-CCNC: 201 U/L (ref 12–45)
AST SERPL-CCNC: 203 U/L (ref 12–45)
BILIRUB SERPL-MCNC: 21 MG/DL (ref 0.1–1.5)
BILIRUB SERPL-MCNC: 21.6 MG/DL (ref 0.1–1.5)
BUN SERPL-MCNC: 22 MG/DL (ref 8–22)
BUN SERPL-MCNC: 23 MG/DL (ref 8–22)
CALCIUM ALBUM COR SERPL-MCNC: 8.7 MG/DL (ref 8.5–10.5)
CALCIUM ALBUM COR SERPL-MCNC: 8.9 MG/DL (ref 8.5–10.5)
CALCIUM SERPL-MCNC: 8 MG/DL (ref 8.5–10.5)
CALCIUM SERPL-MCNC: 8 MG/DL (ref 8.5–10.5)
CHLORIDE SERPL-SCNC: 100 MMOL/L (ref 96–112)
CHLORIDE SERPL-SCNC: 99 MMOL/L (ref 96–112)
CK SERPL-CCNC: 326 U/L (ref 0–154)
CO2 SERPL-SCNC: 20 MMOL/L (ref 20–33)
CO2 SERPL-SCNC: 22 MMOL/L (ref 20–33)
CREAT SERPL-MCNC: 1.92 MG/DL (ref 0.5–1.4)
CREAT SERPL-MCNC: 2.16 MG/DL (ref 0.5–1.4)
GFR SERPLBLD CREATININE-BSD FMLA CKD-EPI: 39 ML/MIN/1.73 M 2
GFR SERPLBLD CREATININE-BSD FMLA CKD-EPI: 45 ML/MIN/1.73 M 2
GLOBULIN SER CALC-MCNC: 3.5 G/DL (ref 1.9–3.5)
GLOBULIN SER CALC-MCNC: 3.6 G/DL (ref 1.9–3.5)
GLUCOSE BLD STRIP.AUTO-MCNC: 129 MG/DL (ref 65–99)
GLUCOSE BLD STRIP.AUTO-MCNC: 131 MG/DL (ref 65–99)
GLUCOSE BLD STRIP.AUTO-MCNC: 177 MG/DL (ref 65–99)
GLUCOSE BLD STRIP.AUTO-MCNC: 99 MG/DL (ref 65–99)
GLUCOSE SERPL-MCNC: 115 MG/DL (ref 65–99)
GLUCOSE SERPL-MCNC: 153 MG/DL (ref 65–99)
HCT VFR BLD AUTO: 33 % (ref 42–52)
HCT VFR BLD AUTO: 33.4 % (ref 42–52)
HGB BLD-MCNC: 10.9 G/DL (ref 14–18)
HGB BLD-MCNC: 11 G/DL (ref 14–18)
INR PPP: 1.17 (ref 0.87–1.13)
MAGNESIUM SERPL-MCNC: 1.8 MG/DL (ref 1.5–2.5)
PHOSPHATE SERPL-MCNC: 3.5 MG/DL (ref 2.5–4.5)
POTASSIUM SERPL-SCNC: 3.3 MMOL/L (ref 3.6–5.5)
POTASSIUM SERPL-SCNC: 3.5 MMOL/L (ref 3.6–5.5)
PROT SERPL-MCNC: 6.4 G/DL (ref 6–8.2)
PROT SERPL-MCNC: 6.7 G/DL (ref 6–8.2)
PROTHROMBIN TIME: 14.9 SEC (ref 12–14.6)
SODIUM SERPL-SCNC: 131 MMOL/L (ref 135–145)
SODIUM SERPL-SCNC: 132 MMOL/L (ref 135–145)

## 2025-04-06 PROCEDURE — 36415 COLL VENOUS BLD VENIPUNCTURE: CPT

## 2025-04-06 PROCEDURE — 85014 HEMATOCRIT: CPT

## 2025-04-06 PROCEDURE — 85610 PROTHROMBIN TIME: CPT

## 2025-04-06 PROCEDURE — 99232 SBSQ HOSP IP/OBS MODERATE 35: CPT | Performed by: STUDENT IN AN ORGANIZED HEALTH CARE EDUCATION/TRAINING PROGRAM

## 2025-04-06 PROCEDURE — 84100 ASSAY OF PHOSPHORUS: CPT

## 2025-04-06 PROCEDURE — 83735 ASSAY OF MAGNESIUM: CPT

## 2025-04-06 PROCEDURE — A9270 NON-COVERED ITEM OR SERVICE: HCPCS | Performed by: INTERNAL MEDICINE

## 2025-04-06 PROCEDURE — 80053 COMPREHEN METABOLIC PANEL: CPT

## 2025-04-06 PROCEDURE — 770006 HCHG ROOM/CARE - MED/SURG/GYN SEMI*

## 2025-04-06 PROCEDURE — 700102 HCHG RX REV CODE 250 W/ 637 OVERRIDE(OP): Performed by: HOSPITALIST

## 2025-04-06 PROCEDURE — 82962 GLUCOSE BLOOD TEST: CPT | Mod: 91

## 2025-04-06 PROCEDURE — 99233 SBSQ HOSP IP/OBS HIGH 50: CPT | Performed by: INTERNAL MEDICINE

## 2025-04-06 PROCEDURE — 82140 ASSAY OF AMMONIA: CPT

## 2025-04-06 PROCEDURE — A9270 NON-COVERED ITEM OR SERVICE: HCPCS | Performed by: HOSPITALIST

## 2025-04-06 PROCEDURE — 85018 HEMOGLOBIN: CPT

## 2025-04-06 PROCEDURE — 82550 ASSAY OF CK (CPK): CPT

## 2025-04-06 PROCEDURE — 700102 HCHG RX REV CODE 250 W/ 637 OVERRIDE(OP): Performed by: INTERNAL MEDICINE

## 2025-04-06 RX ORDER — POTASSIUM CHLORIDE 1500 MG/1
20 TABLET, EXTENDED RELEASE ORAL ONCE
Status: COMPLETED | OUTPATIENT
Start: 2025-04-06 | End: 2025-04-06

## 2025-04-06 RX ADMIN — INSULIN LISPRO 2 UNITS: 100 INJECTION, SOLUTION INTRAVENOUS; SUBCUTANEOUS at 22:46

## 2025-04-06 RX ADMIN — POTASSIUM CHLORIDE 20 MEQ: 1500 TABLET, EXTENDED RELEASE ORAL at 07:37

## 2025-04-06 RX ADMIN — CHLORDIAZEPOXIDE HYDROCHLORIDE 25 MG: 25 CAPSULE ORAL at 00:33

## 2025-04-06 RX ADMIN — CHLORDIAZEPOXIDE HYDROCHLORIDE 25 MG: 25 CAPSULE ORAL at 05:30

## 2025-04-06 RX ADMIN — TAMSULOSIN HYDROCHLORIDE 0.4 MG: 0.4 CAPSULE ORAL at 07:47

## 2025-04-06 RX ADMIN — Medication 100 MG: at 05:30

## 2025-04-06 RX ADMIN — CARVEDILOL 12.5 MG: 12.5 TABLET, FILM COATED ORAL at 16:57

## 2025-04-06 RX ADMIN — CARVEDILOL 12.5 MG: 12.5 TABLET, FILM COATED ORAL at 07:37

## 2025-04-06 RX ADMIN — FOLIC ACID 1 MG: 1 TABLET ORAL at 05:30

## 2025-04-06 RX ADMIN — MULTIVITAMIN TABLET 1 TABLET: TABLET at 05:30

## 2025-04-06 ASSESSMENT — ENCOUNTER SYMPTOMS
PALPITATIONS: 0
DEPRESSION: 0
NAUSEA: 0
SHORTNESS OF BREATH: 0
HEADACHES: 0
FEVER: 0
MYALGIAS: 0
COUGH: 0
BLURRED VISION: 0
ORTHOPNEA: 0
DIZZINESS: 0
DOUBLE VISION: 0
CHILLS: 0
VOMITING: 0
NECK PAIN: 0
SORE THROAT: 0
BACK PAIN: 0

## 2025-04-06 ASSESSMENT — LIFESTYLE VARIABLES
ANXIETY: NO ANXIETY (AT EASE)
AGITATION: NORMAL ACTIVITY
ORIENTATION AND CLOUDING OF SENSORIUM: ORIENTED AND CAN DO SERIAL ADDITIONS
HEADACHE, FULLNESS IN HEAD: NOT PRESENT
VISUAL DISTURBANCES: NOT PRESENT
AUDITORY DISTURBANCES: NOT PRESENT
ORIENTATION AND CLOUDING OF SENSORIUM: ORIENTED AND CAN DO SERIAL ADDITIONS
ORIENTATION AND CLOUDING OF SENSORIUM: ORIENTED AND CAN DO SERIAL ADDITIONS
HEADACHE, FULLNESS IN HEAD: NOT PRESENT
SUBSTANCE_ABUSE: 1
AUDITORY DISTURBANCES: NOT PRESENT
TREMOR: NO TREMOR
TOTAL SCORE: 0
TREMOR: NO TREMOR
NAUSEA AND VOMITING: NO NAUSEA AND NO VOMITING
PAROXYSMAL SWEATS: NO SWEAT VISIBLE
PAROXYSMAL SWEATS: NO SWEAT VISIBLE
NAUSEA AND VOMITING: NO NAUSEA AND NO VOMITING
TOTAL SCORE: 0
AGITATION: NORMAL ACTIVITY
AUDITORY DISTURBANCES: NOT PRESENT
ANXIETY: NO ANXIETY (AT EASE)
VISUAL DISTURBANCES: NOT PRESENT
HEADACHE, FULLNESS IN HEAD: NOT PRESENT
PAROXYSMAL SWEATS: NO SWEAT VISIBLE
TREMOR: NO TREMOR
NAUSEA AND VOMITING: NO NAUSEA AND NO VOMITING
TOTAL SCORE: 0
ANXIETY: NO ANXIETY (AT EASE)
AGITATION: NORMAL ACTIVITY
VISUAL DISTURBANCES: NOT PRESENT

## 2025-04-06 ASSESSMENT — PAIN DESCRIPTION - PAIN TYPE
TYPE: ACUTE PAIN
TYPE: ACUTE PAIN

## 2025-04-06 NOTE — PROGRESS NOTES
Received report and assumed care of patient at 1900. Patient is AOX4, on room air, and bed/chair alarm on. Patient sitting up in chair eating dinner upon assessment. Patient declines pain at this time, just expresses discomfort with burton in place. CIWA scoring done per protocol. Discussed plan with patient and addressed all concerns. Call light placed within reach and patient expresses no other needs at this time.

## 2025-04-06 NOTE — ASSESSMENT & PLAN NOTE
4/11/2025  Bruising to left chest  Denies fall or trauma  Possibly result of patient sleeping against bedside table with underlying thrombocytopenia  Hold DVT ppx

## 2025-04-06 NOTE — PROGRESS NOTES
Report received from NOC RN and assumed patient care at 0700. Patient is A&Ox 4, on RA, and chair alarm activated . Patient reporting a pain level of 0/10. Call light within reach and bed in lowest position. Reinforced the need to call for assistance. Plan of care discussed and patient does not have any further needs at this time.

## 2025-04-06 NOTE — PROGRESS NOTES
Lab called with critical result of bilirubin at 21.6. Critical lab result read back to lab.   Dr. Barrera notified of critical lab result at 1045.  Critical lab result read back by Dr. Barrera.   16-Oct-2023

## 2025-04-06 NOTE — PROGRESS NOTES
[facility] Medicine Daily Progress Note    Date of Service  4/6/2025    Chief Complaint  Alberto Maldonado is a 39 y.o. male admitted 4/3/2025 with abdominal distension    Hospital Course  No notes on file    Interval Problem Update  Patient was seen and examined at bedside.  No acute events overnight. Patient is resting comfortably in bed and in no acute distress.     T.bili 22.3 --> 26.4 --> 25.1 --> 21.6  MELD-Na - 29  Maddrey's 46.4 --> 35.2-->30  Prednisolone  Monitor CMP  Cr 1.92  Nephrology recommendations  Trend Hb    I have discussed this patient's plan of care and discharge plan at IDT rounds today with Case Management, Nursing, Nursing leadership, and other members of the IDT team.      Code Status  Full Code    Disposition  Medically Cleared  I have placed the appropriate orders for post-discharge needs.    Review of Systems  Review of Systems   Constitutional:  Negative for chills and fever.   HENT:  Negative for congestion and sore throat.    Eyes:  Negative for blurred vision and double vision.   Respiratory:  Negative for cough and shortness of breath.    Cardiovascular:  Negative for chest pain and palpitations.   Gastrointestinal:  Negative for nausea and vomiting.   Genitourinary:  Negative for dysuria and frequency.   Musculoskeletal:  Negative for back pain.   Neurological:  Negative for headaches.   Psychiatric/Behavioral:  Positive for substance abuse.         Physical Exam  Temp:  [35.8 °C (96.5 °F)-36.3 °C (97.3 °F)] 36.3 °C (97.3 °F)  Pulse:  [68-93] 68  Resp:  [16-19] 16  BP: ()/(59-80) 101/64  SpO2:  [91 %-95 %] 94 %    Physical Exam  Vitals reviewed.   HENT:      Right Ear: External ear normal.      Left Ear: External ear normal.      Nose: No congestion.      Mouth/Throat:      Pharynx: No oropharyngeal exudate.   Eyes:      General: Scleral icterus present.   Cardiovascular:      Rate and Rhythm: Normal rate.   Pulmonary:      Breath sounds: No wheezing.   Abdominal:       Tenderness: There is no abdominal tenderness. There is no guarding or rebound.   Skin:     Coloration: Skin is jaundiced.      Findings: Bruising present.      Comments: Bruising to right anterior chest   Neurological:      General: No focal deficit present.      Mental Status: He is alert.      Motor: No weakness.   Psychiatric:         Mood and Affect: Mood normal.         Behavior: Behavior normal.         Fluids    Intake/Output Summary (Last 24 hours) at 4/6/2025 1318  Last data filed at 4/6/2025 1315  Gross per 24 hour   Intake 920 ml   Output 290 ml   Net 630 ml          Laboratory  Recent Labs     04/04/25  0015 04/05/25  0048 04/05/25  1701 04/06/25  0059 04/06/25  0856   WBC 8.3 10.8  --   --   --    RBC 4.45* 3.85*  --   --   --    HEMOGLOBIN 13.7* 11.7* 11.5* 11.0* 10.9*   HEMATOCRIT 40.7* 35.7* 34.8* 33.0* 33.4*   MCV 91.5 92.7  --   --   --    MCH 30.8 30.4  --   --   --    MCHC 33.7 32.8  --   --   --    RDW 67.0* 69.2*  --   --   --    PLATELETCT 123* 121*  --   --   --    MPV 10.9 11.0  --   --   --      Recent Labs     04/05/25  0854 04/06/25  0059 04/06/25  0856   SODIUM 134* 132* 131*   POTASSIUM 3.7 3.5* 3.3*   CHLORIDE 100 100 99   CO2 19* 22 20   GLUCOSE 123* 115* 153*   BUN 16 22 23*   CREATININE 1.87* 2.16* 1.92*   CALCIUM 8.4* 8.0* 8.0*     Recent Labs     04/05/25  0048 04/06/25  0059   INR 1.21* 1.17*               Imaging  US-RUQ   Final Result      1.  Hepatic steatosis and hepatomegaly.   2.  Thickened gallbladder wall without cholelithiasis. This may be related to decompression, reactive, or acalculus cholecystitis. Correlate with symptoms.           Assessment/Plan  EDIN (acute kidney injury) (HCC)  Assessment & Plan  Suspect ATN  Minimal urine output  Cr 1.92  Nephrology recommendations    Alcoholic cirrhosis (HCC)  Assessment & Plan  Liver enzymes have more than doubled in the last month  Right upper quadrant ultrasound shows cirrhotic changes, but no evidence of acute obstruction,  no ascites  Follow CMP: If she shows response to steroids and we would continue for 28 days.  If she does not, then we would stop it short.  Encourage sobriety  Hepatitis panel from February of last year showed evidence of hepatitis B vaccination, but no active viral infection.  Increase Coreg in part to treat his hypertension but also for management of ascites  MELD-Na - 29  Maddrey's 46.4 --> 35.2 --> 30  T.bili 22.3 --> 26.4 --> 25.1 --> 21.6    Alcohol use disorder, severe, dependence (HCC)- (present on admission)  Assessment & Plan  Drinks 15 shots daily  Monitor electrolytes  CIWA    Superficial bruising of chest wall, left, initial encounter  Assessment & Plan  Bruising to left chest  Denies fall or trauma  Possibly result of patient sleeping against bedside table with underlying thrombocytopenia  Hold DVT ppx    Anemia  Assessment & Plan  Hb stable    Alcohol withdrawal syndrome with complication (HCC)- (present on admission)  Assessment & Plan  Patient has been given 1 dose of IV phenobarbital in the ED  Admit to the floor on a benzodiazepine withdrawal protocol  Start scheduled p.o. Librium  Continue thiamine folate and MVI  Low threshold to escalate care depended upon his CIWA scores    Asthma- (present on admission)  Assessment & Plan  Lung exam is benign  Placed on O2 and RT protocols    Controlled type 2 diabetes mellitus with hyperglycemia, without long-term current use of insulin (HCC)- (present on admission)  Assessment & Plan  A1c 6.7, 4 months ago  Placed on sliding scale particularly in light of initiation of glucocorticoids for acute alcohol hepatitis    Morbid obesity (HCC)- (present on admission)  Assessment & Plan  BMI 43    Essential hypertension- (present on admission)  Assessment & Plan  Patient is maintained on amlodipine and Coreg  Given the presence of advancing cirrhosis and ascites, we will DC his amlodipine in order to bump up his Coreg in part to treat his blood pressure but also to  address portal venous hypertension         VTE prophylaxis: VTE Selection    I have performed a physical exam and reviewed and updated ROS and Plan today (4/6/2025). In review of yesterday's note (4/5/2025), there are no changes except as documented above.    Greater than 51 minutes spent prepping to see patient (e.g. review of tests) obtaining and/or reviewing separately obtained history. Performing a medically appropriate examination and/ evaluation.  Counseling and educating the patient/family/caregiver.  Ordering medications, tests, or procedures.  Referring and communicating with other health care professionals.  Documenting clinical information in EPIC.  Independently interpreting results and communicating results to patient/family/caregiver.  Care coordination.

## 2025-04-06 NOTE — CARE PLAN
The patient is Stable - Low risk of patient condition declining or worsening    Shift Goals  Clinical Goals: Patient will remain free from falls  Patient Goals: Sleep  Family Goals: DEBBI    Progress made toward(s) clinical / shift goals:      Problem: Pain - Standard  Goal: Alleviation of pain or a reduction in pain to the patient’s comfort goal  Outcome: Progressing     Problem: Fall Risk  Goal: Patient will remain free from falls  Outcome: Progressing     Patient has remained free from falls throughout the night. Bed locked/lowest position, bed alarm on, and call light placed within reach.     Patient is not progressing towards the following goals:

## 2025-04-06 NOTE — CARE PLAN
The patient is Stable - Low risk of patient condition declining or worsening    Shift Goals  Clinical Goals: Pt will remain free from falls. Pt will report pain 3/10 or less this shift. Pt CIWA will remain less than 5 this shift.  Patient Goals: Rest  Family Goals: DEBBI    Progress made toward(s) clinical / shift goals:  Pt has remained free from falls. Pt has reported no pain this shift. Pt CIWA has remained less than 5 this shift.     Problem: Optimal Care for Alcohol Withdrawal  Goal: Optimal Care for the alcohol withdrawal patient  Outcome: Progressing     Problem: Pain - Standard  Goal: Alleviation of pain or a reduction in pain to the patient’s comfort goal  Outcome: Progressing     Problem: Fall Risk  Goal: Patient will remain free from falls  Outcome: Progressing       Patient is not progressing towards the following goals:

## 2025-04-06 NOTE — PROGRESS NOTES
Nephrology Daily Progress Note    Date of Service  4/6/2025    Chief Complaint  39 y.o. male admitted 4/3/2025 with  hypertension, alcohol abuse, morbid obesity, borderline diabetes. Admitted for alcohol detox. hepatitis    Interval Problem Update  4/6 - UOP improved 290mL from 100mL yesterday, no edema, denies shortness of breath, confusion, Nausea/vomiting      Review of Systems  Review of Systems   Constitutional:  Negative for chills and fever.   HENT:  Negative for hearing loss and tinnitus.    Cardiovascular:  Negative for palpitations and orthopnea.   Gastrointestinal:  Negative for nausea and vomiting.   Genitourinary:  Negative for dysuria and urgency.   Musculoskeletal:  Negative for myalgias and neck pain.   Skin:  Negative for itching and rash.   Neurological:  Negative for dizziness and headaches.   Psychiatric/Behavioral:  Negative for depression and suicidal ideas.         Physical Exam  Temp:  [35.8 °C (96.5 °F)-36.3 °C (97.3 °F)] 36.3 °C (97.3 °F)  Pulse:  [68-93] 68  Resp:  [16-21] 16  BP: ()/(59-80) 101/64  SpO2:  [91 %-95 %] 94 %    Physical Exam  Vitals reviewed.   Constitutional:       Appearance: He is obese.   HENT:      Head: Normocephalic and atraumatic.   Eyes:      Conjunctiva/sclera: Conjunctivae normal.      Pupils: Pupils are equal, round, and reactive to light.   Cardiovascular:      Rate and Rhythm: Normal rate and regular rhythm.      Heart sounds: No murmur heard.     No friction rub. No gallop.   Pulmonary:      Effort: No respiratory distress.      Breath sounds: Normal breath sounds. No wheezing or rales.   Abdominal:      General: There is distension.      Palpations: Abdomen is soft.      Tenderness: There is no abdominal tenderness.   Skin:     Capillary Refill: Capillary refill takes less than 2 seconds.      Coloration: Skin is jaundiced.   Neurological:      General: No focal deficit present.      Mental Status: He is alert. Mental status is at baseline.          Fluids    Intake/Output Summary (Last 24 hours) at 4/6/2025 0739  Last data filed at 4/6/2025 0613  Gross per 24 hour   Intake 620 ml   Output 290 ml   Net 330 ml       Laboratory  Labs reviewed, pertinent labs below.  Recent Labs     04/04/25  0015 04/05/25  0048 04/05/25  1701 04/06/25  0059   WBC 8.3 10.8  --   --    RBC 4.45* 3.85*  --   --    HEMOGLOBIN 13.7* 11.7* 11.5* 11.0*   HEMATOCRIT 40.7* 35.7* 34.8* 33.0*   MCV 91.5 92.7  --   --    MCH 30.8 30.4  --   --    MCHC 33.7 32.8  --   --    RDW 67.0* 69.2*  --   --    PLATELETCT 123* 121*  --   --    MPV 10.9 11.0  --   --      Recent Labs     04/05/25  0048 04/05/25  0854 04/06/25  0059   SODIUM 131* 134* 132*   POTASSIUM 3.4* 3.7 3.5*   CHLORIDE 98 100 100   CO2 19* 19* 22   GLUCOSE 158* 123* 115*   BUN 14 16 22   CREATININE 1.97* 1.87* 2.16*   CALCIUM 8.2* 8.4* 8.0*     Recent Labs     04/05/25  0048 04/06/25  0059   INR 1.21* 1.17*           URINALYSIS:  Lab Results   Component Value Date/Time    COLORURINE [place] (A) 04/05/2025 1412    CLARITY [place] (A) 04/05/2025 1412    SPECGRAVITY see below 04/05/2025 1412    PHURINE see below 04/05/2025 1412    KETONES see below 04/05/2025 1412    PROTEINURIN see below 04/05/2025 1412    BILIRUBINUR see below 04/05/2025 1412    UROBILU see below 04/05/2025 1412    NITRITE see below 04/05/2025 1412    LEUKESTERAS see below 04/05/2025 1412    OCCULTBLOOD see below 04/05/2025 1412     UPC  Lab Results   Component Value Date/Time    TOTPROTUR 96.4 (H) 04/05/2025 1412      Lab Results   Component Value Date/Time    CREATININEU 383.00 04/05/2025 1412    CREATININEU 381.00 04/05/2025 1412         Imaging interpreted by radiologist. Imaging reports reviewed with pertinent findings below  US-RUQ   Final Result      1.  Hepatic steatosis and hepatomegaly.   2.  Thickened gallbladder wall without cholelithiasis. This may be related to decompression, reactive, or acalculus cholecystitis. Correlate with symptoms.             Current Facility-Administered Medications   Medication Dose Route Frequency Provider Last Rate Last Admin    tamsulosin (Flomax) capsule 0.4 mg  0.4 mg Oral AFTER BREAKFAST Fidel Barrera, D.OIesha   0.4 mg at 04/05/25 1028    carvedilol (Coreg) tablet 12.5 mg  12.5 mg Oral BID WITH MEALS FELICIANO Ruth.O.   12.5 mg at 04/06/25 0737    folic acid (Folvite) tablet 1 mg  1 mg Oral DAILY Zhou López D.O.   1 mg at 04/06/25 0530    multivitamin tablet 1 Tablet  1 Tablet Oral DAILY Zhou López D.O.   1 Tablet at 04/06/25 0530    thiamine (Vitamin B-1) tablet 100 mg  100 mg Oral DAILY Zhou López, D.O.   100 mg at 04/06/25 0530    Respiratory Therapy Consult   Nebulization Continuous RT FELICIANO Ruth.O.        ondansetron (Zofran) syringe/vial injection 4 mg  4 mg Intravenous Q4HRS PRN Zhou López D.O.   4 mg at 04/03/25 1141    ondansetron (Zofran ODT) dispertab 4 mg  4 mg Oral Q4HRS PRN FELICIANO Ruth.O.        promethazine (Phenergan) tablet 12.5-25 mg  12.5-25 mg Oral Q4HRS PRN FELICIANO Ruth.O.        promethazine (Phenergan) suppository 12.5-25 mg  12.5-25 mg Rectal Q4HRS PRN FELICIANO Ruth.O.        prochlorperazine (Compazine) injection 5-10 mg  5-10 mg Intravenous Q4HRS PRN FELICIANO Ruth.O.        LORazepam (Ativan) tablet 0.5 mg  0.5 mg Oral Q4HRS PRN Zhou López, D.O.   0.5 mg at 04/05/25 1213    LORazepam (Ativan) tablet 1 mg  1 mg Oral Q4HRS PRN Zhou López D.O.   1 mg at 04/05/25 0802    LORazepam (Ativan) tablet 2 mg  2 mg Oral Q2HRS PRN EDWAR RuthOIesha   2 mg at 04/03/25 1141    LORazepam (Ativan) tablet 3 mg  3 mg Oral Q HOUR PRN EDWAR RuthOIesha        LORazepam (Ativan) tablet 4 mg  4 mg Oral Q15 MIN PRN Zhou López D.O.        Or    diazePAM (Valium) injection 10 mg  10 mg Intravenous Q15 MIN PRN EDWAR RuthO.         insulin lispro (HumaLOG,AdmeLOG) subcutaneous injection  2-9 Units Subcutaneous 4X/DAY ACHS EDWAR RuthOIesha   2 Units at 04/04/25 1713    And    dextrose 50 % (D50W) injection 25 g  25 g Intravenous Q15 MIN PRN EDWAR RuthOIesha        enoxaparin (Lovenox) inj 40 mg  40 mg Subcutaneous DAILY AT 1800 EDWAR RuthOIesha   40 mg at 04/05/25 1730    albuterol (Proventil) 2.5mg/0.5ml nebulizer solution 2.5 mg  2.5 mg Nebulization Q2HRS PRN (RT) Zhou López D.O.             Assessment/Plan   39 years old male with hypertension, borderline diabetes, morbid obesity , alcohol dependence with ?acute alcoholic hepatitis     Acute kidney injury  Highly suspected for ATN given renal cell and granular cast   Urine protein creatinine ratio 252   - keep MAP > 70 mmHg  - pt appears euvolemic currently  - monitor renal function panel daily  - avoid NSAIDs and nephrotoxic agents  - renal dose all meds   - no need for dialysis at this time     2. Metabolic acidosis mild  - monitor renal function panel     3. Anemia NCNC mild        4. Alcoholic dependence with fatty liver       Elevated AST, ALT   - recommend alcohol cessation   - monitor for withdrawal symptoms           Everette landis MD  Transplant Nephrology, on call for [facility]

## 2025-04-07 LAB
ALBUMIN SERPL BCP-MCNC: 3.1 G/DL (ref 3.2–4.9)
ALBUMIN/GLOB SERPL: 0.8 G/DL
ALP SERPL-CCNC: 295 U/L (ref 30–99)
ALT SERPL-CCNC: 139 U/L (ref 2–50)
ANION GAP SERPL CALC-SCNC: 14 MMOL/L (ref 7–16)
AST SERPL-CCNC: 177 U/L (ref 12–45)
BACTERIA UR CULT: NORMAL
BILIRUB SERPL-MCNC: 19.7 MG/DL (ref 0.1–1.5)
BUN SERPL-MCNC: 25 MG/DL (ref 8–22)
CALCIUM ALBUM COR SERPL-MCNC: 8.8 MG/DL (ref 8.5–10.5)
CALCIUM SERPL-MCNC: 8.1 MG/DL (ref 8.5–10.5)
CHLORIDE SERPL-SCNC: 100 MMOL/L (ref 96–112)
CO2 SERPL-SCNC: 20 MMOL/L (ref 20–33)
CREAT SERPL-MCNC: 2.1 MG/DL (ref 0.5–1.4)
ERYTHROCYTE [DISTWIDTH] IN BLOOD BY AUTOMATED COUNT: 69.3 FL (ref 35.9–50)
GFR SERPLBLD CREATININE-BSD FMLA CKD-EPI: 41 ML/MIN/1.73 M 2
GLOBULIN SER CALC-MCNC: 3.7 G/DL (ref 1.9–3.5)
GLUCOSE BLD STRIP.AUTO-MCNC: 111 MG/DL (ref 65–99)
GLUCOSE BLD STRIP.AUTO-MCNC: 113 MG/DL (ref 65–99)
GLUCOSE BLD STRIP.AUTO-MCNC: 162 MG/DL (ref 65–99)
GLUCOSE SERPL-MCNC: 130 MG/DL (ref 65–99)
HCT VFR BLD AUTO: 32.4 % (ref 42–52)
HGB BLD-MCNC: 10.6 G/DL (ref 14–18)
MAGNESIUM SERPL-MCNC: 1.9 MG/DL (ref 1.5–2.5)
MCH RBC QN AUTO: 30.6 PG (ref 27–33)
MCHC RBC AUTO-ENTMCNC: 32.7 G/DL (ref 32.3–36.5)
MCV RBC AUTO: 93.6 FL (ref 81.4–97.8)
PHOSPHATE SERPL-MCNC: 3.8 MG/DL (ref 2.5–4.5)
PLATELET # BLD AUTO: 150 K/UL (ref 164–446)
PMV BLD AUTO: 10.8 FL (ref 9–12.9)
POTASSIUM SERPL-SCNC: 3.3 MMOL/L (ref 3.6–5.5)
PROT SERPL-MCNC: 6.8 G/DL (ref 6–8.2)
RBC # BLD AUTO: 3.46 M/UL (ref 4.7–6.1)
SIGNIFICANT IND 70042: NORMAL
SITE SITE: NORMAL
SODIUM SERPL-SCNC: 134 MMOL/L (ref 135–145)
SOURCE SOURCE: NORMAL
WBC # BLD AUTO: 7.2 K/UL (ref 4.8–10.8)

## 2025-04-07 PROCEDURE — 82962 GLUCOSE BLOOD TEST: CPT | Mod: 91

## 2025-04-07 PROCEDURE — 36415 COLL VENOUS BLD VENIPUNCTURE: CPT

## 2025-04-07 PROCEDURE — 85027 COMPLETE CBC AUTOMATED: CPT

## 2025-04-07 PROCEDURE — 99232 SBSQ HOSP IP/OBS MODERATE 35: CPT | Performed by: INTERNAL MEDICINE

## 2025-04-07 PROCEDURE — 700111 HCHG RX REV CODE 636 W/ 250 OVERRIDE (IP): Mod: JZ | Performed by: INTERNAL MEDICINE

## 2025-04-07 PROCEDURE — 83735 ASSAY OF MAGNESIUM: CPT

## 2025-04-07 PROCEDURE — 700102 HCHG RX REV CODE 250 W/ 637 OVERRIDE(OP): Performed by: HOSPITALIST

## 2025-04-07 PROCEDURE — A9270 NON-COVERED ITEM OR SERVICE: HCPCS | Performed by: INTERNAL MEDICINE

## 2025-04-07 PROCEDURE — 94640 AIRWAY INHALATION TREATMENT: CPT

## 2025-04-07 PROCEDURE — 99233 SBSQ HOSP IP/OBS HIGH 50: CPT | Performed by: INTERNAL MEDICINE

## 2025-04-07 PROCEDURE — 700101 HCHG RX REV CODE 250: Performed by: HOSPITALIST

## 2025-04-07 PROCEDURE — 99254 IP/OBS CNSLTJ NEW/EST MOD 60: CPT | Performed by: INTERNAL MEDICINE

## 2025-04-07 PROCEDURE — 700102 HCHG RX REV CODE 250 W/ 637 OVERRIDE(OP): Performed by: INTERNAL MEDICINE

## 2025-04-07 PROCEDURE — 700105 HCHG RX REV CODE 258: Performed by: INTERNAL MEDICINE

## 2025-04-07 PROCEDURE — 84100 ASSAY OF PHOSPHORUS: CPT

## 2025-04-07 PROCEDURE — 770006 HCHG ROOM/CARE - MED/SURG/GYN SEMI*

## 2025-04-07 PROCEDURE — A9270 NON-COVERED ITEM OR SERVICE: HCPCS | Performed by: HOSPITALIST

## 2025-04-07 PROCEDURE — 80053 COMPREHEN METABOLIC PANEL: CPT

## 2025-04-07 PROCEDURE — P9047 ALBUMIN (HUMAN), 25%, 50ML: HCPCS | Mod: JZ | Performed by: INTERNAL MEDICINE

## 2025-04-07 RX ORDER — LACTULOSE 10 G/15ML
30 SOLUTION ORAL 2 TIMES DAILY
Status: DISCONTINUED | OUTPATIENT
Start: 2025-04-07 | End: 2025-04-12 | Stop reason: HOSPADM

## 2025-04-07 RX ORDER — PREDNISOLONE SODIUM PHOSPHATE 15 MG/5ML
40 SOLUTION ORAL DAILY
Status: DISCONTINUED | OUTPATIENT
Start: 2025-04-07 | End: 2025-04-12 | Stop reason: HOSPADM

## 2025-04-07 RX ORDER — POTASSIUM CHLORIDE 1500 MG/1
40 TABLET, EXTENDED RELEASE ORAL ONCE
Status: COMPLETED | OUTPATIENT
Start: 2025-04-07 | End: 2025-04-07

## 2025-04-07 RX ORDER — SODIUM CHLORIDE 9 MG/ML
INJECTION, SOLUTION INTRAVENOUS CONTINUOUS
Status: ACTIVE | OUTPATIENT
Start: 2025-04-07 | End: 2025-04-08

## 2025-04-07 RX ORDER — ALBUMIN (HUMAN) 12.5 G/50ML
100 SOLUTION INTRAVENOUS ONCE
Status: COMPLETED | OUTPATIENT
Start: 2025-04-07 | End: 2025-04-08

## 2025-04-07 RX ADMIN — FOLIC ACID 1 MG: 1 TABLET ORAL at 05:32

## 2025-04-07 RX ADMIN — ALBUMIN (HUMAN) 100 G: 0.25 INJECTION, SOLUTION INTRAVENOUS at 21:20

## 2025-04-07 RX ADMIN — LACTULOSE 30 ML: 10 SOLUTION ORAL at 07:47

## 2025-04-07 RX ADMIN — PREDNISOLONE SODIUM PHOSPHATE 39.9 MG: 15 SOLUTION ORAL at 17:58

## 2025-04-07 RX ADMIN — CARVEDILOL 12.5 MG: 12.5 TABLET, FILM COATED ORAL at 07:30

## 2025-04-07 RX ADMIN — POTASSIUM CHLORIDE 40 MEQ: 1500 TABLET, EXTENDED RELEASE ORAL at 07:47

## 2025-04-07 RX ADMIN — INSULIN LISPRO 2 UNITS: 100 INJECTION, SOLUTION INTRAVENOUS; SUBCUTANEOUS at 07:41

## 2025-04-07 RX ADMIN — MULTIVITAMIN TABLET 1 TABLET: TABLET at 05:32

## 2025-04-07 RX ADMIN — TAMSULOSIN HYDROCHLORIDE 0.4 MG: 0.4 CAPSULE ORAL at 07:30

## 2025-04-07 RX ADMIN — ALBUTEROL SULFATE 2.5 MG: 2.5 SOLUTION RESPIRATORY (INHALATION) at 17:08

## 2025-04-07 RX ADMIN — Medication 100 MG: at 05:32

## 2025-04-07 RX ADMIN — CARVEDILOL 12.5 MG: 12.5 TABLET, FILM COATED ORAL at 16:38

## 2025-04-07 RX ADMIN — LACTULOSE 30 ML: 10 SOLUTION ORAL at 16:38

## 2025-04-07 RX ADMIN — SODIUM CHLORIDE: 9 INJECTION, SOLUTION INTRAVENOUS at 14:19

## 2025-04-07 ASSESSMENT — PAIN DESCRIPTION - PAIN TYPE
TYPE: ACUTE PAIN
TYPE: ACUTE PAIN

## 2025-04-07 ASSESSMENT — ENCOUNTER SYMPTOMS
PALPITATIONS: 0
COUGH: 0
CARDIOVASCULAR NEGATIVE: 1
DOUBLE VISION: 0
WEAKNESS: 1
GASTROINTESTINAL NEGATIVE: 1
EYES NEGATIVE: 1
RESPIRATORY NEGATIVE: 1
HEADACHES: 0
SORE THROAT: 0
VOMITING: 0
FEVER: 0
NAUSEA: 0
BLURRED VISION: 0
SHORTNESS OF BREATH: 0
CHILLS: 0
BACK PAIN: 0

## 2025-04-07 ASSESSMENT — LIFESTYLE VARIABLES: SUBSTANCE_ABUSE: 1

## 2025-04-07 NOTE — CARE PLAN
The patient is Stable - Low risk of patient condition declining or worsening    Shift Goals  Clinical Goals: Patient will remain free from falls  Patient Goals: Rest  Family Goals: DEBBI    Progress made toward(s) clinical / shift goals:      Problem: Pain - Standard  Goal: Alleviation of pain or a reduction in pain to the patient’s comfort goal  Outcome: Progressing     Problem: Fall Risk  Goal: Patient will remain free from falls  Outcome: Progressing     Patient has remained free from falls throughout the night. Bed alarm on, bed locked/lowest position, and call light within reach.     Patient is not progressing towards the following goals:

## 2025-04-07 NOTE — PROGRESS NOTES
Received report and assumed care of patient at 1900. Patient AOX4, on room air, and chair alarm in place. Patient declines pain. Discussed plan and reinforced the need to call for assistance. Call light and belongings within reach and patient has no other needs at this time.

## 2025-04-07 NOTE — PROGRESS NOTES
Report received from NOC RN and assumed patient care at 0700. Patient is A&Ox 4, on RA, Pt sitting up in chair and chair alarm in place. Patient reporting a pain level of 0/10. Call light within reach and bed in lowest position. Reinforced the need to call for assistance. Plan of care discussed and patient does not have any further needs at this time.

## 2025-04-07 NOTE — CARE PLAN
The patient is Stable - Low risk of patient condition declining or worsening    Shift Goals  Clinical Goals: Pt will remain free from falls this shift. Pt will report pain 5/10 or less this shift. Monitor intake and output. Trend labs.  Patient Goals: Rest  Family Goals: DEBBI    Progress made toward(s) clinical / shift goals:  Pt has remained free from falls this shift. Pt has reported pain 5/10 or less this shift.       Problem: Pain - Standard  Goal: Alleviation of pain or a reduction in pain to the patient’s comfort goal  Outcome: Progressing     Problem: Fall Risk  Goal: Patient will remain free from falls  Outcome: Progressing       Patient is not progressing towards the following goals:

## 2025-04-07 NOTE — HOSPITAL COURSE
Alberto Maldonado is a 39 y.o. male with history of alcoholic hepatitis, hypertension, alcohol abuse, borderline diabetes who presented 4/3/2025 after he noticed that his eyes were yellow.  He reports that he drinks very heavily, 10 shots or more daily.  His last drink was yesterday evening.  His other complaint, his inability to achieve an erection, he has noticed this for several weeks.  Patient was noted to have acute kidney injury and acute liver injury.  He was started on CIWA protocol.  GI and nephrology were consulted.    With no cirrhosis or ascites on imaging GI did not believe presentation was consistent with hepatorenal syndrome.    Acute kidney injury was felt to be secondary to prerenal versus acute tubular necrosis.  Based on his low the score patient was started on prednisolone for alcoholic hepatitis with improvement in liver and kidney function.  Recommended a 30-day course.    CT pulmonary angiogram showed no pulm embolism.  10 cm right retroperitoneal hematoma.  Borderline cardiomegaly.    CT abdomen pelvis showed lesion at the pancreatic tail measuring 5 cm, mass versus hematoma.  Soft tissue lesion in the left upper abdomen favors accessory splenule versus lymphadenopathy versus mass.  Hepatomegaly.  Diffuse abdominal wall thickening, subcutaneous edema.    GI recommended outpatient CT pancreas and EUS to further evaluate lesions in pancreatic tail and left upper abdomen.  Recommended to continue prednisolone with for 30-day course for alcoholic hepatitis.  Because of prolonged course he was started on PJP prophylaxis with Bactrim.  Outpatient GI referral was placed.    Patient did have significant chest wall hematoma.  This was monitored with CT as above.  Hemoglobin remained stable.  He did report some chest soreness which continued to improve.  The hematoma was outlined with marker and spreading should continue to be monitored as outpatient.  Patient did have urinary retention and will  be discharged with tamsulosin.    Patient did request an outpatient nutrition consult.  He was counseled about diet and exercise extensively.  Counseled extensively about alcohol cessation.  Patient does have history of sleep apnea.  Encourage compliance with CPAP    Medically stable to discharge home.  Follow-up with primary care as outpatient.  Establish with GI as outpatient.

## 2025-04-07 NOTE — PROGRESS NOTES
Nephrology Daily Progress Note    Date of Service  4/7/2025    Chief Complaint  39 y.o. male admitted 4/3/2025 with  hypertension, alcohol abuse, morbid obesity, borderline diabetes. Admitted for alcohol detox. hepatitis    Interval Problem Update  4/6 - UOP improved 290mL from 100mL yesterday, no edema, denies shortness of breath, confusion, Nausea/vomiting  4/7 patient has no chest pain or shortness of breath    Review of Systems  Review of Systems   Constitutional:  Positive for malaise/fatigue. Negative for chills and fever.   Respiratory:  Negative for cough and shortness of breath.    Cardiovascular:  Negative for chest pain and leg swelling.   Gastrointestinal:  Negative for nausea and vomiting.   Genitourinary:  Negative for dysuria, frequency and urgency.        Physical Exam  Temp:  [36.2 °C (97.1 °F)-36.6 °C (97.9 °F)] 36.2 °C (97.1 °F)  Pulse:  [73-77] 75  Resp:  [16-20] 18  BP: ()/(62-71) 124/66  SpO2:  [93 %-96 %] 94 %    Physical Exam  Vitals and nursing note reviewed.   Constitutional:       General: He is not in acute distress.     Appearance: He is ill-appearing.   HENT:      Head: Normocephalic and atraumatic.      Right Ear: External ear normal.      Left Ear: External ear normal.      Nose: Nose normal.   Eyes:      General: Scleral icterus present.         Right eye: No discharge.         Left eye: No discharge.      Conjunctiva/sclera: Conjunctivae normal.   Cardiovascular:      Rate and Rhythm: Normal rate and regular rhythm.      Heart sounds: No murmur heard.  Pulmonary:      Effort: Pulmonary effort is normal. No respiratory distress.      Breath sounds: Normal breath sounds. No wheezing.   Musculoskeletal:         General: No tenderness or deformity.      Right lower leg: Edema present.      Left lower leg: Edema present.   Skin:     General: Skin is warm.      Coloration: Skin is jaundiced.   Neurological:      General: No focal deficit present.      Mental Status: He is alert and  oriented to person, place, and time.   Psychiatric:         Mood and Affect: Mood normal.         Behavior: Behavior normal.         Fluids    Intake/Output Summary (Last 24 hours) at 4/7/2025 1314  Last data filed at 4/7/2025 1000  Gross per 24 hour   Intake 660 ml   Output 850 ml   Net -190 ml       Laboratory  Labs reviewed, pertinent labs below.  Recent Labs     04/05/25  0048 04/05/25  1701 04/06/25  0059 04/06/25  0856 04/07/25  0139   WBC 10.8  --   --   --  7.2   RBC 3.85*  --   --   --  3.46*   HEMOGLOBIN 11.7*   < > 11.0* 10.9* 10.6*   HEMATOCRIT 35.7*   < > 33.0* 33.4* 32.4*   MCV 92.7  --   --   --  93.6   MCH 30.4  --   --   --  30.6   MCHC 32.8  --   --   --  32.7   RDW 69.2*  --   --   --  69.3*   PLATELETCT 121*  --   --   --  150*   MPV 11.0  --   --   --  10.8    < > = values in this interval not displayed.     Recent Labs     04/06/25  0059 04/06/25 0856 04/07/25 0139   SODIUM 132* 131* 134*   POTASSIUM 3.5* 3.3* 3.3*   CHLORIDE 100 99 100   CO2 22 20 20   GLUCOSE 115* 153* 130*   BUN 22 23* 25*   CREATININE 2.16* 1.92* 2.10*   CALCIUM 8.0* 8.0* 8.1*     Recent Labs     04/05/25  0048 04/06/25  0059   INR 1.21* 1.17*           URINALYSIS:  Lab Results   Component Value Date/Time    COLORURINE [place] (A) 04/05/2025 1412    CLARITY [place] (A) 04/05/2025 1412    SPECGRAVITY see below 04/05/2025 1412    PHURINE see below 04/05/2025 1412    KETONES see below 04/05/2025 1412    PROTEINURIN see below 04/05/2025 1412    BILIRUBINUR see below 04/05/2025 1412    UROBILU see below 04/05/2025 1412    NITRITE see below 04/05/2025 1412    LEUKESTERAS see below 04/05/2025 1412    OCCULTBLOOD see below 04/05/2025 1412     [facility] [place]   Component Value Date/Time    TOTPROTUR 96.4 (H) 04/05/2025 1412      Lab Results   Component Value Date/Time    CREATININEU 383.00 04/05/2025 1412    CREATININEU 381.00 04/05/2025 1412         Imaging interpreted by radiologist. Imaging reports reviewed with pertinent  findings below  US-RUQ   Final Result      1.  Hepatic steatosis and hepatomegaly.   2.  Thickened gallbladder wall without cholelithiasis. This may be related to decompression, reactive, or acalculus cholecystitis. Correlate with symptoms.            Current Facility-Administered Medications   Medication Dose Route Frequency Provider Last Rate Last Admin    lactulose 20 GM/30ML solution 30 mL  30 mL Oral BID Sugey Shi M.D.   30 mL at 04/07/25 0747    tamsulosin (Flomax) capsule 0.4 mg  0.4 mg Oral AFTER BREAKFAST FELICIANO [place].OIesha   0.4 mg at 04/07/25 0730    carvedilol (Coreg) tablet 12.5 mg  12.5 mg Oral BID WITH MEALS FELICIANO Ruth.OIesha   12.5 mg at 04/07/25 0730    folic acid (Folvite) tablet 1 mg  1 mg Oral DAILY Zhou López D.O.   1 mg at 04/07/25 0532    multivitamin tablet 1 Tablet  1 Tablet Oral DAILY Zhou López D.O.   1 Tablet at 04/07/25 0532    thiamine (Vitamin B-1) tablet 100 mg  100 mg Oral DAILY Zhou López D.O.   100 mg at 04/07/25 0532    Respiratory Therapy Consult   Nebulization Continuous RT FELICIANO Ruth.OIesha        ondansetron (Zofran) syringe/vial injection 4 mg  4 mg Intravenous Q4HRS PRN Zhou López D.O.   4 mg at 04/03/25 1141    ondansetron (Zofran ODT) dispertab 4 mg  4 mg Oral Q4HRS PRN FELICIANO Ruth.OIesha        promethazine (Phenergan) tablet 12.5-25 mg  12.5-25 mg Oral Q4HRS PRN FELICIANO Ruth.O.        promethazine (Phenergan) suppository 12.5-25 mg  12.5-25 mg Rectal Q4HRS PRN FELICIANO Ruth.O.        prochlorperazine (Compazine) injection 5-10 mg  5-10 mg Intravenous Q4HRS PRN FELICIANO Ruth.O.        insulin lispro (HumaLOG,AdmeLOG) subcutaneous injection  2-9 Units Subcutaneous 4X/DAY ALKA López D.O.   2 Units at 04/07/25 0741    And    dextrose 50 % (D50W) injection 25 g  25 g Intravenous Q15 MIN PRN Zhou López D.O.         albuterol (Proventil) 2.5mg/0.5ml nebulizer solution 2.5 mg  2.5 mg Nebulization Q2HRS PRN (RT) Zhou López D.O.             Assessment/Plan   39 years old male with hypertension, borderline diabetes, morbid obesity , alcohol dependence with ?acute alcoholic hepatitis     Acute kidney injury: Etiology is not very clear, possible ATN however low urine sodium suggests prerenal versus hepatorenal syndrome    2. Metabolic acidosis mild  3. Anemia NCNC mild     4. Alcoholic dependence with fatty liver  5. Hypokalemia  6.  Hypoalbuminemia       no acute need for HD  Fluid resuscitation  Appreciate GI evaluation  Renal diet  Daily BMP, CBC.  Renal dose all meds  Avoid nephrotoxins like NSAIDs.  Prognosis guarded.  Plan discussed with Dr. Barrera

## 2025-04-07 NOTE — CONSULTS
Gastroenterology Initial Consult Note               Author:  Sugey Shi M.D. Date & Time Created: 4/7/2025 10:36 AM       Patient ID:  Name:             Alberto Maldonado  YOB: 1985  Age:                 39 y.o.  male  MRN:               [id number]      Referring Provider:  Fidel Barrera DO        Presenting Chief Complaint:  Alcohol related liver disease and EDIN      History of Present Illness:    This is a 39 y.o. male with severe alcohol use disorder presented to ER 4/3/25 with jaundice, upper abdominal pain,nausea and a desire to stop drinking.  He has been drinking 10-15 shots daily.    Hgb 15.3, MCV 86.9, plt 147, INR 1.31  Na 132, BuN 5, Cr 1.0, , , , tot bilirubin 22.3  RUQ US: hepatomegaly and steatosis, liver with normal contour, no cholelithiasis, portal vein 14mm, no ascites  MDF 42  MELD 3.0 = 23    Hospital course:  Received 1L LR in ER on admission  Started on prednisolone  4/4:  minimal UO, Cr up to 1.58  4/5: red blood In stool, Hgb dropped.  Nephro consult--suspected of ATN due to casts, Astrid <20.  Cr 1.87 with minimal U/O  4/6:  Cr up to 2.16.  Wants to stop drinking.  Never followed up with Psychiatry        Chart Review:  Admitted to [facility] in 2021 and described to be alcohol use disorder, daily drinker, admitted with acute pancreatitis with necrosis.  He has had multiple admissions for gout and multiple admissions for alcohol use disorder and withdrawal. Chart review states attending AA and taking naltrexone in 2024.  Previously followed by [facility].  Unfortunately further admissions for alcohol use disorder.  History of DUI May 2024 and arrest.  (Please see Dr. Hagan, Psychiatry, note from August 2024.    Complete abd US Dec 2024 without description of cirrhosis, portal vein was <15mm, spleen not well visualized and no ascites    Review of Systems:  Review of Systems   Constitutional:  Positive for malaise/fatigue.   HENT: Negative.     Eyes:  Negative.    Respiratory: Negative.     Cardiovascular: Negative.    Gastrointestinal: Negative.    Genitourinary: Negative.    Skin: Negative.    Neurological:  Positive for weakness.   Endo/Heme/Allergies: Negative.              Past Medical History:  Past Medical History:   Diagnosis Date    Alcohol abuse     Asthma without status asthmaticus 06/29/2017    Chickenpox     Hypertension      Active Hospital Problems    Diagnosis     Superficial bruising of chest wall, left, initial encounter [S20.212A]     Anemia [D64.9]     EDIN (acute kidney injury) ([facility]) [N17.9]     Alcoholic cirrhosis ([facility]) [K70.30]     Alcohol withdrawal syndrome with complication ([facility]) [F10.939]     Controlled type 2 diabetes mellitus with hyperglycemia, without long-term current use of insulin ([facility]) [E11.65]     BOLIVAR (obstructive sleep apnea) [G47.33]     Alcohol use disorder, severe, dependence ([facility]) [F10.20]     Morbid obesity ([facility]) [E66.01]     Essential hypertension [I10]     Asthma [J45.909]          Past Surgical History:  Past Surgical History:   Procedure Laterality Date    OTHER ORTHOPEDIC SURGERY Right     broken hand           Hospital Medications:  Current Facility-Administered Medications   Medication Dose Frequency Provider Last Rate Last Admin    lactulose 20 GM/30ML solution 30 mL  30 mL BID Fidel Barrera, D.O.   30 mL at 04/07/25 0747    tamsulosin (Flomax) capsule 0.4 mg  0.4 mg AFTER BREAKFAST Fidel Barrera D.O.   0.4 mg at 04/07/25 0730    carvedilol (Coreg) tablet 12.5 mg  12.5 mg BID WITH MEALS Zhou López D.O.   12.5 mg at 04/07/25 0730    folic acid (Folvite) tablet 1 mg  1 mg DAILY Zhou López, D.O.   1 mg at 04/07/25 0532    multivitamin tablet 1 Tablet  1 Tablet DAILY Zhou López D.O.   1 Tablet at 04/07/25 0532    thiamine (Vitamin B-1) tablet 100 mg  100 mg DAILY Zhou López D.O.   100 mg at 04/07/25 0532    Respiratory Therapy Consult   Continuous RT Zhou  BING López        ondansetron (Zofran) syringe/vial injection 4 mg  4 mg Q4HRS PRN EDWAR RuthO.   4 mg at 04/03/25 1141    ondansetron (Zofran ODT) dispertab 4 mg  4 mg Q4HRS PRN EDWAR RuthOIesha        promethazine (Phenergan) tablet 12.5-25 mg  12.5-25 mg Q4HRS PRN FELICIANO Ruth.O.        promethazine (Phenergan) suppository 12.5-25 mg  12.5-25 mg Q4HRS PRN EDWAR RuthO.        prochlorperazine (Compazine) injection 5-10 mg  5-10 mg Q4HRS PRN EDWAR RuthO.        insulin lispro (HumaLOG,AdmeLOG) subcutaneous injection  2-9 Units 4X/DAY ACHS Zhou López D.O.   2 Units at 04/07/25 0741    And    dextrose 50 % (D50W) injection 25 g  25 g Q15 MIN PRN Zhou López D.O.        albuterol (Proventil) 2.5mg/0.5ml nebulizer solution 2.5 mg  2.5 mg Q2HRS PRN (RT) Zhou López D.O.       Last reviewed on 4/3/2025 11:56 AM by Paige Ray R.N.       Current Outpatient Medications:  Medications Prior to Admission   Medication Sig Dispense Refill Last Dose/Taking    amLODIPine (NORVASC) 5 MG Tab Take 5 mg by mouth every day.   3/13/2025    carvedilol (COREG) 6.25 MG Tab Take 1 Tablet by mouth 2 times a day with meals. 60 Tablet 0 3/14/2025    thiamine (THIAMINE) 100 MG tablet Take 1 Tablet by mouth every day. 30 Tablet 0 3/14/2025    folic acid (FOLVITE) 1 MG Tab Take 1 Tablet by mouth every day. 30 Tablet 0 3/14/2025    multivitamin Tab Take 1 Tablet by mouth every day. 30 Tablet 0 3/14/2025         Medication Allergies:  No Known Allergies      Family Medical History:  Family History   Problem Relation Age of Onset    Stroke Neg Hx     Heart Disease Neg Hx          Social History:  Social History     Socioeconomic History    Marital status: Single     Spouse name: Not on file    Number of children: Not on file    Years of education: Not on file    Highest education level: Not on file   Occupational History    Not  "on file   Tobacco Use    Smoking status: Former     Types: Cigarettes    Smokeless tobacco: Never   Vaping Use    Vaping status: Never Used   Substance and Sexual Activity    Alcohol use: Yes     Alcohol/week: 7.2 oz     Types: 12 Shots of liquor per week     Comment: \"10-15 vodka mini bottles per day\"    Drug use: Not Currently    Sexual activity: Not Currently   Other Topics Concern    Not on file   Social History Narrative    Works in [employer]    Lives at home with mom, brother, TRAM and nieces and nephews     Social Drivers of Health     Financial Resource Strain: Not on file   Food Insecurity: No Food Insecurity (4/3/2025)    Hunger Vital Sign     Worried About Running Out of Food in the Last Year: Never true     Ran Out of Food in the Last Year: Never true   Transportation Needs: No Transportation Needs (4/3/2025)    PRAPARE - Transportation     Lack of Transportation (Medical): No     Lack of Transportation (Non-Medical): No   Physical Activity: Not on file   Stress: Not on file   Social Connections: Not on file   Intimate Partner Violence: Not At Risk (4/3/2025)    Humiliation, Afraid, Rape, and Kick questionnaire     Fear of Current or Ex-Partner: No     Emotionally Abused: No     Physically Abused: No     Sexually Abused: No   Housing Stability: Low Risk  (4/3/2025)    Housing Stability Vital Sign     Unable to Pay for Housing in the Last Year: No     Number of Times Moved in the Last Year: 0     Homeless in the Last Year: No   Recent Concern: Housing Stability - High Risk (3/2/2025)    Housing Stability Vital Sign     Unable to Pay for Housing in the Last Year: Yes     Number of Times Moved in the Last Year: 0     Homeless in the Last Year: No         Vital signs:  Weight/BMI: Body mass index is 43.19 kg/m².  /66   Pulse 75   Temp 36.2 °C (97.1 °F) (Temporal)   Resp 18   Ht 1.6 m (5' 3\")   Wt 111 kg (243 lb 13.3 oz)   SpO2 94%   Vitals:    04/07/25 0346 04/07/25 0700 " 04/07/25 0730 04/07/25 0830   BP: 112/69  104/62 124/66   Pulse: 73  76 75   Resp: 20 18  18   Temp: 36.2 °C (97.2 °F)   36.2 °C (97.1 °F)   TempSrc: Temporal   Temporal   SpO2: 93%   94%   Weight:       Height:         Oxygen Therapy:  Pulse Oximetry: 94 %, O2 (LPM): 0, O2 Delivery Device: None - Room Air    Intake/Output Summary (Last 24 hours) at 4/7/2025 1036  Last data filed at 4/7/2025 1000  Gross per 24 hour   Intake 760 ml   Output 850 ml   Net -90 ml         Physical Exam:  Physical Exam  Constitutional:       Appearance: He is obese. He is ill-appearing.   HENT:      Head: Normocephalic and atraumatic.      Nose: Nose normal.      Mouth/Throat:      Mouth: Mucous membranes are moist.   Eyes:      General: Scleral icterus present.      Pupils: Pupils are equal, round, and reactive to light.   Cardiovascular:      Rate and Rhythm: Regular rhythm.      Heart sounds: Normal heart sounds.   Pulmonary:      Effort: Pulmonary effort is normal.      Breath sounds: Normal breath sounds.   Abdominal:      General: Bowel sounds are normal.      Palpations: Abdomen is soft.   Musculoskeletal:      Cervical back: Neck supple.      Right lower leg: Edema present.      Left lower leg: Edema present.   Skin:     General: Skin is warm and dry.      Coloration: Skin is jaundiced.   Neurological:      Mental Status: He is alert and oriented to person, place, and time.      Comments: No asterixis                 Labs:  Recent Labs     04/05/25  0048 04/05/25  0854 04/06/25  0059 04/06/25  0856 04/07/25  0139   SODIUM 131*   < > 132* 131* 134*   POTASSIUM 3.4*   < > 3.5* 3.3* 3.3*   CHLORIDE 98   < > 100 99 100   CO2 19*   < > 22 20 20   BUN 14   < > 22 23* 25*   CREATININE 1.97*   < > 2.16* 1.92* 2.10*   MAGNESIUM 2.2  --  1.8  --  1.9   PHOSPHORUS 2.6  --  3.5  --  3.8   CALCIUM 8.2*   < > 8.0* 8.0* 8.1*    < > = values in this interval not displayed.     Recent Labs     04/06/25  0059 04/06/25  0856 04/07/25  0139   ALTPT  151* 151* 139*   ASTSGOT 203* 201* 177*   ALKPHOSPHAT 255* 273* 295*   TBILIRUBIN 21.0* 21.6* 19.7*   GLUCOSE 115* 153* 130*     Recent Labs     04/05/25  0048 04/05/25  0854 04/06/25  0059 04/06/25  0856 04/07/25  0139   WBC 10.8  --   --   --  7.2   NEUTSPOLYS 78.10*  --   --   --   --    LYMPHOCYTES 13.10*  --   --   --   --    MONOCYTES 7.10  --   --   --   --    EOSINOPHILS 0.30  --   --   --   --    BASOPHILS 0.20  --   --   --   --    ASTSGOT 270*   < > 203* 201* 177*   ALTSGPT 183*   < > 151* 151* 139*   ALKPHOSPHAT 285*   < > 255* 273* 295*   TBILIRUBIN 24.2*   < > 21.0* 21.6* 19.7*    < > = values in this interval not displayed.     Recent Labs     04/05/25 0048 04/05/25  1701 04/06/25 0059 04/06/25  0856 04/07/25  0139   RBC 3.85*  --   --   --  3.46*   HEMOGLOBIN 11.7*   < > 11.0* 10.9* 10.6*   HEMATOCRIT 35.7*   < > 33.0* 33.4* 32.4*   PLATELETCT 121*  --   --   --  150*   PROTHROMBTM 15.4*  --  14.9*  --   --    INR 1.21*  --  1.17*  --   --     < > = values in this interval not displayed.     Recent Results (from the past 24 hours)   POCT glucose device results    Collection Time: 04/06/25 11:35 AM   Result Value Ref Range    POC Glucose, Blood 131 (H) 65 - 99 mg/dL   POCT glucose device results    Collection Time: 04/06/25  4:59 PM   Result Value Ref Range    POC Glucose, Blood 129 (H) 65 - 99 mg/dL   CREATINE KINASE    Collection Time: 04/06/25  6:13 PM   Result Value Ref Range    CPK Total 326 (H) 0 - 154 U/L   AMMONIA    Collection Time: 04/06/25  6:13 PM   Result Value Ref Range    Ammonia 71 (H) 11 - 45 umol/L   POCT glucose device results    Collection Time: 04/06/25 10:42 PM   Result Value Ref Range    POC Glucose, Blood 177 (H) 65 - 99 mg/dL   Comp Metabolic Panel    Collection Time: 04/07/25  1:39 AM   Result Value Ref Range    Sodium 134 (L) 135 - 145 mmol/L    Potassium 3.3 (L) 3.6 - 5.5 mmol/L    Chloride 100 96 - 112 mmol/L    Co2 20 20 - 33 mmol/L    Anion Gap 14.0 7.0 - 16.0     Glucose 130 (H) 65 - 99 mg/dL    Bun 25 (H) 8 - 22 mg/dL    Creatinine 2.10 (H) 0.50 - 1.40 mg/dL    Calcium 8.1 (L) 8.5 - 10.5 mg/dL    Correct Calcium 8.8 8.5 - 10.5 mg/dL    AST(SGOT) 177 (H) 12 - 45 U/L    ALT(SGPT) 139 (H) 2 - 50 U/L    Alkaline Phosphatase 295 (H) 30 - 99 U/L    Total Bilirubin 19.7 (H) 0.1 - 1.5 mg/dL    Albumin 3.1 (L) 3.2 - 4.9 g/dL    Total Protein 6.8 6.0 - 8.2 g/dL    Globulin 3.7 (H) 1.9 - 3.5 g/dL    A-G Ratio 0.8 g/dL   MAGNESIUM    Collection Time: 04/07/25  1:39 AM   Result Value Ref Range    Magnesium 1.9 1.5 - 2.5 mg/dL   PHOSPHORUS    Collection Time: 04/07/25  1:39 AM   Result Value Ref Range    Phosphorus 3.8 2.5 - 4.5 mg/dL   CBC WITHOUT DIFFERENTIAL    Collection Time: 04/07/25  1:39 AM   Result Value Ref Range    WBC 7.2 4.8 - 10.8 K/uL    RBC 3.46 (L) 4.70 - 6.10 M/uL    Hemoglobin 10.6 (L) 14.0 - 18.0 g/dL    Hematocrit 32.4 (L) 42.0 - 52.0 %    MCV 93.6 81.4 - 97.8 fL    MCH 30.6 27.0 - 33.0 pg    MCHC 32.7 32.3 - 36.5 g/dL    RDW 69.3 (H) 35.9 - 50.0 fL    Platelet Count 150 (L) 164 - 446 K/uL    MPV 10.8 9.0 - 12.9 fL   ESTIMATED GFR    Collection Time: 04/07/25  1:39 AM   Result Value Ref Range    GFR (CKD-EPI) 41 (A) >60 mL/min/1.73 m 2   POCT glucose device results    Collection Time: 04/07/25  7:38 AM   Result Value Ref Range    POC Glucose, Blood 162 (H) 65 - 99 mg/dL         Radiology Review:  US-RUQ   Final Result      1.  Hepatic steatosis and hepatomegaly.   2.  Thickened gallbladder wall without cholelithiasis. This may be related to decompression, reactive, or acalculus cholecystitis. Correlate with symptoms.            MDM (Data Review):   -Records reviewed and summarized in current documentation  -I personally reviewed and interpreted the laboratory results  -I personally reviewed the radiology images    Assessment/Recommendations:      Impression:  Alcohol use disorder with multiple admissions related to alcohol, DUI, no long term sobriety  Alcoholic  hepatitis, on Prednisolone at admission.    ---Lille score day 4 = 0.134 or good prognosis  ---MDF on admission 42, MDF on 4/6 was 34.9  ---previous HBV, HCV negative, AMA neg, OTF and f actin neg, ferritin normal  ---current MELD 3.0 = 30  3.  EDIN.  No ascites, no cirrhosis on imaging, so doubt HRS.  ?prerenal vs. ATN  ---24 hr I/O 540 in and 850 out  4.  Thrombocytopenia  5.  Anemia with recent small volume bleeding       RECS:   Fluid challenge.  Discussed with Dr. Barrera   Encouraged patient to eat well and drink fluids   Long discussion about importance of sobriety and current liver damage    Continue prednisolone based on Lille score day 4 which showed improvement??  Will ask Dr. Barrera if it was stopped due to complication      Sugey Shi M.D.          Core Quality Measures   Reviewed items:  Labs, Medications and Radiology reports reviewed

## 2025-04-07 NOTE — DIETARY
Nutrition Services: Follow-up for PO intake   Day 4 of admit. Alberto Maldonado is a 39 y.o. male with admitting DX of Alcohol withdrawal delirium.    Recorded PO intake has been good with patient eating usually % of meals.     Current diet order:   Consistent CHO diet and Renal diet    Nutrition Dx: Inadequate Oral Intake related to alcohol abuse as evidenced by patient reports of consuming 10-15 shots daily and weight loss of 9 kg (7.5%) over the past 6 months.      Nutrition Dx Status: resolved     Problem: Nutritional:  Goal: Achieve adequate nutritional intake  Description: Patient will consume >75% of meals  Outcome: goal met      Plan/ Recommendations:      Encourage intake of meals, goal for continued >75% consumption from meals.  Document intake of all meals as % taken in ADL's to provide interdisciplinary communication across all shifts.   Monitor weight.  Nutrition rep available to see patient for ongoing meal and snack preferences.     RD to monitor per department policy.

## 2025-04-07 NOTE — PROGRESS NOTES
[facility] Medicine Daily Progress Note    Date of Service  4/7/2025    Chief Complaint  Alberto Maldonado is a 39 y.o. male admitted 4/3/2025 with abdominal distension    Hospital Course  Alberto Maldonado is a 39 y.o. male who presented 4/3/2025 with history of hypertension, alcohol abuse, and borderline diabetes.     Patient presents today after he noticed that his eyes were yellow.  He reports that he drinks very heavily, 10 shots or more daily.  His last drink was yesterday evening.  His other complaint, his inability to achieve an erection, he has noticed this for several weeks    Interval Problem Update  Patient was seen and examined at bedside.  No acute events overnight. Patient is resting comfortably in bed and in no acute distress.     T.bili 22.3 --> 26.4 --> 25.1 --> 21.6 --> 19.7  MELD-Na - 29  Maddrey's 46.4 --> 35.2-->30  Prednisolone 40mg daily  Cr 2.10  Consult to GI  Nephrology recs  100g albumin today    I have discussed this patient's plan of care and discharge plan at IDT rounds today with Case Management, Nursing, Nursing leadership, and other members of the IDT team.      Code Status  Full Code    Disposition  Medically Cleared  I have placed the appropriate orders for post-discharge needs.    Review of Systems  Review of Systems   Constitutional:  Negative for chills and fever.   HENT:  Negative for congestion and sore throat.    Eyes:  Negative for blurred vision and double vision.   Respiratory:  Negative for cough and shortness of breath.    Cardiovascular:  Negative for chest pain and palpitations.   Gastrointestinal:  Negative for nausea and vomiting.   Genitourinary:  Negative for dysuria and frequency.   Musculoskeletal:  Negative for back pain.   Neurological:  Negative for headaches.   Psychiatric/Behavioral:  Positive for substance abuse.         Physical Exam  Temp:  [35.9 °C (96.7 °F)-36.6 °C (97.9 °F)] 35.9 °C (96.7 °F)  Pulse:  [73-77] 74  Resp:  [16-20] 18  BP:  ()/(62-99) 133/99  SpO2:  [93 %-95 %] 95 %    Physical Exam  Vitals reviewed.   HENT:      Right Ear: External ear normal.      Left Ear: External ear normal.      Nose: No congestion.      Mouth/Throat:      Pharynx: No oropharyngeal exudate.   Eyes:      General: Scleral icterus present.   Cardiovascular:      Rate and Rhythm: Normal rate.   Pulmonary:      Breath sounds: No wheezing.   Abdominal:      Tenderness: There is no abdominal tenderness. There is no guarding or rebound.   Skin:     Coloration: Skin is jaundiced.      Findings: Bruising present.      Comments: Bruising to right anterior chest   Neurological:      General: No focal deficit present.      Mental Status: He is alert.      Motor: No weakness.   Psychiatric:         Mood and Affect: Mood normal.         Behavior: Behavior normal.         Fluids    Intake/Output Summary (Last 24 hours) at 4/7/2025 1642  Last data filed at 4/7/2025 1400  Gross per 24 hour   Intake 680 ml   Output 450 ml   Net 230 ml          Laboratory  Recent Labs     04/05/25 0048 04/05/25  1701 04/06/25 0059 04/06/25 0856 04/07/25 0139   WBC 10.8  --   --   --  7.2   RBC 3.85*  --   --   --  3.46*   HEMOGLOBIN 11.7*   < > 11.0* 10.9* 10.6*   HEMATOCRIT 35.7*   < > 33.0* 33.4* 32.4*   MCV 92.7  --   --   --  93.6   MCH 30.4  --   --   --  30.6   MCHC 32.8  --   --   --  32.7   RDW 69.2*  --   --   --  69.3*   PLATELETCT 121*  --   --   --  150*   MPV 11.0  --   --   --  10.8    < > = values in this interval not displayed.     Recent Labs     04/06/25 0059 04/06/25 0856 04/07/25 0139   SODIUM 132* 131* 134*   POTASSIUM 3.5* 3.3* 3.3*   CHLORIDE 100 99 100   CO2 22 20 20   GLUCOSE 115* 153* 130*   BUN 22 23* 25*   CREATININE 2.16* 1.92* 2.10*   CALCIUM 8.0* 8.0* 8.1*     Recent Labs     04/05/25 0048 04/06/25  0059   INR 1.21* 1.17*               Imaging  US-RUQ   Final Result      1.  Hepatic steatosis and hepatomegaly.   2.  Thickened gallbladder wall without  cholelithiasis. This may be related to decompression, reactive, or acalculus cholecystitis. Correlate with symptoms.           Assessment/Plan  * Alcoholic cirrhosis (HCC)  Assessment & Plan  Liver enzymes have more than doubled in the last month  Right upper quadrant ultrasound shows cirrhotic changes, but no evidence of acute obstruction, no ascites  Follow CMP: If she shows response to steroids and we would continue for 28 days.  If she does not, then we would stop it short.  Encourage sobriety  Hepatitis panel from February of last year showed evidence of hepatitis B vaccination, but no active viral infection.  Increase Coreg in part to treat his hypertension but also for management of ascites  MELD-Na - 29  Maddrey's 46.4 --> 35.2 --> 30  T.bili 22.3 --> 26.4 --> 25.1 --> 21.6    T.bili 22.3 --> 26.4 --> 25.1 --> 21.6 --> 19.7  MELD-Na - 29  Maddrey's 46.4 --> 35.2-->30  Prednisolone 40mg daily  Cr 2.10  Consult to GI  Nephrology recs  100g albumin today    EDIN (acute kidney injury) (HCC)  Assessment & Plan  Suspect ATN vs hepatorenal syndrome  Minimal urine output  Cr 2.10  Will give 100g albumin today      Alcohol use disorder, severe, dependence (HCC)- (present on admission)  Assessment & Plan  Drinks 15 shots daily  Monitor electrolytes  CIWA    Superficial bruising of chest wall, left, initial encounter  Assessment & Plan  Bruising to left chest  Denies fall or trauma  Possibly result of patient sleeping against bedside table with underlying thrombocytopenia  Hold DVT ppx    Anemia  Assessment & Plan  Hb stable    Alcohol withdrawal syndrome with complication (HCC)- (present on admission)  Assessment & Plan  Patient has been given 1 dose of IV phenobarbital in the ED  Admit to the floor on a benzodiazepine withdrawal protocol  Start scheduled p.o. Librium  Continue thiamine folate and MVI  Low threshold to escalate care depended upon his CIWA scores    Asthma- (present on admission)  Assessment &  Plan  Lung exam is benign  Placed on O2 and RT protocols    Controlled type 2 diabetes mellitus with hyperglycemia, without long-term current use of insulin (HCC)- (present on admission)  Assessment & Plan  A1c 6.7, 4 months ago  Placed on sliding scale particularly in light of initiation of glucocorticoids for acute alcohol hepatitis    Morbid obesity (HCC)- (present on admission)  Assessment & Plan  BMI 43    Essential hypertension- (present on admission)  Assessment & Plan  Patient is maintained on amlodipine and Coreg  Given the presence of advancing cirrhosis and ascites, we will DC his amlodipine in order to bump up his Coreg in part to treat his blood pressure but also to address portal venous hypertension         VTE prophylaxis: VTE Selection    I have performed a physical exam and reviewed and updated ROS and Plan today (4/7/2025). In review of yesterday's note (4/6/2025), there are no changes except as documented above.    Greater than 55 minutes spent prepping to see patient (e.g. review of tests) obtaining and/or reviewing separately obtained history. Performing a medically appropriate examination and/ evaluation.  Counseling and educating the patient/family/caregiver.  Ordering medications, tests, or procedures.  Referring and communicating with other health care professionals.  Documenting clinical information in EPIC.  Independently interpreting results and communicating results to patient/family/caregiver.  Care coordination.

## 2025-04-07 NOTE — DISCHARGE PLANNING
Case Management Discharge Planning    Admission Date: 4/3/2025  GMLOS: 4.9  ALOS: 4    6-Clicks ADL Score: 22  6-Clicks Mobility Score: 24      Anticipated Discharge Dispo: Discharge Disposition: Discharged to home/self care (01)  Discharge Address: [address] [place], [place] [address] [address]  Discharge Contact Phone Number: [phone]    DME Needed: Pending hospital course     Action(s) Taken: DC Assessment Complete (See below) Pt discussed in IDT rounds that pt is not MC to DC. GI consulted. Last admission pt was working with [facility] on SA resources.     Escalations Completed: None    Medically Clear: No    Next Steps: F/U with HCM needs     Barriers to Discharge: Medical clearance    Is the patient up for discharge tomorrow: No        Care Transition Team Assessment  LMSW conducted assessment and verify demographics. Pt is a readmit from 3/2/25 for Alcohol withdrawal syndrome with complication (HCC) and was discharged home on 3/6/25. Pt is now admitted on 4/3/25 for Alcohol withdrawal delir*. Please see pt's H&P for prior medical history.     Pt's PCP is KATHRINE NEWMAN P.A.-C.     Address on file was verified. Pt lives with his mother and brother in a 2S house with 5 steps to enter.     Pt's emergency contact is brother Freddy Ngo [phone].    Prior to admission pt was independent with ALDS and IADLS with no use of DME.     Pt's insurance is [employer].    Pt has history of SA.      Information Source  Orientation Level: Oriented X4  Information Given By: Patient, Other (Comments)  Who is responsible for making decisions for patient? : Patient    Readmission Evaluation  Is this a readmission?: Yes - unplanned readmission    Elopement Risk  Legal Hold: No  Ambulatory or Self Mobile in Wheelchair: Yes  Disoriented: No  Psychiatric Symptoms: None  History of Wandering: No  Elopement this Admit: No  Vocalizing Wanting to Leave: No  Displays Behaviors, Body Language Wanting to Leave: No-Not at Risk for  Elopement  Elopement Risk: Not at Risk for Elopement    Interdisciplinary Discharge Planning  Primary Care Physician: KATHRINE NEWMAN P.A.-C.  Lives with - Patient's Self Care Capacity: Parents, Sibling  Patient or legal guardian wants to designate a caregiver: No  Support Systems: Family Member(s)  Housing / Facility: 2 Story House    Discharge Preparedness  What is your plan after discharge?: Home with help  What are your discharge supports?: Parent, Sibling  Prior Functional Level: Ambulatory, Independent with Activities of Daily Living, Independent with Medication Management  Difficulity with ADLs: None  Difficulity with IADLs: None    Functional Assesment  Prior Functional Level: Ambulatory, Independent with Activities of Daily Living, Independent with Medication Management    Finances  Financial Barriers to Discharge: No  Prescription Coverage: Yes    Vision / Hearing Impairment  Vision Impairment : No  Hearing Impairment : No    Domestic Abuse  Have you ever been the victim of abuse or violence?: No  Possible Abuse/Neglect Reported to:: Not Applicable    Psychological Assessment  History of Substance Abuse: Alcohol  History of Psychiatric Problems: No  Non-compliant with Treatment: No  Newly Diagnosed Illness: No    Discharge Risks or Barriers  Discharge risks or barriers?: No    Anticipated Discharge Information  Discharge Disposition: Discharged to home/self care (01)  Discharge Address: [address] [place] [address] [place] [address]  Discharge Contact Phone Number: [phone]

## 2025-04-08 ENCOUNTER — APPOINTMENT (OUTPATIENT)
Dept: RADIOLOGY | Facility: MEDICAL CENTER | Age: 40
DRG: 432 | End: 2025-04-08
Attending: INTERNAL MEDICINE
Payer: COMMERCIAL

## 2025-04-08 ENCOUNTER — APPOINTMENT (OUTPATIENT)
Dept: RADIOLOGY | Facility: MEDICAL CENTER | Age: 40
DRG: 432 | End: 2025-04-08
Attending: HOSPITALIST
Payer: COMMERCIAL

## 2025-04-08 LAB
ALBUMIN SERPL BCP-MCNC: 4 G/DL (ref 3.2–4.9)
ALBUMIN/GLOB SERPL: 1.1 G/DL
ALP SERPL-CCNC: 243 U/L (ref 30–99)
ALT SERPL-CCNC: 107 U/L (ref 2–50)
ANION GAP SERPL CALC-SCNC: 13 MMOL/L (ref 7–16)
AST SERPL-CCNC: 141 U/L (ref 12–45)
BASOPHILS # BLD AUTO: 0.3 % (ref 0–1.8)
BASOPHILS # BLD: 0.02 K/UL (ref 0–0.12)
BILIRUB SERPL-MCNC: 15.2 MG/DL (ref 0.1–1.5)
BUN SERPL-MCNC: 25 MG/DL (ref 8–22)
CALCIUM ALBUM COR SERPL-MCNC: 8.6 MG/DL (ref 8.5–10.5)
CALCIUM SERPL-MCNC: 8.6 MG/DL (ref 8.5–10.5)
CHLORIDE SERPL-SCNC: 105 MMOL/L (ref 96–112)
CO2 SERPL-SCNC: 18 MMOL/L (ref 20–33)
CREAT SERPL-MCNC: 1.4 MG/DL (ref 0.5–1.4)
EOSINOPHIL # BLD AUTO: 0.02 K/UL (ref 0–0.51)
EOSINOPHIL NFR BLD: 0.3 % (ref 0–6.9)
ERYTHROCYTE [DISTWIDTH] IN BLOOD BY AUTOMATED COUNT: 76 FL (ref 35.9–50)
GFR SERPLBLD CREATININE-BSD FMLA CKD-EPI: 66 ML/MIN/1.73 M 2
GLOBULIN SER CALC-MCNC: 3.5 G/DL (ref 1.9–3.5)
GLUCOSE BLD STRIP.AUTO-MCNC: 112 MG/DL (ref 65–99)
GLUCOSE BLD STRIP.AUTO-MCNC: 237 MG/DL (ref 65–99)
GLUCOSE BLD STRIP.AUTO-MCNC: 92 MG/DL (ref 65–99)
GLUCOSE BLD STRIP.AUTO-MCNC: 94 MG/DL (ref 65–99)
GLUCOSE SERPL-MCNC: 205 MG/DL (ref 65–99)
HCT VFR BLD AUTO: 30.8 % (ref 42–52)
HGB BLD-MCNC: 9.9 G/DL (ref 14–18)
IMM GRANULOCYTES # BLD AUTO: 0.32 K/UL (ref 0–0.11)
IMM GRANULOCYTES NFR BLD AUTO: 4.5 % (ref 0–0.9)
INR PPP: 1.12 (ref 0.87–1.13)
LYMPHOCYTES # BLD AUTO: 1.04 K/UL (ref 1–4.8)
LYMPHOCYTES NFR BLD: 14.7 % (ref 22–41)
MAGNESIUM SERPL-MCNC: 2 MG/DL (ref 1.5–2.5)
MCH RBC QN AUTO: 31 PG (ref 27–33)
MCHC RBC AUTO-ENTMCNC: 32.1 G/DL (ref 32.3–36.5)
MCV RBC AUTO: 96.6 FL (ref 81.4–97.8)
MONOCYTES # BLD AUTO: 0.2 K/UL (ref 0–0.85)
MONOCYTES NFR BLD AUTO: 2.8 % (ref 0–13.4)
NEUTROPHILS # BLD AUTO: 5.48 K/UL (ref 1.82–7.42)
NEUTROPHILS NFR BLD: 77.4 % (ref 44–72)
NRBC # BLD AUTO: 0.04 K/UL
NRBC BLD-RTO: 0.6 /100 WBC (ref 0–0.2)
PHOSPHATE SERPL-MCNC: 2.7 MG/DL (ref 2.5–4.5)
PLATELET # BLD AUTO: 176 K/UL (ref 164–446)
PMV BLD AUTO: 9.9 FL (ref 9–12.9)
POTASSIUM SERPL-SCNC: 4 MMOL/L (ref 3.6–5.5)
PROT SERPL-MCNC: 7.5 G/DL (ref 6–8.2)
PROTHROMBIN TIME: 14.4 SEC (ref 12–14.6)
RBC # BLD AUTO: 3.19 M/UL (ref 4.7–6.1)
SODIUM SERPL-SCNC: 136 MMOL/L (ref 135–145)
WBC # BLD AUTO: 7.1 K/UL (ref 4.8–10.8)

## 2025-04-08 PROCEDURE — 74177 CT ABD & PELVIS W/CONTRAST: CPT

## 2025-04-08 PROCEDURE — 700102 HCHG RX REV CODE 250 W/ 637 OVERRIDE(OP): Performed by: INTERNAL MEDICINE

## 2025-04-08 PROCEDURE — 85610 PROTHROMBIN TIME: CPT

## 2025-04-08 PROCEDURE — 99232 SBSQ HOSP IP/OBS MODERATE 35: CPT | Performed by: NURSE PRACTITIONER

## 2025-04-08 PROCEDURE — A9270 NON-COVERED ITEM OR SERVICE: HCPCS

## 2025-04-08 PROCEDURE — 82962 GLUCOSE BLOOD TEST: CPT

## 2025-04-08 PROCEDURE — A9270 NON-COVERED ITEM OR SERVICE: HCPCS | Performed by: INTERNAL MEDICINE

## 2025-04-08 PROCEDURE — A9270 NON-COVERED ITEM OR SERVICE: HCPCS | Performed by: NURSE PRACTITIONER

## 2025-04-08 PROCEDURE — 80053 COMPREHEN METABOLIC PANEL: CPT

## 2025-04-08 PROCEDURE — 36415 COLL VENOUS BLD VENIPUNCTURE: CPT

## 2025-04-08 PROCEDURE — 700102 HCHG RX REV CODE 250 W/ 637 OVERRIDE(OP): Performed by: HOSPITALIST

## 2025-04-08 PROCEDURE — 700101 HCHG RX REV CODE 250: Performed by: HOSPITALIST

## 2025-04-08 PROCEDURE — 51798 US URINE CAPACITY MEASURE: CPT

## 2025-04-08 PROCEDURE — 99232 SBSQ HOSP IP/OBS MODERATE 35: CPT | Performed by: INTERNAL MEDICINE

## 2025-04-08 PROCEDURE — 700117 HCHG RX CONTRAST REV CODE 255: Performed by: INTERNAL MEDICINE

## 2025-04-08 PROCEDURE — 99233 SBSQ HOSP IP/OBS HIGH 50: CPT | Performed by: INTERNAL MEDICINE

## 2025-04-08 PROCEDURE — 700102 HCHG RX REV CODE 250 W/ 637 OVERRIDE(OP)

## 2025-04-08 PROCEDURE — 700102 HCHG RX REV CODE 250 W/ 637 OVERRIDE(OP): Performed by: NURSE PRACTITIONER

## 2025-04-08 PROCEDURE — 770006 HCHG ROOM/CARE - MED/SURG/GYN SEMI*

## 2025-04-08 PROCEDURE — A9270 NON-COVERED ITEM OR SERVICE: HCPCS | Performed by: HOSPITALIST

## 2025-04-08 PROCEDURE — 84100 ASSAY OF PHOSPHORUS: CPT

## 2025-04-08 PROCEDURE — 83735 ASSAY OF MAGNESIUM: CPT

## 2025-04-08 PROCEDURE — 94640 AIRWAY INHALATION TREATMENT: CPT

## 2025-04-08 PROCEDURE — 71275 CT ANGIOGRAPHY CHEST: CPT

## 2025-04-08 PROCEDURE — 85025 COMPLETE CBC W/AUTO DIFF WBC: CPT

## 2025-04-08 RX ORDER — HYDROXYZINE HYDROCHLORIDE 50 MG/1
25 TABLET, FILM COATED ORAL ONCE
Status: COMPLETED | OUTPATIENT
Start: 2025-04-08 | End: 2025-04-08

## 2025-04-08 RX ADMIN — LACTULOSE 30 ML: 10 SOLUTION ORAL at 05:08

## 2025-04-08 RX ADMIN — FOLIC ACID 1 MG: 1 TABLET ORAL at 05:08

## 2025-04-08 RX ADMIN — CARVEDILOL 12.5 MG: 12.5 TABLET, FILM COATED ORAL at 07:48

## 2025-04-08 RX ADMIN — MULTIVITAMIN TABLET 1 TABLET: TABLET at 05:08

## 2025-04-08 RX ADMIN — CARVEDILOL 12.5 MG: 12.5 TABLET, FILM COATED ORAL at 16:29

## 2025-04-08 RX ADMIN — INSULIN LISPRO 2 UNITS: 100 INJECTION, SOLUTION INTRAVENOUS; SUBCUTANEOUS at 00:09

## 2025-04-08 RX ADMIN — HYDROXYZINE HYDROCHLORIDE 25 MG: 50 TABLET, FILM COATED ORAL at 23:23

## 2025-04-08 RX ADMIN — IOHEXOL 97 ML: 350 INJECTION, SOLUTION INTRAVENOUS at 18:30

## 2025-04-08 RX ADMIN — INSULIN LISPRO 3 UNITS: 100 INJECTION, SOLUTION INTRAVENOUS; SUBCUTANEOUS at 23:22

## 2025-04-08 RX ADMIN — ALBUTEROL SULFATE 2.5 MG: 2.5 SOLUTION RESPIRATORY (INHALATION) at 08:54

## 2025-04-08 RX ADMIN — Medication 100 MG: at 05:08

## 2025-04-08 RX ADMIN — PREDNISOLONE SODIUM PHOSPHATE 39.9 MG: 15 SOLUTION ORAL at 16:29

## 2025-04-08 RX ADMIN — TAMSULOSIN HYDROCHLORIDE 0.4 MG: 0.4 CAPSULE ORAL at 07:48

## 2025-04-08 RX ADMIN — LACTULOSE 30 ML: 10 SOLUTION ORAL at 16:29

## 2025-04-08 ASSESSMENT — ENCOUNTER SYMPTOMS
HEARTBURN: 0
ABDOMINAL PAIN: 0
SORE THROAT: 0
TREMORS: 1
COUGH: 0
BACK PAIN: 0
DIARRHEA: 0
MEMORY LOSS: 1
DEPRESSION: 0
PALPITATIONS: 0
DOUBLE VISION: 0
CHILLS: 0
NERVOUS/ANXIOUS: 1
DIZZINESS: 0
NAUSEA: 0
FEVER: 0
CONSTIPATION: 0
VOMITING: 0
BLOOD IN STOOL: 0
HEADACHES: 0
SHORTNESS OF BREATH: 0
BLURRED VISION: 0
WEAKNESS: 0

## 2025-04-08 ASSESSMENT — LIFESTYLE VARIABLES: SUBSTANCE_ABUSE: 1

## 2025-04-08 ASSESSMENT — PAIN DESCRIPTION - PAIN TYPE
TYPE: ACUTE PAIN
TYPE: ACUTE PAIN

## 2025-04-08 NOTE — PROGRESS NOTES
1535 burton removed, per Dr. Wilson nursing communication, bladder scan pt in 6 hours for post residuals. If residuals greater than  500ml straight cath. Repeat bladder scan in another 6 hours. If residuals remain greater than 500mL, replace burton.

## 2025-04-08 NOTE — PROGRESS NOTES
Nephrology Daily Progress Note    Date of Service  4/8/2025    Chief Complaint  39 y.o. male admitted 4/3/2025 with  hypertension, alcohol abuse, morbid obesity, borderline diabetes. Admitted for alcohol detox. hepatitis    Interval Problem Update  4/6 - UOP improved 290mL from 100mL yesterday, no edema, denies shortness of breath, confusion, Nausea/vomiting  4/7 patient has no chest pain or shortness of breath  4/8 patient has no new complaints  Review of Systems  Review of Systems   Constitutional:  Positive for malaise/fatigue. Negative for chills and fever.   Respiratory:  Negative for cough and shortness of breath.    Cardiovascular:  Negative for chest pain and leg swelling.   Gastrointestinal:  Negative for nausea and vomiting.   Genitourinary:  Negative for dysuria, frequency and urgency.        Physical Exam  Temp:  [35.9 °C (96.7 °F)-36.4 °C (97.6 °F)] 36.4 °C (97.5 °F)  Pulse:  [64-82] 82  Resp:  [18-21] 18  BP: (116-154)/(75-99) 154/99  SpO2:  [92 %-95 %] 92 %    Physical Exam  Vitals and nursing note reviewed.   Constitutional:       General: He is awake.      Appearance: He is ill-appearing.   HENT:      Head: Normocephalic and atraumatic.      Right Ear: External ear normal.      Left Ear: External ear normal.      Nose: Nose normal.      Mouth/Throat:      Pharynx: No oropharyngeal exudate or posterior oropharyngeal erythema.   Eyes:      General: Scleral icterus present.         Right eye: No discharge.         Left eye: No discharge.      Conjunctiva/sclera: Conjunctivae normal.   Cardiovascular:      Rate and Rhythm: Normal rate and regular rhythm.   Pulmonary:      Effort: Pulmonary effort is normal. No respiratory distress.      Breath sounds: Normal breath sounds. No wheezing.   Abdominal:      General: Abdomen is flat. Bowel sounds are normal.   Musculoskeletal:         General: No tenderness.      Cervical back: No rigidity. No muscular tenderness.      Right lower leg: No edema.      Left  lower leg: No edema.   Skin:     General: Skin is warm and dry.      Coloration: Skin is jaundiced.      Findings: Bruising present. No lesion or rash.   Neurological:      General: No focal deficit present.      Mental Status: He is alert and oriented to person, place, and time. Mental status is at baseline.   Psychiatric:         Mood and Affect: Mood normal.         Behavior: Behavior normal.         Thought Content: Thought content normal.         Fluids    Intake/Output Summary (Last 24 hours) at 4/8/2025 1358  Last data filed at 4/8/2025 0854  Gross per 24 hour   Intake 680 ml   Output 1100 ml   Net -420 ml       Laboratory  Labs reviewed, pertinent labs below.  Recent Labs     04/06/25  0856 04/07/25  0139 04/08/25  0857   WBC  --  7.2 7.1   RBC  --  3.46* 3.19*   HEMOGLOBIN 10.9* 10.6* 9.9*   HEMATOCRIT 33.4* 32.4* 30.8*   MCV  --  93.6 96.6   MCH  --  30.6 31.0   MCHC  --  32.7 32.1*   RDW  --  69.3* 76.0*   PLATELETCT  --  150* 176   MPV  --  10.8 9.9     Recent Labs     04/06/25  0856 04/07/25  0139 04/08/25  0857   SODIUM 131* 134* 136   POTASSIUM 3.3* 3.3* 4.0   CHLORIDE 99 100 105   CO2 20 20 18*   GLUCOSE 153* 130* 205*   BUN 23* 25* 25*   CREATININE 1.92* 2.10* 1.40   CALCIUM 8.0* 8.1* 8.6     Recent Labs     04/06/25  0059 04/08/25  0857   INR 1.17* 1.12           URINALYSIS:  Lab Results   Component Value Date/Time    COLORURINE Pine (A) 04/05/2025 1412    CLARITY Cloudy (A) 04/05/2025 1412    SPECGRAVITY see below 04/05/2025 1412    PHURINE see below 04/05/2025 1412    KETONES see below 04/05/2025 1412    PROTEINURIN see below 04/05/2025 1412    BILIRUBINUR see below 04/05/2025 1412    UROBILU see below 04/05/2025 1412    NITRITE see below 04/05/2025 1412    LEUKESTERAS see below 04/05/2025 1412    OCCULTBLOOD see below 04/05/2025 1412     [facility]  Lab Results   Component Value Date/Time    TOTPROTUR 96.4 (H) 04/05/2025 1412      Lab Results   Component Value Date/Time    CREATININEU 383.00  04/05/2025 1412    CREATININEU 381.00 04/05/2025 1412         Imaging interpreted by radiologist. Imaging reports reviewed with pertinent findings below  US-RUQ   Final Result      1.  Hepatic steatosis and hepatomegaly.   2.  Thickened gallbladder wall without cholelithiasis. This may be related to decompression, reactive, or acalculus cholecystitis. Correlate with symptoms.      IR-US GUIDED PIV    (Results Pending)   CT-ABDOMEN-PELVIS WITH    (Results Pending)   CT-CTA CHEST PULMONARY ARTERY W/ RECONS    (Results Pending)         Current Facility-Administered Medications   Medication Dose Route Frequency Provider Last Rate Last Admin    lactulose 20 GM/30ML solution 30 mL  30 mL Oral BID Sugey Shi M.D.   30 mL at 04/08/25 0508    prednisoLONE sodium phosphate (Pediapred) 15 mg/5mL oral solution 39.9 mg  39.9 mg Oral DAILY Fidel Barrera D.O.   39.9 mg at 04/07/25 1758    tamsulosin (Flomax) capsule 0.4 mg  0.4 mg Oral AFTER BREAKFAST Fidel Barrera D.O.   0.4 mg at 04/08/25 0748    carvedilol (Coreg) tablet 12.5 mg  12.5 mg Oral BID WITH MEALS Zhou López D.O.   12.5 mg at 04/08/25 0748    folic acid (Folvite) tablet 1 mg  1 mg Oral DAILY Zhou López, D.O.   1 mg at 04/08/25 0508    multivitamin tablet 1 Tablet  1 Tablet Oral DAILY Zhou López, D.O.   1 Tablet at 04/08/25 0508    thiamine (Vitamin B-1) tablet 100 mg  100 mg Oral DAILY Zhou López, D.O.   100 mg at 04/08/25 0508    Respiratory Therapy Consult   Nebulization Continuous RT FELICIANO Ruth.O.        ondansetron (Zofran) syringe/vial injection 4 mg  4 mg Intravenous Q4HRS PRN Zhou López D.O.   4 mg at 04/03/25 1141    ondansetron (Zofran ODT) dispertab 4 mg  4 mg Oral Q4HRS PRN FELICIANO Ruth.OIesha        promethazine (Phenergan) tablet 12.5-25 mg  12.5-25 mg Oral Q4HRS PRN Zhou López D.O.        promethazine (Phenergan) suppository 12.5-25 mg  12.5-25  mg Rectal Q4HRS PRN EDWAR RuthOIesha        prochlorperazine (Compazine) injection 5-10 mg  5-10 mg Intravenous Q4HRS PRN Zhou López D.O.        insulin lispro (HumaLOG,AdmeLOG) subcutaneous injection  2-9 Units Subcutaneous 4X/DAY ACHS EDWAR RuthOIesha   2 Units at 04/08/25 0009    And    dextrose 50 % (D50W) injection 25 g  25 g Intravenous Q15 MIN PRN Zhou López D.O.        [Held by provider] albuterol (Proventil) 2.5mg/0.5ml nebulizer solution 2.5 mg  2.5 mg Nebulization Q2HRS PRN (RT) Zhou López D.O.   2.5 mg at 04/08/25 0854         Assessment/Plan   39 years old male with hypertension, borderline diabetes, morbid obesity , alcohol dependence with ?acute alcoholic hepatitis     Acute kidney injury: Etiology is not very clear, possible ATN , creatinine is better  Metabolic acidosis mild  3. Anemia NCNC mild     4. Alcoholic dependence with fatty liver  5. Hypokalemia  6.  Hypoalbuminemia  no acute need for HD  Encourage oral intake  Renal diet  Daily BMP, CBC.  Renal dose all meds  Avoid nephrotoxins like NSAIDs.  Prognosis guarded.

## 2025-04-08 NOTE — CARE PLAN
The patient is Stable - Low risk of patient condition declining or worsening    Shift Goals  Clinical Goals: Pt will remain free from falls this shift, monitor labs  Patient Goals: rest, sleep  Family Goals: DEBBI    Progress made toward(s) clinical / shift goals:  Patient educated on safety and free from fall and verbalized understanding. Encourage to call for assistance when needed. Bed locked to it's lowest position. Bed/Chair alarm is in place. Non skid socks on. Patient's personal belongings within reach. Patient is free from fall this shift.       Problem: Knowledge Deficit - Standard  Goal: Patient and family/care givers will demonstrate understanding of plan of care, disease process/condition, diagnostic tests and medications  Outcome: Progressing     Problem: Lifestyle Changes  Goal: Patient's ability to identify lifestyle changes and available resources to help reduce recurrence of condition will improve  Outcome: Progressing     Problem: Pain - Standard  Goal: Alleviation of pain or a reduction in pain to the patient’s comfort goal  Outcome: Progressing     Problem: Fall Risk  Goal: Patient will remain free from falls  4/8/2025 0656 by Darlin Sheets R.N.  Outcome: Progressing  4/8/2025 0650 by Darlin Sheets R.N.  Outcome: Progressing     Problem: Safety  Goal: Will remain free from injury  Outcome: Progressing       Patient is not progressing towards the following goals:

## 2025-04-08 NOTE — PROGRESS NOTES
Assumed patient care and received bedside report from Day RN. Patient is A&Ox4 and reports no pain at this time. Patient on RA, awake, and alert. Silver catheter in place.     Patient educated on the use of the call light and verbalized understanding. Bed/chair in the lowest and locked position with alarm on. Personal belongings and call light within reach. High Fall Risk precautions and hourly rounding in place. Safety education provided. POC discussed and patient declines any further needs at this time. Orders carried out.

## 2025-04-08 NOTE — PROGRESS NOTES
..Gastroenterology Progress Note               Author:  Michelle Edwards, DNP,  APRN Date & Time Created: 4/8/2025 7:50 AM       Patient ID:  Name:             Alberto Maldonado  YOB: 1985  Age:                 39 y.o.  male  MRN:               [id number]    Medical Decision Making, by Problem:  Active Hospital Problems    Diagnosis     Superficial bruising of chest wall, left, initial encounter [S20.212A]     Anemia [D64.9]     EDIN (acute kidney injury) (HCC) [N17.9]     Alcoholic cirrhosis (HCC) [K70.30]     Alcohol withdrawal syndrome with complication (HCC) [F10.939]     Controlled type 2 diabetes mellitus with hyperglycemia, without long-term current use of insulin (HCC) [E11.65]     BOLIVAR (obstructive sleep apnea) [G47.33]     Alcohol use disorder, severe, dependence (HCC) [F10.20]     Morbid obesity (HCC) [E66.01]     Essential hypertension [I10]     Asthma [J45.909]      Presenting Chief Complaint:  Alcohol related liver disease and EDIN     History of Present Illness:    This is a 39 y.o. male with severe alcohol use disorder presented to ER 4/3/25 with jaundice, upper abdominal pain,nausea and a desire to stop drinking.  He has been drinking 10-15 shots daily.     Hgb 15.3, MCV 86.9, plt 147, INR 1.31  Na 132, BuN 5, Cr 1.0, , , , tot bilirubin 22.3  RUQ US: hepatomegaly and steatosis, liver with normal contour, no cholelithiasis, portal vein 14mm, no ascites  MDF 42  MELD 3.0 = 23     Hospital course:  Received 1L LR in ER on admission  Started on prednisolone  4/4:  minimal UO, Cr up to 1.58  4/5: red blood In stool, Hgb dropped.  Nephro consult--suspected of ATN due to casts, Astrid <20.  Cr 1.87 with minimal U/O  4/6:  Cr up to 2.16.  Wants to stop drinking.  Never followed up with Psychiatry      Chart Review:  Admitted to [facility] in 2021 and described to be alcohol use disorder, daily drinker, admitted with acute pancreatitis with necrosis.  He has had  multiple admissions for gout and multiple admissions for alcohol use disorder and withdrawal. Chart review states attending AA and taking naltrexone in 2024.  Previously followed by [facility].  Unfortunately further admissions for alcohol use disorder.  History of DUI May 2024 and arrest.  (Please see Dr. Hagan, Psychiatry, note from August 2024.     Complete abd US Dec 2024 without description of cirrhosis, portal vein was <15mm, spleen not well visualized and no ascites     Interval History:  4/8/2025: Patient seen.  Having multiple bowel movements, Silver catheter in place.  Eating well.  Walking around the room.  Noted to have mild asterixis on exam.  Also on exam extensive hematoma across chest extending around right sided chest to his back.  CT chest and abdomen pending.  Also noted to have periumbilical bruising.  Labs reviewed, creatinine, bilirubin and INR improving.    Hospital Medications:  Current Facility-Administered Medications   Medication Dose Frequency Provider Last Rate Last Admin    lactulose 20 GM/30ML solution 30 mL  30 mL BID Sugey Shi M.D.   30 mL at 04/08/25 0508    NS infusion   Continuous Fadi Najjar, M.D. 100 mL/hr at 04/07/25 1419 New Bag at 04/07/25 1419    prednisoLONE sodium phosphate (Pediapred) 15 mg/5mL oral solution 39.9 mg  39.9 mg DAILY Fidel Barrera, D.O.   39.9 mg at 04/07/25 1758    tamsulosin (Flomax) capsule 0.4 mg  0.4 mg AFTER BREAKFAST Fidel Barrera, D.O.   0.4 mg at 04/08/25 0748    carvedilol (Coreg) tablet 12.5 mg  12.5 mg BID WITH MEALS Zhou López D.O.   12.5 mg at 04/08/25 0748    folic acid (Folvite) tablet 1 mg  1 mg DAILY Zhou López D.O.   1 mg at 04/08/25 0508    multivitamin tablet 1 Tablet  1 Tablet DAILY Zhou López D.O.   1 Tablet at 04/08/25 0508    thiamine (Vitamin B-1) tablet 100 mg  100 mg DAILY Zhou López D.O.   100 mg at 04/08/25 0508    Respiratory Therapy Consult   Continuous RT Zhou  EDWAR LópezO.        ondansetron (Zofran) syringe/vial injection 4 mg  4 mg Q4HRS PRN FELICIANO Ruth.O.   4 mg at 04/03/25 1141    ondansetron (Zofran ODT) dispertab 4 mg  4 mg Q4HRS PRN FELICIANO Ruth.O.        promethazine (Phenergan) tablet 12.5-25 mg  12.5-25 mg Q4HRS PRN FELICIANO Ruth.O.        promethazine (Phenergan) suppository 12.5-25 mg  12.5-25 mg Q4HRS PRN FELICIANO Ruth.O.        prochlorperazine (Compazine) injection 5-10 mg  5-10 mg Q4HRS PRN FELICIANO Ruth.O.        insulin lispro (HumaLOG,AdmeLOG) subcutaneous injection  2-9 Units 4X/DAY ACHS FELICIANO Ruth.O.   2 Units at 04/08/25 0009    And    dextrose 50 % (D50W) injection 25 g  25 g Q15 MIN PRN FELICIANO Ruth.O.        albuterol (Proventil) 2.5mg/0.5ml nebulizer solution 2.5 mg  2.5 mg Q2HRS PRN (RT) FELICIANO Ruth.OIesha   2.5 mg at 04/07/25 1708   Last reviewed on 4/3/2025 11:56 AM by Paige Ray R.N.       Review of Systems:  Review of Systems   Constitutional:  Negative for chills, fever and malaise/fatigue.   HENT:  Negative for hearing loss.    Eyes:  Negative for blurred vision.   Respiratory:  Negative for cough and shortness of breath.    Cardiovascular:  Positive for leg swelling. Negative for chest pain.   Gastrointestinal:  Negative for abdominal pain, blood in stool, constipation, diarrhea, heartburn, melena, nausea and vomiting.   Genitourinary:  Negative for dysuria.   Musculoskeletal:  Negative for back pain.   Skin:  Negative for rash.   Neurological:  Positive for tremors. Negative for dizziness and weakness.   Psychiatric/Behavioral:  Positive for memory loss and substance abuse. Negative for depression. The patient is nervous/anxious.    All other systems reviewed and are negative.      Vital signs:  Weight/BMI: Body mass index is 43.19 kg/m².  BP (!) 154/99   Pulse 82   Temp 36.4 °C (97.5 °F) (Temporal)   Resp 18   Ht 1.6 m (5'  "3\")   Wt 111 kg (243 lb 13.3 oz)   SpO2 92%   Vitals:    04/07/25 1922 04/08/25 0317 04/08/25 0725 04/08/25 0748   BP: 116/75 (!) 148/82 (!) 154/99 (!) 154/99   Pulse: 68 76 82 82   Resp: (!) 21 20 18    Temp: 36.4 °C (97.6 °F) 36.3 °C (97.3 °F) 36.4 °C (97.5 °F)    TempSrc: Temporal Temporal Temporal    SpO2: 95% 94% 92%    Weight:       Height:         Oxygen Therapy:  Pulse Oximetry: 92 %, O2 (LPM): 0, O2 Delivery Device: None - Room Air    Intake/Output Summary (Last 24 hours) at 4/8/2025 0750  Last data filed at 4/8/2025 0609  Gross per 24 hour   Intake 680 ml   Output 1100 ml   Net -420 ml       Physical Exam  Vitals and nursing note reviewed.   Constitutional:       General: He is not in acute distress.     Appearance: He is obese. He is not ill-appearing.   HENT:      Head: Normocephalic and atraumatic.      Right Ear: External ear normal.      Left Ear: External ear normal.      Nose: Nose normal.      Mouth/Throat:      Mouth: Mucous membranes are moist.      Pharynx: Oropharynx is clear.   Eyes:      General: Scleral icterus present.   Cardiovascular:      Rate and Rhythm: Normal rate and regular rhythm.      Pulses: Normal pulses.      Heart sounds: Normal heart sounds.   Pulmonary:      Effort: Pulmonary effort is normal.      Breath sounds: Normal breath sounds.   Abdominal:      General: Bowel sounds are normal. There is no distension.      Comments: Adiposity with scattered periumbilical bruising   Genitourinary:     Comments: Silver with bilious colored urine  Musculoskeletal:         General: Normal range of motion.      Cervical back: Normal range of motion and neck supple.      Right lower leg: Edema present.      Left lower leg: Edema present.   Skin:     General: Skin is warm.      Capillary Refill: Capillary refill takes less than 2 seconds.      Coloration: Skin is jaundiced.      Findings: Bruising present.      Comments: Large hematoma abdomen which extends around right side of his back "   Neurological:      Mental Status: He is alert and oriented to person, place, and time.      Comments: Slight asterixis   Psychiatric:         Mood and Affect: Mood normal.         Behavior: Behavior normal.         Labs:  Recent Labs     04/06/25 0059 04/06/25 0856 04/07/25 0139   SODIUM 132* 131* 134*   POTASSIUM 3.5* 3.3* 3.3*   CHLORIDE 100 99 100   CO2 22 20 20   BUN 22 23* 25*   CREATININE 2.16* 1.92* 2.10*   MAGNESIUM 1.8  --  1.9   PHOSPHORUS 3.5  --  3.8   CALCIUM 8.0* 8.0* 8.1*     Recent Labs     04/06/25 0059 04/06/25 0856 04/07/25 0139   ALTSGPT 151* 151* 139*   ASTSGOT 203* 201* 177*   ALKPHOSPHAT 255* 273* 295*   TBILIRUBIN 21.0* 21.6* 19.7*   GLUCOSE 115* 153* 130*     Recent Labs     04/06/25 0059 04/06/25 0856 04/07/25 0139   WBC  --   --  7.2   ASTSGOT 203* 201* 177*   ALTSGPT 151* 151* 139*   ALKPHOSPHAT 255* 273* 295*   TBILIRUBIN 21.0* 21.6* 19.7*     Recent Labs     04/06/25 0059 04/06/25 0856 04/07/25 0139   RBC  --   --  3.46*   HEMOGLOBIN 11.0* 10.9* 10.6*   HEMATOCRIT 33.0* 33.4* 32.4*   PLATELETCT  --   --  150*   PROTHROMBTM 14.9*  --   --    INR 1.17*  --   --      Recent Results (from the past 24 hours)   POCT glucose device results    Collection Time: 04/07/25 11:34 AM   Result Value Ref Range    POC Glucose, Blood 113 (H) 65 - 99 mg/dL   POCT glucose device results    Collection Time: 04/07/25  4:43 PM   Result Value Ref Range    POC Glucose, Blood 111 (H) 65 - 99 mg/dL       Radiology Review:  US-RUQ   Final Result      1.  Hepatic steatosis and hepatomegaly.   2.  Thickened gallbladder wall without cholelithiasis. This may be related to decompression, reactive, or acalculus cholecystitis. Correlate with symptoms.      IR-US GUIDED PIV    (Results Pending)         MDM (Data Review):   -Records reviewed and summarized in current documentation  -I personally reviewed and interpreted the laboratory results  -I personally reviewed the radiology  images    Assessment/Recommendations:  Alcohol use disorder with multiple admissions related to alcohol, DUI, no long term sobriety  Alcoholic hepatitis, on Prednisolone at admission.    Lille score day 4 = 0.134 or good prognosis  MDF on admission 42, MDF on 4/6 was 34.9  Previous HBV, HCV negative, AMA neg, OTF and f actin neg, ferritin normal  Current MELD 3.0 = 30  EDIN.  No ascites, no cirrhosis on imaging, so doubt HRS.  ?prerenal vs. ATN  Thrombocytopenia  Anemia with recent small volume bleeding  Large abdominal hematoma -patient reports he fell asleep on the tray table  Periumbilical bruising - [place]'s sign? - check lipase  Mild asterixis       RECOMMENDATIONS:  Continue prednisolone based on improvement on Lille score day 4   Patient reporting multiple bowel movements, added titration parameters to lactulose  Discussed healthy diet with protein  I again discussed sobriety and current liver damage with patient to reinforce previous discussion with Dr. Shi.  Patient does not currently have a plan for sobriety, just that he will say no more to his friends.  We talked about changing his routine and lifestyle to accommodate maintaining sobriety.  Follow CT chest and abdomen  Follow lipase    GI will follow in a.m.    Plan discussed with patient, RN, Dr. Wilson, Dr. Mijares    ..Michelle Edwards, DNP,  APRN      Core Quality Measures   Reviewed items::  Labs, Medications and Radiology reports reviewed

## 2025-04-08 NOTE — PROGRESS NOTES
Pt refusing bed/chair alarm. Pt is a high fall risk. Pt provided with education regarding risk and need for alarm, verbalizes understanding and continued to refuse.

## 2025-04-08 NOTE — PROGRESS NOTES
[facility] Medicine Daily Progress Note    Date of Service  4/8/2025    Chief Complaint  Alberto Maldonado is a 39 y.o. male admitted 4/3/2025 with abdominal distension    Hospital Course  Alberto Maldonado is a 39 y.o. male who presented 4/3/2025 with history of hypertension, alcohol abuse, and borderline diabetes.     Patient presents today after he noticed that his eyes were yellow.  He reports that he drinks very heavily, 10 shots or more daily.  His last drink was yesterday evening.  His other complaint, his inability to achieve an erection, he has noticed this for several weeks    Interval Problem Update  Patient was seen and examined at bedside.  No acute events overnight. Patient is resting comfortably in bed and in no acute distress.     T.bili 22.3 --> 26.4 --> 25.1 --> 21.6 --> 19.7  MELD-Na - 29  Maddrey's 46.4 --> 35.2-->30  Prednisolone 40mg daily  Cr 2.10  Consult to GI  Nephrology recs  100g albumin today    Patient was seen and examined at bedside.  I have personally reviewed and interpreted vitals, labs, and imaging.    4/8.  Afebrile.  Intermittent tachypnea.  Intermittent hypertension.  Received albumin overnight.  Noted improved kidney function and LFTs.  Denies fevers, chills.  Complains of a cough and his cough causes abdominal pain.  Complains of some shortness of breath likely secondary to IV fluid being given.  Still on room air.  IV fluid was discontinued.  Complains of right chest soreness.  Noted to have hematoma on chest and abdomen.  Discussed with GI.  Ordered CT chest, abdomen, pelvis.  Started voiding trial.  Hgb 10.6 > 9.9  P 150 > 176  Cr 2.1 > 1.4    I have discussed this patient's plan of care and discharge plan at IDT rounds today with Case Management, Nursing, Nursing leadership, and other members of the IDT team.      Code Status  Full Code    Disposition  The patient is not medically cleared for discharge to home or a post-acute facility.  Anticipate discharge to:  home with organized home healthcare and close outpatient follow-up    I have placed the appropriate orders for post-discharge needs.    Review of Systems  Review of Systems   Constitutional:  Negative for chills and fever.   HENT:  Negative for congestion and sore throat.    Eyes:  Negative for blurred vision and double vision.   Respiratory:  Negative for cough and shortness of breath.    Cardiovascular:  Negative for chest pain and palpitations.   Gastrointestinal:  Negative for nausea and vomiting.   Genitourinary:  Negative for dysuria and frequency.   Musculoskeletal:  Negative for back pain.   Neurological:  Negative for headaches.   Psychiatric/Behavioral:  Positive for substance abuse.         Physical Exam  Temp:  [35.9 °C (96.7 °F)-36.4 °C (97.6 °F)] 36.4 °C (97.5 °F)  Pulse:  [64-82] 82  Resp:  [18-21] 18  BP: (116-154)/(75-99) 154/99  SpO2:  [92 %-95 %] 92 %    Physical Exam  Vitals reviewed.   HENT:      Right Ear: External ear normal.      Left Ear: External ear normal.      Nose: No congestion.      Mouth/Throat:      Pharynx: No oropharyngeal exudate.   Eyes:      General: Scleral icterus present.   Cardiovascular:      Rate and Rhythm: Normal rate.   Pulmonary:      Breath sounds: No wheezing.   Abdominal:      Tenderness: There is no abdominal tenderness. There is no guarding or rebound.   Skin:     Coloration: Skin is jaundiced.      Findings: Bruising present.      Comments: Bruising to right anterior chest   Neurological:      General: No focal deficit present.      Mental Status: He is alert.      Motor: No weakness.   Psychiatric:         Mood and Affect: Mood normal.         Behavior: Behavior normal.         Fluids    Intake/Output Summary (Last 24 hours) at 4/8/2025 1033  Last data filed at 4/8/2025 0609  Gross per 24 hour   Intake 460 ml   Output 1100 ml   Net -640 ml          Laboratory  Recent Labs     04/06/25  0856 04/07/25  0139 04/08/25  0857   WBC  --  7.2 7.1   RBC  --  3.46* 3.19*    HEMOGLOBIN 10.9* 10.6* 9.9*   HEMATOCRIT 33.4* 32.4* 30.8*   MCV  --  93.6 96.6   MCH  --  30.6 31.0   MCHC  --  32.7 32.1*   RDW  --  69.3* 76.0*   PLATELETCT  --  150* 176   MPV  --  10.8 9.9     Recent Labs     04/06/25  0856 04/07/25  0139 04/08/25  0857   SODIUM 131* 134* 136   POTASSIUM 3.3* 3.3* 4.0   CHLORIDE 99 100 105   CO2 20 20 18*   GLUCOSE 153* 130* 205*   BUN 23* 25* 25*   CREATININE 1.92* 2.10* 1.40   CALCIUM 8.0* 8.1* 8.6     Recent Labs     04/06/25  0059 04/08/25  0857   INR 1.17* 1.12               Imaging  US-RUQ   Final Result      1.  Hepatic steatosis and hepatomegaly.   2.  Thickened gallbladder wall without cholelithiasis. This may be related to decompression, reactive, or acalculus cholecystitis. Correlate with symptoms.      IR-US GUIDED PIV    (Results Pending)        Assessment/Plan  * Alcoholic cirrhosis (HCC)  Assessment & Plan  4/8/2025  Liver enzymes have more than doubled in the last month  Right upper quadrant ultrasound shows cirrhotic changes, but no evidence of acute obstruction, no ascites  Follow CMP: If she shows response to steroids and we would continue for 28 days.  If she does not, then we would stop it short.  Encourage sobriety  Hepatitis panel from February of last year showed evidence of hepatitis B vaccination, but no active viral infection.  Increase Coreg in part to treat his hypertension but also for management of ascites  MELD-Na - 29  Maddrey's 46.4 --> 35.2 --> 30  T.bili 22.3 --> 26.4 --> 25.1 --> 21.6    T.bili 22.3 --> 26.4 --> 25.1 --> 21.6 --> 19.7  MELD-Na - 29  Maddrey's 46.4 --> 35.2-->30  Prednisolone 40mg daily  Cr 2.10  Consult to GI  Nephrology recs  100g albumin today    Superficial bruising of chest wall, left, initial encounter  Assessment & Plan  4/8/2025  Bruising to left chest  Denies fall or trauma  Possibly result of patient sleeping against bedside table with underlying thrombocytopenia  Hold DVT ppx    Anemia  Assessment &  Plan  4/8/2025  Hb stable    EDIN (acute kidney injury) (HCC)  Assessment & Plan  4/8/2025  Suspect ATN vs hepatorenal syndrome  Minimal urine output  Cr 2.10  Will give 100g albumin today    Alcohol withdrawal syndrome with complication (HCC)- (present on admission)  Assessment & Plan  4/8/2025  Patient has been given 1 dose of IV phenobarbital in the ED  Admit to the floor on a benzodiazepine withdrawal protocol  Start scheduled p.o. Librium  Continue thiamine folate and MVI  Low threshold to escalate care depended upon his CIWA scores    Controlled type 2 diabetes mellitus with hyperglycemia, without long-term current use of insulin (HCC)- (present on admission)  Assessment & Plan  4/8/2025  A1c 6.7, 4 months ago  Placed on sliding scale particularly in light of initiation of glucocorticoids for acute alcohol hepatitis    Alcohol use disorder, severe, dependence (HCC)- (present on admission)  Assessment & Plan  4/8/2025  Drinks 15 shots daily  Monitor electrolytes  CIWA     Asthma- (present on admission)  Assessment & Plan  4/8/2025  Lung exam is benign  Placed on O2 and RT protocols    Morbid obesity (HCC)- (present on admission)  Assessment & Plan  Body mass index is 43.19 kg/m².  Counseled about diet and exercise    Essential hypertension- (present on admission)  Assessment & Plan  4/8/2025  Patient is maintained on amlodipine and Coreg  Given the presence of advancing cirrhosis and ascites, we will DC his amlodipine in order to bump up his Coreg in part to treat his blood pressure but also to address portal venous hypertension         VTE prophylaxis: Hold anticoagulation with a concern for hematoma, anemia.    I have performed a physical exam and reviewed and updated ROS and Plan today (4/8/2025). In review of yesterday's note (4/7/2025), there are no changes except as documented above.    Greater than 50 minutes spent prepping to see patient (e.g. review of tests) obtaining and/or reviewing separately  obtained history. Performing a medically appropriate examination and/ evaluation.  Counseling and educating the patient/family/caregiver.  Ordering medications, tests, or procedures.  Referring and communicating with other health care professionals.  Documenting clinical information in EPIC.  Independently interpreting results and communicating results to patient/family/caregiver.  Care coordination.

## 2025-04-08 NOTE — PROGRESS NOTES
Report received from NOC RN and assumed patient care at 0700. Patient is A&Ox 4, on RA. Patient reporting a pain level of 0/10. Pt reports feeling SOB, Expiratory wheezing noted in upper airway. Pt refusing bed/chair alarm, Charge RN notified. Call light within reach and bed in lowest position. Reinforced the need to call for assistance. Plan of care discussed and patient does not have any further needs at this time.

## 2025-04-08 NOTE — PROGRESS NOTES
Full note to follow, but he has a massive chest hematoma. I don't see it mentioned in notes, physical exam until yesterday.  has photo but it is much more extensive today. . Discussed with Dr.. Wilson who said it wasn't verbalized to him on sign out. Chest Ct being ordered as we do not know the age of hematoma and he denies trauma. He has thrombocytopenia but not to a level of spontaneous bleeding.

## 2025-04-08 NOTE — CARE PLAN
The patient is Stable - Low risk of patient condition declining or worsening    Shift Goals  Clinical Goals: Pt will remain free from falls this shift. Pt will report pain 3/10 or less this shift. Monitor respiratory status, and edema.  Patient Goals: Comfort  Family Goals: DEBBI    Progress made toward(s) clinical / shift goals:  Pt has remained free from falls this shift. Pt has reported no pain this shift. Respiratory status and monitored, urine output recorded.       Problem: Pain - Standard  Goal: Alleviation of pain or a reduction in pain to the patient’s comfort goal  Outcome: Progressing     Problem: Fall Risk  Goal: Patient will remain free from falls  Outcome: Progressing       Patient is not progressing towards the following goals:

## 2025-04-09 LAB
ALBUMIN SERPL BCP-MCNC: 3.7 G/DL (ref 3.2–4.9)
ALBUMIN/GLOB SERPL: 1 G/DL
ALP SERPL-CCNC: 241 U/L (ref 30–99)
ALT SERPL-CCNC: 106 U/L (ref 2–50)
ANION GAP SERPL CALC-SCNC: 15 MMOL/L (ref 7–16)
AST SERPL-CCNC: 147 U/L (ref 12–45)
BASE EXCESS BLDV CALC-SCNC: -4 MMOL/L (ref -2–3)
BILIRUB SERPL-MCNC: 10.3 MG/DL (ref 0.1–1.5)
BODY TEMPERATURE: 36.2 CENTIGRADE
BUN SERPL-MCNC: 26 MG/DL (ref 8–22)
CALCIUM ALBUM COR SERPL-MCNC: 8.6 MG/DL (ref 8.5–10.5)
CALCIUM SERPL-MCNC: 8.4 MG/DL (ref 8.5–10.5)
CHLORIDE SERPL-SCNC: 110 MMOL/L (ref 96–112)
CO2 SERPL-SCNC: 14 MMOL/L (ref 20–33)
CREAT SERPL-MCNC: 1.25 MG/DL (ref 0.5–1.4)
ERYTHROCYTE [DISTWIDTH] IN BLOOD BY AUTOMATED COUNT: 80.5 FL (ref 35.9–50)
GFR SERPLBLD CREATININE-BSD FMLA CKD-EPI: 76 ML/MIN/1.73 M 2
GLOBULIN SER CALC-MCNC: 3.8 G/DL (ref 1.9–3.5)
GLUCOSE BLD STRIP.AUTO-MCNC: 113 MG/DL (ref 65–99)
GLUCOSE BLD STRIP.AUTO-MCNC: 200 MG/DL (ref 65–99)
GLUCOSE BLD STRIP.AUTO-MCNC: 94 MG/DL (ref 65–99)
GLUCOSE BLD STRIP.AUTO-MCNC: 96 MG/DL (ref 65–99)
GLUCOSE SERPL-MCNC: 121 MG/DL (ref 65–99)
HCO3 BLDV-SCNC: 23 MMOL/L (ref 22–29)
HCT VFR BLD AUTO: 32 % (ref 42–52)
HGB BLD-MCNC: 10.3 G/DL (ref 14–18)
LACTATE SERPL-SCNC: 1.6 MMOL/L (ref 0.5–2)
LIPASE SERPL-CCNC: 106 U/L (ref 11–82)
MAGNESIUM SERPL-MCNC: 2.2 MG/DL (ref 1.5–2.5)
MCH RBC QN AUTO: 31.6 PG (ref 27–33)
MCHC RBC AUTO-ENTMCNC: 32.2 G/DL (ref 32.3–36.5)
MCV RBC AUTO: 98.2 FL (ref 81.4–97.8)
PCO2 BLDV: 47.6 MMHG (ref 38–54)
PCO2 TEMP ADJ BLDV: 46 MMHG (ref 38–54)
PH BLDV: 7.29 [PH] (ref 7.31–7.45)
PH TEMP ADJ BLDV: 7.3 [PH] (ref 7.31–7.45)
PHOSPHATE SERPL-MCNC: 3.1 MG/DL (ref 2.5–4.5)
PLATELET # BLD AUTO: 214 K/UL (ref 164–446)
PMV BLD AUTO: 9.8 FL (ref 9–12.9)
PO2 BLDV: 22.4 MMHG (ref 23–48)
PO2 TEMP ADJ BLDV: 21.2 MMHG (ref 23–48)
POTASSIUM SERPL-SCNC: 4 MMOL/L (ref 3.6–5.5)
PROT SERPL-MCNC: 7.5 G/DL (ref 6–8.2)
RBC # BLD AUTO: 3.26 M/UL (ref 4.7–6.1)
SAO2 % BLDV: 30.4 % (ref 60–85)
SODIUM SERPL-SCNC: 139 MMOL/L (ref 135–145)
WBC # BLD AUTO: 8.3 K/UL (ref 4.8–10.8)

## 2025-04-09 PROCEDURE — A9270 NON-COVERED ITEM OR SERVICE: HCPCS | Performed by: NURSE PRACTITIONER

## 2025-04-09 PROCEDURE — 700102 HCHG RX REV CODE 250 W/ 637 OVERRIDE(OP): Performed by: HOSPITALIST

## 2025-04-09 PROCEDURE — 83605 ASSAY OF LACTIC ACID: CPT

## 2025-04-09 PROCEDURE — A9270 NON-COVERED ITEM OR SERVICE: HCPCS | Performed by: INTERNAL MEDICINE

## 2025-04-09 PROCEDURE — 99232 SBSQ HOSP IP/OBS MODERATE 35: CPT | Performed by: INTERNAL MEDICINE

## 2025-04-09 PROCEDURE — 700102 HCHG RX REV CODE 250 W/ 637 OVERRIDE(OP): Performed by: INTERNAL MEDICINE

## 2025-04-09 PROCEDURE — 85027 COMPLETE CBC AUTOMATED: CPT

## 2025-04-09 PROCEDURE — 84100 ASSAY OF PHOSPHORUS: CPT

## 2025-04-09 PROCEDURE — 99233 SBSQ HOSP IP/OBS HIGH 50: CPT | Performed by: INTERNAL MEDICINE

## 2025-04-09 PROCEDURE — 82962 GLUCOSE BLOOD TEST: CPT | Mod: 91

## 2025-04-09 PROCEDURE — 80053 COMPREHEN METABOLIC PANEL: CPT

## 2025-04-09 PROCEDURE — 770006 HCHG ROOM/CARE - MED/SURG/GYN SEMI*

## 2025-04-09 PROCEDURE — 83690 ASSAY OF LIPASE: CPT

## 2025-04-09 PROCEDURE — 99232 SBSQ HOSP IP/OBS MODERATE 35: CPT | Performed by: NURSE PRACTITIONER

## 2025-04-09 PROCEDURE — 700102 HCHG RX REV CODE 250 W/ 637 OVERRIDE(OP): Performed by: NURSE PRACTITIONER

## 2025-04-09 PROCEDURE — 36415 COLL VENOUS BLD VENIPUNCTURE: CPT

## 2025-04-09 PROCEDURE — 83735 ASSAY OF MAGNESIUM: CPT

## 2025-04-09 PROCEDURE — 82803 BLOOD GASES ANY COMBINATION: CPT

## 2025-04-09 PROCEDURE — A9270 NON-COVERED ITEM OR SERVICE: HCPCS | Performed by: HOSPITALIST

## 2025-04-09 RX ORDER — SODIUM BICARBONATE 650 MG/1
650 TABLET ORAL 3 TIMES DAILY
Status: DISCONTINUED | OUTPATIENT
Start: 2025-04-09 | End: 2025-04-10

## 2025-04-09 RX ADMIN — PREDNISOLONE SODIUM PHOSPHATE 39.9 MG: 15 SOLUTION ORAL at 18:21

## 2025-04-09 RX ADMIN — TAMSULOSIN HYDROCHLORIDE 0.4 MG: 0.4 CAPSULE ORAL at 07:58

## 2025-04-09 RX ADMIN — LACTULOSE 30 ML: 10 SOLUTION ORAL at 18:22

## 2025-04-09 RX ADMIN — INSULIN LISPRO 2 UNITS: 100 INJECTION, SOLUTION INTRAVENOUS; SUBCUTANEOUS at 22:15

## 2025-04-09 RX ADMIN — CARVEDILOL 12.5 MG: 12.5 TABLET, FILM COATED ORAL at 08:20

## 2025-04-09 RX ADMIN — SODIUM BICARBONATE 650 MG: 650 TABLET ORAL at 20:54

## 2025-04-09 RX ADMIN — SODIUM BICARBONATE 650 MG: 650 TABLET ORAL at 08:18

## 2025-04-09 RX ADMIN — CARVEDILOL 12.5 MG: 12.5 TABLET, FILM COATED ORAL at 18:21

## 2025-04-09 RX ADMIN — FOLIC ACID 1 MG: 1 TABLET ORAL at 05:20

## 2025-04-09 RX ADMIN — Medication 100 MG: at 05:20

## 2025-04-09 RX ADMIN — MULTIVITAMIN TABLET 1 TABLET: TABLET at 05:20

## 2025-04-09 RX ADMIN — SODIUM BICARBONATE 650 MG: 650 TABLET ORAL at 15:28

## 2025-04-09 ASSESSMENT — ENCOUNTER SYMPTOMS
CONSTIPATION: 0
TREMORS: 1
DIARRHEA: 0
DOUBLE VISION: 0
NAUSEA: 0
VOMITING: 0
BACK PAIN: 0
BLURRED VISION: 0
CHILLS: 0
SORE THROAT: 0
DIZZINESS: 0
COUGH: 0
PALPITATIONS: 0
DEPRESSION: 0
WEAKNESS: 0
BLOOD IN STOOL: 0
HEADACHES: 0
SHORTNESS OF BREATH: 0
ABDOMINAL PAIN: 0
HEARTBURN: 0
MEMORY LOSS: 1
FEVER: 0
NERVOUS/ANXIOUS: 1

## 2025-04-09 ASSESSMENT — PAIN DESCRIPTION - PAIN TYPE
TYPE: ACUTE PAIN

## 2025-04-09 ASSESSMENT — LIFESTYLE VARIABLES: SUBSTANCE_ABUSE: 1

## 2025-04-09 NOTE — PROGRESS NOTES
Assumed care of patient at 1900. Received bedside report from day RN Maria Ines. Patient A&Ox4, sitting up in chair, on RA, Reporting a pain level of 0/10. Call light within reach, belongings within reach, fall precautions in place, bed in lowest position. Patient does not have any other needs at this time.     POC was discussed with patient. All questions were answered. Patient verbalized understanding.

## 2025-04-09 NOTE — PROGRESS NOTES
Pt is a moderate fall risk but is refusing implementation of a bed and chair alarm. Pt educated on the importance of fall precautions such as a bed alarm to help prevent falls, but pt continues to refuse. Pt verbalized understanding of needing to utilize call light prior to getting out of bed and for any needs. Non-skid socks on, call light within reach. Charge RN Jairo notified of pt's refusal.

## 2025-04-09 NOTE — PROGRESS NOTES
[facility] Medicine Daily Progress Note    Date of Service  4/9/2025    Chief Complaint  Alberto Maldonado is a 39 y.o. male admitted 4/3/2025 with abdominal distension    Hospital Course  Alberto Maldonado is a 39 y.o. male who presented 4/3/2025 with history of hypertension, alcohol abuse, and borderline diabetes.     Patient presents today after he noticed that his eyes were yellow.  He reports that he drinks very heavily, 10 shots or more daily.  His last drink was yesterday evening.  His other complaint, his inability to achieve an erection, he has noticed this for several weeks    Interval Problem Update  Patient was seen and examined at bedside.  No acute events overnight. Patient is resting comfortably in bed and in no acute distress.     T.bili 22.3 --> 26.4 --> 25.1 --> 21.6 --> 19.7  MELD-Na - 29  Maddrey's 46.4 --> 35.2-->30  Prednisolone 40mg daily  Cr 2.10  Consult to GI  Nephrology recs  100g albumin today    Patient was seen and examined at bedside.  I have personally reviewed and interpreted vitals, labs, and imaging.    4/8.  Afebrile.  Intermittent tachypnea.  Intermittent hypertension.  Received albumin overnight.  Noted improved kidney function and LFTs.  Denies fevers, chills.  Complains of a cough and his cough causes abdominal pain.  Complains of some shortness of breath likely secondary to IV fluid being given.  Still on room air.  IV fluid was discontinued.  Complains of right chest soreness.  Noted to have hematoma on chest and abdomen.  Discussed with GI.  Ordered CT chest, abdomen, pelvis.  Started voiding trial.  4/9.  Afebrile.  Hypertension is improved.  On room air.  Denies fevers, chills, shortness of breath.  Has some chest soreness.  Hematoma is improved.  Kidney and liver function are improving.  Started on sodium bicarb for high anion gap metabolic acidosis.  Plan of care was discussed with GI and nephrology.  Hgb 10.6 > 9.9 > 10.3  P 150 > 176 > 214  Cr 2.1 > 1.4 >  1.25  Tbili 21.6 > 19.7 > 15.2 > 10.3    I have discussed this patient's plan of care and discharge plan at IDT rounds today with Case Management, Nursing, Nursing leadership, and other members of the IDT team.      Code Status  Full Code    Disposition  The patient is not medically cleared for discharge to home or a post-acute facility.  Anticipate discharge to: home with close outpatient follow-up    I have placed the appropriate orders for post-discharge needs.    Review of Systems  Review of Systems   Constitutional:  Negative for chills and fever.   HENT:  Negative for congestion and sore throat.    Eyes:  Negative for blurred vision and double vision.   Respiratory:  Negative for cough and shortness of breath.    Cardiovascular:  Negative for chest pain and palpitations.   Gastrointestinal:  Negative for nausea and vomiting.   Genitourinary:  Negative for dysuria and frequency.   Musculoskeletal:  Negative for back pain.   Neurological:  Negative for headaches.   Psychiatric/Behavioral:  Positive for substance abuse.         Physical Exam  Temp:  [36.4 °C (97.5 °F)-36.7 °C (98 °F)] 36.4 °C (97.6 °F)  Pulse:  [70-82] 80  Resp:  [18-20] 20  BP: (116-154)/(74-99) 116/76  SpO2:  [92 %-95 %] 95 %    Physical Exam  Vitals reviewed.   HENT:      Right Ear: External ear normal.      Left Ear: External ear normal.      Nose: No congestion.      Mouth/Throat:      Pharynx: No oropharyngeal exudate.   Eyes:      General: Scleral icterus present.   Cardiovascular:      Rate and Rhythm: Normal rate.   Pulmonary:      Breath sounds: No wheezing.   Abdominal:      Tenderness: There is no abdominal tenderness. There is no guarding or rebound.   Skin:     Coloration: Skin is jaundiced.      Findings: Bruising present.      Comments: Bruising to right anterior chest   Neurological:      General: No focal deficit present.      Mental Status: He is alert.      Motor: No weakness.   Psychiatric:         Mood and Affect: Mood  normal.         Behavior: Behavior normal.         Fluids    Intake/Output Summary (Last 24 hours) at 4/9/2025 0620  Last data filed at 4/8/2025 2158  Gross per 24 hour   Intake 580 ml   Output 650 ml   Net -70 ml          Laboratory  Recent Labs     04/06/25  0856 04/07/25  0139 04/08/25  0857   WBC  --  7.2 7.1   RBC  --  3.46* 3.19*   HEMOGLOBIN 10.9* 10.6* 9.9*   HEMATOCRIT 33.4* 32.4* 30.8*   MCV  --  93.6 96.6   MCH  --  30.6 31.0   MCHC  --  32.7 32.1*   RDW  --  69.3* 76.0*   PLATELETCT  --  150* 176   MPV  --  10.8 9.9     Recent Labs     04/06/25  0856 04/07/25  0139 04/08/25  0857   SODIUM 131* 134* 136   POTASSIUM 3.3* 3.3* 4.0   CHLORIDE 99 100 105   CO2 20 20 18*   GLUCOSE 153* 130* 205*   BUN 23* 25* 25*   CREATININE 1.92* 2.10* 1.40   CALCIUM 8.0* 8.1* 8.6     Recent Labs     04/08/25  0857   INR 1.12               Imaging  CT-ABDOMEN-PELVIS WITH   Final Result      1.  Ill-defined somewhat hyperdense lesion inferiorly adjacent the pancreatic tail measuring approximately 5 cm, mass versus hematoma.   2.  Ovoid well-defined soft tissue lesion in the LEFT upper abdomen favors accessory splenule however adenopathy or other mass is not excluded.   3.  Hepatomegaly.   4.  Gas in the bladder is concerning for infection in the absence of recent instrumentation.   5.  Diffuse abdominal wall thickening with subcutaneous edema.      CT-CTA CHEST PULMONARY ARTERY W/ RECONS   Final Result      1.  No pulmonary embolus.   2.  A 10 cm right retropectoral hematoma.   3.  Borderline cardiomegaly. Abdomen is reported separately.            US-RUQ   Final Result      1.  Hepatic steatosis and hepatomegaly.   2.  Thickened gallbladder wall without cholelithiasis. This may be related to decompression, reactive, or acalculus cholecystitis. Correlate with symptoms.      IR-US GUIDED PIV    (Results Pending)        Assessment/Plan  * Alcoholic cirrhosis (HCC)  Assessment & Plan  4/9/2025  Liver enzymes have more than  doubled in the last month  Right upper quadrant ultrasound shows cirrhotic changes, but no evidence of acute obstruction, no ascites  Follow CMP: If she shows response to steroids and we would continue for 28 days.  If she does not, then we would stop it short.  Encourage sobriety  Hepatitis panel from February of last year showed evidence of hepatitis B vaccination, but no active viral infection.  Increase Coreg in part to treat his hypertension but also for management of ascites  MELD-Na - 29  Maddrey's 46.4 --> 35.2 --> 30  T.bili 22.3 --> 26.4 --> 25.1 --> 21.6    T.bili 22.3 --> 26.4 --> 25.1 --> 21.6 --> 19.7  MELD-Na - 29  Maddrey's 46.4 --> 35.2-->30  Prednisolone 40mg daily  Consult to GI  Nephrology recs  100g albumin today    Superficial bruising of chest wall, left, initial encounter  Assessment & Plan  4/9/2025  Bruising to left chest  Denies fall or trauma  Possibly result of patient sleeping against bedside table with underlying thrombocytopenia  Hold DVT ppx    Anemia  Assessment & Plan  4/9/2025  Hb stable    EDIN (acute kidney injury) (HCC)  Assessment & Plan  4/9/2025  Suspect ATN vs pre-renal  Minimal urine output  Improving    Alcohol withdrawal syndrome with complication (HCC)- (present on admission)  Assessment & Plan  4/9/2025  Patient has been given 1 dose of IV phenobarbital in the ED  Admit to the floor on a benzodiazepine withdrawal protocol  Start scheduled p.o. Librium  Continue thiamine folate and MVI  Low threshold to escalate care depended upon his CIWA scores    Controlled type 2 diabetes mellitus with hyperglycemia, without long-term current use of insulin (HCC)- (present on admission)  Assessment & Plan  4/9/2025  A1c 6.7, 4 months ago  Placed on sliding scale particularly in light of initiation of glucocorticoids for acute alcohol hepatitis    Alcohol use disorder, severe, dependence (HCC)- (present on admission)  Assessment & Plan  4/9/2025  Drinks 15 shots daily  Monitor  electrolytes  CIWA     Asthma- (present on admission)  Assessment & Plan  4/9/2025  Lung exam is benign  Placed on O2 and RT protocols    Morbid obesity (HCC)- (present on admission)  Assessment & Plan  Body mass index is 43.19 kg/m².  Counseled about diet and exercise    Essential hypertension- (present on admission)  Assessment & Plan  4/9/2025  Patient is maintained on amlodipine and Coreg  Given the presence of advancing cirrhosis and ascites, we will DC his amlodipine in order to bump up his Coreg in part to treat his blood pressure but also to address portal venous hypertension         VTE prophylaxis: Hold anticoagulation with a concern for hematoma, anemia.    I have performed a physical exam and reviewed and updated ROS and Plan today (4/9/2025). In review of yesterday's note (4/8/2025), there are no changes except as documented above.    Greater than 51 minutes spent prepping to see patient (e.g. review of tests) obtaining and/or reviewing separately obtained history. Performing a medically appropriate examination and/ evaluation.  Counseling and educating the patient/family/caregiver.  Ordering medications, tests, or procedures.  Referring and communicating with other health care professionals.  Documenting clinical information in EPIC.  Independently interpreting results and communicating results to patient/family/caregiver.  Care coordination.

## 2025-04-09 NOTE — DISCHARGE PLANNING
Case Management Discharge Planning    Admission Date: 4/3/2025  GMLOS: 4.9  ALOS: 6    6-Clicks ADL Score: 22  6-Clicks Mobility Score: 24      Anticipated Discharge Dispo: Discharge Disposition: Discharged to home/self care (01)  Discharge Address: [address] [place] [address] [address] [place] [address]  Discharge Contact Phone Number: [phone]    DME Needed: No    Action(s) Taken: OTHER    Discussed patient during IDT rounds. Not medically cleared. Per medical team, patient needs substance abuse resources. SW added substance abuse resources to AVS; included transitional housing if needed.    Escalations Completed: None    Medically Clear: No    Next Steps: F/U with medical team to discuss discharge needs and barriers.    Barriers to Discharge: Medical clearance    Is the patient up for discharge tomorrow: No

## 2025-04-09 NOTE — PROGRESS NOTES
Nephrology Daily Progress Note    Date of Service  4/9/2025    Chief Complaint  39 y.o. male admitted 4/3/2025 with  hypertension, alcohol abuse, morbid obesity, borderline diabetes. Admitted for alcohol detox. hepatitis    Interval Problem Update  4/6 - UOP improved 290mL from 100mL yesterday, no edema, denies shortness of breath, confusion, Nausea/vomiting  4/7 patient has no chest pain or shortness of breath  4/8 patient has no new complaints  4/9 patient has no chest pain or shortness of breath  Review of Systems  Review of Systems   Constitutional:  Negative for chills, fever and malaise/fatigue.   Respiratory:  Negative for cough and shortness of breath.    Cardiovascular:  Negative for chest pain and leg swelling.   Gastrointestinal:  Negative for nausea and vomiting.   Genitourinary:  Negative for dysuria, frequency and urgency.        Physical Exam  Temp:  [36.4 °C (97.5 °F)-36.7 °C (98 °F)] 36.4 °C (97.5 °F)  Pulse:  [70-80] 74  Resp:  [18-20] 18  BP: (116-132)/(74-89) 125/79  SpO2:  [92 %-97 %] 97 %    Physical Exam  Vitals and nursing note reviewed.   Constitutional:       General: He is not in acute distress.     Appearance: He is not ill-appearing.   HENT:      Head: Normocephalic and atraumatic.      Right Ear: External ear normal.      Left Ear: External ear normal.      Nose: Nose normal.   Eyes:      General: Scleral icterus present.         Right eye: No discharge.         Left eye: No discharge.      Conjunctiva/sclera: Conjunctivae normal.   Cardiovascular:      Rate and Rhythm: Normal rate and regular rhythm.      Heart sounds: No murmur heard.  Pulmonary:      Effort: Pulmonary effort is normal. No respiratory distress.      Breath sounds: Normal breath sounds. No wheezing.   Musculoskeletal:         General: No tenderness or deformity.      Right lower leg: Edema present.      Left lower leg: Edema present.   Skin:     General: Skin is warm.      Coloration: Skin is jaundiced.   Neurological:       General: No focal deficit present.      Mental Status: He is alert and oriented to person, place, and time.   Psychiatric:         Mood and Affect: Mood normal.         Behavior: Behavior normal.         Fluids    Intake/Output Summary (Last 24 hours) at 4/9/2025 1133  Last data filed at 4/9/2025 0821  Gross per 24 hour   Intake 660 ml   Output 650 ml   Net 10 ml       Laboratory  Labs reviewed, pertinent labs below.  Recent Labs     04/07/25  0139 04/08/25  0857 04/09/25  0642   WBC 7.2 7.1 8.3   RBC 3.46* 3.19* 3.26*   HEMOGLOBIN 10.6* 9.9* 10.3*   HEMATOCRIT 32.4* 30.8* 32.0*   MCV 93.6 96.6 98.2*   MCH 30.6 31.0 31.6   MCHC 32.7 32.1* 32.2*   RDW 69.3* 76.0* 80.5*   PLATELETCT 150* 176 214   MPV 10.8 9.9 9.8     Recent Labs     04/07/25  0139 04/08/25  0857 04/09/25  0642   SODIUM 134* 136 139   POTASSIUM 3.3* 4.0 4.0   CHLORIDE 100 105 110   CO2 20 18* 14*   GLUCOSE 130* 205* 121*   BUN 25* 25* 26*   CREATININE 2.10* 1.40 1.25   CALCIUM 8.1* 8.6 8.4*     Recent Labs     04/08/25  0857   INR 1.12           URINALYSIS:  Lab Results   Component Value Date/Time    COLORURINE Refugio (A) 04/05/2025 1412    CLARITY Cloudy (A) 04/05/2025 1412    SPECGRAVITY see below 04/05/2025 1412    PHURINE see below 04/05/2025 1412    KETONES see below 04/05/2025 1412    PROTEINURIN see below 04/05/2025 1412    BILIRUBINUR see below 04/05/2025 1412    UROBILU see below 04/05/2025 1412    NITRITE see below 04/05/2025 1412    LEUKESTERAS see below 04/05/2025 1412    OCCULTBLOOD see below 04/05/2025 1412     UPC  Lab Results   Component Value Date/Time    TOTPROTUR 96.4 (H) 04/05/2025 1412      Lab Results   Component Value Date/Time    CREATININEU 383.00 04/05/2025 1412    CREATININEU 381.00 04/05/2025 1412         Imaging interpreted by radiologist. Imaging reports reviewed with pertinent findings below  CT-ABDOMEN-PELVIS WITH   Final Result      1.  Ill-defined somewhat hyperdense lesion inferiorly adjacent the pancreatic tail  measuring approximately 5 cm, mass versus hematoma.   2.  Ovoid well-defined soft tissue lesion in the LEFT upper abdomen favors accessory splenule however adenopathy or other mass is not excluded.   3.  Hepatomegaly.   4.  Gas in the bladder is concerning for infection in the absence of recent instrumentation.   5.  Diffuse abdominal wall thickening with subcutaneous edema.      CT-CTA CHEST PULMONARY ARTERY W/ RECONS   Final Result      1.  No pulmonary embolus.   2.  A 10 cm right retropectoral hematoma.   3.  Borderline cardiomegaly. Abdomen is reported separately.            US-RUQ   Final Result      1.  Hepatic steatosis and hepatomegaly.   2.  Thickened gallbladder wall without cholelithiasis. This may be related to decompression, reactive, or acalculus cholecystitis. Correlate with symptoms.      IR-US GUIDED PIV    (Results Pending)         Current Facility-Administered Medications   Medication Dose Route Frequency Provider Last Rate Last Admin    sodium bicarbonate tablet 650 mg  650 mg Oral TID Deo Wilson D.O.   650 mg at 04/09/25 0818    lactulose 20 GM/30ML solution 30 mL  30 mL Oral BID Michelle Edwards, DNP,  APRN   30 mL at 04/08/25 1629    prednisoLONE sodium phosphate (Pediapred) 15 mg/5mL oral solution 39.9 mg  39.9 mg Oral DAILY Fidel Barrera D.O.   39.9 mg at 04/08/25 1629    tamsulosin (Flomax) capsule 0.4 mg  0.4 mg Oral AFTER BREAKFAST iFdel Barrera D.O.   0.4 mg at 04/09/25 0758    carvedilol (Coreg) tablet 12.5 mg  12.5 mg Oral BID WITH MEALS Zhou López D.O.   12.5 mg at 04/09/25 0820    folic acid (Folvite) tablet 1 mg  1 mg Oral DAILY Zhou López D.O.   1 mg at 04/09/25 0520    multivitamin tablet 1 Tablet  1 Tablet Oral DAILY Zhou López D.O.   1 Tablet at 04/09/25 0520    thiamine (Vitamin B-1) tablet 100 mg  100 mg Oral DAILY Zhou López D.O.   100 mg at 04/09/25 0520    Respiratory Therapy Consult   Nebulization  Continuous RT Zhou López D.O.        ondansetron (Zofran) syringe/vial injection 4 mg  4 mg Intravenous Q4HRS PRN FELICIANO Ruth.O.   4 mg at 04/03/25 1141    ondansetron (Zofran ODT) dispertab 4 mg  4 mg Oral Q4HRS PRN FELICIANO Ruth.OIesah        promethazine (Phenergan) tablet 12.5-25 mg  12.5-25 mg Oral Q4HRS PRN FELICIANO Ruth.O.        promethazine (Phenergan) suppository 12.5-25 mg  12.5-25 mg Rectal Q4HRS PRN FELICIANO Ruth.O.        prochlorperazine (Compazine) injection 5-10 mg  5-10 mg Intravenous Q4HRS PRN FELICIANO Ruth.O.        insulin lispro (HumaLOG,AdmeLOG) subcutaneous injection  2-9 Units Subcutaneous 4X/DAY ACHS FELICIANO Ruth.OIesha   3 Units at 04/08/25 2322    And    dextrose 50 % (D50W) injection 25 g  25 g Intravenous Q15 MIN PRN Zhou López D.O.        [Held by provider] albuterol (Proventil) 2.5mg/0.5ml nebulizer solution 2.5 mg  2.5 mg Nebulization Q2HRS PRN (RT) FELICIANO Ruth.OIesha   2.5 mg at 04/08/25 0854         Assessment/Plan   39 years old male with hypertension, borderline diabetes, morbid obesity , alcohol dependence with ?acute alcoholic hepatitis     Acute kidney injury: Etiology is not very clear, possible ATN , creatinine is improving  Metabolic acidosis mild  3. Anemia NCNC mild     4. Alcoholic dependence with fatty liver  5. Hypokalemia  6.  Hypoalbuminemia  no acute need for HD  Daily BMP, CBC.  Renal dose all meds  Avoid nephrotoxins like NSAIDs.  Prognosis guarded.  We will sign off the case please call if needed  Plan discussed with Dr. Wilson

## 2025-04-09 NOTE — CARE PLAN
The patient is Stable - Low risk of patient condition declining or worsening    Shift Goals  Clinical Goals: Pt to remain free from falls. Pt to report decreased anxiety and itchiness. Pt to void by end of shift.  Patient Goals: Feel better  Family Goals: DEBBI    Progress made toward(s) clinical / shift goals:    Pt remains free from falls and utilizes call light appropriately for needs. Pt reported decreased anxiety and itchiness following one time dose of atarax. Pt voiding following removal of burton.    Problem: Knowledge Deficit - Standard  Goal: Patient and family/care givers will demonstrate understanding of plan of care, disease process/condition, diagnostic tests and medications  Outcome: Progressing     Problem: Psychosocial  Goal: Patient's level of anxiety will decrease  Outcome: Progressing     Problem: Fall Risk  Goal: Patient will remain free from falls  Outcome: Progressing       Patient is not progressing towards the following goals:

## 2025-04-09 NOTE — CARE PLAN
Problem: Knowledge Deficit - Standard  Goal: Patient and family/care givers will demonstrate understanding of plan of care, disease process/condition, diagnostic tests and medications  Outcome: Progressing     Problem: Lifestyle Changes  Goal: Patient's ability to identify lifestyle changes and available resources to help reduce recurrence of condition will improve  Outcome: Progressing     Problem: Psychosocial  Goal: Patient's level of anxiety will decrease  Outcome: Progressing  Goal: Spiritual and cultural needs incorporated into hospitalization  Outcome: Progressing     Problem: Risk for Aspiration  Goal: Patient's risk for aspiration will be absent or decrease  Outcome: Progressing     Problem: Pain - Standard  Goal: Alleviation of pain or a reduction in pain to the patient’s comfort goal  Outcome: Progressing     Problem: Fall Risk  Goal: Patient will remain free from falls  Outcome: Progressing  Pt's belongings is within reach. Pt's bed is in lowest position.      Problem: Safety  Goal: Will remain free from injury  Outcome: Progressing  Goal: Will remain free from falls  Outcome: Progressing     Problem: Seizure Precautions  Goal: Implementation of seizure precautions  Outcome: Progressing     The patient is Stable - Low risk of patient condition declining or worsening    Shift Goals  Clinical Goals: Pt to remain free from falls. Pt to report decreased anxiety and itchiness. Pt to void by end of shift.  Patient Goals: Feel better  Family Goals: DEBBI    Progress made toward(s) clinical / shift goals:    Pt to remain free from falls. Pt got a shower today.   Hematoma remains within border line. Pt had good intake during this RN's shift.     Patient is not progressing towards the following goals:

## 2025-04-09 NOTE — PROGRESS NOTES
..Gastroenterology Progress Note               Author:  Michelle Edwards, DNP,  APRN Date & Time Created: 4/9/2025 7:17 AM       Patient ID:  Name:             Alberto Maldonado  YOB: 1985  Age:                 39 y.o.  male  MRN:               [id number]    Medical Decision Making, by Problem:  Active Hospital Problems    Diagnosis     Superficial bruising of chest wall, left, initial encounter [S20.212A]     Anemia [D64.9]     EDIN (acute kidney injury) (HCC) [N17.9]     Alcoholic cirrhosis (HCC) [K70.30]     Alcohol withdrawal syndrome with complication (HCC) [F10.939]     Controlled type 2 diabetes mellitus with hyperglycemia, without long-term current use of insulin (HCC) [E11.65]     BOLIVAR (obstructive sleep apnea) [G47.33]     Alcohol use disorder, severe, dependence (HCC) [F10.20]     Morbid obesity (HCC) [E66.01]     Essential hypertension [I10]     Asthma [J45.909]      Presenting Chief Complaint:  Alcohol related liver disease and EDIN     History of Present Illness:    This is a 39 y.o. male with severe alcohol use disorder presented to ER 4/3/25 with jaundice, upper abdominal pain,nausea and a desire to stop drinking.  He has been drinking 10-15 shots daily.     Hgb 15.3, MCV 86.9, plt 147, INR 1.31  Na 132, BuN 5, Cr 1.0, , , , tot bilirubin 22.3  RUQ US: hepatomegaly and steatosis, liver with normal contour, no cholelithiasis, portal vein 14mm, no ascites  MDF 42  MELD 3.0 = 23     Hospital course:  Received 1L LR in ER on admission  Started on prednisolone  4/4:  minimal UO, Cr up to 1.58  4/5: red blood In stool, Hgb dropped.  Nephro consult--suspected of ATN due to casts, Astrid <20.  Cr 1.87 with minimal U/O  4/6:  Cr up to 2.16.  Wants to stop drinking.  Never followed up with Psychiatry      Chart Review:  Admitted to [facility] in 2021 and described to be alcohol use disorder, daily drinker, admitted with acute pancreatitis with necrosis.  He has had  multiple admissions for gout and multiple admissions for alcohol use disorder and withdrawal. Chart review states attending AA and taking naltrexone in 2024.  Previously followed by [facility].  Unfortunately further admissions for alcohol use disorder.  History of DUI May 2024 and arrest.  (Please see Dr. Hagan, Psychiatry, note from August 2024.     Complete abd US Dec 2024 without description of cirrhosis, portal vein was <15mm, spleen not well visualized and no ascites     Interval History:  4/8/2025: Patient seen.  Having multiple bowel movements, Silver catheter in place.  Eating well.  Walking around the room.  Noted to have mild asterixis on exam.  Also on exam extensive hematoma across chest extending around right sided chest to his back.  CT chest and abdomen pending.  Also noted to have periumbilical bruising.  Labs reviewed, creatinine, bilirubin and INR improving.    4/9/2025: Continues to feel better. Had 3 BM today, eating well and took a shower. Labs continue to improve. Discussed CT findings. Slight increase in hematoma distal anterior chest.    Hospital Medications:  Current Facility-Administered Medications   Medication Dose Frequency Provider Last Rate Last Admin    lactulose 20 GM/30ML solution 30 mL  30 mL BID Michelle Edwards, DNP,  APRN   30 mL at 04/08/25 1629    prednisoLONE sodium phosphate (Pediapred) 15 mg/5mL oral solution 39.9 mg  39.9 mg DAILY Fidel Barrera, D.O.   39.9 mg at 04/08/25 1629    tamsulosin (Flomax) capsule 0.4 mg  0.4 mg AFTER BREAKFAST Fidel Barrera, D.O.   0.4 mg at 04/08/25 0748    carvedilol (Coreg) tablet 12.5 mg  12.5 mg BID WITH MEALS Zhou López, D.O.   12.5 mg at 04/08/25 1629    folic acid (Folvite) tablet 1 mg  1 mg DAILY Zhou López D.O.   1 mg at 04/09/25 0520    multivitamin tablet 1 Tablet  1 Tablet DAILY Zhou López D.O.   1 Tablet at 04/09/25 0520    thiamine (Vitamin B-1) tablet 100 mg  100 mg DAILY Zhou  FELICIANO López.O.   100 mg at 04/09/25 0520    Respiratory Therapy Consult   Continuous RT Zhou López D.O.        ondansetron (Zofran) syringe/vial injection 4 mg  4 mg Q4HRS PRN FELICIANO Ruth.O.   4 mg at 04/03/25 1141    ondansetron (Zofran ODT) dispertab 4 mg  4 mg Q4HRS PRN FELICIANO Ruth.OIesha        promethazine (Phenergan) tablet 12.5-25 mg  12.5-25 mg Q4HRS PRN FELICIANO Ruth.O.        promethazine (Phenergan) suppository 12.5-25 mg  12.5-25 mg Q4HRS PRN FELICIANO Ruth.O.        prochlorperazine (Compazine) injection 5-10 mg  5-10 mg Q4HRS PRN FELICIANO Ruth.O.        insulin lispro (HumaLOG,AdmeLOG) subcutaneous injection  2-9 Units 4X/DAY ACHS FELICIANO Ruth.O.   3 Units at 04/08/25 2322    And    dextrose 50 % (D50W) injection 25 g  25 g Q15 MIN PRN Zhou López D.O.        [Held by provider] albuterol (Proventil) 2.5mg/0.5ml nebulizer solution 2.5 mg  2.5 mg Q2HRS PRN (RT) FELICIANO Ruth.OIesha   2.5 mg at 04/08/25 0854   Last reviewed on 4/3/2025 11:56 AM by Paige Ray R.N.       Review of Systems:  Review of Systems   Constitutional:  Negative for chills, fever and malaise/fatigue.   HENT:  Negative for hearing loss.    Eyes:  Negative for blurred vision.   Respiratory:  Negative for cough and shortness of breath.    Cardiovascular:  Positive for leg swelling. Negative for chest pain.   Gastrointestinal:  Negative for abdominal pain, blood in stool, constipation, diarrhea, heartburn, melena, nausea and vomiting.   Genitourinary:  Negative for dysuria.   Musculoskeletal:  Negative for back pain.   Skin:  Negative for rash.   Neurological:  Positive for tremors. Negative for dizziness and weakness.   Psychiatric/Behavioral:  Positive for memory loss and substance abuse. Negative for depression. The patient is nervous/anxious.    All other systems reviewed and are negative.      Vital signs:  Weight/BMI:  "Body mass index is 43.19 kg/m².  /76   Pulse 80   Temp 36.4 °C (97.6 °F) (Temporal)   Resp 20   Ht 1.6 m (5' 3\")   Wt 111 kg (243 lb 13.3 oz)   SpO2 95%   Vitals:    04/08/25 1624 04/08/25 1629 04/08/25 1934 04/09/25 0417   BP: 126/80 126/80 126/74 116/76   Pulse: 76 76 80 80   Resp:   18 20   Temp:   36.7 °C (98 °F) 36.4 °C (97.6 °F)   TempSrc:   Temporal Temporal   SpO2:   92% 95%   Weight:       Height:         Oxygen Therapy:  Pulse Oximetry: 95 %, O2 (LPM): 0, O2 Delivery Device: None - Room Air    Intake/Output Summary (Last 24 hours) at 4/9/2025 0717  Last data filed at 4/8/2025 2158  Gross per 24 hour   Intake 580 ml   Output 650 ml   Net -70 ml       Physical Exam  Vitals and nursing note reviewed.   Constitutional:       General: He is not in acute distress.     Appearance: He is obese. He is not ill-appearing.   HENT:      Head: Normocephalic and atraumatic.      Right Ear: External ear normal.      Left Ear: External ear normal.      Nose: Nose normal.      Mouth/Throat:      Mouth: Mucous membranes are moist.      Pharynx: Oropharynx is clear.   Eyes:      General: Scleral icterus present.   Cardiovascular:      Rate and Rhythm: Normal rate and regular rhythm.      Pulses: Normal pulses.      Heart sounds: Normal heart sounds.   Pulmonary:      Effort: Pulmonary effort is normal.      Breath sounds: Normal breath sounds.   Abdominal:      General: Bowel sounds are normal. There is no distension.      Comments: Adiposity with scattered periumbilical bruising   Genitourinary:     Comments: Silver with bilious colored urine  Musculoskeletal:         General: Normal range of motion.      Cervical back: Normal range of motion and neck supple.      Right lower leg: Edema present.      Left lower leg: Edema present.   Skin:     General: Skin is warm.      Capillary Refill: Capillary refill takes less than 2 seconds.      Coloration: Skin is jaundiced.      Findings: Bruising present.      Comments: " Large hematoma abdomen which extends around right side of his back   Neurological:      Mental Status: He is alert and oriented to person, place, and time.      Comments: Slight asterixis   Psychiatric:         Mood and Affect: Mood normal.         Behavior: Behavior normal.         Labs:  Recent Labs     04/06/25 0856 04/07/25 0139 04/08/25 0857   SODIUM 131* 134* 136   POTASSIUM 3.3* 3.3* 4.0   CHLORIDE 99 100 105   CO2 20 20 18*   BUN 23* 25* 25*   CREATININE 1.92* 2.10* 1.40   MAGNESIUM  --  1.9 2.0   PHOSPHORUS  --  3.8 2.7   CALCIUM 8.0* 8.1* 8.6     Recent Labs     04/06/25 0856 04/07/25 0139 04/08/25 0857   ALTSGPT 151* 139* 107*   ASTSGOT 201* 177* 141*   ALKPHOSPHAT 273* 295* 243*   TBILIRUBIN 21.6* 19.7* 15.2*   GLUCOSE 153* 130* 205*     Recent Labs     04/06/25 0856 04/07/25 0139 04/08/25 0857 04/09/25  0642   WBC  --  7.2 7.1 8.3   NEUTSPOLYS  --   --  77.40*  --    LYMPHOCYTES  --   --  14.70*  --    MONOCYTES  --   --  2.80  --    EOSINOPHILS  --   --  0.30  --    BASOPHILS  --   --  0.30  --    ASTSGOT 201* 177* 141*  --    ALTSGPT 151* 139* 107*  --    ALKPHOSPHAT 273* 295* 243*  --    TBILIRUBIN 21.6* 19.7* 15.2*  --      Recent Labs     04/07/25 0139 04/08/25 0857 04/09/25  0642   RBC 3.46* 3.19* 3.26*   HEMOGLOBIN 10.6* 9.9* 10.3*   HEMATOCRIT 32.4* 30.8* 32.0*   PLATELETCT 150* 176 214   PROTHROMBTM  --  14.4  --    INR  --  1.12  --      Recent Results (from the past 24 hours)   POCT glucose device results    Collection Time: 04/08/25  7:55 AM   Result Value Ref Range    POC Glucose, Blood 112 (H) 65 - 99 mg/dL   Comp Metabolic Panel    Collection Time: 04/08/25  8:57 AM   Result Value Ref Range    Sodium 136 135 - 145 mmol/L    Potassium 4.0 3.6 - 5.5 mmol/L    Chloride 105 96 - 112 mmol/L    Co2 18 (L) 20 - 33 mmol/L    Glucose 205 (H) 65 - 99 mg/dL    Bun 25 (H) 8 - 22 mg/dL    Creatinine 1.40 0.50 - 1.40 mg/dL    Calcium 8.6 8.5 - 10.5 mg/dL    Anion Gap 13.0 7.0 - 16.0     Correct Calcium 8.6 8.5 - 10.5 mg/dL    AST(SGOT) 141 (H) 12 - 45 U/L    ALT(SGPT) 107 (H) 2 - 50 U/L    Alkaline Phosphatase 243 (H) 30 - 99 U/L    Total Bilirubin 15.2 (H) 0.1 - 1.5 mg/dL    Albumin 4.0 3.2 - 4.9 g/dL    Total Protein 7.5 6.0 - 8.2 g/dL    Globulin 3.5 1.9 - 3.5 g/dL    A-G Ratio 1.1 g/dL   MAGNESIUM    Collection Time: 04/08/25  8:57 AM   Result Value Ref Range    Magnesium 2.0 1.5 - 2.5 mg/dL   PHOSPHORUS    Collection Time: 04/08/25  8:57 AM   Result Value Ref Range    Phosphorus 2.7 2.5 - 4.5 mg/dL   CBC WITH DIFFERENTIAL    Collection Time: 04/08/25  8:57 AM   Result Value Ref Range    WBC 7.1 4.8 - 10.8 K/uL    RBC 3.19 (L) 4.70 - 6.10 M/uL    Hemoglobin 9.9 (L) 14.0 - 18.0 g/dL    Hematocrit 30.8 (L) 42.0 - 52.0 %    MCV 96.6 81.4 - 97.8 fL    MCH 31.0 27.0 - 33.0 pg    MCHC 32.1 (L) 32.3 - 36.5 g/dL    RDW 76.0 (H) 35.9 - 50.0 fL    Platelet Count 176 164 - 446 K/uL    MPV 9.9 9.0 - 12.9 fL    Neutrophils-Polys 77.40 (H) 44.00 - 72.00 %    Lymphocytes 14.70 (L) 22.00 - 41.00 %    Monocytes 2.80 0.00 - 13.40 %    Eosinophils 0.30 0.00 - 6.90 %    Basophils 0.30 0.00 - 1.80 %    Immature Granulocytes 4.50 (H) 0.00 - 0.90 %    Nucleated RBC 0.60 (H) 0.00 - 0.20 /100 WBC    Neutrophils (Absolute) 5.48 1.82 - 7.42 K/uL    Lymphs (Absolute) 1.04 1.00 - 4.80 K/uL    Monos (Absolute) 0.20 0.00 - 0.85 K/uL    Eos (Absolute) 0.02 0.00 - 0.51 K/uL    Baso (Absolute) 0.02 0.00 - 0.12 K/uL    Immature Granulocytes (abs) 0.32 (H) 0.00 - 0.11 K/uL    NRBC (Absolute) 0.04 K/uL   Prothrombin Time    Collection Time: 04/08/25  8:57 AM   Result Value Ref Range    PT 14.4 12.0 - 14.6 sec    INR 1.12 0.87 - 1.13   ESTIMATED GFR    Collection Time: 04/08/25  8:57 AM   Result Value Ref Range    GFR (CKD-EPI) 66 >60 mL/min/1.73 m 2   POCT glucose device results    Collection Time: 04/08/25 11:35 AM   Result Value Ref Range    POC Glucose, Blood 92 65 - 99 mg/dL   POCT glucose device results    Collection Time:  04/08/25  4:32 PM   Result Value Ref Range    POC Glucose, Blood 94 65 - 99 mg/dL   POCT glucose device results    Collection Time: 04/08/25 11:18 PM   Result Value Ref Range    POC Glucose, Blood 237 (H) 65 - 99 mg/dL   CBC WITHOUT DIFFERENTIAL    Collection Time: 04/09/25  6:42 AM   Result Value Ref Range    WBC 8.3 4.8 - 10.8 K/uL    RBC 3.26 (L) 4.70 - 6.10 M/uL    Hemoglobin 10.3 (L) 14.0 - 18.0 g/dL    Hematocrit 32.0 (L) 42.0 - 52.0 %    MCV 98.2 (H) 81.4 - 97.8 fL    MCH 31.6 27.0 - 33.0 pg    MCHC 32.2 (L) 32.3 - 36.5 g/dL    RDW 80.5 (H) 35.9 - 50.0 fL    Platelet Count 214 164 - 446 K/uL    MPV 9.8 9.0 - 12.9 fL       Radiology Review:  CT-ABDOMEN-PELVIS WITH   Final Result      1.  Ill-defined somewhat hyperdense lesion inferiorly adjacent the pancreatic tail measuring approximately 5 cm, mass versus hematoma.   2.  Ovoid well-defined soft tissue lesion in the LEFT upper abdomen favors accessory splenule however adenopathy or other mass is not excluded.   3.  Hepatomegaly.   4.  Gas in the bladder is concerning for infection in the absence of recent instrumentation.   5.  Diffuse abdominal wall thickening with subcutaneous edema.      CT-CTA CHEST PULMONARY ARTERY W/ RECONS   Final Result      1.  No pulmonary embolus.   2.  A 10 cm right retropectoral hematoma.   3.  Borderline cardiomegaly. Abdomen is reported separately.            US-RUQ   Final Result      1.  Hepatic steatosis and hepatomegaly.   2.  Thickened gallbladder wall without cholelithiasis. This may be related to decompression, reactive, or acalculus cholecystitis. Correlate with symptoms.      IR-US GUIDED PIV    (Results Pending)         MDM (Data Review):   -Records reviewed and summarized in current documentation  -I personally reviewed and interpreted the laboratory results  -I personally reviewed the radiology images    Assessment/Recommendations:  Alcohol use disorder with multiple admissions related to alcohol, DUI, no long term  sobriety  Alcoholic hepatitis, on Prednisolone at admission.    Lille score day 4 = 0.134 or good prognosis  MDF on admission 42, MDF on 4/6 was 34.9  Previous HBV, HCV negative, AMA neg, OTF and f actin neg, ferritin normal  Current MELD 3.0 = 30  EDIN.  No ascites, no cirrhosis on imaging, so doubt HRS.  ?prerenal vs. ATN  Thrombocytopenia  Anemia with recent small volume bleeding  Large abdominal hematoma -patient reports he fell asleep on the tray table  Periumbilical bruising - [place]'s sign? - check lipase  Mild asterixis  CT Chest/abd/pelvis findings concerning for pancreatic mass vs hematoma, soft tissue lesion in left upper abdomen adenopathy vs mass, gas in the bladder, and diffuse abdominal wall thickening with subcutaneous emphysema. 10 cm rectopectoral hematoma.        RECOMMENDATIONS:  Continue prednisolone based on improvement on Lille score day 4 to complete 30 day course  Lactulose titrated to 2-3 BM daily  Discussed healthy diet with protein  I discussed sobriety and current liver damage with patient and his supportive family bedside. They are willing to help and support him to achieve sobriety.    Referral placed for outpatient follow-up with GI for further work-up of pancreatic findings on CT  Continue to monitor hematoma for worsening  Silver discontinued, monitor urine output    [facility] will sign off. Please reconsult with any questions or concerns.    Plan discussed with patient, RN, Dr. Wilson, Dr. Shi    ..Michelle Edwards, DNP,  APRN    Core Quality Measures   Reviewed items::  Labs, Medications and Radiology reports reviewed

## 2025-04-09 NOTE — PROGRESS NOTES
Report was received on pt. Pt has belongings and call light within reach. Pt agreed for chair alarm to bed placed which is now in use. Pt has non-skid socks on. Pt was educated to use call light if needed to get up. Pt verbalized understanding.

## 2025-04-10 ENCOUNTER — APPOINTMENT (OUTPATIENT)
Dept: RADIOLOGY | Facility: MEDICAL CENTER | Age: 40
DRG: 432 | End: 2025-04-10
Attending: INTERNAL MEDICINE
Payer: COMMERCIAL

## 2025-04-10 LAB
ALBUMIN SERPL BCP-MCNC: 3.7 G/DL (ref 3.2–4.9)
ALBUMIN/GLOB SERPL: 1.1 G/DL
ALP SERPL-CCNC: 226 U/L (ref 30–99)
ALT SERPL-CCNC: 121 U/L (ref 2–50)
ANION GAP SERPL CALC-SCNC: 11 MMOL/L (ref 7–16)
AST SERPL-CCNC: 186 U/L (ref 12–45)
BILIRUB SERPL-MCNC: 8.4 MG/DL (ref 0.1–1.5)
BUN SERPL-MCNC: 31 MG/DL (ref 8–22)
CALCIUM ALBUM COR SERPL-MCNC: 8.6 MG/DL (ref 8.5–10.5)
CALCIUM SERPL-MCNC: 8.4 MG/DL (ref 8.5–10.5)
CHLORIDE SERPL-SCNC: 109 MMOL/L (ref 96–112)
CO2 SERPL-SCNC: 22 MMOL/L (ref 20–33)
CREAT SERPL-MCNC: 1.34 MG/DL (ref 0.5–1.4)
ERYTHROCYTE [DISTWIDTH] IN BLOOD BY AUTOMATED COUNT: 82.4 FL (ref 35.9–50)
EST. AVERAGE GLUCOSE BLD GHB EST-MCNC: 108 MG/DL
GFR SERPLBLD CREATININE-BSD FMLA CKD-EPI: 70 ML/MIN/1.73 M 2
GLOBULIN SER CALC-MCNC: 3.4 G/DL (ref 1.9–3.5)
GLUCOSE BLD STRIP.AUTO-MCNC: 110 MG/DL (ref 65–99)
GLUCOSE BLD STRIP.AUTO-MCNC: 326 MG/DL (ref 65–99)
GLUCOSE BLD STRIP.AUTO-MCNC: 73 MG/DL (ref 65–99)
GLUCOSE BLD STRIP.AUTO-MCNC: 84 MG/DL (ref 65–99)
GLUCOSE SERPL-MCNC: 175 MG/DL (ref 65–99)
HBA1C MFR BLD: 5.4 % (ref 4–5.6)
HCT VFR BLD AUTO: 31.7 % (ref 42–52)
HGB BLD-MCNC: 9.9 G/DL (ref 14–18)
MAGNESIUM SERPL-MCNC: 2.1 MG/DL (ref 1.5–2.5)
MCH RBC QN AUTO: 31.4 PG (ref 27–33)
MCHC RBC AUTO-ENTMCNC: 31.2 G/DL (ref 32.3–36.5)
MCV RBC AUTO: 100.6 FL (ref 81.4–97.8)
PHOSPHATE SERPL-MCNC: 2.2 MG/DL (ref 2.5–4.5)
PLATELET # BLD AUTO: 255 K/UL (ref 164–446)
PMV BLD AUTO: 10.1 FL (ref 9–12.9)
POTASSIUM SERPL-SCNC: 4.8 MMOL/L (ref 3.6–5.5)
PROT SERPL-MCNC: 7.1 G/DL (ref 6–8.2)
RBC # BLD AUTO: 3.15 M/UL (ref 4.7–6.1)
SODIUM SERPL-SCNC: 142 MMOL/L (ref 135–145)
WBC # BLD AUTO: 6.2 K/UL (ref 4.8–10.8)

## 2025-04-10 PROCEDURE — 84100 ASSAY OF PHOSPHORUS: CPT

## 2025-04-10 PROCEDURE — 700102 HCHG RX REV CODE 250 W/ 637 OVERRIDE(OP): Performed by: NURSE PRACTITIONER

## 2025-04-10 PROCEDURE — 80053 COMPREHEN METABOLIC PANEL: CPT

## 2025-04-10 PROCEDURE — 99233 SBSQ HOSP IP/OBS HIGH 50: CPT | Performed by: INTERNAL MEDICINE

## 2025-04-10 PROCEDURE — A9270 NON-COVERED ITEM OR SERVICE: HCPCS | Performed by: HOSPITALIST

## 2025-04-10 PROCEDURE — 82962 GLUCOSE BLOOD TEST: CPT | Mod: 91

## 2025-04-10 PROCEDURE — 700117 HCHG RX CONTRAST REV CODE 255: Performed by: INTERNAL MEDICINE

## 2025-04-10 PROCEDURE — 700102 HCHG RX REV CODE 250 W/ 637 OVERRIDE(OP): Performed by: INTERNAL MEDICINE

## 2025-04-10 PROCEDURE — 83735 ASSAY OF MAGNESIUM: CPT

## 2025-04-10 PROCEDURE — 700111 HCHG RX REV CODE 636 W/ 250 OVERRIDE (IP): Performed by: INTERNAL MEDICINE

## 2025-04-10 PROCEDURE — 93970 EXTREMITY STUDY: CPT | Mod: 26 | Performed by: INTERNAL MEDICINE

## 2025-04-10 PROCEDURE — 36415 COLL VENOUS BLD VENIPUNCTURE: CPT

## 2025-04-10 PROCEDURE — 770006 HCHG ROOM/CARE - MED/SURG/GYN SEMI*

## 2025-04-10 PROCEDURE — 93970 EXTREMITY STUDY: CPT

## 2025-04-10 PROCEDURE — A9270 NON-COVERED ITEM OR SERVICE: HCPCS | Performed by: NURSE PRACTITIONER

## 2025-04-10 PROCEDURE — A9270 NON-COVERED ITEM OR SERVICE: HCPCS | Performed by: INTERNAL MEDICINE

## 2025-04-10 PROCEDURE — 71260 CT THORAX DX C+: CPT

## 2025-04-10 PROCEDURE — 83036 HEMOGLOBIN GLYCOSYLATED A1C: CPT

## 2025-04-10 PROCEDURE — 85027 COMPLETE CBC AUTOMATED: CPT

## 2025-04-10 PROCEDURE — 700102 HCHG RX REV CODE 250 W/ 637 OVERRIDE(OP): Performed by: HOSPITALIST

## 2025-04-10 RX ORDER — TAMSULOSIN HYDROCHLORIDE 0.4 MG/1
0.4 CAPSULE ORAL
Status: DISCONTINUED | OUTPATIENT
Start: 2025-04-10 | End: 2025-04-12 | Stop reason: HOSPADM

## 2025-04-10 RX ORDER — SODIUM BICARBONATE 650 MG/1
650 TABLET ORAL 2 TIMES DAILY
Status: DISCONTINUED | OUTPATIENT
Start: 2025-04-10 | End: 2025-04-12 | Stop reason: HOSPADM

## 2025-04-10 RX ORDER — SULFAMETHOXAZOLE AND TRIMETHOPRIM 800; 160 MG/1; MG/1
1 TABLET ORAL
Status: DISCONTINUED | OUTPATIENT
Start: 2025-04-10 | End: 2025-04-12 | Stop reason: HOSPADM

## 2025-04-10 RX ORDER — FUROSEMIDE 10 MG/ML
20 INJECTION INTRAMUSCULAR; INTRAVENOUS ONCE
Status: COMPLETED | OUTPATIENT
Start: 2025-04-10 | End: 2025-04-10

## 2025-04-10 RX ADMIN — TAMSULOSIN HYDROCHLORIDE 0.4 MG: 0.4 CAPSULE ORAL at 12:39

## 2025-04-10 RX ADMIN — LACTULOSE 30 ML: 10 SOLUTION ORAL at 17:38

## 2025-04-10 RX ADMIN — CARVEDILOL 12.5 MG: 12.5 TABLET, FILM COATED ORAL at 07:57

## 2025-04-10 RX ADMIN — SODIUM BICARBONATE 650 MG: 650 TABLET ORAL at 05:56

## 2025-04-10 RX ADMIN — CARVEDILOL 12.5 MG: 12.5 TABLET, FILM COATED ORAL at 17:39

## 2025-04-10 RX ADMIN — SULFAMETHOXAZOLE AND TRIMETHOPRIM 1 TABLET: 800; 160 TABLET ORAL at 12:39

## 2025-04-10 RX ADMIN — FOLIC ACID 1 MG: 1 TABLET ORAL at 05:56

## 2025-04-10 RX ADMIN — DIBASIC SODIUM PHOSPHATE, MONOBASIC POTASSIUM PHOSPHATE AND MONOBASIC SODIUM PHOSPHATE 500 MG: 852; 155; 130 TABLET ORAL at 05:56

## 2025-04-10 RX ADMIN — DIBASIC SODIUM PHOSPHATE, MONOBASIC POTASSIUM PHOSPHATE AND MONOBASIC SODIUM PHOSPHATE 500 MG: 852; 155; 130 TABLET ORAL at 11:59

## 2025-04-10 RX ADMIN — IOHEXOL 80 ML: 350 INJECTION, SOLUTION INTRAVENOUS at 13:00

## 2025-04-10 RX ADMIN — Medication 100 MG: at 05:56

## 2025-04-10 RX ADMIN — INSULIN LISPRO 6 UNITS: 100 INJECTION, SOLUTION INTRAVENOUS; SUBCUTANEOUS at 22:19

## 2025-04-10 RX ADMIN — PREDNISOLONE SODIUM PHOSPHATE 39.9 MG: 15 SOLUTION ORAL at 17:39

## 2025-04-10 RX ADMIN — LACTULOSE 30 ML: 10 SOLUTION ORAL at 05:56

## 2025-04-10 RX ADMIN — MULTIVITAMIN TABLET 1 TABLET: TABLET at 05:56

## 2025-04-10 RX ADMIN — SODIUM BICARBONATE 650 MG: 650 TABLET ORAL at 17:38

## 2025-04-10 RX ADMIN — DIBASIC SODIUM PHOSPHATE, MONOBASIC POTASSIUM PHOSPHATE AND MONOBASIC SODIUM PHOSPHATE 500 MG: 852; 155; 130 TABLET ORAL at 17:39

## 2025-04-10 RX ADMIN — FUROSEMIDE 20 MG: 10 INJECTION, SOLUTION INTRAVENOUS at 18:28

## 2025-04-10 ASSESSMENT — PAIN DESCRIPTION - PAIN TYPE
TYPE: ACUTE PAIN

## 2025-04-10 ASSESSMENT — ENCOUNTER SYMPTOMS
CHILLS: 0
BLURRED VISION: 0
DOUBLE VISION: 0
HEADACHES: 0
COUGH: 0
SORE THROAT: 0
BACK PAIN: 0
PALPITATIONS: 0
VOMITING: 0
SHORTNESS OF BREATH: 0
FEVER: 0
NAUSEA: 0

## 2025-04-10 ASSESSMENT — PATIENT HEALTH QUESTIONNAIRE - PHQ9
1. LITTLE INTEREST OR PLEASURE IN DOING THINGS: NOT AT ALL
SUM OF ALL RESPONSES TO PHQ9 QUESTIONS 1 AND 2: 0
2. FEELING DOWN, DEPRESSED, IRRITABLE, OR HOPELESS: NOT AT ALL

## 2025-04-10 ASSESSMENT — LIFESTYLE VARIABLES: SUBSTANCE_ABUSE: 1

## 2025-04-10 NOTE — PROGRESS NOTES
[facility] Medicine Daily Progress Note    Date of Service  4/10/2025    Chief Complaint  Alberto Maldonado is a 39 y.o. male admitted 4/3/2025 with abdominal distension    Hospital Course  Alberto Maldonado is a 39 y.o. male with history of alcoholic hepatitis, hypertension, alcohol abuse, borderline diabetes who presented 4/3/2025 after he noticed that his eyes were yellow.  He reports that he drinks very heavily, 10 shots or more daily.  His last drink was yesterday evening.  His other complaint, his inability to achieve an erection, he has noticed this for several weeks.  Patient was noted to have acute kidney injury and acute liver injury.  He was started on CIWA protocol.  GI and nephrology were consulted.    With no cirrhosis or ascites on imaging GI did not believe presentation was consistent with hepatorenal syndrome.    Acute kidney injury was felt to be secondary to prerenal versus acute tubular necrosis.  Based on his low the score patient was started on prednisolone for alcoholic hepatitis with improvement in liver and kidney function.  Recommended a 30-day course.    CT pulmonary angiogram showed no pulm embolism.  10 cm right retroperitoneal hematoma.  Borderline cardiomegaly.    CT abdomen pelvis showed lesion at the pancreatic tail measuring 5 cm, mass versus hematoma.  Soft tissue lesion in the left upper abdomen favors accessory splenule versus lymphadenopathy versus mass.  Hepatomegaly.  Diffuse abdominal wall thickening, subcutaneous edema.    GI recommended outpatient CT pancreas and EUS to further evaluate lesions in pancreatic tail and left upper abdomen.  Recommended to continue prednisolone with for 30-day course for alcoholic hepatitis.  Outpatient GI referral was placed.    Interval Problem Update  Patient was seen and examined at bedside.  No acute events overnight. Patient is resting comfortably in bed and in no acute distress.     T.bili 22.3 --> 26.4 --> 25.1 --> 21.6 -->  19.7  MELD-Na - 29  Rivera's 46.4 --> 35.2-->30  Prednisolone 40mg daily  Cr 2.10  Consult to GI  Nephrology recs  100g albumin today    Patient was seen and examined at bedside.  I have personally reviewed and interpreted vitals, labs, and imaging.    4/8.  Afebrile.  Intermittent tachypnea.  Intermittent hypertension.  Received albumin overnight.  Noted improved kidney function and LFTs.  Denies fevers, chills.  Complains of a cough and his cough causes abdominal pain.  Complains of some shortness of breath likely secondary to IV fluid being given.  Still on room air.  IV fluid was discontinued.  Complains of right chest soreness.  Noted to have hematoma on chest and abdomen.  Discussed with GI.  Ordered CT chest, abdomen, pelvis.  Started voiding trial.  4/9.  Afebrile.  Hypertension is improved.  On room air.  Denies fevers, chills, shortness of breath.  Has some chest soreness.  Hematoma is improved.  Kidney and liver function are improving.  Started on sodium bicarb for high anion gap metabolic acidosis.  Plan of care was discussed with GI and nephrology.  4/10.  Afebrile.  Stable vitals.  On room air.  Replete phos.  Denies fever, chills.  Complains of some soreness on his chest.  Ecchymosis has spread.  Ordered repeat CT chest to assess patient's hematoma.  He has complaints of shortness of breath especially when lying flat and on exertion.  Hgb 10.6 > 9.9 > 10.3 > 9.9  P 150 > 176 > 214 > 255  Cr 2.1 > 1.4 > 1.25 > 1.34  Tbili 21.6 > 19.7 > 15.2 > 10.3 > 8.4    I have discussed this patient's plan of care and discharge plan at IDT rounds today with Case Management, Nursing, Nursing leadership, and other members of the IDT team.      Code Status  Full Code    Disposition  The patient is not medically cleared for discharge to home or a post-acute facility.  Anticipate discharge to: home with close outpatient follow-up    I have placed the appropriate orders for post-discharge needs.    Review of  Systems  Review of Systems   Constitutional:  Negative for chills and fever.   HENT:  Negative for congestion and sore throat.    Eyes:  Negative for blurred vision and double vision.   Respiratory:  Negative for cough and shortness of breath.    Cardiovascular:  Negative for chest pain and palpitations.   Gastrointestinal:  Negative for nausea and vomiting.   Genitourinary:  Negative for dysuria and frequency.   Musculoskeletal:  Negative for back pain.   Neurological:  Negative for headaches.   Psychiatric/Behavioral:  Positive for substance abuse.         Physical Exam  Temp:  [36.2 °C (97.1 °F)-36.8 °C (98.2 °F)] 36.8 °C (98.2 °F)  Pulse:  [73-76] 73  Resp:  [18-20] 20  BP: (121-134)/(75-84) 131/84  SpO2:  [92 %-97 %] 95 %    Physical Exam  Vitals reviewed.   HENT:      Right Ear: External ear normal.      Left Ear: External ear normal.      Nose: No congestion.      Mouth/Throat:      Pharynx: No oropharyngeal exudate.   Eyes:      General: Scleral icterus present.   Cardiovascular:      Rate and Rhythm: Normal rate.   Pulmonary:      Breath sounds: No wheezing.   Abdominal:      Tenderness: There is no abdominal tenderness. There is no guarding or rebound.   Skin:     Coloration: Skin is jaundiced.      Findings: Bruising present.      Comments: Bruising to right anterior chest   Neurological:      General: No focal deficit present.      Mental Status: He is alert.      Motor: No weakness.   Psychiatric:         Mood and Affect: Mood normal.         Behavior: Behavior normal.         Fluids    Intake/Output Summary (Last 24 hours) at 4/10/2025 0538  Last data filed at 4/9/2025 1816  Gross per 24 hour   Intake 950 ml   Output --   Net 950 ml          Laboratory  Recent Labs     04/08/25  0857 04/09/25  0642 04/10/25  0136   WBC 7.1 8.3 6.2   RBC 3.19* 3.26* 3.15*   HEMOGLOBIN 9.9* 10.3* 9.9*   HEMATOCRIT 30.8* 32.0* 31.7*   MCV 96.6 98.2* 100.6*   MCH 31.0 31.6 31.4   MCHC 32.1* 32.2* 31.2*   RDW 76.0* 80.5*  82.4*   PLATELETCT 176 214 255   MPV 9.9 9.8 10.1     Recent Labs     04/08/25  0857 04/09/25  0642 04/10/25  0136   SODIUM 136 139 142   POTASSIUM 4.0 4.0 4.8   CHLORIDE 105 110 109   CO2 18* 14* 22   GLUCOSE 205* 121* 175*   BUN 25* 26* 31*   CREATININE 1.40 1.25 1.34   CALCIUM 8.6 8.4* 8.4*     Recent Labs     04/08/25  0857   INR 1.12               Imaging  CT-ABDOMEN-PELVIS WITH   Final Result      1.  Ill-defined somewhat hyperdense lesion inferiorly adjacent the pancreatic tail measuring approximately 5 cm, mass versus hematoma.   2.  Ovoid well-defined soft tissue lesion in the LEFT upper abdomen favors accessory splenule however adenopathy or other mass is not excluded.   3.  Hepatomegaly.   4.  Gas in the bladder is concerning for infection in the absence of recent instrumentation.   5.  Diffuse abdominal wall thickening with subcutaneous edema.      CT-CTA CHEST PULMONARY ARTERY W/ RECONS   Final Result      1.  No pulmonary embolus.   2.  A 10 cm right retropectoral hematoma.   3.  Borderline cardiomegaly. Abdomen is reported separately.            US-RUQ   Final Result      1.  Hepatic steatosis and hepatomegaly.   2.  Thickened gallbladder wall without cholelithiasis. This may be related to decompression, reactive, or acalculus cholecystitis. Correlate with symptoms.      IR-US GUIDED PIV    (Results Pending)        Assessment/Plan  * Alcoholic cirrhosis (HCC)  Assessment & Plan  4/10/2025  Liver enzymes have more than doubled in the last month  Right upper quadrant ultrasound shows cirrhotic changes, but no evidence of acute obstruction, no ascites  Follow CMP: If she shows response to steroids and we would continue for 28 days.  If she does not, then we would stop it short.  Encourage sobriety  Hepatitis panel from February of last year showed evidence of hepatitis B vaccination, but no active viral infection.  Increase Coreg in part to treat his hypertension but also for management of  ascites  MELD-Na - 29  Maddrey's 46.4 --> 35.2 --> 30  T.bili 22.3 --> 26.4 --> 25.1 --> 21.6    T.bili 22.3 --> 26.4 --> 25.1 --> 21.6 --> 19.7  MELD-Na - 29  Pepedrey's 46.4 --> 35.2-->30  Prednisolone 40mg daily  Consult to GI  Nephrology recs  100g albumin today    Superficial bruising of chest wall, left, initial encounter  Assessment & Plan  4/10/2025  Bruising to left chest  Denies fall or trauma  Possibly result of patient sleeping against bedside table with underlying thrombocytopenia  Hold DVT ppx    Anemia  Assessment & Plan  4/10/2025  Hb stable    EDIN (acute kidney injury) (HCC)  Assessment & Plan  4/10/2025  Suspect ATN vs pre-renal  Minimal urine output  Improving    Alcohol withdrawal syndrome with complication (HCC)- (present on admission)  Assessment & Plan  4/10/2025  Patient has been given 1 dose of IV phenobarbital in the ED  Admit to the floor on a benzodiazepine withdrawal protocol  Start scheduled p.o. Librium  Continue thiamine folate and MVI  Low threshold to escalate care depended upon his CIWA scores    Controlled type 2 diabetes mellitus with hyperglycemia, without long-term current use of insulin (HCC)- (present on admission)  Assessment & Plan  4/10/2025  A1c 6.7, 4 months ago  Placed on sliding scale particularly in light of initiation of glucocorticoids for acute alcohol hepatitis    Alcohol use disorder, severe, dependence (HCC)- (present on admission)  Assessment & Plan  4/10/2025  Drinks 15 shots daily  Monitor electrolytes  CIWA     Asthma- (present on admission)  Assessment & Plan  4/10/2025  Lung exam is benign  Placed on O2 and RT protocols    Morbid obesity (HCC)- (present on admission)  Assessment & Plan  Body mass index is 43.19 kg/m².  Counseled about diet and exercise    Essential hypertension- (present on admission)  Assessment & Plan  4/10/2025  Patient is maintained on amlodipine and Coreg  Given the presence of advancing cirrhosis and ascites, we will DC his amlodipine  in order to bump up his Coreg in part to treat his blood pressure but also to address portal venous hypertension         VTE prophylaxis: Hold anticoagulation with a concern for hematoma, anemia.    I have performed a physical exam and reviewed and updated ROS and Plan today (4/10/2025). In review of yesterday's note (4/9/2025), there are no changes except as documented above.    Greater than 50 minutes spent prepping to see patient (e.g. review of tests) obtaining and/or reviewing separately obtained history. Performing a medically appropriate examination and/ evaluation.  Counseling and educating the patient/family/caregiver.  Ordering medications, tests, or procedures.  Referring and communicating with other health care professionals.  Documenting clinical information in EPIC.  Independently interpreting results and communicating results to patient/family/caregiver.  Care coordination.

## 2025-04-10 NOTE — CARE PLAN
The patient is Stable - Low risk of patient condition declining or worsening    Shift Goals  Clinical Goals: pt will void throughout the shift; pts fsbg will remain less than 200 during the shift  Patient Goals: feel better; sleep  Family Goals: DEBBI    Progress made toward(s) clinical / shift goals:        Problem: Diabetes Management  Goal: Patient will achieve and maintain glucose in satisfactory range  Outcome: Progressing  Note: Pts fsbg 200. Pt required 2 units of insulin.      Problem: Urinary Elimination:  Goal: Ability to reestablish a normal urinary elimination pattern will improve  Outcome: Progressing  Note: Pt able to void throughout shift.        Patient is not progressing towards the following goals:

## 2025-04-10 NOTE — DISCHARGE PLANNING
Care Transition Team Discharge Planning    Anticipated Discharge Information  Discharge Disposition: Discharged to home/self care (01)  Discharge Address: [address] [place], [place] [address] [address]  Discharge Contact Phone Number: [phone]    Discharge Plan:  Patient discussed in IDT rounds. Pending further work up today, anticipate potential medical clearance this afternoon. Anticipate no DC needs. RNCM to remain available for DCP needs.

## 2025-04-10 NOTE — CARE PLAN
The patient is Stable - Low risk of patient condition declining or worsening    Shift Goals  Clinical Goals: pt will report pain level of less thank 3. Blood sugar will remain below 150 mg/dl  Patient Goals: good respiratory hygiene, comfortable pain level of less than 3  Family Goals: DEBBI    Progress made toward(s) clinical / shift goals:  Patients pain has remained controlled throughout shift through nonpharmacological methods of pain management. Patients pain will continue to be assessed throughout shift and controlled appropriately. Patient has remained free of falls throughout shift. Patient has proper fall preventions and protocols in place at this time. Patients fall risk will continue to be assessed each shift. Patient will continue to remain free of falls and will call appropriately.   Problem: Knowledge Deficit - Standard  Goal: Patient and family/care givers will demonstrate understanding of plan of care, disease process/condition, diagnostic tests and medications  Outcome: Progressing     Problem: Lifestyle Changes  Goal: Patient's ability to identify lifestyle changes and available resources to help reduce recurrence of condition will improve  Outcome: Progressing     Problem: Psychosocial  Goal: Patient's level of anxiety will decrease  Outcome: Progressing  Goal: Spiritual and cultural needs incorporated into hospitalization  Outcome: Progressing     Problem: Pain - Standard  Goal: Alleviation of pain or a reduction in pain to the patient’s comfort goal  Outcome: Progressing     Problem: Fall Risk  Goal: Patient will remain free from falls  Outcome: Progressing     Problem: Safety  Goal: Will remain free from injury  Outcome: Progressing  Goal: Will remain free from falls  Outcome: Progressing     Problem: Diabetes Management  Goal: Patient will achieve and maintain glucose in satisfactory range  Outcome: Progressing     Problem: Urinary Elimination:  Goal: Ability to reestablish a normal urinary  elimination pattern will improve  Outcome: Progressing       Patient is not progressing towards the following goals:

## 2025-04-10 NOTE — CARE PLAN
Problem: Knowledge Deficit - Standard  Goal: Patient and family/care givers will demonstrate understanding of plan of care, disease process/condition, diagnostic tests and medications  4/9/2025 1831 by Francisca Puga, R.N.  Outcome: Progressing  4/9/2025 1645 by Francisca Puga R.N.  Outcome: Progressing   The patient is {Patient Stability:[id number]}    Shift Goals  Clinical Goals: Pt to remain free from falls. Pt to report decreased anxiety and itchiness. Pt to void by end of shift.  Patient Goals: Feel better  Family Goals: DEBBI    Progress made toward(s) clinical / shift goals:  ***    Patient is not progressing towards the following goals:

## 2025-04-10 NOTE — PROGRESS NOTES
Patient is a moderate fall risk, but refusing bed/chair alarm intervention following education provided. Bedside RN educated pt on the importance of the chair alarm to help prevent falls. Pt verbalizes understanding of needing to use to call light prior to ambulating. Non-skid socks on patient. Call light within reach. Bedside Rn notified charge RN of pt's refusal of bed/chair alarm at this time.

## 2025-04-10 NOTE — PROGRESS NOTES
Received report from day shift RN and assumed pt care at 1900. Patient assessment completed. Patient is A&Ox4 and on RA. Patient report pain of 3   /10. Heat applied. Bed locked and in the lowest position. Pt educated on importance of bed/chair alarm and refused following education. New image uploaded of bruise. Foot care provided to patient. Call light within reach. Plan of care discussed and Patient expresses no further needs at this time.

## 2025-04-10 NOTE — PROGRESS NOTES
Report received from NOC RN and assumed patient care at 0700. Patient is A&Ox 4, on RA, and pt refusing bed and chair alarm, education and risks explained. Notified charge RN.  . Patient reporting a pain level of 6/10 in his right pec where hematoma is located and radiates to his right side of back. Pt describes the pain as achy and throbbing. Pt also reports SOB and difficulty breathing when lying down. He states it is more comfortable to sleep in his chair. He also reports pain on the top of his right foot that started yesterday. Upon assessment RLE is more swollen compared to the left. No redness and is not hot to the touch. Notified MD. . Call light within reach and bed in lowest position. Reinforced the need to call for assistance. Plan of care discussed and patient does not have any further needs at this time.

## 2025-04-10 NOTE — PROGRESS NOTES
RN went to round on pt and saw bed alarm was not in place. RN asked pt if bed alarm could be placed for pt's safety. Pt declined.  Pt educated on importance of fall precautions and using the bed alarm. Pt still has non-skid socks on. This RN let Charge nurse Olga know about the refusal.

## 2025-04-11 LAB
ALBUMIN SERPL BCP-MCNC: 3.7 G/DL (ref 3.2–4.9)
ALBUMIN/GLOB SERPL: 1 G/DL
ALP SERPL-CCNC: 208 U/L (ref 30–99)
ALT SERPL-CCNC: 121 U/L (ref 2–50)
ANION GAP SERPL CALC-SCNC: 13 MMOL/L (ref 7–16)
APPEARANCE UR: CLEAR
AST SERPL-CCNC: 170 U/L (ref 12–45)
BILIRUB SERPL-MCNC: 6.8 MG/DL (ref 0.1–1.5)
BILIRUB UR QL STRIP.AUTO: ABNORMAL
BUN SERPL-MCNC: 36 MG/DL (ref 8–22)
CALCIUM ALBUM COR SERPL-MCNC: 8.9 MG/DL (ref 8.5–10.5)
CALCIUM SERPL-MCNC: 8.7 MG/DL (ref 8.5–10.5)
CHLORIDE SERPL-SCNC: 109 MMOL/L (ref 96–112)
CO2 SERPL-SCNC: 17 MMOL/L (ref 20–33)
COLOR UR: ABNORMAL
CREAT SERPL-MCNC: 1.28 MG/DL (ref 0.5–1.4)
ERYTHROCYTE [DISTWIDTH] IN BLOOD BY AUTOMATED COUNT: 76.2 FL (ref 35.9–50)
GFR SERPLBLD CREATININE-BSD FMLA CKD-EPI: 74 ML/MIN/1.73 M 2
GLOBULIN SER CALC-MCNC: 3.7 G/DL (ref 1.9–3.5)
GLUCOSE BLD STRIP.AUTO-MCNC: 186 MG/DL (ref 65–99)
GLUCOSE BLD STRIP.AUTO-MCNC: 73 MG/DL (ref 65–99)
GLUCOSE BLD STRIP.AUTO-MCNC: 77 MG/DL (ref 65–99)
GLUCOSE BLD STRIP.AUTO-MCNC: 95 MG/DL (ref 65–99)
GLUCOSE SERPL-MCNC: 156 MG/DL (ref 65–99)
GLUCOSE UR STRIP.AUTO-MCNC: NEGATIVE MG/DL
HCT VFR BLD AUTO: 33.1 % (ref 42–52)
HGB BLD-MCNC: 10.2 G/DL (ref 14–18)
KETONES UR STRIP.AUTO-MCNC: NEGATIVE MG/DL
LEUKOCYTE ESTERASE UR QL STRIP.AUTO: NEGATIVE
MAGNESIUM SERPL-MCNC: 2.1 MG/DL (ref 1.5–2.5)
MCH RBC QN AUTO: 31.3 PG (ref 27–33)
MCHC RBC AUTO-ENTMCNC: 30.8 G/DL (ref 32.3–36.5)
MCV RBC AUTO: 101.5 FL (ref 81.4–97.8)
MICRO URNS: ABNORMAL
NITRITE UR QL STRIP.AUTO: NEGATIVE
PH UR STRIP.AUTO: 6 [PH] (ref 5–8)
PHOSPHATE SERPL-MCNC: 3.8 MG/DL (ref 2.5–4.5)
PLATELET # BLD AUTO: 288 K/UL (ref 164–446)
PMV BLD AUTO: 9.4 FL (ref 9–12.9)
POTASSIUM SERPL-SCNC: 4.3 MMOL/L (ref 3.6–5.5)
PROT SERPL-MCNC: 7.4 G/DL (ref 6–8.2)
PROT UR QL STRIP: NEGATIVE MG/DL
RBC # BLD AUTO: 3.26 M/UL (ref 4.7–6.1)
RBC UR QL AUTO: NEGATIVE
SODIUM SERPL-SCNC: 139 MMOL/L (ref 135–145)
SP GR UR STRIP.AUTO: 1.02
UROBILINOGEN UR STRIP.AUTO-MCNC: 2 EU/DL
WBC # BLD AUTO: 6.3 K/UL (ref 4.8–10.8)

## 2025-04-11 PROCEDURE — 81003 URINALYSIS AUTO W/O SCOPE: CPT

## 2025-04-11 PROCEDURE — 80053 COMPREHEN METABOLIC PANEL: CPT

## 2025-04-11 PROCEDURE — 99233 SBSQ HOSP IP/OBS HIGH 50: CPT | Performed by: INTERNAL MEDICINE

## 2025-04-11 PROCEDURE — A9270 NON-COVERED ITEM OR SERVICE: HCPCS | Performed by: HOSPITALIST

## 2025-04-11 PROCEDURE — 84100 ASSAY OF PHOSPHORUS: CPT

## 2025-04-11 PROCEDURE — 700102 HCHG RX REV CODE 250 W/ 637 OVERRIDE(OP): Performed by: NURSE PRACTITIONER

## 2025-04-11 PROCEDURE — 770006 HCHG ROOM/CARE - MED/SURG/GYN SEMI*

## 2025-04-11 PROCEDURE — 700102 HCHG RX REV CODE 250 W/ 637 OVERRIDE(OP): Performed by: HOSPITALIST

## 2025-04-11 PROCEDURE — 85027 COMPLETE CBC AUTOMATED: CPT

## 2025-04-11 PROCEDURE — 83735 ASSAY OF MAGNESIUM: CPT

## 2025-04-11 PROCEDURE — 700102 HCHG RX REV CODE 250 W/ 637 OVERRIDE(OP): Performed by: INTERNAL MEDICINE

## 2025-04-11 PROCEDURE — A9270 NON-COVERED ITEM OR SERVICE: HCPCS | Performed by: NURSE PRACTITIONER

## 2025-04-11 PROCEDURE — A9270 NON-COVERED ITEM OR SERVICE: HCPCS | Performed by: INTERNAL MEDICINE

## 2025-04-11 PROCEDURE — 82962 GLUCOSE BLOOD TEST: CPT | Mod: 91

## 2025-04-11 RX ADMIN — SODIUM BICARBONATE 650 MG: 650 TABLET ORAL at 17:06

## 2025-04-11 RX ADMIN — SODIUM BICARBONATE 650 MG: 650 TABLET ORAL at 05:11

## 2025-04-11 RX ADMIN — INSULIN LISPRO 2 UNITS: 100 INJECTION, SOLUTION INTRAVENOUS; SUBCUTANEOUS at 23:11

## 2025-04-11 RX ADMIN — LACTULOSE 30 ML: 10 SOLUTION ORAL at 05:11

## 2025-04-11 RX ADMIN — PREDNISOLONE SODIUM PHOSPHATE 39.9 MG: 15 SOLUTION ORAL at 17:07

## 2025-04-11 RX ADMIN — MULTIVITAMIN TABLET 1 TABLET: TABLET at 05:11

## 2025-04-11 RX ADMIN — Medication 100 MG: at 05:11

## 2025-04-11 RX ADMIN — TAMSULOSIN HYDROCHLORIDE 0.4 MG: 0.4 CAPSULE ORAL at 08:48

## 2025-04-11 RX ADMIN — FOLIC ACID 1 MG: 1 TABLET ORAL at 05:11

## 2025-04-11 RX ADMIN — CARVEDILOL 12.5 MG: 12.5 TABLET, FILM COATED ORAL at 08:48

## 2025-04-11 RX ADMIN — CARVEDILOL 12.5 MG: 12.5 TABLET, FILM COATED ORAL at 17:07

## 2025-04-11 ASSESSMENT — ENCOUNTER SYMPTOMS
FEVER: 0
NAUSEA: 0
HEADACHES: 0
VOMITING: 0
SHORTNESS OF BREATH: 0
CHILLS: 0
BACK PAIN: 0
BLURRED VISION: 0
DOUBLE VISION: 0
COUGH: 0
SORE THROAT: 0
PALPITATIONS: 0

## 2025-04-11 ASSESSMENT — PAIN DESCRIPTION - PAIN TYPE
TYPE: ACUTE PAIN

## 2025-04-11 ASSESSMENT — LIFESTYLE VARIABLES: SUBSTANCE_ABUSE: 1

## 2025-04-11 NOTE — PROGRESS NOTES
Received report from day shift RN and assumed pt care at 1900. Patient assessment completed. Patient is A&Ox4 and on RA. Patient report pain of 4   /10. Declines medication intervention at this time. Bed locked and in the lowest position. Patient assisted to the shower. Call light within reach. Plan of care discussed and Patient expresses no further needs at this time.

## 2025-04-11 NOTE — DISCHARGE PLANNING
Case Management Discharge Planning    Admission Date: 4/3/2025  GMLOS: 4.9  ALOS: 8    6-Clicks ADL Score: 22  6-Clicks Mobility Score: 24      Anticipated Discharge Dispo: Discharge Disposition: Discharged to home/self care (01)  Discharge Address: [address] [place] [address] [place] [address]  Discharge Contact Phone Number: [phone]    DME Needed: No    Action(s) Taken: Patient was discussed in IDT rounds, all imaging came back negative. Pending result from Urinalysis. Patient will likely to discharge later today once the Urinalysis results.    Escalations Completed: None    Medically Clear: No    Next Steps: pending urinalysis    Barriers to Discharge: Medical clearance    Is the patient up for discharge tomorrow: No

## 2025-04-11 NOTE — PROGRESS NOTES
Pt moderate fall risk and refusing bed alarm. Educated pt of risks. Charge made aware. Education provided

## 2025-04-11 NOTE — CARE PLAN
The patient is Stable - Low risk of patient condition declining or worsening    Shift Goals  Clinical Goals: pts pain will be controlled to comfort level; itching will decrease; fsbg will remain less than 200  Patient Goals: breathe better; pain control; shower  Family Goals: for pt to feel better    Progress made toward(s) clinical / shift goals:        Problem: Diabetes Management  Goal: Patient will achieve and maintain glucose in satisfactory range  Outcome: Not Progressing  Note: FSBG 326. Patient required 6 units of insulin. Patient educated on diabetic diet and the need for insulin coverage. Pt verbalizes understanding.        Patient is not progressing towards the following goals:      Problem: Diabetes Management  Goal: Patient will achieve and maintain glucose in satisfactory range  Outcome: Not Progressing  Note: FSBG 326. Patient required 6 units of insulin. Patient educated on diabetic diet and the need for insulin coverage. Pt verbalizes understanding.

## 2025-04-11 NOTE — PROGRESS NOTES
Patient is a moderate fall risk, but refusing bed/chair alarm intervention. Bedside RN educated pt on the importance of the chair alarm to help prevent falls. Pt still refusing bed/chair alarm following education provided. Pt verbalizes understanding of needing to use the call light prior to ambulating. Non-skid socks on patient. Call light within reach. Bedside RN notified charge RN of pt's refusal of bed/chair alarm at this time.

## 2025-04-11 NOTE — PROGRESS NOTES
Report received from NOC RN and assumed patient care at 0700. Patient is A&Ox 4, on RA, and awake and alert . Patient reporting a pain level of 6/10. Declining pain interventions of heat or ice at this time, and no medications are on the MAR. Call light within reach and bed in lowest position. Reinforced the need to call for assistance. Plan of care discussed and patient does not have any further needs at this time.

## 2025-04-11 NOTE — CARE PLAN
The patient is Stable - Low risk of patient condition declining or worsening    Shift Goals  Clinical Goals: pt will maintain a pain level of less than three. FS less than (FS less thank 200)  Patient Goals: Good respiratory hygiene, pain control  Family Goals: Will see patient after IR procedure    Progress made toward(s) clinical / shift goals:    Pt remains free from falls    Patient is not progressing towards the following goals:

## 2025-04-11 NOTE — PROGRESS NOTES
[facility] Medicine Daily Progress Note    Date of Service  4/11/2025    Chief Complaint  Alberto Maldonado is a 39 y.o. male admitted 4/3/2025 with abdominal distension    Hospital Course  Alberto Maldonado is a 39 y.o. male with history of alcoholic hepatitis, hypertension, alcohol abuse, borderline diabetes who presented 4/3/2025 after he noticed that his eyes were yellow.  He reports that he drinks very heavily, 10 shots or more daily.  His last drink was yesterday evening.  His other complaint, his inability to achieve an erection, he has noticed this for several weeks.  Patient was noted to have acute kidney injury and acute liver injury.  He was started on CIWA protocol.  GI and nephrology were consulted.    With no cirrhosis or ascites on imaging GI did not believe presentation was consistent with hepatorenal syndrome.    Acute kidney injury was felt to be secondary to prerenal versus acute tubular necrosis.  Based on his low the score patient was started on prednisolone for alcoholic hepatitis with improvement in liver and kidney function.  Recommended a 30-day course.    CT pulmonary angiogram showed no pulm embolism.  10 cm right retroperitoneal hematoma.  Borderline cardiomegaly.    CT abdomen pelvis showed lesion at the pancreatic tail measuring 5 cm, mass versus hematoma.  Soft tissue lesion in the left upper abdomen favors accessory splenule versus lymphadenopathy versus mass.  Hepatomegaly.  Diffuse abdominal wall thickening, subcutaneous edema.    GI recommended outpatient CT pancreas and EUS to further evaluate lesions in pancreatic tail and left upper abdomen.  Recommended to continue prednisolone with for 30-day course for alcoholic hepatitis.  Outpatient GI referral was placed.    Interval Problem Update  Patient was seen and examined at bedside.  No acute events overnight. Patient is resting comfortably in bed and in no acute distress.     T.bili 22.3 --> 26.4 --> 25.1 --> 21.6 -->  19.7  MELD-Na - 29  Rivera's 46.4 --> 35.2-->30  Prednisolone 40mg daily  Cr 2.10  Consult to GI  Nephrology recs  100g albumin today    Patient was seen and examined at bedside.  I have personally reviewed and interpreted vitals, labs, and imaging.    4/8.  Afebrile.  Intermittent tachypnea.  Intermittent hypertension.  Received albumin overnight.  Noted improved kidney function and LFTs.  Denies fevers, chills.  Complains of a cough and his cough causes abdominal pain.  Complains of some shortness of breath likely secondary to IV fluid being given.  Still on room air.  IV fluid was discontinued.  Complains of right chest soreness.  Noted to have hematoma on chest and abdomen.  Discussed with GI.  Ordered CT chest, abdomen, pelvis.  Started voiding trial.  4/9.  Afebrile.  Hypertension is improved.  On room air.  Denies fevers, chills, shortness of breath.  Has some chest soreness.  Hematoma is improved.  Kidney and liver function are improving.  Started on sodium bicarb for high anion gap metabolic acidosis.  Plan of care was discussed with GI and nephrology.  4/10.  Afebrile.  Stable vitals.  On room air.  Replete phos.  Denies fever, chills.  Complains of some soreness on his chest.  Ecchymosis has spread.  Ordered repeat CT chest to assess patient's hematoma.  He has complaints of shortness of breath especially when lying flat and on exertion.  4/11.  Afebrile.  Intermittent hypertension.  On room air.  Denies any fevers, chills, chest pains.  Shortness of breath is improved.  Has some chest tenderness from hematoma.  Complains of some dysuria stating that he is not urinating as much.  Ordered urinalysis, bladder scans.  Hgb 10.6 > 9.9 > 10.3 > 9.9 > 10.2  P 150 > 176 > 214 > 255 > 288  Cr 2.1 > 1.4 > 1.25 > 1.34 > 1.28  Tbili 21.6 > 19.7 > 15.2 > 10.3 > 8.4 > 6.8    I have discussed this patient's plan of care and discharge plan at IDT rounds today with Case Management, Nursing, Nursing leadership, and other  members of the IDT team.      Code Status  Full Code    Disposition  The patient is not medically cleared for discharge to home or a post-acute facility.  Anticipate discharge to: home with close outpatient follow-up    I have placed the appropriate orders for post-discharge needs.    Review of Systems  Review of Systems   Constitutional:  Negative for chills and fever.   HENT:  Negative for congestion and sore throat.    Eyes:  Negative for blurred vision and double vision.   Respiratory:  Negative for cough and shortness of breath.    Cardiovascular:  Negative for chest pain and palpitations.   Gastrointestinal:  Negative for nausea and vomiting.   Genitourinary:  Negative for dysuria and frequency.   Musculoskeletal:  Negative for back pain.   Neurological:  Negative for headaches.   Psychiatric/Behavioral:  Positive for substance abuse.         Physical Exam  Temp:  [36.3 °C (97.3 °F)-36.6 °C (97.8 °F)] 36.3 °C (97.3 °F)  Pulse:  [70-84] 84  Resp:  [18-20] 20  BP: (133-155)/(80-94) 133/90  SpO2:  [92 %-94 %] 93 %    Physical Exam  Vitals reviewed.   HENT:      Right Ear: External ear normal.      Left Ear: External ear normal.      Nose: No congestion.      Mouth/Throat:      Pharynx: No oropharyngeal exudate.   Eyes:      General: Scleral icterus present.   Cardiovascular:      Rate and Rhythm: Normal rate.   Pulmonary:      Breath sounds: No wheezing.   Abdominal:      Tenderness: There is no abdominal tenderness. There is no guarding or rebound.   Skin:     Coloration: Skin is jaundiced.      Findings: Bruising present.      Comments: Bruising to right anterior chest   Neurological:      General: No focal deficit present.      Mental Status: He is alert.      Motor: No weakness.   Psychiatric:         Mood and Affect: Mood normal.         Behavior: Behavior normal.         Fluids    Intake/Output Summary (Last 24 hours) at 4/11/2025 0611  Last data filed at 4/10/2025 1400  Gross per 24 hour   Intake 440 ml    Output --   Net 440 ml          Laboratory  Recent Labs     04/09/25  0642 04/10/25  0136 04/11/25  0136   WBC 8.3 6.2 6.3   RBC 3.26* 3.15* 3.26*   HEMOGLOBIN 10.3* 9.9* 10.2*   HEMATOCRIT 32.0* 31.7* 33.1*   MCV 98.2* 100.6* 101.5*   MCH 31.6 31.4 31.3   MCHC 32.2* 31.2* 30.8*   RDW 80.5* 82.4* 76.2*   PLATELETCT 214 255 288   MPV 9.8 10.1 9.4     Recent Labs     04/09/25  0642 04/10/25  0136 04/11/25  0136   SODIUM 139 142 139   POTASSIUM 4.0 4.8 4.3   CHLORIDE 110 109 109   CO2 14* 22 17*   GLUCOSE 121* 175* 156*   BUN 26* 31* 36*   CREATININE 1.25 1.34 1.28   CALCIUM 8.4* 8.4* 8.7     Recent Labs     04/08/25  0857   INR 1.12               Imaging  CT-CHEST (THORAX) WITH   Final Result      1.  No significant interval change in right pectoralis minor intramuscular hematoma and some right chest wall edema.   2.  Stable nonspecific mass or large splenule in the left upper quadrant.   3.  No new abnormality.      Fleischner Society pulmonary nodule recommendations:   Not Applicable         US-EXTREMITY VENOUS LOWER BILAT   Final Result      CT-ABDOMEN-PELVIS WITH   Final Result      1.  Ill-defined somewhat hyperdense lesion inferiorly adjacent the pancreatic tail measuring approximately 5 cm, mass versus hematoma.   2.  Ovoid well-defined soft tissue lesion in the LEFT upper abdomen favors accessory splenule however adenopathy or other mass is not excluded.   3.  Hepatomegaly.   4.  Gas in the bladder is concerning for infection in the absence of recent instrumentation.   5.  Diffuse abdominal wall thickening with subcutaneous edema.      CT-CTA CHEST PULMONARY ARTERY W/ RECONS   Final Result      1.  No pulmonary embolus.   2.  A 10 cm right retropectoral hematoma.   3.  Borderline cardiomegaly. Abdomen is reported separately.            US-RUQ   Final Result      1.  Hepatic steatosis and hepatomegaly.   2.  Thickened gallbladder wall without cholelithiasis. This may be related to decompression, reactive, or  acalculus cholecystitis. Correlate with symptoms.      IR-US GUIDED PIV    (Results Pending)        Assessment/Plan  * Alcoholic cirrhosis (HCC)  Assessment & Plan  4/11/2025  Liver enzymes have more than doubled in the last month  Right upper quadrant ultrasound shows cirrhotic changes, but no evidence of acute obstruction, no ascites  Follow CMP: If she shows response to steroids and we would continue for 28 days.  If she does not, then we would stop it short.  Encourage sobriety  Hepatitis panel from February of last year showed evidence of hepatitis B vaccination, but no active viral infection.  Increase Coreg in part to treat his hypertension but also for management of ascites  MELD-Na - 29  Maddrey's 46.4 --> 35.2 --> 30  T.bili 22.3 --> 26.4 --> 25.1 --> 21.6    T.bili 22.3 --> 26.4 --> 25.1 --> 21.6 --> 19.7  MELD-Na - 29  Maddrey's 46.4 --> 35.2-->30  Prednisolone 40mg daily  Consult to GI  Nephrology recs  100g albumin today    Superficial bruising of chest wall, left, initial encounter  Assessment & Plan  4/11/2025  Bruising to left chest  Denies fall or trauma  Possibly result of patient sleeping against bedside table with underlying thrombocytopenia  Hold DVT ppx    Anemia  Assessment & Plan  4/11/2025  Hb stable    EDIN (acute kidney injury) (HCC)  Assessment & Plan  4/11/2025  Suspect ATN vs pre-renal  Minimal urine output  Improving    Alcohol withdrawal syndrome with complication (HCC)- (present on admission)  Assessment & Plan  4/11/2025  Patient has been given 1 dose of IV phenobarbital in the ED  Admit to the floor on a benzodiazepine withdrawal protocol  Start scheduled p.o. Librium  Continue thiamine folate and MVI  Low threshold to escalate care depended upon his CIWA scores    Completed EtOH withdrawal    Controlled type 2 diabetes mellitus with hyperglycemia, without long-term current use of insulin (HCC)- (present on admission)  Assessment & Plan  4/11/2025  A1c 6.7, 4 months ago  Placed on  sliding scale particularly in light of initiation of glucocorticoids for acute alcohol hepatitis    Alcohol use disorder, severe, dependence (HCC)- (present on admission)  Assessment & Plan  4/11/2025  Drinks 15 shots daily  Monitor electrolytes  CIWA     Asthma- (present on admission)  Assessment & Plan  4/11/2025  Lung exam is benign  Placed on O2 and RT protocols    Morbid obesity (HCC)- (present on admission)  Assessment & Plan  Body mass index is 43.19 kg/m².  Counseled about diet and exercise    Essential hypertension- (present on admission)  Assessment & Plan  4/11/2025  Patient is maintained on amlodipine and Coreg  Given the presence of advancing cirrhosis and ascites, we will DC his amlodipine in order to bump up his Coreg in part to treat his blood pressure but also to address portal venous hypertension         VTE prophylaxis: Hold anticoagulation with a concern for hematoma, anemia.    I have performed a physical exam and reviewed and updated ROS and Plan today (4/11/2025). In review of yesterday's note (4/10/2025), there are no changes except as documented above.    Greater than 51 minutes spent prepping to see patient (e.g. review of tests) obtaining and/or reviewing separately obtained history. Performing a medically appropriate examination and/ evaluation.  Counseling and educating the patient/family/caregiver.  Ordering medications, tests, or procedures.  Referring and communicating with other health care professionals.  Documenting clinical information in EPIC.  Independently interpreting results and communicating results to patient/family/caregiver.  Care coordination.

## 2025-04-11 NOTE — PROGRESS NOTES
"Discussed with patient about CPAP. Patient stated that he had a sleep study done at the end of February, got a mask, but still unable to get a machine. Bedside Rn asked if he would like to talk to a respiratory therapist tonight about the situation. Patient stated that he would. Bedside RN contacted RT to potentially see patient tonight. RT asked if patient would like to wear a CPAP tonight. Patient replied \"I'll try.\"  "

## 2025-04-12 ENCOUNTER — PHARMACY VISIT (OUTPATIENT)
Dept: PHARMACY | Facility: MEDICAL CENTER | Age: 40
End: 2025-04-12
Payer: MEDICARE

## 2025-04-12 VITALS
RESPIRATION RATE: 20 BRPM | DIASTOLIC BLOOD PRESSURE: 86 MMHG | TEMPERATURE: 97.6 F | OXYGEN SATURATION: 97 % | HEIGHT: 63 IN | WEIGHT: 243.83 LBS | HEART RATE: 86 BPM | BODY MASS INDEX: 43.2 KG/M2 | SYSTOLIC BLOOD PRESSURE: 138 MMHG

## 2025-04-12 PROBLEM — E11.65 CONTROLLED TYPE 2 DIABETES MELLITUS WITH HYPERGLYCEMIA, WITHOUT LONG-TERM CURRENT USE OF INSULIN (HCC): Status: RESOLVED | Noted: 2023-11-06 | Resolved: 2025-04-12

## 2025-04-12 PROBLEM — N17.9 AKI (ACUTE KIDNEY INJURY) (HCC): Status: RESOLVED | Noted: 2025-04-04 | Resolved: 2025-04-12

## 2025-04-12 PROBLEM — F10.939 ALCOHOL WITHDRAWAL SYNDROME WITH COMPLICATION (HCC): Status: RESOLVED | Noted: 2025-03-02 | Resolved: 2025-04-12

## 2025-04-12 LAB
ALBUMIN SERPL BCP-MCNC: 4.1 G/DL (ref 3.2–4.9)
ALBUMIN/GLOB SERPL: 1.1 G/DL
ALP SERPL-CCNC: 185 U/L (ref 30–99)
ALT SERPL-CCNC: 152 U/L (ref 2–50)
ANION GAP SERPL CALC-SCNC: 10 MMOL/L (ref 7–16)
AST SERPL-CCNC: 196 U/L (ref 12–45)
BILIRUB SERPL-MCNC: 7 MG/DL (ref 0.1–1.5)
BUN SERPL-MCNC: 29 MG/DL (ref 8–22)
CALCIUM ALBUM COR SERPL-MCNC: 9 MG/DL (ref 8.5–10.5)
CALCIUM SERPL-MCNC: 9.1 MG/DL (ref 8.5–10.5)
CHLORIDE SERPL-SCNC: 107 MMOL/L (ref 96–112)
CO2 SERPL-SCNC: 24 MMOL/L (ref 20–33)
CREAT SERPL-MCNC: 1.05 MG/DL (ref 0.5–1.4)
ERYTHROCYTE [DISTWIDTH] IN BLOOD BY AUTOMATED COUNT: 73 FL (ref 35.9–50)
GFR SERPLBLD CREATININE-BSD FMLA CKD-EPI: 94 ML/MIN/1.73 M 2
GLOBULIN SER CALC-MCNC: 3.8 G/DL (ref 1.9–3.5)
GLUCOSE BLD STRIP.AUTO-MCNC: 94 MG/DL (ref 65–99)
GLUCOSE BLD STRIP.AUTO-MCNC: 96 MG/DL (ref 65–99)
GLUCOSE SERPL-MCNC: 105 MG/DL (ref 65–99)
HCT VFR BLD AUTO: 36.4 % (ref 42–52)
HGB BLD-MCNC: 11 G/DL (ref 14–18)
MAGNESIUM SERPL-MCNC: 2.3 MG/DL (ref 1.5–2.5)
MCH RBC QN AUTO: 31.1 PG (ref 27–33)
MCHC RBC AUTO-ENTMCNC: 30.2 G/DL (ref 32.3–36.5)
MCV RBC AUTO: 102.8 FL (ref 81.4–97.8)
PHOSPHATE SERPL-MCNC: 3.7 MG/DL (ref 2.5–4.5)
PLATELET # BLD AUTO: 307 K/UL (ref 164–446)
PMV BLD AUTO: 9.2 FL (ref 9–12.9)
POTASSIUM SERPL-SCNC: 4.3 MMOL/L (ref 3.6–5.5)
PROT SERPL-MCNC: 7.9 G/DL (ref 6–8.2)
RBC # BLD AUTO: 3.54 M/UL (ref 4.7–6.1)
SODIUM SERPL-SCNC: 141 MMOL/L (ref 135–145)
WBC # BLD AUTO: 6.9 K/UL (ref 4.8–10.8)

## 2025-04-12 PROCEDURE — 85027 COMPLETE CBC AUTOMATED: CPT

## 2025-04-12 PROCEDURE — 82962 GLUCOSE BLOOD TEST: CPT

## 2025-04-12 PROCEDURE — 83735 ASSAY OF MAGNESIUM: CPT

## 2025-04-12 PROCEDURE — 80053 COMPREHEN METABOLIC PANEL: CPT

## 2025-04-12 PROCEDURE — 84100 ASSAY OF PHOSPHORUS: CPT

## 2025-04-12 PROCEDURE — A9270 NON-COVERED ITEM OR SERVICE: HCPCS | Performed by: INTERNAL MEDICINE

## 2025-04-12 PROCEDURE — A9270 NON-COVERED ITEM OR SERVICE: HCPCS | Performed by: HOSPITALIST

## 2025-04-12 PROCEDURE — 99239 HOSP IP/OBS DSCHRG MGMT >30: CPT | Performed by: INTERNAL MEDICINE

## 2025-04-12 PROCEDURE — 700102 HCHG RX REV CODE 250 W/ 637 OVERRIDE(OP): Performed by: INTERNAL MEDICINE

## 2025-04-12 PROCEDURE — 700102 HCHG RX REV CODE 250 W/ 637 OVERRIDE(OP): Performed by: HOSPITALIST

## 2025-04-12 PROCEDURE — RXMED WILLOW AMBULATORY MEDICATION CHARGE: Performed by: INTERNAL MEDICINE

## 2025-04-12 RX ORDER — SULFAMETHOXAZOLE AND TRIMETHOPRIM 800; 160 MG/1; MG/1
1 TABLET ORAL
Qty: 10 TABLET | Refills: 0 | Status: ACTIVE | OUTPATIENT
Start: 2025-04-12 | End: 2025-05-05

## 2025-04-12 RX ORDER — HYDROXYZINE HYDROCHLORIDE 25 MG/1
25 TABLET, FILM COATED ORAL 3 TIMES DAILY PRN
Qty: 30 TABLET | Refills: 0 | Status: SHIPPED | OUTPATIENT
Start: 2025-04-12

## 2025-04-12 RX ORDER — TAMSULOSIN HYDROCHLORIDE 0.4 MG/1
0.4 CAPSULE ORAL
Qty: 30 CAPSULE | Refills: 0 | Status: SHIPPED | OUTPATIENT
Start: 2025-04-13

## 2025-04-12 RX ORDER — HYDROXYZINE HYDROCHLORIDE 50 MG/1
25 TABLET, FILM COATED ORAL 3 TIMES DAILY PRN
Status: DISCONTINUED | OUTPATIENT
Start: 2025-04-12 | End: 2025-04-12 | Stop reason: HOSPADM

## 2025-04-12 RX ORDER — CARVEDILOL 12.5 MG/1
12.5 TABLET ORAL 2 TIMES DAILY WITH MEALS
Qty: 60 TABLET | Refills: 0 | Status: SHIPPED | OUTPATIENT
Start: 2025-04-12

## 2025-04-12 RX ORDER — PREDNISOLONE SODIUM PHOSPHATE 15 MG/5ML
40 SOLUTION ORAL DAILY
Qty: 306 ML | Refills: 0 | Status: SHIPPED | OUTPATIENT
Start: 2025-04-12 | End: 2025-05-05

## 2025-04-12 RX ADMIN — TAMSULOSIN HYDROCHLORIDE 0.4 MG: 0.4 CAPSULE ORAL at 09:21

## 2025-04-12 RX ADMIN — HYDROXYZINE HYDROCHLORIDE 25 MG: 50 TABLET, FILM COATED ORAL at 11:45

## 2025-04-12 RX ADMIN — Medication 100 MG: at 04:43

## 2025-04-12 RX ADMIN — SODIUM BICARBONATE 650 MG: 650 TABLET ORAL at 04:43

## 2025-04-12 RX ADMIN — MULTIVITAMIN TABLET 1 TABLET: TABLET at 04:42

## 2025-04-12 RX ADMIN — FOLIC ACID 1 MG: 1 TABLET ORAL at 04:42

## 2025-04-12 RX ADMIN — CARVEDILOL 12.5 MG: 12.5 TABLET, FILM COATED ORAL at 09:20

## 2025-04-12 ASSESSMENT — PAIN DESCRIPTION - PAIN TYPE
TYPE: ACUTE PAIN

## 2025-04-12 NOTE — CARE PLAN
The patient is Stable - Low risk of patient condition declining or worsening    Shift Goals  Clinical Goals: pt will wemain free from falls  Patient Goals: updates  Family Goals: DEBBI    Progress made toward(s) clinical / shift goals:    Pt remains free from falls    Patient is not progressing towards the following goals:

## 2025-04-12 NOTE — PROGRESS NOTES
Discharge orders received.  Patient arrived to the discharge lounge.  PIV removed by discharge RN. Meds to beds medications verified by discharge RN, bag of medications given to patient.  Instructions given, medications reviewed and general discharge education provided to patient.  Follow up appointments discussed.  Patient verbalized understanding of dc instructions and prescriptions.  Patient signed discharge instructions.  Patient verbalized he had all belongings with him, Denied having any home medications locked in our inpatient pharmacy that  he needs back. Patient wheeled from discharge lounge to private vehicle. Patient left via car with family to home in stable condition.

## 2025-04-12 NOTE — CARE PLAN
The patient is Stable - Low risk of patient condition declining or worsening    Shift Goals  Clinical Goals: maintain blood sugar level within normal level, maintain stable respiratory status  Patient Goals: Rest for the night  Family Goals: DEBBI    Progress made toward(s) clinical / shift goals:        Problem: Pain - Standard  Goal: Alleviation of pain or a reduction in pain to the patient’s comfort goal  Outcome: Progressing  Note: No reports of pain thus far this shift. Educated patient to report any new onset of pain. Monitoring of patient's pain level to continue for the rest of the shift.     Problem: Fall Risk  Goal: Patient will remain free from falls  Outcome: Progressing  Note: Safety education given, verbalized understanding. Bed placed on low position but patient refused to have bed alarm on despite staff constantly educating him for safety reasons,. Care items within reach, hourly rounding done. Remains free from fall. Monitoring to continue for the rest of the shift.     Problem: Diabetes Management  Goal: Patient will achieve and maintain glucose in satisfactory range  Outcome: Progressing  Note: No signs and symptoms of hyperglycemia manifested. Blood sugar level managed through insulin sliding scale. Monitored for any signs and symptoms of hypoglycemia after insulin was given, no symptoms exhibited. Monitoring to run through the entire shift.       Patient is not progressing towards the following goals:

## 2025-04-12 NOTE — DISCHARGE INSTRUCTIONS
Discharge Instructions per EDWAR ZhangO.    DIET: Diet Order Diet: Regular    ACTIVITY: As tolerated    A proper diet that is low in grease, fat, and salt, along with 30 minutes of exercise per day will lead to weight loss, and better controlled blood sugar and blood pressure.    DIAGNOSIS: Alcoholic cirrhosis (HCC)    Follow up with your Primary Care Provider Cyrus Tolliver P.A.-C. as scheduled or sooner if your symptoms persist or worsen.  Return to Emergency Room for sever chest pain, shortness of breath, signs of a stroke, or any other emergencies.

## 2025-04-12 NOTE — PROGRESS NOTES
Pt transferred to [facility] in  with transport escort. Pt is AO4 and stable. Pt's home medications have been returned to pt

## 2025-04-12 NOTE — PROGRESS NOTES
Assumed care of patient at 1900. Received Report from ALF [facility]. Patient A&Ox4, on room air and not on apparent distress. Reporting a pain level of 0 at this time.  PIV on right forearm- checked and no signs of phlebitis. Call light within reach, fall prevention education given, belongings within reach, bed in lowest position. All questions answered, plan of care discussed and pt verbalized understanding.

## 2025-04-12 NOTE — PROGRESS NOTES
Report received from NOC RN and assumed patient care at 0700. Patient is A&Ox 4, on RA, and awake and alert . Patient reporting a pain level of 3/10. Call light within reach and bed in lowest position. Reinforced the need to call for assistance. Plan of care discussed and patient does not have any further needs at this time.

## 2025-04-12 NOTE — DISCHARGE SUMMARY
"Discharge Summary    CHIEF COMPLAINT ON ADMISSION  Chief Complaint   Patient presents with    Detox     Pt states he help to detox off alcohol; pt states he drinks 10-15 \"shot bottles\" a day; last drink was yesterday around 1500    Jaundice     Yellow sclera of eyes x2 days       Reason for Admission  detox     Admission Date  4/3/2025    CODE STATUS  Full Code    HPI & HOSPITAL COURSE  Alberto Maldonado is a 39 y.o. male with history of alcoholic hepatitis, hypertension, alcohol abuse, borderline diabetes who presented 4/3/2025 after he noticed that his eyes were yellow.  He reports that he drinks very heavily, 10 shots or more daily.  His last drink was yesterday evening.  His other complaint, his inability to achieve an erection, he has noticed this for several weeks.  Patient was noted to have acute kidney injury and acute liver injury.  He was started on CIWA protocol.  GI and nephrology were consulted.    With no cirrhosis or ascites on imaging GI did not believe presentation was consistent with hepatorenal syndrome.    Acute kidney injury was felt to be secondary to prerenal versus acute tubular necrosis.  Based on his low the score patient was started on prednisolone for alcoholic hepatitis with improvement in liver and kidney function.  Recommended a 30-day course.    CT pulmonary angiogram showed no pulm embolism.  10 cm right retroperitoneal hematoma.  Borderline cardiomegaly.    CT abdomen pelvis showed lesion at the pancreatic tail measuring 5 cm, mass versus hematoma.  Soft tissue lesion in the left upper abdomen favors accessory splenule versus lymphadenopathy versus mass.  Hepatomegaly.  Diffuse abdominal wall thickening, subcutaneous edema.    GI recommended outpatient CT pancreas and EUS to further evaluate lesions in pancreatic tail and left upper abdomen.  Recommended to continue prednisolone with for 30-day course for alcoholic hepatitis.  Because of prolonged course he was started on PJP " prophylaxis with Bactrim.  Outpatient GI referral was placed.    Patient did have significant chest wall hematoma.  This was monitored with CT as above.  Hemoglobin remained stable.  He did report some chest soreness which continued to improve.  The hematoma was outlined with marker and spreading should continue to be monitored as outpatient.  Patient did have urinary retention and will be discharged with tamsulosin.    Patient did request an outpatient nutrition consult.  He was counseled about diet and exercise extensively.  Counseled extensively about alcohol cessation.  Patient does have history of sleep apnea.  Encourage compliance with CPAP    Medically stable to discharge home.  Follow-up with primary care as outpatient.  Establish with GI as outpatient.    Therefore, he is discharged in fair and stable condition to home with close outpatient follow-up.    The patient met 2-midnight criteria for an inpatient stay at the time of discharge.    Discharge Date  4/12/2025      FOLLOW UP ITEMS POST DISCHARGE  None    DISCHARGE DIAGNOSES  Principal Problem:    Alcoholic cirrhosis (HCC) (POA: Yes)  Active Problems:    Essential hypertension (Chronic) (POA: Yes)    BOLIVAR (obstructive sleep apnea) (POA: Yes)      Overview: Awaiting to see sleep specialist    Morbid obesity (HCC) (Chronic) (POA: Yes)    Asthma (POA: Yes)    Alcohol use disorder, severe, dependence (HCC) (POA: Yes)    Anemia (POA: Yes)    Superficial bruising of chest wall, left, initial encounter (POA: Yes)  Resolved Problems:    Controlled type 2 diabetes mellitus with hyperglycemia, without long-term current use of insulin (HCC) (POA: Yes)    Alcohol withdrawal syndrome with complication (HCC) (POA: Yes)    EDIN (acute kidney injury) (HCC) (POA: Yes)      FOLLOW UP  Future Appointments   Date Time Provider Department Center   6/12/2025  4:00 PM [address]     [facility]  [address] [facility]  [address]  [phone]  Follow up      [facility]  [id number]  [place] [address]  [phone]  Follow up      Cyrus Tolliver P.A.-C.  [address] [place] [address] [phone]    Follow up        MEDICATIONS ON DISCHARGE     Medication List        START taking these medications        Instructions   hydrOXYzine HCl 25 MG Tabs  Commonly known as: Atarax   Take 1 Tablet by mouth 3 times a day as needed for Anxiety or Itching.  Dose: 25 mg     prednisoLONE sodium phosphate 15 mg/5mL oral solution  Commonly known as: Pediapred   Take 13.3 mL by mouth every day for 23 days.  Dose: 39.9 mg     sulfamethoxazole-trimethoprim 800-160 MG tablet  Commonly known as: Bactrim DS   Take 1 Tablet by mouth 3 times a week for 23 days.  Dose: 1 Tablet     tamsulosin 0.4 MG capsule  Start taking on: April 13, 2025  Commonly known as: Flomax   Take 1 Capsule by mouth 1/2 hour after breakfast.  Dose: 0.4 mg            CHANGE how you take these medications        Instructions   carvedilol 12.5 MG Tabs  What changed:   medication strength  how much to take  Commonly known as: Coreg   Take 1 Tablet by mouth 2 times a day with meals.  Dose: 12.5 mg            CONTINUE taking these medications        Instructions   folic acid 1 MG Tabs  Commonly known as: Folvite   Take 1 Tablet by mouth every day.  Dose: 1 mg     One-Daily Multi-Vitamin Tabs   Take 1 Tablet by mouth every day.  Dose: 1 Tablet     thiamine 100 MG tablet  Commonly known as: Thiamine   Take 1 Tablet by mouth every day.  Dose: 100 mg            STOP taking these medications      amLODIPine 5 MG Tabs  Commonly known as: Norvasc              Allergies  No Known Allergies    DIET  Orders Placed This Encounter   Procedures    Diet Order Diet: Regular     Standing Status:   Standing     Number of Occurrences:   1     Diet::   Regular [1]       ACTIVITY  As tolerated.  Weight bearing as tolerated    CONSULTATIONS  GI  Neph    PROCEDURES  none    LABORATORY  Lab Results   Component Value Date    SODIUM 141 04/12/2025    POTASSIUM 4.3 04/12/2025    CHLORIDE 107 04/12/2025    CO2 24 04/12/2025    GLUCOSE 105 (H) 04/12/2025    BUN 29 (H) 04/12/2025    CREATININE 1.05 04/12/2025        Lab Results   Component Value Date    WBC 6.9 04/12/2025    HEMOGLOBIN 11.0 (L) 04/12/2025    HEMATOCRIT 36.4 (L) 04/12/2025    PLATELETCT 307 04/12/2025        I discussed medications and side effects with the patient.  I discussed prognosis and importance of medical compliance with the patient.  I counseled the patient about diet, exercise, weight loss, smoking cessation, and life style modifications.  All questions and concerns have been addressed.  Total time of the discharge process was 38 minutes.

## 2025-04-16 NOTE — Clinical Note
REFERRAL APPROVAL NOTICE         Sent on April 16, 2025                   Alberto Maldonado  [address] [place] [address]                   Dear Mr. Maldonado,    After a careful review of the medical information and benefit coverage, [facility] has processed your referral. See below for additional details.    If applicable, you must be actively enrolled with your insurance for coverage of the authorized service. If you have any questions regarding your coverage, please contact your insurance directly.    REFERRAL INFORMATION   Referral #:  [id number]  Referred-To Provider    Referred-By Provider:  Gastroenterology    Michelle Edwards, STAN,  APRN   [facility]      [address] [place] [address] [address] [address]  [phone] [address] [address] [phone]    Referral Start Date:  04/09/2025  Referral End Date:   04/09/2026             SCHEDULING  If you do not already have an appointment, please call [phone] to make an appointment.     MORE INFORMATION  If you do not already have a [employer] account, sign up at: [email or web address]  You can access your medical information, make appointments, see lab results, billing information, and more.  If you have questions regarding this referral, please contact  the [facility] Referrals department at:             [phone]. Monday - Friday 8:00AM - 5:00PM.     Sincerely,    [facility]

## 2025-04-16 NOTE — Clinical Note
REFERRAL APPROVAL NOTICE         Sent on April 16, 2025                   Alberto Maldonado  [address] [place] [address]                   Dear Mr. Maldonado,    After a careful review of the medical information and benefit coverage, [facility] has processed your referral. See below for additional details.    If applicable, you must be actively enrolled with your insurance for coverage of the authorized service. If you have any questions regarding your coverage, please contact your insurance directly.    REFERRAL INFORMATION   Referral #:  [id number]  Referred-To Department    Referred-By Provider:  Concepción Wilson D.O.   [facility]      [address] [facility] [id number] [phone] [address] [facility] [address] [phone]    Referral Start Date:  04/12/2025  Referral End Date:   04/12/2026             SCHEDULING  If you do not already have an appointment, please call [phone] to make an appointment.     MORE INFORMATION  If you do not already have a [employer] account, sign up at: [email or web address]  You can access your medical information, make appointments, see lab results, billing information, and more.  If you have questions regarding this referral, please contact  the [facility] Referrals department at:             [phone]. Monday - Friday 8:00AM - 5:00PM.     Sincerely,    [facility]

## 2025-04-26 ENCOUNTER — HOSPITAL ENCOUNTER (OUTPATIENT)
Dept: LAB | Facility: MEDICAL CENTER | Age: 40
End: 2025-04-26
Attending: OBSTETRICS & GYNECOLOGY
Payer: COMMERCIAL

## 2025-04-26 ENCOUNTER — HOSPITAL ENCOUNTER (OUTPATIENT)
Dept: LAB | Facility: MEDICAL CENTER | Age: 40
End: 2025-04-26
Attending: STUDENT IN AN ORGANIZED HEALTH CARE EDUCATION/TRAINING PROGRAM
Payer: COMMERCIAL

## 2025-04-26 LAB
ALBUMIN SERPL BCP-MCNC: 3.7 G/DL (ref 3.2–4.9)
ALBUMIN SERPL BCP-MCNC: 3.8 G/DL (ref 3.2–4.9)
ALBUMIN/GLOB SERPL: 1.1 G/DL
ALBUMIN/GLOB SERPL: 1.2 G/DL
ALP SERPL-CCNC: 146 U/L (ref 30–99)
ALP SERPL-CCNC: 148 U/L (ref 30–99)
ALT SERPL-CCNC: 97 U/L (ref 2–50)
ALT SERPL-CCNC: 99 U/L (ref 2–50)
ANION GAP SERPL CALC-SCNC: 12 MMOL/L (ref 7–16)
ANION GAP SERPL CALC-SCNC: 13 MMOL/L (ref 7–16)
ANISOCYTOSIS BLD QL SMEAR: ABNORMAL
ANISOCYTOSIS BLD QL SMEAR: ABNORMAL
AST SERPL-CCNC: 119 U/L (ref 12–45)
AST SERPL-CCNC: 122 U/L (ref 12–45)
BASOPHILS # BLD AUTO: 0.4 % (ref 0–1.8)
BASOPHILS # BLD AUTO: 0.8 % (ref 0–1.8)
BASOPHILS # BLD: 0.03 K/UL (ref 0–0.12)
BASOPHILS # BLD: 0.05 K/UL (ref 0–0.12)
BILIRUB SERPL-MCNC: 4.1 MG/DL (ref 0.1–1.5)
BILIRUB SERPL-MCNC: 4.2 MG/DL (ref 0.1–1.5)
BUN SERPL-MCNC: 25 MG/DL (ref 8–22)
BUN SERPL-MCNC: 26 MG/DL (ref 8–22)
CALCIUM ALBUM COR SERPL-MCNC: 8.6 MG/DL (ref 8.5–10.5)
CALCIUM ALBUM COR SERPL-MCNC: 8.8 MG/DL (ref 8.5–10.5)
CALCIUM SERPL-MCNC: 8.4 MG/DL (ref 8.5–10.5)
CALCIUM SERPL-MCNC: 8.6 MG/DL (ref 8.5–10.5)
CANCER AG19-9 SERPL-ACNC: 541 U/ML (ref 0–35)
CHLORIDE SERPL-SCNC: 97 MMOL/L (ref 96–112)
CHLORIDE SERPL-SCNC: 98 MMOL/L (ref 96–112)
CO2 SERPL-SCNC: 30 MMOL/L (ref 20–33)
CO2 SERPL-SCNC: 31 MMOL/L (ref 20–33)
COMMENT 1642: NORMAL
COMMENT 1642: NORMAL
CREAT SERPL-MCNC: 0.84 MG/DL (ref 0.5–1.4)
CREAT SERPL-MCNC: 0.92 MG/DL (ref 0.5–1.4)
EOSINOPHIL # BLD AUTO: 0.01 K/UL (ref 0–0.51)
EOSINOPHIL # BLD AUTO: 0.01 K/UL (ref 0–0.51)
EOSINOPHIL NFR BLD: 0.1 % (ref 0–6.9)
EOSINOPHIL NFR BLD: 0.2 % (ref 0–6.9)
ERYTHROCYTE [DISTWIDTH] IN BLOOD BY AUTOMATED COUNT: 74.4 FL (ref 35.9–50)
ERYTHROCYTE [DISTWIDTH] IN BLOOD BY AUTOMATED COUNT: 74.6 FL (ref 35.9–50)
FOLATE SERPL-MCNC: 6.6 NG/ML
GFR SERPLBLD CREATININE-BSD FMLA CKD-EPI: 110 ML/MIN/1.73 M 2
GFR SERPLBLD CREATININE-BSD FMLA CKD-EPI: 115 ML/MIN/1.73 M 2
GLOBULIN SER CALC-MCNC: 3.3 G/DL (ref 1.9–3.5)
GLOBULIN SER CALC-MCNC: 3.5 G/DL (ref 1.9–3.5)
GLUCOSE SERPL-MCNC: 153 MG/DL (ref 65–99)
GLUCOSE SERPL-MCNC: 156 MG/DL (ref 65–99)
HCT VFR BLD AUTO: 43 % (ref 42–52)
HCT VFR BLD AUTO: 43.1 % (ref 42–52)
HGB BLD-MCNC: 13.5 G/DL (ref 14–18)
HGB BLD-MCNC: 13.5 G/DL (ref 14–18)
IMM GRANULOCYTES # BLD AUTO: 0.01 K/UL (ref 0–0.11)
IMM GRANULOCYTES # BLD AUTO: 0.01 K/UL (ref 0–0.11)
IMM GRANULOCYTES NFR BLD AUTO: 0.1 % (ref 0–0.9)
IMM GRANULOCYTES NFR BLD AUTO: 0.2 % (ref 0–0.9)
INR PPP: 1.38 (ref 0.87–1.13)
INR PPP: 1.39 (ref 0.87–1.13)
IRON SATN MFR SERPL: 29 % (ref 15–55)
IRON SERPL-MCNC: 82 UG/DL (ref 50–180)
LIPASE SERPL-CCNC: 38 U/L (ref 11–82)
LYMPHOCYTES # BLD AUTO: 1.61 K/UL (ref 1–4.8)
LYMPHOCYTES # BLD AUTO: 1.72 K/UL (ref 1–4.8)
LYMPHOCYTES NFR BLD: 24.2 % (ref 22–41)
LYMPHOCYTES NFR BLD: 24.6 % (ref 22–41)
MACROCYTES BLD QL SMEAR: ABNORMAL
MACROCYTES BLD QL SMEAR: ABNORMAL
MAGNESIUM SERPL-MCNC: 1.8 MG/DL (ref 1.5–2.5)
MCH RBC QN AUTO: 32.3 PG (ref 27–33)
MCH RBC QN AUTO: 32.5 PG (ref 27–33)
MCHC RBC AUTO-ENTMCNC: 31.3 G/DL (ref 32.3–36.5)
MCHC RBC AUTO-ENTMCNC: 31.4 G/DL (ref 32.3–36.5)
MCV RBC AUTO: 103.1 FL (ref 81.4–97.8)
MCV RBC AUTO: 103.4 FL (ref 81.4–97.8)
MONOCYTES # BLD AUTO: 0.78 K/UL (ref 0–0.85)
MONOCYTES # BLD AUTO: 0.79 K/UL (ref 0–0.85)
MONOCYTES NFR BLD AUTO: 11.3 % (ref 0–13.4)
MONOCYTES NFR BLD AUTO: 11.7 % (ref 0–13.4)
MORPHOLOGY BLD-IMP: NORMAL
MORPHOLOGY BLD-IMP: NORMAL
NEUTROPHILS # BLD AUTO: 4.2 K/UL (ref 1.82–7.42)
NEUTROPHILS # BLD AUTO: 4.44 K/UL (ref 1.82–7.42)
NEUTROPHILS NFR BLD: 62.9 % (ref 44–72)
NEUTROPHILS NFR BLD: 63.5 % (ref 44–72)
NRBC # BLD AUTO: 0 K/UL
NRBC # BLD AUTO: 0 K/UL
NRBC BLD-RTO: 0 /100 WBC (ref 0–0.2)
NRBC BLD-RTO: 0 /100 WBC (ref 0–0.2)
PHOSPHATE SERPL-MCNC: 3.4 MG/DL (ref 2.5–4.5)
PLATELET # BLD AUTO: 305 K/UL (ref 164–446)
PLATELET # BLD AUTO: 308 K/UL (ref 164–446)
PLATELET BLD QL SMEAR: NORMAL
PLATELET BLD QL SMEAR: NORMAL
PMV BLD AUTO: 8.6 FL (ref 9–12.9)
PMV BLD AUTO: 8.7 FL (ref 9–12.9)
POLYCHROMASIA BLD QL SMEAR: NORMAL
POTASSIUM SERPL-SCNC: 3.8 MMOL/L (ref 3.6–5.5)
POTASSIUM SERPL-SCNC: 3.8 MMOL/L (ref 3.6–5.5)
PROT SERPL-MCNC: 7.1 G/DL (ref 6–8.2)
PROT SERPL-MCNC: 7.2 G/DL (ref 6–8.2)
PROTHROMBIN TIME: 17 SEC (ref 12–14.6)
PROTHROMBIN TIME: 17.1 SEC (ref 12–14.6)
RBC # BLD AUTO: 4.16 M/UL (ref 4.7–6.1)
RBC # BLD AUTO: 4.18 M/UL (ref 4.7–6.1)
RBC BLD AUTO: PRESENT
RBC BLD AUTO: PRESENT
SODIUM SERPL-SCNC: 140 MMOL/L (ref 135–145)
SODIUM SERPL-SCNC: 141 MMOL/L (ref 135–145)
TIBC SERPL-MCNC: 282 UG/DL (ref 250–450)
UIBC SERPL-MCNC: 200 UG/DL (ref 110–370)
VIT B12 SERPL-MCNC: 657 PG/ML (ref 211–911)
WBC # BLD AUTO: 6.7 K/UL (ref 4.8–10.8)
WBC # BLD AUTO: 7 K/UL (ref 4.8–10.8)

## 2025-04-26 PROCEDURE — 86301 IMMUNOASSAY TUMOR CA 19-9: CPT

## 2025-04-26 PROCEDURE — 83690 ASSAY OF LIPASE: CPT

## 2025-04-26 PROCEDURE — 36415 COLL VENOUS BLD VENIPUNCTURE: CPT

## 2025-04-26 PROCEDURE — 80053 COMPREHEN METABOLIC PANEL: CPT

## 2025-04-26 PROCEDURE — 80053 COMPREHEN METABOLIC PANEL: CPT | Mod: 91

## 2025-04-26 PROCEDURE — 82746 ASSAY OF FOLIC ACID SERUM: CPT

## 2025-04-26 PROCEDURE — 85610 PROTHROMBIN TIME: CPT | Mod: 91

## 2025-04-26 PROCEDURE — 83735 ASSAY OF MAGNESIUM: CPT

## 2025-04-26 PROCEDURE — 83550 IRON BINDING TEST: CPT

## 2025-04-26 PROCEDURE — 85025 COMPLETE CBC W/AUTO DIFF WBC: CPT | Mod: 91

## 2025-04-26 PROCEDURE — 83540 ASSAY OF IRON: CPT

## 2025-04-26 PROCEDURE — 85025 COMPLETE CBC W/AUTO DIFF WBC: CPT

## 2025-04-26 PROCEDURE — 82607 VITAMIN B-12: CPT

## 2025-04-26 PROCEDURE — 84100 ASSAY OF PHOSPHORUS: CPT

## 2025-04-26 PROCEDURE — 85610 PROTHROMBIN TIME: CPT

## 2025-05-05 NOTE — DISCHARGE PLANNING
"In the case of an emergency, pt's legal NOK are his parents but prefers to have his brother Freddy as his emergency contact [phone].     RN ALICIA met with Alberto at bedside and obtained the information used in this assessment. He verified accuracy of facesheet; patient lives in a 2 story apartment with his parents, brother, TRAM and their kids.  Prior to current hospitalization, pt was independent with ADLS/IADLS. Pt drives and is able to attend necessary MD appointments. Patient has no financial concerns. Pt has good support from his family. Pt endorses EtOH abuse and denies any dx of mh. He wa provided with alcohol cessation resources and states he was sober for about a year, but \"peer pressure and a break-up\" led him to drinking again. He reports there is a family trip to the [facility] coming up and hopeful he can still go. He denies any needs at home.      Care Transition Team Assessment    Information Source  Orientation Level: Oriented X4  Information Given By: Patient  Who is responsible for making decisions for patient? : Patient    Readmission Evaluation  Is this a readmission?: No    Elopement Risk  Legal Hold: No  Ambulatory or Self Mobile in Wheelchair: Yes  Disoriented: No  Psychiatric Symptoms: None  History of Wandering: No  Elopement this Admit: No  Vocalizing Wanting to Leave: No  Displays Behaviors, Body Language Wanting to Leave: No-Not at Risk for Elopement    Interdisciplinary Discharge Planning  Does Admitting Nurse Feel This Could be a Complex Discharge?: No  Primary Care Physician: n/a  Lives with - Patient's Self Care Capacity: Parents, Related Adult  Patient or legal guardian wants to designate a caregiver: No  Support Systems: Family Member(s)  Housing / Facility: 2 Story Apartment / Condo  Able to Return to Previous ADL's: Yes  Mobility Issues: No  Prior Services: None  Patient Prefers to be Discharged to:: home  Assistance Needed: No  Durable Medical Equipment: Not " 5/5/25: Appt scheduled - will follow up after discharge    Applicable    Discharge Preparedness  What is your plan after discharge?: Home with help  What are your discharge supports?: Parent, Sibling  Prior Functional Level: Ambulatory, Drives Self, Independent with Activities of Daily Living, Independent with Medication Management  Difficulity with ADLs: None  Difficulity with IADLs: None    Functional Assesment  Prior Functional Level: Ambulatory, Drives Self, Independent with Activities of Daily Living, Independent with Medication Management    Finances  Financial Barriers to Discharge: No  Prescription Coverage: Yes    Vision / Hearing Impairment  Vision Impairment : No  Hearing Impairment : No    Values / Beliefs / Concerns  Values / Beliefs Concerns : No    Advance Directive  Advance Directive?: None  Advance Directive offered?: AD Booklet refused    Domestic Abuse  Have you ever been the victim of abuse or violence?: No  Physical Abuse or Sexual Abuse: No  Verbal Abuse or Emotional Abuse: No  Possible Abuse/Neglect Reported to:: Not Applicable    Psychological Assessment  History of Substance Abuse: Alcohol  History of Psychiatric Problems: No    Discharge Risks or Barriers  Discharge risks or barriers?: No PCP, Substance abuse  Patient risk factors: Substance abuse    Anticipated Discharge Information  Discharge Disposition: Discharged to home/self care (01)

## 2025-05-28 ENCOUNTER — HOSPITAL ENCOUNTER (INPATIENT)
Facility: MEDICAL CENTER | Age: 40
End: 2025-05-28
Attending: STUDENT IN AN ORGANIZED HEALTH CARE EDUCATION/TRAINING PROGRAM | Admitting: INTERNAL MEDICINE
Payer: COMMERCIAL

## 2025-05-28 DIAGNOSIS — K76.82 HEPATIC ENCEPHALOPATHY (HCC): Primary | ICD-10-CM

## 2025-05-28 LAB
ALBUMIN SERPL BCP-MCNC: 2.7 G/DL (ref 3.2–4.9)
ALBUMIN/GLOB SERPL: 0.8 G/DL
ALP SERPL-CCNC: 498 U/L (ref 30–99)
ALT SERPL-CCNC: 109 U/L (ref 2–50)
ANION GAP SERPL CALC-SCNC: 10 MMOL/L (ref 7–16)
AST SERPL-CCNC: 205 U/L (ref 12–45)
BASOPHILS # BLD AUTO: 0.8 % (ref 0–1.8)
BASOPHILS # BLD: 0.09 K/UL (ref 0–0.12)
BILIRUB SERPL-MCNC: 17.3 MG/DL (ref 0.1–1.5)
BUN SERPL-MCNC: 12 MG/DL (ref 8–22)
CALCIUM ALBUM COR SERPL-MCNC: 8.9 MG/DL (ref 8.5–10.5)
CALCIUM SERPL-MCNC: 7.9 MG/DL (ref 8.5–10.5)
CHLORIDE SERPL-SCNC: 102 MMOL/L (ref 96–112)
CO2 SERPL-SCNC: 18 MMOL/L (ref 20–33)
CREAT SERPL-MCNC: 1.38 MG/DL (ref 0.5–1.4)
EKG IMPRESSION: NORMAL
EOSINOPHIL # BLD AUTO: 0.06 K/UL (ref 0–0.51)
EOSINOPHIL NFR BLD: 0.5 % (ref 0–6.9)
ERYTHROCYTE [DISTWIDTH] IN BLOOD BY AUTOMATED COUNT: 64 FL (ref 35.9–50)
ETHANOL BLD-MCNC: <10.1 MG/DL
GFR SERPLBLD CREATININE-BSD FMLA CKD-EPI: 67 ML/MIN/1.73 M 2
GLOBULIN SER CALC-MCNC: 3.5 G/DL (ref 1.9–3.5)
GLUCOSE SERPL-MCNC: 115 MG/DL (ref 65–99)
HCT VFR BLD AUTO: 38.7 % (ref 42–52)
HGB BLD-MCNC: 13.3 G/DL (ref 14–18)
IMM GRANULOCYTES # BLD AUTO: 0.23 K/UL (ref 0–0.11)
IMM GRANULOCYTES NFR BLD AUTO: 1.9 % (ref 0–0.9)
INR PPP: 1.39 (ref 0.87–1.13)
LIPASE SERPL-CCNC: 71 U/L (ref 11–82)
LYMPHOCYTES # BLD AUTO: 1.3 K/UL (ref 1–4.8)
LYMPHOCYTES NFR BLD: 11 % (ref 22–41)
MAGNESIUM SERPL-MCNC: 1.8 MG/DL (ref 1.5–2.5)
MCH RBC QN AUTO: 32.1 PG (ref 27–33)
MCHC RBC AUTO-ENTMCNC: 34.4 G/DL (ref 32.3–36.5)
MCV RBC AUTO: 93.5 FL (ref 81.4–97.8)
MONOCYTES # BLD AUTO: 1.23 K/UL (ref 0–0.85)
MONOCYTES NFR BLD AUTO: 10.4 % (ref 0–13.4)
NEUTROPHILS # BLD AUTO: 8.89 K/UL (ref 1.82–7.42)
NEUTROPHILS NFR BLD: 75.4 % (ref 44–72)
NRBC # BLD AUTO: 0.02 K/UL
NRBC BLD-RTO: 0.2 /100 WBC (ref 0–0.2)
NT-PROBNP SERPL IA-MCNC: 242 PG/ML (ref 0–125)
PLATELET # BLD AUTO: 146 K/UL (ref 164–446)
PMV BLD AUTO: 9.5 FL (ref 9–12.9)
POTASSIUM SERPL-SCNC: 4.3 MMOL/L (ref 3.6–5.5)
PROT SERPL-MCNC: 6.2 G/DL (ref 6–8.2)
PROTHROMBIN TIME: 17.1 SEC (ref 12–14.6)
RBC # BLD AUTO: 4.14 M/UL (ref 4.7–6.1)
SODIUM SERPL-SCNC: 130 MMOL/L (ref 135–145)
WBC # BLD AUTO: 11.8 K/UL (ref 4.8–10.8)

## 2025-05-28 PROCEDURE — 83880 ASSAY OF NATRIURETIC PEPTIDE: CPT

## 2025-05-28 PROCEDURE — 83690 ASSAY OF LIPASE: CPT

## 2025-05-28 PROCEDURE — 80053 COMPREHEN METABOLIC PANEL: CPT

## 2025-05-28 PROCEDURE — 82077 ASSAY SPEC XCP UR&BREATH IA: CPT

## 2025-05-28 PROCEDURE — 83735 ASSAY OF MAGNESIUM: CPT

## 2025-05-28 PROCEDURE — 93005 ELECTROCARDIOGRAM TRACING: CPT | Mod: TC | Performed by: STUDENT IN AN ORGANIZED HEALTH CARE EDUCATION/TRAINING PROGRAM

## 2025-05-28 PROCEDURE — 36415 COLL VENOUS BLD VENIPUNCTURE: CPT

## 2025-05-28 PROCEDURE — 85025 COMPLETE CBC W/AUTO DIFF WBC: CPT

## 2025-05-28 PROCEDURE — 99285 EMERGENCY DEPT VISIT HI MDM: CPT

## 2025-05-28 PROCEDURE — 85610 PROTHROMBIN TIME: CPT

## 2025-05-28 ASSESSMENT — PAIN DESCRIPTION - PAIN TYPE: TYPE: ACUTE PAIN

## 2025-05-28 ASSESSMENT — LIFESTYLE VARIABLES: DO YOU DRINK ALCOHOL: NO

## 2025-05-28 ASSESSMENT — FIBROSIS 4 INDEX: FIB4 SCORE: 1.45

## 2025-05-29 PROBLEM — E87.1 HYPONATREMIA: Status: ACTIVE | Noted: 2025-05-29

## 2025-05-29 PROBLEM — E72.20 HYPERAMMONEMIA (HCC): Status: ACTIVE | Noted: 2025-05-29

## 2025-05-29 PROBLEM — K70.10 ACUTE ALCOHOLIC HEPATITIS: Status: ACTIVE | Noted: 2025-05-29

## 2025-05-29 PROBLEM — R60.1 ANASARCA: Status: ACTIVE | Noted: 2025-05-29

## 2025-05-29 LAB
ALBUMIN SERPL BCP-MCNC: 2.6 G/DL (ref 3.2–4.9)
ALBUMIN/GLOB SERPL: 0.8 G/DL
ALP SERPL-CCNC: 443 U/L (ref 30–99)
ALT SERPL-CCNC: 100 U/L (ref 2–50)
AMMONIA PLAS-SCNC: 122 UMOL/L (ref 11–45)
ANION GAP SERPL CALC-SCNC: 11 MMOL/L (ref 7–16)
APPEARANCE UR: CLEAR
AST SERPL-CCNC: 188 U/L (ref 12–45)
BILIRUB SERPL-MCNC: 18 MG/DL (ref 0.1–1.5)
BILIRUB UR QL STRIP.AUTO: ABNORMAL
BUN SERPL-MCNC: 13 MG/DL (ref 8–22)
CALCIUM ALBUM COR SERPL-MCNC: 8.8 MG/DL (ref 8.5–10.5)
CALCIUM SERPL-MCNC: 7.7 MG/DL (ref 8.5–10.5)
CHLORIDE SERPL-SCNC: 105 MMOL/L (ref 96–112)
CO2 SERPL-SCNC: 17 MMOL/L (ref 20–33)
COLOR UR: ABNORMAL
CREAT SERPL-MCNC: 1.24 MG/DL (ref 0.5–1.4)
ERYTHROCYTE [DISTWIDTH] IN BLOOD BY AUTOMATED COUNT: 65.2 FL (ref 35.9–50)
GFR SERPLBLD CREATININE-BSD FMLA CKD-EPI: 77 ML/MIN/1.73 M 2
GLOBULIN SER CALC-MCNC: 3.2 G/DL (ref 1.9–3.5)
GLUCOSE SERPL-MCNC: 167 MG/DL (ref 65–99)
GLUCOSE UR STRIP.AUTO-MCNC: NEGATIVE MG/DL
HCT VFR BLD AUTO: 37.2 % (ref 42–52)
HGB BLD-MCNC: 12.3 G/DL (ref 14–18)
INR PPP: 1.46 (ref 0.87–1.13)
KETONES UR STRIP.AUTO-MCNC: NEGATIVE MG/DL
LEUKOCYTE ESTERASE UR QL STRIP.AUTO: NEGATIVE
MCH RBC QN AUTO: 31.2 PG (ref 27–33)
MCHC RBC AUTO-ENTMCNC: 33.1 G/DL (ref 32.3–36.5)
MCV RBC AUTO: 94.4 FL (ref 81.4–97.8)
MICRO URNS: ABNORMAL
NITRITE UR QL STRIP.AUTO: NEGATIVE
PH UR STRIP.AUTO: 6 [PH] (ref 5–8)
PLATELET # BLD AUTO: 137 K/UL (ref 164–446)
PMV BLD AUTO: 9.8 FL (ref 9–12.9)
POTASSIUM SERPL-SCNC: 3.9 MMOL/L (ref 3.6–5.5)
PROT SERPL-MCNC: 5.8 G/DL (ref 6–8.2)
PROT UR QL STRIP: NEGATIVE MG/DL
PROTHROMBIN TIME: 17.8 SEC (ref 12–14.6)
RBC # BLD AUTO: 3.94 M/UL (ref 4.7–6.1)
RBC UR QL AUTO: NEGATIVE
SODIUM SERPL-SCNC: 133 MMOL/L (ref 135–145)
SP GR UR STRIP.AUTO: 1.03
UROBILINOGEN UR STRIP.AUTO-MCNC: 1 EU/DL
WBC # BLD AUTO: 10.3 K/UL (ref 4.8–10.8)

## 2025-05-29 PROCEDURE — 85027 COMPLETE CBC AUTOMATED: CPT

## 2025-05-29 PROCEDURE — 80053 COMPREHEN METABOLIC PANEL: CPT

## 2025-05-29 PROCEDURE — A9270 NON-COVERED ITEM OR SERVICE: HCPCS | Performed by: INTERNAL MEDICINE

## 2025-05-29 PROCEDURE — 82105 ALPHA-FETOPROTEIN SERUM: CPT

## 2025-05-29 PROCEDURE — 97535 SELF CARE MNGMENT TRAINING: CPT

## 2025-05-29 PROCEDURE — 700117 HCHG RX CONTRAST REV CODE 255: Mod: UD | Performed by: STUDENT IN AN ORGANIZED HEALTH CARE EDUCATION/TRAINING PROGRAM

## 2025-05-29 PROCEDURE — 700111 HCHG RX REV CODE 636 W/ 250 OVERRIDE (IP): Mod: JZ | Performed by: INTERNAL MEDICINE

## 2025-05-29 PROCEDURE — 81003 URINALYSIS AUTO W/O SCOPE: CPT

## 2025-05-29 PROCEDURE — 700111 HCHG RX REV CODE 636 W/ 250 OVERRIDE (IP): Performed by: INTERNAL MEDICINE

## 2025-05-29 PROCEDURE — HZ2ZZZZ DETOXIFICATION SERVICES FOR SUBSTANCE ABUSE TREATMENT: ICD-10-PCS | Performed by: INTERNAL MEDICINE

## 2025-05-29 PROCEDURE — 97163 PT EVAL HIGH COMPLEX 45 MIN: CPT

## 2025-05-29 PROCEDURE — 700102 HCHG RX REV CODE 250 W/ 637 OVERRIDE(OP): Performed by: INTERNAL MEDICINE

## 2025-05-29 PROCEDURE — 85610 PROTHROMBIN TIME: CPT

## 2025-05-29 PROCEDURE — 36415 COLL VENOUS BLD VENIPUNCTURE: CPT

## 2025-05-29 PROCEDURE — 99223 1ST HOSP IP/OBS HIGH 75: CPT | Performed by: INTERNAL MEDICINE

## 2025-05-29 PROCEDURE — 770006 HCHG ROOM/CARE - MED/SURG/GYN SEMI*

## 2025-05-29 PROCEDURE — 82140 ASSAY OF AMMONIA: CPT

## 2025-05-29 PROCEDURE — 97166 OT EVAL MOD COMPLEX 45 MIN: CPT

## 2025-05-29 RX ORDER — PROCHLORPERAZINE EDISYLATE 5 MG/ML
5-10 INJECTION INTRAMUSCULAR; INTRAVENOUS EVERY 4 HOURS PRN
Status: DISCONTINUED | OUTPATIENT
Start: 2025-05-29 | End: 2025-06-10

## 2025-05-29 RX ORDER — FUROSEMIDE 10 MG/ML
40 INJECTION INTRAMUSCULAR; INTRAVENOUS 2 TIMES DAILY
Status: DISCONTINUED | OUTPATIENT
Start: 2025-05-29 | End: 2025-05-29

## 2025-05-29 RX ORDER — FOLIC ACID 1 MG/1
1 TABLET ORAL DAILY
Status: DISCONTINUED | OUTPATIENT
Start: 2025-05-29 | End: 2025-06-09

## 2025-05-29 RX ORDER — LABETALOL 100 MG/1
200 TABLET, FILM COATED ORAL EVERY 6 HOURS PRN
Status: DISCONTINUED | OUTPATIENT
Start: 2025-05-29 | End: 2025-06-01

## 2025-05-29 RX ORDER — GAUZE BANDAGE 2" X 2"
100 BANDAGE TOPICAL DAILY
Status: DISCONTINUED | OUTPATIENT
Start: 2025-05-29 | End: 2025-06-06

## 2025-05-29 RX ORDER — MORPHINE SULFATE 4 MG/ML
4 INJECTION INTRAVENOUS
Status: DISCONTINUED | OUTPATIENT
Start: 2025-05-29 | End: 2025-06-09

## 2025-05-29 RX ORDER — LORAZEPAM 1 MG/1
3 TABLET ORAL
Status: DISCONTINUED | OUTPATIENT
Start: 2025-05-29 | End: 2025-06-01

## 2025-05-29 RX ORDER — OXYCODONE HYDROCHLORIDE 10 MG/1
10 TABLET ORAL
Refills: 0 | Status: DISCONTINUED | OUTPATIENT
Start: 2025-05-29 | End: 2025-06-09

## 2025-05-29 RX ORDER — FUROSEMIDE 10 MG/ML
60 INJECTION INTRAMUSCULAR; INTRAVENOUS 2 TIMES DAILY
Status: DISCONTINUED | OUTPATIENT
Start: 2025-05-29 | End: 2025-05-30

## 2025-05-29 RX ORDER — ENOXAPARIN SODIUM 100 MG/ML
40 INJECTION SUBCUTANEOUS DAILY
Status: DISCONTINUED | OUTPATIENT
Start: 2025-05-29 | End: 2025-06-01

## 2025-05-29 RX ORDER — LORAZEPAM 0.5 MG/1
0.5 TABLET ORAL EVERY 4 HOURS PRN
Status: DISCONTINUED | OUTPATIENT
Start: 2025-05-29 | End: 2025-06-01

## 2025-05-29 RX ORDER — HYDRALAZINE HYDROCHLORIDE 20 MG/ML
10 INJECTION INTRAMUSCULAR; INTRAVENOUS EVERY 4 HOURS PRN
Status: DISCONTINUED | OUTPATIENT
Start: 2025-05-29 | End: 2025-06-10

## 2025-05-29 RX ORDER — LACTULOSE 10 G/15ML
30 SOLUTION ORAL 3 TIMES DAILY
Status: DISCONTINUED | OUTPATIENT
Start: 2025-05-29 | End: 2025-06-08

## 2025-05-29 RX ORDER — TAMSULOSIN HYDROCHLORIDE 0.4 MG/1
0.4 CAPSULE ORAL
Status: DISCONTINUED | OUTPATIENT
Start: 2025-05-29 | End: 2025-06-10

## 2025-05-29 RX ORDER — SULFAMETHOXAZOLE AND TRIMETHOPRIM 800; 160 MG/1; MG/1
1 TABLET ORAL DAILY
COMMUNITY

## 2025-05-29 RX ORDER — POLYETHYLENE GLYCOL 3350 17 G/17G
1 POWDER, FOR SOLUTION ORAL
Status: DISCONTINUED | OUTPATIENT
Start: 2025-05-29 | End: 2025-05-30

## 2025-05-29 RX ORDER — CARVEDILOL 3.12 MG/1
3.12 TABLET ORAL 2 TIMES DAILY WITH MEALS
Status: DISCONTINUED | OUTPATIENT
Start: 2025-05-29 | End: 2025-06-07

## 2025-05-29 RX ORDER — PROMETHAZINE HYDROCHLORIDE 25 MG/1
12.5-25 TABLET ORAL EVERY 4 HOURS PRN
Status: DISCONTINUED | OUTPATIENT
Start: 2025-05-29 | End: 2025-06-09

## 2025-05-29 RX ORDER — SPIRONOLACTONE 25 MG/1
25 TABLET ORAL DAILY
COMMUNITY
Start: 2025-04-28

## 2025-05-29 RX ORDER — LORAZEPAM 1 MG/1
4 TABLET ORAL
Status: DISCONTINUED | OUTPATIENT
Start: 2025-05-29 | End: 2025-06-01

## 2025-05-29 RX ORDER — PANTOPRAZOLE SODIUM 40 MG/1
40 TABLET, DELAYED RELEASE ORAL DAILY
COMMUNITY
Start: 2025-04-17

## 2025-05-29 RX ORDER — ONDANSETRON 2 MG/ML
4 INJECTION INTRAMUSCULAR; INTRAVENOUS EVERY 4 HOURS PRN
Status: DISCONTINUED | OUTPATIENT
Start: 2025-05-29 | End: 2025-06-10

## 2025-05-29 RX ORDER — ONDANSETRON 4 MG/1
4 TABLET, ORALLY DISINTEGRATING ORAL EVERY 4 HOURS PRN
Status: DISCONTINUED | OUTPATIENT
Start: 2025-05-29 | End: 2025-06-09

## 2025-05-29 RX ORDER — SPIRONOLACTONE 50 MG/1
50 TABLET, FILM COATED ORAL
Status: DISCONTINUED | OUTPATIENT
Start: 2025-05-29 | End: 2025-05-31

## 2025-05-29 RX ORDER — HYDROXYZINE HYDROCHLORIDE 25 MG/1
25 TABLET, FILM COATED ORAL 3 TIMES DAILY PRN
Status: DISCONTINUED | OUTPATIENT
Start: 2025-05-29 | End: 2025-06-09

## 2025-05-29 RX ORDER — LORAZEPAM 1 MG/1
1 TABLET ORAL EVERY 4 HOURS PRN
Status: DISCONTINUED | OUTPATIENT
Start: 2025-05-29 | End: 2025-06-01

## 2025-05-29 RX ORDER — PREDNISONE 10 MG/1
10-30 TABLET ORAL DAILY
COMMUNITY
Start: 2025-04-28

## 2025-05-29 RX ORDER — DIAZEPAM 10 MG/2ML
5 INJECTION, SOLUTION INTRAMUSCULAR; INTRAVENOUS
Status: COMPLETED | OUTPATIENT
Start: 2025-05-29 | End: 2025-05-31

## 2025-05-29 RX ORDER — DIAZEPAM 10 MG/2ML
10 INJECTION, SOLUTION INTRAMUSCULAR; INTRAVENOUS
Status: DISCONTINUED | OUTPATIENT
Start: 2025-05-29 | End: 2025-06-01

## 2025-05-29 RX ORDER — LORAZEPAM 1 MG/1
2 TABLET ORAL
Status: DISCONTINUED | OUTPATIENT
Start: 2025-05-29 | End: 2025-06-01

## 2025-05-29 RX ORDER — OXYCODONE HYDROCHLORIDE 5 MG/1
5 TABLET ORAL
Refills: 0 | Status: DISCONTINUED | OUTPATIENT
Start: 2025-05-29 | End: 2025-06-09

## 2025-05-29 RX ORDER — AMOXICILLIN 250 MG
2 CAPSULE ORAL EVERY EVENING
Status: DISCONTINUED | OUTPATIENT
Start: 2025-05-29 | End: 2025-05-30

## 2025-05-29 RX ORDER — FUROSEMIDE 20 MG/1
20 TABLET ORAL DAILY
COMMUNITY
Start: 2025-04-28

## 2025-05-29 RX ORDER — LABETALOL HYDROCHLORIDE 5 MG/ML
10 INJECTION, SOLUTION INTRAVENOUS
Status: DISCONTINUED | OUTPATIENT
Start: 2025-05-29 | End: 2025-06-01

## 2025-05-29 RX ORDER — BENZOCAINE/MENTHOL 6 MG-10 MG
LOZENGE MUCOUS MEMBRANE 2 TIMES DAILY
Status: DISCONTINUED | OUTPATIENT
Start: 2025-05-29 | End: 2025-06-10

## 2025-05-29 RX ORDER — PROMETHAZINE HYDROCHLORIDE 25 MG/1
12.5-25 SUPPOSITORY RECTAL EVERY 4 HOURS PRN
Status: DISCONTINUED | OUTPATIENT
Start: 2025-05-29 | End: 2025-06-10

## 2025-05-29 RX ADMIN — TAMSULOSIN HYDROCHLORIDE 0.4 MG: 0.4 CAPSULE ORAL at 09:03

## 2025-05-29 RX ADMIN — OXYCODONE 5 MG: 5 TABLET ORAL at 21:26

## 2025-05-29 RX ADMIN — THERA TABS 1 TABLET: TAB at 06:13

## 2025-05-29 RX ADMIN — DOCUSATE SODIUM 50 MG AND SENNOSIDES 8.6 MG 2 TABLET: 8.6; 5 TABLET, FILM COATED ORAL at 17:37

## 2025-05-29 RX ADMIN — FUROSEMIDE 60 MG: 10 INJECTION, SOLUTION INTRAVENOUS at 17:41

## 2025-05-29 RX ADMIN — FUROSEMIDE 40 MG: 10 INJECTION, SOLUTION INTRAVENOUS at 06:12

## 2025-05-29 RX ADMIN — SPIRONOLACTONE 50 MG: 50 TABLET ORAL at 06:13

## 2025-05-29 RX ADMIN — IOHEXOL 100 ML: 350 INJECTION, SOLUTION INTRAVENOUS at 01:20

## 2025-05-29 RX ADMIN — LACTULOSE 30 ML: 10 SOLUTION ORAL at 17:36

## 2025-05-29 RX ADMIN — OXYCODONE 5 MG: 5 TABLET ORAL at 06:11

## 2025-05-29 RX ADMIN — CARVEDILOL 3.12 MG: 3.12 TABLET, FILM COATED ORAL at 17:37

## 2025-05-29 RX ADMIN — ENOXAPARIN SODIUM 40 MG: 100 INJECTION SUBCUTANEOUS at 17:41

## 2025-05-29 RX ADMIN — Medication 100 MG: at 06:13

## 2025-05-29 RX ADMIN — LACTULOSE 30 ML: 10 SOLUTION ORAL at 12:14

## 2025-05-29 RX ADMIN — HYDROCORTISONE: 1 CREAM TOPICAL at 21:00

## 2025-05-29 RX ADMIN — LACTULOSE 30 ML: 10 SOLUTION ORAL at 09:03

## 2025-05-29 RX ADMIN — FOLIC ACID 1 MG: 1 TABLET ORAL at 06:11

## 2025-05-29 RX ADMIN — OXYCODONE 5 MG: 5 TABLET ORAL at 12:14

## 2025-05-29 RX ADMIN — CARVEDILOL 3.12 MG: 3.12 TABLET, FILM COATED ORAL at 09:04

## 2025-05-29 ASSESSMENT — LIFESTYLE VARIABLES
AUDITORY DISTURBANCES: NOT PRESENT
PAROXYSMAL SWEATS: NO SWEAT VISIBLE
ORIENTATION AND CLOUDING OF SENSORIUM: ORIENTED AND CAN DO SERIAL ADDITIONS
AGITATION: NORMAL ACTIVITY
TOTAL SCORE: 4
TREMOR: TREMOR NOT VISIBLE BUT CAN BE FELT, FINGERTIP TO FINGERTIP
SUBSTANCE_ABUSE: 0
PAROXYSMAL SWEATS: NO SWEAT VISIBLE
NAUSEA AND VOMITING: NO NAUSEA AND NO VOMITING
VISUAL DISTURBANCES: NOT PRESENT
EVER HAD A DRINK FIRST THING IN THE MORNING TO STEADY YOUR NERVES TO GET RID OF A HANGOVER: YES
TOTAL SCORE: VERY MILD ITCHING, PINS AND NEEDLES SENSATION, BURNING OR NUMBNESS
TOTAL SCORE: 3
ORIENTATION AND CLOUDING OF SENSORIUM: ORIENTED AND CAN DO SERIAL ADDITIONS
TOTAL SCORE: 4
AVERAGE NUMBER OF DAYS PER WEEK YOU HAVE A DRINK CONTAINING ALCOHOL: 7
NAUSEA AND VOMITING: NO NAUSEA AND NO VOMITING
PAROXYSMAL SWEATS: NO SWEAT VISIBLE
TOTAL SCORE: 4
ALCOHOL_USE: YES
HAVE PEOPLE ANNOYED YOU BY CRITICIZING YOUR DRINKING: YES
HEADACHE, FULLNESS IN HEAD: NOT PRESENT
AUDITORY DISTURBANCES: NOT PRESENT
ANXIETY: MILDLY ANXIOUS
TOTAL SCORE: 4
HEADACHE, FULLNESS IN HEAD: NOT PRESENT
HEADACHE, FULLNESS IN HEAD: NOT PRESENT
ANXIETY: MILDLY ANXIOUS
TREMOR: *
EVER FELT BAD OR GUILTY ABOUT YOUR DRINKING: YES
TOTAL SCORE: 4
TREMOR: *
AGITATION: NORMAL ACTIVITY
VISUAL DISTURBANCES: NOT PRESENT
AGITATION: NORMAL ACTIVITY
VISUAL DISTURBANCES: NOT PRESENT
VISUAL DISTURBANCES: NOT PRESENT
ORIENTATION AND CLOUDING OF SENSORIUM: ORIENTED AND CAN DO SERIAL ADDITIONS
CONSUMPTION TOTAL: POSITIVE
TREMOR: *
ANXIETY: MILDLY ANXIOUS
AGITATION: NORMAL ACTIVITY
TOTAL SCORE: VERY MILD ITCHING, PINS AND NEEDLES SENSATION, BURNING OR NUMBNESS
ON A TYPICAL DAY WHEN YOU DRINK ALCOHOL HOW MANY DRINKS DO YOU HAVE: 10
HOW MANY TIMES IN THE PAST YEAR HAVE YOU HAD 5 OR MORE DRINKS IN A DAY: 120
HEADACHE, FULLNESS IN HEAD: NOT PRESENT
ORIENTATION AND CLOUDING OF SENSORIUM: ORIENTED AND CAN DO SERIAL ADDITIONS
AGITATION: NORMAL ACTIVITY
PAROXYSMAL SWEATS: NO SWEAT VISIBLE
AUDITORY DISTURBANCES: NOT PRESENT
TREMOR: *
DOES PATIENT WANT TO TALK TO SOMEONE ABOUT QUITTING: YES
TOTAL SCORE: VERY MILD ITCHING, PINS AND NEEDLES SENSATION, BURNING OR NUMBNESS
TOTAL SCORE: 4
TOTAL SCORE: VERY MILD ITCHING, PINS AND NEEDLES SENSATION, BURNING OR NUMBNESS
VISUAL DISTURBANCES: NOT PRESENT
HEADACHE, FULLNESS IN HEAD: NOT PRESENT
ORIENTATION AND CLOUDING OF SENSORIUM: ORIENTED AND CAN DO SERIAL ADDITIONS
ANXIETY: MILDLY ANXIOUS
PAROXYSMAL SWEATS: NO SWEAT VISIBLE
ANXIETY: MILDLY ANXIOUS
AUDITORY DISTURBANCES: NOT PRESENT
TOTAL SCORE: 4
NAUSEA AND VOMITING: NO NAUSEA AND NO VOMITING
AUDITORY DISTURBANCES: NOT PRESENT
TOTAL SCORE: VERY MILD ITCHING, PINS AND NEEDLES SENSATION, BURNING OR NUMBNESS
HAVE YOU EVER FELT YOU SHOULD CUT DOWN ON YOUR DRINKING: YES
DOES PATIENT WANT TO STOP DRINKING: YES

## 2025-05-29 ASSESSMENT — SOCIAL DETERMINANTS OF HEALTH (SDOH)
WITHIN THE PAST 12 MONTHS, THE FOOD YOU BOUGHT JUST DIDN'T LAST AND YOU DIDN'T HAVE MONEY TO GET MORE: NEVER TRUE
WITHIN THE LAST YEAR, HAVE YOU BEEN HUMILIATED OR EMOTIONALLY ABUSED IN OTHER WAYS BY YOUR PARTNER OR EX-PARTNER?: NO
IN THE PAST 12 MONTHS, HAS THE ELECTRIC, GAS, OIL, OR WATER COMPANY THREATENED TO SHUT OFF SERVICE IN YOUR HOME?: NO
WITHIN THE LAST YEAR, HAVE YOU BEEN AFRAID OF YOUR PARTNER OR EX-PARTNER?: NO
WITHIN THE LAST YEAR, HAVE YOU BEEN KICKED, HIT, SLAPPED, OR OTHERWISE PHYSICALLY HURT BY YOUR PARTNER OR EX-PARTNER?: NO
WITHIN THE LAST YEAR, HAVE TO BEEN RAPED OR FORCED TO HAVE ANY KIND OF SEXUAL ACTIVITY BY YOUR PARTNER OR EX-PARTNER?: NO
WITHIN THE PAST 12 MONTHS, YOU WORRIED THAT YOUR FOOD WOULD RUN OUT BEFORE YOU GOT THE MONEY TO BUY MORE: NEVER TRUE

## 2025-05-29 ASSESSMENT — ENCOUNTER SYMPTOMS
FLANK PAIN: 0
POLYDIPSIA: 0
CHILLS: 0
NERVOUS/ANXIOUS: 0
FOCAL WEAKNESS: 0
COUGH: 0
VOMITING: 0
DOUBLE VISION: 0
SPEECH CHANGE: 0
NAUSEA: 0
HEARTBURN: 0
NECK PAIN: 0
PHOTOPHOBIA: 0
WEIGHT LOSS: 0
BLURRED VISION: 0
HALLUCINATIONS: 0
HEADACHES: 0
BACK PAIN: 0
HEMOPTYSIS: 0
BRUISES/BLEEDS EASILY: 0
ORTHOPNEA: 0
PALPITATIONS: 0
SPUTUM PRODUCTION: 0
TREMORS: 0
FEVER: 0

## 2025-05-29 ASSESSMENT — COGNITIVE AND FUNCTIONAL STATUS - GENERAL
TOILETING: A LITTLE
MOBILITY SCORE: 18
CLIMB 3 TO 5 STEPS WITH RAILING: A LOT
TURNING FROM BACK TO SIDE WHILE IN FLAT BAD: A LITTLE
DAILY ACTIVITIY SCORE: 21
DRESSING REGULAR UPPER BODY CLOTHING: A LITTLE
WALKING IN HOSPITAL ROOM: A LITTLE
DRESSING REGULAR LOWER BODY CLOTHING: A LITTLE
HELP NEEDED FOR BATHING: A LOT
MOBILITY SCORE: 12
TOILETING: A LITTLE
STANDING UP FROM CHAIR USING ARMS: A LOT
DAILY ACTIVITIY SCORE: 19
SUGGESTED CMS G CODE MODIFIER DAILY ACTIVITY: CJ
MOVING FROM LYING ON BACK TO SITTING ON SIDE OF FLAT BED: A LOT
SUGGESTED CMS G CODE MODIFIER MOBILITY: CK
WALKING IN HOSPITAL ROOM: A LOT
MOVING TO AND FROM BED TO CHAIR: A LOT
SUGGESTED CMS G CODE MODIFIER DAILY ACTIVITY: CK
SUGGESTED CMS G CODE MODIFIER MOBILITY: CL
MOVING TO AND FROM BED TO CHAIR: A LITTLE
HELP NEEDED FOR BATHING: A LITTLE
CLIMB 3 TO 5 STEPS WITH RAILING: A LITTLE
STANDING UP FROM CHAIR USING ARMS: A LITTLE
MOVING FROM LYING ON BACK TO SITTING ON SIDE OF FLAT BED: A LITTLE
DRESSING REGULAR LOWER BODY CLOTHING: A LITTLE
TURNING FROM BACK TO SIDE WHILE IN FLAT BAD: A LOT

## 2025-05-29 ASSESSMENT — GAIT ASSESSMENTS
GAIT LEVEL OF ASSIST: SUPERVISED
DEVIATION: INCREASED BASE OF SUPPORT;BRADYKINETIC
DISTANCE (FEET): 100

## 2025-05-29 ASSESSMENT — PATIENT HEALTH QUESTIONNAIRE - PHQ9
2. FEELING DOWN, DEPRESSED, IRRITABLE, OR HOPELESS: NOT AT ALL
SUM OF ALL RESPONSES TO PHQ9 QUESTIONS 1 AND 2: 0
1. LITTLE INTEREST OR PLEASURE IN DOING THINGS: NOT AT ALL

## 2025-05-29 ASSESSMENT — PAIN DESCRIPTION - PAIN TYPE
TYPE: ACUTE PAIN

## 2025-05-29 ASSESSMENT — ACTIVITIES OF DAILY LIVING (ADL): TOILETING: REQUIRES ASSIST

## 2025-05-29 ASSESSMENT — FIBROSIS 4 INDEX: FIB4 SCORE: 4.98

## 2025-05-29 NOTE — ED NOTES
Medication history reviewed with patient.  Med rec is complete.  Allergies reviewed.     Patient on a 30 day course of Bactrim once daily.    Anticoagulants taken in the last 14 days? No    Dispense history available in EPIC? Yes      Bernard Torres

## 2025-05-29 NOTE — WOUND TEAM
[facility]  Inpatient Services  Wound and Skin Care Brief Evaluation    Admission Date: 5/28/2025     Last order of IP CONSULT TO WOUND CARE was found on 5/29/2025 from Hospital Encounter on 5/28/2025     HPI, PMH, SH: Reviewed    Chief Complaint   Patient presents with    Abdominal Swelling     Pt reports facial, abdominal and leg swelling. Pt recently diagnosed with alcoholic cirrhosis, daily ETOH use.     Facial Swelling    Foot Swelling     Diagnosis: Acute alcoholic hepatitis [K70.10]    Unit where seen by Wound Team: S624/02     Wound consult placed regarding sacrum. Chart and images reviewed. This discussed with bedside RN .     Pt has a purple birth nakul at the sacrum.  Pt up in chair, stands on his own.      No pressure injuries or advanced wound care needs identified. Wound consult completed. No further follow up unless indicated and consulted.          PREVENTATIVE INTERVENTIONS:    Q shift Osmani - performed per nursing policy  Q shift pressure point assessments - performed per nursing policy

## 2025-05-29 NOTE — PROGRESS NOTES
Patient was seen at bedside and chart was reviewed. Please see Dr. Jarquin's note for further details.    40 yo man with alcoholic cirrhosis, ongoing alcohol use, DM, HTN, BOLIVAR, morbid obesity who presented with worsening swelling.  He continues to drink 9-10 shots every day.  He was found with worsening liver function, elevated ammonia.  Patient was previously also hospitalized and CTAP at that time showed a pancreatic tail lesion and was recommended to have outpatient CT pancreatic protocol.  CT was done in the ER which showed a mass in the pancreatic tail, ovoid mass in the splenic hilum.  MRI abdomen is pending  TTE pending  Patient was drinking yesterday, CIWA protocol.  He has tried to quit before but he feels social pressure to drink.  He says his family staged intervention to talk about his drinking.  He does want to quit.  He has done AA in the past.  He is anasaric, increase IV Lasix, continue spironolactone  DF score 35.7, MELD 26. I consulted GI

## 2025-05-29 NOTE — ASSESSMENT & PLAN NOTE
DF score 35.7, MELD 26   Patient just completed course of steroid, GI consulted and does not recommend restarting  Alcohol cessation counseling, patient is motivated to stop drinking.  Case management to provide resources  No signs of hepatic encephalopathy, continue maintenance lactulose  Continue diuresis, continue on Lasix 60 mg IV twice daily and increase spironolactone  Monitor I's and O's, low-sodium diet and fluid restriction  No ascites on ultrasound  He has an appointment with GI on Thursday

## 2025-05-29 NOTE — ASSESSMENT & PLAN NOTE
Continue diuresis, with IV Lasix for spironolactone  Monitor I's and O's, low-sodium diet and fluid restriction  Ultrasound negative for ascites  Doppler negative for DVT.  TTE with normal EF

## 2025-05-29 NOTE — PROGRESS NOTES
4 Eyes Skin Assessment Completed by Benita RN and ALF Avendano.    Skin assessment is primarily focused on high risk bony prominences. Pay special attention to skin beneath and around medical devices, high risk bony prominences, skin to skin areas and areas where the patient lacks sensation to feel pain and areas where the patient previously had breakdown.     Head (Occipital):  WDL   Ears (Under Medical Devices): WDL   Nose (Under Medical Devices): WDL   Mouth:  WDL   Neck: WDL   Breast/Chest:  WDL   Shoulder Blades:  WDL   Spine:   Redness/scratch to lower back     (R) Arm/Elbow/Hand: Redness, tear, scab to wrist, dryness, discoloration           (L) Arm/Elbow/Hand: Dryness, scab, discoloration     Abdomen: Red and Edema   Pannus/Groin:  DEBBI (refused)   Sacrum/Coccyx:   Discoloration, dry     (R) Ischial Tuberosity (Sit Bones):  WDL   (L) Ischial Tuberosity (Sit Bones):  WDL   (R) Leg:  Edema, dry, discoloration     (L) Leg:  Edema, dry, discoloration   (R) Heel:  Pink and Blanching, dry, calloused, boggy   (R) Foot/Toe: Redness, Edema, dry   (L) Heel: Pink, Blanching, and Boggy, dry, calloused     (L) Foot/Toe:  Edema         DEVICES IN USE:   Respiratory Devices:  NA, patient on room air  Feeding Devices:  N/A   Lines & BP Monitoring Devices:  Peripheral IV    Orthopedic Devices:  N/A  Miscellaneous Devices:  N/A      WOUND PHOTOS:   Completed and in EPIC     WOUND CONSULT:   Consult ordered for the following areas sacrum

## 2025-05-29 NOTE — ED TRIAGE NOTES
"Chief Complaint   Patient presents with    Abdominal Swelling     Pt reports facial, abdominal and leg swelling. Pt recently diagnosed with alcoholic cirrhosis, daily ETOH use.     Facial Swelling    Foot Swelling     Pt denies abd pain/CP/SOB.     Pt is alert and oriented, speaking in full sentences, follows commands and responds appropriately to questions. Resperations are even and unlabored.      Pt placed in lobby. Pt educated on triage process. Pt encouraged to alert staff for any changes.      BP (!) 140/88   Pulse (!) 102   Temp 37.2 °C (98.9 °F) (Temporal)   Resp 18   Ht 1.6 m (5' 3\")   Wt 113 kg (250 lb)   SpO2 97%      "

## 2025-05-29 NOTE — THERAPY
Occupational Therapy   Initial Evaluation     Patient Name: Alberto Maldonado  Age:  39 y.o., Sex:  male  Medical Record #: [id number]  Today's Date: 5/29/2025     Precautions  Precautions: Fall Risk  Comments: MELD 26    Assessment  Patient is a 39 y.o. male with a diagnosis of alcoholic liver hepatitis and cirrhosis after presenting on 5/28 with worsening swelling of his face, abdomen and legs. Additional factors influencing patient status / progress: PMHx includes BMI 51, alcoholic cirrhosis, ongoing alcohol use, DM, HTN, BOLIVAR.      During OT eval, pt presented with impaired  activity tolerance, endurance and mobility. Pt has poor distal mgmt for LBD but was receptive to education/training on AE as detailed below to maximize independence. Pt performed toileting, standing g/h and related ADL mobility with SPV and no AD. Pt fatigues quickly with activity. Pt reports his family can assist at home. Recommend home health OT. No further acute OT needs. Patient will not be actively followed for occupational therapy services at this time, however may be seen if requested by physician for 1 more visit within 30 days to address any discharge or equipment needs.    Plan    Occupational Therapy Initial Treatment Plan   Duration: Discharge Needs Only    DC Equipment Recommendations: Tub Transfer Bench, Tub / Shower Seat, Hand Held Shower, Sock Aide, Toilet Aide, Reacher (bariatric shower chair vs tub transfer bench depending on width of tub; pt verbalized understanding)  Discharge Recommendations: Recommend home health for continued occupational therapy services     Subjective    Pt agreeable to OT eval.      Objective     05/29/25 5038   Initial Contact Note    Initial Contact Note Order Received and Verified, Evaluation Only - Patient Does Not Require Further Acute Occupational Therapy at this Time.  However, May Benefit from Post Acute Therapy for Higher Level Functional Deficits.   Prior Living Situation   Prior  Services Intermittent Physical Support for ADL Per Family   Housing / Facility 2 Story House   Steps Into Home 1   Steps In Home   (full flight to bedroom/bathroom)   Bathroom Set up Bathtub / Shower Combination   Equipment Owned 4-Wheel Walker   Lives with - Patient's Self Care Capacity Parents;Child Less than 18 Years of Age;Sibling   Comments Pt lives his mom, brother, sister in law and their kids   Prior Level of ADL Function   Self Feeding Independent   Grooming / Hygiene Independent   Bathing Requires Assist   Dressing Requires Assist   Toileting Requires Assist   Comments Reports increasing difficulty recently. 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)   History of Falls   History of Falls   (reports last fall was 2 years ago)   Precautions   Precautions Fall Risk   Pain   Intervention Declines   Non Verbal Descriptors   Non Verbal Scale  Calm   Cognition    Level of Consciousness Alert   Comments Pleasant and cooperative   Active ROM Upper Body   Active ROM Upper Body  WDL   Strength Upper Body   Upper Body Strength  WDL   Sensation Upper Body   Upper Extremity Sensation  Not Tested   Upper Body Muscle Tone   Upper Body Muscle Tone  WDL   Neurological Concerns   Neurological Concerns No   Coordination Upper Body   Coordination WDL   Balance Assessment   Sitting Balance (Static) Good   Sitting Balance (Dynamic) Good   Standing Balance (Static) Fair +   Standing Balance (Dynamic) Fair   Weight Shift Sitting Good   Weight Shift Standing Good   Comments no AD   Bed Mobility    Scooting Supervised   Comments in chair pre/post   ADL  Assessment   Grooming Supervision;Standing  (hand hygiene at sink)   Lower Body Dressing Minimal Assist  (doff/don shorts; assist for distal mgmt; education/training and demo provided on use of reacher and sock aid to compensate. Reports family can assist too)   Toileting Supervision  (pt reports difficulty with clothing mgmt and hygiene but was able to manage on his own during session after BM)   How much help from another person does the patient currently need...   Putting on and taking off regular lower body clothing? 3   Bathing (including washing, rinsing, and drying)? 3   Toileting, which includes using a toilet, bedpan, or urinal? 3   Putting on and taking off regular upper body clothing? 4   Taking care of personal grooming such as brushing teeth? 4   Eating meals? 4   6 Clicks Daily Activity Score 21   Functional Mobility   Sit to Stand Supervised   Bed, Chair, Wheelchair Transfer Supervised   Toilet Transfers Supervised   Transfer Method Stand Step   Mobility in room for ADLs with no AD   Comments fatigues quick   Activity Tolerance   Sitting in Chair pre/post   Standing 5-6 min   Comments poor endurance   Education Group   Education Provided Adaptive Equipment;Activities of Daily Living   Role of Occupational Therapist Patient Response Patient;Acceptance;Explanation;Verbal Demonstration   ADL Patient Response Patient;Acceptance;Explanation;Demonstration;Verbal Demonstration;Action Demonstration   Adaptive Equipment Patient Response Patient;Acceptance;Explanation;Demonstration;Handout;Verbal Demonstration;Action Demonstration  (education on reacher, sock aid and recommendation for a bariatric shower chair vs tub transfer bench, HHSH, bidet or toileting aid; handout provided)   Occupational Therapy Initial Treatment Plan    Duration Discharge Needs Only   Problem List   Problem List Decreased Active Daily Living Skills;Decreased Homemaking Skills;Decreased Functional Mobility;Decreased Activity Tolerance    Anticipated Discharge Equipment and Recommendations   DC Equipment Recommendations Tub Transfer Bench;Tub / Shower Seat;Hand Held Shower;Sock Aide;Toilet Aide;Reacher  (bariatric shower chair vs tub transfer bench depending on width of tub; pt verbalized understanding)   Discharge Recommendations Recommend home health for continued occupational therapy services   Interdisciplinary Plan of Care Collaboration   IDT Collaboration with  Nursing;Physical Therapist   Patient Position at End of Therapy Seated;Call Light within Reach;Tray Table within Reach;Phone within Reach   Collaboration Comments RN updated; chair alarm off on arrival and RN reported pt keeps removing it himself   Session Information   Date / Session Number  5/29 d/c needs     Occupational Therapy session overlapped with Physical Therapy to help facilitate functional mobility to the bathroom in anticipation of pt requiring two sets of skilled hands, BMI 51, high HD score. Pt was left in care of OT for ADL education/training after initial assessments completed.

## 2025-05-29 NOTE — ED PROVIDER NOTES
"CHIEF COMPLAINT  Chief Complaint   Patient presents with    Abdominal Swelling     Pt reports facial, abdominal and leg swelling. Pt recently diagnosed with alcoholic cirrhosis, daily ETOH use.     Facial Swelling    Foot Swelling       LIMITATION TO HISTORY   Select: none    HPI    Alberto Maldonado is a 39 y.o. male who presents to the Emergency Department presents for evaluation of swelling of his feet abdomen and face which he states is only been going on for the past week.  Patient does have a history of alcohol abuse, alcoholic hepatitis hypertension and borderline diabetes.  He continues to drink 10-15 shots a day.  Last drink was earlier today.  Denies any chest pain shortness of breath nausea vomiting diarrhea.    OUTSIDE HISTORIAN(S):  Select: none    EXTERNAL RECORDS REVIEWED  Select: Other reviewed discharge summary patient was recently admitted for an alcoholic hepatitis      PAST MEDICAL HISTORY  Past Medical History[1]  .    SURGICAL HISTORY  Past Surgical History[2]      FAMILY HISTORY  Family History   Problem Relation Age of Onset    Stroke Neg Hx     Heart Disease Neg Hx           SOCIAL HISTORY  Social History     Socioeconomic History    Marital status: Single     Spouse name: Not on file    Number of children: Not on file    Years of education: Not on file    Highest education level: Not on file   Occupational History    Not on file   Tobacco Use    Smoking status: Former     Types: Cigarettes    Smokeless tobacco: Never   Vaping Use    Vaping status: Never Used   Substance and Sexual Activity    Alcohol use: Yes     Alcohol/week: 7.2 oz     Types: 12 Shots of liquor per week     Comment: \"10-15 vodka mini bottles per day\"    Drug use: Not Currently    Sexual activity: Not Currently   Other Topics Concern    Not on file   Social History Narrative    Works in [employer]    Lives at home with mom, brother, TRAM and nieces and nephews     Social Drivers of Health " "    Financial Resource Strain: Not on file   Food Insecurity: No Food Insecurity (4/3/2025)    Hunger Vital Sign     Worried About Running Out of Food in the Last Year: Never true     Ran Out of Food in the Last Year: Never true   Transportation Needs: No Transportation Needs (4/3/2025)    PRAPARE - Transportation     Lack of Transportation (Medical): No     Lack of Transportation (Non-Medical): No   Physical Activity: Not on file   Stress: Not on file   Social Connections: Not on file   Intimate Partner Violence: Not At Risk (4/3/2025)    Humiliation, Afraid, Rape, and Kick questionnaire     Fear of Current or Ex-Partner: No     Emotionally Abused: No     Physically Abused: No     Sexually Abused: No   Housing Stability: Low Risk  (4/3/2025)    Housing Stability Vital Sign     Unable to Pay for Housing in the Last Year: No     Number of Times Moved in the Last Year: 0     Homeless in the Last Year: No   Recent Concern: Housing Stability - High Risk (3/2/2025)    Housing Stability Vital Sign     Unable to Pay for Housing in the Last Year: Yes     Number of Times Moved in the Last Year: 0     Homeless in the Last Year: No         CURRENT MEDICATIONS  Medications Ordered Prior to Encounter[3]        ALLERGIES  Allergies[4]    PHYSICAL EXAM  VITAL SIGNS:/83   Pulse 94   Temp 37.2 °C (98.9 °F) (Temporal)   Resp 16   Ht 1.6 m (5' 3\")   Wt 113 kg (250 lb)   SpO2 92%   BMI 44.29 kg/m²       VITALS - vital signs documented prior to this note have been reviewed and noted,  GENERAL - awake, alert, oriented, GCS 15, no apparent distress, non-toxic  appearing  HEENT - normocephalic, atraumatic, pupils equal, sclera anicteric, mucus  membranes moist  NECK - supple, no meningismus, full active range of motion, trachea midline  CARDIOVASCULAR - regular rate/rhythm, no murmurs/gallops/rubs  PULMONARY - no respiratory distress, speaking in full sentences, clear to  auscultation bilaterally, no wheezing/ronchi/rales, no " accessory muscle use  GASTROINTESTINAL -anasarca soft, non-tender, non-distended, no rebound, guarding,  or peritonitis  GENITOURINARY - Deferred  NEUROLOGIC - Awake alert, normal mental status, speech fluid, cognition  normal, moves all extremities      DIAGNOSTIC STUDIES / PROCEDURES    LABS  Labs Reviewed   CBC WITH DIFFERENTIAL - Abnormal; Notable for the following components:       Result Value    WBC 11.8 (*)     RBC 4.14 (*)     Hemoglobin 13.3 (*)     Hematocrit 38.7 (*)     RDW 64.0 (*)     Platelet Count 146 (*)     Neutrophils-Polys 75.40 (*)     Lymphocytes 11.00 (*)     Immature Granulocytes 1.90 (*)     Neutrophils (Absolute) 8.89 (*)     Monos (Absolute) 1.23 (*)     Immature Granulocytes (abs) 0.23 (*)     All other components within normal limits   COMP METABOLIC PANEL - Abnormal; Notable for the following components:    Sodium 130 (*)     Co2 18 (*)     Glucose 115 (*)     Calcium 7.9 (*)     AST(SGOT) 205 (*)     ALT(SGPT) 109 (*)     Alkaline Phosphatase 498 (*)     Total Bilirubin 17.3 (*)     Albumin 2.7 (*)     All other components within normal limits   PROTHROMBIN TIME - Abnormal; Notable for the following components:    PT 17.1 (*)     INR 1.39 (*)     All other components within normal limits   AMMONIA - Abnormal; Notable for the following components:    Ammonia 122 (*)     All other components within normal limits   PROBRAIN NATRIURETIC PEPTIDE, NT - Abnormal; Notable for the following components:    NT-proBNP 242 (*)     All other components within normal limits   LIPASE   DIAGNOSTIC ALCOHOL   MAGNESIUM   ESTIMATED GFR   URINALYSIS       Bilirubin has significantly elevated from baseline  RADIOLOGY  I have independently interpreted the diagnostic imaging associated with this visit and am waiting the final reading from the radiologist.   My preliminary interpretation is as follows: Ultrasound does not show any shadowing gallstones      Radiologist interpretation:   US-RUQ   Final Result          1.  Thickened gallbladder wall without visualized shadowing stones, consider acalculous cholecystitis, could be further evaluated with HIDA scan as clinically appropriate   2.  Hepatomegaly   3.  Echogenic liver, compatible with fatty change versus fibrosis      CT-PANCREAS AND ABDOMEN WITH & W/O   Final Result         1.  Masslike structure adjacent to the tail of pancreas with possible slight enhancement on arterial phase imaging, recommend follow-up MRI of the pancreas with contrast for more definitive characterization.   2.  Ovoid mass in the splenic hilum with similar density to spleen on all contrast phases, appearance favoring splenule.   3.  Changes of cirrhosis.   4.  Hepatomegaly      US-EXTREMITY VENOUS LOWER BILAT    (Results Pending)   MR-ABDOMEN-WITH & W/O    (Results Pending)   EC-ECHOCARDIOGRAM COMPLETE W/O CONT    (Results Pending)        COURSE & MEDICAL DECISION MAKING    ED COURSE:        INTERVENTIONS BY ME:  Medications   enoxaparin (Lovenox) inj 40 mg (has no administration in time range)   oxyCODONE immediate-release (Roxicodone) tablet 5 mg (has no administration in time range)     Or   oxyCODONE immediate release (Roxicodone) tablet 10 mg (has no administration in time range)     Or   morphine 4 MG/ML injection 4 mg (has no administration in time range)   senna-docusate (Pericolace Or Senokot S) 8.6-50 MG per tablet 2 Tablet (has no administration in time range)     And   polyethylene glycol/lytes (Miralax) Packet 1 Packet (has no administration in time range)   hydrALAZINE (Apresoline) injection 10 mg (has no administration in time range)   ondansetron (Zofran) syringe/vial injection 4 mg (has no administration in time range)   ondansetron (Zofran ODT) dispertab 4 mg (has no administration in time range)   promethazine (Phenergan) tablet 12.5-25 mg (has no administration in time range)   promethazine (Phenergan) suppository 12.5-25 mg (has no administration in time range)    prochlorperazine (Compazine) injection 5-10 mg (has no administration in time range)   lactulose 20 GM/30ML solution 30 mL (has no administration in time range)   spironolactone (Aldactone) tablet 50 mg (has no administration in time range)   furosemide (Lasix) injection 40 mg (has no administration in time range)   carvedilol (Coreg) tablet 3.125 mg (has no administration in time range)   folic acid (Folvite) tablet 1 mg (has no administration in time range)   hydrOXYzine HCl (Atarax) tablet 25 mg (has no administration in time range)   tamsulosin (Flomax) capsule 0.4 mg (has no administration in time range)   thiamine (Vitamin B-1) tablet 100 mg (has no administration in time range)   diazePAM (Valium) injection 5 mg (has no administration in time range)   LORazepam (Ativan) tablet 0.5 mg (has no administration in time range)   LORazepam (Ativan) tablet 1 mg (has no administration in time range)   LORazepam (Ativan) tablet 2 mg (has no administration in time range)   LORazepam (Ativan) tablet 3 mg (has no administration in time range)   LORazepam (Ativan) tablet 4 mg (has no administration in time range)     Or   diazePAM (Valium) injection 10 mg (has no administration in time range)   multivitamin tablet 1 Tablet (has no administration in time range)   labetalol (Normodyne/Trandate) injection 10 mg (has no administration in time range)     Or   labetalol (Normodyne) tablet 200 mg (has no administration in time range)   iohexol (OMNIPAQUE) 350 mg/mL (IV) (100 mL Intravenous Given 5/29/25 0120)         INITIAL ASSESSMENT, COURSE AND PLAN  Care Narrative: Patient presented for evaluation of increasing anasarca.  Patient does have a history of alcohol abuse alcoholic hepatitis, recently resumed binge drinking after his most recent discharge back in April.  On examination does have a diffuse anasarca with 3+ pitting edema bilaterally.  Workup was initiated, was remarkable for elevated liver enzymes and significantly  increased bilirubin from his most recent discharge.  Reviewed imaging in the past it does appear he has a pancreatic mass and has not been able to get his CT pancreas with, thus this was obtained here which did show masslike structure and recommended an MRI.  Did repeat an ultrasound to evaluate for obstructive etiology of the patient's increasing bilirubin, fortunately there was no evidence of shadowing gallstones.  Given the patient's uptrending bilirubin acute alcoholic hepatitis pancreatic mass, we will plan for admission to the hospital.  He is admitted in stable condition           ADDITIONAL PROBLEM LIST    DISPOSITION AND DISCUSSIONS  I have discussed management of the patient with the following physicians and BATSHEVA's: Hospitalist  FINAL DIAGNOSIS  #1 alcoholic hepatitis  2.  Pancreatic mass  3.  Anasarca         Electronically signed by: Todd Ceballos DO ,3:33 AM 05/28/25         [1]   Past Medical History:  Diagnosis Date    Alcohol abuse     Asthma without status asthmaticus 06/29/2017    Chickenpox     Hypertension    [2]   Past Surgical History:  Procedure Laterality Date    OTHER ORTHOPEDIC SURGERY Right     broken hand   [3]   No current facility-administered medications on file prior to encounter.     Current Outpatient Medications on File Prior to Encounter   Medication Sig Dispense Refill    carvedilol (COREG) 12.5 MG Tab Take 1 Tablet by mouth 2 times a day with meals. 60 Tablet 0    tamsulosin (FLOMAX) 0.4 MG capsule Take 1 Capsule by mouth 1/2 hour after breakfast. 30 Capsule 0    hydrOXYzine HCl (ATARAX) 25 MG Tab Take 1 Tablet by mouth 3 times a day as needed for Anxiety or Itching. 30 Tablet 0    thiamine (THIAMINE) 100 MG tablet Take 1 Tablet by mouth every day. 30 Tablet 0    folic acid (FOLVITE) 1 MG Tab Take 1 Tablet by mouth every day. 30 Tablet 0    multivitamin Tab Take 1 Tablet by mouth every day. 30 Tablet 0   [4] No Known Allergies

## 2025-05-29 NOTE — THERAPY
"Physical Therapy   Initial Evaluation     Patient Name: Alberto Maldonado  Age:  39 y.o., Sex:  male  Medical Record #: [id number]  Today's Date: 5/29/2025     Precautions  Precautions: Fall Risk  Comments: MELD 26    Assessment   Alberto Maldonado is a 39 y.o. male with alcoholic liver cirrhosis, ongoing alcohol abuse, obstructive sleep apnea, morbid obesity, type 2 diabetes, hypertension, who presented 5/28/2025 with complaints of worsening of swelling of the face, abdomen and legs in the last week.  He has been drinking 9-10 shots every day, last drink was yesterday.  He is urine became dark according to him.     Patient seen for PT eval and presents with impaired mobility and activity tolerance 2/2 body habitus and liver failure due to ETOH use.  He reports a sedentary lifestyle at home but does not use any DME unless he has a gout flare. He was able to demo transfers and short distance gait without AD or assist. He is limited by his JACK. Patient will not be actively followed for physical therapy services at this time, however may be seen if requested by physician for 1 more visit within 30 days to address any discharge or equipment needs.     Plan    Physical Therapy Initial Treatment Plan   Duration: Discharge Needs Only    DC Equipment Recommendations: None  Discharge Recommendations: Recommend home health for continued physical therapy services       Subjective  \"I just have a hard time getting my shoes and pants on\"     Objective       05/29/25 1430   Precautions   Precautions Fall Risk   Comments MELD 26   Vitals   Vitals Comments all VSS throughout   Pain 0 - 10 Group   Location Foot   Location Orientation Right;Left   Therapist Pain Assessment 3;During Activity;Nurse Notified   Prior Living Situation   Prior Services Intermittent Physical Support for ADL Per Family   Housing / Facility 2 Story House   Steps Into Home 1   Steps In Home 12   Equipment Owned 4-Wheel Walker;Crutches   Lives with - " Patient's Self Care Capacity Parents;Sibling   Comments patient lives with his mom, his brother and TRAM and 2 neices and a nephew. Someone is always available to assist   Prior Level of Functional Mobility   Bed Mobility Independent   Transfer Status Independent   Ambulation Independent   Ambulation Distance household distances   Assistive Devices Used None   Stairs Independent   Comments JACK with stairs at baseline, cannot stay on the ground floor   History of Falls   History of Falls Yes   Date of Last Fall   (2 years ago fell down the stairs while intoxicated)   Cognition    Cognition / Consciousness WDL   Level of Consciousness Alert   Comments pleasant and cooperative   Active ROM Upper Body   Active ROM Upper Body  WDL   Strength Upper Body   Upper Body Strength  WDL   Active ROM Lower Body    Active ROM Lower Body  X   Comments limited by body habitus   Strength Lower Body   Lower Body Strength  X   Gross Strength Generalized Weakness, Equal Bilaterally   Comments B LE grossly 4/5   Sensation Lower Body   Lower Extremity Sensation   WDL   Other Treatments   Other Treatments Provided educated about the importance on self-pacing nad continued mobility. Educated about the importance of ETOH cessation   Balance Assessment   Sitting Balance (Static) Fair   Sitting Balance (Dynamic) Fair   Standing Balance (Static) Fair   Standing Balance (Dynamic) Fair -   Weight Shift Sitting Fair   Weight Shift Standing Fair   Comments no AD   Bed Mobility    Comments found and left up in the chair   Gait Analysis   Gait Level Of Assist Supervised   Assistive Device None   Distance (Feet) 100   Deviation Increased Base Of Support;Bradykinetic   Comments limited by SOB, one minor LOB, able to self-correct.   Functional Mobility   Sit to Stand Supervised   Bed, Chair, Wheelchair Transfer Supervised   Toilet Transfers Supervised   Mobility chair>toilet>chair>toilet>hallway>chair   6 Clicks Assessment - How much HELP from from  another person do you currently need... (If the patient hasn't done an activity recently, how much help from another person do you think he/she would need if he/she tried?)   Turning from your back to your side while in a flat bed without using bedrails? 3   Moving from lying on your back to sitting on the side of a flat bed without using bedrails? 3   Moving to and from a bed to a chair (including a wheelchair)? 3   Standing up from a chair using your arms (e.g., wheelchair, or bedside chair)? 3   Walking in hospital room? 3   Climbing 3-5 steps with a railing? 3   6 clicks Mobility Score 18   Activity Tolerance   Standing 5 min   Comments poor endurance   Edema / Skin Assessment   Comments jaundiced and read patchy skin on extremities   Education Group   Education Provided Role of Physical Therapist;Gait Training   Role of Physical Therapist Patient Response Patient;Acceptance;Demonstration;Explanation;Verbal Demonstration;Action Demonstration   Gait Training Patient Response Patient;Acceptance;Explanation;Demonstration;Verbal Demonstration;Action Demonstration   Physical Therapy Initial Treatment Plan    Duration Discharge Needs Only   Anticipated Discharge Equipment and Recommendations   DC Equipment Recommendations None   Discharge Recommendations Recommend home health for continued physical therapy services     Micaela Bolivar, PT, DPT, GCS

## 2025-05-29 NOTE — ED NOTES
Pt roomed to [facility] 05. Pt adamantly refusing to transfer [place] to [facility]. Pt states they have been here prior and were allowed to be on a chair. Pt educated on the importance of being on the gurney for procedures and fall risk prevention. Pt continued to refuse after being educated on the importance of being on a gurney. Pt set up on chair with arm rest, connected to monitors, given call light. ERP at bedside.

## 2025-05-29 NOTE — ASSESSMENT & PLAN NOTE
CT pancreatic protocol demonstrated masslike structure adjacent to the tail of the pancreas with possible slight enhancement of arterial phase.    MRI of the pancreas with anesthesia ordered  CA 19-9 was elevated.  AFP normal

## 2025-05-29 NOTE — H&P
Hospital Medicine History & Physical Note    Date of Service  5/29/2025    Primary Care Physician  Cyrus Tolliver P.A.-C.      Code Status  Full Code    Chief Complaint  Chief Complaint   Patient presents with    Abdominal Swelling     Pt reports facial, abdominal and leg swelling. Pt recently diagnosed with alcoholic cirrhosis, daily ETOH use.     Facial Swelling    Foot Swelling       History of Presenting Illness  Alberto Maldonado is a 39 y.o. male with alcoholic liver cirrhosis, ongoing alcohol abuse, obstructive sleep apnea, morbid obesity, type 2 diabetes, hypertension, who presented 5/28/2025 with complaints of worsening of swelling of the face, abdomen and legs in the last week.  He has been drinking 9-10 shots every day, last drink was yesterday.  He is urine became dark according to him.  He denies abdominal pain, diarrhea, chest pain, shortness of breath.  However, he is sleeping in the semisitting position  .  Ammonia was checked and it is 122.  CMP showed worsening of total bilirubin from 4 one month ago to 17 today.  Diagnostic alcohol was not elevated.  proBNP 242.  Sodium 130.  CT pancreas and abdomen with contrast: Masslike structure adjacent to tail of the pancreas, recommended MRI of the pancreas, cirrhosis, hepatomegaly.  Right upper quadrant ultrasound: Thickened gallbladder without stones.  Hepatomegaly.  Echogenic liver.  Per chart review he was admitted here 4/3 to 4/12.  At that time CT of the abdomen showed pancreatic tail lesion measuring 5 cm, mass versus hematoma.  He was discharged with recommendation to obtain CT pancreatic protocol, which was done today in ED.  He was prescribed prednisolone and Bactrim at that time  I discussed the plan of care with patient, bedside RN, and ERP.    Review of Systems  Review of Systems   Constitutional:  Negative for chills, fever and weight loss.   HENT:  Negative for ear pain, hearing loss and tinnitus.    Eyes:  Negative for blurred vision,  double vision and photophobia.   Respiratory:  Negative for cough, hemoptysis and sputum production.    Cardiovascular:  Positive for leg swelling. Negative for chest pain, palpitations and orthopnea.   Gastrointestinal:  Negative for heartburn, nausea and vomiting.   Genitourinary:  Negative for dysuria, flank pain, frequency and hematuria.   Musculoskeletal:  Negative for back pain and neck pain.   Skin:  Negative for itching and rash.   Neurological:  Negative for tremors, speech change, focal weakness and headaches.   Endo/Heme/Allergies:  Negative for environmental allergies and polydipsia. Does not bruise/bleed easily.   Psychiatric/Behavioral:  Negative for hallucinations and substance abuse. The patient is not nervous/anxious.        Past Medical History   has a past medical history of Alcohol abuse, Asthma without status asthmaticus (06/29/2017), Chickenpox, and Hypertension.    Surgical History   has a past surgical history that includes other orthopedic surgery (Right).     Family History  family history is not on file.   Family history reviewed with patient. There is no family history that is pertinent to the chief complaint.     Social History   reports that he has quit smoking. His smoking use included cigarettes. He has never used smokeless tobacco. He reports current alcohol use of about 7.2 oz of alcohol per week. He reports that he does not currently use drugs.    Allergies  Allergies[1]    Medications  Prior to Admission Medications   Prescriptions Last Dose Informant Patient Reported? Taking?   carvedilol (COREG) 12.5 MG Tab   No No   Sig: Take 1 Tablet by mouth 2 times a day with meals.   folic acid (FOLVITE) 1 MG Tab  Patient's Home Pharmacy No No   Sig: Take 1 Tablet by mouth every day.   hydrOXYzine HCl (ATARAX) 25 MG Tab   No No   Sig: Take 1 Tablet by mouth 3 times a day as needed for Anxiety or Itching.   multivitamin Tab  Patient's Home Pharmacy No No   Sig: Take 1 Tablet by mouth every  day.   tamsulosin (FLOMAX) 0.4 MG capsule   No No   Sig: Take 1 Capsule by mouth 1/2 hour after breakfast.   thiamine (THIAMINE) 100 MG tablet  Patient's Home Pharmacy No No   Sig: Take 1 Tablet by mouth every day.      Facility-Administered Medications: None       Physical Exam  Temp:  [37.2 °C (98.9 °F)] 37.2 °C (98.9 °F)  Pulse:  [] 94  Resp:  [16-18] 16  BP: (132-140)/(65-88) 139/83  SpO2:  [92 %-97 %] 92 %  Blood Pressure: 139/83   Temperature: 37.2 °C (98.9 °F)   Pulse: 94   Respiration: 16   Pulse Oximetry: 92 %       Physical Exam  Vitals and nursing note reviewed.   Constitutional:       General: He is not in acute distress.     Appearance: He is obese. He is ill-appearing.   HENT:      Head: Normocephalic and atraumatic.      Nose: Nose normal.      Mouth/Throat:      Mouth: Mucous membranes are moist.   Eyes:      Extraocular Movements: Extraocular movements intact.      Pupils: Pupils are equal, round, and reactive to light.   Cardiovascular:      Rate and Rhythm: Normal rate and regular rhythm.   Pulmonary:      Effort: Pulmonary effort is normal.      Breath sounds: Normal breath sounds.   Abdominal:      General: Abdomen is flat and protuberant. There is distension.      Tenderness: There is no abdominal tenderness.   Musculoskeletal:         General: No swelling or deformity. Normal range of motion.      Cervical back: Normal range of motion and neck supple.   Skin:     General: Skin is warm and dry.      Coloration: Skin is jaundiced.   Neurological:      General: No focal deficit present.      Mental Status: He is alert and oriented to person, place, and time.   Psychiatric:         Mood and Affect: Mood normal.         Behavior: Behavior normal.         Laboratory:  Recent Labs     05/28/25 2217   WBC 11.8*   RBC 4.14*   HEMOGLOBIN 13.3*   HEMATOCRIT 38.7*   MCV 93.5   MCH 32.1   MCHC 34.4   RDW 64.0*   PLATELETCT 146*   MPV 9.5     Recent Labs     05/28/25 2217   SODIUM 130*   POTASSIUM  "4.3   CHLORIDE 102   CO2 18*   GLUCOSE 115*   BUN 12   CREATININE 1.38   CALCIUM 7.9*     Recent Labs     05/28/25  2217   ALTSGPT 109*   ASTSGOT 205*   ALKPHOSPHAT 498*   TBILIRUBIN 17.3*   LIPASE 71   GLUCOSE 115*     Recent Labs     05/28/25  2217   INR 1.39*     Recent Labs     05/28/25 2217   NTPROBNP 242*         No results for input(s): \"TROPONINT\" in the last 72 hours.    Imaging:  US-RUQ   Final Result         1.  Thickened gallbladder wall without visualized shadowing stones, consider acalculous cholecystitis, could be further evaluated with HIDA scan as clinically appropriate   2.  Hepatomegaly   3.  Echogenic liver, compatible with fatty change versus fibrosis      CT-PANCREAS AND ABDOMEN WITH & W/O   Final Result         1.  Masslike structure adjacent to the tail of pancreas with possible slight enhancement on arterial phase imaging, recommend follow-up MRI of the pancreas with contrast for more definitive characterization.   2.  Ovoid mass in the splenic hilum with similar density to spleen on all contrast phases, appearance favoring splenule.   3.  Changes of cirrhosis.   4.  Hepatomegaly      MR-ABDOMEN-WITH & W/O    (Results Pending)   US-EXTREMITY VENOUS LOWER BILAT    (Results Pending)         Assessment/Plan:  Justification for Admission Status  I anticipate this patient will require at least two midnights for appropriate medical management, necessitating inpatient admission because anasarca    Patient will need a Med/Surg bed on MEDICAL service .  The need is secondary to anasarca.    * Alcoholic liver hepatitis and cirrhosis- (present on admission)  Assessment & Plan  MELDS 3.0 score 28 points, 79.9% survival in 3 months  Madrey discrimination score 41.8, poor prognosis.  Patient just finished course of prednisolone.  Will not restart it and he has been drinking alcohol continuously    Hyperammonemia (HCC)  Assessment & Plan  Ammonia 122  Started on lactulose    Hyponatremia  Assessment & " Plan  Sodium 130.  Secondary to alcohol abuse, liver disease  Monitor    Anasarca  Assessment & Plan  IV Lasix 40 mg twice daily for now for forced diuresis.  Spironolactone 50 mg once a day.  Plan to transition to 2:5 ratio by discharge  Monitor input and output and daily weight  Will check bilateral venous Doppler ultrasound and transthoracic echo    Intraabdominal mass- (present on admission)  Assessment & Plan  CT pancreatic protocol demonstrated masslike structure adjacent to the tail of the pancreas with possible slight enhancement of arterial phase.  Will order MRI of the pancreas for further evaluation    BOLIVAR on CPAP- (present on admission)  Assessment & Plan  Continue CPAP at night    Alcohol use disorder, severe, dependence (HCC)- (present on admission)  Assessment & Plan  He has been drinking 9-10 shots every day, last drink was yesterday.  Mild tremulousness noted  Will order CIWA protocol with thiamine  Monitor and replace electrolytes  Fall, aspiration, seizure precautions    Morbid obesity (HCC)- (present on admission)  Assessment & Plan  BMI 44    Essential hypertension- (present on admission)  Assessment & Plan  Will start Lasix and spironolactone for volume overload.  IV Lasix 40 mg twice daily, spironolactone 50 mg once a day.  Will reduce dose of carvedilol to 3.125 mg twice daily to allow room for diuresis  IV labetalol for systolic blood pressure above 160 as needed          VTE prophylaxis: enoxaparin ppx         [1] No Known Allergies

## 2025-05-30 ENCOUNTER — APPOINTMENT (OUTPATIENT)
Dept: CARDIOLOGY | Facility: MEDICAL CENTER | Age: 40
End: 2025-05-30
Attending: INTERNAL MEDICINE
Payer: COMMERCIAL

## 2025-05-30 LAB
ALBUMIN SERPL BCP-MCNC: 2.5 G/DL (ref 3.2–4.9)
ALBUMIN/GLOB SERPL: 0.8 G/DL
ALP SERPL-CCNC: 408 U/L (ref 30–99)
ALT SERPL-CCNC: 91 U/L (ref 2–50)
ANION GAP SERPL CALC-SCNC: 12 MMOL/L (ref 7–16)
ANISOCYTOSIS BLD QL SMEAR: ABNORMAL
AST SERPL-CCNC: 177 U/L (ref 12–45)
BASOPHILS # BLD AUTO: 2.6 % (ref 0–1.8)
BASOPHILS # BLD: 0.25 K/UL (ref 0–0.12)
BILIRUB SERPL-MCNC: 17.6 MG/DL (ref 0.1–1.5)
BUN SERPL-MCNC: 14 MG/DL (ref 8–22)
CALCIUM ALBUM COR SERPL-MCNC: 8.9 MG/DL (ref 8.5–10.5)
CALCIUM SERPL-MCNC: 7.7 MG/DL (ref 8.5–10.5)
CHLORIDE SERPL-SCNC: 106 MMOL/L (ref 96–112)
CO2 SERPL-SCNC: 18 MMOL/L (ref 20–33)
CREAT SERPL-MCNC: 1.32 MG/DL (ref 0.5–1.4)
EOSINOPHIL # BLD AUTO: 0.17 K/UL (ref 0–0.51)
EOSINOPHIL NFR BLD: 1.7 % (ref 0–6.9)
ERYTHROCYTE [DISTWIDTH] IN BLOOD BY AUTOMATED COUNT: 66.4 FL (ref 35.9–50)
GFR SERPLBLD CREATININE-BSD FMLA CKD-EPI: 71 ML/MIN/1.73 M 2
GLOBULIN SER CALC-MCNC: 3.2 G/DL (ref 1.9–3.5)
GLUCOSE SERPL-MCNC: 131 MG/DL (ref 65–99)
HCT VFR BLD AUTO: 38.3 % (ref 42–52)
HGB BLD-MCNC: 13.1 G/DL (ref 14–18)
HYPOCHROMIA BLD QL SMEAR: ABNORMAL
INR PPP: 1.45 (ref 0.87–1.13)
LV EJECT FRACT  99904: 55
LYMPHOCYTES # BLD AUTO: 0.25 K/UL (ref 1–4.8)
LYMPHOCYTES NFR BLD: 2.6 % (ref 22–41)
MACROCYTES BLD QL SMEAR: ABNORMAL
MAGNESIUM SERPL-MCNC: 1.9 MG/DL (ref 1.5–2.5)
MANUAL DIFF BLD: NORMAL
MCH RBC QN AUTO: 32 PG (ref 27–33)
MCHC RBC AUTO-ENTMCNC: 34.2 G/DL (ref 32.3–36.5)
MCV RBC AUTO: 93.6 FL (ref 81.4–97.8)
METAMYELOCYTES NFR BLD MANUAL: 0.9 %
MONOCYTES # BLD AUTO: 0.8 K/UL (ref 0–0.85)
MONOCYTES NFR BLD AUTO: 8.5 % (ref 0–13.4)
MORPHOLOGY BLD-IMP: NORMAL
NEUTROPHILS # BLD AUTO: 8.2 K/UL (ref 1.82–7.42)
NEUTROPHILS NFR BLD: 80.3 % (ref 44–72)
NEUTS BAND NFR BLD MANUAL: 3.4 % (ref 0–10)
NRBC # BLD AUTO: 0.02 K/UL
NRBC BLD-RTO: 0.2 /100 WBC (ref 0–0.2)
PHOSPHATE SERPL-MCNC: 2.6 MG/DL (ref 2.5–4.5)
PLATELET # BLD AUTO: 98 K/UL (ref 164–446)
PLATELET BLD QL SMEAR: NORMAL
PMV BLD AUTO: 9.2 FL (ref 9–12.9)
POTASSIUM SERPL-SCNC: 3.9 MMOL/L (ref 3.6–5.5)
PROT SERPL-MCNC: 5.7 G/DL (ref 6–8.2)
PROTHROMBIN TIME: 17.7 SEC (ref 12–14.6)
RBC # BLD AUTO: 4.09 M/UL (ref 4.7–6.1)
RBC BLD AUTO: PRESENT
SODIUM SERPL-SCNC: 136 MMOL/L (ref 135–145)
WBC # BLD AUTO: 9.8 K/UL (ref 4.8–10.8)

## 2025-05-30 PROCEDURE — 770006 HCHG ROOM/CARE - MED/SURG/GYN SEMI*

## 2025-05-30 PROCEDURE — 85007 BL SMEAR W/DIFF WBC COUNT: CPT

## 2025-05-30 PROCEDURE — 93306 TTE W/DOPPLER COMPLETE: CPT

## 2025-05-30 PROCEDURE — 83735 ASSAY OF MAGNESIUM: CPT

## 2025-05-30 PROCEDURE — 80053 COMPREHEN METABOLIC PANEL: CPT

## 2025-05-30 PROCEDURE — 93306 TTE W/DOPPLER COMPLETE: CPT | Mod: 26 | Performed by: INTERNAL MEDICINE

## 2025-05-30 PROCEDURE — 700111 HCHG RX REV CODE 636 W/ 250 OVERRIDE (IP): Mod: JZ | Performed by: INTERNAL MEDICINE

## 2025-05-30 PROCEDURE — 700117 HCHG RX CONTRAST REV CODE 255: Performed by: INTERNAL MEDICINE

## 2025-05-30 PROCEDURE — 36415 COLL VENOUS BLD VENIPUNCTURE: CPT

## 2025-05-30 PROCEDURE — 700102 HCHG RX REV CODE 250 W/ 637 OVERRIDE(OP): Performed by: INTERNAL MEDICINE

## 2025-05-30 PROCEDURE — 99233 SBSQ HOSP IP/OBS HIGH 50: CPT | Performed by: INTERNAL MEDICINE

## 2025-05-30 PROCEDURE — 85027 COMPLETE CBC AUTOMATED: CPT

## 2025-05-30 PROCEDURE — 85610 PROTHROMBIN TIME: CPT

## 2025-05-30 PROCEDURE — A9270 NON-COVERED ITEM OR SERVICE: HCPCS | Performed by: INTERNAL MEDICINE

## 2025-05-30 PROCEDURE — 99254 IP/OBS CNSLTJ NEW/EST MOD 60: CPT | Performed by: INTERNAL MEDICINE

## 2025-05-30 PROCEDURE — 700111 HCHG RX REV CODE 636 W/ 250 OVERRIDE (IP): Performed by: INTERNAL MEDICINE

## 2025-05-30 PROCEDURE — 84100 ASSAY OF PHOSPHORUS: CPT

## 2025-05-30 RX ORDER — FUROSEMIDE 10 MG/ML
60 INJECTION INTRAMUSCULAR; INTRAVENOUS 3 TIMES DAILY
Status: DISCONTINUED | OUTPATIENT
Start: 2025-05-30 | End: 2025-06-03

## 2025-05-30 RX ADMIN — Medication 100 MG: at 05:59

## 2025-05-30 RX ADMIN — CARVEDILOL 3.12 MG: 3.12 TABLET, FILM COATED ORAL at 17:51

## 2025-05-30 RX ADMIN — FUROSEMIDE 60 MG: 10 INJECTION, SOLUTION INTRAVENOUS at 05:59

## 2025-05-30 RX ADMIN — LACTULOSE 30 ML: 10 SOLUTION ORAL at 17:51

## 2025-05-30 RX ADMIN — TAMSULOSIN HYDROCHLORIDE 0.4 MG: 0.4 CAPSULE ORAL at 08:17

## 2025-05-30 RX ADMIN — HYDROCORTISONE: 1 CREAM TOPICAL at 18:01

## 2025-05-30 RX ADMIN — HYDROCORTISONE: 1 CREAM TOPICAL at 06:04

## 2025-05-30 RX ADMIN — FUROSEMIDE 60 MG: 10 INJECTION, SOLUTION INTRAVENOUS at 17:51

## 2025-05-30 RX ADMIN — SPIRONOLACTONE 50 MG: 50 TABLET ORAL at 05:59

## 2025-05-30 RX ADMIN — ENOXAPARIN SODIUM 40 MG: 100 INJECTION SUBCUTANEOUS at 17:51

## 2025-05-30 RX ADMIN — LACTULOSE 30 ML: 10 SOLUTION ORAL at 06:00

## 2025-05-30 RX ADMIN — FUROSEMIDE 60 MG: 10 INJECTION, SOLUTION INTRAVENOUS at 13:39

## 2025-05-30 RX ADMIN — CARVEDILOL 3.12 MG: 3.12 TABLET, FILM COATED ORAL at 08:17

## 2025-05-30 RX ADMIN — FOLIC ACID 1 MG: 1 TABLET ORAL at 05:59

## 2025-05-30 RX ADMIN — LACTULOSE 30 ML: 10 SOLUTION ORAL at 13:39

## 2025-05-30 RX ADMIN — THERA TABS 1 TABLET: TAB at 05:59

## 2025-05-30 RX ADMIN — HUMAN ALBUMIN MICROSPHERES AND PERFLUTREN 3 ML: 10; .22 INJECTION, SOLUTION INTRAVENOUS at 12:00

## 2025-05-30 ASSESSMENT — LIFESTYLE VARIABLES
TREMOR: TREMOR NOT VISIBLE BUT CAN BE FELT, FINGERTIP TO FINGERTIP
VISUAL DISTURBANCES: NOT PRESENT
HEADACHE, FULLNESS IN HEAD: NOT PRESENT
AUDITORY DISTURBANCES: NOT PRESENT
ANXIETY: MILDLY ANXIOUS
NAUSEA AND VOMITING: NO NAUSEA AND NO VOMITING
TOTAL SCORE: 3
TREMOR: TREMOR NOT VISIBLE BUT CAN BE FELT, FINGERTIP TO FINGERTIP
TOTAL SCORE: VERY MILD ITCHING, PINS AND NEEDLES SENSATION, BURNING OR NUMBNESS
HEADACHE, FULLNESS IN HEAD: NOT PRESENT
HEADACHE, FULLNESS IN HEAD: NOT PRESENT
NAUSEA AND VOMITING: NO NAUSEA AND NO VOMITING
AUDITORY DISTURBANCES: NOT PRESENT
TOTAL SCORE: VERY MILD ITCHING, PINS AND NEEDLES SENSATION, BURNING OR NUMBNESS
NAUSEA AND VOMITING: NO NAUSEA AND NO VOMITING
AGITATION: NORMAL ACTIVITY
TOTAL SCORE: VERY MILD ITCHING, PINS AND NEEDLES SENSATION, BURNING OR NUMBNESS
ANXIETY: MILDLY ANXIOUS
PAROXYSMAL SWEATS: NO SWEAT VISIBLE
PAROXYSMAL SWEATS: NO SWEAT VISIBLE
VISUAL DISTURBANCES: NOT PRESENT
AGITATION: NORMAL ACTIVITY
PAROXYSMAL SWEATS: NO SWEAT VISIBLE
TOTAL SCORE: 2
PAROXYSMAL SWEATS: NO SWEAT VISIBLE
TREMOR: TREMOR NOT VISIBLE BUT CAN BE FELT, FINGERTIP TO FINGERTIP
ORIENTATION AND CLOUDING OF SENSORIUM: ORIENTED AND CAN DO SERIAL ADDITIONS
NAUSEA AND VOMITING: NO NAUSEA AND NO VOMITING
PAROXYSMAL SWEATS: NO SWEAT VISIBLE
ANXIETY: MILDLY ANXIOUS
HEADACHE, FULLNESS IN HEAD: NOT PRESENT
TOTAL SCORE: 2
ANXIETY: NO ANXIETY (AT EASE)
ORIENTATION AND CLOUDING OF SENSORIUM: ORIENTED AND CAN DO SERIAL ADDITIONS
VISUAL DISTURBANCES: NOT PRESENT
HEADACHE, FULLNESS IN HEAD: NOT PRESENT
TOTAL SCORE: VERY MILD ITCHING, PINS AND NEEDLES SENSATION, BURNING OR NUMBNESS
AUDITORY DISTURBANCES: NOT PRESENT
VISUAL DISTURBANCES: NOT PRESENT
HEADACHE, FULLNESS IN HEAD: NOT PRESENT
TOTAL SCORE: 2
AUDITORY DISTURBANCES: NOT PRESENT
AGITATION: NORMAL ACTIVITY
ORIENTATION AND CLOUDING OF SENSORIUM: ORIENTED AND CAN DO SERIAL ADDITIONS
TOTAL SCORE: 3
VISUAL DISTURBANCES: NOT PRESENT
AUDITORY DISTURBANCES: NOT PRESENT
TOTAL SCORE: VERY MILD ITCHING, PINS AND NEEDLES SENSATION, BURNING OR NUMBNESS
TREMOR: TREMOR NOT VISIBLE BUT CAN BE FELT, FINGERTIP TO FINGERTIP
TOTAL SCORE: VERY MILD ITCHING, PINS AND NEEDLES SENSATION, BURNING OR NUMBNESS
ORIENTATION AND CLOUDING OF SENSORIUM: ORIENTED AND CAN DO SERIAL ADDITIONS
TREMOR: TREMOR NOT VISIBLE BUT CAN BE FELT, FINGERTIP TO FINGERTIP
ANXIETY: NO ANXIETY (AT EASE)
ANXIETY: NO ANXIETY (AT EASE)
VISUAL DISTURBANCES: NOT PRESENT
TOTAL SCORE: 3
ORIENTATION AND CLOUDING OF SENSORIUM: ORIENTED AND CAN DO SERIAL ADDITIONS
AGITATION: NORMAL ACTIVITY
AGITATION: NORMAL ACTIVITY
ORIENTATION AND CLOUDING OF SENSORIUM: ORIENTED AND CAN DO SERIAL ADDITIONS
PAROXYSMAL SWEATS: NO SWEAT VISIBLE
TREMOR: TREMOR NOT VISIBLE BUT CAN BE FELT, FINGERTIP TO FINGERTIP
AGITATION: NORMAL ACTIVITY
AUDITORY DISTURBANCES: NOT PRESENT

## 2025-05-30 ASSESSMENT — ENCOUNTER SYMPTOMS
VOMITING: 0
SHORTNESS OF BREATH: 1
WEAKNESS: 1
DIARRHEA: 1
ABDOMINAL PAIN: 0

## 2025-05-30 ASSESSMENT — PAIN DESCRIPTION - PAIN TYPE
TYPE: ACUTE PAIN
TYPE: ACUTE PAIN

## 2025-05-30 ASSESSMENT — FIBROSIS 4 INDEX: FIB4 SCORE: 7.01

## 2025-05-30 NOTE — CARE PLAN
The patient is Stable - Low risk of patient condition declining or worsening    Shift Goals  Clinical Goals: Monitor labs, keep patient safe from falls, mobilize during shift  Patient Goals: rest  Family Goals: DEBBI    Progress made toward(s) clinical / shift goals:      Patients bilirubin 18.0 (0.1-1.5), patient sits in the bedside chair, he does not like to lay in the bed due to his size and its uncomfortable, PT saw him today, I did ask if it was ok for him to stay in the chair, they said it was ok as long as he mobilizes often. He does mobilize with standby assist he has a wide based stance. I educated patient on trying to lay in the bed as well with some adjustments to the bed for comfort, he said he will try. He is scoring 4 on CIWA, and eager to get assistance to stop drinking. He is very pleasant and compliant with treatment. Chair has strip alarm, and waffle cushion, call light within reach and he uses it appropriately.   Problem: Pain - Standard  Goal: Alleviation of pain or a reduction in pain to the patient’s comfort goal  Description: Target End Date:  Prior to discharge or change in level of careDocument on Vitals flowsheet1.  Document pain using the appropriate pain scale per order or unit policy2.  Educate and implement non-pharmacologic comfort measures (i.e. relaxation, distraction, massage, cold/heat therapy, etc.)3.  Pain management medications as ordered4.  Reassess pain after pain med administration per policy5.  If opiods administered assess patient's response to pain medication is appropriate per POSS sedation scale6.  Follow pain management plan developed in collaboration with patient and interdisciplinary team (including palliative care or pain specialists if applicable)  Outcome: Progressing     Problem: Knowledge Deficit - Standard  Goal: Patient and family/care givers will demonstrate understanding of plan of care, disease process/condition, diagnostic tests and medications  Description:  Target End Date:  1-3 days or as soon as patient condition allowsDocument in Patient Education1.  Patient and family/caregiver oriented to unit, equipment, visitation policy and means for communicating concern2.  Complete/review Learning Assessment3.  Assess knowledge level of disease process/condition, treatment plan, diagnostic tests and medications4.  Explain disease process/condition, treatment plan, diagnostic tests and medications  Outcome: Progressing     Problem: Optimal Care for Alcohol Withdrawal  Goal: Optimal Care for the alcohol withdrawal patient  Description: Target End Date:  1 to 3 days1.  Alcohol history screening done on admission2.  CIWA-AR score assessment (includes assessment of nausea/vomiting, tremor, sweats, anxiety, agitation, tactile, visual and auditory disturbances, headache and orientation/sensorium).  Document on CIWA flowsheet.3.  Follow CIWA-AR score protocol4.  Frequent reorientation5.  Monitor for fluid and electrolyte imbalance.6.  Assess for respiratory depression due to sedation (pulse ox)7.  Consider thiamine, multivitamins, folic acid and magnesium sulfate per provider order8.  Collaborate with Social Workers/Case Management9.  Collaborate with mental health  Outcome: Progressing     Problem: Lifestyle Changes  Goal: Patient's ability to identify lifestyle changes and available resources to help reduce recurrence of condition will improve  Description: Target End Date:  1 to 3 days1.  Discuss recommended lifestyle changes2.  Identify available resources and support systems3.  Consider referral to substance abuse program  Outcome: Progressing     Problem: Psychosocial  Goal: Patient's level of anxiety will decrease  Description: Target End Date:  1-3 days or as soon as patient condition allows1.  Collaborate with patient and family/caregiver to identify triggers and develop strategies to cope with anxiety2.  Implement stimuli reduction, calming techniques3.  Pharmacologic  management per provider order4.  Encourage patient/family/care giver participation5.  Collaborate with interdisciplinary team including Psychologist or Behavioral Health Team as needed  Outcome: Progressing

## 2025-05-30 NOTE — PROGRESS NOTES
Pt offered CPAP but refuses it. Currently sitting on a chair. Pt states that he likes to sleep while seated due to SOB when lying flat. Pt educated on the need to lay in bed so to elevate his lower extremities d/t edema. This RN offered to raise HOB to improve comfort. Pt still prefers sitting on a chair.

## 2025-05-30 NOTE — CARE PLAN
The patient is Stable - Low risk of patient condition declining or worsening    Shift Goals  Clinical Goals: ambulate to hallway, CPAP  Patient Goals: rest,  Family Goals: updates    Progress made toward(s) clinical / shift goals:    Problem: Knowledge Deficit - Standard  Goal: Patient and family/care givers will demonstrate understanding of plan of care, disease process/condition, diagnostic tests and medications  Description: Target End Date:  1-3 days or as soon as patient condition allowsDocument in Patient Education1.  Patient and family/caregiver oriented to unit, equipment, visitation policy and means for communicating concern2.  Complete/review Learning Assessment3.  Assess knowledge level of disease process/condition, treatment plan, diagnostic tests and medications4.  Explain disease process/condition, treatment plan, diagnostic tests and medications  Outcome: Progressing  Note: Pt now on 2 g Na diet and 2 L fluid restriction, strict I/O       Patient is not progressing towards the following goals:

## 2025-05-30 NOTE — CONSULTS
Date of Consultation:  5/30/2025    Patient: : Alberto Maldonado  MRN: [id number]    Referring Physician:  Gordon Brambila M.D.  Attending      GI:Yoandy Camacho M.D.     Reason for Consultation: Decompensated liver cirrhosis    History of Present Illness:   The patient 39 years old gentleman with excessive alcohol use, liver cirrhosis with jaundice and fluid overload, recent admission for acute on chronic liver injury from alcohol use, s/p steroid treatment (however, the patient does not recall if he finished the steroid as instructed upon last discharge), present to inpatient GI service for worsening jaundice and fluid accumulation.    Saw the patient at bedside.  The patient has multiple bottles of water and a Gatorade around him.  Overt fluid overload with facial swelling, distended abdomen, severely distended lower extremities.    Borderline encephalopathy with poor sleep and is not shaking.  Memory status was not perfect.     No evidence of GI bleeding at this time.      Past Medical History:   Diagnosis Date    Asthma without status asthmaticus 06/29/2017    Alcohol abuse     Chickenpox     Hypertension          Past Surgical History[1]    Family History   Problem Relation Age of Onset    Stroke Neg Hx     Heart Disease Neg Hx        Social History[2]        Physical Exam:  Vitals:    05/29/25 2231 05/30/25 0419 05/30/25 0613 05/30/25 0738   BP:  111/65  121/66   Pulse:  87  88   Resp: 18 18  16   Temp:  36.6 °C (97.8 °F)  36.9 °C (98.4 °F)   TempSrc:  Temporal  Temporal   SpO2:  94%  96%   Weight:   (!) 131 kg (289 lb 0.4 oz)    Height:         Constitutional: No fevers.  Eyes: No symptoms reported.   Ears/Nose/Throat/Mouth: No choking reported.  Cardiovascular: No chest pain reported.   Respiratory: Denies shortness of breath.  Gastrointestinal/Hepatic: Per H/P.   Skin/Breast: No new rash reported.   Psychiatric: No complaints    HEENT: grossly normal.  Cardiovascular: Please refer to primary team's daily  assessment.   Lungs: Please refer to primary team's daily assessment.   Abdomen: No tenderness.  Newald distended  Skin: No erythema, No rash.  Lower limbs: Severe fluid overload.  Neurologic: Alert & oriented x 3, Normal motor function, No focal deficits noted.  PSY: stable mood.           Labs:  Recent Labs     25  0833 25  0146   WBC 11.8* 10.3 9.8   RBC 4.14* 3.94* 4.09*   HEMOGLOBIN 13.3* 12.3* 13.1*   HEMATOCRIT 38.7* 37.2* 38.3*   MCV 93.5 94.4 93.6   MCH 32.1 31.2 32.0   MCHC 34.4 33.1 34.2   RDW 64.0* 65.2* 66.4*   PLATELETCT 146* 137* 98*   MPV 9.5 9.8 9.2     Recent Labs     25  0833 25  0146   SODIUM 130* 133* 136   POTASSIUM 4.3 3.9 3.9   CHLORIDE 102 105 106   CO2 18* 17* 18*   GLUCOSE 115* 167* 131*   BUN 12 13 14     Recent Labs     25  0833 25  0146   INR 1.39* 1.46* 1.45*       Recent Labs     25  02125  0833 25  0146   ASTSGOT 205*  --  188* 177*   ALTSGPT 109*  --  100* 91*   TBILIRUBIN 17.3*  --  18.0* 17.6*   ALKPHOSPHAT 498*  --  443* 408*   GLOBULIN 3.5  --  3.2 3.2   INR 1.39*  --  1.46* 1.45*   AMMONIA  --  122*  --   --          Imaging:  US-EXTREMITY VENOUS LOWER BILAT   Vascular Laboratory   CONCLUSIONS   1) Normal bilateral lower extremity superficial and deep venous    examination.  Evaluation limited as described.     ANNIA ALMONTE     Exam Date:     2025 03:37     Room #:     Inpatient     Priority:     Routine     Ht (in):             Wt (lb):     Ordering Physician:        LAVERNE PINEDO     Referring Physician:       [id number], KUHAREVIC     Sonographer:               Ifeanyi Pryor RVT     Study Type:                Complete Bilateral     Technical Quality:         Adequate     Age:    39    Gender:     M     MRN:    [id number]     :    1985      BSA:     Indications:     Swelling of Limb     CPT Codes:       23244     ICD Codes:       729.81      History:         Bilateral lower extremity swelling     Limitations:     Patient could not tolerate compressions in the thighs,                     bilaterally.     PROCEDURES:   Bilateral lower extremity venous duplex imaging.    The following venous structures were evaluated: common femoral, deep    femoral, proximal great saphenous, femoral, popliteal, peroneal, and    posterior tibial veins.    Serial compression, color, and spectral Doppler flow evaluations were    performed.      FINDINGS:   Bilateral lower extremities.       Compressions were not obtained in the thighs, bilaterally due to the    limitations above.     The common femoral, profunda femoral, great saphenous, and femoral veins    demonstrate wall to wall color filling and phasic flow, bilaterally.     The peroneal and posterior tibial veins are difficult to assess for    compressibility, but flow response to augmentation is demonstrated.      All other veins demonstrate complete color filling and compressibility with    normal venous flow dynamics including spontaneous flow and respiratory    phasicity.      No deep venous thrombosis.      Yoandy Crenshaw MD   (Electronically Signed)   Final Date:      29 May 2025 07:44  CT-PANCREAS AND ABDOMEN WITH & W/O  Narrative:   5/29/2025 1:05 AM    HISTORY/REASON FOR EXAM: Abdominal mass, intra-abdominal neoplasm suspected    TECHNIQUE/EXAM DESCRIPTION: CT Abdomen with and without IV contrast. Transaxial MDCT scans of the abdomen were obtained without and with 100 cc Omnipaque 350 IV contrast.    Low dose optimization technique was utilized for this CT exam including automated exposure control and adjustment of the mA and/or kV according to patient size.    COMPARISON: April 8, 2025    FINDINGS:    CT ABDOMEN WITH CONTRAST FINDINGS:    The liver is nodular in contour, no intrahepatic biliary ductal dilatation is seen. Hepatomegaly is noted.    The gallbladder appears within normal limits.    The spleen is  unremarkable.  There is 3.8 x 4.3 cm ovoid mass identified in the splenic hilum with similar appearance to spleen on all contrast phases.    Intermediate density mass like structure identified adjacent to the tail of pancreas measuring approximately 4.0 x 3.2 cm measuring 50 Hounsfield units on noncontrast study, 96 Hounsfield units on arterial phase, 56 Hounsfield units on venous phase   imaging.    Bilateral adrenal glands appear within normal limits.    The kidneys are unremarkable. The ureters are normal in caliber along their visualized course.    Visualized colon and small bowel appear grossly unremarkable.    The bony structures are age appropriate.    CT ABDOMEN WITHOUT CONTRAST:    On non-contrast images, no renal or urinary tract stones are identified.  Impression: 1.  Masslike structure adjacent to the tail of pancreas with possible slight enhancement on arterial phase imaging, recommend follow-up MRI of the pancreas with contrast for more definitive characterization.  2.  Ovoid mass in the splenic hilum with similar density to spleen on all contrast phases, appearance favoring splenule.  3.  Changes of cirrhosis.  4.  Hepatomegaly  US-RUQ  Narrative:   5/29/2025 1:09 AM    HISTORY/REASON FOR EXAM:  Abnormal Labs, Abdominal pain    TECHNIQUE/EXAM DESCRIPTION AND NUMBER OF VIEWS:  Real-time sonography of the liver and biliary tree.    COMPARISON: None    FINDINGS:    The liver is normal in contour. There is no evidence of solid mass lesion. The liver measures 19.2 cm. Liver appears echogenic.    The gallbladder is normal. There is no evidence of cholelithiasis.  The gallbladder wall thickness measures 3.2 mm. There is no pericholecystic fluid.    The common duct measures 5.6 mm.    The visualized pancreas is unremarkable.    The aorta is obscured by bowel gas.    Intrahepatic IVC is not well visualized due to shadowing bowel gas.    The portal vein is patent with hepatopetal flow. The MPV measures 1.2  cm.    The right kidney measures 11.1 cm.    There is no ascites.  Impression: 1.  Thickened gallbladder wall without visualized shadowing stones, consider acalculous cholecystitis, could be further evaluated with HIDA scan as clinically appropriate  2.  Hepatomegaly  3.  Echogenic liver, compatible with fatty change versus fibrosis            Impressions:  The patient 39 years old gentleman with excessive alcohol use, liver cirrhosis with jaundice and fluid overload, recent admission for acute on chronic liver injury from alcohol use, s/p steroid treatment (however, the patient does not recall if he finished the steroid as instructed upon last discharge), present to inpatient GI service for worsening jaundice and fluid accumulation.    Saw the patient at bedside.  The patient has multiple bottles of water and a Gatorade around him.  Overt fluid overload with facial swelling, distended abdomen, severely distended lower extremities.    Possible pancreatic tail lesion; awaiting MRI for further evaluation. No evidence of other complications at this time. Likely appropriate for outpatient management.    The GI team does not recommend restarting steroid therapy at this point due to uncertainty regarding the patient’s compliance and ongoing alcohol use, which is a contraindication for steroid treatment.    If further evidence suggests hepatic encephalopathy, we will consider initiating/increasing lactulose or rifaximin to maintain three bowel movements per day.    Diuretic and electrolyte management to be continued by the primary team.    Alcohol cessation remains the top priority for this patient.    The GI team is signing off for now. Please contact us again for any acute GI or liver-related issues.    This note was generated using voice recognition software which has a small chance of producing errors of grammar and possibly content. I have made every reasonable attempt to find and correct any obvious errors, but  "expect that some may not be found prior to finalization of this note.         [1]   Past Surgical History:  Procedure Laterality Date    OTHER ORTHOPEDIC SURGERY Right     broken hand   [2]   Social History  Socioeconomic History    Marital status: Single   Tobacco Use    Smoking status: Former     Types: Cigarettes    Smokeless tobacco: Never   Vaping Use    Vaping status: Never Used   Substance and Sexual Activity    Alcohol use: Yes     Alcohol/week: 7.2 oz     Types: 12 Shots of liquor per week     Comment: \"10-15 vodka mini bottles per day\"    Drug use: Not Currently    Sexual activity: Not Currently   Social History Narrative    Works in [employer]    Lives at home with mom, brother, TRAM and nieces and nephews     Social Drivers of Health     Food Insecurity: No Food Insecurity (5/29/2025)    Hunger Vital Sign     Worried About Running Out of Food in the Last Year: Never true     Ran Out of Food in the Last Year: Never true   Transportation Needs: No Transportation Needs (5/29/2025)    PRAPARE - Transportation     Lack of Transportation (Medical): No     Lack of Transportation (Non-Medical): No   Intimate Partner Violence: Not At Risk (5/29/2025)    Humiliation, Afraid, Rape, and Kick questionnaire     Fear of Current or Ex-Partner: No     Emotionally Abused: No     Physically Abused: No     Sexually Abused: No   Housing Stability: Low Risk  (5/29/2025)    Housing Stability Vital Sign     Unable to Pay for Housing in the Last Year: No     Number of Times Moved in the Last Year: 1     Homeless in the Last Year: No   Recent Concern: Housing Stability - High Risk (3/2/2025)    Housing Stability Vital Sign     Unable to Pay for Housing in the Last Year: Yes     Number of Times Moved in the Last Year: 0     Homeless in the Last Year: No     "

## 2025-05-30 NOTE — PROGRESS NOTES
4 Eyes Skin Assessment Completed by ALF Lopes and ALF Pulido.    Skin assessment is primarily focused on high risk bony prominences. Pay special attention to skin beneath and around medical devices, high risk bony prominences, skin to skin areas and areas where the patient lacks sensation to feel pain and areas where the patient previously had breakdown.     Head (Occipital):  WDL   Ears (Under Medical Devices): WDL   Nose (Under Medical Devices): WDL   Mouth:  WDL   Neck: WDL   Breast/Chest:  WDL   Shoulder Blades:  WDL   Spine:   Redness, scratches lower back   (R) Arm/Elbow/Hand: Redness, tear,scab to wrist, dryness, discoloration   (L) Arm/Elbow/Hand: Dryness,scab,discoloration   Abdomen: Redness,edema,weeping,dryness   Pannus/Groin:  Edema,dryness,discoloration   Sacrum/Coccyx:   Discolaration,dry,   (R) Ischial Tuberosity (Sit Bones):  WDL   (L) Ischial Tuberosity (Sit Bones):  WDL   (R) Leg:  Dry,edema,weeping   (L) Leg:  Dry,edema,weeping   (R) Heel:  Edema,red,dry,weeping   (R) Foot/Toe: Edema,red,dry,weeping   (L) Heel: dry   (L) Foot/Toe:  dry       DEVICES IN USE:   Respiratory Devices:  NA, patient on room air  Feeding Devices:  N/A   Lines & BP Monitoring Devices:  Peripheral IV    Orthopedic Devices:  N/A  Miscellaneous Devices:  N/A    PROTOCOL INTERVENTIONS:   Low Airloss Bed:  Already in place    WOUND PHOTOS:   Completed and in EPIC     WOUND CONSULT:   N/A, no advanced wound care needs identified,wound care signed off on sacrum consult

## 2025-05-30 NOTE — CARE PLAN
The patient is Stable - Low risk of patient condition declining or worsening    Shift Goals  Clinical Goals: ambulate to hallway, CPAP  Patient Goals: rest,  Family Goals: updates        Progress made toward(s) clinical / shift goals:  Pt refuses nocturnal  Problem: Fall Risk  Goal: Patient will remain free from falls  Outcome: Progressing     Problem: Skin Integrity  Goal: Skin integrity is maintained or improved  Outcome: Progressing    CPAP. Education provided. Medicated for pain W/effect. Sitting on a chair. Call light within reach. Hourly rounding.     Patient is not progressing towards the following goals:

## 2025-05-30 NOTE — DIETARY
"Nutrition Services: Initial Assessment     Day 0 5/28/2025of admit. Alberto Maldonado is 39 y.o., male with admitting DX of Acute alcoholic hepatitis [K70.10].    Consult Received for: BMI    NUTRITION SERVICES: BMI - Pt with BMI >40 morbid obesity. Weight loss counseling not appropriate in acute care setting.      RECOMMEND - If appropriate at discharge please refer to outpatient nutrition services for weight management.        Pt admitted with alcoholic liver hepatitis and cirrhosis, alcohol abuse, anasarca pmh sleep apnea, T2DM, HTN per EMR/provider note.        Nutrition Assessment:      Height: 160 cm (5' 3\")  Weight: (!) 132 kg (290 lb 1.6 oz)  Weight taken via: Other Healthcare Provider  BMI Calculated: 51.39  BMI Classification: Morbid Obesity       Weight Readings from Last 5 encounters:   Wt Readings from Last 5 Encounters:   05/29/25 (!) 132 kg (290 lb 1.6 oz)   04/03/25 111 kg (243 lb 13.3 oz)   03/06/25 117 kg (257 lb 8 oz)   12/23/24 117 kg (258 lb 2.5 oz)   11/20/24 120 kg (264 lb)         Objective:   Pertinent Labs: Na 133, A1C 5.4% (4/2025)  Pertinent Meds: folic acid, furosemide, lactulose, multivitamin, bowel regimen, spironolactone, thiamine  Skin/Wounds:  no concerns noted  Food Allergies: NKFA  Last BM:  Type 6: Fluffy pieces with ragged edges, a mushy stool  05/29/25       Current Diet Order/Intake:   Regular diet    PO Intake >75% x 5 meals    Nutrition Focused Physical Exam (NFPE)  NFPE did not occur     Nutrition Diagnosis:      Increased nutrient needs related to increase nutrient needs as evidence by cirrhosis    Nutrition Interventions:      Recommend to resume regular diet      Nutrition Monitoring and Evaluation:      Monitor nutrition POC, goal for >75% intake from meals and supplements.  Additional fluids per MD/DO  Monitor vital signs pertinent to nutrition.    RD following and will provide updated recommendations as indicated.      Charlee Zapata R.D.                      "                    ASP[facility]/AND CRITERIA FOR MALNUTRITION

## 2025-05-30 NOTE — PROGRESS NOTES
Hospital Medicine Daily Progress Note    Date of Service  5/30/2025    Chief Complaint  Alberto Maldonado is a 39 y.o. male admitted 5/28/2025 with swelling    Hospital Course  40 yo man with alcoholic cirrhosis, ongoing alcohol use, DM, HTN, BOLIVAR, morbid obesity who presented with worsening swelling.  He continues to drink 9-10 shots every day.  He was found with worsening liver function, elevated ammonia.  Patient was previously also hospitalized and CTAP at that time showed a pancreatic tail lesion and was recommended to have outpatient CT pancreatic protocol.  CT was done in the ER which showed a mass in the pancreatic tail, ovoid mass in the splenic hilum.  GI was consulted    Interval Problem Update  UOP not recorded.  He does not feel like he is urinating much.  MRI is pending  I discussed my concerns with his prognosis for his alcohol hepatitis and cirrhosis.  Per GI he would not benefit from steroid.  Patient is strongly motivated to turn his life around, stop drinking and doing what ever he can to improve his health.  Patient says it is okay if I update his brother    I have discussed this patient's plan of care and discharge plan at IDT rounds today with Case Management, Nursing, Nursing leadership, and other members of the IDT team.    Consultants/Specialty  GI    Code Status  Full Code    Disposition  The patient is not medically cleared for discharge to home or a post-acute facility.  Anticipate discharge to: home with close outpatient follow-up    I have placed the appropriate orders for post-discharge needs.    Review of Systems  Review of Systems   Constitutional:  Positive for malaise/fatigue.   Respiratory:  Positive for shortness of breath.    Cardiovascular:  Positive for leg swelling.   Gastrointestinal:  Positive for diarrhea. Negative for abdominal pain and vomiting.   Genitourinary:  Negative for frequency and urgency.   Neurological:  Positive for weakness.        Physical Exam  Temp:   [36.6 °C (97.8 °F)-36.9 °C (98.4 °F)] 36.9 °C (98.4 °F)  Pulse:  [70-96] 88  Resp:  [16-18] 16  BP: (105-127)/(64-78) 121/66  SpO2:  [94 %-96 %] 96 %    Physical Exam  Vitals and nursing note reviewed.   Constitutional:       General: He is not in acute distress.     Appearance: He is obese. He is not toxic-appearing.      Comments: Anasarca   HENT:      Head: Normocephalic.      Mouth/Throat:      Mouth: Mucous membranes are moist.   Eyes:      General:         Right eye: No discharge.         Left eye: No discharge.   Cardiovascular:      Rate and Rhythm: Normal rate and regular rhythm.   Pulmonary:      Effort: Pulmonary effort is normal. No respiratory distress.      Breath sounds: No wheezing or rales.   Abdominal:      General: There is distension (Pitting edema).      Tenderness: There is no abdominal tenderness. There is no guarding or rebound.   Musculoskeletal:         General: Swelling present.      Cervical back: Neck supple.      Right lower leg: Edema present.      Left lower leg: Edema present.   Skin:     General: Skin is warm and dry.      Comments: Excoriations to both arms   Neurological:      Mental Status: He is alert and oriented to person, place, and time.         Fluids    Intake/Output Summary (Last 24 hours) at 5/30/2025 1441  Last data filed at 5/30/2025 0738  Gross per 24 hour   Intake 600 ml   Output --   Net 600 ml        Laboratory  Recent Labs     05/28/25 2217 05/29/25  0833 05/30/25  0146   WBC 11.8* 10.3 9.8   RBC 4.14* 3.94* 4.09*   HEMOGLOBIN 13.3* 12.3* 13.1*   HEMATOCRIT 38.7* 37.2* 38.3*   MCV 93.5 94.4 93.6   MCH 32.1 31.2 32.0   MCHC 34.4 33.1 34.2   RDW 64.0* 65.2* 66.4*   PLATELETCT 146* 137* 98*   MPV 9.5 9.8 9.2     Recent Labs     05/28/25 2217 05/29/25  0833 05/30/25  0146   SODIUM 130* 133* 136   POTASSIUM 4.3 3.9 3.9   CHLORIDE 102 105 106   CO2 18* 17* 18*   GLUCOSE 115* 167* 131*   BUN 12 13 14   CREATININE 1.38 1.24 1.32   CALCIUM 7.9* 7.7* 7.7*     Recent Labs      05/28/25  2217 05/29/25  0833 05/30/25  0146   INR 1.39* 1.46* 1.45*               Imaging  EC-ECHOCARDIOGRAM COMPLETE W/ CONT         US-EXTREMITY VENOUS LOWER BILAT   Final Result      US-RUQ   Final Result         1.  Thickened gallbladder wall without visualized shadowing stones, consider acalculous cholecystitis, could be further evaluated with HIDA scan as clinically appropriate   2.  Hepatomegaly   3.  Echogenic liver, compatible with fatty change versus fibrosis      CT-PANCREAS AND ABDOMEN WITH & W/O   Final Result         1.  Masslike structure adjacent to the tail of pancreas with possible slight enhancement on arterial phase imaging, recommend follow-up MRI of the pancreas with contrast for more definitive characterization.   2.  Ovoid mass in the splenic hilum with similar density to spleen on all contrast phases, appearance favoring splenule.   3.  Changes of cirrhosis.   4.  Hepatomegaly      MR-ABDOMEN-WITH & W/O    (Results Pending)        Assessment/Plan  * Alcoholic liver hepatitis and cirrhosis- (present on admission)  Assessment & Plan  DF score 35.7, MELD 26   Patient just completed course of steroid, GI consulted and does not recommend restarting  Alcohol cessation counseling, patient is motivated to stop drinking  No signs of hepatic encephalopathy, continue maintenance lactulose  Continue diuresis, increase Lasix and continue spironolactone  Monitor I's and O's, low-sodium diet and fluid restriction  Check ultrasound for ascites    Hyperammonemia (HCC)  Assessment & Plan  He does not clinically have hepatic encephalopathy  Continue maintenance lactulose    Hyponatremia  Assessment & Plan  Secondary to alcohol abuse, liver disease  Monitor    Anasarca  Assessment & Plan  Continue diuresis, increase Lasix and continue spironolactone  Monitor I's and O's, low-sodium diet and fluid restriction  Check ultrasound for ascites  Doppler negative for DVT.  TTE pending    Intraabdominal mass-  (present on admission)  Assessment & Plan  CT pancreatic protocol demonstrated masslike structure adjacent to the tail of the pancreas with possible slight enhancement of arterial phase.  Will order MRI of the pancreas for further evaluation pending    BOLIVAR on CPAP- (present on admission)  Assessment & Plan  Continue CPAP at night    Alcohol use disorder, severe, dependence (HCC)- (present on admission)  Assessment & Plan  Last drink 5/28  CIWA protocol, symptoms mild    Morbid obesity (HCC)- (present on admission)  Assessment & Plan  BMI 44    Essential hypertension- (present on admission)  Assessment & Plan  Continue on Coreg, BP stable, monitor while on diuretics           VTE prophylaxis:    enoxaparin ppx      I have performed a physical exam and reviewed and updated ROS and Plan today (5/30/2025). In review of yesterday's note (5/29/2025), there are no changes except as documented above.

## 2025-05-31 VITALS
WEIGHT: 287.48 LBS | OXYGEN SATURATION: 95 % | HEIGHT: 63 IN | SYSTOLIC BLOOD PRESSURE: 97 MMHG | RESPIRATION RATE: 16 BRPM | HEART RATE: 95 BPM | BODY MASS INDEX: 50.94 KG/M2 | TEMPERATURE: 98.3 F | DIASTOLIC BLOOD PRESSURE: 53 MMHG

## 2025-05-31 LAB
AFP-TM SERPL-MCNC: 9 NG/ML (ref 0–9)
ALBUMIN SERPL BCP-MCNC: 2.4 G/DL (ref 3.2–4.9)
ALBUMIN/GLOB SERPL: 0.8 G/DL
ALP SERPL-CCNC: 379 U/L (ref 30–99)
ALT SERPL-CCNC: 80 U/L (ref 2–50)
ANION GAP SERPL CALC-SCNC: 12 MMOL/L (ref 7–16)
AST SERPL-CCNC: 171 U/L (ref 12–45)
BILIRUB SERPL-MCNC: 15.2 MG/DL (ref 0.1–1.5)
BUN SERPL-MCNC: 14 MG/DL (ref 8–22)
CALCIUM ALBUM COR SERPL-MCNC: 9.1 MG/DL (ref 8.5–10.5)
CALCIUM SERPL-MCNC: 7.8 MG/DL (ref 8.5–10.5)
CHLORIDE SERPL-SCNC: 106 MMOL/L (ref 96–112)
CO2 SERPL-SCNC: 18 MMOL/L (ref 20–33)
CREAT SERPL-MCNC: 1.32 MG/DL (ref 0.5–1.4)
ERYTHROCYTE [DISTWIDTH] IN BLOOD BY AUTOMATED COUNT: 68.1 FL (ref 35.9–50)
GFR SERPLBLD CREATININE-BSD FMLA CKD-EPI: 71 ML/MIN/1.73 M 2
GLOBULIN SER CALC-MCNC: 3.2 G/DL (ref 1.9–3.5)
GLUCOSE SERPL-MCNC: 132 MG/DL (ref 65–99)
HCT VFR BLD AUTO: 33.2 % (ref 42–52)
HGB BLD-MCNC: 11.1 G/DL (ref 14–18)
INR PPP: 1.35 (ref 0.87–1.13)
MCH RBC QN AUTO: 32.1 PG (ref 27–33)
MCHC RBC AUTO-ENTMCNC: 33.4 G/DL (ref 32.3–36.5)
MCV RBC AUTO: 96 FL (ref 81.4–97.8)
PLATELET # BLD AUTO: 121 K/UL (ref 164–446)
PMV BLD AUTO: 9.4 FL (ref 9–12.9)
POTASSIUM SERPL-SCNC: 3.4 MMOL/L (ref 3.6–5.5)
PROT SERPL-MCNC: 5.6 G/DL (ref 6–8.2)
PROTHROMBIN TIME: 16.7 SEC (ref 12–14.6)
RBC # BLD AUTO: 3.46 M/UL (ref 4.7–6.1)
SODIUM SERPL-SCNC: 136 MMOL/L (ref 135–145)
WBC # BLD AUTO: 9.6 K/UL (ref 4.8–10.8)

## 2025-05-31 PROCEDURE — A9270 NON-COVERED ITEM OR SERVICE: HCPCS | Performed by: INTERNAL MEDICINE

## 2025-05-31 PROCEDURE — 85610 PROTHROMBIN TIME: CPT

## 2025-05-31 PROCEDURE — 770006 HCHG ROOM/CARE - MED/SURG/GYN SEMI*

## 2025-05-31 PROCEDURE — 700117 HCHG RX CONTRAST REV CODE 255: Mod: JZ | Performed by: INTERNAL MEDICINE

## 2025-05-31 PROCEDURE — 80053 COMPREHEN METABOLIC PANEL: CPT

## 2025-05-31 PROCEDURE — 700102 HCHG RX REV CODE 250 W/ 637 OVERRIDE(OP): Performed by: INTERNAL MEDICINE

## 2025-05-31 PROCEDURE — 700111 HCHG RX REV CODE 636 W/ 250 OVERRIDE (IP): Performed by: INTERNAL MEDICINE

## 2025-05-31 PROCEDURE — A9579 GAD-BASE MR CONTRAST NOS,1ML: HCPCS | Mod: JZ | Performed by: INTERNAL MEDICINE

## 2025-05-31 PROCEDURE — 85027 COMPLETE CBC AUTOMATED: CPT

## 2025-05-31 PROCEDURE — 99232 SBSQ HOSP IP/OBS MODERATE 35: CPT | Performed by: INTERNAL MEDICINE

## 2025-05-31 PROCEDURE — 36415 COLL VENOUS BLD VENIPUNCTURE: CPT

## 2025-05-31 RX ORDER — SPIRONOLACTONE 25 MG/1
100 TABLET ORAL
Status: DISCONTINUED | OUTPATIENT
Start: 2025-06-01 | End: 2025-06-10

## 2025-05-31 RX ORDER — GADOTERIDOL 279.3 MG/ML
20 INJECTION INTRAVENOUS ONCE
Status: COMPLETED | OUTPATIENT
Start: 2025-05-31 | End: 2025-05-31

## 2025-05-31 RX ORDER — POTASSIUM CHLORIDE 1500 MG/1
20 TABLET, EXTENDED RELEASE ORAL ONCE
Status: COMPLETED | OUTPATIENT
Start: 2025-05-31 | End: 2025-05-31

## 2025-05-31 RX ADMIN — HYDROCORTISONE: 1 CREAM TOPICAL at 05:51

## 2025-05-31 RX ADMIN — ENOXAPARIN SODIUM 40 MG: 100 INJECTION SUBCUTANEOUS at 17:14

## 2025-05-31 RX ADMIN — Medication 100 MG: at 05:46

## 2025-05-31 RX ADMIN — DIAZEPAM 5 MG: 10 INJECTION, SOLUTION INTRAMUSCULAR; INTRAVENOUS at 12:08

## 2025-05-31 RX ADMIN — OXYCODONE 5 MG: 5 TABLET ORAL at 20:33

## 2025-05-31 RX ADMIN — LACTULOSE 30 ML: 10 SOLUTION ORAL at 11:52

## 2025-05-31 RX ADMIN — TAMSULOSIN HYDROCHLORIDE 0.4 MG: 0.4 CAPSULE ORAL at 07:39

## 2025-05-31 RX ADMIN — HYDROCORTISONE: 1 CREAM TOPICAL at 17:14

## 2025-05-31 RX ADMIN — THERA TABS 1 TABLET: TAB at 05:46

## 2025-05-31 RX ADMIN — FUROSEMIDE 60 MG: 10 INJECTION, SOLUTION INTRAVENOUS at 05:49

## 2025-05-31 RX ADMIN — POTASSIUM CHLORIDE 20 MEQ: 1500 TABLET, EXTENDED RELEASE ORAL at 07:39

## 2025-05-31 RX ADMIN — CARVEDILOL 3.12 MG: 3.12 TABLET, FILM COATED ORAL at 17:15

## 2025-05-31 RX ADMIN — LACTULOSE 30 ML: 10 SOLUTION ORAL at 17:15

## 2025-05-31 RX ADMIN — FOLIC ACID 1 MG: 1 TABLET ORAL at 05:46

## 2025-05-31 RX ADMIN — GADOTERIDOL 20 ML: 279.3 INJECTION, SOLUTION INTRAVENOUS at 13:01

## 2025-05-31 RX ADMIN — HYDROXYZINE HYDROCHLORIDE 25 MG: 50 TABLET ORAL at 11:51

## 2025-05-31 RX ADMIN — CARVEDILOL 3.12 MG: 3.12 TABLET, FILM COATED ORAL at 07:39

## 2025-05-31 RX ADMIN — FUROSEMIDE 60 MG: 10 INJECTION, SOLUTION INTRAVENOUS at 17:14

## 2025-05-31 RX ADMIN — LACTULOSE 30 ML: 10 SOLUTION ORAL at 05:51

## 2025-05-31 RX ADMIN — FUROSEMIDE 60 MG: 10 INJECTION, SOLUTION INTRAVENOUS at 11:51

## 2025-05-31 RX ADMIN — SPIRONOLACTONE 50 MG: 50 TABLET ORAL at 05:46

## 2025-05-31 ASSESSMENT — LIFESTYLE VARIABLES
PAROXYSMAL SWEATS: NO SWEAT VISIBLE
NAUSEA AND VOMITING: NO NAUSEA AND NO VOMITING
TREMOR: TREMOR NOT VISIBLE BUT CAN BE FELT, FINGERTIP TO FINGERTIP
AGITATION: NORMAL ACTIVITY
HEADACHE, FULLNESS IN HEAD: NOT PRESENT
AUDITORY DISTURBANCES: NOT PRESENT
VISUAL DISTURBANCES: NOT PRESENT
TREMOR: TREMOR NOT VISIBLE BUT CAN BE FELT, FINGERTIP TO FINGERTIP
PAROXYSMAL SWEATS: NO SWEAT VISIBLE
ANXIETY: NO ANXIETY (AT EASE)
TOTAL SCORE: 2
VISUAL DISTURBANCES: NOT PRESENT
TOTAL SCORE: 2
PAROXYSMAL SWEATS: NO SWEAT VISIBLE
ORIENTATION AND CLOUDING OF SENSORIUM: ORIENTED AND CAN DO SERIAL ADDITIONS
TREMOR: TREMOR NOT VISIBLE BUT CAN BE FELT, FINGERTIP TO FINGERTIP
TOTAL SCORE: 2
TOTAL SCORE: VERY MILD ITCHING, PINS AND NEEDLES SENSATION, BURNING OR NUMBNESS
ORIENTATION AND CLOUDING OF SENSORIUM: ORIENTED AND CAN DO SERIAL ADDITIONS
HEADACHE, FULLNESS IN HEAD: NOT PRESENT
TREMOR: TREMOR NOT VISIBLE BUT CAN BE FELT, FINGERTIP TO FINGERTIP
TOTAL SCORE: 2
TOTAL SCORE: VERY MILD ITCHING, PINS AND NEEDLES SENSATION, BURNING OR NUMBNESS
HEADACHE, FULLNESS IN HEAD: NOT PRESENT
ANXIETY: NO ANXIETY (AT EASE)
ORIENTATION AND CLOUDING OF SENSORIUM: ORIENTED AND CAN DO SERIAL ADDITIONS
NAUSEA AND VOMITING: NO NAUSEA AND NO VOMITING
VISUAL DISTURBANCES: NOT PRESENT
AGITATION: NORMAL ACTIVITY
ANXIETY: NO ANXIETY (AT EASE)
AGITATION: NORMAL ACTIVITY
NAUSEA AND VOMITING: NO NAUSEA AND NO VOMITING
NAUSEA AND VOMITING: NO NAUSEA AND NO VOMITING
PAROXYSMAL SWEATS: NO SWEAT VISIBLE
VISUAL DISTURBANCES: NOT PRESENT
AUDITORY DISTURBANCES: NOT PRESENT
AUDITORY DISTURBANCES: NOT PRESENT
ANXIETY: NO ANXIETY (AT EASE)
TOTAL SCORE: VERY MILD ITCHING, PINS AND NEEDLES SENSATION, BURNING OR NUMBNESS
AGITATION: NORMAL ACTIVITY
AUDITORY DISTURBANCES: NOT PRESENT
ORIENTATION AND CLOUDING OF SENSORIUM: ORIENTED AND CAN DO SERIAL ADDITIONS
TOTAL SCORE: VERY MILD ITCHING, PINS AND NEEDLES SENSATION, BURNING OR NUMBNESS
HEADACHE, FULLNESS IN HEAD: NOT PRESENT

## 2025-05-31 ASSESSMENT — ENCOUNTER SYMPTOMS
VOMITING: 0
WEAKNESS: 1
SHORTNESS OF BREATH: 1
ABDOMINAL PAIN: 0
DIARRHEA: 1

## 2025-05-31 ASSESSMENT — FIBROSIS 4 INDEX: FIB4 SCORE: 5.85

## 2025-05-31 ASSESSMENT — PAIN DESCRIPTION - PAIN TYPE
TYPE: ACUTE PAIN

## 2025-05-31 NOTE — PROGRESS NOTES
Hospital Medicine Daily Progress Note    Date of Service  5/31/2025    Chief Complaint  Alberto Maldonado is a 39 y.o. male admitted 5/28/2025 with swelling    Hospital Course  38 yo man with alcoholic cirrhosis, ongoing alcohol use, DM, HTN, BOLIVAR, morbid obesity who presented with worsening swelling.  He continues to drink 9-10 shots every day.  He was found with worsening liver function, elevated ammonia.  Patient was previously also hospitalized and CTAP at that time showed a pancreatic tail lesion and was recommended to have outpatient CT pancreatic protocol.  CT was done in the ER which showed a mass in the pancreatic tail, ovoid mass in the splenic hilum.  GI was consulted    Interval Problem Update   in 24h, there has been urine mixed with stool. He feels a little less edematous  Hypo-K, replete.  Creatinine stable from yesterday  He wasn't able to do MRI due to anxiety and requesting anesthesia  I updated his brother over the phone at bedside on speaker phone, family to help support him with sobriety    I have discussed this patient's plan of care and discharge plan at IDT rounds today with Case Management, Nursing, Nursing leadership, and other members of the IDT team.    Consultants/Specialty  GI    Code Status  Full Code    Disposition  The patient is not medically cleared for discharge to home or a post-acute facility.  Anticipate discharge to: home with close outpatient follow-up    I have placed the appropriate orders for post-discharge needs.    Review of Systems  Review of Systems   Constitutional:  Positive for malaise/fatigue.   Respiratory:  Positive for shortness of breath.    Cardiovascular:  Positive for leg swelling.   Gastrointestinal:  Positive for diarrhea. Negative for abdominal pain and vomiting.   Neurological:  Positive for weakness.        Physical Exam  Temp:  [36.3 °C (97.4 °F)-36.6 °C (97.8 °F)] 36.5 °C (97.7 °F)  Pulse:  [89-92] 92  Resp:  [16-18] 18  BP:  (117-133)/(58-71) 133/71  SpO2:  [95 %-96 %] 95 %    Physical Exam  Vitals and nursing note reviewed.   Constitutional:       General: He is not in acute distress.     Appearance: He is obese. He is not toxic-appearing.      Comments: Anasarca   HENT:      Head: Normocephalic.      Mouth/Throat:      Mouth: Mucous membranes are moist.   Eyes:      General:         Right eye: No discharge.         Left eye: No discharge.   Cardiovascular:      Rate and Rhythm: Normal rate and regular rhythm.   Pulmonary:      Effort: No respiratory distress.      Breath sounds: No wheezing or rales.      Comments: Diminished at bases  Abdominal:      General: There is distension (Pitting edema).      Tenderness: There is no abdominal tenderness. There is no guarding or rebound.   Musculoskeletal:         General: Swelling present.      Cervical back: Neck supple.      Right lower leg: Edema present.      Left lower leg: Edema present.   Skin:     General: Skin is warm and dry.      Comments: Excoriations to both arms   Neurological:      Mental Status: He is alert and oriented to person, place, and time.         Fluids    Intake/Output Summary (Last 24 hours) at 5/31/2025 1030  Last data filed at 5/31/2025 0942  Gross per 24 hour   Intake 1386 ml   Output 700 ml   Net 686 ml        Laboratory  Recent Labs     05/29/25  0833 05/30/25  0146 05/31/25  0149   WBC 10.3 9.8 9.6   RBC 3.94* 4.09* 3.46*   HEMOGLOBIN 12.3* 13.1* 11.1*   HEMATOCRIT 37.2* 38.3* 33.2*   MCV 94.4 93.6 96.0   MCH 31.2 32.0 32.1   MCHC 33.1 34.2 33.4   RDW 65.2* 66.4* 68.1*   PLATELETCT 137* 98* 121*   MPV 9.8 9.2 9.4     Recent Labs     05/29/25  0833 05/30/25  0146 05/31/25  0149   SODIUM 133* 136 136   POTASSIUM 3.9 3.9 3.4*   CHLORIDE 105 106 106   CO2 17* 18* 18*   GLUCOSE 167* 131* 132*   BUN 13 14 14   CREATININE 1.24 1.32 1.32   CALCIUM 7.7* 7.7* 7.8*     Recent Labs     05/29/25  0833 05/30/25  0146 05/31/25  0149   INR 1.46* 1.45* 1.35*                Imaging  EC-ECHOCARDIOGRAM COMPLETE W/ CONT   Final Result      US-EXTREMITY VENOUS LOWER BILAT   Final Result      US-RUQ   Final Result         1.  Thickened gallbladder wall without visualized shadowing stones, consider acalculous cholecystitis, could be further evaluated with HIDA scan as clinically appropriate   2.  Hepatomegaly   3.  Echogenic liver, compatible with fatty change versus fibrosis      CT-PANCREAS AND ABDOMEN WITH & W/O   Final Result         1.  Masslike structure adjacent to the tail of pancreas with possible slight enhancement on arterial phase imaging, recommend follow-up MRI of the pancreas with contrast for more definitive characterization.   2.  Ovoid mass in the splenic hilum with similar density to spleen on all contrast phases, appearance favoring splenule.   3.  Changes of cirrhosis.   4.  Hepatomegaly      MR-ABDOMEN-WITH & W/O    (Results Pending)        Assessment/Plan  * Alcoholic liver hepatitis and cirrhosis- (present on admission)  Assessment & Plan  DF score 35.7, MELD 26   Patient just completed course of steroid, GI consulted and does not recommend restarting  Alcohol cessation counseling, patient is motivated to stop drinking.  Case management to provide resources  No signs of hepatic encephalopathy, continue maintenance lactulose  Continue diuresis, continue on Lasix 60 mg IV twice daily and increase spironolactone  Monitor I's and O's, low-sodium diet and fluid restriction  No ascites on ultrasound  He has an appointment with GI on Thursday    Hyperammonemia (HCC)  Assessment & Plan  He does not clinically have hepatic encephalopathy  Continue maintenance lactulose    Hyponatremia  Assessment & Plan  Secondary to alcohol abuse, liver disease  Monitor    Anasarca  Assessment & Plan  Continue diuresis, with IV Lasix for spironolactone  Monitor I's and O's, low-sodium diet and fluid restriction  Ultrasound negative for ascites  Doppler negative for DVT.  TTE with normal  EF    Intraabdominal mass- (present on admission)  Assessment & Plan  CT pancreatic protocol demonstrated masslike structure adjacent to the tail of the pancreas with possible slight enhancement of arterial phase.    MRI of the pancreas with anesthesia ordered  CA 19-9 was elevated.  AFP normal    BOLIVAR on CPAP- (present on admission)  Assessment & Plan  Continue CPAP at night    Alcohol use disorder, severe, dependence (HCC)- (present on admission)  Assessment & Plan  Last drink 5/28  CIWA protocol, mild.  No significant withdrawal    Morbid obesity (HCC)- (present on admission)  Assessment & Plan  BMI 44    Essential hypertension- (present on admission)  Assessment & Plan  Continue on Coreg, BP stable, monitor while on diuretics           VTE prophylaxis:    enoxaparin ppx      I have performed a physical exam and reviewed and updated ROS and Plan today (5/31/2025). In review of yesterday's note (5/30/2025), there are no changes except as documented above.

## 2025-06-01 LAB
ALBUMIN SERPL BCP-MCNC: 2.8 G/DL (ref 3.2–4.9)
ALBUMIN/GLOB SERPL: 0.8 G/DL
ALP SERPL-CCNC: 368 U/L (ref 30–99)
ALT SERPL-CCNC: 83 U/L (ref 2–50)
ANION GAP SERPL CALC-SCNC: 14 MMOL/L (ref 7–16)
AST SERPL-CCNC: 192 U/L (ref 12–45)
BILIRUB SERPL-MCNC: 15.4 MG/DL (ref 0.1–1.5)
BUN SERPL-MCNC: 16 MG/DL (ref 8–22)
CALCIUM ALBUM COR SERPL-MCNC: 9.1 MG/DL (ref 8.5–10.5)
CALCIUM SERPL-MCNC: 8.1 MG/DL (ref 8.5–10.5)
CHLORIDE SERPL-SCNC: 100 MMOL/L (ref 96–112)
CO2 SERPL-SCNC: 19 MMOL/L (ref 20–33)
CREAT SERPL-MCNC: 1.45 MG/DL (ref 0.5–1.4)
ERYTHROCYTE [DISTWIDTH] IN BLOOD BY AUTOMATED COUNT: 72.9 FL (ref 35.9–50)
GFR SERPLBLD CREATININE-BSD FMLA CKD-EPI: 63 ML/MIN/1.73 M 2
GLOBULIN SER CALC-MCNC: 3.6 G/DL (ref 1.9–3.5)
GLUCOSE SERPL-MCNC: 104 MG/DL (ref 65–99)
HCT VFR BLD AUTO: 37 % (ref 42–52)
HGB BLD-MCNC: 12.1 G/DL (ref 14–18)
INR PPP: 1.29 (ref 0.87–1.13)
MCH RBC QN AUTO: 32 PG (ref 27–33)
MCHC RBC AUTO-ENTMCNC: 32.7 G/DL (ref 32.3–36.5)
MCV RBC AUTO: 97.9 FL (ref 81.4–97.8)
PLATELET # BLD AUTO: 146 K/UL (ref 164–446)
PMV BLD AUTO: 9.3 FL (ref 9–12.9)
POTASSIUM SERPL-SCNC: 3 MMOL/L (ref 3.6–5.5)
PROT SERPL-MCNC: 6.4 G/DL (ref 6–8.2)
PROTHROMBIN TIME: 16.1 SEC (ref 12–14.6)
RBC # BLD AUTO: 3.78 M/UL (ref 4.7–6.1)
SODIUM SERPL-SCNC: 133 MMOL/L (ref 135–145)
WBC # BLD AUTO: 10.5 K/UL (ref 4.8–10.8)

## 2025-06-01 PROCEDURE — 770006 HCHG ROOM/CARE - MED/SURG/GYN SEMI*

## 2025-06-01 PROCEDURE — 700102 HCHG RX REV CODE 250 W/ 637 OVERRIDE(OP): Performed by: INTERNAL MEDICINE

## 2025-06-01 PROCEDURE — 36415 COLL VENOUS BLD VENIPUNCTURE: CPT

## 2025-06-01 PROCEDURE — A9270 NON-COVERED ITEM OR SERVICE: HCPCS | Performed by: INTERNAL MEDICINE

## 2025-06-01 PROCEDURE — 99232 SBSQ HOSP IP/OBS MODERATE 35: CPT | Performed by: INTERNAL MEDICINE

## 2025-06-01 PROCEDURE — 85610 PROTHROMBIN TIME: CPT

## 2025-06-01 PROCEDURE — 85027 COMPLETE CBC AUTOMATED: CPT

## 2025-06-01 PROCEDURE — 700111 HCHG RX REV CODE 636 W/ 250 OVERRIDE (IP): Mod: JZ | Performed by: INTERNAL MEDICINE

## 2025-06-01 PROCEDURE — 80053 COMPREHEN METABOLIC PANEL: CPT

## 2025-06-01 RX ORDER — ENOXAPARIN SODIUM 100 MG/ML
40 INJECTION SUBCUTANEOUS EVERY 12 HOURS
Status: DISCONTINUED | OUTPATIENT
Start: 2025-06-01 | End: 2025-06-09

## 2025-06-01 RX ORDER — POTASSIUM CHLORIDE 1500 MG/1
40 TABLET, EXTENDED RELEASE ORAL 2 TIMES DAILY
Status: COMPLETED | OUTPATIENT
Start: 2025-06-01 | End: 2025-06-01

## 2025-06-01 RX ADMIN — ENOXAPARIN SODIUM 40 MG: 100 INJECTION SUBCUTANEOUS at 17:37

## 2025-06-01 RX ADMIN — POTASSIUM CHLORIDE 40 MEQ: 1500 TABLET, EXTENDED RELEASE ORAL at 17:37

## 2025-06-01 RX ADMIN — LACTULOSE 30 ML: 10 SOLUTION ORAL at 12:26

## 2025-06-01 RX ADMIN — HYDROCORTISONE: 1 CREAM TOPICAL at 17:49

## 2025-06-01 RX ADMIN — CARVEDILOL 3.12 MG: 3.12 TABLET, FILM COATED ORAL at 17:40

## 2025-06-01 RX ADMIN — OXYCODONE 5 MG: 5 TABLET ORAL at 19:53

## 2025-06-01 RX ADMIN — LACTULOSE 30 ML: 10 SOLUTION ORAL at 04:28

## 2025-06-01 RX ADMIN — FOLIC ACID 1 MG: 1 TABLET ORAL at 04:29

## 2025-06-01 RX ADMIN — FUROSEMIDE 60 MG: 10 INJECTION, SOLUTION INTRAVENOUS at 04:29

## 2025-06-01 RX ADMIN — HYDROXYZINE HYDROCHLORIDE 25 MG: 50 TABLET ORAL at 02:50

## 2025-06-01 RX ADMIN — OXYCODONE 5 MG: 5 TABLET ORAL at 02:45

## 2025-06-01 RX ADMIN — SPIRONOLACTONE 100 MG: 100 TABLET ORAL at 05:56

## 2025-06-01 RX ADMIN — POTASSIUM CHLORIDE 40 MEQ: 1500 TABLET, EXTENDED RELEASE ORAL at 08:30

## 2025-06-01 RX ADMIN — HYDROCORTISONE: 1 CREAM TOPICAL at 04:28

## 2025-06-01 RX ADMIN — Medication 100 MG: at 04:29

## 2025-06-01 RX ADMIN — FUROSEMIDE 60 MG: 10 INJECTION, SOLUTION INTRAVENOUS at 12:26

## 2025-06-01 RX ADMIN — THERA TABS 1 TABLET: TAB at 04:29

## 2025-06-01 RX ADMIN — TAMSULOSIN HYDROCHLORIDE 0.4 MG: 0.4 CAPSULE ORAL at 08:30

## 2025-06-01 RX ADMIN — CARVEDILOL 3.12 MG: 3.12 TABLET, FILM COATED ORAL at 08:30

## 2025-06-01 RX ADMIN — FUROSEMIDE 60 MG: 10 INJECTION, SOLUTION INTRAVENOUS at 17:41

## 2025-06-01 RX ADMIN — LACTULOSE 30 ML: 10 SOLUTION ORAL at 21:34

## 2025-06-01 ASSESSMENT — LIFESTYLE VARIABLES
HEADACHE, FULLNESS IN HEAD: NOT PRESENT
AGITATION: NORMAL ACTIVITY
ANXIETY: NO ANXIETY (AT EASE)
TOTAL SCORE: 2
TOTAL SCORE: VERY MILD ITCHING, PINS AND NEEDLES SENSATION, BURNING OR NUMBNESS
TOTAL SCORE: VERY MILD ITCHING, PINS AND NEEDLES SENSATION, BURNING OR NUMBNESS
NAUSEA AND VOMITING: NO NAUSEA AND NO VOMITING
ANXIETY: NO ANXIETY (AT EASE)
TREMOR: TREMOR NOT VISIBLE BUT CAN BE FELT, FINGERTIP TO FINGERTIP
ORIENTATION AND CLOUDING OF SENSORIUM: ORIENTED AND CAN DO SERIAL ADDITIONS
AUDITORY DISTURBANCES: NOT PRESENT
ORIENTATION AND CLOUDING OF SENSORIUM: ORIENTED AND CAN DO SERIAL ADDITIONS
TOTAL SCORE: 2
NAUSEA AND VOMITING: NO NAUSEA AND NO VOMITING
VISUAL DISTURBANCES: NOT PRESENT
AGITATION: NORMAL ACTIVITY
PAROXYSMAL SWEATS: NO SWEAT VISIBLE
TREMOR: TREMOR NOT VISIBLE BUT CAN BE FELT, FINGERTIP TO FINGERTIP
AUDITORY DISTURBANCES: NOT PRESENT
HEADACHE, FULLNESS IN HEAD: NOT PRESENT
VISUAL DISTURBANCES: NOT PRESENT
PAROXYSMAL SWEATS: NO SWEAT VISIBLE

## 2025-06-01 ASSESSMENT — FIBROSIS 4 INDEX: FIB4 SCORE: 4.85

## 2025-06-01 ASSESSMENT — PAIN DESCRIPTION - PAIN TYPE
TYPE: ACUTE PAIN

## 2025-06-01 ASSESSMENT — ENCOUNTER SYMPTOMS
VOMITING: 0
SHORTNESS OF BREATH: 1
WEAKNESS: 1
ABDOMINAL PAIN: 0
DIARRHEA: 1

## 2025-06-01 NOTE — PROGRESS NOTES
Report received from RN, assumed care at 1900  Pt is A0X4, and responds appropriately   Pt declines any SOB, chest pain, new onset of numbness/ tingling  Pt is on RA  Pt rates pain at 6/10, on a scale of 1-10, pt medicated per MAR  Pt is voiding adequately and without hesitancy  Pt has + flatus, + bowel sounds, + BM on 5/30  Pt ambulates with a SB assist   Pt is tolerating a diet, pt denies any nausea/vomiting  Plan of care discussed, all questions answered. Explained importance of calling before getting OOB and pt verbalizes understanding. Explained importance of oral care. Call light is within reach, treaded slipper socks on, bed in lowest/ locked position, hourly rounding in place, all needs met at this time

## 2025-06-01 NOTE — CARE PLAN
The patient is Stable - Low risk of patient condition declining or worsening    Shift Goals  Clinical Goals: Safety  Patient Goals: Remain updated on POC    Progress made toward(s) clinical / shift goals:      Problem: Pain - Standard  Goal: Alleviation of pain or a reduction in pain to the patient’s comfort goal  Outcome: Progressing     Problem: Knowledge Deficit - Standard  Goal: Patient and family/care givers will demonstrate understanding of plan of care, disease process/condition, diagnostic tests and medications  Outcome: Progressing     Problem: Fall Risk  Goal: Patient will remain free from falls  Outcome: Progressing

## 2025-06-01 NOTE — PROGRESS NOTES
Hospital Medicine Daily Progress Note    Date of Service  6/1/2025    Chief Complaint  Alberto Maldonado is a 39 y.o. male admitted 5/28/2025 with swelling    Hospital Course  38 yo man with alcoholic cirrhosis, ongoing alcohol use, DM, HTN, BOLIVAR, morbid obesity who presented with worsening swelling.  He continues to drink 9-10 shots every day.  He was found with worsening liver function, elevated ammonia.  Patient was previously also hospitalized and CTAP at that time showed a pancreatic tail lesion and was recommended to have outpatient CT pancreatic protocol.  CT was done in the ER which showed a mass in the pancreatic tail, ovoid mass in the splenic hilum.  MRI showed increased signal in tail of pancreas, prior pancreatitis but mass was not excluded.  He will need a follow-up MRI in 6 months.  GI was consulted and he is not a candidate for further steroid treatment for alcohol hepatitis.  He was started on diuretics for his edema.  Imaging was negative for ascites.    Interval Problem Update  UOP >1200cc.  He says he is urinating frequently   HypoK, replete  Creat increasing   he feels slightly less edematous      I have discussed this patient's plan of care and discharge plan at IDT rounds today with Case Management, Nursing, Nursing leadership, and other members of the IDT team.    Consultants/Specialty  GI    Code Status  Full Code    Disposition  The patient is not medically cleared for discharge to home or a post-acute facility.  Anticipate discharge to: home with close outpatient follow-up    I have placed the appropriate orders for post-discharge needs.    Review of Systems  Review of Systems   Constitutional:  Positive for malaise/fatigue.   Respiratory:  Positive for shortness of breath.    Cardiovascular:  Positive for leg swelling.   Gastrointestinal:  Positive for diarrhea. Negative for abdominal pain and vomiting.   Neurological:  Positive for weakness.        Physical Exam  Temp:  [36.2 °C  (97.2 °F)-36.8 °C (98.3 °F)] 36.5 °C (97.7 °F)  Pulse:  [84-95] 84  Resp:  [16-18] 18  BP: ()/(53-65) 108/55  SpO2:  [95 %-96 %] 96 %    Physical Exam  Vitals and nursing note reviewed.   Constitutional:       General: He is not in acute distress.     Appearance: He is obese. He is not toxic-appearing.      Comments: Anasarca   HENT:      Head: Normocephalic.      Mouth/Throat:      Mouth: Mucous membranes are moist.   Eyes:      General:         Right eye: No discharge.         Left eye: No discharge.   Cardiovascular:      Rate and Rhythm: Normal rate and regular rhythm.   Pulmonary:      Effort: No respiratory distress.      Breath sounds: No wheezing or rales.      Comments: Diminished at bases  Abdominal:      General: There is distension (Pitting edema).      Tenderness: There is no abdominal tenderness. There is no guarding or rebound.   Musculoskeletal:         General: Swelling present.      Cervical back: Neck supple.      Right lower leg: Edema present.      Left lower leg: Edema present.   Skin:     General: Skin is warm and dry.      Comments: Excoriations to both arms   Neurological:      Mental Status: He is alert and oriented to person, place, and time.         Fluids    Intake/Output Summary (Last 24 hours) at 6/1/2025 1324  Last data filed at 6/1/2025 1233  Gross per 24 hour   Intake 1884 ml   Output 1150 ml   Net 734 ml        Laboratory  Recent Labs     05/30/25  0146 05/31/25  0149 06/01/25  0538   WBC 9.8 9.6 10.5   RBC 4.09* 3.46* 3.78*   HEMOGLOBIN 13.1* 11.1* 12.1*   HEMATOCRIT 38.3* 33.2* 37.0*   MCV 93.6 96.0 97.9*   MCH 32.0 32.1 32.0   MCHC 34.2 33.4 32.7   RDW 66.4* 68.1* 72.9*   PLATELETCT 98* 121* 146*   MPV 9.2 9.4 9.3     Recent Labs     05/30/25  0146 05/31/25  0149 06/01/25  0538   SODIUM 136 136 133*   POTASSIUM 3.9 3.4* 3.0*   CHLORIDE 106 106 100   CO2 18* 18* 19*   GLUCOSE 131* 132* 104*   BUN 14 14 16   CREATININE 1.32 1.32 1.45*   CALCIUM 7.7* 7.8* 8.1*     Recent Labs      05/30/25  0146 05/31/25  0149 06/01/25  0538   INR 1.45* 1.35* 1.29*               Imaging  MR-ABDOMEN-WITH & W/O   Final Result      1.  The tail of the pancreas appears somewhat irregular and stranding with increased signal but evaluation is very limited due to significant motion artifact. This could relate to sequela of prior pancreatitis. A mass cannot be excluded. Follow-up in 6    months with MR after improvement is recommended.   2.  There is an accessory splenule adjacent to the spleen   3. Diffuse subcutaneous edema .                        EC-ECHOCARDIOGRAM COMPLETE W/ CONT   Final Result      US-EXTREMITY VENOUS LOWER BILAT   Final Result      US-RUQ   Final Result         1.  Thickened gallbladder wall without visualized shadowing stones, consider acalculous cholecystitis, could be further evaluated with HIDA scan as clinically appropriate   2.  Hepatomegaly   3.  Echogenic liver, compatible with fatty change versus fibrosis      CT-PANCREAS AND ABDOMEN WITH & W/O   Final Result         1.  Masslike structure adjacent to the tail of pancreas with possible slight enhancement on arterial phase imaging, recommend follow-up MRI of the pancreas with contrast for more definitive characterization.   2.  Ovoid mass in the splenic hilum with similar density to spleen on all contrast phases, appearance favoring splenule.   3.  Changes of cirrhosis.   4.  Hepatomegaly           Assessment/Plan  * Alcoholic liver hepatitis and cirrhosis- (present on admission)  Assessment & Plan  DF score 35.7, MELD 26   Patient just completed course of steroid, GI consulted and does not recommend restarting  Alcohol cessation counseling, patient is motivated to stop drinking.  Case management to provide resources  No signs of hepatic encephalopathy, continue maintenance lactulose  Continue diuresis, continue on Lasix 60 mg IV twice daily and spironolactone 100mg. Monitor I's and O's, low-sodium diet and fluid restriction  No  ascites on ultrasound  He has an appointment with GI on Thursday    Hyperammonemia (HCC)  Assessment & Plan  He does not clinically have hepatic encephalopathy  Continue maintenance lactulose    Hyponatremia  Assessment & Plan  Secondary to alcohol abuse, liver disease  Monitor    Anasarca  Assessment & Plan  Continue diuresis, with IV Lasix for spironolactone  Creatinine rising, may need to decrease doses tomorrow if continues to rise  Replating for low potassium  Monitor I's and O's, low-sodium diet and fluid restriction  Ultrasound negative for ascites  Doppler negative for DVT.  TTE with normal EF    Intraabdominal mass- (present on admission)  Assessment & Plan  CT pancreatic protocol demonstrated masslike structure adjacent to the tail of the pancreas with possible slight enhancement of arterial phase.    CA 19-9 was elevated.  AFP normal  MRI showed increased signal in tail of pancreas, prior pancreatitis but mass was not excluded.  He will need a follow-up MRI in 6 months.     BOLIVAR on CPAP- (present on admission)  Assessment & Plan  Continue CPAP at night    Alcohol use disorder, severe, dependence (HCC)- (present on admission)  Assessment & Plan  Last drink 5/28   No significant withdrawal stop CIWA    Morbid obesity (HCC)- (present on admission)  Assessment & Plan  BMI 44    Essential hypertension- (present on admission)  Assessment & Plan  Continue on Coreg, BP stable, monitor while on diuretics           VTE prophylaxis:    enoxaparin ppx      I have performed a physical exam and reviewed and updated ROS and Plan today (6/1/2025). In review of yesterday's note (5/31/2025), there are no changes except as documented above.

## 2025-06-01 NOTE — CARE PLAN
The patient is Stable - Low risk of patient condition declining or worsening    Shift Goals  Clinical Goals: Shower, monitor I&O, pain management  Patient Goals: Shower, rest  Family Goals: Updates    Progress made toward(s) clinical / shift goals:        Problem: Pain - Standard  Goal: Alleviation of pain or a reduction in pain to the patient’s comfort goal  Outcome: Progressing     Problem: Knowledge Deficit - Standard  Goal: Patient and family/care givers will demonstrate understanding of plan of care, disease process/condition, diagnostic tests and medications  Outcome: Progressing     Problem: Optimal Care for Alcohol Withdrawal  Goal: Optimal Care for the alcohol withdrawal patient  Outcome: Progressing     Problem: Seizure Precautions  Goal: Implementation of seizure precautions  Outcome: Progressing     Problem: Lifestyle Changes  Goal: Patient's ability to identify lifestyle changes and available resources to help reduce recurrence of condition will improve  Outcome: Progressing     Problem: Psychosocial  Goal: Patient's level of anxiety will decrease  Outcome: Progressing  Goal: Spiritual and cultural needs incorporated into hospitalization  Outcome: Progressing     Problem: Risk for Aspiration  Goal: Patient's risk for aspiration will be absent or decrease  Outcome: Progressing     Problem: Skin Integrity  Goal: Skin integrity is maintained or improved  Outcome: Progressing     Problem: Fall Risk  Goal: Patient will remain free from falls  Outcome: Progressing

## 2025-06-02 LAB
ALBUMIN SERPL BCP-MCNC: 2.7 G/DL (ref 3.2–4.9)
ALBUMIN/GLOB SERPL: 0.8 G/DL
ALP SERPL-CCNC: 329 U/L (ref 30–99)
ALT SERPL-CCNC: 75 U/L (ref 2–50)
ANION GAP SERPL CALC-SCNC: 11 MMOL/L (ref 7–16)
AST SERPL-CCNC: 186 U/L (ref 12–45)
BILIRUB SERPL-MCNC: 13.9 MG/DL (ref 0.1–1.5)
BUN SERPL-MCNC: 16 MG/DL (ref 8–22)
CALCIUM ALBUM COR SERPL-MCNC: 8.9 MG/DL (ref 8.5–10.5)
CALCIUM SERPL-MCNC: 7.9 MG/DL (ref 8.5–10.5)
CHLORIDE SERPL-SCNC: 102 MMOL/L (ref 96–112)
CO2 SERPL-SCNC: 21 MMOL/L (ref 20–33)
CREAT SERPL-MCNC: 1.47 MG/DL (ref 0.5–1.4)
ERYTHROCYTE [DISTWIDTH] IN BLOOD BY AUTOMATED COUNT: 70.9 FL (ref 35.9–50)
GFR SERPLBLD CREATININE-BSD FMLA CKD-EPI: 62 ML/MIN/1.73 M 2
GLOBULIN SER CALC-MCNC: 3.5 G/DL (ref 1.9–3.5)
GLUCOSE SERPL-MCNC: 139 MG/DL (ref 65–99)
HCT VFR BLD AUTO: 34.6 % (ref 42–52)
HGB BLD-MCNC: 11.5 G/DL (ref 14–18)
INR PPP: 1.27 (ref 0.87–1.13)
MAGNESIUM SERPL-MCNC: 1.8 MG/DL (ref 1.5–2.5)
MCH RBC QN AUTO: 32.1 PG (ref 27–33)
MCHC RBC AUTO-ENTMCNC: 33.2 G/DL (ref 32.3–36.5)
MCV RBC AUTO: 96.6 FL (ref 81.4–97.8)
PLATELET # BLD AUTO: 137 K/UL (ref 164–446)
PMV BLD AUTO: 9.3 FL (ref 9–12.9)
POTASSIUM SERPL-SCNC: 3.5 MMOL/L (ref 3.6–5.5)
PROT SERPL-MCNC: 6.2 G/DL (ref 6–8.2)
PROTHROMBIN TIME: 15.9 SEC (ref 12–14.6)
RBC # BLD AUTO: 3.58 M/UL (ref 4.7–6.1)
SODIUM SERPL-SCNC: 134 MMOL/L (ref 135–145)
WBC # BLD AUTO: 9.6 K/UL (ref 4.8–10.8)

## 2025-06-02 PROCEDURE — 700102 HCHG RX REV CODE 250 W/ 637 OVERRIDE(OP): Performed by: INTERNAL MEDICINE

## 2025-06-02 PROCEDURE — 80053 COMPREHEN METABOLIC PANEL: CPT

## 2025-06-02 PROCEDURE — 83735 ASSAY OF MAGNESIUM: CPT

## 2025-06-02 PROCEDURE — 770006 HCHG ROOM/CARE - MED/SURG/GYN SEMI*

## 2025-06-02 PROCEDURE — 85027 COMPLETE CBC AUTOMATED: CPT

## 2025-06-02 PROCEDURE — 85610 PROTHROMBIN TIME: CPT

## 2025-06-02 PROCEDURE — 36415 COLL VENOUS BLD VENIPUNCTURE: CPT

## 2025-06-02 PROCEDURE — A9270 NON-COVERED ITEM OR SERVICE: HCPCS | Performed by: INTERNAL MEDICINE

## 2025-06-02 PROCEDURE — 700111 HCHG RX REV CODE 636 W/ 250 OVERRIDE (IP): Performed by: INTERNAL MEDICINE

## 2025-06-02 PROCEDURE — 99232 SBSQ HOSP IP/OBS MODERATE 35: CPT | Performed by: INTERNAL MEDICINE

## 2025-06-02 RX ORDER — DIAZEPAM 2 MG/1
2 TABLET ORAL EVERY 6 HOURS PRN
Status: DISCONTINUED | OUTPATIENT
Start: 2025-06-02 | End: 2025-06-09

## 2025-06-02 RX ORDER — POTASSIUM CHLORIDE 1500 MG/1
40 TABLET, EXTENDED RELEASE ORAL 2 TIMES DAILY
Status: COMPLETED | OUTPATIENT
Start: 2025-06-02 | End: 2025-06-02

## 2025-06-02 RX ADMIN — FOLIC ACID 1 MG: 1 TABLET ORAL at 04:33

## 2025-06-02 RX ADMIN — LACTULOSE 30 ML: 10 SOLUTION ORAL at 11:43

## 2025-06-02 RX ADMIN — Medication 100 MG: at 04:33

## 2025-06-02 RX ADMIN — POTASSIUM CHLORIDE 40 MEQ: 1500 TABLET, EXTENDED RELEASE ORAL at 16:49

## 2025-06-02 RX ADMIN — FUROSEMIDE 60 MG: 10 INJECTION, SOLUTION INTRAVENOUS at 04:33

## 2025-06-02 RX ADMIN — LACTULOSE 30 ML: 10 SOLUTION ORAL at 22:09

## 2025-06-02 RX ADMIN — SPIRONOLACTONE 100 MG: 100 TABLET ORAL at 04:33

## 2025-06-02 RX ADMIN — FUROSEMIDE 60 MG: 10 INJECTION, SOLUTION INTRAVENOUS at 22:09

## 2025-06-02 RX ADMIN — LACTULOSE 30 ML: 10 SOLUTION ORAL at 04:33

## 2025-06-02 RX ADMIN — CARVEDILOL 3.12 MG: 3.12 TABLET, FILM COATED ORAL at 08:09

## 2025-06-02 RX ADMIN — POTASSIUM CHLORIDE 40 MEQ: 1500 TABLET, EXTENDED RELEASE ORAL at 08:09

## 2025-06-02 RX ADMIN — HYDROCORTISONE: 1 CREAM TOPICAL at 16:48

## 2025-06-02 RX ADMIN — TAMSULOSIN HYDROCHLORIDE 0.4 MG: 0.4 CAPSULE ORAL at 08:09

## 2025-06-02 RX ADMIN — OXYCODONE 5 MG: 5 TABLET ORAL at 08:13

## 2025-06-02 RX ADMIN — FUROSEMIDE 60 MG: 10 INJECTION, SOLUTION INTRAVENOUS at 11:46

## 2025-06-02 RX ADMIN — HYDROCORTISONE: 1 CREAM TOPICAL at 04:33

## 2025-06-02 RX ADMIN — ENOXAPARIN SODIUM 40 MG: 100 INJECTION SUBCUTANEOUS at 16:48

## 2025-06-02 RX ADMIN — ENOXAPARIN SODIUM 40 MG: 100 INJECTION SUBCUTANEOUS at 04:33

## 2025-06-02 ASSESSMENT — PAIN DESCRIPTION - PAIN TYPE
TYPE: ACUTE PAIN

## 2025-06-02 ASSESSMENT — ENCOUNTER SYMPTOMS
WEAKNESS: 1
ABDOMINAL PAIN: 0
DIARRHEA: 1
VOMITING: 0
SHORTNESS OF BREATH: 1

## 2025-06-02 ASSESSMENT — FIBROSIS 4 INDEX: FIB4 SCORE: 5.8

## 2025-06-02 NOTE — CARE PLAN
The patient is Stable - Low risk of patient condition declining or worsening    Shift Goals  Clinical Goals: Diuresis, monitor I/O, electrolyte replacements( K), control pain  Patient Goals: Pain control  Family Goals: Updates    Progress made toward(s) clinical / shift goals:    Problem: Pain - Standard  Goal: Alleviation of pain or a reduction in pain to the patient’s comfort goal  Description: Target End Date:  Prior to discharge or change in level of careDocument on Vitals flowsheet1.  Document pain using the appropriate pain scale per order or unit policy2.  Educate and implement non-pharmacologic comfort measures (i.e. relaxation, distraction, massage, cold/heat therapy, etc.)3.  Pain management medications as ordered4.  Reassess pain after pain med administration per policy5.  If opiods administered assess patient's response to pain medication is appropriate per POSS sedation scale6.  Follow pain management plan developed in collaboration with patient and interdisciplinary team (including palliative care or pain specialists if applicable)  Outcome: Progressing     Problem: Fall Risk  Goal: Patient will remain free from falls  Description: Target End Date:  Prior to discharge or change in level of careDocument interventions on the Castellano Raghu Fall Risk Assessment1.  Assess for fall risk factors2.  Implement fall precautions  Outcome: Progressing       Patient is not progressing towards the following goals:

## 2025-06-02 NOTE — PROGRESS NOTES
"Received report and assumed care of patient (pt) at shift change.     Pt is A&O x4. Pt reports 6/10 pain on his legs. Medicated pt per MAR. Pt sitting up on the chair. Last set of  Vital signs are  /56   Pulse 95   Temp 36.6 °C (97.9 °F) (Temporal)   Resp 17   Ht 1.6 m (5' 3\")   Wt (!) 131 kg (288 lb 5.8 oz)   SpO2 94% on room air.  Bed is in lowest, locked position, call light and belongings are within reach. Pt educated to call for assistance when getting OOB.  Plan of care discussed and all questions answered. All other needs met at this time. Care continuous.   "

## 2025-06-02 NOTE — PROGRESS NOTES
Report received from RN, assumed care at 1900  Pt is A0X4, and responds appropriately   Pt declines any SOB, chest pain, new onset of numbness/ tingling  Pt is on RA  Pt rates pain at 5/10, on a scale of 1-10, pt medicated per MAR  Pt is voiding adequately and without hesitancy   Pt has + flatus, + bowel sounds, + BM on 6/1  Pt ambulates self independently   Pt is tolerating a diet, pt denies any nausea/vomiting  Plan of care discussed, all questions answered. Explained importance of calling before getting OOB and pt verbalizes understanding. Explained importance of oral care. Call light is within reach, treaded slipper socks on, bed in lowest/ locked position, hourly rounding in place, all needs met at this time

## 2025-06-02 NOTE — CARE PLAN
The patient is Stable - Low risk of patient condition declining or worsening    Shift Goals  Clinical Goals: Safety, strict I&Os  Patient Goals: Updates on POC  Family Goals: Updates    Progress made toward(s) clinical / shift goals:        Problem: Pain - Standard  Goal: Alleviation of pain or a reduction in pain to the patient’s comfort goal  Outcome: Progressing     Problem: Knowledge Deficit - Standard  Goal: Patient and family/care givers will demonstrate understanding of plan of care, disease process/condition, diagnostic tests and medications  Outcome: Progressing     Problem: Optimal Care for Alcohol Withdrawal  Goal: Optimal Care for the alcohol withdrawal patient  Outcome: Progressing     Problem: Seizure Precautions  Goal: Implementation of seizure precautions  Outcome: Progressing     Problem: Lifestyle Changes  Goal: Patient's ability to identify lifestyle changes and available resources to help reduce recurrence of condition will improve  Outcome: Progressing     Problem: Psychosocial  Goal: Patient's level of anxiety will decrease  Outcome: Progressing  Goal: Spiritual and cultural needs incorporated into hospitalization  Outcome: Progressing     Problem: Risk for Aspiration  Goal: Patient's risk for aspiration will be absent or decrease  Outcome: Progressing     Problem: Skin Integrity  Goal: Skin integrity is maintained or improved  Outcome: Progressing     Problem: Fall Risk  Goal: Patient will remain free from falls  Outcome: Progressing

## 2025-06-02 NOTE — DISCHARGE PLANNING
Case Management Discharge Planning    Admission Date: 5/28/2025  GMLOS: 3.3  ALOS: 4    6-Clicks ADL Score: 21  6-Clicks Mobility Score: 18      Anticipated Discharge Dispo: Discharge Disposition: Discharged to home/self care (01)    DME Needed: No    Action(s) Taken: Chart reviewed, pt discussed in IDT rounds. He is continuing to be diuresed and is not medically cleared.    Escalations Completed: None    Medically Clear: No    Next Steps: Follow for discharge planning needs    Barriers to Discharge: Medical clearance    Is the patient up for discharge tomorrow: No

## 2025-06-02 NOTE — DISCHARGE PLANNING
Care Transition Team Assessment    In case of emergency, call patient's brother, Freddy, [phone]    RN CM met with patient at bedside for assessment. He confirms living at the address on facesheet in a 2 story home with his mother, brother and brother's wife and 2 kids. He gets occasional assistance from family when needed and has a 4WW and crutches available. He drives self and was working for [employer] until his health prevented him from it. He drinks 10-15 shots of liquor a day. He has been in AA and been sober for periods of time. RN CM gave patient substance use resources at bedside and added to AVS. He has MTM benefits through his [employer]. He sees Cyrus Tolliver at [facility] for PCP. Discharge plan is home with transport from family.    Information Source  Orientation Level: Oriented X4  Information Given By: Patient  Who is responsible for making decisions for patient? : Patient    Elopement Risk  Legal Hold: No  Ambulatory or Self Mobile in Wheelchair: Yes  Disoriented: No  Psychiatric Symptoms: None  History of Wandering: No  Elopement this Admit: No  Vocalizing Wanting to Leave: No  Displays Behaviors, Body Language Wanting to Leave: No-Not at Risk for Elopement  Elopement Risk: Not at Risk for Elopement    Interdisciplinary Discharge Planning  Primary Care Physician: Cyrus Tolliver  Lives with - Patient's Self Care Capacity: Parents, Child Less than 18 Years of Age, Sibling  Patient or legal guardian wants to designate a caregiver: No  Support Systems: Family Member(s)  Housing / Facility: 2 Story House  Able to Return to Previous ADL's: Yes  Mobility Issues: No  Prior Services: Intermittent Physical Support for ADL Per Family  Patient Prefers to be Discharged to:: home    Discharge Preparedness  What is your plan after discharge?: Home with help  What are your discharge supports?: Sibling  Prior Functional Level: Ambulatory, Drives Self, Independent with Activities of Daily Living  Difficulity with  ADLs: None    Functional Assesment  Prior Functional Level: Ambulatory, Drives Self, Independent with Activities of Daily Living    Finances  Financial Barriers to Discharge: No  Prescription Coverage: Yes    Vision / Hearing Impairment  Vision Impairment : No  Hearing Impairment : No    Advance Directive  Advance Directive?: None    Domestic Abuse  Have you ever been the victim of abuse or violence?: No  Possible Abuse/Neglect Reported to:: Not Applicable    Psychological Assessment  History of Substance Abuse: Alcohol  Date Last Used - Alcohol: 5/28/25    Discharge Risks or Barriers  Discharge risks or barriers?: Substance abuse  Patient risk factors: Noncompliance, Substance abuse    Anticipated Discharge Information  Discharge Disposition: Discharged to home/self care (01)

## 2025-06-02 NOTE — PROGRESS NOTES
Hospital Medicine Daily Progress Note    Date of Service  6/2/2025    Chief Complaint  Alberto Maldonado is a 39 y.o. male admitted 5/28/2025 with swelling    Hospital Course  38 yo man with alcoholic cirrhosis, ongoing alcohol use, DM, HTN, BOLIVAR, morbid obesity who presented with worsening swelling.  He continues to drink 9-10 shots every day.  He was found with worsening liver function, elevated ammonia.  Patient was previously also hospitalized and CTAP at that time showed a pancreatic tail lesion and was recommended to have outpatient CT pancreatic protocol.  CT was done in the ER which showed a mass in the pancreatic tail, ovoid mass in the splenic hilum.  MRI showed increased signal in tail of pancreas, prior pancreatitis but mass was not excluded.  He will need a follow-up MRI in 6 months.  GI was consulted and he is not a candidate for further steroid treatment for alcohol hepatitis.  He was started on diuretics for his edema.  Imaging was negative for ascites.    Interval Problem Update  Hypokalemic, continue repletion.  Creatinine slightly up from yesterday.  UOP over 1075cc  The edema in his arms are improved, he feels like the skin on his abdomen feels less tight  He does feel anxious today, slightly tremulous.  He has been ambulating, takes breaks for JACK    I have discussed this patient's plan of care and discharge plan at IDT rounds today with Case Management, Nursing, Nursing leadership, and other members of the IDT team.    Consultants/Specialty  GI    Code Status  Full Code    Disposition  The patient is not medically cleared for discharge to home or a post-acute facility.  Anticipate discharge to: home with close outpatient follow-up    I have placed the appropriate orders for post-discharge needs.    Review of Systems  Review of Systems   Constitutional:  Positive for malaise/fatigue.   Respiratory:  Positive for shortness of breath.    Cardiovascular:  Positive for leg swelling.    Gastrointestinal:  Positive for diarrhea. Negative for abdominal pain and vomiting.   Neurological:  Positive for weakness.        Physical Exam  Temp:  [36.3 °C (97.3 °F)-37.5 °C (99.5 °F)] 36.6 °C (97.9 °F)  Pulse:  [] 91  Resp:  [16-18] 17  BP: ()/(50-69) 102/67  SpO2:  [94 %-96 %] 95 %    Physical Exam  Vitals and nursing note reviewed.   Constitutional:       General: He is not in acute distress.     Appearance: He is obese. He is not toxic-appearing.      Comments: Anasarca   HENT:      Head: Normocephalic.      Mouth/Throat:      Mouth: Mucous membranes are moist.   Eyes:      General:         Right eye: No discharge.         Left eye: No discharge.   Cardiovascular:      Rate and Rhythm: Normal rate and regular rhythm.   Pulmonary:      Effort: No respiratory distress.      Breath sounds: No wheezing or rales.      Comments: Diminished at bases  Abdominal:      General: There is distension (Pitting edema).      Tenderness: There is no abdominal tenderness. There is no guarding or rebound.   Musculoskeletal:         General: Swelling (Improved at levels of arms) present.      Cervical back: Neck supple.      Right lower leg: Edema present.      Left lower leg: Edema present.   Skin:     General: Skin is warm.      Comments: Excoriations to both arms  Weeping lower extremities   Neurological:      Mental Status: He is alert and oriented to person, place, and time.      Comments: Mild tremor to both hands         Fluids    Intake/Output Summary (Last 24 hours) at 6/2/2025 1316  Last data filed at 6/2/2025 0900  Gross per 24 hour   Intake 953 ml   Output 1375 ml   Net -422 ml        Laboratory  Recent Labs     05/31/25  0149 06/01/25  0538 06/02/25  0115   WBC 9.6 10.5 9.6   RBC 3.46* 3.78* 3.58*   HEMOGLOBIN 11.1* 12.1* 11.5*   HEMATOCRIT 33.2* 37.0* 34.6*   MCV 96.0 97.9* 96.6   MCH 32.1 32.0 32.1   MCHC 33.4 32.7 33.2   RDW 68.1* 72.9* 70.9*   PLATELETCT 121* 146* 137*   MPV 9.4 9.3 9.3      Recent Labs     05/31/25  0149 06/01/25  0538 06/02/25  0115   SODIUM 136 133* 134*   POTASSIUM 3.4* 3.0* 3.5*   CHLORIDE 106 100 102   CO2 18* 19* 21   GLUCOSE 132* 104* 139*   BUN 14 16 16   CREATININE 1.32 1.45* 1.47*   CALCIUM 7.8* 8.1* 7.9*     Recent Labs     05/31/25  0149 06/01/25  0538 06/02/25  0115   INR 1.35* 1.29* 1.27*               Imaging  MR-ABDOMEN-WITH & W/O   Final Result      1.  The tail of the pancreas appears somewhat irregular and stranding with increased signal but evaluation is very limited due to significant motion artifact. This could relate to sequela of prior pancreatitis. A mass cannot be excluded. Follow-up in 6    months with MR after improvement is recommended.   2.  There is an accessory splenule adjacent to the spleen   3. Diffuse subcutaneous edema .                        EC-ECHOCARDIOGRAM COMPLETE W/ CONT   Final Result      US-EXTREMITY VENOUS LOWER BILAT   Final Result      US-RUQ   Final Result         1.  Thickened gallbladder wall without visualized shadowing stones, consider acalculous cholecystitis, could be further evaluated with HIDA scan as clinically appropriate   2.  Hepatomegaly   3.  Echogenic liver, compatible with fatty change versus fibrosis      CT-PANCREAS AND ABDOMEN WITH & W/O   Final Result         1.  Masslike structure adjacent to the tail of pancreas with possible slight enhancement on arterial phase imaging, recommend follow-up MRI of the pancreas with contrast for more definitive characterization.   2.  Ovoid mass in the splenic hilum with similar density to spleen on all contrast phases, appearance favoring splenule.   3.  Changes of cirrhosis.   4.  Hepatomegaly           Assessment/Plan  * Alcoholic liver hepatitis and cirrhosis- (present on admission)  Assessment & Plan  DF score 35.7, MELD 26 -> now 25.  T. bili and INR has decreased  Patient just completed course of steroid, GI consulted and does not recommend restarting  Alcohol cessation  counseling, patient is motivated to stop drinking.  Case management to provide resources  No signs of hepatic encephalopathy, continue maintenance lactulose  Continue diuresis, continue on Lasix 60 mg IV twice daily and spironolactone 100mg. Monitor I's and O's, low-sodium diet and fluid restriction  No ascites on ultrasound  He has a referral to [facility], he will make a new appointment    Hyperammonemia (HCC)  Assessment & Plan  He does not clinically have hepatic encephalopathy  Continue maintenance lactulose    Hyponatremia  Assessment & Plan  Secondary to alcohol abuse, liver disease  Monitor    Anasarca  Assessment & Plan  Continue diuresis, with IV Lasix for spironolactone  Creatinine has increased but may have plateaued.  Continue current doses and recheck creatinine tomorrow  Repleting for low potassium  Monitor I's and O's, low-sodium diet and fluid restriction  Ultrasound negative for ascites  Doppler negative for DVT.  TTE with normal EF    Intraabdominal mass- (present on admission)  Assessment & Plan  CT pancreatic protocol demonstrated masslike structure adjacent to the tail of the pancreas with possible slight enhancement of arterial phase.    CA 19-9 was elevated.  AFP normal  MRI showed increased signal in tail of pancreas, prior pancreatitis but mass was not excluded.  He will need a follow-up MRI in 6 months.     BOLIVAR on CPAP- (present on admission)  Assessment & Plan  Continue CPAP at night    Alcohol use disorder, severe, dependence (HCC)- (present on admission)  Assessment & Plan  Last drink 5/28  CIWA score's were low and was stopped.  He has mild tremors and some anxiety.  I will order Valium as needed but if his symptoms worsen may need to restart CIWA    Morbid obesity (HCC)- (present on admission)  Assessment & Plan  BMI 44    Essential hypertension- (present on admission)  Assessment & Plan  Continue on Coreg, BP stable, monitor while on diuretics           VTE prophylaxis:    enoxaparin  ppx      I have performed a physical exam and reviewed and updated ROS and Plan today (6/2/2025). In review of yesterday's note (6/1/2025), there are no changes except as documented above.

## 2025-06-03 LAB
ALBUMIN SERPL BCP-MCNC: 2.7 G/DL (ref 3.2–4.9)
ALBUMIN/GLOB SERPL: 0.8 G/DL
ALP SERPL-CCNC: 298 U/L (ref 30–99)
ALT SERPL-CCNC: 64 U/L (ref 2–50)
ANION GAP SERPL CALC-SCNC: 13 MMOL/L (ref 7–16)
AST SERPL-CCNC: 150 U/L (ref 12–45)
BILIRUB SERPL-MCNC: 11.9 MG/DL (ref 0.1–1.5)
BUN SERPL-MCNC: 19 MG/DL (ref 8–22)
CALCIUM ALBUM COR SERPL-MCNC: 8.7 MG/DL (ref 8.5–10.5)
CALCIUM SERPL-MCNC: 7.7 MG/DL (ref 8.5–10.5)
CHLORIDE SERPL-SCNC: 104 MMOL/L (ref 96–112)
CO2 SERPL-SCNC: 19 MMOL/L (ref 20–33)
CREAT SERPL-MCNC: 1.43 MG/DL (ref 0.5–1.4)
ERYTHROCYTE [DISTWIDTH] IN BLOOD BY AUTOMATED COUNT: 72.5 FL (ref 35.9–50)
GFR SERPLBLD CREATININE-BSD FMLA CKD-EPI: 65 ML/MIN/1.73 M 2
GLOBULIN SER CALC-MCNC: 3.4 G/DL (ref 1.9–3.5)
GLUCOSE SERPL-MCNC: 122 MG/DL (ref 65–99)
HCT VFR BLD AUTO: 34 % (ref 42–52)
HGB BLD-MCNC: 11 G/DL (ref 14–18)
INR PPP: 1.23 (ref 0.87–1.13)
MCH RBC QN AUTO: 32.1 PG (ref 27–33)
MCHC RBC AUTO-ENTMCNC: 32.4 G/DL (ref 32.3–36.5)
MCV RBC AUTO: 99.1 FL (ref 81.4–97.8)
PLATELET # BLD AUTO: 154 K/UL (ref 164–446)
PMV BLD AUTO: 9.3 FL (ref 9–12.9)
POTASSIUM SERPL-SCNC: 4 MMOL/L (ref 3.6–5.5)
PROT SERPL-MCNC: 6.1 G/DL (ref 6–8.2)
PROTHROMBIN TIME: 15.6 SEC (ref 12–14.6)
RBC # BLD AUTO: 3.43 M/UL (ref 4.7–6.1)
SODIUM SERPL-SCNC: 136 MMOL/L (ref 135–145)
WBC # BLD AUTO: 9.3 K/UL (ref 4.8–10.8)

## 2025-06-03 PROCEDURE — 700102 HCHG RX REV CODE 250 W/ 637 OVERRIDE(OP): Performed by: INTERNAL MEDICINE

## 2025-06-03 PROCEDURE — 700111 HCHG RX REV CODE 636 W/ 250 OVERRIDE (IP): Mod: JZ | Performed by: INTERNAL MEDICINE

## 2025-06-03 PROCEDURE — A9270 NON-COVERED ITEM OR SERVICE: HCPCS | Performed by: INTERNAL MEDICINE

## 2025-06-03 PROCEDURE — 36415 COLL VENOUS BLD VENIPUNCTURE: CPT

## 2025-06-03 PROCEDURE — 700111 HCHG RX REV CODE 636 W/ 250 OVERRIDE (IP): Performed by: INTERNAL MEDICINE

## 2025-06-03 PROCEDURE — 85610 PROTHROMBIN TIME: CPT

## 2025-06-03 PROCEDURE — 770006 HCHG ROOM/CARE - MED/SURG/GYN SEMI*

## 2025-06-03 PROCEDURE — 80053 COMPREHEN METABOLIC PANEL: CPT

## 2025-06-03 PROCEDURE — 85027 COMPLETE CBC AUTOMATED: CPT

## 2025-06-03 PROCEDURE — 99233 SBSQ HOSP IP/OBS HIGH 50: CPT | Performed by: INTERNAL MEDICINE

## 2025-06-03 PROCEDURE — 99254 IP/OBS CNSLTJ NEW/EST MOD 60: CPT | Performed by: PSYCHIATRY & NEUROLOGY

## 2025-06-03 RX ORDER — GABAPENTIN 300 MG/1
300 CAPSULE ORAL 3 TIMES DAILY
Status: DISCONTINUED | OUTPATIENT
Start: 2025-06-03 | End: 2025-06-09

## 2025-06-03 RX ORDER — FUROSEMIDE 10 MG/ML
60 INJECTION INTRAMUSCULAR; INTRAVENOUS 3 TIMES DAILY
Status: DISCONTINUED | OUTPATIENT
Start: 2025-06-03 | End: 2025-06-05

## 2025-06-03 RX ADMIN — FUROSEMIDE 60 MG: 10 INJECTION, SOLUTION INTRAVENOUS at 19:55

## 2025-06-03 RX ADMIN — OXYCODONE HYDROCHLORIDE 10 MG: 10 TABLET ORAL at 14:25

## 2025-06-03 RX ADMIN — LACTULOSE 30 ML: 10 SOLUTION ORAL at 12:01

## 2025-06-03 RX ADMIN — HYDROCORTISONE: 1 CREAM TOPICAL at 17:30

## 2025-06-03 RX ADMIN — GABAPENTIN 300 MG: 300 CAPSULE ORAL at 17:30

## 2025-06-03 RX ADMIN — LACTULOSE 30 ML: 10 SOLUTION ORAL at 05:06

## 2025-06-03 RX ADMIN — LACTULOSE 30 ML: 10 SOLUTION ORAL at 23:54

## 2025-06-03 RX ADMIN — HYDROXYZINE HYDROCHLORIDE 25 MG: 50 TABLET ORAL at 14:23

## 2025-06-03 RX ADMIN — HYDROCORTISONE: 1 CREAM TOPICAL at 05:06

## 2025-06-03 RX ADMIN — CARVEDILOL 3.12 MG: 3.12 TABLET, FILM COATED ORAL at 17:30

## 2025-06-03 RX ADMIN — TAMSULOSIN HYDROCHLORIDE 0.4 MG: 0.4 CAPSULE ORAL at 08:31

## 2025-06-03 RX ADMIN — FUROSEMIDE 60 MG: 10 INJECTION, SOLUTION INTRAVENOUS at 15:00

## 2025-06-03 RX ADMIN — ENOXAPARIN SODIUM 40 MG: 100 INJECTION SUBCUTANEOUS at 17:30

## 2025-06-03 RX ADMIN — FOLIC ACID 1 MG: 1 TABLET ORAL at 05:06

## 2025-06-03 RX ADMIN — OXYCODONE HYDROCHLORIDE 10 MG: 10 TABLET ORAL at 08:37

## 2025-06-03 RX ADMIN — Medication 100 MG: at 05:06

## 2025-06-03 RX ADMIN — ENOXAPARIN SODIUM 40 MG: 100 INJECTION SUBCUTANEOUS at 05:06

## 2025-06-03 RX ADMIN — HYDROXYZINE HYDROCHLORIDE 25 MG: 50 TABLET ORAL at 23:55

## 2025-06-03 RX ADMIN — SPIRONOLACTONE 100 MG: 100 TABLET ORAL at 08:31

## 2025-06-03 RX ADMIN — CARVEDILOL 3.12 MG: 3.12 TABLET, FILM COATED ORAL at 08:31

## 2025-06-03 RX ADMIN — FUROSEMIDE 60 MG: 10 INJECTION, SOLUTION INTRAVENOUS at 10:00

## 2025-06-03 ASSESSMENT — LIFESTYLE VARIABLES: SUBSTANCE_ABUSE: 1

## 2025-06-03 ASSESSMENT — ENCOUNTER SYMPTOMS
NAUSEA: 1
SHORTNESS OF BREATH: 0
COUGH: 1
FEVER: 0
SHORTNESS OF BREATH: 1
VOMITING: 0
VOMITING: 1
DIARRHEA: 1
NERVOUS/ANXIOUS: 1
ABDOMINAL PAIN: 0
WEAKNESS: 1

## 2025-06-03 ASSESSMENT — PAIN DESCRIPTION - PAIN TYPE
TYPE: ACUTE PAIN
TYPE: ACUTE PAIN

## 2025-06-03 ASSESSMENT — FIBROSIS 4 INDEX: FIB4 SCORE: 4.5
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
## 2025-06-03 NOTE — PROGRESS NOTES
Hospital Medicine Daily Progress Note    Date of Service  6/3/2025    Chief Complaint  Alberto Maldonado is a 39 y.o. male admitted 5/28/2025 with swelling    Hospital Course  40 yo man with alcoholic cirrhosis, ongoing alcohol use, DM, HTN, BOLIVAR, morbid obesity who presented with worsening swelling.  He continues to drink 9-10 shots every day.  He was found with worsening liver function, elevated ammonia.  Patient was previously also hospitalized and CTAP at that time showed a pancreatic tail lesion and was recommended to have outpatient CT pancreatic protocol.  CT was done in the ER which showed a mass in the pancreatic tail, ovoid mass in the splenic hilum.  MRI showed increased signal in tail of pancreas, prior pancreatitis but mass was not excluded.  He will need a follow-up MRI in 6 months.  GI was consulted and he is not a candidate for further steroid treatment for alcohol hepatitis.  He was started on diuretics for his edema.  Imaging was negative for ascites.    Interval Problem Update  Hypokalemic, continue repletion.  Creatinine slightly up from yesterday.  UOP over 1075cc  The edema in his arms are improved, he feels like the skin on his abdomen feels less tight  He does feel anxious today, slightly tremulous.  He has been ambulating, takes breaks for JACK    6/3 Vitals stable on room air   -On review of MAR patient has not been getting all his Lasix doses due to blood pressure less than 100.  Change holding parameters, if patient's blood pressure continues to be low may need to add midodrine to augment diuresis  WBC 9.3, hemoglobin 11  Creatinine 1.43, GFR 65  , ALT 64, total bili 11.9  Patient reports that his normal weight is 240-250, thus he is up 40 pounds from his baseline.  He continues to have significant pitting lower extremity edema up into his abdomen.  He does have weeping from his abdominal edema.  Discussed importance of alcohol cessation.  Patient states he is motivated to quit  especially as his family has been very sad about his health condition.  He states that he uses alcohol to treat self treat his anxiety, is agreeable to psychiatry consult  Discussed with psychiatry recommended gabapentin 300 mg 3 times daily for anxiety, agreed with Atarax 25 mg p.o. 3 times daily.  They will refer patient for inpatient psychotherapy  Paracentesis ordered, pending    I have discussed this patient's plan of care and discharge plan at IDT rounds today with Case Management, Nursing, Nursing leadership, and other members of the IDT team.    Consultants/Specialty  GI  Psychiatry    Code Status  Full Code    Disposition  The patient is not medically cleared for discharge to home or a post-acute facility.  Anticipate discharge to: home with close outpatient follow-up    I have placed the appropriate orders for post-discharge needs.    Review of Systems  Review of Systems   Constitutional:  Positive for malaise/fatigue. Negative for fever.   Respiratory:  Positive for shortness of breath.    Cardiovascular:  Positive for leg swelling.   Gastrointestinal:  Positive for diarrhea. Negative for abdominal pain and vomiting.   Neurological:  Positive for weakness.   Psychiatric/Behavioral:  Positive for substance abuse. The patient is nervous/anxious.         Physical Exam  Temp:  [36.2 °C (97.1 °F)-37.2 °C (99 °F)] 36.5 °C (97.7 °F)  Pulse:  [] 98  Resp:  [18] 18  BP: ()/(58-80) 123/80  SpO2:  [93 %-98 %] 93 %    Physical Exam  Vitals and nursing note reviewed.   Constitutional:       General: He is not in acute distress.     Appearance: He is obese.      Comments: Anasarca   HENT:      Head: Normocephalic.      Mouth/Throat:      Pharynx: Oropharynx is clear.   Eyes:      General:         Right eye: No discharge.         Left eye: No discharge.      Conjunctiva/sclera: Conjunctivae normal.   Cardiovascular:      Rate and Rhythm: Normal rate and regular rhythm.   Pulmonary:      Effort: Pulmonary  effort is normal. No respiratory distress.      Breath sounds: No wheezing or rales.      Comments: Diminished at bases  Abdominal:      General: There is distension (Pitting edema).      Tenderness: There is no abdominal tenderness.      Comments: Pitting edema up to umbilicus, + weeping   Musculoskeletal:         General: Swelling (Improved at levels of arms) present.      Cervical back: Neck supple.      Right lower leg: Edema present.      Left lower leg: Edema present.      Comments: +3 pitting edema bilateral lower extremity   Skin:     General: Skin is warm.      Comments: Excoriations to both arms  Weeping lower extremities   Neurological:      Mental Status: He is alert and oriented to person, place, and time.   Psychiatric:         Mood and Affect: Mood is anxious.         Fluids    Intake/Output Summary (Last 24 hours) at 6/3/2025 1628  Last data filed at 6/3/2025 1130  Gross per 24 hour   Intake 600 ml   Output 950 ml   Net -350 ml        Laboratory  Recent Labs     06/01/25  0538 06/02/25  0115 06/03/25  0235   WBC 10.5 9.6 9.3   RBC 3.78* 3.58* 3.43*   HEMOGLOBIN 12.1* 11.5* 11.0*   HEMATOCRIT 37.0* 34.6* 34.0*   MCV 97.9* 96.6 99.1*   MCH 32.0 32.1 32.1   MCHC 32.7 33.2 32.4   RDW 72.9* 70.9* 72.5*   PLATELETCT 146* 137* 154*   MPV 9.3 9.3 9.3     Recent Labs     06/01/25  0538 06/02/25  0115 06/03/25  0235   SODIUM 133* 134* 136   POTASSIUM 3.0* 3.5* 4.0   CHLORIDE 100 102 104   CO2 19* 21 19*   GLUCOSE 104* 139* 122*   BUN 16 16 19   CREATININE 1.45* 1.47* 1.43*   CALCIUM 8.1* 7.9* 7.7*     Recent Labs     06/01/25  0538 06/02/25  0115 06/03/25  0235   INR 1.29* 1.27* 1.23*               Imaging  MR-ABDOMEN-WITH & W/O   Final Result      1.  The tail of the pancreas appears somewhat irregular and stranding with increased signal but evaluation is very limited due to significant motion artifact. This could relate to sequela of prior pancreatitis. A mass cannot be excluded. Follow-up in 6    months  with MR after improvement is recommended.   2.  There is an accessory splenule adjacent to the spleen   3. Diffuse subcutaneous edema .                        EC-ECHOCARDIOGRAM COMPLETE W/ CONT   Final Result      US-EXTREMITY VENOUS LOWER BILAT   Final Result      US-RUQ   Final Result         1.  Thickened gallbladder wall without visualized shadowing stones, consider acalculous cholecystitis, could be further evaluated with HIDA scan as clinically appropriate   2.  Hepatomegaly   3.  Echogenic liver, compatible with fatty change versus fibrosis      CT-PANCREAS AND ABDOMEN WITH & W/O   Final Result         1.  Masslike structure adjacent to the tail of pancreas with possible slight enhancement on arterial phase imaging, recommend follow-up MRI of the pancreas with contrast for more definitive characterization.   2.  Ovoid mass in the splenic hilum with similar density to spleen on all contrast phases, appearance favoring splenule.   3.  Changes of cirrhosis.   4.  Hepatomegaly      US-PARACENTESIS, ABD WITH IMAGING    (Results Pending)        Assessment/Plan  * Alcoholic liver hepatitis and cirrhosis- (present on admission)  Assessment & Plan  DF score 35.7, MELD 26 -> now 25.  T. bili and INR has decreased  Patient just completed course of steroid, GI consulted and does not recommend restarting  Alcohol cessation counseling, patient is motivated to stop drinking.  Case management to provide resources  No signs of hepatic encephalopathy, continue maintenance lactulose  Continue diuresis, continue on Lasix 60 mg IV twice daily and spironolactone 100mg. Monitor I's and O's, low-sodium diet and fluid restriction  He has a referral to [facility], he will make a new appointment    May need midodrine for diuresis, he continues to be +40 lbs from baseline     Hyperammonemia (HCC)  Assessment & Plan  He does not clinically have hepatic encephalopathy  Continue maintenance lactulose    Hyponatremia  Assessment & Plan  Secondary  to alcohol abuse, liver disease  Monitor    Anasarca  Assessment & Plan  Continue diuresis, with IV Lasix for spironolactone  Creatinine has increased but may have plateaued.  Continue current doses and recheck creatinine tomorrow  Repleting for low potassium  Monitor I's and O's, low-sodium diet and fluid restriction  Ultrasound negative for ascites  Doppler negative for DVT.  TTE with normal EF    Anxiety- (present on admission)  Assessment & Plan  Reports uses alcohol to self treat his anxiety  Psychiatry consulted recommended starting gabapentin 300 mg 3 times daily  Atarax as needed  Will start inpatient psychotherapy    Intraabdominal mass- (present on admission)  Assessment & Plan  CT pancreatic protocol demonstrated masslike structure adjacent to the tail of the pancreas with possible slight enhancement of arterial phase.    CA 19-9 was elevated.  AFP normal  MRI showed increased signal in tail of pancreas, prior pancreatitis but mass was not excluded.  He will need a follow-up MRI in 6 months.     BOLIVAR on CPAP- (present on admission)  Assessment & Plan  Continue CPAP at night    Alcohol use disorder, severe, dependence (HCC)- (present on admission)  Assessment & Plan  Last drink 5/28  CIWA score's were low and was stopped.  He has mild tremors and some anxiety.  I will order Valium as needed but if his symptoms worsen may need to restart CIWA    Morbid obesity (HCC)- (present on admission)  Assessment & Plan  BMI 44    Essential hypertension- (present on admission)  Assessment & Plan  Continue on Coreg, BP stable, monitor while on diuretics           VTE prophylaxis:    enoxaparin ppx      I have performed a physical exam and reviewed and updated ROS and Plan today (6/3/2025). In review of yesterday's note (6/2/2025), there are no changes except as documented above.

## 2025-06-03 NOTE — CARE PLAN
The patient is Stable - Low risk of patient condition declining or worsening    Shift Goals  Clinical Goals: monitor intake and output  Patient Goals: lasix and lactulose  Family Goals: support    Progress made toward(s) clinical / shift goals:  held am lasix for SBP <100, tolerating fluid restriction    Patient is not progressing towards the following goals:    Problem: Pain - Standard  Goal: Alleviation of pain or a reduction in pain to the patient’s comfort goal  Outcome: Progressing     Problem: Knowledge Deficit - Standard  Goal: Patient and family/care givers will demonstrate understanding of plan of care, disease process/condition, diagnostic tests and medications  Outcome: Progressing     Problem: Optimal Care for Alcohol Withdrawal  Goal: Optimal Care for the alcohol withdrawal patient  Outcome: Progressing     Problem: Lifestyle Changes  Goal: Patient's ability to identify lifestyle changes and available resources to help reduce recurrence of condition will improve  Outcome: Progressing

## 2025-06-03 NOTE — CARE PLAN
The patient is Stable - Low risk of patient condition declining or worsening    Shift Goals  Clinical Goals: Monitor intake and output  Patient Goals: Decrease fluid  Family Goals: DEBBI    Patient a/o x4. Patient pain 7/10. Pain medication given. Call light and belongings are within reach. Bed is in low locked position.     Progress made toward(s) clinical / shift goals:    Problem: Pain - Standard  Goal: Alleviation of pain or a reduction in pain to the patient’s comfort goal  Outcome: Progressing     Problem: Knowledge Deficit - Standard  Goal: Patient and family/care givers will demonstrate understanding of plan of care, disease process/condition, diagnostic tests and medications  Outcome: Progressing     Problem: Optimal Care for Alcohol Withdrawal  Goal: Optimal Care for the alcohol withdrawal patient  Outcome: Progressing     Problem: Seizure Precautions  Goal: Implementation of seizure precautions  Outcome: Progressing     Problem: Lifestyle Changes  Goal: Patient's ability to identify lifestyle changes and available resources to help reduce recurrence of condition will improve  Outcome: Progressing     Problem: Psychosocial  Goal: Patient's level of anxiety will decrease  Outcome: Progressing  Goal: Spiritual and cultural needs incorporated into hospitalization  Outcome: Progressing     Problem: Risk for Aspiration  Goal: Patient's risk for aspiration will be absent or decrease  Outcome: Progressing     Problem: Skin Integrity  Goal: Skin integrity is maintained or improved  Outcome: Progressing     Problem: Fall Risk  Goal: Patient will remain free from falls  Outcome: Progressing       Patient is not progressing towards the following goals:

## 2025-06-04 PROBLEM — E55.9 VITAMIN D DEFICIENCY: Status: ACTIVE | Noted: 2025-01-01

## 2025-06-04 LAB
25(OH)D3 SERPL-MCNC: 18 NG/ML (ref 30–100)
ALBUMIN SERPL BCP-MCNC: 2.8 G/DL (ref 3.2–4.9)
ALBUMIN/GLOB SERPL: 0.8 G/DL
ALP SERPL-CCNC: 293 U/L (ref 30–99)
ALT SERPL-CCNC: 65 U/L (ref 2–50)
ANION GAP SERPL CALC-SCNC: 13 MMOL/L (ref 7–16)
AST SERPL-CCNC: 165 U/L (ref 12–45)
BILIRUB SERPL-MCNC: 11.2 MG/DL (ref 0.1–1.5)
BUN SERPL-MCNC: 20 MG/DL (ref 8–22)
CALCIUM ALBUM COR SERPL-MCNC: 9.1 MG/DL (ref 8.5–10.5)
CALCIUM SERPL-MCNC: 8.1 MG/DL (ref 8.5–10.5)
CHLORIDE SERPL-SCNC: 98 MMOL/L (ref 96–112)
CO2 SERPL-SCNC: 20 MMOL/L (ref 20–33)
CREAT SERPL-MCNC: 1.34 MG/DL (ref 0.5–1.4)
ERYTHROCYTE [DISTWIDTH] IN BLOOD BY AUTOMATED COUNT: 71.1 FL (ref 35.9–50)
GFR SERPLBLD CREATININE-BSD FMLA CKD-EPI: 70 ML/MIN/1.73 M 2
GLOBULIN SER CALC-MCNC: 3.5 G/DL (ref 1.9–3.5)
GLUCOSE SERPL-MCNC: 123 MG/DL (ref 65–99)
HCT VFR BLD AUTO: 35.3 % (ref 42–52)
HGB BLD-MCNC: 11.3 G/DL (ref 14–18)
MAGNESIUM SERPL-MCNC: 1.8 MG/DL (ref 1.5–2.5)
MCH RBC QN AUTO: 31.7 PG (ref 27–33)
MCHC RBC AUTO-ENTMCNC: 32 G/DL (ref 32.3–36.5)
MCV RBC AUTO: 98.9 FL (ref 81.4–97.8)
PLATELET # BLD AUTO: 170 K/UL (ref 164–446)
PMV BLD AUTO: 9.3 FL (ref 9–12.9)
POTASSIUM SERPL-SCNC: 4 MMOL/L (ref 3.6–5.5)
PROT SERPL-MCNC: 6.3 G/DL (ref 6–8.2)
RBC # BLD AUTO: 3.57 M/UL (ref 4.7–6.1)
SODIUM SERPL-SCNC: 131 MMOL/L (ref 135–145)
WBC # BLD AUTO: 9.4 K/UL (ref 4.8–10.8)

## 2025-06-04 PROCEDURE — 85027 COMPLETE CBC AUTOMATED: CPT

## 2025-06-04 PROCEDURE — A9270 NON-COVERED ITEM OR SERVICE: HCPCS | Performed by: INTERNAL MEDICINE

## 2025-06-04 PROCEDURE — 82306 VITAMIN D 25 HYDROXY: CPT

## 2025-06-04 PROCEDURE — 700102 HCHG RX REV CODE 250 W/ 637 OVERRIDE(OP): Performed by: INTERNAL MEDICINE

## 2025-06-04 PROCEDURE — 700111 HCHG RX REV CODE 636 W/ 250 OVERRIDE (IP): Performed by: INTERNAL MEDICINE

## 2025-06-04 PROCEDURE — 80053 COMPREHEN METABOLIC PANEL: CPT

## 2025-06-04 PROCEDURE — 90837 PSYTX W PT 60 MINUTES: CPT

## 2025-06-04 PROCEDURE — 770006 HCHG ROOM/CARE - MED/SURG/GYN SEMI*

## 2025-06-04 PROCEDURE — 83735 ASSAY OF MAGNESIUM: CPT

## 2025-06-04 PROCEDURE — 99233 SBSQ HOSP IP/OBS HIGH 50: CPT | Performed by: INTERNAL MEDICINE

## 2025-06-04 PROCEDURE — 700111 HCHG RX REV CODE 636 W/ 250 OVERRIDE (IP): Mod: JZ | Performed by: INTERNAL MEDICINE

## 2025-06-04 PROCEDURE — 36415 COLL VENOUS BLD VENIPUNCTURE: CPT

## 2025-06-04 RX ORDER — VITAMIN B COMPLEX
1000 TABLET ORAL DAILY
Status: DISCONTINUED | OUTPATIENT
Start: 2025-06-04 | End: 2025-06-09

## 2025-06-04 RX ORDER — ALBUTEROL SULFATE 90 UG/1
2 INHALANT RESPIRATORY (INHALATION) EVERY 4 HOURS PRN
Status: DISCONTINUED | OUTPATIENT
Start: 2025-06-04 | End: 2025-06-10

## 2025-06-04 RX ORDER — LANOLIN ALCOHOL/MO/W.PET/CERES
400 CREAM (GRAM) TOPICAL DAILY
Status: DISCONTINUED | OUTPATIENT
Start: 2025-06-04 | End: 2025-06-09

## 2025-06-04 RX ADMIN — HYDROCORTISONE: 1 CREAM TOPICAL at 04:54

## 2025-06-04 RX ADMIN — Medication 1000 UNITS: at 08:24

## 2025-06-04 RX ADMIN — OXYCODONE HYDROCHLORIDE 10 MG: 10 TABLET ORAL at 17:09

## 2025-06-04 RX ADMIN — GABAPENTIN 300 MG: 300 CAPSULE ORAL at 12:40

## 2025-06-04 RX ADMIN — GABAPENTIN 300 MG: 300 CAPSULE ORAL at 04:54

## 2025-06-04 RX ADMIN — TAMSULOSIN HYDROCHLORIDE 0.4 MG: 0.4 CAPSULE ORAL at 08:24

## 2025-06-04 RX ADMIN — OXYCODONE HYDROCHLORIDE 10 MG: 10 TABLET ORAL at 21:43

## 2025-06-04 RX ADMIN — HYDROCORTISONE: 1 CREAM TOPICAL at 17:10

## 2025-06-04 RX ADMIN — HYDROXYZINE HYDROCHLORIDE 25 MG: 50 TABLET ORAL at 21:42

## 2025-06-04 RX ADMIN — ALBUTEROL SULFATE 2 PUFF: 90 AEROSOL, METERED RESPIRATORY (INHALATION) at 17:46

## 2025-06-04 RX ADMIN — SPIRONOLACTONE 100 MG: 100 TABLET ORAL at 04:54

## 2025-06-04 RX ADMIN — CARVEDILOL 3.12 MG: 3.12 TABLET, FILM COATED ORAL at 17:09

## 2025-06-04 RX ADMIN — Medication 100 MG: at 04:54

## 2025-06-04 RX ADMIN — Medication 400 MG: at 08:24

## 2025-06-04 RX ADMIN — OXYCODONE HYDROCHLORIDE 10 MG: 10 TABLET ORAL at 08:23

## 2025-06-04 RX ADMIN — CARVEDILOL 3.12 MG: 3.12 TABLET, FILM COATED ORAL at 08:24

## 2025-06-04 RX ADMIN — LACTULOSE 30 ML: 10 SOLUTION ORAL at 04:54

## 2025-06-04 RX ADMIN — ENOXAPARIN SODIUM 40 MG: 100 INJECTION SUBCUTANEOUS at 17:10

## 2025-06-04 RX ADMIN — FOLIC ACID 1 MG: 1 TABLET ORAL at 04:54

## 2025-06-04 RX ADMIN — FUROSEMIDE 60 MG: 10 INJECTION, SOLUTION INTRAVENOUS at 08:24

## 2025-06-04 RX ADMIN — FUROSEMIDE 60 MG: 10 INJECTION, SOLUTION INTRAVENOUS at 15:36

## 2025-06-04 RX ADMIN — FUROSEMIDE 60 MG: 10 INJECTION, SOLUTION INTRAVENOUS at 21:40

## 2025-06-04 RX ADMIN — GABAPENTIN 300 MG: 300 CAPSULE ORAL at 17:10

## 2025-06-04 RX ADMIN — HYDROXYZINE HYDROCHLORIDE 25 MG: 50 TABLET ORAL at 09:59

## 2025-06-04 ASSESSMENT — ENCOUNTER SYMPTOMS
FEVER: 0
ABDOMINAL PAIN: 0
VOMITING: 0
WEAKNESS: 1
SHORTNESS OF BREATH: 1
DIARRHEA: 1
NERVOUS/ANXIOUS: 1

## 2025-06-04 ASSESSMENT — PAIN DESCRIPTION - PAIN TYPE
TYPE: ACUTE PAIN

## 2025-06-04 ASSESSMENT — ANXIETY QUESTIONNAIRES
1. FEELING NERVOUS, ANXIOUS, OR ON EDGE: NEARLY EVERY DAY
5. BEING SO RESTLESS THAT IT IS HARD TO SIT STILL: NEARLY EVERY DAY
7. FEELING AFRAID AS IF SOMETHING AWFUL MIGHT HAPPEN: NEARLY EVERY DAY
6. BECOMING EASILY ANNOYED OR IRRITABLE: NEARLY EVERY DAY
GAD7 TOTAL SCORE: 21
IF YOU CHECKED OFF ANY PROBLEMS ON THIS QUESTIONNAIRE, HOW DIFFICULT HAVE THESE PROBLEMS MADE IT FOR YOU TO DO YOUR WORK, TAKE CARE OF THINGS AT HOME, OR GET ALONG WITH OTHER PEOPLE: EXTREMELY DIFFICULT
2. NOT BEING ABLE TO STOP OR CONTROL WORRYING: NEARLY EVERY DAY
4. TROUBLE RELAXING: NEARLY EVERY DAY
3. WORRYING TOO MUCH ABOUT DIFFERENT THINGS: NEARLY EVERY DAY

## 2025-06-04 ASSESSMENT — FIBROSIS 4 INDEX: FIB4 SCORE: 4.45

## 2025-06-04 ASSESSMENT — LIFESTYLE VARIABLES: SUBSTANCE_ABUSE: 1

## 2025-06-04 NOTE — CARE PLAN
The patient is Stable - Low risk of patient condition declining or worsening    Shift Goals  Clinical Goals: paracentecis  Patient Goals: lactulose  Family Goals: DEBBI    Progress made toward(s) clinical / shift goals:  paracentesis cancelled    Patient is not progressing towards the following goals:    Problem: Pain - Standard  Goal: Alleviation of pain or a reduction in pain to the patient’s comfort goal  Outcome: Progressing     Problem: Knowledge Deficit - Standard  Goal: Patient and family/care givers will demonstrate understanding of plan of care, disease process/condition, diagnostic tests and medications  Outcome: Progressing     Problem: Risk for Aspiration  Goal: Patient's risk for aspiration will be absent or decrease  Outcome: Progressing     Problem: Fall Risk  Goal: Patient will remain free from falls  Outcome: Progressing

## 2025-06-04 NOTE — PROGRESS NOTES
Hospital Medicine Daily Progress Note    Date of Service  6/4/2025    Chief Complaint  Alberto Maldonado is a 39 y.o. male admitted 5/28/2025 with swelling    Hospital Course  38 yo man with alcoholic cirrhosis, ongoing alcohol use, DM, HTN, BOLIVAR, morbid obesity who presented with worsening swelling.  He continues to drink 9-10 shots every day.  He was found with worsening liver function, elevated ammonia.  Patient was previously also hospitalized and CTAP at that time showed a pancreatic tail lesion and was recommended to have outpatient CT pancreatic protocol.  CT was done in the ER which showed a mass in the pancreatic tail, ovoid mass in the splenic hilum.  MRI showed increased signal in tail of pancreas, prior pancreatitis but mass was not excluded.  He will need a follow-up MRI in 6 months.  GI was consulted and he is not a candidate for further steroid treatment for alcohol hepatitis.  He was started on diuretics for his edema.  Imaging was negative for ascites.    Interval Problem Update  Hypokalemic, continue repletion.  Creatinine slightly up from yesterday.  UOP over 1075cc  The edema in his arms are improved, he feels like the skin on his abdomen feels less tight  He does feel anxious today, slightly tremulous.  He has been ambulating, takes breaks for JACK    6/3 Vitals stable on room air   -On review of MAR patient has not been getting all his Lasix doses due to blood pressure less than 100.  Change holding parameters, if patient's blood pressure continues to be low may need to add midodrine to augment diuresis  WBC 9.3, hemoglobin 11  Creatinine 1.43, GFR 65  , ALT 64, total bili 11.9  Patient reports that his normal weight is 240-250, thus he is up 40 pounds from his baseline.  He continues to have significant pitting lower extremity edema up into his abdomen.  He does have weeping from his abdominal edema.  Discussed importance of alcohol cessation.  Patient states he is motivated to quit  especially as his family has been very sad about his health condition.  He states that he uses alcohol to treat self treat his anxiety, is agreeable to psychiatry consult  Discussed with psychiatry recommended gabapentin 300 mg 3 times daily for anxiety, agreed with Atarax 25 mg p.o. 3 times daily.  They will refer patient for inpatient psychotherapy  Paracentesis ordered, pending    6/4 BP has been stable with IV lasix   Urine output not being documented, only 400 ml yesterday   WBC 9.4, Hb 11.3   Cr 1.34, GFR 70   , ALT 65, tbili 11.2  Vit D 18, replace  On paracentesis attempt patient had no ascites  Psychotherapy to see the patient today  Continues to have significant peripheral edema, patient reporting good urine output.  Weight today 290 pounds  Patient with less weeping from his abdomen and reports that the sides of his abdomen feel more soft compared to yesterday.  He continues to have significant pitting lower extremity edema.   Patient with multiple episodes of diarrhea this morning, placed holding parameters on lactulose to hold if more than 3 bowel movements in 24 hours    I have discussed this patient's plan of care and discharge plan at IDT rounds today with Case Management, Nursing, Nursing leadership, and other members of the IDT team.    Consultants/Specialty  GI  Psychiatry    Code Status  Full Code    Disposition  The patient is not medically cleared for discharge to home or a post-acute facility.  Anticipate discharge to: home with close outpatient follow-up    I have placed the appropriate orders for post-discharge needs.    Review of Systems  Review of Systems   Constitutional:  Positive for malaise/fatigue. Negative for fever.   Respiratory:  Positive for shortness of breath.    Cardiovascular:  Positive for leg swelling.   Gastrointestinal:  Positive for diarrhea. Negative for abdominal pain and vomiting.   Neurological:  Positive for weakness.   Psychiatric/Behavioral:  Positive for  substance abuse. The patient is nervous/anxious.         Physical Exam  Temp:  [36.2 °C (97.2 °F)-36.9 °C (98.4 °F)] 36.9 °C (98.4 °F)  Pulse:  [85-99] 99  Resp:  [18-20] 20  BP: (122-131)/(60-72) 126/60  SpO2:  [93 %-95 %] 94 %    Physical Exam  Vitals and nursing note reviewed.   Constitutional:       General: He is not in acute distress.     Appearance: He is obese.      Comments: Anasarca   HENT:      Head: Normocephalic.   Eyes:      General:         Right eye: No discharge.         Left eye: No discharge.      Conjunctiva/sclera: Conjunctivae normal.   Cardiovascular:      Rate and Rhythm: Normal rate and regular rhythm.   Pulmonary:      Effort: Pulmonary effort is normal. No respiratory distress.      Breath sounds: No wheezing or rales.      Comments: Diminished at bases  Abdominal:      General: There is distension (Pitting edema).      Tenderness: There is no abdominal tenderness.      Comments: Pitting edema up to umbilicus, + weeping   Musculoskeletal:         General: Swelling (Improved at levels of arms) present.      Cervical back: Neck supple.      Right lower leg: Edema present.      Left lower leg: Edema present.      Comments: +3 pitting edema bilateral lower extremity   Skin:     General: Skin is warm.      Comments: Excoriations to both arms  Weeping lower extremities   Neurological:      Mental Status: He is alert and oriented to person, place, and time.   Psychiatric:         Mood and Affect: Mood is anxious.         Fluids    Intake/Output Summary (Last 24 hours) at 6/4/2025 1725  Last data filed at 6/4/2025 1400  Gross per 24 hour   Intake 1080 ml   Output --   Net 1080 ml        Laboratory  Recent Labs     06/02/25  0115 06/03/25  0235 06/04/25  0112   WBC 9.6 9.3 9.4   RBC 3.58* 3.43* 3.57*   HEMOGLOBIN 11.5* 11.0* 11.3*   HEMATOCRIT 34.6* 34.0* 35.3*   MCV 96.6 99.1* 98.9*   MCH 32.1 32.1 31.7   MCHC 33.2 32.4 32.0*   RDW 70.9* 72.5* 71.1*   PLATELETCT 137* 154* 170   MPV 9.3 9.3 9.3      Recent Labs     06/02/25  0115 06/03/25  0235 06/04/25  0112   SODIUM 134* 136 131*   POTASSIUM 3.5* 4.0 4.0   CHLORIDE 102 104 98   CO2 21 19* 20   GLUCOSE 139* 122* 123*   BUN 16 19 20   CREATININE 1.47* 1.43* 1.34   CALCIUM 7.9* 7.7* 8.1*     Recent Labs     06/02/25  0115 06/03/25  0235   INR 1.27* 1.23*               Imaging  US-ABDOMEN LTD (SOFT TISSUE)   Final Result      No ascites.         MR-ABDOMEN-WITH & W/O   Final Result      1.  The tail of the pancreas appears somewhat irregular and stranding with increased signal but evaluation is very limited due to significant motion artifact. This could relate to sequela of prior pancreatitis. A mass cannot be excluded. Follow-up in 6    months with MR after improvement is recommended.   2.  There is an accessory splenule adjacent to the spleen   3. Diffuse subcutaneous edema .                        EC-ECHOCARDIOGRAM COMPLETE W/ CONT   Final Result      US-EXTREMITY VENOUS LOWER BILAT   Final Result      US-RUQ   Final Result         1.  Thickened gallbladder wall without visualized shadowing stones, consider acalculous cholecystitis, could be further evaluated with HIDA scan as clinically appropriate   2.  Hepatomegaly   3.  Echogenic liver, compatible with fatty change versus fibrosis      CT-PANCREAS AND ABDOMEN WITH & W/O   Final Result         1.  Masslike structure adjacent to the tail of pancreas with possible slight enhancement on arterial phase imaging, recommend follow-up MRI of the pancreas with contrast for more definitive characterization.   2.  Ovoid mass in the splenic hilum with similar density to spleen on all contrast phases, appearance favoring splenule.   3.  Changes of cirrhosis.   4.  Hepatomegaly           Assessment/Plan  * Alcoholic liver hepatitis and cirrhosis- (present on admission)  Assessment & Plan  DF score 35.7, MELD 26 -> now 25.  T. bili and INR has decreased  Patient just completed course of steroid, GI consulted and does  not recommend restarting  Alcohol cessation counseling, patient is motivated to stop drinking.  Case management to provide resources  No signs of hepatic encephalopathy, continue maintenance lactulose  Continue diuresis, continue on Lasix 60 mg IV twice daily and spironolactone 100mg. Monitor I's and O's, low-sodium diet and fluid restriction  He has a referral to [facility], he will make a new appointment    May need midodrine for diuresis, he continues to be +40 lbs from baseline     Vitamin D deficiency  Assessment & Plan  Replacement ordered    Hyperammonemia (HCC)  Assessment & Plan  He does not clinically have hepatic encephalopathy  Continue maintenance lactulose    Hyponatremia  Assessment & Plan  Secondary to alcohol abuse, liver disease  Monitor    Anasarca  Assessment & Plan  Continue diuresis, with IV Lasix for spironolactone  Creatinine has increased but may have plateaued.  Continue current doses and recheck creatinine tomorrow  Repleting for low potassium  Monitor I's and O's, low-sodium diet and fluid restriction  Ultrasound negative for ascites  Doppler negative for DVT.  TTE with normal EF    Anxiety- (present on admission)  Assessment & Plan  Reports uses alcohol to self treat his anxiety  Psychiatry consulted recommended starting gabapentin 300 mg 3 times daily  Atarax as needed  Will start inpatient psychotherapy    Intraabdominal mass- (present on admission)  Assessment & Plan  CT pancreatic protocol demonstrated masslike structure adjacent to the tail of the pancreas with possible slight enhancement of arterial phase.    CA 19-9 was elevated.  AFP normal  MRI showed increased signal in tail of pancreas, prior pancreatitis but mass was not excluded.  He will need a follow-up MRI in 6 months.     BOLIVAR on CPAP- (present on admission)  Assessment & Plan  Continue CPAP at night    Alcohol use disorder, severe, dependence (HCC)- (present on admission)  Assessment & Plan  Last drink 5/28  CIWA score's were  low and was stopped.  He has mild tremors and some anxiety.  I will order Valium as needed but if his symptoms worsen may need to restart CIWA    Morbid obesity (HCC)- (present on admission)  Assessment & Plan  BMI 44    Essential hypertension- (present on admission)  Assessment & Plan  Continue on Coreg, BP stable, monitor while on diuretics      Total time spent in chart review, at bedside with the patient, discussing with consultants, nursing and case management: 55 minutes    VTE prophylaxis:    enoxaparin ppx    I have performed a physical exam and reviewed and updated ROS and Plan today (6/4/2025). In review of yesterday's note (6/3/2025), there are no changes except as documented above.

## 2025-06-04 NOTE — CARE PLAN
The patient is Stable - Low risk of patient condition declining or worsening    Shift Goals  Clinical Goals: Patient pain will be <4 throughout shift  Patient Goals: Rest  Family Goals: DEBBI    Patient a/o x4. Patient abdomen, legs, and feet swollen. Patient states pain 7/10. Pain medication given. Patient ambulates to bathroom. Patient is sitting up in the chair for all meals and said that he's most comfortable in the chair. Call light and belongings are within reach. Bed is in low locked position.     Progress made toward(s) clinical / shift goals:    Problem: Pain - Standard  Goal: Alleviation of pain or a reduction in pain to the patient’s comfort goal  Outcome: Progressing     Problem: Knowledge Deficit - Standard  Goal: Patient and family/care givers will demonstrate understanding of plan of care, disease process/condition, diagnostic tests and medications  Outcome: Progressing     Problem: Optimal Care for Alcohol Withdrawal  Goal: Optimal Care for the alcohol withdrawal patient  Outcome: Progressing     Problem: Seizure Precautions  Goal: Implementation of seizure precautions  Outcome: Progressing     Problem: Lifestyle Changes  Goal: Patient's ability to identify lifestyle changes and available resources to help reduce recurrence of condition will improve  Outcome: Progressing     Problem: Psychosocial  Goal: Patient's level of anxiety will decrease  Outcome: Progressing  Goal: Spiritual and cultural needs incorporated into hospitalization  Outcome: Progressing     Problem: Risk for Aspiration  Goal: Patient's risk for aspiration will be absent or decrease  Outcome: Progressing     Problem: Skin Integrity  Goal: Skin integrity is maintained or improved  Outcome: Progressing     Problem: Fall Risk  Goal: Patient will remain free from falls  Outcome: Progressing       Patient is not progressing towards the following goals:

## 2025-06-05 LAB
ALBUMIN SERPL BCP-MCNC: 2.9 G/DL (ref 3.2–4.9)
ALBUMIN/GLOB SERPL: 0.8 G/DL
ALP SERPL-CCNC: 268 U/L (ref 30–99)
ALT SERPL-CCNC: 65 U/L (ref 2–50)
ANION GAP SERPL CALC-SCNC: 11 MMOL/L (ref 7–16)
AST SERPL-CCNC: 147 U/L (ref 12–45)
BASE EXCESS BLDV CALC-SCNC: -2 MMOL/L (ref -2–3)
BILIRUB SERPL-MCNC: 11.2 MG/DL (ref 0.1–1.5)
BODY TEMPERATURE: 36.6 CENTIGRADE
BUN SERPL-MCNC: 26 MG/DL (ref 8–22)
CALCIUM ALBUM COR SERPL-MCNC: 8.8 MG/DL (ref 8.5–10.5)
CALCIUM SERPL-MCNC: 7.9 MG/DL (ref 8.5–10.5)
CHLORIDE SERPL-SCNC: 100 MMOL/L (ref 96–112)
CO2 SERPL-SCNC: 19 MMOL/L (ref 20–33)
CREAT SERPL-MCNC: 1.91 MG/DL (ref 0.5–1.4)
CREAT UR-MCNC: 307 MG/DL
GFR SERPLBLD CREATININE-BSD FMLA CKD-EPI: 46 ML/MIN/1.73 M 2
GLOBULIN SER CALC-MCNC: 3.8 G/DL (ref 1.9–3.5)
GLUCOSE SERPL-MCNC: 223 MG/DL (ref 65–99)
HCO3 BLDV-SCNC: 22 MMOL/L (ref 22–29)
PCO2 BLDV: 37.7 MMHG (ref 38–54)
PCO2 TEMP ADJ BLDV: 37 MMHG (ref 38–54)
PH BLDV: 7.37 [PH] (ref 7.31–7.45)
PH TEMP ADJ BLDV: 7.38 [PH] (ref 7.31–7.45)
PO2 BLDV: 47.5 MMHG (ref 23–48)
PO2 TEMP ADJ BLDV: 46.2 MMHG (ref 23–48)
POTASSIUM SERPL-SCNC: 4.6 MMOL/L (ref 3.6–5.5)
PROT SERPL-MCNC: 6.7 G/DL (ref 6–8.2)
PROT UR-MCNC: 40.5 MG/DL (ref 0–15)
PROT/CREAT UR: 132 MG/G (ref 15–68)
SAO2 % BLDV: 62 % (ref 60–85)
SODIUM SERPL-SCNC: 130 MMOL/L (ref 135–145)
SODIUM UR-SCNC: <20 MMOL/L

## 2025-06-05 PROCEDURE — 700102 HCHG RX REV CODE 250 W/ 637 OVERRIDE(OP): Performed by: INTERNAL MEDICINE

## 2025-06-05 PROCEDURE — 82530 CORTISOL FREE: CPT

## 2025-06-05 PROCEDURE — A9270 NON-COVERED ITEM OR SERVICE: HCPCS | Performed by: INTERNAL MEDICINE

## 2025-06-05 PROCEDURE — 700111 HCHG RX REV CODE 636 W/ 250 OVERRIDE (IP): Mod: JZ | Performed by: INTERNAL MEDICINE

## 2025-06-05 PROCEDURE — 82570 ASSAY OF URINE CREATININE: CPT

## 2025-06-05 PROCEDURE — 84300 ASSAY OF URINE SODIUM: CPT

## 2025-06-05 PROCEDURE — 36415 COLL VENOUS BLD VENIPUNCTURE: CPT

## 2025-06-05 PROCEDURE — 80053 COMPREHEN METABOLIC PANEL: CPT

## 2025-06-05 PROCEDURE — 99233 SBSQ HOSP IP/OBS HIGH 50: CPT | Performed by: INTERNAL MEDICINE

## 2025-06-05 PROCEDURE — 770006 HCHG ROOM/CARE - MED/SURG/GYN SEMI*

## 2025-06-05 PROCEDURE — 90837 PSYTX W PT 60 MINUTES: CPT | Performed by: SOCIAL WORKER

## 2025-06-05 PROCEDURE — P9047 ALBUMIN (HUMAN), 25%, 50ML: HCPCS | Mod: JZ | Performed by: INTERNAL MEDICINE

## 2025-06-05 PROCEDURE — 84156 ASSAY OF PROTEIN URINE: CPT

## 2025-06-05 PROCEDURE — 82803 BLOOD GASES ANY COMBINATION: CPT

## 2025-06-05 PROCEDURE — 99222 1ST HOSP IP/OBS MODERATE 55: CPT | Performed by: INTERNAL MEDICINE

## 2025-06-05 RX ORDER — FUROSEMIDE 10 MG/ML
80 INJECTION INTRAMUSCULAR; INTRAVENOUS 3 TIMES DAILY
Status: DISCONTINUED | OUTPATIENT
Start: 2025-06-05 | End: 2025-06-08

## 2025-06-05 RX ORDER — ALBUMIN (HUMAN) 12.5 G/50ML
12.5 SOLUTION INTRAVENOUS 3 TIMES DAILY
Status: DISCONTINUED | OUTPATIENT
Start: 2025-06-05 | End: 2025-06-06

## 2025-06-05 RX ADMIN — GABAPENTIN 300 MG: 300 CAPSULE ORAL at 12:12

## 2025-06-05 RX ADMIN — CARVEDILOL 3.12 MG: 3.12 TABLET, FILM COATED ORAL at 08:26

## 2025-06-05 RX ADMIN — ENOXAPARIN SODIUM 40 MG: 100 INJECTION SUBCUTANEOUS at 17:15

## 2025-06-05 RX ADMIN — Medication 1000 UNITS: at 06:06

## 2025-06-05 RX ADMIN — HYDROCORTISONE: 1 CREAM TOPICAL at 06:08

## 2025-06-05 RX ADMIN — Medication 400 MG: at 06:04

## 2025-06-05 RX ADMIN — ENOXAPARIN SODIUM 40 MG: 100 INJECTION SUBCUTANEOUS at 06:06

## 2025-06-05 RX ADMIN — GABAPENTIN 300 MG: 300 CAPSULE ORAL at 17:16

## 2025-06-05 RX ADMIN — ALBUMIN (HUMAN) 12.5 G: 0.25 INJECTION, SOLUTION INTRAVENOUS at 15:04

## 2025-06-05 RX ADMIN — FUROSEMIDE 80 MG: 10 INJECTION INTRAMUSCULAR; INTRAVENOUS at 22:41

## 2025-06-05 RX ADMIN — HYDROCORTISONE: 1 CREAM TOPICAL at 17:18

## 2025-06-05 RX ADMIN — ALBUMIN (HUMAN) 12.5 G: 0.25 INJECTION, SOLUTION INTRAVENOUS at 22:10

## 2025-06-05 RX ADMIN — FUROSEMIDE 60 MG: 10 INJECTION, SOLUTION INTRAVENOUS at 08:26

## 2025-06-05 RX ADMIN — FOLIC ACID 1 MG: 1 TABLET ORAL at 06:05

## 2025-06-05 RX ADMIN — OXYCODONE 5 MG: 5 TABLET ORAL at 06:02

## 2025-06-05 RX ADMIN — LACTULOSE 30 ML: 10 SOLUTION ORAL at 12:13

## 2025-06-05 RX ADMIN — ALBUTEROL SULFATE 2 PUFF: 90 AEROSOL, METERED RESPIRATORY (INHALATION) at 08:54

## 2025-06-05 RX ADMIN — Medication 100 MG: at 06:05

## 2025-06-05 RX ADMIN — SPIRONOLACTONE 100 MG: 100 TABLET ORAL at 06:04

## 2025-06-05 RX ADMIN — TAMSULOSIN HYDROCHLORIDE 0.4 MG: 0.4 CAPSULE ORAL at 08:26

## 2025-06-05 RX ADMIN — HYDROXYZINE HYDROCHLORIDE 25 MG: 50 TABLET ORAL at 06:17

## 2025-06-05 RX ADMIN — FUROSEMIDE 80 MG: 10 INJECTION INTRAMUSCULAR; INTRAVENOUS at 14:59

## 2025-06-05 RX ADMIN — CARVEDILOL 3.12 MG: 3.12 TABLET, FILM COATED ORAL at 17:16

## 2025-06-05 RX ADMIN — GABAPENTIN 300 MG: 300 CAPSULE ORAL at 06:05

## 2025-06-05 RX ADMIN — HYDROXYZINE HYDROCHLORIDE 25 MG: 50 TABLET ORAL at 20:19

## 2025-06-05 ASSESSMENT — ENCOUNTER SYMPTOMS
FEVER: 0
NERVOUS/ANXIOUS: 1
DIARRHEA: 1
MYALGIAS: 1
ABDOMINAL PAIN: 0
WEAKNESS: 1
SHORTNESS OF BREATH: 1
VOMITING: 0

## 2025-06-05 ASSESSMENT — PAIN DESCRIPTION - PAIN TYPE
TYPE: ACUTE PAIN
TYPE: ACUTE PAIN

## 2025-06-05 ASSESSMENT — PATIENT HEALTH QUESTIONNAIRE - PHQ9
5. POOR APPETITE OR OVEREATING: 1 - SEVERAL DAYS
SUM OF ALL RESPONSES TO PHQ QUESTIONS 1-9: 8
CLINICAL INTERPRETATION OF PHQ2 SCORE: 2

## 2025-06-05 ASSESSMENT — LIFESTYLE VARIABLES: SUBSTANCE_ABUSE: 1

## 2025-06-05 ASSESSMENT — FIBROSIS 4 INDEX: FIB4 SCORE: 3.97

## 2025-06-05 NOTE — CARE PLAN
The patient is Watcher - Medium risk of patient condition declining or worsening    Shift Goals  Clinical Goals: Monitor intake and output  Patient Goals: Decrease fluid  Family Goals: DEBBI    Patient a/o x4. Patient very sleepy today. Patient was on oxygen overnight but currently 93% on room air sitting in chair. Notified Dr. Fernanda Daniels. Patient vitals are stable. Patient ambulates to bathroom. Call light and belongings are within reach.     Progress made toward(s) clinical / shift goals:    Problem: Pain - Standard  Goal: Alleviation of pain or a reduction in pain to the patient’s comfort goal  Outcome: Progressing     Problem: Knowledge Deficit - Standard  Goal: Patient and family/care givers will demonstrate understanding of plan of care, disease process/condition, diagnostic tests and medications  Outcome: Progressing     Problem: Optimal Care for Alcohol Withdrawal  Goal: Optimal Care for the alcohol withdrawal patient  Outcome: Progressing     Problem: Seizure Precautions  Goal: Implementation of seizure precautions  Outcome: Progressing     Problem: Lifestyle Changes  Goal: Patient's ability to identify lifestyle changes and available resources to help reduce recurrence of condition will improve  Outcome: Progressing     Problem: Psychosocial  Goal: Patient's level of anxiety will decrease  Outcome: Progressing  Goal: Spiritual and cultural needs incorporated into hospitalization  Outcome: Progressing     Problem: Risk for Aspiration  Goal: Patient's risk for aspiration will be absent or decrease  Outcome: Progressing     Problem: Skin Integrity  Goal: Skin integrity is maintained or improved  Outcome: Progressing     Problem: Fall Risk  Goal: Patient will remain free from falls  Outcome: Progressing       Patient is not progressing towards the following goals:

## 2025-06-05 NOTE — CONSULTS
[facility] Psychotherapy Session Summary (New)    Name: Alberto Maldonado  MRN: [id number]  : 1985  Age: 39 y.o.  Date of assessment: 25  PCP: Cyrus Tolliver P.A.-C.  Persons in attendance: Patient    HPI  40 yo man with alcoholic cirrhosis, ongoing alcohol use, DM, HTN, BOLIVAR, morbid obesity who presented with worsening swelling. He continues to drink 9-10 shots every day. He was found with worsening liver function, elevated ammonia. Patient was previously also hospitalized and CTAP at that time showed a pancreatic tail lesion and was recommended to have outpatient CT pancreatic protocol. CT was done in the ER which showed a mass in the pancreatic tail, ovoid mass in the splenic hilum. MRI showed increased signal in tail of pancreas, prior pancreatitis but mass was not excluded. He will need a follow-up MRI in 6 months. GI was consulted and he is not a candidate for further steroid treatment for alcohol hepatitis. He was started on diuretics for his edema. Imaging was negative for ascites. Behavioral Health consulted for ongoing substance use management.     Psychotherapy Session Summary  Patient was awake and alert sitting in hospital bed when writer entered room. Patient was fully oriented. He denied any SI/HI/AH/VH. He was in agreement to a psychotherapy session.     Patient endorses drinking heavily over the past 5 years, aside from a brief sobriety stint for 10 months in . 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 He managed to discontinue his drinking in  by distracting himself, going to the gym, engaging in hobbies, spending time with family and eating healthy. He described a specific event where he was with his male friends and he was pressured into drinking at a party. Since then, patient has been consistently and steadily drinking. He endorses about  10-12 shots of hard alcohol a day.     In terms of patient's reported anxiety, he scored high on the FLORENTIN-7. Patient states that he would often utilize alcohol to numb his anxiety. He states that he would constantly worry about many different things and have difficulty controlling the worry. He would have fears about random things like earthquakes and car accidents that seemingly came out of nowhere. He rates that his sleep is poor and he would often wake up in the night with panic and anxiety. He is prescribed hydroxyzine for anxiety which he state helps.     Patient was able to articulate specific negative consequences that he has experienced over the years due to drinking. He has been fired from 2 jobs in the recent past due to drinking on the job. Patient gave specific details, saying that he would go sit in his car and drink hard alcohol on his lunch break rather than eating food. He discussed the impact that excessive drinking has taken on him financially. His most perious concern has been his declining health. Patient reports that today, a provider discussed with him the possible need for a liver transplant in the future and that startled patient significantly.     Patient expressed a desire to maintain sobriety and work on his health. He has not had a rink in 7 days and is feeling better physically and mentally. Writer suggested AA, patient is not terribly open to the idea. Writer challenged patient by pointing out the benefits of being around individuals who have established sobriety and learning coping techniques from them. Patient was unable to come up with any coping techniques on his own, aside from making sure he is social with his family who does not drink. Patient expressed a desire to continue utilizing psychotherapy while he is in the hospital.     Chief Complaint   Patient presents with    Abdominal Swelling     Pt reports facial, abdominal and leg swelling. Pt recently diagnosed with alcoholic  cirrhosis, daily ETOH use.     Facial Swelling    Foot Swelling        Psychiatric Review of Systems    FLORENTIN-7 Questionnaire  Feeling nervous, anxious, or on edge:  Nearly every day   Not being able to sop or control worrying:  Nearly every day   Worrying too much about different things:  Nearly every day   Trouble relaxing:  Nearly every day   Being so restless that it's hard to sit still:  Nearly every day   Becoming easily annoyed or irritable:  Nearly every day   Feeling afraid as if something awful might happen:  Nearly every day   Total:  21   How difficult  have these problems made it for you to do your work, take care of things at home, or get along with other people? - Extremely difficult    Interpretation of FLORENTIN 7 Total Score   Score Severity: 0-4 No Anxiety, 5-9 Mild Anxiety, 10-14 Moderate Anxiety, 15-21 Severe Anxiety    PHQ-9 Depression Screening    Little interest or pleasure in doing things?      Feeling down, depressed, or hopeless?      Trouble falling or staying asleep, or sleeping too much?       Feeling tired or having little energy?      Poor appetite or overeating?       Feeling bad about yourself - or that you are a failure or have let yourself or your family down?      Trouble concentrating on things, such as reading the newspaper or watching television?      Moving or speaking so slowly that other people could have noticed.  Or the opposite - being so fidgety or restless that you have been moving around a lot more than usual?       Thoughts that you would be better off dead, or of hurting yourself?       Patient Health Questionnaire Score:        Interpretation of PHQ-9 Total Score   Score Severity: 1-4 No Depression, 5-9 Mild Depression, 10-14 Moderate Depression, 15-19 Moderately Severe Depression, 20-27 Severe Depression    MoCA Performed?:  N/A      Behavioral Health Treatment History  Does patient/parent report a history of prior behavioral health treatment for patient? No:    SAFETY  "ASSESSMENT - SELF  Does patient acknowledge current or past symptoms of dangerousness to self? no   Does parent/significant other report patient has current or past symptoms of dangerousness to self? N\A     Does presenting problem suggest symptoms of dangerousness to self? No      SAFETY ASSESSMENT - OTHERS  Does patient acknowledge current or past symptoms of aggressive behavior or risk to others? no   Does parent/significant other report patient has current or past symptoms of aggressive behavior or risk to others? N\A     Does presenting problem suggest symptoms of dangerousness to others?  No    Crisis Safety Plan completed and copy given to patient? no    SUBSTANCE USE SCREENING  Yes:  Tevin all substances used in the past 30 days:      Last Use Amount   [x]   Alcohol 7 days ago    []   Marijuana     []   Heroin     []   Prescription Opioids  (used without prescription, for    recreation, or in excess of prescribed amount)     []   Other Prescription  (used without prescription, for    recreation, or in excess of prescribed amount)     []   Cocaine      []   Methamphetamine     []   \"\" drugs (ectasy, MDMA)     []   Other substances        UDS results: Not collected  Breathalyzer results: None    What consequences does the patient associate with any of the above substance use and or addictive behaviors? Work problems or losses: , Health problems: , Monetary problems:     Risk factors for detox (check all that apply):  []  Seizures   []  Diaphoretic (sweating)   []  Tremors   []  Hallucinations   []  Increased blood pressure   []  Decreased blood pressure   []  Other   []  None      [] Patient education on risk factors for detoxification and instructed to return to ER as needed.    MENTAL STATUS  Participation Active verbal participation, Attentive, Engaged, and Open to feedback   Grooming Casual   Orientation Alert and Fully Oriented   Behavior Calm   Eye contact Good   Mood Euthymic   Affect Full range "   Thought Process Logical   Thought Content Within normal limits   Speech Rate within normal limits and Volume within normal limits   Perception Within normal limits   Memory No gross evidence of memory deficits   Insight Adequate   Judgement Adequate   Other        Collateral Information:   Source: [facility]    Unable to complete full assessment due to:  NA - Assessment completed    CLINICAL IMPRESSIONS:  Primary:  Alcohol Use Disorder, Severe  Secondary:  Generalized Anxiety Disorder                                       Recommendations and Observation Level:  Sitter: N/A  Phone: yes  Visitors: yes  Personal belongings: yes    Legal Hold: N/A    JEWELS [email or web address]  6/4/2025    Length of Intervention:  60 minutes

## 2025-06-05 NOTE — DIETARY
"Nutrition Services: Initial Assessment     Day 7 5/28/2025of admit. Alberto Maldonado is 39 y.o., male with admitting DX of Acute alcoholic hepatitis [K70.10].    Consult Received for: MST - Malnutrition Screening Tool and BMI    Current Hospital Problems List:    Principal Problem:    Alcoholic liver hepatitis and cirrhosis (POA: Yes)  Active Problems:    Essential hypertension (Chronic) (POA: Yes)    Morbid obesity (HCC) (Chronic) (POA: Yes)    Alcohol use disorder, severe, dependence (HCC) (POA: Yes)    BOLIVAR on CPAP (Chronic) (POA: Yes)    Intraabdominal mass (POA: Yes)    Anxiety (POA: Yes)    Anasarca (POA: Unknown)    Hyponatremia (POA: Unknown)    Hyperammonemia (HCC) (POA: Unknown)    Vitamin D deficiency (POA: Unknown)  Resolved Problems:    * No resolved hospital problems. *    Nutrition Assessment:      Height: 160 cm (5' 3\")  Weight: (!) 132 kg (290 lb 9.1 oz)  Weight taken via: Stand Up Scale  BMI Calculated: 51.39  BMI Classification: Morbid Obesity       Weight Readings from Last 5 encounters:   Wt Readings from Last 5 Encounters:   06/04/25 (!) 132 kg (290 lb 9.1 oz)   04/03/25 111 kg (243 lb 13.3 oz)   03/06/25 117 kg (257 lb 8 oz)   12/23/24 117 kg (258 lb 2.5 oz)   11/20/24 120 kg (264 lb)       Objective:   Pertinent Medical Hx: HTN, alcohol use disorder (supplementation in place), vitamin D def- supplementation in place, hx of limited acceptance of meals, edema (anasarca)-6/4 was noted as bilaterally lower extremity +3 edema.   Pertinent Labs: Na 130 (L),  (H), BUN (H), Creatinine 1.91 (H), GFR 46 (L), Ca 7.9 (L)  Pertinent Meds: furosemide, folic acid, lactulose, Mg Ox, zofran, oxycodone, thiamine, vitamin D 3  Skin/Wounds:  None  Food Allergies: NKFA  Last BM:  Type 6: Fluffy pieces with ragged edges, a mushy stool  06/03/25       Current Diet Order/Intake:   2gram sodium diet. 2000mL fluid restriction.     Pt is documented to be eating well, >75% of meals reported consumed. "     Subjective:   Patient reported UBW: 240 lbs. (Without fluid per pt)  Dietary Recall/Energy Intake: Pt states he enjoys fruits, water, black coffee, and SF cranberry juice. Discussed various low sodium options to consider. Pt reports he has been trying to not eat fast food as much. Often enjoys yogurt with granola and fruit. This indicates Sufficient energy intake.     Nutrition Diagnosis:      Altered nutrition related needs related to edema and HTN per dx list as evidenced by edema (anasarca)-6/4 was noted as bilaterally lower extremity +3 edema and need for a 2g sodium and 2000mL fluid restriction to assist with managing.      Nutrition Interventions:      Continue with reduced sodium diet for heart health edema status.   Patient aware of active plan of care as appropriate.     BMI - Pt with BMI >40 (=Body mass index is 51.47 kg/m².), class III obesity. Weight loss counseling not appropriate in acute care setting.     RECOMMEND - If appropriate at DC please refer to outpatient nutrition services for weight management.       Nutrition Monitoring and Evaluation:      Monitor nutrition POC, goal for 75% intake from meals and supplements.  Additional fluids per MD/DO  Monitor vital signs pertinent to nutrition.      RD following and will provide updated recommendations as indicated.      Venessa Crocker R.D.

## 2025-06-05 NOTE — PROGRESS NOTES
4 Eyes Skin Assessment Completed by ALF Powell and ALF Wheeler.    Skin assessment is primarily focused on high risk bony prominences. Pay special attention to skin beneath and around medical devices, high risk bony prominences, skin to skin areas and areas where the patient lacks sensation to feel pain and areas where the patient previously had breakdown.     Head (Occipital):  WDL   Ears (Under Medical Devices): WDL   Nose (Under Medical Devices): WDL   Mouth:  WDL   Neck: Rash   Breast/Chest:  Rash   Shoulder Blades:  Rash   Spine:   Rash, scratches lower back   (R) Arm/Elbow/Hand: Rash and Red, dryness, and discoloration    (L) Arm/Elbow/Hand: Rash and Red, dryness, and discoloration    Abdomen: Red and Weeping, edema, +3 pitting    Pannus/Groin:  discoloration   Sacrum/Coccyx:   Discoloration, blanching   (R) Ischial Tuberosity (Sit Bones):  WDL   (L) Ischial Tuberosity (Sit Bones):  WDL   (R) Leg:  Rash and Weeping, edema, +3 pitting   (L) Leg:  Rash, Edema, and Weeping, +3 pitting   (R) Heel:  Red and Blanching   (R) Foot/Toe: Weeping, edema, +3 pitting   (L) Heel: Red and Blanching   (L) Foot/Toe:  Edema and Weeping. +3 pitting       DEVICES IN USE:   Respiratory Devices:  Nasal cannula, at night only  Feeding Devices:  N/A   Lines & BP Monitoring Devices:  Peripheral IV, BP cuff, and Pulse ox    Orthopedic Devices:  N/A  Miscellaneous Devices:  N/A    PROTOCOL INTERVENTIONS:   Nasal gray foam, pt is in chair refuses to be in bed    WOUND PHOTOS:   Completed and in EPIC     WOUND CONSULT:   N/A, none needed at this moment

## 2025-06-05 NOTE — CARE PLAN
The patient is Stable - Low risk of patient condition declining or worsening    Shift Goals  Clinical Goals: maintain fluid restriction less than 2,000 mL per day and pain 6 < by the end of my shift  Patient Goals: rest  Family Goals: aide    Progress made toward(s) clinical / shift goals:  A&Ox4 currently on RA, however pt requires 2L NC during sleep. Patient did not consume more than 2,000mL of fluid and pt last pain score was 5/10.    Patient is not progressing towards the following goals:

## 2025-06-05 NOTE — PROGRESS NOTES
Hospital Medicine Daily Progress Note    Date of Service  6/5/2025    Chief Complaint  Alberto Maldonado is a 39 y.o. male admitted 5/28/2025 with swelling    Hospital Course  40 yo man with alcoholic cirrhosis, ongoing alcohol use, DM, HTN, BOLIVAR, morbid obesity who presented with worsening swelling.  He continues to drink 9-10 shots every day.  He was found with worsening liver function, elevated ammonia.  Patient was previously also hospitalized and CTAP at that time showed a pancreatic tail lesion and was recommended to have outpatient CT pancreatic protocol.  CT was done in the ER which showed a mass in the pancreatic tail, ovoid mass in the splenic hilum.  MRI showed increased signal in tail of pancreas, prior pancreatitis but mass was not excluded.  He will need a follow-up MRI in 6 months.  GI was consulted and he is not a candidate for further steroid treatment for alcohol hepatitis.  He was started on diuretics for his edema.  Imaging was negative for ascites.    Interval Problem Update  6/3 Vitals stable on room air   -On review of MAR patient has not been getting all his Lasix doses due to blood pressure less than 100.  Change holding parameters, if patient's blood pressure continues to be low may need to add midodrine to augment diuresis  WBC 9.3, hemoglobin 11  Creatinine 1.43, GFR 65  , ALT 64, total bili 11.9  Patient reports that his normal weight is 240-250, thus he is up 40 pounds from his baseline.  He continues to have significant pitting lower extremity edema up into his abdomen.  He does have weeping from his abdominal edema.  Discussed importance of alcohol cessation.  Patient states he is motivated to quit especially as his family has been very sad about his health condition.  He states that he uses alcohol to treat self treat his anxiety, is agreeable to psychiatry consult  Discussed with psychiatry recommended gabapentin 300 mg 3 times daily for anxiety, agreed with Atarax 25 mg  p.o. 3 times daily.  They will refer patient for inpatient psychotherapy  Paracentesis ordered, pending    6/4 BP has been stable with IV lasix   Urine output not being documented, only 400 ml yesterday   WBC 9.4, Hb 11.3   Cr 1.34, GFR 70   , ALT 65, tbili 11.2  Vit D 18, replace  On paracentesis attempt patient had no ascites  Psychotherapy to see the patient today  Continues to have significant peripheral edema, patient reporting good urine output.  Weight today 290 pounds  Patient with less weeping from his abdomen and reports that the sides of his abdomen feel more soft compared to yesterday.  He continues to have significant pitting lower extremity edema.   Patient with multiple episodes of diarrhea this morning, placed holding parameters on lactulose to hold if more than 3 bowel movements in 24 hours    6/5 vitals stable on 2 L NC   WBC 9.4, hemoglobin stable   sodium 138, creatinine 1.91, GFR 46  - Discussed with nephrology worsening creatinine and increasing daily weight, concern for hepatorenal.  Start albumin prior to Lasix to help with diuresis, increase Lasix to 80 mg 3 times daily  -Urine sodium pending  , ALT 65, t bili 11.2   Concerning for cushingoid syndrome will get 24-hour urine cortisol and midnight salivary cortisol level  Patient reporting dizziness and worsening lethargy today.  Falling asleep mid conversation.  VBG shows [id number], pH 7.37.  Patient reports he does not use CPAP at home.  - Ordered CPAP to be used nightly  Patient also reporting worsening shortness of breath.  Chest x-ray reviewed showed no acute findings.    I have discussed this patient's plan of care and discharge plan at IDT rounds today with Case Management, Nursing, Nursing leadership, and other members of the IDT team.    Consultants/Specialty  GI  Psychiatry  Nephrology    Code Status  Full Code    Disposition  The patient is not medically cleared for discharge to home or a post-acute facility.      I have  placed the appropriate orders for post-discharge needs.    Review of Systems  Review of Systems   Constitutional:  Positive for malaise/fatigue. Negative for fever.   Respiratory:  Positive for shortness of breath.    Cardiovascular:  Positive for leg swelling.   Gastrointestinal:  Positive for diarrhea. Negative for abdominal pain and vomiting.   Musculoskeletal:  Positive for myalgias.   Neurological:  Positive for weakness.   Psychiatric/Behavioral:  Positive for substance abuse. The patient is nervous/anxious.         Physical Exam  Temp:  [36.3 °C (97.3 °F)-36.6 °C (97.8 °F)] 36.6 °C (97.8 °F)  Pulse:  [78-97] 82  Resp:  [18-20] 18  BP: (118-146)/(71-78) 122/78  SpO2:  [84 %-95 %] 93 %    Physical Exam  Vitals and nursing note reviewed.   Constitutional:       General: He is not in acute distress.     Appearance: He is obese. He is ill-appearing.      Comments: Falls asleep multiple times during encounter, awakes easily   HENT:      Head: Normocephalic.   Eyes:      General: Scleral icterus present.         Right eye: No discharge.         Left eye: No discharge.   Cardiovascular:      Rate and Rhythm: Normal rate and regular rhythm.   Pulmonary:      Effort: Pulmonary effort is normal. No respiratory distress.      Breath sounds: No wheezing or rales.      Comments: Diminished at bases  Abdominal:      General: There is distension (Pitting edema).      Tenderness: There is no abdominal tenderness.      Comments: Pitting edema up to umbilicus, + weeping   Musculoskeletal:         General: Swelling (Improved at levels of arms) present.      Cervical back: Neck supple.      Right lower leg: Edema present.      Left lower leg: Edema present.      Comments: +3 pitting edema bilateral lower extremity   Skin:     General: Skin is warm.      Comments: Excoriations to both arms   Neurological:      Mental Status: He is oriented to person, place, and time. He is lethargic.         Fluids    Intake/Output Summary (Last  24 hours) at 6/5/2025 1719  Last data filed at 6/5/2025 1400  Gross per 24 hour   Intake 4608 ml   Output 520 ml   Net 4088 ml        Laboratory  Recent Labs     06/03/25  0235 06/04/25  0112   WBC 9.3 9.4   RBC 3.43* 3.57*   HEMOGLOBIN 11.0* 11.3*   HEMATOCRIT 34.0* 35.3*   MCV 99.1* 98.9*   MCH 32.1 31.7   MCHC 32.4 32.0*   RDW 72.5* 71.1*   PLATELETCT 154* 170   MPV 9.3 9.3     Recent Labs     06/03/25  0235 06/04/25  0112 06/05/25  0419   SODIUM 136 131* 130*   POTASSIUM 4.0 4.0 4.6   CHLORIDE 104 98 100   CO2 19* 20 19*   GLUCOSE 122* 123* 223*   BUN 19 20 26*   CREATININE 1.43* 1.34 1.91*   CALCIUM 7.7* 8.1* 7.9*     Recent Labs     06/03/25 0235   INR 1.23*               Imaging  DX-CHEST-PORTABLE (1 VIEW)   Final Result      Cardiomegaly. Hypoinflation. No definite new abnormality.      US-ABDOMEN LTD (SOFT TISSUE)   Final Result      No ascites.         MR-ABDOMEN-WITH & W/O   Final Result      1.  The tail of the pancreas appears somewhat irregular and stranding with increased signal but evaluation is very limited due to significant motion artifact. This could relate to sequela of prior pancreatitis. A mass cannot be excluded. Follow-up in 6    months with MR after improvement is recommended.   2.  There is an accessory splenule adjacent to the spleen   3. Diffuse subcutaneous edema .                        EC-ECHOCARDIOGRAM COMPLETE W/ CONT   Final Result      US-EXTREMITY VENOUS LOWER BILAT   Final Result      US-RUQ   Final Result         1.  Thickened gallbladder wall without visualized shadowing stones, consider acalculous cholecystitis, could be further evaluated with HIDA scan as clinically appropriate   2.  Hepatomegaly   3.  Echogenic liver, compatible with fatty change versus fibrosis      CT-PANCREAS AND ABDOMEN WITH & W/O   Final Result         1.  Masslike structure adjacent to the tail of pancreas with possible slight enhancement on arterial phase imaging, recommend follow-up MRI of the  pancreas with contrast for more definitive characterization.   2.  Ovoid mass in the splenic hilum with similar density to spleen on all contrast phases, appearance favoring splenule.   3.  Changes of cirrhosis.   4.  Hepatomegaly           Assessment/Plan  * Alcoholic liver hepatitis and cirrhosis- (present on admission)  Assessment & Plan  DF score 35.7, MELD 26 -> now 25.  T. bili and INR has decreased  Patient just completed course of steroid, GI consulted and does not recommend restarting  Alcohol cessation counseling, patient is motivated to stop drinking.  Case management to provide resources  No signs of hepatic encephalopathy, continue maintenance lactulose  Continue diuresis, continue on Lasix 60 mg IV twice daily and spironolactone 100mg. Monitor I's and O's, low-sodium diet and fluid restriction  He has a referral to [facility], he will make a new appointment    May need midodrine for diuresis, he continues to be +40 lbs from baseline     Vitamin D deficiency  Assessment & Plan  Replacement ordered    Hyperammonemia (HCC)  Assessment & Plan  He does not clinically have hepatic encephalopathy  Continue maintenance lactulose    Hyponatremia  Assessment & Plan  Secondary to alcohol abuse, liver disease  Monitor    Anasarca  Assessment & Plan  Continue diuresis, with IV Lasix for spironolactone  Creatinine has increased but may have plateaued.  Continue current doses and recheck creatinine tomorrow  Repleting for low potassium  Monitor I's and O's, low-sodium diet and fluid restriction  Ultrasound negative for ascites  Doppler negative for DVT.  TTE with normal EF  Nephrology following, give IV albumin with Lasix 3 times daily    EDIN (acute kidney injury) (HCC)- (present on admission)  Assessment & Plan  Due to fluid overload  Continue aggressive IV diuresis  Nephrology consulted, start albumin with Lasix  Avoid nephrotoxic medication    Anxiety- (present on admission)  Assessment & Plan  Reports uses alcohol to  self treat his anxiety  Psychiatry consulted recommended starting gabapentin 300 mg 3 times daily  Atarax as needed  Will start inpatient psychotherapy    Intraabdominal mass- (present on admission)  Assessment & Plan  CT pancreatic protocol demonstrated masslike structure adjacent to the tail of the pancreas with possible slight enhancement of arterial phase.    CA 19-9 was elevated.  AFP normal  MRI showed increased signal in tail of pancreas, prior pancreatitis but mass was not excluded.  He will need a follow-up MRI in 6 months.     BOLIVAR on CPAP- (present on admission)  Assessment & Plan  Continue CPAP at night    Alcohol use disorder, severe, dependence (HCC)- (present on admission)  Assessment & Plan  Last drink 5/28  CIWA score's were low and was stopped.  He has mild tremors and some anxiety.  I will order Valium as needed but if his symptoms worsen may need to restart CIWA    Morbid obesity (HCC)- (present on admission)  Assessment & Plan  BMI 44  Query cushingoid syndrome  -24-hour urine cortisol  -Midnight salivary cortisol    Essential hypertension- (present on admission)  Assessment & Plan  Continue on Coreg, BP stable, monitor while on diuretics        VTE prophylaxis:    enoxaparin ppx    I have performed a physical exam and reviewed and updated ROS and Plan today (6/5/2025). In review of yesterday's note (6/4/2025), there are no changes except as documented above.

## 2025-06-05 NOTE — PROGRESS NOTES
"Pt has refused chair alarm strip and refused to lay in bed, pt states \"they can not sleep in bed and want to sit in chair\". Patient was educated on fall precautions and charge nurse is aware.  "

## 2025-06-05 NOTE — CONSULTS
"[facility] Assessment Follow up     Name: Alberto Maldonado  MRN: [id number]  : 1985  Age: 39 y.o.  Date of assessment: 25  PCP: Cyrus Tolliver P.A.-C.  Persons in attendance: Patient      HPI: Per Medical Record: Patient is a \"40 yo man with alcoholic cirrhosis, ongoing alcohol use, DM, HTN, BOLIVAR, morbid obesity who presented with worsening swelling. He continues to drink 9-10 shots every day. He was found with worsening liver function, elevated ammonia. Patient was previously also hospitalized and CTAP at that time showed a pancreatic tail lesion and was recommended to have outpatient CT pancreatic protocol. CT was done in the ER which showed a mass in the pancreatic tail, ovoid mass in the splenic hilum. MRI showed increased signal in tail of pancreas, prior pancreatitis but mass was not excluded. He will need a follow-up MRI in 6 months. GI was consulted and he is not a candidate for further steroid treatment for alcohol hepatitis. He was started on diuretics for his edema.\" Patient was referred to Behavioral Health for psychotherapy.    CHIEF COMPLAINT/PRESENTING ISSUE (as stated by Patient): Alberto Maldonado is a 39 year old male seen sitting in hospital chair with his head intermittently resting on the hospital bedside table. Patient was cooperative and engaged in the psychotherapy process.     Patient presented with a flat affect, no range of emotion. Patient denies suicidal or homicidal ideations. Patient denies auditory of visual hallucinations. Patient reports a long history of alcohol use since his teen years. Patient denies any history of sexual or physical abuse. Patient states he suffered bullying as a child calling him \"fat and ugly\", using profanity and threatening to \"beat me up if I don't give up my lunch money\". Patient reports being very unhappy and feeling alone. Patient reports no one in his family is alcoholic that he is aware of. Patient reports never " "marrying and no children. He currently resides with a brother and his family. Patient's father has passed but his mother is still living in [place] as well as two brothers. Patient reports he has been employed  and has friends.     Based on the Stages of Change Model, this patient presents at the Contemplative Stage of Change as it relates to getting treatment for substance use. Patient states, \"Oh no I can't go to a treatment place, I need to be home in my own room and in my own bed\". Patient and Clinician discussed at length with patient the consequences of his drinking and continued drinking. However, patient was adamant about not getting treatment. Patient states, \"I tried AA a couple times but that doesn't work\". Patient states he wants to maintain sobriety but without treatment stating he was able to do this in the past. By the end of session, patient was willing to find out more information about treatment and the cost as long as the treatment programs are in [place].    Chief Complaint   Patient presents with    Abdominal Swelling     Pt reports facial, abdominal and leg swelling. Pt recently diagnosed with alcoholic cirrhosis, daily ETOH use.     Facial Swelling    Foot Swelling        Psychiatric Review of Systems    FLORENTIN-7 Questionnaire  Feeling nervous, anxious, or on edge:  Nearly every day   Not being able to sop or control worrying:  Nearly every day   Worrying too much about different things:  Nearly every day   Trouble relaxing:  Nearly every day   Being so restless that it's hard to sit still:  Nearly every day   Becoming easily annoyed or irritable:  Nearly every day   Feeling afraid as if something awful might happen:  Nearly every day   Total:  21   How difficult  have these problems made it for you to do your work, take care of things at home, or get along with other people? - Extremely difficult    Interpretation of FLORENTIN 7 Total Score   Score Severity: 0-4 No Anxiety, 5-9 Mild Anxiety, 10-14 Moderate " Anxiety, 15-21 Severe Anxiety    PHQ-9 Depression Screening    Little interest or pleasure in doing things?  1 - several days   Feeling down, depressed, or hopeless?  1 - several days   Trouble falling or staying asleep, or sleeping too much?   1 - several days   Feeling tired or having little energy?  1 - several days   Poor appetite or overeating?   1 - several days   Feeling bad about yourself - or that you are a failure or have let yourself or your family down?  1 - several days   Trouble concentrating on things, such as reading the newspaper or watching television?  1 - several days   Moving or speaking so slowly that other people could have noticed.  Or the opposite - being so fidgety or restless that you have been moving around a lot more than usual?   1 - several days   Thoughts that you would be better off dead, or of hurting yourself?   0 - not at all   Patient Health Questionnaire Score:  8     Interpretation of PHQ-9 Total Score   Score Severity: 1-4 No Depression, 5-9 Mild Depression, 10-14 Moderate Depression, 15-19 Moderately Severe Depression, 20-27 Severe Depression    MoCA Performed?:  N/A  Behavioral Health Treatment History  Does patient/parent report a history of prior behavioral health treatment for patient? No:     SAFETY ASSESSMENT - SELF  Does patient acknowledge current or past symptoms of dangerousness to self? no   Does parent/significant other report patient has current or past symptoms of dangerousness to self? N\A      Does presenting problem suggest symptoms of dangerousness to self? No        SAFETY ASSESSMENT - OTHERS  Does patient acknowledge current or past symptoms of aggressive behavior or risk to others? no   Does parent/significant other report patient has current or past symptoms of aggressive behavior or risk to others? N\A      Does presenting problem suggest symptoms of dangerousness to others?  No     Crisis Safety Plan completed and copy given to patient? no    "  SUBSTANCE USE SCREENING  Yes:  Tevin all substances used in the past 30 days:         Last Use Amount   [x]   Alcohol 7 days ago     []   Marijuana       []   Heroin       []   Prescription Opioids  (used without prescription, for    recreation, or in excess of prescribed amount)       []   Other Prescription  (used without prescription, for    recreation, or in excess of prescribed amount)       []   Cocaine       []   Methamphetamine       []   \"\" drugs (ectasy, MDMA)       []   Other substances                     UDS results: Not collected  Breathalyzer results: None     What consequences does the patient associate with any of the above substance use and or addictive behaviors? Work problems or losses: , Health problems: , Monetary problems:      Risk factors for detox (check all that apply):  []   Seizures   []   Diaphoretic (sweating)   []   Tremors   []   Hallucinations   []   Increased blood pressure   []   Decreased blood pressure   []   Other   []   None       [] Patient education on risk factors for detoxification and instructed to return to ER as needed.     MENTAL STATUS  Participation Active verbal participation   Grooming Casual   Orientation Alert    Behavior Calm   Eye contact Good   Mood Euthymic   Affect Flat   Thought Process circumstantial   Thought Content Within normal limits   Speech Rate within normal limits    Perception Within normal limits   Memory No gross evidence of memory deficits   Insight Adequate   Judgement limited   Other           Collateral Information:   Source: [facility]     Unable to complete full assessment due to:  NA - Assessment completed     CLINICAL IMPRESSIONS:  Primary:  Alcohol Use Disorder, Severe  Secondary:                                           Recommendations and Observation Level:  Case Management, please provide patient with information of substance use treatment programs approved by patients insurance. Thank you      Legal Hold: " N/A      Dotty Nix, Ph.D., [facility]  6/5/2025    Length of Intervention:  60 minutes

## 2025-06-06 PROBLEM — K76.7: Status: ACTIVE | Noted: 2025-01-01

## 2025-06-06 LAB
ALBUMIN SERPL BCP-MCNC: 3.1 G/DL (ref 3.2–4.9)
ALBUMIN/GLOB SERPL: 0.9 G/DL
ALP SERPL-CCNC: 238 U/L (ref 30–99)
ALT SERPL-CCNC: 54 U/L (ref 2–50)
ANION GAP SERPL CALC-SCNC: 11 MMOL/L (ref 7–16)
APPEARANCE UR: CLEAR
AST SERPL-CCNC: 119 U/L (ref 12–45)
BACTERIA #/AREA URNS HPF: ABNORMAL /HPF
BILIRUB CONJ SERPL-MCNC: 6.4 MG/DL (ref 0.1–0.5)
BILIRUB INDIRECT SERPL-MCNC: 3.6 MG/DL (ref 0–1)
BILIRUB SERPL-MCNC: 10 MG/DL (ref 0.1–1.5)
BILIRUB UR QL STRIP.AUTO: ABNORMAL
BUN SERPL-MCNC: 34 MG/DL (ref 8–22)
CALCIUM ALBUM COR SERPL-MCNC: 8.7 MG/DL (ref 8.5–10.5)
CALCIUM SERPL-MCNC: 8 MG/DL (ref 8.5–10.5)
CASTS URNS QL MICRO: ABNORMAL /LPF (ref 0–2)
CHLORIDE SERPL-SCNC: 101 MMOL/L (ref 96–112)
CO2 SERPL-SCNC: 20 MMOL/L (ref 20–33)
COLOR UR: ABNORMAL
CREAT SERPL-MCNC: 2.78 MG/DL (ref 0.5–1.4)
EPITHELIAL CELLS 1715: ABNORMAL /HPF (ref 0–5)
ERYTHROCYTE [DISTWIDTH] IN BLOOD BY AUTOMATED COUNT: 69.2 FL (ref 35.9–50)
GFR SERPLBLD CREATININE-BSD FMLA CKD-EPI: 29 ML/MIN/1.73 M 2
GLOBULIN SER CALC-MCNC: 3.4 G/DL (ref 1.9–3.5)
GLUCOSE SERPL-MCNC: 187 MG/DL (ref 65–99)
GLUCOSE UR STRIP.AUTO-MCNC: NEGATIVE MG/DL
HCT VFR BLD AUTO: 29.6 % (ref 42–52)
HGB BLD-MCNC: 9.7 G/DL (ref 14–18)
HYALINE CAST   1831: PRESENT /LPF
KETONES UR STRIP.AUTO-MCNC: ABNORMAL MG/DL
LEUKOCYTE ESTERASE UR QL STRIP.AUTO: ABNORMAL
MAGNESIUM SERPL-MCNC: 2.1 MG/DL (ref 1.5–2.5)
MCH RBC QN AUTO: 32.8 PG (ref 27–33)
MCHC RBC AUTO-ENTMCNC: 32.8 G/DL (ref 32.3–36.5)
MCV RBC AUTO: 100 FL (ref 81.4–97.8)
MICRO URNS: ABNORMAL
NITRITE UR QL STRIP.AUTO: NEGATIVE
PH UR STRIP.AUTO: 5.5 [PH] (ref 5–8)
PHOSPHATE SERPL-MCNC: 4.1 MG/DL (ref 2.5–4.5)
PLATELET # BLD AUTO: 193 K/UL (ref 164–446)
PMV BLD AUTO: 9.1 FL (ref 9–12.9)
POTASSIUM SERPL-SCNC: 4.7 MMOL/L (ref 3.6–5.5)
PROT SERPL-MCNC: 6.5 G/DL (ref 6–8.2)
PROT UR QL STRIP: 30 MG/DL
RBC # BLD AUTO: 2.96 M/UL (ref 4.7–6.1)
RBC # URNS HPF: ABNORMAL /HPF (ref 0–2)
RBC UR QL AUTO: ABNORMAL
SODIUM SERPL-SCNC: 132 MMOL/L (ref 135–145)
SP GR UR STRIP.AUTO: 1.02
UROBILINOGEN UR STRIP.AUTO-MCNC: 1 EU/DL
WBC # BLD AUTO: 11 K/UL (ref 4.8–10.8)
WBC #/AREA URNS HPF: ABNORMAL /HPF

## 2025-06-06 PROCEDURE — 85027 COMPLETE CBC AUTOMATED: CPT

## 2025-06-06 PROCEDURE — 84100 ASSAY OF PHOSPHORUS: CPT

## 2025-06-06 PROCEDURE — 83735 ASSAY OF MAGNESIUM: CPT

## 2025-06-06 PROCEDURE — 99233 SBSQ HOSP IP/OBS HIGH 50: CPT | Performed by: INTERNAL MEDICINE

## 2025-06-06 PROCEDURE — 770020 HCHG ROOM/CARE - TELE (206)

## 2025-06-06 PROCEDURE — P9047 ALBUMIN (HUMAN), 25%, 50ML: HCPCS | Mod: JZ | Performed by: INTERNAL MEDICINE

## 2025-06-06 PROCEDURE — A9270 NON-COVERED ITEM OR SERVICE: HCPCS | Performed by: INTERNAL MEDICINE

## 2025-06-06 PROCEDURE — 700111 HCHG RX REV CODE 636 W/ 250 OVERRIDE (IP): Mod: JZ | Performed by: INTERNAL MEDICINE

## 2025-06-06 PROCEDURE — 36415 COLL VENOUS BLD VENIPUNCTURE: CPT

## 2025-06-06 PROCEDURE — 99232 SBSQ HOSP IP/OBS MODERATE 35: CPT | Performed by: NURSE PRACTITIONER

## 2025-06-06 PROCEDURE — 700102 HCHG RX REV CODE 250 W/ 637 OVERRIDE(OP): Performed by: INTERNAL MEDICINE

## 2025-06-06 PROCEDURE — 700105 HCHG RX REV CODE 258: Performed by: INTERNAL MEDICINE

## 2025-06-06 PROCEDURE — 80053 COMPREHEN METABOLIC PANEL: CPT

## 2025-06-06 PROCEDURE — 82248 BILIRUBIN DIRECT: CPT

## 2025-06-06 PROCEDURE — 81001 URINALYSIS AUTO W/SCOPE: CPT

## 2025-06-06 RX ORDER — MIDODRINE HYDROCHLORIDE 5 MG/1
5 TABLET ORAL
Status: DISCONTINUED | OUTPATIENT
Start: 2025-06-06 | End: 2025-06-09

## 2025-06-06 RX ORDER — ALBUMIN (HUMAN) 12.5 G/50ML
25 SOLUTION INTRAVENOUS EVERY 6 HOURS
Status: DISCONTINUED | OUTPATIENT
Start: 2025-06-06 | End: 2025-06-06

## 2025-06-06 RX ORDER — ALBUMIN (HUMAN) 12.5 G/50ML
50 SOLUTION INTRAVENOUS EVERY 8 HOURS
Status: DISCONTINUED | OUTPATIENT
Start: 2025-06-06 | End: 2025-06-10

## 2025-06-06 RX ORDER — THIAMINE HYDROCHLORIDE 100 MG/ML
100 INJECTION, SOLUTION INTRAMUSCULAR; INTRAVENOUS DAILY
Status: COMPLETED | OUTPATIENT
Start: 2025-06-07 | End: 2025-06-09

## 2025-06-06 RX ADMIN — FOLIC ACID 1 MG: 1 TABLET ORAL at 04:25

## 2025-06-06 RX ADMIN — ALBUMIN (HUMAN) 50 G: 0.25 INJECTION, SOLUTION INTRAVENOUS at 22:25

## 2025-06-06 RX ADMIN — Medication 100 MG: at 04:25

## 2025-06-06 RX ADMIN — MIDODRINE HYDROCHLORIDE 5 MG: 5 TABLET ORAL at 13:18

## 2025-06-06 RX ADMIN — ALBUTEROL SULFATE 2 PUFF: 90 AEROSOL, METERED RESPIRATORY (INHALATION) at 20:40

## 2025-06-06 RX ADMIN — ALBUMIN (HUMAN) 12.5 G: 0.25 INJECTION, SOLUTION INTRAVENOUS at 08:19

## 2025-06-06 RX ADMIN — OXYCODONE HYDROCHLORIDE 10 MG: 10 TABLET ORAL at 04:23

## 2025-06-06 RX ADMIN — HYDROCORTISONE: 1 CREAM TOPICAL at 04:28

## 2025-06-06 RX ADMIN — GABAPENTIN 300 MG: 300 CAPSULE ORAL at 18:17

## 2025-06-06 RX ADMIN — CARVEDILOL 3.12 MG: 3.12 TABLET, FILM COATED ORAL at 08:13

## 2025-06-06 RX ADMIN — OCTREOTIDE ACETATE 50 MCG/HR: 200 INJECTION, SOLUTION INTRAVENOUS; SUBCUTANEOUS at 15:58

## 2025-06-06 RX ADMIN — MIDODRINE HYDROCHLORIDE 5 MG: 5 TABLET ORAL at 18:17

## 2025-06-06 RX ADMIN — ENOXAPARIN SODIUM 40 MG: 100 INJECTION SUBCUTANEOUS at 18:17

## 2025-06-06 RX ADMIN — HYDROCORTISONE: 1 CREAM TOPICAL at 18:14

## 2025-06-06 RX ADMIN — ENOXAPARIN SODIUM 40 MG: 100 INJECTION SUBCUTANEOUS at 04:22

## 2025-06-06 RX ADMIN — GABAPENTIN 300 MG: 300 CAPSULE ORAL at 04:25

## 2025-06-06 RX ADMIN — SPIRONOLACTONE 100 MG: 100 TABLET ORAL at 04:24

## 2025-06-06 RX ADMIN — ALBUMIN (HUMAN) 25 G: 0.25 INJECTION, SOLUTION INTRAVENOUS at 13:23

## 2025-06-06 RX ADMIN — GABAPENTIN 300 MG: 300 CAPSULE ORAL at 13:18

## 2025-06-06 RX ADMIN — CARVEDILOL 3.12 MG: 3.12 TABLET, FILM COATED ORAL at 18:17

## 2025-06-06 RX ADMIN — TAMSULOSIN HYDROCHLORIDE 0.4 MG: 0.4 CAPSULE ORAL at 08:13

## 2025-06-06 RX ADMIN — Medication 400 MG: at 04:24

## 2025-06-06 RX ADMIN — Medication 1000 UNITS: at 04:24

## 2025-06-06 ASSESSMENT — FIBROSIS 4 INDEX: FIB4 SCORE: 3.1

## 2025-06-06 ASSESSMENT — ENCOUNTER SYMPTOMS
SHORTNESS OF BREATH: 1
DEPRESSION: 0
HEARTBURN: 0
HEMOPTYSIS: 0
MEMORY LOSS: 1
EYES NEGATIVE: 1
SINUS PAIN: 0
MYALGIAS: 1
COUGH: 0
VOMITING: 0
CONSTIPATION: 0
FEVER: 0
BACK PAIN: 0
PALPITATIONS: 0
CHILLS: 0
NERVOUS/ANXIOUS: 1
BLURRED VISION: 0
WEAKNESS: 1
ABDOMINAL PAIN: 0
BLOOD IN STOOL: 0
DIARRHEA: 1
ORTHOPNEA: 0
FLANK PAIN: 0
WHEEZING: 0
DIARRHEA: 0
WEIGHT LOSS: 0
DIZZINESS: 0
SHORTNESS OF BREATH: 0
NAUSEA: 0

## 2025-06-06 ASSESSMENT — PAIN DESCRIPTION - PAIN TYPE
TYPE: ACUTE PAIN

## 2025-06-06 ASSESSMENT — LIFESTYLE VARIABLES: SUBSTANCE_ABUSE: 1

## 2025-06-06 NOTE — CARE PLAN
The patient is Stable - Low risk of patient condition declining or worsening    Shift Goals  Clinical Goals: monitor intake and output  Patient Goals: lose fluid weight  Family Goals: wants pt to get better    Progress made toward(s) clinical / shift goals:  pt is A&Ox4 currently on 2L NC only while sleeping otherwise pt is RA when awake. Patient and family members were educated on the importance of fluid restriction and diet and have been monitoring pt intake and output through out my shift.     Patient is not progressing towards the following goals:

## 2025-06-06 NOTE — PROGRESS NOTES
..Gastroenterology Progress Note               Author:  Michelle Edwards, STAN,  APRN Date & Time Created: 6/6/2025 4:26 PM       Patient ID:  Name:             Alberto Maldonado  YOB: 1985  Age:                 39 y.o.  male  MRN:               [id number]    Medical Decision Making, by Problem:  Active Hospital Problems    Diagnosis     Vitamin D deficiency [E55.9]     Alcoholic liver hepatitis and cirrhosis [K70.10]     Anasarca [R60.1]     Hyponatremia [E87.1]     Hyperammonemia (HCC) [E72.20]     Hepatorenal syndrome with acute kidney injury (HCC) [K76.7, N17.9]     Anxiety [F41.9]     Intraabdominal mass [R19.00]     BOLIVAR on CPAP [G47.33]     Alcohol use disorder, severe, dependence (HCC) [F10.20]     Morbid obesity (HCC) [E66.01]     Essential hypertension [I10]        Presenting Chief Complaint:  Decompensated liver cirrhosis     History of Present Illness:   The patient 39 years old gentleman with excessive alcohol use, liver cirrhosis with jaundice and fluid overload, recent admission for acute on chronic liver injury from alcohol use, s/p steroid treatment (however, the patient does not recall if he finished the steroid as instructed upon last discharge), present to inpatient GI service for worsening jaundice and fluid accumulation.  On initial evaluation, he had multiple bottles of water and Gatorade at his bedside.  He was noted to be overtly fluid overloaded with facial swelling, distended abdomen, and severely distended lower extremities.  He was borderline encephalopathic with poor sleep and impaired memory.  There is no evidence of GI bleeding.     Recently admitted from 4/3 through 4/12 when he requested detoxification.    Prior chart review:  Admitted to [facility] in 2021 and described to be alcohol use disorder, daily drinker, admitted with acute pancreatitis with necrosis.  He has had multiple admissions for gout and multiple admissions for alcohol use disorder and  withdrawal. Chart review states attending AA and taking naltrexone in 2024.  Previously followed by [facility].  Unfortunately further admissions for alcohol use disorder.  History of DUI May 2024 and arrest.  (Please see Dr. Hagan, Psychiatry, note from August 2024).    Interval History:  5/30/2025: initial consult    6/6/2025: Gastroenterology service reengaged given worsening clinical picture, development of HRS. Patient seen bedside on Charlee 6.  He remains significantly edematous and encephalopathic.  A Silver catheter is being placed for closer monitoring of urine output in the setting of worsening kidney function.  Nephrology also consulted.    Patient seen.  He admits to me that he continued to drink after his recent discharge.  Per chart review it appears he was attending regular visits with his primary care at [facility].  It is unclear if he completed his steroid dose for alcoholic hepatitis.  On admission, his total bilirubin was 17.3, 4.2 on discharge.  His liver enzymes also increased and his ammonia was 122.  INR is about the same.  His creatinine on admission was 1.38 and previously 0.84.  Now it is 2.78.  Bilirubin today is 10 with 6.4 direct.    Given the CT scan during last admission showing pancreatic tail lesion, MRI was completed and remarks the tail of the pancreas is somewhat irregular, could be prior pancreatitis but cannot exclude mass.  However given limited exam recommend 6-month follow-up after improvement.  Noted to have diffuse subcutaneous edema.    Hospital Medications:  Current Facility-Administered Medications   Medication Dose Frequency Provider Last Rate Last Admin    midodrine (Proamatine) tablet 5 mg  5 mg TID WITH MEALS Fernanda Daniels D.O.   5 mg at 06/06/25 1318    octreotide (SandoSTATIN) 1,250 mcg in  mL Infusion  50 mcg/hr Continuous FELICIANO Jackman.O. 10 mL/hr at 06/06/25 1558 50 mcg/hr at 06/06/25 1558    albumin human 25% solution 50 g  50 g Q8HRS  Fernanda Daniels D.O.        [START ON 6/7/2025] thiamine (B-1) injection 100 mg  100 mg DAILY Fernanda Daniels D.O.        Respiratory Therapy Consult   Continuous RT Fernanda Daniels D.O.        [Held by provider] furosemide (Lasix) injection 80 mg  80 mg TID EDWAR JackmanOIesha   80 mg at 06/05/25 2241    vitamin D3 (Cholecalciferol) tablet 1,000 Units  1,000 Units DAILY EDWAR JackmanOIesha   1,000 Units at 06/06/25 0424    magnesium oxide tablet 400 mg  400 mg DAILY EDWAR JackmanOIesha   400 mg at 06/06/25 0424    albuterol inhaler 2 Puff  2 Puff Q4HRS PRN Fernanda Daniels D.O.   2 Puff at 06/05/25 0854    gabapentin (Neurontin) capsule 300 mg  300 mg TID EDWAR JackmanOIesha   300 mg at 06/06/25 1318    diazePAM (Valium) tablet 2 mg  2 mg Q6HRS PRN Gordon Brambila M.D.        enoxaparin (Lovenox) inj 40 mg  40 mg Q12HRS Gordon Brambila M.D.   40 mg at 06/06/25 0422    [Held by provider] spironolactone (Aldactone) tablet 100 mg  100 mg Q DAY Gordon Brambila M.D.   100 mg at 06/06/25 0424    oxyCODONE immediate-release (Roxicodone) tablet 5 mg  5 mg Q3HRS PRN Héctor Jarquin M.D.   5 mg at 06/05/25 0602    Or    oxyCODONE immediate release (Roxicodone) tablet 10 mg  10 mg Q3HRS PRN Héctor Jarquin M.D.   10 mg at 06/06/25 0423    Or    morphine 4 MG/ML injection 4 mg  4 mg Q3HRS PRN Héctor Jarquin M.D.        hydrALAZINE (Apresoline) injection 10 mg  10 mg Q4HRS PRN Héctor Jarquin M.D.        ondansetron (Zofran) syringe/vial injection 4 mg  4 mg Q4HRS PRN Héctor Jarquin M.D.        ondansetron (Zofran ODT) dispertab 4 mg  4 mg Q4HRS PROSMAR Jarquin M.D.        promethazine (Phenergan) tablet 12.5-25 mg  12.5-25 mg Q4HRS PROSMAR Jarquin M.D.        promethazine (Phenergan) suppository 12.5-25 mg  12.5-25 mg Q4HRS PROSMAR Jarquin M.D.        prochlorperazine (Compazine) injection 5-10 mg  5-10 mg Q4HRS PROSMAR Jarquin M.D.        lactulose 20 GM/30ML solution 30 mL  30 mL TID  "Fernanda Daniels D.O.   30 mL at 06/05/25 1213    carvedilol (Coreg) tablet 3.125 mg  3.125 mg BID WITH MEALS Hcétor Jarquin M.D.   3.125 mg at 06/06/25 0813    folic acid (Folvite) tablet 1 mg  1 mg DAILY Héctor Jarquin M.D.   1 mg at 06/06/25 0425    hydrOXYzine HCl (Atarax) tablet 25 mg  25 mg TID PRN Héctor Jarquin M.D.   25 mg at 06/05/25 2019    tamsulosin (Flomax) capsule 0.4 mg  0.4 mg AFTER BREAKFAST Héctor Jarquin M.D.   0.4 mg at 06/06/25 0813    hydrocortisone 1 % cream   BID Gordon Brambila M.D.   Given at 06/06/25 0428   Last reviewed on 5/29/2025  3:51 AM by Bernard Torres       Review of Systems:  Review of Systems   Constitutional:  Positive for malaise/fatigue. Negative for chills and fever.   HENT:  Negative for hearing loss.    Eyes:  Negative for blurred vision.   Respiratory:  Positive for shortness of breath. Negative for cough.    Cardiovascular:  Positive for leg swelling. Negative for chest pain.   Gastrointestinal:  Negative for abdominal pain, blood in stool, constipation, diarrhea, heartburn, melena, nausea and vomiting.   Genitourinary:  Negative for dysuria.   Musculoskeletal:  Negative for back pain.   Skin:  Negative for rash.   Neurological:  Positive for weakness. Negative for dizziness.   Psychiatric/Behavioral:  Positive for memory loss and substance abuse. Negative for depression. The patient is nervous/anxious.    All other systems reviewed and are negative.        Vital signs:  Weight/BMI: Body mass index is 53.23 kg/m².  BP (!) 142/88   Pulse (!) 105   Temp 36.6 °C (97.8 °F) (Temporal)   Resp 20   Ht 1.6 m (5' 3\")   Wt (!) 136 kg (300 lb 7.8 oz)   SpO2 90%   Vitals:    06/06/25 0423 06/06/25 0722 06/06/25 0808 06/06/25 1516   BP:  128/72  (!) 142/88   Pulse:  82  (!) 105   Resp: 18 18  20   Temp:  36.5 °C (97.7 °F)  36.6 °C (97.8 °F)   TempSrc:  Temporal  Temporal   SpO2:  95%  90%   Weight:   (!) 136 kg (300 lb 7.8 oz)    Height:         Oxygen Therapy:  " Pulse Oximetry: 90 %, O2 (LPM): 0, O2 Delivery Device: None - Room Air    Intake/Output Summary (Last 24 hours) at 6/6/2025 1626  Last data filed at 6/6/2025 1611  Gross per 24 hour   Intake 2178 ml   Output 330 ml   Net 1848 ml       Physical Exam  Vitals and nursing note reviewed.   Constitutional:       General: He is not in acute distress.     Appearance: He is obese. He is ill-appearing.   HENT:      Head: Normocephalic and atraumatic.      Right Ear: External ear normal.      Left Ear: External ear normal.      Nose: Nose normal.      Mouth/Throat:      Mouth: Mucous membranes are moist.      Pharynx: Oropharynx is clear.   Eyes:      General: Scleral icterus present.   Cardiovascular:      Rate and Rhythm: Regular rhythm. Tachycardia present.      Pulses: Normal pulses.      Heart sounds: Normal heart sounds.   Pulmonary:      Effort: Respiratory distress present.      Breath sounds: Rales present.   Abdominal:      General: There is distension.      Tenderness: There is abdominal tenderness.   Musculoskeletal:         General: Swelling present. Normal range of motion.      Cervical back: Normal range of motion.      Right lower leg: Edema present.      Left lower leg: Edema present.   Skin:     General: Skin is warm.      Capillary Refill: Capillary refill takes less than 2 seconds.      Coloration: Skin is jaundiced.   Neurological:      Mental Status: He is alert.      Motor: Weakness present.      Comments: Encephalopathic   Psychiatric:         Mood and Affect: Mood normal.         Behavior: Behavior normal.         Labs:  Recent Labs     06/04/25 0112 06/05/25 0419 06/06/25 0115   SODIUM 131* 130* 132*   POTASSIUM 4.0 4.6 4.7   CHLORIDE 98 100 101   CO2 20 19* 20   BUN 20 26* 34*   CREATININE 1.34 1.91* 2.78*   MAGNESIUM 1.8  --  2.1   PHOSPHORUS  --   --  4.1   CALCIUM 8.1* 7.9* 8.0*     Recent Labs     06/04/25 0112 06/05/25 0419 06/06/25  0115   ALTSGPT 65* 65* 54*   ASTSGOT 165* 147* 119*    ALKPHOSPHAT 293* 268* 238*   TBILIRUBIN 11.2* 11.2* 10.0*   DBILIRUBIN  --   --  6.4*   GLUCOSE 123* 223* 187*     Recent Labs     06/04/25  0112 06/05/25  0419 06/06/25  0115   WBC 9.4  --  11.0*   ASTSGOT 165* 147* 119*   ALTSGPT 65* 65* 54*   ALKPHOSPHAT 293* 268* 238*   TBILIRUBIN 11.2* 11.2* 10.0*     Recent Labs     06/04/25  0112 06/06/25  0115   RBC 3.57* 2.96*   HEMOGLOBIN 11.3* 9.7*   HEMATOCRIT 35.3* 29.6*   PLATELETCT 170 193     Recent Results (from the past 24 hours)   URINE CORTISOL 24 HR, ICMA    Collection Time: 06/05/25  5:14 PM   Result Value Ref Range    Collection Length Random Hrs    Total Volume Random mL   URINE SODIUM RANDOM    Collection Time: 06/05/25  5:14 PM   Result Value Ref Range    Sodium, Urine -per volume <20 mmol/L   PROTEIN/CREAT RATIO URINE    Collection Time: 06/05/25  5:14 PM   Result Value Ref Range    Total Protein, Urine 40.5 (H) 0.0 - 15.0 mg/dL    Creatinine, Random Urine 307.00 mg/dL    Protein Creatinine Ratio 132 (H) 15 - 68 mg/g   Comp Metabolic Panel    Collection Time: 06/06/25  1:15 AM   Result Value Ref Range    Sodium 132 (L) 135 - 145 mmol/L    Potassium 4.7 3.6 - 5.5 mmol/L    Chloride 101 96 - 112 mmol/L    Co2 20 20 - 33 mmol/L    Anion Gap 11.0 7.0 - 16.0    Glucose 187 (H) 65 - 99 mg/dL    Bun 34 (H) 8 - 22 mg/dL    Creatinine 2.78 (H) 0.50 - 1.40 mg/dL    Calcium 8.0 (L) 8.5 - 10.5 mg/dL    Correct Calcium 8.7 8.5 - 10.5 mg/dL    AST(SGOT) 119 (H) 12 - 45 U/L    ALT(SGPT) 54 (H) 2 - 50 U/L    Alkaline Phosphatase 238 (H) 30 - 99 U/L    Total Bilirubin 10.0 (H) 0.1 - 1.5 mg/dL    Albumin 3.1 (L) 3.2 - 4.9 g/dL    Total Protein 6.5 6.0 - 8.2 g/dL    Globulin 3.4 1.9 - 3.5 g/dL    A-G Ratio 0.9 g/dL   CBC WITHOUT DIFFERENTIAL    Collection Time: 06/06/25  1:15 AM   Result Value Ref Range    WBC 11.0 (H) 4.8 - 10.8 K/uL    RBC 2.96 (L) 4.70 - 6.10 M/uL    Hemoglobin 9.7 (L) 14.0 - 18.0 g/dL    Hematocrit 29.6 (L) 42.0 - 52.0 %    .0 (H) 81.4 - 97.8 fL     MCH 32.8 27.0 - 33.0 pg    MCHC 32.8 32.3 - 36.5 g/dL    RDW 69.2 (H) 35.9 - 50.0 fL    Platelet Count 193 164 - 446 K/uL    MPV 9.1 9.0 - 12.9 fL   MAGNESIUM    Collection Time: 06/06/25  1:15 AM   Result Value Ref Range    Magnesium 2.1 1.5 - 2.5 mg/dL   ESTIMATED GFR    Collection Time: 06/06/25  1:15 AM   Result Value Ref Range    GFR (CKD-EPI) 29 (A) >60 mL/min/1.73 m 2   PHOSPHORUS    Collection Time: 06/06/25  1:15 AM   Result Value Ref Range    Phosphorus 4.1 2.5 - 4.5 mg/dL   BILIRUBIN DIRECT    Collection Time: 06/06/25  1:15 AM   Result Value Ref Range    Direct Bilirubin 6.4 (H) 0.1 - 0.5 mg/dL   BILIRUBIN INDIRECT    Collection Time: 06/06/25  1:15 AM   Result Value Ref Range    Indirect Bilirubin 3.6 (H) 0.0 - 1.0 mg/dL       Radiology Review:  DX-CHEST-PORTABLE (1 VIEW)   Final Result      Cardiomegaly. Hypoinflation. No definite new abnormality.      US-ABDOMEN LTD (SOFT TISSUE)   Final Result      No ascites.         MR-ABDOMEN-WITH & W/O   Final Result      1.  The tail of the pancreas appears somewhat irregular and stranding with increased signal but evaluation is very limited due to significant motion artifact. This could relate to sequela of prior pancreatitis. A mass cannot be excluded. Follow-up in 6    months with MR after improvement is recommended.   2.  There is an accessory splenule adjacent to the spleen   3. Diffuse subcutaneous edema .                        EC-ECHOCARDIOGRAM COMPLETE W/ CONT   Final Result      US-EXTREMITY VENOUS LOWER BILAT   Final Result      US-RUQ   Final Result         1.  Thickened gallbladder wall without visualized shadowing stones, consider acalculous cholecystitis, could be further evaluated with HIDA scan as clinically appropriate   2.  Hepatomegaly   3.  Echogenic liver, compatible with fatty change versus fibrosis      CT-PANCREAS AND ABDOMEN WITH & W/O   Final Result         1.  Masslike structure adjacent to the tail of pancreas with possible slight  enhancement on arterial phase imaging, recommend follow-up MRI of the pancreas with contrast for more definitive characterization.   2.  Ovoid mass in the splenic hilum with similar density to spleen on all contrast phases, appearance favoring splenule.   3.  Changes of cirrhosis.   4.  Hepatomegaly          MDM (Data Review):   -Records reviewed and summarized in current documentation  -I personally reviewed and interpreted the laboratory results  -I personally reviewed the radiology images    Assessment/Recommendations:  Alcohol use disorder with multiple admissions related to alcohol, DUI, no long term sobriety  Abdominal pain  Diffuse subcutaneous edema - cirrhosis vs nephrotic syndrome/acute kidney failure vs nutritional deficiency vs steroid use vs ?lymphatic obstruction due to malignancy  Previous thyroid studies normal, echo with grossly normal LV function   Hepatorenal syndrome with urine sodium less than 20  Transaminitis and hyperbilirubinemia, direct greater than indirect  Hypoalbuminemia  Thrombocytopenia  Mild coagulopathy  Hypervolemic hyponatremia  Macrocytic anemia  Vitamin D deficiency  Previous HBV, HCV negative, AMA neg, OTF and f actin neg, ferritin normal  Mild asterixis and hepatic encephalopathy  CT Chest/abd/pelvis findings concerning for pancreatic mass, MRI with limited evaluation, consider sequela of prior pancreatitis versus mass, recommend repeat imaging in 6 months. CA [id number] [id number] in April 2025  Vitamin D deficiency    Current MELD 3.0 = 31  Child Menard class B  Maddrey's 20.6    Recommendations:  Agree with nephrology recommendations to treat for HRS with octreotide, albumin 1 mg/kg per day, and midodrine.  Hold diuresis.  Check urinalysis  Low-sodium diet, high-protein-nutritional support is critical in the management of liver disease  Primary team working up for underlying cushingoid syndrome, workup ongoing  No ascites on RUQ US  Monitor for signs of bleeding, downtrending  hemoglobin  High-dose thiamine supplementation, folic acid, consider vitamin D  Although Rivera's is high, he just completed a course of steroids for alcoholic hepatitis.  It is not recommended to initiate additional course in the setting of ongoing alcohol use    Unfortunately given his repeated admissions for alcohol related liver decompensation despite counseling to maintain sobriety and refrain from drinking, he is not a candidate for liver transplant at this time     GI will continue to follow    Discussed with patient, RN, Dr. Daniels, Dr. Camacho    ..Michelle Edwards, STAN,  APRN    Core Quality Measures   Reviewed items::  Labs, Medications and Radiology reports reviewed

## 2025-06-06 NOTE — DISCHARGE PLANNING
Case Management Discharge Planning    Admission Date: 5/28/2025  GMLOS: 3.3  ALOS: 8    6-Clicks ADL Score: 21  6-Clicks Mobility Score: 18      Anticipated Discharge Dispo: Discharge Disposition: Discharged to home/self care (01)    DME Needed: No    Action(s) Taken: Discussed in IDT rounds, pt's creatine was 2.7 today. Nephrology and GI following,  plan for possible tele transfer.     Escalations Completed: None    Medically Clear: No    Next Steps: pending medical clearance.    Barriers to Discharge: Medical clearance    Is the patient up for discharge tomorrow: No

## 2025-06-06 NOTE — PROGRESS NOTES
4 Eyes Skin Assessment Completed by ALF Clifford and MISTY Swanson.    Skin assessment is primarily focused on high risk bony prominences. Pay special attention to skin beneath and around medical devices, high risk bony prominences, skin to skin areas and areas where the patient lacks sensation to feel pain and areas where the patient previously had breakdown.     Head (Occipital):  WDL   Ears (Under Medical Devices): WDL   Nose (Under Medical Devices): WDL   Mouth:  WDL   Neck: WDL   Breast/Chest:  WDL   Shoulder Blades:  WDL   Spine:   WDL   (R) Arm/Elbow/Hand: Scab   (L) Arm/Elbow/Hand: Scab   Abdomen: Red, Edema, and Weeping   Pannus/Groin:  Red, Moisture, and Edema   Sacrum/Coccyx:   DIscolored   (R) Ischial Tuberosity (Sit Bones):  WDL   (L) Ischial Tuberosity (Sit Bones):  WDL   (R) Leg:  Edema and Weeping   (L) Leg:  Edema and Weeping   (R) Heel:  Red and Blanching   (R) Foot/Toe: Edema Swelling   (L) Heel: Red and Blanching   (L) Foot/Toe:  Edema Swelling       DEVICES IN USE:   Respiratory Devices:  NA, patient on room air  Feeding Devices:  N/A   Lines & BP Monitoring Devices:  Peripheral IV, BP cuff, and Pulse ox    Orthopedic Devices:  N/A  Miscellaneous Devices:  Telemetry monitor and Silver    PROTOCOL INTERVENTIONS:   Standard/Trauma Bed:  Already in place  Silver:  Already in place  Interdry:  Applied this assessment    WOUND PHOTOS:   N/A no wounds identified    WOUND CONSULT:   Wound team already following area(s) of concern

## 2025-06-06 NOTE — PROGRESS NOTES
Pt refuses to chair alarm strip and refuses to sleep in bed, pt prefers to sleep in chair, charge nurse notified. Pt educated on fall precaution and call light is within reach.

## 2025-06-06 NOTE — CARE PLAN
The patient is Stable - Low risk of patient condition declining or worsening    Shift Goals  Clinical Goals: monitor intake and output  Patient Goals: lose fluid weight  Family Goals: wants pt to get better    Progress made toward(s) clinical / shift goals:    Problem: Lifestyle Changes  Goal: Patient's ability to identify lifestyle changes and available resources to help reduce recurrence of condition will improve  Description: Target End Date:  1 to 3 days1.  Discuss recommended lifestyle changes2.  Identify available resources and support systems3.  Consider referral to substance abuse program  Outcome: Progressing  Note: Pt understands the need for complete cessation of alcohol        Patient is not progressing towards the following goals:       not examined

## 2025-06-06 NOTE — ASSESSMENT & PLAN NOTE
Patient had significant volume overload, started on a Lasix drip  Continue midodrine and albumin  Additional recommendations per nephrology and GI

## 2025-06-06 NOTE — PROGRESS NOTES
Nephrology Daily Progress Note    Date of Service  6/6/2025    Chief Complaint  39 y.o. male ETOH liver cirrhosis, admitted 5/28/2025 severe volume overloaded,anasarca, consulted for EDIN    Interval Problem Update  6/6/25 -no improvement in edema  Creat level worse  Holding diuretics  Continue albumin  BP well controlled  To monitor UOP closely    Review of Systems  Review of Systems   Constitutional:  Positive for malaise/fatigue. Negative for chills, fever and weight loss.   HENT:  Negative for congestion, hearing loss and sinus pain.    Eyes: Negative.    Respiratory:  Negative for cough, hemoptysis, shortness of breath and wheezing.    Cardiovascular:  Positive for leg swelling. Negative for chest pain, palpitations and orthopnea.   Gastrointestinal:  Negative for abdominal pain, diarrhea, nausea and vomiting.   Genitourinary:  Negative for dysuria, flank pain, frequency, hematuria and urgency.   Skin: Negative.    All other systems reviewed and are negative.       Physical Exam  Temp:  [36.4 °C (97.6 °F)-37.1 °C (98.8 °F)] 36.5 °C (97.7 °F)  Pulse:  [] 82  Resp:  [18-20] 18  BP: (107-128)/(64-78) 128/72  SpO2:  [92 %-98 %] 95 %    Physical Exam  Vitals reviewed.   Constitutional:       General: He is not in acute distress.     Appearance: Normal appearance. He is well-developed. He is not diaphoretic.   HENT:      Head: Normocephalic and atraumatic.      Nose: Nose normal.      Mouth/Throat:      Mouth: Mucous membranes are moist.      Pharynx: Oropharynx is clear.   Eyes:      General: Scleral icterus present.      Extraocular Movements: Extraocular movements intact.      Conjunctiva/sclera: Conjunctivae normal.      Pupils: Pupils are equal, round, and reactive to light.   Cardiovascular:      Rate and Rhythm: Normal rate and regular rhythm.      Pulses: Normal pulses.      Heart sounds: Normal heart sounds.   Pulmonary:      Effort: Pulmonary effort is normal. No respiratory distress.      Breath  "sounds: Normal breath sounds. No wheezing or rales.   Abdominal:      General: Bowel sounds are normal. There is no distension.      Palpations: Abdomen is soft. There is no mass.      Tenderness: There is no abdominal tenderness. There is no right CVA tenderness or left CVA tenderness.   Musculoskeletal:         General: Swelling present.      Cervical back: Normal range of motion and neck supple.      Right lower leg: Edema present.      Left lower leg: Edema present.      Comments: anasarca   Skin:     General: Skin is warm.      Coloration: Skin is jaundiced. Skin is not pale.      Findings: No erythema or rash.   Neurological:      General: No focal deficit present.      Mental Status: He is alert and oriented to person, place, and time.      Cranial Nerves: No cranial nerve deficit.      Coordination: Coordination normal.   Psychiatric:         Mood and Affect: Mood normal.         Behavior: Behavior normal.         Thought Content: Thought content normal.         Judgment: Judgment normal.         Fluids    Intake/Output Summary (Last 24 hours) at 6/6/2025 1322  Last data filed at 6/6/2025 1316  Gross per 24 hour   Intake 4978 ml   Output 300 ml   Net 4678 ml       Laboratory  Recent Labs     06/04/25  0112 06/06/25  0115   WBC 9.4 11.0*   RBC 3.57* 2.96*   HEMOGLOBIN 11.3* 9.7*   HEMATOCRIT 35.3* 29.6*   MCV 98.9* 100.0*   MCH 31.7 32.8   MCHC 32.0* 32.8   RDW 71.1* 69.2*   PLATELETCT 170 193   MPV 9.3 9.1     Recent Labs     06/04/25  0112 06/05/25  0419 06/06/25  0115   SODIUM 131* 130* 132*   POTASSIUM 4.0 4.6 4.7   CHLORIDE 98 100 101   CO2 20 19* 20   GLUCOSE 123* 223* 187*   BUN 20 26* 34*   CREATININE 1.34 1.91* 2.78*   CALCIUM 8.1* 7.9* 8.0*         No results for input(s): \"NTPROBNP\" in the last 72 hours.        Imaging  DX-CHEST-PORTABLE (1 VIEW)   Final Result      Cardiomegaly. Hypoinflation. No definite new abnormality.      US-ABDOMEN LTD (SOFT TISSUE)   Final Result      No ascites.       "   MR-ABDOMEN-WITH & W/O   Final Result      1.  The tail of the pancreas appears somewhat irregular and stranding with increased signal but evaluation is very limited due to significant motion artifact. This could relate to sequela of prior pancreatitis. A mass cannot be excluded. Follow-up in 6    months with MR after improvement is recommended.   2.  There is an accessory splenule adjacent to the spleen   3. Diffuse subcutaneous edema .                        EC-ECHOCARDIOGRAM COMPLETE W/ CONT   Final Result      US-EXTREMITY VENOUS LOWER BILAT   Final Result      US-RUQ   Final Result         1.  Thickened gallbladder wall without visualized shadowing stones, consider acalculous cholecystitis, could be further evaluated with HIDA scan as clinically appropriate   2.  Hepatomegaly   3.  Echogenic liver, compatible with fatty change versus fibrosis      CT-PANCREAS AND ABDOMEN WITH & W/O   Final Result         1.  Masslike structure adjacent to the tail of pancreas with possible slight enhancement on arterial phase imaging, recommend follow-up MRI of the pancreas with contrast for more definitive characterization.   2.  Ovoid mass in the splenic hilum with similar density to spleen on all contrast phases, appearance favoring splenule.   3.  Changes of cirrhosis.   4.  Hepatomegaly           Assessment/Plan     The patient is a 39-year-old male with a history of   chronic alcohol abuse, liver cirrhosis, alcohol related, admitted with   worsening liver function tests, severely edematous, developed acute kidney   injury.    1.  Acute kidney injury: with Dawna < 20 likely HRS -     2.  Electrolytes: Mild hyponatremia due to hypervolemia.  Avoid hypotonicsolutions    Monitor closely.   3.  Anemia: Hemoglobin level is stable, to monitor.   4.  Hypertension: Blood pressure remains well controlled.    5.  Volume: overloaded     RECOMMENDATIONS:   1.  No need for emergent dialysis.  2.  Hold diuretics  3.  Monitor daily CBC  and basic metabolic panel.   4.  Continue albumin  5.  Avoid nephrotoxic agents.  6.  low-sodium diet.     Will follow  D/w

## 2025-06-06 NOTE — PROGRESS NOTES
[facility] Medicine Daily Progress Note    Date of Service  6/6/2025    Chief Complaint  Alberto Maldonado is a 39 y.o. male admitted 5/28/2025 with swelling.    Hospital Course  40 yo man with alcoholic cirrhosis, ongoing alcohol use, DM, HTN, BOLIVAR, morbid obesity who presented with worsening swelling.  He continues to drink 9-10 shots every day.  He was found with worsening liver function, elevated ammonia.  Patient was previously also hospitalized and CTAP at that time showed a pancreatic tail lesion and was recommended to have outpatient CT pancreatic protocol.  CT was done in the ER which showed a mass in the pancreatic tail, ovoid mass in the splenic hilum.  MRI showed increased signal in tail of pancreas, prior pancreatitis but mass was not excluded.  He will need a follow-up MRI in 6 months.  GI was consulted and he is not a candidate for further steroid treatment for alcohol hepatitis.  He was started on diuretics for his edema.  Abdominal ultrasound was negative for ascites.  Patient reported he drinks alcohol to treat his anxiety, psychiatry was consulted and patient started on gabapentin.  Psychotherapy is following the patient.    Despite aggressive IV diuresis patient's weight is going up. Worsening renal function and nephrology was consulted.  Started on treatment with octreotide, midodrine, albumin for hepatorenal syndrome and GI has been reconsulted.    Interval Problem Update  6/5 vitals stable on 2 L NC   WBC 9.4, hemoglobin stable   sodium 138, creatinine 1.91, GFR 46  - Discussed with nephrology worsening creatinine and increasing daily weight, concern for hepatorenal.  Start albumin prior to Lasix to help with diuresis, increase Lasix to 80 mg 3 times daily  -Urine sodium pending  , ALT 65, t bili 11.2   Concerning for cushingoid syndrome will get 24-hour urine cortisol and midnight salivary cortisol level  Patient reporting dizziness and worsening lethargy today.  Falling asleep mid  conversation.  VBG shows [id number], pH 7.37.  Patient reports he does not use CPAP at home.  - Ordered CPAP to be used nightly  Patient also reporting worsening shortness of breath.  Chest x-ray reviewed showed no acute findings.    6/6 Vitals stable on room air   WBC 11, Hb 9.7   Cr 2.78, GFR 29   -Urine sodium less than 20   -Discussed with nephrology concern for HRS, hold Aldactone and Lasix.  Start scheduled albumin with midodrine.  Requested GI consult.   -Nephrology recommended to place Burton catheter for strict I's and O's  , AST 54, t bili 10   Pending 24 hour urine cortisol   Saliva cortisol and 24-hour cortisol not collected yesterday  -24-hour urine collection started this morning  Weight up again 300 lbs, unfortunately urine output not documented yesterday  Patient refused CPAP overnight  Discussed with GI recommended to start octreotide infusion with transfer to telemetry for cardiac monitoring for risk of bradycardia  - Patient needs to be on 1 g/kg albumin daily, albumin 50 g every 8 hours  - Indirect bilirubin 3.6  Patient still fatigued, not as lethargic as yesterday. Initially refusing burton placement, discussed the risks of worsening renal failure and possible need for dialysis and now patient agreeable.    I have discussed this patient's plan of care and discharge plan at IDT rounds today with Case Management, Nursing, Nursing leadership, and other members of the IDT team.    Consultants/Specialty  GI  Psychiatry  Nephrology    Code Status  Full Code    Disposition  The patient is not medically cleared for discharge to home or a post-acute facility.  Anticipate discharge to: home with close outpatient follow-up    I have placed the appropriate orders for post-discharge needs.    Review of Systems  Review of Systems   Constitutional:  Positive for malaise/fatigue. Negative for fever.   Respiratory:  Positive for shortness of breath.    Cardiovascular:  Positive for leg swelling.    Gastrointestinal:  Positive for diarrhea. Negative for abdominal pain and vomiting.   Musculoskeletal:  Positive for myalgias.   Neurological:  Positive for weakness.   Psychiatric/Behavioral:  Positive for substance abuse. The patient is nervous/anxious.         Physical Exam  Temp:  [36.4 °C (97.6 °F)-37.1 °C (98.8 °F)] 36.6 °C (97.8 °F)  Pulse:  [] 105  Resp:  [18-20] 20  BP: (107-142)/(64-88) 142/88  SpO2:  [90 %-98 %] 90 %    Physical Exam  Vitals and nursing note reviewed.   Constitutional:       General: He is not in acute distress.     Appearance: He is obese. He is ill-appearing.      Comments: More alert today   HENT:      Head: Normocephalic.   Eyes:      General: Scleral icterus present.         Right eye: No discharge.         Left eye: No discharge.   Cardiovascular:      Rate and Rhythm: Normal rate and regular rhythm.   Pulmonary:      Effort: Pulmonary effort is normal. No respiratory distress.      Breath sounds: No wheezing or rales.      Comments: Diminished at bases  Abdominal:      General: There is distension (Pitting edema).      Tenderness: There is no abdominal tenderness.      Comments: Pitting edema up to umbilicus, + weeping   Musculoskeletal:         General: Swelling (Improved at levels of arms) present.      Cervical back: Neck supple.      Right lower leg: Edema present.      Left lower leg: Edema present.      Comments: +3 pitting edema bilateral lower extremity   Skin:     General: Skin is warm.      Coloration: Skin is jaundiced.      Comments: Excoriations to both arms   Neurological:      Mental Status: He is alert and oriented to person, place, and time.   Psychiatric:         Mood and Affect: Mood is anxious.         Fluids    Intake/Output Summary (Last 24 hours) at 6/6/2025 1619  Last data filed at 6/6/2025 1611  Gross per 24 hour   Intake 2178 ml   Output 330 ml   Net 1848 ml        Laboratory  Recent Labs     06/04/25  0112 06/06/25  0115   WBC 9.4 11.0*   RBC  3.57* 2.96*   HEMOGLOBIN 11.3* 9.7*   HEMATOCRIT 35.3* 29.6*   MCV 98.9* 100.0*   MCH 31.7 32.8   MCHC 32.0* 32.8   RDW 71.1* 69.2*   PLATELETCT 170 193   MPV 9.3 9.1     Recent Labs     06/04/25  0112 06/05/25  0419 06/06/25  0115   SODIUM 131* 130* 132*   POTASSIUM 4.0 4.6 4.7   CHLORIDE 98 100 101   CO2 20 19* 20   GLUCOSE 123* 223* 187*   BUN 20 26* 34*   CREATININE 1.34 1.91* 2.78*   CALCIUM 8.1* 7.9* 8.0*                     Imaging  DX-CHEST-PORTABLE (1 VIEW)   Final Result      Cardiomegaly. Hypoinflation. No definite new abnormality.      US-ABDOMEN LTD (SOFT TISSUE)   Final Result      No ascites.         MR-ABDOMEN-WITH & W/O   Final Result      1.  The tail of the pancreas appears somewhat irregular and stranding with increased signal but evaluation is very limited due to significant motion artifact. This could relate to sequela of prior pancreatitis. A mass cannot be excluded. Follow-up in 6    months with MR after improvement is recommended.   2.  There is an accessory splenule adjacent to the spleen   3. Diffuse subcutaneous edema .                        EC-ECHOCARDIOGRAM COMPLETE W/ CONT   Final Result      US-EXTREMITY VENOUS LOWER BILAT   Final Result      US-RUQ   Final Result         1.  Thickened gallbladder wall without visualized shadowing stones, consider acalculous cholecystitis, could be further evaluated with HIDA scan as clinically appropriate   2.  Hepatomegaly   3.  Echogenic liver, compatible with fatty change versus fibrosis      CT-PANCREAS AND ABDOMEN WITH & W/O   Final Result         1.  Masslike structure adjacent to the tail of pancreas with possible slight enhancement on arterial phase imaging, recommend follow-up MRI of the pancreas with contrast for more definitive characterization.   2.  Ovoid mass in the splenic hilum with similar density to spleen on all contrast phases, appearance favoring splenule.   3.  Changes of cirrhosis.   4.  Hepatomegaly            Assessment/Plan  * Alcoholic liver hepatitis and cirrhosis- (present on admission)  Assessment & Plan  DF score 35.7, MELD 26 -> now 25.  T. bili and INR has decreased  Patient just completed course of steroid, GI consulted and does not recommend restarting  Alcohol cessation counseling, patient is motivated to stop drinking.  Case management to provide resources  No signs of hepatic encephalopathy, continue maintenance lactulose  Continue diuresis, continue on Lasix 60 mg IV twice daily and spironolactone 100mg. Monitor I's and O's, low-sodium diet and fluid restriction  He has a referral to [facility], he will make a new appointment    Patient reports baseline weight 240-250  -Daily weights, today he is 300 pounds    Hepatorenal syndrome with acute kidney injury (HCC)- (present on admission)  Assessment & Plan  Worsening despite aggressive IV diuresis  Nephrology consulted  -Hold Lasix and Aldactone for now  -Start scheduled albumin and midodrine  GI consulted  -Octreotide drip  -Transfer to telemetry for cardiac monitoring, risk for bradycardia on octreotide  Avoid nephrotoxic medications  Daily labs    Vitamin D deficiency  Assessment & Plan  Replacement ordered    Hyperammonemia (HCC)  Assessment & Plan  He does not clinically have hepatic encephalopathy  Continue maintenance lactulose    Hyponatremia  Assessment & Plan  Secondary to alcohol abuse, liver disease  Monitor    Anasarca  Assessment & Plan  Likely due to alcoholic cirrhosis  -No ascites on imaging, query Cushing's: Pending 24-hour urine cortisol and midnight salivary cortisol levels  Nephrology and GI following hold diuresis for now  Silver catheter placement, strict I's and O's  Doppler negative for DVT.  TTE with normal EF    Anxiety- (present on admission)  Assessment & Plan  Reports uses alcohol to self treat his anxiety  Psychiatry consulted recommended starting gabapentin 300 mg 3 times daily  Atarax as needed   inpatient  psychotherapy    Intraabdominal mass- (present on admission)  Assessment & Plan  CT pancreatic protocol demonstrated masslike structure adjacent to the tail of the pancreas with possible slight enhancement of arterial phase.    CA 19-9 was elevated.  AFP normal  MRI showed increased signal in tail of pancreas, prior pancreatitis but mass was not excluded.  He will need a follow-up MRI in 6 months.     BOLIVAR on CPAP- (present on admission)  Assessment & Plan  Continue CPAP at night    Alcohol use disorder, severe, dependence (HCC)- (present on admission)  Assessment & Plan  Last drink 5/28  CIWA score's were low and was stopped.  He has mild tremors and some anxiety.  I will order Valium as needed but if his symptoms worsen may need to restart CIWA    Morbid obesity (HCC)- (present on admission)  Assessment & Plan  BMI 44  Query cushingoid syndrome  -24-hour urine cortisol  -Midnight salivary cortisol    Essential hypertension- (present on admission)  Assessment & Plan  Continue on Coreg, BP stable, monitor while on diuretics        VTE prophylaxis:    enoxaparin ppx    I have performed a physical exam and reviewed and updated ROS and Plan today (6/6/2025). In review of yesterday's note (6/5/2025), there are no changes except as documented above.

## 2025-06-06 NOTE — CONSULTS
DATE OF SERVICE:  06/05/2025     NEPHROLOGY CONSULTATION:      REQUESTING PHYSICIAN:  Dr. Fernanda Daniels.     REASON FOR CONSULTATION:  Evaluate the patient with acute kidney injury,   volume overloaded.     HISTORY OF PRESENT ILLNESS: The patient is a 39-year-old male who has past   medical history significant for alcohol-related liver cirrhosis, chronic   alcohol abuse, also hypertension, type 2 diabetes.  The patient was admitted   on 05/28/2025 with worsening edema over face, abdomen and legs.  Baseline   creatinine level was 0.8 to 1.0.  Upon admission, creatinine level was 1.2 to   1.3.  Over the hospital course, worsened to 1.4, now is at 1.91.  Liver   function tests revealed elevated AST and ALT, also elevated alkaline   phosphatase, total bilirubin at 11.2, albumin 2.9.  Urinalysis; no protein, no   blood.  Over the hospital course, the patient was extensively diuresed with   spironolactone and Lasix without significant improvement in his volume status.    Blood pressure remains well controlled.  Urine output not monitored.     REVIEW OF SYSTEMS:   GENERAL: Positive for fatigue, malaise.  No fever or chills.  HENT: No sore throat.  No sinus pain.  No hearing loss.   EYES: No double or blurry vision.  No eye pain.  NECK: No pain or stiffness.  RESPIRATORY: No cough or hemoptysis.  Mild shortness of breath.  CARDIOVASCULAR: Positive for severe edema.  No chest pain or palpitations.  GASTROINTESTINAL: No nausea, vomiting, or diarrhea.  GENITOURINARY: No dysuria, hematuria, or flank pain.    All other systems reviewed and all negative.     PAST MEDICAL HISTORY:  1.  Liver cirrhosis secondary to alcohol abuse.  2.  Hypertension.    3.  Diabetes mellitus type 2.   4.  Asthma.     PAST SURGICAL HISTORY: Orthopedic surgery.     FAMILY HISTORY: No history of kidney disease.     SOCIAL HISTORY: Quit smoking.  Currently drinks alcohol.  No drugs.     ALLERGIES: No known drug allergies.     OUTPATIENT MEDICATIONS:  Reviewed.     PHYSICAL EXAMINATION:   VITAL SIGNS: Blood pressure 180/72, heart rate 78, temperature 36.5 Celsius.   GENERAL APPEARANCE: Well-developed, morbidly obese male in no acute distress.  HEENT: Normocephalic and atraumatic.  Pupils are equal, round, and reactive to   light.  Extraocular movements are intact.  Nares patent.  Oropharynx clear,   moist mucosa.  No erythema or exudate.  NECK:  Supple.  No lymphadenopathy or thyromegaly appreciated.  LUNGS:  Clear to auscultation bilaterally.  No rales, wheezes, or rhonchi.  HEART:  Regular rhythm.  No rub or gallop.  ABDOMEN: Distended, nontender.  Bowel sounds present.   EXTREMITIES: Severe edema and anasarca.  No cyanosis.    SKIN: Warm, jaundiced.  No erythema or rash.   NEUROLOGIC: No focal deficits.  Alert and oriented x3.     LABORATORY DATA: Laboratory results revealed hemoglobin level 11.3, platelets   170.  Sodium 130, potassium 4.6, BUN 26, creatinine 1.91, CO2 of 19.  Urine   electrolytes pending.     DIAGNOSTIC STUDIES: Abdomen ultrasound, no ascites.  CT scan revealed   unremarkable kidneys imaging.  No hydronephrosis.       ASSESSMENT AND PLAN: The patient is a 39-year-old male with a history of   chronic alcohol abuse, liver cirrhosis, alcohol related, admitted with   worsening liver function tests, severely edematous, developed acute kidney   injury.    1.  Acute kidney injury: To complete urine sodium to assess for hepatorenal   syndrome versus acute tubular necrosis.  Continue to monitor kidney function   closely.    2.  Electrolytes: Mild hyponatremia due to hypervolemia.  Continue diuretics.    Monitor closely.   3.  Anemia: Hemoglobin level is stable, to monitor.   4.  Hypertension: Blood pressure remains well controlled.    5.  Volume: Continue Lasix, increase dose to 80 mg every 8 hours with albumin   support.     RECOMMENDATIONS:   1.  No need for emergent dialysis.  2.  Obtain urine random sodium.   3.  Monitor daily CBC and basic  metabolic panel.   4.  Continue Lasix at 80 mg every 8 hours with albumin support.    5.  Avoid nephrotoxic agents.  6.  Provide low-sodium diet.   7.  Check cortisol level     We will follow up the patient closely.  Thank you for the consult.     The above plan was discussed with Dr. Fernanda Daniels.         ______________________________  MD VERA BURDICK/BHARGAV    DD:  06/05/2025 14:37  DT:  06/05/2025 19:10    Job#:  [id number]

## 2025-06-07 LAB
ALBUMIN SERPL BCP-MCNC: 3.5 G/DL (ref 3.2–4.9)
ALBUMIN/GLOB SERPL: 1 G/DL
ALP SERPL-CCNC: 193 U/L (ref 30–99)
ALT SERPL-CCNC: 54 U/L (ref 2–50)
AMMONIA PLAS-SCNC: 121 UMOL/L (ref 11–45)
ANION GAP SERPL CALC-SCNC: 13 MMOL/L (ref 7–16)
AST SERPL-CCNC: 113 U/L (ref 12–45)
BASE EXCESS BLDA CALC-SCNC: -6 MMOL/L (ref -4–3)
BILIRUB SERPL-MCNC: 11.2 MG/DL (ref 0.1–1.5)
BODY TEMPERATURE: 36.4 CENTIGRADE
BUN SERPL-MCNC: 44 MG/DL (ref 8–22)
CALCIUM ALBUM COR SERPL-MCNC: 8.9 MG/DL (ref 8.5–10.5)
CALCIUM SERPL-MCNC: 8.5 MG/DL (ref 8.5–10.5)
CHLORIDE SERPL-SCNC: 97 MMOL/L (ref 96–112)
CO2 SERPL-SCNC: 18 MMOL/L (ref 20–33)
CREAT SERPL-MCNC: 2.76 MG/DL (ref 0.5–1.4)
CREAT UR-MCNC: 253 MG/DL
GFR SERPLBLD CREATININE-BSD FMLA CKD-EPI: 29 ML/MIN/1.73 M 2
GLOBULIN SER CALC-MCNC: 3.4 G/DL (ref 1.9–3.5)
GLUCOSE SERPL-MCNC: 232 MG/DL (ref 65–99)
HCO3 BLDA-SCNC: 20 MMOL/L (ref 21–28)
PCO2 BLDA: 39.9 MMHG (ref 32–48)
PCO2 TEMP ADJ BLDA: 38.9 MMHG (ref 32–48)
PH BLDA: 7.3 [PH] (ref 7.35–7.45)
PH TEMP ADJ BLDA: 7.3 [PH] (ref 7.35–7.45)
PO2 BLDA: 106.1 MMHG (ref 83–108)
PO2 TEMP ADJ BLDA: 102.5 MMHG (ref 83–108)
POTASSIUM SERPL-SCNC: 5.3 MMOL/L (ref 3.6–5.5)
PROT SERPL-MCNC: 6.9 G/DL (ref 6–8.2)
PROT UR-MCNC: 87 MG/DL (ref 0–15)
PROT/CREAT UR: 344 MG/G (ref 15–68)
SAO2 % BLDA: 97 % (ref 93–99)
SODIUM SERPL-SCNC: 128 MMOL/L (ref 135–145)

## 2025-06-07 PROCEDURE — 99233 SBSQ HOSP IP/OBS HIGH 50: CPT | Performed by: INTERNAL MEDICINE

## 2025-06-07 PROCEDURE — 82530 CORTISOL FREE: CPT

## 2025-06-07 PROCEDURE — 99291 CRITICAL CARE FIRST HOUR: CPT | Performed by: STUDENT IN AN ORGANIZED HEALTH CARE EDUCATION/TRAINING PROGRAM

## 2025-06-07 PROCEDURE — 99232 SBSQ HOSP IP/OBS MODERATE 35: CPT | Performed by: NURSE PRACTITIONER

## 2025-06-07 PROCEDURE — 82803 BLOOD GASES ANY COMBINATION: CPT

## 2025-06-07 PROCEDURE — 700111 HCHG RX REV CODE 636 W/ 250 OVERRIDE (IP): Mod: JZ | Performed by: INTERNAL MEDICINE

## 2025-06-07 PROCEDURE — 700102 HCHG RX REV CODE 250 W/ 637 OVERRIDE(OP): Performed by: INTERNAL MEDICINE

## 2025-06-07 PROCEDURE — P9047 ALBUMIN (HUMAN), 25%, 50ML: HCPCS | Mod: JZ | Performed by: INTERNAL MEDICINE

## 2025-06-07 PROCEDURE — A9270 NON-COVERED ITEM OR SERVICE: HCPCS | Performed by: INTERNAL MEDICINE

## 2025-06-07 PROCEDURE — 770020 HCHG ROOM/CARE - TELE (206)

## 2025-06-07 PROCEDURE — 82140 ASSAY OF AMMONIA: CPT

## 2025-06-07 PROCEDURE — 94640 AIRWAY INHALATION TREATMENT: CPT

## 2025-06-07 PROCEDURE — 97161 PT EVAL LOW COMPLEX 20 MIN: CPT

## 2025-06-07 PROCEDURE — 700101 HCHG RX REV CODE 250: Performed by: STUDENT IN AN ORGANIZED HEALTH CARE EDUCATION/TRAINING PROGRAM

## 2025-06-07 PROCEDURE — 700105 HCHG RX REV CODE 258: Performed by: INTERNAL MEDICINE

## 2025-06-07 PROCEDURE — 700101 HCHG RX REV CODE 250

## 2025-06-07 PROCEDURE — 82570 ASSAY OF URINE CREATININE: CPT

## 2025-06-07 PROCEDURE — 80053 COMPREHEN METABOLIC PANEL: CPT

## 2025-06-07 PROCEDURE — 84156 ASSAY OF PROTEIN URINE: CPT

## 2025-06-07 RX ORDER — IPRATROPIUM BROMIDE AND ALBUTEROL SULFATE 2.5; .5 MG/3ML; MG/3ML
3 SOLUTION RESPIRATORY (INHALATION)
Status: DISCONTINUED | OUTPATIENT
Start: 2025-06-07 | End: 2025-06-10

## 2025-06-07 RX ORDER — IPRATROPIUM BROMIDE AND ALBUTEROL SULFATE 2.5; .5 MG/3ML; MG/3ML
3 SOLUTION RESPIRATORY (INHALATION)
Status: DISCONTINUED | OUTPATIENT
Start: 2025-06-07 | End: 2025-06-07

## 2025-06-07 RX ORDER — ALBUTEROL SULFATE 5 MG/ML
2.5 SOLUTION RESPIRATORY (INHALATION)
Status: DISCONTINUED | OUTPATIENT
Start: 2025-06-07 | End: 2025-06-10

## 2025-06-07 RX ADMIN — ENOXAPARIN SODIUM 40 MG: 100 INJECTION SUBCUTANEOUS at 17:27

## 2025-06-07 RX ADMIN — HYDROCORTISONE: 1 CREAM TOPICAL at 17:27

## 2025-06-07 RX ADMIN — TAMSULOSIN HYDROCHLORIDE 0.4 MG: 0.4 CAPSULE ORAL at 07:55

## 2025-06-07 RX ADMIN — LACTULOSE 30 ML: 10 SOLUTION ORAL at 17:25

## 2025-06-07 RX ADMIN — Medication 1000 UNITS: at 05:18

## 2025-06-07 RX ADMIN — ALBUMIN (HUMAN) 50 G: 0.25 INJECTION, SOLUTION INTRAVENOUS at 15:03

## 2025-06-07 RX ADMIN — GABAPENTIN 300 MG: 300 CAPSULE ORAL at 05:18

## 2025-06-07 RX ADMIN — THIAMINE HYDROCHLORIDE 100 MG: 100 INJECTION, SOLUTION INTRAMUSCULAR; INTRAVENOUS at 05:18

## 2025-06-07 RX ADMIN — OCTREOTIDE ACETATE 50 MCG/HR: 200 INJECTION, SOLUTION INTRAVENOUS; SUBCUTANEOUS at 16:25

## 2025-06-07 RX ADMIN — FOLIC ACID 1 MG: 1 TABLET ORAL at 05:18

## 2025-06-07 RX ADMIN — MIDODRINE HYDROCHLORIDE 5 MG: 5 TABLET ORAL at 07:56

## 2025-06-07 RX ADMIN — Medication 400 MG: at 05:18

## 2025-06-07 RX ADMIN — IPRATROPIUM BROMIDE AND ALBUTEROL SULFATE 3 ML: .5; 2.5 SOLUTION RESPIRATORY (INHALATION) at 13:43

## 2025-06-07 RX ADMIN — IPRATROPIUM BROMIDE AND ALBUTEROL SULFATE 3 ML: .5; 2.5 SOLUTION RESPIRATORY (INHALATION) at 19:19

## 2025-06-07 RX ADMIN — GABAPENTIN 300 MG: 300 CAPSULE ORAL at 13:29

## 2025-06-07 RX ADMIN — ALBUMIN (HUMAN) 50 G: 0.25 INJECTION, SOLUTION INTRAVENOUS at 23:18

## 2025-06-07 RX ADMIN — ALBUTEROL SULFATE 2 PUFF: 90 AEROSOL, METERED RESPIRATORY (INHALATION) at 03:22

## 2025-06-07 RX ADMIN — IPRATROPIUM BROMIDE AND ALBUTEROL SULFATE 3 ML: .5; 2.5 SOLUTION RESPIRATORY (INHALATION) at 22:13

## 2025-06-07 RX ADMIN — ALBUMIN (HUMAN) 50 G: 0.25 INJECTION, SOLUTION INTRAVENOUS at 05:25

## 2025-06-07 RX ADMIN — ENOXAPARIN SODIUM 40 MG: 100 INJECTION SUBCUTANEOUS at 05:18

## 2025-06-07 RX ADMIN — IPRATROPIUM BROMIDE AND ALBUTEROL SULFATE 3 ML: .5; 2.5 SOLUTION RESPIRATORY (INHALATION) at 11:15

## 2025-06-07 RX ADMIN — LACTULOSE 30 ML: 10 SOLUTION ORAL at 13:29

## 2025-06-07 RX ADMIN — HYDROCORTISONE: 1 CREAM TOPICAL at 05:17

## 2025-06-07 RX ADMIN — LACTULOSE 30 ML: 10 SOLUTION ORAL at 05:18

## 2025-06-07 ASSESSMENT — ENCOUNTER SYMPTOMS
SHORTNESS OF BREATH: 0
WHEEZING: 0
DEPRESSION: 0
EYES NEGATIVE: 1
VOMITING: 0
DIARRHEA: 0
ORTHOPNEA: 0
HEARTBURN: 0
BLOOD IN STOOL: 0
SHORTNESS OF BREATH: 1
MYALGIAS: 1
DIZZINESS: 0
NERVOUS/ANXIOUS: 1
BACK PAIN: 0
MEMORY LOSS: 1
FEVER: 0
CONSTIPATION: 0
NAUSEA: 0
FLANK PAIN: 0
PALPITATIONS: 0
HEMOPTYSIS: 0
SINUS PAIN: 0
ABDOMINAL PAIN: 0
DIARRHEA: 1
WEAKNESS: 1
WEIGHT LOSS: 0
BLURRED VISION: 0
CHILLS: 0
COUGH: 0

## 2025-06-07 ASSESSMENT — COPD QUESTIONNAIRES
DURING THE PAST 4 WEEKS HOW MUCH DID YOU FEEL SHORT OF BREATH: SOME OF THE TIME
HAVE YOU SMOKED AT LEAST 100 CIGARETTES IN YOUR ENTIRE LIFE: NO/DON'T KNOW
COPD SCREENING SCORE: 2
DO YOU EVER COUGH UP ANY MUCUS OR PHLEGM?: NO/ONLY WITH OCCASIONAL COLDS OR INFECTIONS

## 2025-06-07 ASSESSMENT — FIBROSIS 4 INDEX: FIB4 SCORE: 2.95

## 2025-06-07 ASSESSMENT — PAIN DESCRIPTION - PAIN TYPE: TYPE: ACUTE PAIN

## 2025-06-07 ASSESSMENT — LIFESTYLE VARIABLES: SUBSTANCE_ABUSE: 1

## 2025-06-07 NOTE — PROGRESS NOTES
[facility] Medicine Daily Progress Note    Date of Service  6/7/2025    Chief Complaint  Alberto Maldonado is a 39 y.o. male admitted 5/28/2025 with swelling.    Hospital Course  38 yo man with alcoholic cirrhosis, ongoing alcohol use, DM, HTN, BOLIVAR, morbid obesity who presented with worsening swelling.  He continues to drink 9-10 shots every day.  He was found with worsening liver function, elevated ammonia.  Patient was previously also hospitalized and CTAP at that time showed a pancreatic tail lesion and was recommended to have outpatient CT pancreatic protocol.  CT was done in the ER which showed a mass in the pancreatic tail, ovoid mass in the splenic hilum.  MRI showed increased signal in tail of pancreas, prior pancreatitis but mass was not excluded.  He will need a follow-up MRI in 6 months.  GI was consulted and he is not a candidate for further steroid treatment for alcohol hepatitis.  He was started on diuretics for his edema.  Abdominal ultrasound was negative for ascites.  Patient reported he drinks alcohol to treat his anxiety, psychiatry was consulted and patient started on gabapentin.  Psychotherapy is following the patient.    Despite aggressive IV diuresis patient's weight is going up. Worsening renal function and nephrology was consulted.  Started on treatment with octreotide, midodrine, albumin for hepatorenal syndrome and GI has been reconsulted.    Interval Problem Update  I have seen and examined the patient at bedside    On 2.5 L    AO x 3.  However, he seems confused during the conversation.  More drowsy.   I ordered ABG, ammonia    I ordered rifaximin, continue lactulose    HRS -continue octreotide drip, continue albumin 50 mg 3 times daily  Continue to hold diuretics per nephrology    /75>113/54  Bili 15>10>11  Na 128  Cre 1.3>2.78>2.76    Concerning for cushingoid syndrome - pending 24-hour urine cortisol and midnight salivary cortisol level    - Ordered CPAP to be used nightly,  however patient refused CPAP overnight  - Patient needs to be on 1 g/kg albumin daily, albumin 50 g every 8 hours    I have discussed this patient's plan of care and discharge plan at IDT rounds today with Case Management, Nursing, Nursing leadership, and other members of the IDT team.    Consultants/Specialty  GI  Psychiatry  Nephrology    Code Status  Full Code    Disposition  The patient is not medically cleared for discharge to home or a post-acute facility.      I have placed the appropriate orders for post-discharge needs.    Review of Systems  Review of Systems   Constitutional:  Positive for malaise/fatigue. Negative for fever.   Respiratory:  Positive for shortness of breath.    Cardiovascular:  Positive for leg swelling.   Gastrointestinal:  Positive for diarrhea. Negative for abdominal pain and vomiting.   Musculoskeletal:  Positive for myalgias.   Neurological:  Positive for weakness.   Psychiatric/Behavioral:  Positive for substance abuse. The patient is nervous/anxious.         Physical Exam  Temp:  [36.4 °C (97.5 °F)-36.7 °C (98.1 °F)] 36.4 °C (97.5 °F)  Pulse:  [] 112  Resp:  [17-26] 24  BP: (111-160)/() 148/102  SpO2:  [90 %-96 %] 94 %    Physical Exam  Vitals and nursing note reviewed.   Constitutional:       General: He is not in acute distress.     Appearance: He is obese. He is ill-appearing.      Comments: More alert today   HENT:      Head: Normocephalic.   Eyes:      General: Scleral icterus present.         Right eye: No discharge.         Left eye: No discharge.   Cardiovascular:      Rate and Rhythm: Normal rate and regular rhythm.   Pulmonary:      Effort: Pulmonary effort is normal. No respiratory distress.      Breath sounds: No wheezing or rales.      Comments: Diminished at bases  Abdominal:      General: There is distension (Pitting edema).      Tenderness: There is no abdominal tenderness.      Comments: Pitting edema up to umbilicus, + weeping   Musculoskeletal:          General: Swelling (Improved at levels of arms) present.      Cervical back: Neck supple.      Right lower leg: Edema present.      Left lower leg: Edema present.      Comments: +3 pitting edema bilateral lower extremity   Skin:     General: Skin is warm.      Coloration: Skin is jaundiced.      Comments: Excoriations to both arms   Neurological:      Mental Status: He is alert and oriented to person, place, and time.   Psychiatric:         Mood and Affect: Mood is anxious.         Fluids    Intake/Output Summary (Last 24 hours) at 6/7/2025 1208  Last data filed at 6/7/2025 0744  Gross per 24 hour   Intake 1218 ml   Output 480 ml   Net 738 ml        Laboratory  Recent Labs     06/06/25  0115   WBC 11.0*   RBC 2.96*   HEMOGLOBIN 9.7*   HEMATOCRIT 29.6*   .0*   MCH 32.8   MCHC 32.8   RDW 69.2*   PLATELETCT 193   MPV 9.1     Recent Labs     06/05/25  0419 06/06/25  0115 06/07/25  0445   SODIUM 130* 132* 128*   POTASSIUM 4.6 4.7 5.3   CHLORIDE 100 101 97   CO2 19* 20 18*   GLUCOSE 223* 187* 232*   BUN 26* 34* 44*   CREATININE 1.91* 2.78* 2.76*   CALCIUM 7.9* 8.0* 8.5                     Imaging  DX-CHEST-PORTABLE (1 VIEW)   Final Result      Cardiomegaly. Hypoinflation. No definite new abnormality.      US-ABDOMEN LTD (SOFT TISSUE)   Final Result      No ascites.         MR-ABDOMEN-WITH & W/O   Final Result      1.  The tail of the pancreas appears somewhat irregular and stranding with increased signal but evaluation is very limited due to significant motion artifact. This could relate to sequela of prior pancreatitis. A mass cannot be excluded. Follow-up in 6    months with MR after improvement is recommended.   2.  There is an accessory splenule adjacent to the spleen   3. Diffuse subcutaneous edema .                        EC-ECHOCARDIOGRAM COMPLETE W/ CONT   Final Result      US-EXTREMITY VENOUS LOWER BILAT   Final Result      US-RUQ   Final Result         1.  Thickened gallbladder wall without visualized  shadowing stones, consider acalculous cholecystitis, could be further evaluated with HIDA scan as clinically appropriate   2.  Hepatomegaly   3.  Echogenic liver, compatible with fatty change versus fibrosis      CT-PANCREAS AND ABDOMEN WITH & W/O   Final Result         1.  Masslike structure adjacent to the tail of pancreas with possible slight enhancement on arterial phase imaging, recommend follow-up MRI of the pancreas with contrast for more definitive characterization.   2.  Ovoid mass in the splenic hilum with similar density to spleen on all contrast phases, appearance favoring splenule.   3.  Changes of cirrhosis.   4.  Hepatomegaly           Assessment/Plan  * Alcoholic liver hepatitis and cirrhosis- (present on admission)  Assessment & Plan  DF score 35.7, MELD 26 -> now 25.  T. bili and INR has decreased  Patient just completed course of steroid, GI consulted and does not recommend restarting  Alcohol cessation counseling, patient is motivated to stop drinking.  Case management to provide resources  No signs of hepatic encephalopathy, continue maintenance lactulose  Continue diuresis, continue on Lasix 60 mg IV twice daily and spironolactone 100mg. Monitor I's and O's, low-sodium diet and fluid restriction  [facility], he will make a new appointment    Patient reports baseline weight 240-250  -Daily weights, today he is 300 pounds    Vitamin D deficiency  Assessment & Plan  Replacement ordered    Hyperammonemia (HCC)  Assessment & Plan  He does not clinically have hepatic encephalopathy  Continue maintenance lactulose    Hyponatremia  Assessment & Plan  Secondary to alcohol abuse, liver disease  Monitor    Anasarca  Assessment & Plan  Likely due to alcoholic cirrhosis  -No ascites on imaging, query Cushing's: Pending 24-hour urine cortisol and midnight salivary cortisol levels  Nephrology and GI following hold diuresis for now  Silver catheter placement, strict I's and O's  Doppler negative  for DVT.  TTE with normal EF    No improvement with diuretics  Continue to hold diuretics per nephrology    Hepatorenal syndrome with acute kidney injury (HCC)- (present on admission)  Assessment & Plan  Worsening despite aggressive IV diuresis  Nephrology consulted  -Hold Lasix and Aldactone for now  -Start scheduled albumin and midodrine    6/7 continue octreotide drip, continue albumin 50 mg 3 times daily  Monitor HR, BP, respiratory status while on octreotide drip  Continue telemetry monitor  Continue to hold diuretics per nephrology  I discussed with GI  Avoid nephrotoxic agent  Renal dose medications    Anxiety- (present on admission)  Assessment & Plan  Reports uses alcohol to self treat his anxiety  Psychiatry consulted recommended starting gabapentin 300 mg 3 times daily  Atarax as needed   inpatient psychotherapy    Intraabdominal mass- (present on admission)  Assessment & Plan  CT pancreatic protocol demonstrated masslike structure adjacent to the tail of the pancreas with possible slight enhancement of arterial phase.    CA 19-9 was elevated.  AFP normal  MRI showed increased signal in tail of pancreas, prior pancreatitis but mass was not excluded.  He will need a follow-up MRI in 6 months.     BOLIVAR on CPAP- (present on admission)  Assessment & Plan  Continue CPAP at night    Alcohol use disorder, severe, dependence (HCC)- (present on admission)  Assessment & Plan  Last drink 5/28  CIWA score's were low and was stopped.  He has mild tremors and some anxiety.      Received Atarax for anxiety    Morbid obesity (HCC)- (present on admission)  Assessment & Plan  BMI 44  Query cushingoid syndrome  -24-hour urine cortisol  -Midnight salivary cortisol    Essential hypertension- (present on admission)  Assessment & Plan  Continue on Coreg, BP stable, monitor while on diuretics        VTE prophylaxis:    enoxaparin ppx    I have performed a physical exam and reviewed and updated ROS and Plan today (6/7/2025). In  review of yesterday's note (6/6/2025), there are no changes except as documented above.      Patient is critically ill.   The patient continues to have: HRS  The vital organ system that is affected is the: Cardiac or respiratory and kidney  If untreated there is a high chance of deterioration into: Cardiorespiratory failure and kidney failure  And eventually death.   The critical care that I am providing today is: Octreotide drip   the critical that has been undertaken is medically complex.   There has been no overlap in critical care time.   Critical Care Time not including procedures: 45      VTE Selection

## 2025-06-07 NOTE — PROGRESS NOTES
Nephrology Daily Progress Note    Date of Service  6/7/2025    Chief Complaint  39 y.o. male ETOH liver cirrhosis, admitted 5/28/2025 severe volume overloaded,anasarca, consulted for EDIN    Interval Problem Update  6/6/25 -no improvement in edema  Creat level worse  Holding diuretics  Continue albumin  BP well controlled  To monitor UOP closely  6/7 -no acute events  Placed burton catheter -hematuria likely traumatic  Creat level slightly better    Review of Systems  Review of Systems   Constitutional:  Negative for chills, fever, malaise/fatigue and weight loss.   HENT:  Negative for congestion, hearing loss and sinus pain.    Eyes: Negative.    Respiratory:  Negative for cough, hemoptysis, shortness of breath and wheezing.    Cardiovascular:  Positive for leg swelling. Negative for chest pain, palpitations and orthopnea.   Gastrointestinal:  Negative for abdominal pain, nausea and vomiting.   Genitourinary:  Negative for flank pain.        Burton catheter   Skin: Negative.    All other systems reviewed and are negative.       Physical Exam  Temp:  [36.4 °C (97.5 °F)-36.7 °C (98.1 °F)] 36.4 °C (97.5 °F)  Pulse:  [] 107  Resp:  [17-26] 17  BP: (111-160)/() 148/102  SpO2:  [90 %-96 %] 93 %    Physical Exam  Vitals reviewed.   Constitutional:       General: He is not in acute distress.     Appearance: Normal appearance. He is well-developed. He is not diaphoretic.   HENT:      Head: Normocephalic and atraumatic.      Nose: Nose normal.      Mouth/Throat:      Mouth: Mucous membranes are moist.      Pharynx: Oropharynx is clear.   Eyes:      General: Scleral icterus present.      Extraocular Movements: Extraocular movements intact.      Conjunctiva/sclera: Conjunctivae normal.      Pupils: Pupils are equal, round, and reactive to light.   Cardiovascular:      Rate and Rhythm: Normal rate and regular rhythm.      Pulses: Normal pulses.      Heart sounds: Normal heart sounds.   Pulmonary:      Effort: Pulmonary  "effort is normal. No respiratory distress.      Breath sounds: Normal breath sounds. No wheezing or rales.   Abdominal:      General: Bowel sounds are normal. There is distension.      Palpations: Abdomen is soft. There is no mass.      Tenderness: There is no abdominal tenderness. There is no right CVA tenderness or left CVA tenderness.   Musculoskeletal:         General: Swelling present.      Cervical back: Normal range of motion and neck supple.      Right lower leg: Edema present.      Left lower leg: Edema present.      Comments: anasarca   Skin:     General: Skin is warm.      Coloration: Skin is jaundiced. Skin is not pale.      Findings: No erythema or rash.   Neurological:      General: No focal deficit present.      Mental Status: He is alert and oriented to person, place, and time.      Cranial Nerves: No cranial nerve deficit.      Coordination: Coordination normal.         Fluids    Intake/Output Summary (Last 24 hours) at 6/7/2025 1011  Last data filed at 6/7/2025 0744  Gross per 24 hour   Intake 1218 ml   Output 530 ml   Net 688 ml       Laboratory  Recent Labs     06/06/25  0115   WBC 11.0*   RBC 2.96*   HEMOGLOBIN 9.7*   HEMATOCRIT 29.6*   .0*   MCH 32.8   MCHC 32.8   RDW 69.2*   PLATELETCT 193   MPV 9.1     Recent Labs     06/05/25  0419 06/06/25  0115 06/07/25  0445   SODIUM 130* 132* 128*   POTASSIUM 4.6 4.7 5.3   CHLORIDE 100 101 97   CO2 19* 20 18*   GLUCOSE 223* 187* 232*   BUN 26* 34* 44*   CREATININE 1.91* 2.78* 2.76*   CALCIUM 7.9* 8.0* 8.5         No results for input(s): \"NTPROBNP\" in the last 72 hours.        Imaging  DX-CHEST-PORTABLE (1 VIEW)   Final Result      Cardiomegaly. Hypoinflation. No definite new abnormality.      US-ABDOMEN LTD (SOFT TISSUE)   Final Result      No ascites.         MR-ABDOMEN-WITH & W/O   Final Result      1.  The tail of the pancreas appears somewhat irregular and stranding with increased signal but evaluation is very limited due to significant " motion artifact. This could relate to sequela of prior pancreatitis. A mass cannot be excluded. Follow-up in 6    months with MR after improvement is recommended.   2.  There is an accessory splenule adjacent to the spleen   3. Diffuse subcutaneous edema .                        EC-ECHOCARDIOGRAM COMPLETE W/ CONT   Final Result      US-EXTREMITY VENOUS LOWER BILAT   Final Result      US-RUQ   Final Result         1.  Thickened gallbladder wall without visualized shadowing stones, consider acalculous cholecystitis, could be further evaluated with HIDA scan as clinically appropriate   2.  Hepatomegaly   3.  Echogenic liver, compatible with fatty change versus fibrosis      CT-PANCREAS AND ABDOMEN WITH & W/O   Final Result         1.  Masslike structure adjacent to the tail of pancreas with possible slight enhancement on arterial phase imaging, recommend follow-up MRI of the pancreas with contrast for more definitive characterization.   2.  Ovoid mass in the splenic hilum with similar density to spleen on all contrast phases, appearance favoring splenule.   3.  Changes of cirrhosis.   4.  Hepatomegaly           Assessment/Plan     The patient is a 39-year-old male with a history of   chronic alcohol abuse, liver cirrhosis, alcohol related, admitted with   worsening liver function tests, severely edematous, developed acute kidney   injury.    1.  Acute kidney injury due to HRS -continue current treatment       Monitor CMP daily  2.  Electrolytes: Mild hyponatremia due to hypervolemia.  Avoid hypotonicsolutions    Monitor closely.   3.  Anemia: Hemoglobin level is stable, to monitor.   4.  Hypertension: Blood pressure remains well controlled.    5.  Volume: overloaded/third spacing  6.  Pancreatic mass with elevated Ca 19-9 -consider diagnostic biopsy    RECOMMENDATIONS:   1.  No need for emergent dialysis.  2.  Hold diuretics  3.  Monitor daily CBC and basic metabolic panel.   4.  Continue albumin  5.  Avoid nephrotoxic  agents.  6.  low-sodium diet.     Will follow  D/w  GI team

## 2025-06-07 NOTE — PROGRESS NOTES
..Gastroenterology Progress Note               Author:  Michelle Edwards, STAN,  APRN Date & Time Created: 6/7/2025 8:09 AM       Patient ID:  Name:             Alberto Maldonado  YOB: 1985  Age:                 39 y.o.  male  MRN:               [id number]    Medical Decision Making, by Problem:  Active Hospital Problems    Diagnosis     Vitamin D deficiency [E55.9]     Alcoholic liver hepatitis and cirrhosis [K70.10]     Anasarca [R60.1]     Hyponatremia [E87.1]     Hyperammonemia (HCC) [E72.20]     Hepatorenal syndrome with acute kidney injury (HCC) [K76.7, N17.9]     Anxiety [F41.9]     Intraabdominal mass [R19.00]     BOLIVAR on CPAP [G47.33]     Alcohol use disorder, severe, dependence (HCC) [F10.20]     Morbid obesity (HCC) [E66.01]     Essential hypertension [I10]        Presenting Chief Complaint:  Decompensated liver cirrhosis     History of Present Illness:   The patient 39 years old gentleman with excessive alcohol use, liver cirrhosis with jaundice and fluid overload, recent admission for acute on chronic liver injury from alcohol use, s/p steroid treatment (however, the patient does not recall if he finished the steroid as instructed upon last discharge), present to inpatient GI service for worsening jaundice and fluid accumulation.  On initial evaluation, he had multiple bottles of water and Gatorade at his bedside.  He was noted to be overtly fluid overloaded with facial swelling, distended abdomen, and severely distended lower extremities.  He was borderline encephalopathic with poor sleep and impaired memory.  There is no evidence of GI bleeding.     Recently admitted from 4/3 through 4/12 when he requested detoxification.    Prior chart review:  Admitted to [facility] in 2021 and described to be alcohol use disorder, daily drinker, admitted with acute pancreatitis with necrosis.  He has had multiple admissions for gout and multiple admissions for alcohol use disorder and  withdrawal. Chart review states attending AA and taking naltrexone in 2024.  Previously followed by [facility].  Unfortunately further admissions for alcohol use disorder.  History of DUI May 2024 and arrest.  (Please see Dr. Hagan, Psychiatry, note from August 2024).    Interval History:  5/30/2025: initial consult    6/6/2025: Gastroenterology service reengaged given worsening clinical picture, development of HRS. Patient seen bedside on Charlee 6.  He remains significantly edematous and encephalopathic.  A Silver catheter is being placed for closer monitoring of urine output in the setting of worsening kidney function.  Nephrology also consulted.    Patient seen.  He admits to me that he continued to drink after his recent discharge.  Per chart review it appears he was attending regular visits with his primary care at [facility].  It is unclear if he completed his steroid dose for alcoholic hepatitis.  On admission, his total bilirubin was 17.3, 4.2 on discharge.  His liver enzymes also increased and his ammonia was 122.  INR is about the same.  His creatinine on admission was 1.38 and previously 0.84.  Now it is 2.78.  Bilirubin today is 10 with 6.4 direct.    Given the CT scan during last admission showing pancreatic tail lesion, MRI was completed and remarks the tail of the pancreas is somewhat irregular, could be prior pancreatitis but cannot exclude mass.  However given limited exam recommend 6-month follow-up after improvement.  Noted to have diffuse subcutaneous edema.    6/7/2025: patient seen, clinically worse. Obtunded and barely rousable. Observed apnea and audible wheezing. D/W Dr. Lopez. One BM today. Cr about the same, T. Bili 11.2. INR and direct bili pending.    Hospital Medications:  Current Facility-Administered Medications   Medication Dose Frequency Provider Last Rate Last Admin    ipratropium-albuterol (DUONEB) nebulizer solution  3 mL Q4H PRN (RT) Lisette Santizo D.N.P.        midodrine  (Proamatine) tablet 5 mg  5 mg TID WITH MEALS Fernanda Daniels D.OIesha   5 mg at 06/07/25 0756    octreotide (SandoSTATIN) 1,250 mcg in  mL Infusion  50 mcg/hr Continuous Fernanda Daniels D.O. 10 mL/hr at 06/06/25 1558 50 mcg/hr at 06/06/25 1558    albumin human 25% solution 50 g  50 g Q8HRS Fernanda Daniels D.O. 150 mL/hr at 06/07/25 0525 50 g at 06/07/25 0525    thiamine (B-1) injection 100 mg  100 mg DAILY Fernanda Daniels D.O.   100 mg at 06/07/25 0518    Respiratory Therapy Consult   Continuous RT FELICIANO Jackman.SULMA        [Held by provider] furosemide (Lasix) injection 80 mg  80 mg TID Fernanda Daniels D.O.   80 mg at 06/05/25 2241    vitamin D3 (Cholecalciferol) tablet 1,000 Units  1,000 Units DAILY Fernanda Daniels D.O.   1,000 Units at 06/07/25 0518    magnesium oxide tablet 400 mg  400 mg DAILY Fernanda Daniels D.O.   400 mg at 06/07/25 0518    albuterol inhaler 2 Puff  2 Puff Q4HRS PRN EDWAR JackmanOIesha   2 Puff at 06/07/25 0322    gabapentin (Neurontin) capsule 300 mg  300 mg TID Fernanda Daniels D.O.   300 mg at 06/07/25 0518    diazePAM (Valium) tablet 2 mg  2 mg Q6HRS PRN Gordon Brambila M.D.        enoxaparin (Lovenox) inj 40 mg  40 mg Q12HRS Gordon Brambila M.D.   40 mg at 06/07/25 0518    [Held by provider] spironolactone (Aldactone) tablet 100 mg  100 mg Q DAY Gordon Brambila M.D.   100 mg at 06/06/25 0424    oxyCODONE immediate-release (Roxicodone) tablet 5 mg  5 mg Q3HRS PRN Héctor Jarquin M.D.   5 mg at 06/05/25 0602    Or    oxyCODONE immediate release (Roxicodone) tablet 10 mg  10 mg Q3HRS PRN Héctor Jarquin M.D.   10 mg at 06/06/25 0423    Or    morphine 4 MG/ML injection 4 mg  4 mg Q3HRS PRN Héctor Jarquin M.D.        hydrALAZINE (Apresoline) injection 10 mg  10 mg Q4HRS PRN Héctor Jarquin M.D.        ondansetron (Zofran) syringe/vial injection 4 mg  4 mg Q4HRS PRN Héctor Jarquin M.D.        ondansetron (Zofran ODT) dispertab 4 mg  4 mg Q4HRS PROSMAR Jarquin M.D.         "promethazine (Phenergan) tablet 12.5-25 mg  12.5-25 mg Q4HRS PRN Héctor Jarquin M.D.        promethazine (Phenergan) suppository 12.5-25 mg  12.5-25 mg Q4HRS PRN Héctor Jarquin M.D.        prochlorperazine (Compazine) injection 5-10 mg  5-10 mg Q4HRS PRN Héctor Jarquin M.D.        lactulose 20 GM/30ML solution 30 mL  30 mL TID Fernanda Daniels D.O.   30 mL at 06/07/25 0518    carvedilol (Coreg) tablet 3.125 mg  3.125 mg BID WITH MEALS Héctor Jarquin M.D.   3.125 mg at 06/06/25 1817    folic acid (Folvite) tablet 1 mg  1 mg DAILY Héctor Jarquin M.D.   1 mg at 06/07/25 0518    hydrOXYzine HCl (Atarax) tablet 25 mg  25 mg TID PRN Héctor Jarquin M.D.   25 mg at 06/05/25 2019    tamsulosin (Flomax) capsule 0.4 mg  0.4 mg AFTER BREAKFAST Héctor Jarquin M.D.   0.4 mg at 06/07/25 0755    hydrocortisone 1 % cream   BID Gordon Brambila M.D.   Given at 06/07/25 0517   Last reviewed on 5/29/2025  3:51 AM by Bernard Torres       Review of Systems:  Review of Systems   Constitutional:  Positive for malaise/fatigue. Negative for chills and fever.   HENT:  Negative for hearing loss.    Eyes:  Negative for blurred vision.   Respiratory:  Positive for shortness of breath. Negative for cough.    Cardiovascular:  Positive for leg swelling. Negative for chest pain.   Gastrointestinal:  Negative for abdominal pain, blood in stool, constipation, diarrhea, heartburn, melena, nausea and vomiting.   Genitourinary:  Negative for dysuria.   Musculoskeletal:  Negative for back pain.   Skin:  Negative for rash.   Neurological:  Positive for weakness. Negative for dizziness.   Psychiatric/Behavioral:  Positive for memory loss and substance abuse. Negative for depression. The patient is nervous/anxious.    All other systems reviewed and are negative.        Vital signs:  Weight/BMI: Body mass index is 53.27 kg/m².  BP (!) 148/102   Pulse (!) 107   Temp 36.4 °C (97.5 °F) (Temporal)   Resp 17   Ht 1.6 m (5' 3\")   Wt (!) " 136 kg (300 lb 11.3 oz)   SpO2 93%   Vitals:    06/06/25 2314 06/07/25 0322 06/07/25 0537 06/07/25 0714   BP: 138/77  112/78 (!) 148/102   Pulse: (!) 103 (!) 119  (!) 107   Resp: (!) 24 (!) 24 (!) 26 17   Temp: 36.6 °C (97.9 °F)  36.7 °C (98.1 °F) 36.4 °C (97.5 °F)   TempSrc: Temporal  Temporal Temporal   SpO2: 94% 95% 96% 93%   Weight:   (!) 136 kg (300 lb 11.3 oz)    Height:         Oxygen Therapy:  Pulse Oximetry: 93 %, O2 (LPM): 2.5, O2 Delivery Device: Silicone Nasal Cannula    Intake/Output Summary (Last 24 hours) at 6/7/2025 0809  Last data filed at 6/7/2025 0744  Gross per 24 hour   Intake 1458 ml   Output 530 ml   Net 928 ml       Physical Exam  Vitals and nursing note reviewed.   Constitutional:       General: He is not in acute distress.     Appearance: He is obese. He is ill-appearing.   HENT:      Head: Normocephalic and atraumatic.      Right Ear: External ear normal.      Left Ear: External ear normal.      Nose: Nose normal.      Mouth/Throat:      Mouth: Mucous membranes are moist.      Pharynx: Oropharynx is clear.   Eyes:      General: Scleral icterus present.   Cardiovascular:      Rate and Rhythm: Regular rhythm. Tachycardia present.      Pulses: Normal pulses.      Heart sounds: Normal heart sounds.   Pulmonary:      Effort: Respiratory distress present.      Breath sounds: Rales present.   Abdominal:      General: There is distension.      Tenderness: There is abdominal tenderness.   Musculoskeletal:         General: Swelling present. Normal range of motion.      Cervical back: Normal range of motion.      Right lower leg: Edema present.      Left lower leg: Edema present.   Skin:     General: Skin is warm.      Capillary Refill: Capillary refill takes less than 2 seconds.      Coloration: Skin is jaundiced.   Neurological:      Mental Status: He is alert.      Motor: Weakness present.      Comments: Encephalopathic   Psychiatric:         Mood and Affect: Mood normal.         Behavior:  Behavior normal.         Labs:  Recent Labs     06/05/25 0419 06/06/25 0115 06/07/25 0445   SODIUM 130* 132* 128*   POTASSIUM 4.6 4.7 5.3   CHLORIDE 100 101 97   CO2 19* 20 18*   BUN 26* 34* 44*   CREATININE 1.91* 2.78* 2.76*   MAGNESIUM  --  2.1  --    PHOSPHORUS  --  4.1  --    CALCIUM 7.9* 8.0* 8.5     Recent Labs     06/05/25 0419 06/06/25 0115 06/07/25 0445   ALTSGPT 65* 54* 54*   ASTSGOT 147* 119* 113*   ALKPHOSPHAT 268* 238* 193*   TBILIRUBIN 11.2* 10.0* 11.2*   DBILIRUBIN  --  6.4*  --    GLUCOSE 223* 187* 232*     Recent Labs     06/05/25 0419 06/06/25 0115 06/07/25 0445   WBC  --  11.0*  --    ASTSGOT 147* 119* 113*   ALTSGPT 65* 54* 54*   ALKPHOSPHAT 268* 238* 193*   TBILIRUBIN 11.2* 10.0* 11.2*     Recent Labs     06/06/25 0115   RBC 2.96*   HEMOGLOBIN 9.7*   HEMATOCRIT 29.6*   PLATELETCT 193     Recent Results (from the past 24 hours)   URINALYSIS    Collection Time: 06/06/25  6:11 PM    Specimen: Urine, Silver Cath   Result Value Ref Range    Color Dark Yellow     Character Clear     Specific Gravity 1.018 <1.035    Ph 5.5 5.0 - 8.0    Glucose Negative Negative mg/dL    Ketones Trace (A) Negative mg/dL    Protein 30 (A) Negative mg/dL    Bilirubin Moderate (A) Negative    Urobilinogen, Urine 1.0 <=1.0 EU/dL    Nitrite Negative Negative    Leukocyte Esterase Trace (A) Negative    Occult Blood Small (A) Negative    Micro Urine Req Microscopic    URINE MICROSCOPIC (W/UA)    Collection Time: 06/06/25  6:11 PM   Result Value Ref Range    WBC 3-5 (A) /hpf    RBC 11-20 (A) 0 - 2 /hpf    Bacteria None Seen None /hpf    Epithelial Cells 11-20 (A) 0 - 5 /hpf    Urine Casts 6-10 (A) 0 - 2 /lpf    Hyaline Cast Present /lpf   Comp Metabolic Panel    Collection Time: 06/07/25  4:45 AM   Result Value Ref Range    Sodium 128 (L) 135 - 145 mmol/L    Potassium 5.3 3.6 - 5.5 mmol/L    Chloride 97 96 - 112 mmol/L    Co2 18 (L) 20 - 33 mmol/L    Anion Gap 13.0 7.0 - 16.0    Glucose 232 (H) 65 - 99 mg/dL    Bun 44  (H) 8 - 22 mg/dL    Creatinine 2.76 (H) 0.50 - 1.40 mg/dL    Calcium 8.5 8.5 - 10.5 mg/dL    Correct Calcium 8.9 8.5 - 10.5 mg/dL    AST(SGOT) 113 (H) 12 - 45 U/L    ALT(SGPT) 54 (H) 2 - 50 U/L    Alkaline Phosphatase 193 (H) 30 - 99 U/L    Total Bilirubin 11.2 (H) 0.1 - 1.5 mg/dL    Albumin 3.5 3.2 - 4.9 g/dL    Total Protein 6.9 6.0 - 8.2 g/dL    Globulin 3.4 1.9 - 3.5 g/dL    A-G Ratio 1.0 g/dL   ESTIMATED GFR    Collection Time: 06/07/25  4:45 AM   Result Value Ref Range    GFR (CKD-EPI) 29 (A) >60 mL/min/1.73 m 2       Radiology Review:  DX-CHEST-PORTABLE (1 VIEW)   Final Result      Cardiomegaly. Hypoinflation. No definite new abnormality.      US-ABDOMEN LTD (SOFT TISSUE)   Final Result      No ascites.         MR-ABDOMEN-WITH & W/O   Final Result      1.  The tail of the pancreas appears somewhat irregular and stranding with increased signal but evaluation is very limited due to significant motion artifact. This could relate to sequela of prior pancreatitis. A mass cannot be excluded. Follow-up in 6    months with MR after improvement is recommended.   2.  There is an accessory splenule adjacent to the spleen   3. Diffuse subcutaneous edema .                        EC-ECHOCARDIOGRAM COMPLETE W/ CONT   Final Result      US-EXTREMITY VENOUS LOWER BILAT   Final Result      US-RUQ   Final Result         1.  Thickened gallbladder wall without visualized shadowing stones, consider acalculous cholecystitis, could be further evaluated with HIDA scan as clinically appropriate   2.  Hepatomegaly   3.  Echogenic liver, compatible with fatty change versus fibrosis      CT-PANCREAS AND ABDOMEN WITH & W/O   Final Result         1.  Masslike structure adjacent to the tail of pancreas with possible slight enhancement on arterial phase imaging, recommend follow-up MRI of the pancreas with contrast for more definitive characterization.   2.  Ovoid mass in the splenic hilum with similar density to spleen on all contrast  phases, appearance favoring splenule.   3.  Changes of cirrhosis.   4.  Hepatomegaly          MDM (Data Review):   -Records reviewed and summarized in current documentation  -I personally reviewed and interpreted the laboratory results  -I personally reviewed the radiology images    Assessment/Recommendations:  Alcohol use disorder with multiple admissions related to alcohol, DUI, no long term sobriety  Abdominal pain  Diffuse subcutaneous edema - cirrhosis vs nephrotic syndrome/acute kidney failure vs nutritional deficiency vs steroid use vs ?lymphatic obstruction due to malignancy  Previous thyroid studies normal, echo with grossly normal LV function   Hepatorenal syndrome with urine sodium less than 20  Transaminitis and hyperbilirubinemia, direct greater than indirect  Hypoalbuminemia  Thrombocytopenia  Mild coagulopathy  Hypervolemic hyponatremia  Macrocytic anemia  Vitamin D deficiency  Previous HBV, HCV negative, AMA neg, OTF and f actin neg, ferritin normal  Mild asterixis and hepatic encephalopathy  CT Chest/abd/pelvis findings concerning for pancreatic mass, MRI with limited evaluation, consider sequela of prior pancreatitis versus mass, recommend repeat imaging in 6 months. CA [id number] 541 in April 2025  Vitamin D deficiency    Current MELD 3.0 = 31  Child Menard class B  Maddrey's 20.6    Recommendations:  Only one BM today, needs to take lactulose titrated to 3-4 BM daily to manage encephalopathy. Add rifaxamin. If altered, give lactulose rectally.   Agree with nephrology recommendations to treat for HRS with octreotide, albumin 1 mg/kg per day, and midodrine.  Hold diuresis.  Urinalysis negative for infection, some hematuria and casts noted  Low-sodium diet, high-protein-nutritional support is critical in the management of liver disease  Primary team working up for underlying cushingoid syndrome, workup ongoing  No ascites on RUQ US  Monitor for signs of bleeding, downtrending hemoglobin  High-dose thiamine  supplementation, folic acid, consider vitamin D  Although Rivera's is high, he just completed a course of steroids for alcoholic hepatitis.  It is not recommended to initiate additional course in the setting of ongoing alcohol use    Unfortunately given his repeated admissions for alcohol related liver decompensation despite counseling to maintain sobriety and refrain from drinking, he is not a candidate for liver transplant at this time     GI will continue to follow    Discussed with patient, RN, Dr. Lopez, Dr. Esparza    ..Michelle Edwards, STAN,  APRN    Core Quality Measures   Reviewed items::  Labs, Medications and Radiology reports reviewed

## 2025-06-07 NOTE — PROGRESS NOTES
Monitor Summary  Rhythm: Normal Sinus Rhythm  Rate: 93  Ectopy: None Noted  .15 / .06 / .30    12 hr Chart check.

## 2025-06-07 NOTE — PROGRESS NOTES
Pt refusing bed alarm at this time. Pt educated on purpose of fall interventions to prevent injury, pt is A&O 4 and understands risks. Charge RN notified

## 2025-06-07 NOTE — PROGRESS NOTES
Bedside report received from off going RN/tech: ALF Majano, assumed care of patient.     Fall Risk Score: HIGH RISK  Fall risk interventions in place: Place yellow fall risk ID band on patient, Provide patient/family education based on risk assessment, Educate patient/family to call staff for assistance when getting out of bed, Place fall precaution signage outside patient door, Utilize bed/chair fall alarm, Notify charge of high risk for huddle, and Refuses - escalate to charge  Bed type: Regular (Osmani Score less than 17 interventions in place)  Patient on cardiac monitor: Yes  IVF/IV medications: Infusion per MAR (List Med(s)) Octreotide @ 10ml/hr  Oxygen: How many liters 2L, Traced the line to wall oxygen, and No oxygen tank in room  Bedside sitter: Not Applicable   Isolation: Not applicable    Patient is Aox4, refusing bed alarm. Education provided on risk vs. Benefits. Patient endorses understanding. Charge notified.

## 2025-06-07 NOTE — PROGRESS NOTES
Monitor summary    Rhythm: SR, ST   Rate:   Ectopy: rare PVC, rare PAC  Measurements: .16/.10/.36

## 2025-06-07 NOTE — CARE PLAN
Problem: Bronchoconstriction  Goal: Improve in air movement and diminished wheezing  Description: Target End Date:  2 to 3 days1.  Implement inhaled treatments2.  Evaluate and manage medication effects  Outcome: Not Met   Duo q4

## 2025-06-07 NOTE — WOUND TEAM
"[facility]  Inpatient Services  Initial Wound and Skin Care Evaluation    Admission Date: 5/28/2025     Last order of IP CONSULT TO WOUND CARE was found on 6/7/2025 from Hospital Encounter on 5/28/2025     HPI, PMH, SH: Reviewed    Past Surgical History[1]  Social History     Tobacco Use    Smoking status: Former     Types: Cigarettes    Smokeless tobacco: Never   Substance Use Topics    Alcohol use: Yes     Alcohol/week: 7.2 oz     Types: 12 Shots of liquor per week     Comment: \"10-15 vodka mini bottles per day\"     Chief Complaint   Patient presents with    Abdominal Swelling     Pt reports facial, abdominal and leg swelling. Pt recently diagnosed with alcoholic cirrhosis, daily ETOH use.     Facial Swelling    Foot Swelling     Diagnosis: Acute alcoholic hepatitis [K70.10]    Unit where seen by Wound Team: T830/00     WOUND CONSULT RELATED TO:  B legs    WOUND TEAM PLAN OF CARE - Frequency of Follow-up:   Nursing to follow dressing orders written for wound care. Contact wound team if area fails to progress, deteriorates or with any questions/concerns if something comes up before next scheduled follow up (See below as to whether wound is following and frequency of wound follow up)   Weekly - Wound care to follow up for B legs    Bedside nurse to complete site care and dressing changes per wound orders.    WOUND HISTORY:   Patient is a 40 yo male with alcoholic cirrhosis, ongoing alcohol use, DM, HTN, BOLIVAR, morbid obesity who presented with worsening swelling on 5/28/2025. Per physician's note, he continues to drink 9-10 shots every day. He was found with worsening liver function, elevated ammonia. Patient was sleepy but compliant during the wound evaluation.       WOUND ASSESSMENT/LDA     Wound Foot Anterior Left (Active)   Site Assessment Pink;Red;Edema 06/07/25 1000   Periwound Assessment Edema;Red;Pink 06/07/25 1000   Closure Open to air 06/07/25 1000   Drainage Amount Scant 06/07/25 1000 "   Drainage Description Serous;Clear;Yellow 06/07/25 1000   Treatments Cleansed 06/07/25 1000   Wound Cleansing Foam Cleanser/Washcloth 06/07/25 1000   Dressing Status Intact 06/07/25 1000   Dressing Changed Changed 06/07/25 1000   Dressing Options Petrolatum Gauze (yellow);Dry Roll Gauze 06/07/25 1000   Dressing Change/Treatment Frequency Every 48 hrs, and As Needed 06/07/25 1000   NEXT Dressing Change/Treatment Date 06/09/25 06/07/25 1000   NEXT Weekly Photo (Inpatient Only) 06/14/25 06/07/25 1000   ** WOUND NURSE ONLY BELOW THIS LINE** WOUND 06/07/25 1000   Wound Team Following Weekly 06/07/25 1000   Shape irregular 06/07/25 1000   Wound Odor None 06/07/25 1000   WOUND NURSE ONLY - Time Spent with Patient (mins) 60 06/07/25 1000       Wound 06/06/25 Foot Anterior Right (Active)   Site Assessment Pink;Red;Edema 06/07/25 1000   Periwound Assessment Edema;Pink;Red 06/07/25 1000   Closure Open to air 06/07/25 1000   Wound Cleansing Foam Cleanser/Washcloth 06/07/25 1000   Dressing Status Intact 06/07/25 1000   Dressing Changed Changed 06/07/25 1000   Dressing Options Petrolatum Gauze (yellow);Dry Roll Gauze 06/07/25 1000   Dressing Change/Treatment Frequency Every 48 hrs, and As Needed 06/07/25 1000   NEXT Dressing Change/Treatment Date 06/09/25 06/07/25 1000   ** WOUND NURSE ONLY BELOW THIS LINE** WOUND 06/07/25 1000   Wound Team Following Weekly 06/07/25 1000   Shape irregular 06/07/25 1000   Wound Odor None 06/07/25 1000   WOUND NURSE ONLY - Time Spent with Patient (mins) 60 06/07/25 1000       Vascular:    PARISH:   No results found.    Lab Values:    Lab Results   Component Value Date/Time    WBC 11.0 (H) 06/06/2025 01:15 AM    RBC 2.96 (L) 06/06/2025 01:15 AM    HEMOGLOBIN 9.7 (L) 06/06/2025 01:15 AM    HEMATOCRIT 29.6 (L) 06/06/2025 01:15 AM    CREACTPROT <0.30 11/06/2024 03:27 PM    SEDRATEWES 10 11/06/2024 03:27 PM    HBA1C 5.4 04/10/2025 01:36 AM    PLATELETCT 193 06/06/2025 01:15 AM         Culture Results  show:  No results found for this or any previous visit (from the past 720 hours).    Pain Level/Medicated:  None, Tolerated without pain medication       INTERVENTIONS BY WOUND TEAM:  Chart and images reviewed. Discussed with bedside RN. All areas of concern (based on picture review, LDA review and discussion with bedside RN) have been thoroughly assessed. Documentation of areas based on significant findings. This RN in to assess patient. Performed standard wound care which includes appropriate positioning, dressing removal and non-selective debridement. Pictures and measurements obtained weekly if/when required.    Wound:  B LEs  Preparation for Dressing removal: Open to air  Cleansed/Non-selectively Debrided with:  Moist warm washcloth  Penny wound: Cleansed with Moist warm washcloth, Prepped with N/A  Primary Dressing:  Petrolatum gauze (yellow)  Secondary (Outer) Dressing: Dry rolled guaze (applied very loose)    Advanced Wound Care Discharge Planning  Number of Clinicians necessary to complete wound care: 2  Is patient requiring IV pain medications for dressing changes:  No   Length of time for dressing change 45 min. (This does not include chart review, pre-medication time, set up, clean up or time spent charting.)    Interdisciplinary consultation: Patient, Bedside RN (Amara), Kimmie MARISCAL (Wound PT) and Pari ZEPEDA (SPT).  Pressure injury and staging reviewed with N/A.    EVALUATION / RATIONALE FOR TREATMENT:     Date:  06/07/25  Wound Status:  Initial evaluation    B Les: Weeping lower leg/foot edema as a result of patient's comorbidities. Patient was not a candidate for compression as his cardiac symptoms are not yet controlled. Re-eval for compression in 1 week.         Goals: Decrease BLE edema circumference by 25% in 2 weeks.    NURSING PLAN OF CARE ORDERS:  Dressing changes: See Dressing Care orders  Skin care: See Skin Care orders    NUTRITION RECOMMENDATIONS   Wound Team Recommendations:  N/A    DIET  ORDERS (From admission to next 24h)       Start     Ordered    06/06/25 1707  Diet Order Diet: 2 Gram Sodium; Nutrient modifications: (optional): High Protein; Fluid modifications: (optional): 2000 ml Fluid Restriction  ALL MEALS        Question Answer Comment   Diet: 2 Gram Sodium    Nutrient modifications: (optional) High Protein    Fluid modifications: (optional) 2000 ml Fluid Restriction        06/06/25 1706                    PREVENTATIVE INTERVENTIONS:    Q shift Osmani - performed per nursing policy  Q shift pressure point assessments - performed per nursing policy    Surface/Positioning  Reposition q 2 hours with wedges - Ordered for nursing to apply    Offloading/Redistribution  Float Heels off Bed with Pillows - Ordered for nursing to apply         Anticipated discharge plans:  TBD        Vac Discharge Needs:  Vac Discharge plan is purely a recommendation from wound team and not a requirement for discharge unless otherwise stated by physician.  Not Applicable Pt not on a wound vac        [1]   Past Surgical History:  Procedure Laterality Date    OTHER ORTHOPEDIC SURGERY Right     broken hand

## 2025-06-07 NOTE — PROGRESS NOTES
Bedside report received from off going RN/tech: Dana, assumed care of patient.     Fall Risk Score: HIGH RISK  Fall risk interventions in place: Place yellow fall risk ID band on patient, Provide patient/family education based on risk assessment, Educate patient/family to call staff for assistance when getting out of bed, and Refuses - escalate to charge  Bed type: Low air loss (Osmani Score less than 17 interventions in place)  Patient on cardiac monitor: Yes  IVF/IV medications: Infusion per MAR (List Med(s)) octreotide  Oxygen: How many liters 2.5L, Traced the line to wall oxygen, and No oxygen tank in room  Bedside sitter: Not Applicable   Isolation: Not applicable

## 2025-06-08 PROBLEM — K76.82 HEPATIC ENCEPHALOPATHY (HCC): Status: ACTIVE | Noted: 2025-01-01

## 2025-06-08 PROBLEM — J45.901 ASTHMA EXACERBATION: Status: ACTIVE | Noted: 2017-06-29

## 2025-06-08 PROBLEM — G31.2 ALCOHOLIC ENCEPHALOPATHY (HCC): Status: ACTIVE | Noted: 2025-01-01

## 2025-06-08 LAB
ALBUMIN SERPL BCP-MCNC: 3.7 G/DL (ref 3.2–4.9)
ALBUMIN/GLOB SERPL: 1.2 G/DL
ALP SERPL-CCNC: 154 U/L (ref 30–99)
ALT SERPL-CCNC: 37 U/L (ref 2–50)
AMMONIA PLAS-SCNC: 137 UMOL/L (ref 11–45)
ANION GAP SERPL CALC-SCNC: 15 MMOL/L (ref 7–16)
AST SERPL-CCNC: 69 U/L (ref 12–45)
BASE EXCESS BLDA CALC-SCNC: -6 MMOL/L (ref -4–3)
BASE EXCESS BLDA CALC-SCNC: -6 MMOL/L (ref -4–3)
BILIRUB CONJ SERPL-MCNC: 6.5 MG/DL (ref 0.1–0.5)
BILIRUB SERPL-MCNC: 10 MG/DL (ref 0.1–1.5)
BODY TEMPERATURE: 36.8 CENTIGRADE
BODY TEMPERATURE: 36.8 CENTIGRADE
BUN SERPL-MCNC: 53 MG/DL (ref 8–22)
CALCIUM ALBUM COR SERPL-MCNC: 9.1 MG/DL (ref 8.5–10.5)
CALCIUM SERPL-MCNC: 8.9 MG/DL (ref 8.5–10.5)
CHLORIDE SERPL-SCNC: 102 MMOL/L (ref 96–112)
CO2 SERPL-SCNC: 14 MMOL/L (ref 20–33)
CREAT SERPL-MCNC: 2.74 MG/DL (ref 0.5–1.4)
ERYTHROCYTE [DISTWIDTH] IN BLOOD BY AUTOMATED COUNT: 65.9 FL (ref 35.9–50)
GFR SERPLBLD CREATININE-BSD FMLA CKD-EPI: 30 ML/MIN/1.73 M 2
GLOBULIN SER CALC-MCNC: 3.2 G/DL (ref 1.9–3.5)
GLUCOSE SERPL-MCNC: 168 MG/DL (ref 65–99)
HCO3 BLDA-SCNC: 19 MMOL/L (ref 21–28)
HCO3 BLDA-SCNC: 19 MMOL/L (ref 21–28)
HCT VFR BLD AUTO: 25.5 % (ref 42–52)
HGB BLD-MCNC: 8.5 G/DL (ref 14–18)
INR PPP: 1.39 (ref 0.87–1.13)
LACTATE SERPL-SCNC: 1.7 MMOL/L (ref 0.5–2)
MAGNESIUM SERPL-MCNC: 2.2 MG/DL (ref 1.5–2.5)
MCH RBC QN AUTO: 32.7 PG (ref 27–33)
MCHC RBC AUTO-ENTMCNC: 33.3 G/DL (ref 32.3–36.5)
MCV RBC AUTO: 98.1 FL (ref 81.4–97.8)
NT-PROBNP SERPL IA-MCNC: 2718 PG/ML (ref 0–125)
PCO2 BLDA: 37.8 MMHG (ref 32–48)
PCO2 BLDA: 38.5 MMHG (ref 32–48)
PCO2 TEMP ADJ BLDA: 37.5 MMHG (ref 32–48)
PCO2 TEMP ADJ BLDA: 38.1 MMHG (ref 32–48)
PH BLDA: 7.3 [PH] (ref 7.35–7.45)
PH BLDA: 7.31 [PH] (ref 7.35–7.45)
PH TEMP ADJ BLDA: 7.31 [PH] (ref 7.35–7.45)
PH TEMP ADJ BLDA: 7.32 [PH] (ref 7.35–7.45)
PHOSPHATE SERPL-MCNC: 4.7 MG/DL (ref 2.5–4.5)
PLATELET # BLD AUTO: 212 K/UL (ref 164–446)
PMV BLD AUTO: 9.2 FL (ref 9–12.9)
PO2 BLDA: 114.5 MMHG (ref 83–108)
PO2 BLDA: 96.4 MMHG (ref 83–108)
PO2 TEMP ADJ BLDA: 113.3 MMHG (ref 83–108)
PO2 TEMP ADJ BLDA: 95.2 MMHG (ref 83–108)
POTASSIUM SERPL-SCNC: 5.1 MMOL/L (ref 3.6–5.5)
PROCALCITONIN SERPL-MCNC: 0.84 NG/ML
PROT SERPL-MCNC: 6.9 G/DL (ref 6–8.2)
PROTHROMBIN TIME: 17.1 SEC (ref 12–14.6)
RBC # BLD AUTO: 2.6 M/UL (ref 4.7–6.1)
SAO2 % BLDA: 95 % (ref 93–99)
SAO2 % BLDA: 97 % (ref 93–99)
SODIUM SERPL-SCNC: 131 MMOL/L (ref 135–145)
WBC # BLD AUTO: 12.5 K/UL (ref 4.8–10.8)

## 2025-06-08 PROCEDURE — 302307 BAG FECAL MANAGEMENT TEMPORARY COLLECTION WITH FILTER - SEAL SIGNAL FMS: Performed by: STUDENT IN AN ORGANIZED HEALTH CARE EDUCATION/TRAINING PROGRAM

## 2025-06-08 PROCEDURE — 700105 HCHG RX REV CODE 258

## 2025-06-08 PROCEDURE — 87040 BLOOD CULTURE FOR BACTERIA: CPT | Mod: 91

## 2025-06-08 PROCEDURE — 83880 ASSAY OF NATRIURETIC PEPTIDE: CPT

## 2025-06-08 PROCEDURE — 82140 ASSAY OF AMMONIA: CPT

## 2025-06-08 PROCEDURE — 82803 BLOOD GASES ANY COMBINATION: CPT

## 2025-06-08 PROCEDURE — 83605 ASSAY OF LACTIC ACID: CPT

## 2025-06-08 PROCEDURE — 99291 CRITICAL CARE FIRST HOUR: CPT | Performed by: EMERGENCY MEDICINE

## 2025-06-08 PROCEDURE — 83735 ASSAY OF MAGNESIUM: CPT

## 2025-06-08 PROCEDURE — P9047 ALBUMIN (HUMAN), 25%, 50ML: HCPCS | Mod: JZ | Performed by: INTERNAL MEDICINE

## 2025-06-08 PROCEDURE — 85610 PROTHROMBIN TIME: CPT

## 2025-06-08 PROCEDURE — 84100 ASSAY OF PHOSPHORUS: CPT

## 2025-06-08 PROCEDURE — 94640 AIRWAY INHALATION TREATMENT: CPT

## 2025-06-08 PROCEDURE — 80053 COMPREHEN METABOLIC PANEL: CPT

## 2025-06-08 PROCEDURE — 770000 HCHG ROOM/CARE - INTERMEDIATE ICU *

## 2025-06-08 PROCEDURE — 700111 HCHG RX REV CODE 636 W/ 250 OVERRIDE (IP): Performed by: INTERNAL MEDICINE

## 2025-06-08 PROCEDURE — 700105 HCHG RX REV CODE 258: Performed by: INTERNAL MEDICINE

## 2025-06-08 PROCEDURE — 700102 HCHG RX REV CODE 250 W/ 637 OVERRIDE(OP)

## 2025-06-08 PROCEDURE — 99233 SBSQ HOSP IP/OBS HIGH 50: CPT | Performed by: INTERNAL MEDICINE

## 2025-06-08 PROCEDURE — A9270 NON-COVERED ITEM OR SERVICE: HCPCS | Performed by: NURSE PRACTITIONER

## 2025-06-08 PROCEDURE — 99291 CRITICAL CARE FIRST HOUR: CPT | Performed by: STUDENT IN AN ORGANIZED HEALTH CARE EDUCATION/TRAINING PROGRAM

## 2025-06-08 PROCEDURE — 700111 HCHG RX REV CODE 636 W/ 250 OVERRIDE (IP): Performed by: EMERGENCY MEDICINE

## 2025-06-08 PROCEDURE — 700105 HCHG RX REV CODE 258: Performed by: EMERGENCY MEDICINE

## 2025-06-08 PROCEDURE — 84145 PROCALCITONIN (PCT): CPT

## 2025-06-08 PROCEDURE — 700111 HCHG RX REV CODE 636 W/ 250 OVERRIDE (IP): Mod: JZ | Performed by: STUDENT IN AN ORGANIZED HEALTH CARE EDUCATION/TRAINING PROGRAM

## 2025-06-08 PROCEDURE — 99232 SBSQ HOSP IP/OBS MODERATE 35: CPT | Performed by: NURSE PRACTITIONER

## 2025-06-08 PROCEDURE — 700111 HCHG RX REV CODE 636 W/ 250 OVERRIDE (IP): Mod: JZ | Performed by: INTERNAL MEDICINE

## 2025-06-08 PROCEDURE — 82248 BILIRUBIN DIRECT: CPT

## 2025-06-08 PROCEDURE — 99497 ADVNCD CARE PLAN 30 MIN: CPT | Mod: MISDOCU | Performed by: STUDENT IN AN ORGANIZED HEALTH CARE EDUCATION/TRAINING PROGRAM

## 2025-06-08 PROCEDURE — 700101 HCHG RX REV CODE 250

## 2025-06-08 PROCEDURE — 700102 HCHG RX REV CODE 250 W/ 637 OVERRIDE(OP): Performed by: NURSE PRACTITIONER

## 2025-06-08 PROCEDURE — A9270 NON-COVERED ITEM OR SERVICE: HCPCS

## 2025-06-08 PROCEDURE — 700101 HCHG RX REV CODE 250: Performed by: STUDENT IN AN ORGANIZED HEALTH CARE EDUCATION/TRAINING PROGRAM

## 2025-06-08 PROCEDURE — 700111 HCHG RX REV CODE 636 W/ 250 OVERRIDE (IP): Mod: JZ

## 2025-06-08 PROCEDURE — 85027 COMPLETE CBC AUTOMATED: CPT

## 2025-06-08 RX ORDER — MAGNESIUM SULFATE HEPTAHYDRATE 40 MG/ML
2 INJECTION, SOLUTION INTRAVENOUS ONCE
Status: COMPLETED | OUTPATIENT
Start: 2025-06-08 | End: 2025-06-08

## 2025-06-08 RX ORDER — AZITHROMYCIN 250 MG/1
500 TABLET, FILM COATED ORAL DAILY
Status: DISCONTINUED | OUTPATIENT
Start: 2025-06-09 | End: 2025-06-09

## 2025-06-08 RX ORDER — FUROSEMIDE 10 MG/ML
60 INJECTION INTRAMUSCULAR; INTRAVENOUS ONCE
Status: COMPLETED | OUTPATIENT
Start: 2025-06-08 | End: 2025-06-08

## 2025-06-08 RX ORDER — LACTULOSE 10 G/15ML
45 SOLUTION ORAL 4 TIMES DAILY
Status: DISCONTINUED | OUTPATIENT
Start: 2025-06-09 | End: 2025-06-09

## 2025-06-08 RX ORDER — LACTULOSE 10 G/15ML
45 SOLUTION ORAL EVERY 6 HOURS
Status: DISCONTINUED | OUTPATIENT
Start: 2025-06-08 | End: 2025-06-08

## 2025-06-08 RX ORDER — METHYLPREDNISOLONE SODIUM SUCCINATE 40 MG/ML
60 INJECTION, POWDER, LYOPHILIZED, FOR SOLUTION INTRAMUSCULAR; INTRAVENOUS EVERY 8 HOURS
Status: DISCONTINUED | OUTPATIENT
Start: 2025-06-09 | End: 2025-06-09

## 2025-06-08 RX ORDER — IPRATROPIUM BROMIDE AND ALBUTEROL SULFATE 2.5; .5 MG/3ML; MG/3ML
3 SOLUTION RESPIRATORY (INHALATION)
Status: DISCONTINUED | OUTPATIENT
Start: 2025-06-08 | End: 2025-06-10

## 2025-06-08 RX ORDER — LACTULOSE 10 G/15ML
300 SOLUTION ORAL EVERY 4 HOURS PRN
Status: DISCONTINUED | OUTPATIENT
Start: 2025-06-08 | End: 2025-06-10

## 2025-06-08 RX ORDER — LACTULOSE 10 G/15ML
300 SOLUTION ORAL EVERY 4 HOURS
Status: DISCONTINUED | OUTPATIENT
Start: 2025-06-09 | End: 2025-06-09

## 2025-06-08 RX ORDER — LEVALBUTEROL INHALATION SOLUTION 1.25 MG/3ML
1.25 SOLUTION RESPIRATORY (INHALATION)
Status: DISCONTINUED | OUTPATIENT
Start: 2025-06-08 | End: 2025-06-11 | Stop reason: HOSPADM

## 2025-06-08 RX ORDER — AZITHROMYCIN 500 MG/5ML
500 INJECTION, POWDER, LYOPHILIZED, FOR SOLUTION INTRAVENOUS EVERY 24 HOURS
Status: DISCONTINUED | OUTPATIENT
Start: 2025-06-08 | End: 2025-06-08

## 2025-06-08 RX ORDER — METHYLPREDNISOLONE SODIUM SUCCINATE 40 MG/ML
125 INJECTION, POWDER, LYOPHILIZED, FOR SOLUTION INTRAMUSCULAR; INTRAVENOUS ONCE
Status: COMPLETED | OUTPATIENT
Start: 2025-06-08 | End: 2025-06-08

## 2025-06-08 RX ORDER — FUROSEMIDE 10 MG/ML
40 INJECTION INTRAMUSCULAR; INTRAVENOUS 2 TIMES DAILY
Status: DISCONTINUED | OUTPATIENT
Start: 2025-06-08 | End: 2025-06-08

## 2025-06-08 RX ORDER — LACTULOSE 10 G/15ML
30 SOLUTION ORAL EVERY 6 HOURS
Status: DISCONTINUED | OUTPATIENT
Start: 2025-06-08 | End: 2025-06-08

## 2025-06-08 RX ORDER — SODIUM BICARBONATE 650 MG/1
1300 TABLET ORAL 3 TIMES DAILY
Status: DISCONTINUED | OUTPATIENT
Start: 2025-06-08 | End: 2025-06-09

## 2025-06-08 RX ORDER — LACTULOSE 10 G/15ML
300 SOLUTION ORAL EVERY 4 HOURS
Status: DISCONTINUED | OUTPATIENT
Start: 2025-06-08 | End: 2025-06-08

## 2025-06-08 RX ADMIN — IPRATROPIUM BROMIDE AND ALBUTEROL SULFATE 3 ML: .5; 2.5 SOLUTION RESPIRATORY (INHALATION) at 14:35

## 2025-06-08 RX ADMIN — THIAMINE HYDROCHLORIDE 100 MG: 100 INJECTION, SOLUTION INTRAMUSCULAR; INTRAVENOUS at 06:32

## 2025-06-08 RX ADMIN — FUROSEMIDE 60 MG: 10 INJECTION, SOLUTION INTRAVENOUS at 02:51

## 2025-06-08 RX ADMIN — IPRATROPIUM BROMIDE AND ALBUTEROL SULFATE 3 ML: .5; 2.5 SOLUTION RESPIRATORY (INHALATION) at 01:27

## 2025-06-08 RX ADMIN — FUROSEMIDE 10 MG/HR: 10 INJECTION, SOLUTION INTRAMUSCULAR; INTRAVENOUS at 13:52

## 2025-06-08 RX ADMIN — IPRATROPIUM BROMIDE AND ALBUTEROL SULFATE 3 ML: .5; 2.5 SOLUTION RESPIRATORY (INHALATION) at 07:14

## 2025-06-08 RX ADMIN — OCTREOTIDE ACETATE 50 MCG/HR: 200 INJECTION, SOLUTION INTRAVENOUS; SUBCUTANEOUS at 18:24

## 2025-06-08 RX ADMIN — LACTULOSE 300 ML: 10 SOLUTION ORAL; RECTAL at 12:30

## 2025-06-08 RX ADMIN — HYDROCORTISONE: 1 CREAM TOPICAL at 06:32

## 2025-06-08 RX ADMIN — FUROSEMIDE 10 MG/HR: 10 INJECTION, SOLUTION INTRAMUSCULAR; INTRAVENOUS at 23:12

## 2025-06-08 RX ADMIN — IPRATROPIUM BROMIDE AND ALBUTEROL SULFATE 3 ML: .5; 2.5 SOLUTION RESPIRATORY (INHALATION) at 22:48

## 2025-06-08 RX ADMIN — HYDROCORTISONE: 1 CREAM TOPICAL at 18:22

## 2025-06-08 RX ADMIN — AZITHROMYCIN 500 MG: 500 INJECTION, POWDER, LYOPHILIZED, FOR SOLUTION INTRAVENOUS at 07:35

## 2025-06-08 RX ADMIN — METHYLPREDNISOLONE SODIUM SUCCINATE 125.2 MG: 40 INJECTION, POWDER, FOR SOLUTION INTRAMUSCULAR; INTRAVENOUS at 10:12

## 2025-06-08 RX ADMIN — ALBUMIN (HUMAN) 50 G: 0.25 INJECTION, SOLUTION INTRAVENOUS at 06:41

## 2025-06-08 RX ADMIN — CEFTRIAXONE SODIUM 2000 MG: 10 INJECTION, POWDER, FOR SOLUTION INTRAVENOUS at 06:32

## 2025-06-08 RX ADMIN — MAGNESIUM SULFATE HEPTAHYDRATE 2 G: 2 INJECTION, SOLUTION INTRAVENOUS at 03:02

## 2025-06-08 RX ADMIN — LACTULOSE 300 ML: 10 SOLUTION ORAL; RECTAL at 20:53

## 2025-06-08 RX ADMIN — ENOXAPARIN SODIUM 40 MG: 100 INJECTION SUBCUTANEOUS at 06:32

## 2025-06-08 RX ADMIN — IPRATROPIUM BROMIDE AND ALBUTEROL SULFATE 3 ML: .5; 2.5 SOLUTION RESPIRATORY (INHALATION) at 09:57

## 2025-06-08 RX ADMIN — CHLOROTHIAZIDE SODIUM 500 MG: 500 INJECTION, POWDER, LYOPHILIZED, FOR SOLUTION INTRAVENOUS at 14:00

## 2025-06-08 RX ADMIN — ALBUMIN (HUMAN) 50 G: 0.25 INJECTION, SOLUTION INTRAVENOUS at 21:53

## 2025-06-08 RX ADMIN — ENOXAPARIN SODIUM 40 MG: 100 INJECTION SUBCUTANEOUS at 18:00

## 2025-06-08 RX ADMIN — IPRATROPIUM BROMIDE AND ALBUTEROL SULFATE 3 ML: .5; 2.5 SOLUTION RESPIRATORY (INHALATION) at 19:09

## 2025-06-08 RX ADMIN — LACTULOSE 45 ML: 10 SOLUTION ORAL at 03:56

## 2025-06-08 RX ADMIN — ALBUTEROL SULFATE 2.5 MG: 2.5 SOLUTION RESPIRATORY (INHALATION) at 02:08

## 2025-06-08 RX ADMIN — ALBUMIN (HUMAN) 50 G: 0.25 INJECTION, SOLUTION INTRAVENOUS at 14:20

## 2025-06-08 ASSESSMENT — FIBROSIS 4 INDEX
FIB4 SCORE: 1.98
FIB4 SCORE: 1.98

## 2025-06-08 ASSESSMENT — ENCOUNTER SYMPTOMS
DIARRHEA: 1
NERVOUS/ANXIOUS: 1
MYALGIAS: 1
ABDOMINAL PAIN: 0
SHORTNESS OF BREATH: 1
VOMITING: 0
FEVER: 0
WEAKNESS: 1

## 2025-06-08 ASSESSMENT — LIFESTYLE VARIABLES: SUBSTANCE_ABUSE: 1

## 2025-06-08 ASSESSMENT — PAIN DESCRIPTION - PAIN TYPE
TYPE: ACUTE PAIN
TYPE: ACUTE PAIN

## 2025-06-08 NOTE — PROGRESS NOTES
Nephrology Daily Progress Note    Date of Service  6/8/2025    Chief Complaint  39 y.o. male ETOH liver cirrhosis, admitted 5/28/2025 severe volume overloaded,anasarca, consulted for EDIN    Interval Problem Update  6/6/25 -no improvement in edema  Creat level worse  Holding diuretics  Continue albumin  BP well controlled  To monitor UOP closely  6/7 -no acute events  Placed burton catheter -hematuria likely traumatic  Creat level slightly better  6/8 -AMS, worsening ammonia  In resp distress  Wheezes  Creat level at 2.7:s same  Worsening metabolic acidosis  Review of Systems  Review of Systems   Unable to perform ROS: Mental status change        Physical Exam  Temp:  [36.4 °C (97.5 °F)-36.7 °C (98.1 °F)] 36.7 °C (98.1 °F)  Pulse:  [] 113  Resp:  [12-24] 23  BP: (108-146)/(56-94) 121/63  SpO2:  [93 %-98 %] 95 %    Physical Exam  Vitals reviewed.   Constitutional:       General: He is not in acute distress.     Appearance: He is well-developed. He is obese. He is ill-appearing. He is not diaphoretic.   HENT:      Head: Normocephalic and atraumatic.      Nose: Nose normal.      Mouth/Throat:      Mouth: Mucous membranes are moist.      Pharynx: Oropharynx is clear.   Eyes:      Extraocular Movements: Extraocular movements intact.      Conjunctiva/sclera: Conjunctivae normal.      Pupils: Pupils are equal, round, and reactive to light.   Cardiovascular:      Rate and Rhythm: Regular rhythm. Tachycardia present.      Pulses: Normal pulses.      Heart sounds: Normal heart sounds.   Pulmonary:      Effort: Tachypnea and respiratory distress present.      Breath sounds: Wheezing present. No rales.   Abdominal:      General: Bowel sounds are normal. There is no distension.      Palpations: Abdomen is soft. There is no mass.      Tenderness: There is no abdominal tenderness.   Musculoskeletal:         General: Swelling present.      Cervical back: Normal range of motion and neck supple.      Right lower leg: Edema  present.      Left lower leg: Edema present.   Skin:     General: Skin is warm.      Coloration: Skin is not pale.      Findings: Erythema present. No rash.   Neurological:      Mental Status: He is alert.      Comments: AMS         Fluids    Intake/Output Summary (Last 24 hours) at 6/8/2025 0921  Last data filed at 6/8/2025 0816  Gross per 24 hour   Intake 200 ml   Output 500 ml   Net -300 ml       Laboratory  Recent Labs     06/06/25  0115 06/08/25  0230   WBC 11.0* 12.5*   RBC 2.96* 2.60*   HEMOGLOBIN 9.7* 8.5*   HEMATOCRIT 29.6* 25.5*   .0* 98.1*   MCH 32.8 32.7   MCHC 32.8 33.3   RDW 69.2* 65.9*   PLATELETCT 193 212   MPV 9.1 9.2     Recent Labs     06/06/25  0115 06/07/25  0445 06/08/25  0211   SODIUM 132* 128* 131*   POTASSIUM 4.7 5.3 5.1   CHLORIDE 101 97 102   CO2 20 18* 14*   GLUCOSE 187* 232* 168*   BUN 34* 44* 53*   CREATININE 2.78* 2.76* 2.74*   CALCIUM 8.0* 8.5 8.9         Recent Labs     06/08/25  0211   NTPROBNP 2718*           Imaging  DX-CHEST-PORTABLE (1 VIEW)   Final Result      1.  Consolidation within the left lung consistent with pneumonia.      2.  Cardiomegaly with interstitial opacities.      DX-CHEST-PORTABLE (1 VIEW)   Final Result      Cardiomegaly. Hypoinflation. No definite new abnormality.      US-ABDOMEN LTD (SOFT TISSUE)   Final Result      No ascites.         MR-ABDOMEN-WITH & W/O   Final Result      1.  The tail of the pancreas appears somewhat irregular and stranding with increased signal but evaluation is very limited due to significant motion artifact. This could relate to sequela of prior pancreatitis. A mass cannot be excluded. Follow-up in 6    months with MR after improvement is recommended.   2.  There is an accessory splenule adjacent to the spleen   3. Diffuse subcutaneous edema .                        EC-ECHOCARDIOGRAM COMPLETE W/ CONT   Final Result      US-EXTREMITY VENOUS LOWER BILAT   Final Result      US-RUQ   Final Result         1.  Thickened gallbladder  wall without visualized shadowing stones, consider acalculous cholecystitis, could be further evaluated with HIDA scan as clinically appropriate   2.  Hepatomegaly   3.  Echogenic liver, compatible with fatty change versus fibrosis      CT-PANCREAS AND ABDOMEN WITH & W/O   Final Result         1.  Masslike structure adjacent to the tail of pancreas with possible slight enhancement on arterial phase imaging, recommend follow-up MRI of the pancreas with contrast for more definitive characterization.   2.  Ovoid mass in the splenic hilum with similar density to spleen on all contrast phases, appearance favoring splenule.   3.  Changes of cirrhosis.   4.  Hepatomegaly      [id number] VIEW    (Results Pending)        Assessment/Plan     The patient is a 39-year-old male with a history of   chronic alcohol abuse, liver cirrhosis, alcohol related, admitted with   worsening liver function tests, severely edematous, developed acute kidney   injury.    1.  Acute kidney injury due to HRS -continue current treatment       Monitor CMP daily  2.  Electrolytes: Mild hyponatremia due to hypervolemia -improving.  Avoid hypotonicsolutions    Monitor closely.   3.  Anemia: drp in Hemoglobin level - to monitor.   4.  Hypertension: Blood pressure remains well controlled.    5.  Volume: overloaded/third spacing -to restart lasix iv  6.  Pancreatic mass with elevated Ca 19-9 -consider diagnostic biopsy    RECOMMENDATIONS:   1.  No need for emergent dialysis.-however may need RRT if renal function, acidosis worsening       Unfortunately with alcohol related liver injury not a candidate for renal transplant -would not proceed for dialysis with very poor prognosis  2.  Lasix iv with albumin  3.  Monitor daily CBC and basic metabolic panel.   4.  Continue current treatment  5.  Avoid nephrotoxic agents.  6.  low-sodium diet.     Will follow  D/w

## 2025-06-08 NOTE — ASSESSMENT & PLAN NOTE
Chest x-ray did show changes consistent with pneumonia with a mildly elevated procalcitonin  Treated with Rocephin

## 2025-06-08 NOTE — CARE PLAN
The patient is Watcher - Medium risk of patient condition declining or worsening    Shift Goals  Clinical Goals: monitor resp status, 24 hour urine  Patient Goals: sleep  Family Goals: aide    Progress made toward(s) clinical / shift goals:    Problem: Pain - Standard  Goal: Alleviation of pain or a reduction in pain to the patient’s comfort goal  Outcome: Progressing     Problem: Knowledge Deficit - Standard  Goal: Patient and family/care givers will demonstrate understanding of plan of care, disease process/condition, diagnostic tests and medications  Outcome: Progressing     Problem: Seizure Precautions  Goal: Implementation of seizure precautions  Outcome: Progressing     Problem: Skin Integrity  Goal: Skin integrity is maintained or improved  Outcome: Progressing     Problem: Fall Risk  Goal: Patient will remain free from falls  Outcome: Progressing       Patient is not progressing towards the following goals:

## 2025-06-08 NOTE — PROGRESS NOTES
NOC HOSPITALIST CROSS COVER    Notified by RN regarding patient having increased work of breathing, wheezing, and shortness of breath. Patient was evaluated at bedside. His oxygen was uptitrated from 2 L NC to 5 L NC. RT at bedside had given a PRN duoneb without improvement in respiratory distress. Patient was having difficulty talking and was moving air poorly, and was tachypneic in the mid 20s. He was in obvious respiratory distress, with accessory muscle use. RT instructed to give a second duoneb with slight improvement in work of breathing. Lung sounds with wheezing and poor air movement. Abdomen is distended. He has 3+ pitting edema in bilat lower extremities and trunk up to the nipple line. He is noted to have weeping edema, with saturated linens.     CXR showing left sided consolidations and cardiomegaly, though does appear to have some component of pulmonary edema as well. ABG showing metabolic acidosis, with a pH of 7.31, CO2 37.8, bicarb 19.     The patient's diuretics have been held due to worsening EDIN in the setting of HRS. Given that he is now having worsening respiratory distress, with increased oxygen demand and work of breathing, will give a one time dose of Lasix 60 mg IV. If this does not improve his work of breathing, will discuss with intensivist about possible upgrade to IMCU vs ICU. He is at high risk for clinical decompensation and very low threshold for transfer.     Vitals:    06/08/25 0216   BP:    Pulse: (!) 123   Resp: 20   Temp:    SpO2: 98%      Plan:  #Acute respiratory failure  -In the setting of pulmonary edema vs superimposed pneumonia  -Lasix 60 mg IV once  -Titrate oxygen to maintain SpO2 > 92%  -Continue as needed duo-nebs  -Mag 2 g IV for smooth muscle relaxation  -Procal elevated at 0.84   -Blood cultures ordered and pending  -Start Ceftriaxone and azithromycin for possible pneumonia    #Altered mental status  -Increasing lactulose as patient is not clearing ammonia properly  as he is not having enough bowel movements  -Likely compounded by respiratory status  -Ammonia 137 this morning    Discussed with collaborating MD who reviewed vitals, lab data, and assessed the patient. MD agrees with plan of care.   -----------------------------------------------------------------------------------------------------------    Electronically signed by:  Cassy Santizo, STAN, APRN, PRINCEP-BC  Hospitalist Services

## 2025-06-08 NOTE — PROGRESS NOTES
Bedside report received from off going RN/tech: ALF Maloney, assumed care of patient.     Fall Risk Score: HIGH RISK  Fall risk interventions in place: Place yellow fall risk ID band on patient, Provide patient/family education based on risk assessment, Educate patient/family to call staff for assistance when getting out of bed, Place fall precaution signage outside patient door, Utilize bed/chair fall alarm, Notify charge of high risk for huddle, and Bed alarm connected correctly  Bed type: Low air loss (Osmani Score less than 17 interventions in place)  Patient on cardiac monitor: Yes  IVF/IV medications: Infusion per MAR (List Med(s)) octreotide @ 10 ml/hr  Oxygen: How many liters 5L, Traced the line to wall oxygen, and No oxygen tank in room  Bedside sitter: Not Applicable   Isolation: Not applicable

## 2025-06-08 NOTE — PROGRESS NOTES
Pt transferred to . Report called to Deanne MILNER. All bedside belongings sent with pt including cell phone and . Family at bedside and aware of transfer.

## 2025-06-08 NOTE — ASSESSMENT & PLAN NOTE
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  Unfortunately he is failing medical management including MAP augmentation, octreotide, albumin, forced diuresis.  He is now profoundly hypervolemic/anasarcic.  He is now having expiratory wheezing likely on account of hypervolemia, upper airway edema.  No significant respiratory failure at this point - ABG 7.3/40/100 on 2L NC.  I discussed the circumstances with Alberto, his mother, and his brother at the bedside.  We discussed the unfortunate circumstance of HRS without the prospect of transplant given ongoing alcohol use as well as morbid obesity (BMI 52).  I discussed this with Dr. Magaña and we feel that at this juncture with cirrhosis with HRS we recommend against dialysis despite his metabolic acidosis and hypervolemia.  In this light I recommended DNR/DNI order, which his family was agreeable to.  Nonetheless we will transfer him to the IMCU for furosemide drip and chlorothiazide.      Discussed with Dr. Magaña and Dr. Ordaz.

## 2025-06-08 NOTE — PROGRESS NOTES
Attempted to collect cortisol saliva sample, however, patient was not able to follow commands related to preparation for sputum collection.

## 2025-06-08 NOTE — CONSULTS
Critical Care Consultation    Date of consult: 6/8/2025    Reason for Consultation  Respiratory distress    History of Presenting Illness  39 y.o. male with alcoholic and NAFLD cirrhosis, metabolic syndrome, asthma, pancreatic mass, recent admission for alcoholic hepatitis (4/3-4/12) s/p 30d of prednisolone, who presented on 5/28 with weight gain and edema.  During this hospital admission he has developed worsening renal function concerning for HRS.  He was initiated on octreotide, midodrine, and albumin for HRS with nephrology guidance.      On 6/8, he developed for respiratory distress.  XR chest this AM revealed left midlung consolidation and interstitial opacities.  He was initiated on ceftriaxone. His ABG demonstrated 7.3/40/100.  He received albuterol/ipratropium with no improvement in his work of breathing.      Intensivist was consulted for further management.    Notably, this is his ninth alcohol related admission since 12/2023.    Code Status  Full Code    Review of Systems  ROS    Past Medical History   has a past medical history of Alcohol abuse, Asthma without status asthmaticus (06/29/2017), Chickenpox, and Hypertension.    He has no past medical history of Cancer (HCC) or Diabetes (HCC).    Surgical History   has a past surgical history that includes other orthopedic surgery (Right).    Family History  family history is not on file.    Social History   reports that he has quit smoking. His smoking use included cigarettes. He has never used smokeless tobacco. He reports current alcohol use of about 7.2 oz of alcohol per week. He reports that he does not currently use drugs.    Medications  Home Medications       Reviewed by Bernard Torres (Pharmacy Tech) on 05/29/25 at 0351  Med List Status: Complete     Medication Last Dose Status   carvedilol (COREG) 12.5 MG Tab 5/28/2025 Active   furosemide (LASIX) 20 MG Tab 5/12/2025 Active   hydrOXYzine HCl (ATARAX) 25 MG Tab 5/28/2025 Active   pantoprazole  (PROTONIX) 40 MG Tablet Delayed Response 5/28/2025 Active   predniSONE (DELTASONE) 10 MG Tab 5/19/2025 Active   spironolactone (ALDACTONE) 25 MG Tab 5/12/2025 Active   sulfamethoxazole-trimethoprim (BACTRIM DS) 800-160 MG tablet 5/28/2025 Active   tamsulosin (FLOMAX) 0.4 MG capsule 5/12/2025 Active                  Audit from Redirected Encounters    **Home medications have not yet been reviewed for this encounter**       Current Medications[1]    Allergies  Allergies[2]    Vital Signs last 24 hours  Temp:  [36.4 °C (97.5 °F)-36.7 °C (98.1 °F)] 36.6 °C (97.9 °F)  Pulse:  [] 117  Resp:  [12-26] 26  BP: (108-166)/() 166/129  SpO2:  [93 %-98 %] 94 %    Physical Exam  Physical Exam  Vitals and nursing note reviewed.   Constitutional:       General: He is not in acute distress.     Appearance: He is obese. He is ill-appearing. He is not toxic-appearing.   HENT:      Head: Normocephalic and atraumatic.   Cardiovascular:      Rate and Rhythm: Regular rhythm. Tachycardia present.   Pulmonary:      Effort: No respiratory distress.   Abdominal:      General: There is no distension.      Palpations: Abdomen is soft.   Genitourinary:     Comments: Silver with small volume dark urine  Musculoskeletal:         General: No swelling.      Comments: Diffuse pitting edema   Skin:     General: Skin is warm.   Neurological:      Mental Status: He is alert.      Comments: Awake, conversing, fluent speech  Moves all four extremities         Fluids    Intake/Output Summary (Last 24 hours) at 6/8/2025 1332  Last data filed at 6/8/2025 1200  Gross per 24 hour   Intake 200 ml   Output 600 ml   Net -400 ml       Laboratory  Recent Results (from the past 48 hours)   URINALYSIS    Collection Time: 06/06/25  6:11 PM    Specimen: Urine, Silver Cath   Result Value Ref Range    Color Dark Yellow     Character Clear     Specific Gravity 1.018 <1.035    Ph 5.5 5.0 - 8.0    Glucose Negative Negative mg/dL    Ketones Trace (A) Negative mg/dL     Protein 30 (A) Negative mg/dL    Bilirubin Moderate (A) Negative    Urobilinogen, Urine 1.0 <=1.0 EU/dL    Nitrite Negative Negative    Leukocyte Esterase Trace (A) Negative    Occult Blood Small (A) Negative    Micro Urine Req Microscopic    URINE MICROSCOPIC (W/UA)    Collection Time: 06/06/25  6:11 PM   Result Value Ref Range    WBC 3-5 (A) /hpf    RBC 11-20 (A) 0 - 2 /hpf    Bacteria None Seen None /hpf    Epithelial Cells 11-20 (A) 0 - 5 /hpf    Urine Casts 6-10 (A) 0 - 2 /lpf    Hyaline Cast Present /lpf   Comp Metabolic Panel    Collection Time: 06/07/25  4:45 AM   Result Value Ref Range    Sodium 128 (L) 135 - 145 mmol/L    Potassium 5.3 3.6 - 5.5 mmol/L    Chloride 97 96 - 112 mmol/L    Co2 18 (L) 20 - 33 mmol/L    Anion Gap 13.0 7.0 - 16.0    Glucose 232 (H) 65 - 99 mg/dL    Bun 44 (H) 8 - 22 mg/dL    Creatinine 2.76 (H) 0.50 - 1.40 mg/dL    Calcium 8.5 8.5 - 10.5 mg/dL    Correct Calcium 8.9 8.5 - 10.5 mg/dL    AST(SGOT) 113 (H) 12 - 45 U/L    ALT(SGPT) 54 (H) 2 - 50 U/L    Alkaline Phosphatase 193 (H) 30 - 99 U/L    Total Bilirubin 11.2 (H) 0.1 - 1.5 mg/dL    Albumin 3.5 3.2 - 4.9 g/dL    Total Protein 6.9 6.0 - 8.2 g/dL    Globulin 3.4 1.9 - 3.5 g/dL    A-G Ratio 1.0 g/dL   ESTIMATED GFR    Collection Time: 06/07/25  4:45 AM   Result Value Ref Range    GFR (CKD-EPI) 29 (A) >60 mL/min/1.73 m 2   ABG - LAB    Collection Time: 06/07/25 12:10 PM   Result Value Ref Range    Ph 7.30 (L) 7.35 - 7.45    Pco2 39.9 32.0 - 48.0 mmHg    Po2 106.1 83.0 - 108.0 mmHg    O2 Saturation 97.0 93.0 - 99.0 %    Hco3 20 (L) 21 - 28 mmol/L    Base Excess -6 (L) -4 - 3 mmol/L    Body Temp 36.4 Centigrade    Ph -TC 7.30 (L) 7.35 - 7.45    Pco2 -TC 38.9 32.0 - 48.0 mmHg    Po2 -.5 83.0 - 108.0 mmHg   AMMONIA    Collection Time: 06/07/25 12:10 PM   Result Value Ref Range    Ammonia 121 (HH) 11 - 45 umol/L   URINE CORTISOL 24 HR, ICMA    Collection Time: 06/07/25 12:30 PM   Result Value Ref Range    Collection Length 24  Hrs    Total Volume 600 mL   PROTEIN/CREAT RATIO URINE    Collection Time: 06/07/25 12:30 PM   Result Value Ref Range    Total Protein, Urine 87.0 (H) 0.0 - 15.0 mg/dL    Creatinine, Random Urine 253.00 mg/dL    Protein Creatinine Ratio 344 (H) 15 - 68 mg/g   proBrain Natriuretic Peptide, NT    Collection Time: 06/08/25  2:11 AM   Result Value Ref Range    NT-proBNP 2718 (H) 0 - 125 pg/mL   Comp Metabolic Panel    Collection Time: 06/08/25  2:11 AM   Result Value Ref Range    Sodium 131 (L) 135 - 145 mmol/L    Potassium 5.1 3.6 - 5.5 mmol/L    Chloride 102 96 - 112 mmol/L    Co2 14 (L) 20 - 33 mmol/L    Anion Gap 15.0 7.0 - 16.0    Glucose 168 (H) 65 - 99 mg/dL    Bun 53 (H) 8 - 22 mg/dL    Creatinine 2.74 (H) 0.50 - 1.40 mg/dL    Calcium 8.9 8.5 - 10.5 mg/dL    Correct Calcium 9.1 8.5 - 10.5 mg/dL    AST(SGOT) 69 (H) 12 - 45 U/L    ALT(SGPT) 37 2 - 50 U/L    Alkaline Phosphatase 154 (H) 30 - 99 U/L    Total Bilirubin 10.0 (H) 0.1 - 1.5 mg/dL    Albumin 3.7 3.2 - 4.9 g/dL    Total Protein 6.9 6.0 - 8.2 g/dL    Globulin 3.2 1.9 - 3.5 g/dL    A-G Ratio 1.2 g/dL   MAGNESIUM    Collection Time: 06/08/25  2:11 AM   Result Value Ref Range    Magnesium 2.2 1.5 - 2.5 mg/dL   PHOSPHORUS    Collection Time: 06/08/25  2:11 AM   Result Value Ref Range    Phosphorus 4.7 (H) 2.5 - 4.5 mg/dL   ABG - LAB    Collection Time: 06/08/25  2:11 AM   Result Value Ref Range    Ph 7.31 (L) 7.35 - 7.45    Pco2 37.8 32.0 - 48.0 mmHg    Po2 114.5 (H) 83.0 - 108.0 mmHg    O2 Saturation 97.0 93.0 - 99.0 %    Hco3 19 (L) 21 - 28 mmol/L    Base Excess -6 (L) -4 - 3 mmol/L    Body Temp 36.8 Centigrade    Ph -TC 7.32 (L) 7.35 - 7.45    Pco2 -TC 37.5 32.0 - 48.0 mmHg    Po2 -.3 (H) 83.0 - 108.0 mmHg   AMMONIA    Collection Time: 06/08/25  2:11 AM   Result Value Ref Range    Ammonia 137 (HH) 11 - 45 umol/L   ESTIMATED GFR    Collection Time: 06/08/25  2:11 AM   Result Value Ref Range    GFR (CKD-EPI) 30 (A) >60 mL/min/1.73 m 2   CBC WITHOUT  DIFFERENTIAL    Collection Time: 06/08/25  2:30 AM   Result Value Ref Range    WBC 12.5 (H) 4.8 - 10.8 K/uL    RBC 2.60 (L) 4.70 - 6.10 M/uL    Hemoglobin 8.5 (L) 14.0 - 18.0 g/dL    Hematocrit 25.5 (L) 42.0 - 52.0 %    MCV 98.1 (H) 81.4 - 97.8 fL    MCH 32.7 27.0 - 33.0 pg    MCHC 33.3 32.3 - 36.5 g/dL    RDW 65.9 (H) 35.9 - 50.0 fL    Platelet Count 212 164 - 446 K/uL    MPV 9.2 9.0 - 12.9 fL   PROCALCITONIN    Collection Time: 06/08/25  2:30 AM   Result Value Ref Range    Procalcitonin 0.84 (H) <0.25 ng/mL   BLOOD CULTURE    Collection Time: 06/08/25  2:30 AM    Specimen: Peripheral; Blood   Result Value Ref Range    Significant Indicator NEG     Source BLD     Site PERIPHERAL     Culture Result       No Growth  Note: Blood cultures are incubated for 5 days and  are monitored continuously.Positive blood cultures  are called to the RN and reported as soon as  they are identified.     BLOOD CULTURE    Collection Time: 06/08/25  2:30 AM    Specimen: Peripheral; Blood   Result Value Ref Range    Significant Indicator NEG     Source BLD     Site PERIPHERAL     Culture Result       No Growth  Note: Blood cultures are incubated for 5 days and  are monitored continuously.Positive blood cultures  are called to the RN and reported as soon as  they are identified.     BILIRUBIN DIRECT    Collection Time: 06/08/25  2:30 AM   Result Value Ref Range    Direct Bilirubin 6.5 (H) 0.1 - 0.5 mg/dL   ABG - LAB    Collection Time: 06/08/25  6:51 AM   Result Value Ref Range    Ph 7.30 (L) 7.35 - 7.45    Pco2 38.5 32.0 - 48.0 mmHg    Po2 96.4 83.0 - 108.0 mmHg    O2 Saturation 95.0 93.0 - 99.0 %    Hco3 19 (L) 21 - 28 mmol/L    Base Excess -6 (L) -4 - 3 mmol/L    Body Temp 36.8 Centigrade    Ph -TC 7.31 (L) 7.35 - 7.45    Pco2 -TC 38.1 32.0 - 48.0 mmHg    Po2 -TC 95.2 83.0 - 108.0 mmHg   LACTIC ACID    Collection Time: 06/08/25  8:01 AM   Result Value Ref Range    Lactic Acid 1.7 0.5 - 2.0 mmol/L   Prothrombin Time    Collection Time:  06/08/25  9:31 AM   Result Value Ref Range    PT 17.1 (H) 12.0 - 14.6 sec    INR 1.39 (H) 0.87 - 1.13       Assessment/Plan  Hepatorenal syndrome with acute kidney injury (HCC)- (present on admission)  Assessment & Plan  Alcoholic and NAFLD cirrhosis with oliguric hepatorenal syndrome.  Unfortunately he is failing medical management including MAP augmentation, octreotide, albumin, forced diuresis.  He is now profoundly hypervolemic/anasarcic.  He is now having expiratory wheezing likely on account of hypervolemia, upper airway edema.  No significant respiratory failure at this point - ABG 7.3/40/100 on 2L NC.  I discussed the circumstances with Alberto, his mother, and his brother at the bedside.  We discussed the unfortunate circumstance of HRS without the prospect of transplant given ongoing alcohol use as well as morbid obesity (BMI 52).  I discussed this with Dr. Magaña and we feel that at this juncture with cirrhosis with HRS we recommend against dialysis despite his metabolic acidosis and hypervolemia.  In this light I recommended DNR/DNI order, which his family was agreeable to.  Nonetheless we will transfer him to the [facility] for furosemide drip and chlorothiazide.      Discussed with Dr. Magaña and Dr. Ordaz.    Critical care time: 70 minutes, excluding procedure       [1]   Current Facility-Administered Medications   Medication Dose Route Frequency Provider Last Rate Last Admin    cefTRIAXone (Rocephin) syringe 2,000 mg  2,000 mg Intravenous Q24HRS EDWAR McneilN.P.   2,000 mg at 06/08/25 0632    levalbuterol (Xopenex) 1.25 MG/3ML nebulizer solution 1.25 mg  1.25 mg Nebulization Q4H PRN (RT) EDWAR McneilN.P.        lactulose 10 g/15mL solution 300 mL  300 mL Rectal Q4HRS PRN EDWAR McneilN.P.        sodium bicarbonate tablet 1,300 mg  1,300 mg Oral TID Faustina Magaña M.D.        lactulose 10 g/15mL solution 300 mL  300 mL Rectal Q4HRS Michelle Edwards, DNP,  APRN   300 mL at 06/08/25 1230    [START ON  6/9/2025] methylPREDNISolone sod succ (Solu-Medrol) 40 MG injection 60 mg  60 mg Intravenous Q8HRS Nicol Lopez M.D.        ipratropium-albuterol (DUONEB) nebulizer solution  3 mL Nebulization Q4H PRN (RT) Nicol Lopez M.D.        [START ON 6/9/2025] azithromycin (Zithromax) tablet 500 mg  500 mg Oral DAILY Nicol Lopez M.D.        furosemide (Lasix) 100 mg in  mL infusion  10 mg/hr Intravenous Continuous Shawn Khan M.D.        chlorothiazide (Diuril) 500 mg in NS 50 mL IVPB  500 mg Intravenous Once Shawn Khan M.D.        ipratropium-albuterol (DUONEB) nebulizer solution  3 mL Nebulization Q4HRS (RT) Nicol Lopez M.D.   3 mL at 06/08/25 0957    albuterol (Proventil) 2.5mg/0.5ml nebulizer solution 2.5 mg  2.5 mg Nebulization Q2HRS PRN (RT) Nicol Lopez M.D.   2.5 mg at 06/08/25 0208    riFAXIMin (Xifaxan) tablet 550 mg  550 mg Oral BID Nicol Lopez M.D.        midodrine (Proamatine) tablet 5 mg  5 mg Oral TID WITH MEALS Fernanda Daniels DIeshaO.   5 mg at 06/07/25 0756    octreotide (SandoSTATIN) 1,250 mcg in  mL Infusion  50 mcg/hr Intravenous Continuous Fernanda Daniels D.O. 10 mL/hr at 06/07/25 1625 50 mcg/hr at 06/07/25 1625    albumin human 25% solution 50 g  50 g Intravenous Q8HRS Fernanda Daniels D.O. 150 mL/hr at 06/08/25 0641 50 g at 06/08/25 0641    thiamine (B-1) injection 100 mg  100 mg Intravenous DAILY Fernanda Daniels, D.O.   100 mg at 06/08/25 0632    Respiratory Therapy Consult   Nebulization Continuous RT Fernanda Daniels D.O.        vitamin D3 (Cholecalciferol) tablet 1,000 Units  1,000 Units Oral DAILY Fernanda Daniels D.O.   1,000 Units at 06/07/25 0518    magnesium oxide tablet 400 mg  400 mg Oral DAILY Fernanda Daniels D.O.   400 mg at 06/07/25 0518    albuterol inhaler 2 Puff  2 Puff Inhalation Q4HRS PRN Fernanda Daniels D.O.   2 Puff at 06/07/25 0322    gabapentin (Neurontin) capsule 300 mg  300 mg Oral TID Fernanda Daniels D.O.   300 mg at 06/07/25 1329    diazePAM (Valium) tablet 2 mg  2 mg Oral  Q6HRS PRN Gordon Brambila M.D.        enoxaparin (Lovenox) inj 40 mg  40 mg Subcutaneous Q12HRS Gordon Brambila M.D.   40 mg at 06/08/25 0632    [Held by provider] spironolactone (Aldactone) tablet 100 mg  100 mg Oral Q DAY Gordon Brambila M.D.   100 mg at 06/06/25 0424    oxyCODONE immediate-release (Roxicodone) tablet 5 mg  5 mg Oral Q3HRS PRN Héctor Jarquin M.D.   5 mg at 06/05/25 0602    Or    oxyCODONE immediate release (Roxicodone) tablet 10 mg  10 mg Oral Q3HRS PRN Héctor Jarquin M.D.   10 mg at 06/06/25 0423    Or    morphine 4 MG/ML injection 4 mg  4 mg Intravenous Q3HRS PRN Héctor Jarquin M.D.        hydrALAZINE (Apresoline) injection 10 mg  10 mg Intravenous Q4HRS PRN Héctor Jarquin M.D.        ondansetron (Zofran) syringe/vial injection 4 mg  4 mg Intravenous Q4HRS PRN Héctor Jarquin M.D.        ondansetron (Zofran ODT) dispertab 4 mg  4 mg Oral Q4HRS PRN Héctor Jarquin M.D.        promethazine (Phenergan) tablet 12.5-25 mg  12.5-25 mg Oral Q4HRS PRN Héctor Jarquin M.D.        promethazine (Phenergan) suppository 12.5-25 mg  12.5-25 mg Rectal Q4HRS PRN Héctor Jarquin M.D.        prochlorperazine (Compazine) injection 5-10 mg  5-10 mg Intravenous Q4HRS PRN Héctor Jarquin M.D.        folic acid (Folvite) tablet 1 mg  1 mg Oral DAILY Héctor Jarquin M.D.   1 mg at 06/07/25 0518    hydrOXYzine HCl (Atarax) tablet 25 mg  25 mg Oral TID PRN Héctor Jarquin M.D.   25 mg at 06/05/25 2019    tamsulosin (Flomax) capsule 0.4 mg  0.4 mg Oral AFTER BREAKFAST Héctor Jarquin M.D.   0.4 mg at 06/07/25 0755    hydrocortisone 1 % cream   Topical BID Gordon Brambila M.D.   Given at 06/08/25 0632   [2] No Known Allergies

## 2025-06-08 NOTE — ASSESSMENT & PLAN NOTE
Deemed secondary to community-acquired pneumonia  Patient started on Rocephin  Mildly elevated procalcitonin, okay to continue Rocephin at this time  Continue IV steroids  Patient remains DNR

## 2025-06-08 NOTE — PROGRESS NOTES
..Gastroenterology Progress Note               Author:  Michelle Edwards, STAN,  APRN Date & Time Created: 6/8/2025 8:33 AM       Patient ID:  Name:             Alberto Maldonado  YOB: 1985  Age:                 39 y.o.  male  MRN:               [id number]    Medical Decision Making, by Problem:  Active Hospital Problems    Diagnosis     Vitamin D deficiency [E55.9]     Alcoholic liver hepatitis and cirrhosis [K70.10]     Anasarca [R60.1]     Hyponatremia [E87.1]     Hyperammonemia (HCC) [E72.20]     Hepatorenal syndrome with acute kidney injury (HCC) [K76.7, N17.9]     Anxiety [F41.9]     Intraabdominal mass [R19.00]     BOLIVAR on CPAP [G47.33]     Alcohol use disorder, severe, dependence (HCC) [F10.20]     Morbid obesity (HCC) [E66.01]     Essential hypertension [I10]        Presenting Chief Complaint:  Decompensated liver cirrhosis     History of Present Illness:   The patient 39 years old gentleman with excessive alcohol use, liver cirrhosis with jaundice and fluid overload, recent admission for acute on chronic liver injury from alcohol use, s/p steroid treatment (however, the patient does not recall if he finished the steroid as instructed upon last discharge), present to inpatient GI service for worsening jaundice and fluid accumulation.  On initial evaluation, he had multiple bottles of water and Gatorade at his bedside.  He was noted to be overtly fluid overloaded with facial swelling, distended abdomen, and severely distended lower extremities.  He was borderline encephalopathic with poor sleep and impaired memory.  There is no evidence of GI bleeding.     Recently admitted from 4/3 through 4/12 when he requested detoxification.    Prior chart review:  Admitted to [facility] in 2021 and described to be alcohol use disorder, daily drinker, admitted with acute pancreatitis with necrosis.  He has had multiple admissions for gout and multiple admissions for alcohol use disorder and  withdrawal. Chart review states attending AA and taking naltrexone in 2024.  Previously followed by [facility].  Unfortunately further admissions for alcohol use disorder.  History of DUI May 2024 and arrest.  (Please see Dr. Hagan, Psychiatry, note from August 2024).    Interval History:  5/30/2025: initial consult    6/6/2025: Gastroenterology service reengaged given worsening clinical picture, development of HRS. Patient seen bedside on [facility]lee 6.  He remains significantly edematous and encephalopathic.  A Silver catheter is being placed for closer monitoring of urine output in the setting of worsening kidney function.  Nephrology also consulted.    Patient seen.  He admits to me that he continued to drink after his recent discharge.  Per chart review it appears he was attending regular visits with his primary care at [facility].  It is unclear if he completed his steroid dose for alcoholic hepatitis.  On admission, his total bilirubin was 17.3, 4.2 on discharge.  His liver enzymes also increased and his ammonia was 122.  INR is about the same.  His creatinine on admission was 1.38 and previously 0.84.  Now it is 2.78.  Bilirubin today is 10 with 6.4 direct.    Given the CT scan during last admission showing pancreatic tail lesion, MRI was completed and remarks the tail of the pancreas is somewhat irregular, could be prior pancreatitis but cannot exclude mass.  However given limited exam recommend 6-month follow-up after improvement.  Noted to have diffuse subcutaneous edema.    6/7/2025: patient seen, clinically worse. Obtunded and barely rousable. Observed apnea and audible wheezing. D/W Dr. Lopez. One BM today. Cr about the same, T. Bili 11.2. INR and direct bili pending.    6/8/2025: In respiratory distress again overnight with increased WOB and wheezing. CXR with LL consolidation and cardiomegaly with interstitial opacities. One time dose of lasix 60 mg IV given. BC pending, started on  ceftriaxone    Patient seen bedside with rapid response RN, bedside RN, and RT.  Continues to have increased work of breathing with audible stridor and wheezing.  Eventually evaluated by intensivist and nephrologist and considered not a candidate for dialysis.  He was upgraded to IMCU for forced diuresis.    Still no BM, ammonia 137, change to rectal lactulose Q4 hours    Hospital Medications:  Current Facility-Administered Medications   Medication Dose Frequency Provider Last Rate Last Admin    lactulose 20 GM/30ML solution 45 mL  45 mL Q6HRS FELICIANO Mcneil.N.P.   45 mL at 06/08/25 0356    cefTRIAXone (Rocephin) syringe 2,000 mg  2,000 mg Q24HRS FELICIANO Mcneil.N.P.   2,000 mg at 06/08/25 0632    azithromycin (ZITHROMAX) 500 mg in D5W 250 mL IVPB premix  500 mg Q24HRS FELICIANO Mcneil.N.P. 250 mL/hr at 06/08/25 0735 500 mg at 06/08/25 0735    levalbuterol (Xopenex) 1.25 MG/3ML nebulizer solution 1.25 mg  1.25 mg Q4H PRN (RT) EDWAR McneilN.PIesha        lactulose 10 g/15mL solution 300 mL  300 mL Q4HRS PRN EDWAR McneilN.P.        sodium bicarbonate tablet 1,300 mg  1,300 mg TID Faustina Magaña M.D.        ipratropium-albuterol (DUONEB) nebulizer solution  3 mL Q4HRS (RT) Nicol Lopez M.D.   3 mL at 06/08/25 0714    albuterol (Proventil) 2.5mg/0.5ml nebulizer solution 2.5 mg  2.5 mg Q2HRS PRN (RT) Nicol Lopez M.D.   2.5 mg at 06/08/25 0208    riFAXIMin (Xifaxan) tablet 550 mg  550 mg BID Nicol Lopez M.D.        midodrine (Proamatine) tablet 5 mg  5 mg TID WITH MEALS EDWAR JackmanOIesha   5 mg at 06/07/25 0756    octreotide (SandoSTATIN) 1,250 mcg in  mL Infusion  50 mcg/hr Continuous Fernanda REX Daniels D.O. 10 mL/hr at 06/07/25 1625 50 mcg/hr at 06/07/25 1625    albumin human 25% solution 50 g  50 g Q8HRS Fernanda Daniels D.O. 150 mL/hr at 06/08/25 0641 50 g at 06/08/25 0641    thiamine (B-1) injection 100 mg  100 mg DAILY Fernanda Daniels, D.O.   100 mg at 06/08/25 0632    Respiratory Therapy Consult   Continuous RT  Fernanda Daniels D.OIesha        [Held by provider] furosemide (Lasix) injection 80 mg  80 mg TID Fernanda Daniels D.OIesha   80 mg at 06/05/25 2241    vitamin D3 (Cholecalciferol) tablet 1,000 Units  1,000 Units DAILY Fernanda Daniels D.OIesha   1,000 Units at 06/07/25 0518    magnesium oxide tablet 400 mg  400 mg DAILY FELICIANO Jackman.OIesha   400 mg at 06/07/25 0518    albuterol inhaler 2 Puff  2 Puff Q4HRS PRN EDWAR JackmanOIesha   2 Puff at 06/07/25 0322    gabapentin (Neurontin) capsule 300 mg  300 mg TID Fernanda Daniels D.OIesha   300 mg at 06/07/25 1329    diazePAM (Valium) tablet 2 mg  2 mg Q6HRS PRN Gordon Brambila M.D.        enoxaparin (Lovenox) inj 40 mg  40 mg Q12HRS Gordon Brambila M.D.   40 mg at 06/08/25 0632    [Held by provider] spironolactone (Aldactone) tablet 100 mg  100 mg Q DAY Gordon Brambila M.D.   100 mg at 06/06/25 0424    oxyCODONE immediate-release (Roxicodone) tablet 5 mg  5 mg Q3HRS PROSMAR Jarquin M.D.   5 mg at 06/05/25 0602    Or    oxyCODONE immediate release (Roxicodone) tablet 10 mg  10 mg Q3HRS PRN Héctor Jarquin M.D.   10 mg at 06/06/25 0423    Or    morphine 4 MG/ML injection 4 mg  4 mg Q3HRS PRN Héctor Jarquin M.D.        hydrALAZINE (Apresoline) injection 10 mg  10 mg Q4HRS PROSMAR Jarquin M.D.        ondansetron (Zofran) syringe/vial injection 4 mg  4 mg Q4HRS PROSMAR Jarquin M.D.        ondansetron (Zofran ODT) dispertab 4 mg  4 mg Q4HRS PRN Héctor Jarquin M.D.        promethazine (Phenergan) tablet 12.5-25 mg  12.5-25 mg Q4HRS FAZAL Jarquin M.D.        promethazine (Phenergan) suppository 12.5-25 mg  12.5-25 mg Q4HRS PROSMAR Jarquin M.D.        prochlorperazine (Compazine) injection 5-10 mg  5-10 mg Q4HRS PROSMAR Jarquin M.D.        folic acid (Folvite) tablet 1 mg  1 mg DAILY Héctor Jarquin M.D.   1 mg at 06/07/25 0518    hydrOXYzine HCl (Atarax) tablet 25 mg  25 mg TID PROSMAR Jarquin M.D.   25 mg at 06/05/25 2019    tamsulosin (Flomax)  "capsule 0.4 mg  0.4 mg AFTER BREAKFAST Héctor Jarquin M.D.   0.4 mg at 06/07/25 0755    hydrocortisone 1 % cream   BID Gordon Brambila M.D.   Given at 06/08/25 0632   Last reviewed on 5/29/2025  3:51 AM by Olga Ronquillo PhT       Review of Systems:  Review of Systems   Unable to perform ROS: Acuity of condition         Vital signs:  Weight/BMI: Body mass index is 51.63 kg/m².  /63   Pulse (!) 113   Temp 36.7 °C (98.1 °F) (Temporal)   Resp (!) 23   Ht 1.6 m (5' 3\")   Wt (!) 132 kg (291 lb 7.2 oz)   SpO2 95%   Vitals:    06/08/25 0448 06/08/25 0525 06/08/25 0715 06/08/25 0816   BP: 131/69   121/63   Pulse: (!) 109 (!) 115 (!) 110 (!) 113   Resp: (!) 22 (!) 24 (!) 24 (!) 23   Temp: 36.6 °C (97.9 °F)   36.7 °C (98.1 °F)   TempSrc: Temporal   Temporal   SpO2: 97% 95% 95% 95%   Weight: (!) 132 kg (291 lb 7.2 oz)      Height:         Oxygen Therapy:  Pulse Oximetry: 95 %, O2 (LPM): 2, O2 Delivery Device: Silicone Nasal Cannula    Intake/Output Summary (Last 24 hours) at 6/8/2025 0833  Last data filed at 6/8/2025 0816  Gross per 24 hour   Intake 200 ml   Output 500 ml   Net -300 ml       Physical Exam  Vitals and nursing note reviewed.   Constitutional:       General: He is in acute distress.      Appearance: He is obese. He is ill-appearing.   HENT:      Head: Normocephalic and atraumatic.      Right Ear: External ear normal.      Left Ear: External ear normal.      Nose: Nose normal.      Mouth/Throat:      Mouth: Mucous membranes are dry.      Pharynx: Oropharynx is clear.   Eyes:      General: Scleral icterus present.   Neck:      Comments: Obese  Cardiovascular:      Rate and Rhythm: Regular rhythm. Tachycardia present.      Pulses: Normal pulses.      Heart sounds: Normal heart sounds.   Pulmonary:      Effort: Respiratory distress present.      Breath sounds: Stridor present. Wheezing and rales present.   Abdominal:      General: There is distension.      Tenderness: There is abdominal tenderness. "   Musculoskeletal:         General: Swelling present. Normal range of motion.      Right lower leg: Edema present.      Left lower leg: Edema present.   Skin:     General: Skin is warm.      Coloration: Skin is jaundiced.   Neurological:      Motor: Weakness present.      Comments: Lethargic and encephalopathic     Labs:  Recent Labs     06/06/25 0115 06/07/25 0445 06/08/25 0211   SODIUM 132* 128* 131*   POTASSIUM 4.7 5.3 5.1   CHLORIDE 101 97 102   CO2 20 18* 14*   BUN 34* 44* 53*   CREATININE 2.78* 2.76* 2.74*   MAGNESIUM 2.1  --  2.2   PHOSPHORUS 4.1  --  4.7*   CALCIUM 8.0* 8.5 8.9     Recent Labs     06/06/25 0115 06/07/25 0445 06/08/25  0211   ALTSGPT 54* 54* 37   ASTSGOT 119* 113* 69*   ALKPHOSPHAT 238* 193* 154*   TBILIRUBIN 10.0* 11.2* 10.0*   DBILIRUBIN 6.4*  --   --    GLUCOSE 187* 232* 168*     Recent Labs     06/06/25 0115 06/07/25 0445 06/08/25  0211 06/08/25  0230   WBC 11.0*  --   --  12.5*   ASTSGOT 119* 113* 69*  --    ALTSGPT 54* 54* 37  --    ALKPHOSPHAT 238* 193* 154*  --    TBILIRUBIN 10.0* 11.2* 10.0*  --      Recent Labs     06/06/25 0115 06/08/25  0230   RBC 2.96* 2.60*   HEMOGLOBIN 9.7* 8.5*   HEMATOCRIT 29.6* 25.5*   PLATELETCT 193 212     Recent Results (from the past 24 hours)   ABG - LAB    Collection Time: 06/07/25 12:10 PM   Result Value Ref Range    Ph 7.30 (L) 7.35 - 7.45    Pco2 39.9 32.0 - 48.0 mmHg    Po2 106.1 83.0 - 108.0 mmHg    O2 Saturation 97.0 93.0 - 99.0 %    Hco3 20 (L) 21 - 28 mmol/L    Base Excess -6 (L) -4 - 3 mmol/L    Body Temp 36.4 Centigrade    Ph -TC 7.30 (L) 7.35 - 7.45    Pco2 -TC 38.9 32.0 - 48.0 mmHg    Po2 -.5 83.0 - 108.0 mmHg   AMMONIA    Collection Time: 06/07/25 12:10 PM   Result Value Ref Range    Ammonia 121 (HH) 11 - 45 umol/L   URINE CORTISOL 24 HR, ICMA    Collection Time: 06/07/25 12:30 PM   Result Value Ref Range    Collection Length 24 Hrs    Total Volume 600 mL   PROTEIN/CREAT RATIO URINE    Collection Time: 06/07/25 12:30 PM    Result Value Ref Range    Total Protein, Urine 87.0 (H) 0.0 - 15.0 mg/dL    Creatinine, Random Urine 253.00 mg/dL    Protein Creatinine Ratio 344 (H) 15 - 68 mg/g   proBrain Natriuretic Peptide, NT    Collection Time: 06/08/25  2:11 AM   Result Value Ref Range    NT-proBNP 2718 (H) 0 - 125 pg/mL   Comp Metabolic Panel    Collection Time: 06/08/25  2:11 AM   Result Value Ref Range    Sodium 131 (L) 135 - 145 mmol/L    Potassium 5.1 3.6 - 5.5 mmol/L    Chloride 102 96 - 112 mmol/L    Co2 14 (L) 20 - 33 mmol/L    Anion Gap 15.0 7.0 - 16.0    Glucose 168 (H) 65 - 99 mg/dL    Bun 53 (H) 8 - 22 mg/dL    Creatinine 2.74 (H) 0.50 - 1.40 mg/dL    Calcium 8.9 8.5 - 10.5 mg/dL    Correct Calcium 9.1 8.5 - 10.5 mg/dL    AST(SGOT) 69 (H) 12 - 45 U/L    ALT(SGPT) 37 2 - 50 U/L    Alkaline Phosphatase 154 (H) 30 - 99 U/L    Total Bilirubin 10.0 (H) 0.1 - 1.5 mg/dL    Albumin 3.7 3.2 - 4.9 g/dL    Total Protein 6.9 6.0 - 8.2 g/dL    Globulin 3.2 1.9 - 3.5 g/dL    A-G Ratio 1.2 g/dL   MAGNESIUM    Collection Time: 06/08/25  2:11 AM   Result Value Ref Range    Magnesium 2.2 1.5 - 2.5 mg/dL   PHOSPHORUS    Collection Time: 06/08/25  2:11 AM   Result Value Ref Range    Phosphorus 4.7 (H) 2.5 - 4.5 mg/dL   ABG - LAB    Collection Time: 06/08/25  2:11 AM   Result Value Ref Range    Ph 7.31 (L) 7.35 - 7.45    Pco2 37.8 32.0 - 48.0 mmHg    Po2 114.5 (H) 83.0 - 108.0 mmHg    O2 Saturation 97.0 93.0 - 99.0 %    Hco3 19 (L) 21 - 28 mmol/L    Base Excess -6 (L) -4 - 3 mmol/L    Body Temp 36.8 Centigrade    Ph -TC 7.32 (L) 7.35 - 7.45    Pco2 -TC 37.5 32.0 - 48.0 mmHg    Po2 -.3 (H) 83.0 - 108.0 mmHg   AMMONIA    Collection Time: 06/08/25  2:11 AM   Result Value Ref Range    Ammonia 137 (HH) 11 - 45 umol/L   ESTIMATED GFR    Collection Time: 06/08/25  2:11 AM   Result Value Ref Range    GFR (CKD-EPI) 30 (A) >60 mL/min/1.73 m 2   CBC WITHOUT DIFFERENTIAL    Collection Time: 06/08/25  2:30 AM   Result Value Ref Range    WBC 12.5 (H) 4.8 -  10.8 K/uL    RBC 2.60 (L) 4.70 - 6.10 M/uL    Hemoglobin 8.5 (L) 14.0 - 18.0 g/dL    Hematocrit 25.5 (L) 42.0 - 52.0 %    MCV 98.1 (H) 81.4 - 97.8 fL    MCH 32.7 27.0 - 33.0 pg    MCHC 33.3 32.3 - 36.5 g/dL    RDW 65.9 (H) 35.9 - 50.0 fL    Platelet Count 212 164 - 446 K/uL    MPV 9.2 9.0 - 12.9 fL   PROCALCITONIN    Collection Time: 06/08/25  2:30 AM   Result Value Ref Range    Procalcitonin 0.84 (H) <0.25 ng/mL   BLOOD CULTURE    Collection Time: 06/08/25  2:30 AM    Specimen: Peripheral; Blood   Result Value Ref Range    Significant Indicator NEG     Source BLD     Site PERIPHERAL     Culture Result       No Growth  Note: Blood cultures are incubated for 5 days and  are monitored continuously.Positive blood cultures  are called to the RN and reported as soon as  they are identified.     BLOOD CULTURE    Collection Time: 06/08/25  2:30 AM    Specimen: Peripheral; Blood   Result Value Ref Range    Significant Indicator NEG     Source BLD     Site PERIPHERAL     Culture Result       No Growth  Note: Blood cultures are incubated for 5 days and  are monitored continuously.Positive blood cultures  are called to the RN and reported as soon as  they are identified.     ABG - LAB    Collection Time: 06/08/25  6:51 AM   Result Value Ref Range    Ph 7.30 (L) 7.35 - 7.45    Pco2 38.5 32.0 - 48.0 mmHg    Po2 96.4 83.0 - 108.0 mmHg    O2 Saturation 95.0 93.0 - 99.0 %    Hco3 19 (L) 21 - 28 mmol/L    Base Excess -6 (L) -4 - 3 mmol/L    Body Temp 36.8 Centigrade    Ph -TC 7.31 (L) 7.35 - 7.45    Pco2 -TC 38.1 32.0 - 48.0 mmHg    Po2 -TC 95.2 83.0 - 108.0 mmHg       Radiology Review:  DX-CHEST-PORTABLE (1 VIEW)   Final Result      1.  Consolidation within the left lung consistent with pneumonia.      2.  Cardiomegaly with interstitial opacities.      DX-CHEST-PORTABLE (1 VIEW)   Final Result      Cardiomegaly. Hypoinflation. No definite new abnormality.      US-ABDOMEN LTD (SOFT TISSUE)   Final Result      No ascites.          MR-ABDOMEN-WITH & W/O   Final Result      1.  The tail of the pancreas appears somewhat irregular and stranding with increased signal but evaluation is very limited due to significant motion artifact. This could relate to sequela of prior pancreatitis. A mass cannot be excluded. Follow-up in 6    months with MR after improvement is recommended.   2.  There is an accessory splenule adjacent to the spleen   3. Diffuse subcutaneous edema .                        EC-ECHOCARDIOGRAM COMPLETE W/ CONT   Final Result      US-EXTREMITY VENOUS LOWER BILAT   Final Result      US-RUQ   Final Result         1.  Thickened gallbladder wall without visualized shadowing stones, consider acalculous cholecystitis, could be further evaluated with HIDA scan as clinically appropriate   2.  Hepatomegaly   3.  Echogenic liver, compatible with fatty change versus fibrosis      CT-PANCREAS AND ABDOMEN WITH & W/O   Final Result         1.  Masslike structure adjacent to the tail of pancreas with possible slight enhancement on arterial phase imaging, recommend follow-up MRI of the pancreas with contrast for more definitive characterization.   2.  Ovoid mass in the splenic hilum with similar density to spleen on all contrast phases, appearance favoring splenule.   3.  Changes of cirrhosis.   4.  Hepatomegaly      RP-RTRUIFE-2 VIEW    (Results Pending)       MDM (Data Review):   -Records reviewed and summarized in current documentation  -I personally reviewed and interpreted the laboratory results  -I personally reviewed the radiology images    Assessment/Recommendations:  Alcohol use disorder with multiple admissions related to alcohol, DUI, no long term sobriety  Clinically significant portal hypertension with worsening hepatic encephalopathy  Alcoholic liver disease with changes of cirrhosis on CT  Abdominal pain  Diffuse subcutaneous edema - cirrhosis vs nephrotic syndrome/acute kidney failure vs nutritional deficiency vs steroid use vs  ?lymphatic obstruction due to malignancy  Previous thyroid studies normal, echo with grossly normal LV function, unable to visualize RV   Hepatorenal syndrome with urine sodium less than 20  Transaminitis and hyperbilirubinemia, direct greater than indirect  Hypoalbuminemia  Thrombocytopenia  Mild coagulopathy  Hypervolemic hyponatremia  Macrocytic anemia  Vitamin D deficiency  Previous HBV, HCV negative, AMA neg, OTF and f actin neg, ferritin normal  CT Chest/abd/pelvis findings concerning for pancreatic mass, MRI with limited evaluation, consider sequela of prior pancreatitis versus mass, recommend repeat imaging in 6 months. CA [id number] 541 in April 2025  Vitamin D deficiency    Current MELD 3.0 = 32  Child Menard 9 points - class B  Maddrey's 27.5    Recommendations:  Given increased work of breathing with wheezing and stridor, alongside significant fluid overload, transferred to IMCU for Lasix drip  Started on IV methylprednisolone for wheezing and stridor  On ceftriaxone and azithromycin for possible pneumonia  Significantly worsening hepatic encephalopathy, not having regular bowel movements  Increase to rectal lactulose every 4 hours until reliably stooling  Continue rifaximin  Unfortunately not a dialysis candidate, continue HRS treatment with octreotide, albumin 1 mg/kg per day, and midodrine.    Low-sodium diet, high-protein-nutritional support is critical in the management of liver disease  Primary team working up for underlying cushingoid syndrome, 24 hour urine cortisol pending  No ascites on RUQ US  Monitor for signs of bleeding, downtrending hemoglobin  High-dose thiamine supplementation, folic acid, consider vitamin D    Unfortunately given his repeated admissions for alcohol related liver decompensation despite counseling to maintain sobriety and refrain from drinking, he is not a candidate for liver transplant at this time     GI will continue to follow    Discussed with patient's family, RNDr. Lopez,  Dr. Camacho    ..Michelle Edwards, STAN,  APRN    Core Quality Measures   Reviewed items::  Labs, Medications and Radiology reports reviewed

## 2025-06-08 NOTE — PROGRESS NOTES
Rapid Response Summary     Rapid response called at 1045 for: Shortness of breath  and increased WOB    VS: Tachypneic  and Hypertensive  (See Vitals Flowsheet)  Additional info:   MD Paged: John and Block  Interventions:    Imaging/Tests: N/A   Labs: N/A   Medications: Neb tx already ordered and being given by RT   Other: Goals of care discussion had with Dr. Khan and family.   Disposition: Did not improve  with rapid response team interventions. Primary RN updated on plan of care. Patient transferred to [facility].  Lasix gtt ordered as well.

## 2025-06-08 NOTE — PROGRESS NOTES
[facility] Medicine Daily Progress Note    Date of Service  6/8/2025    Chief Complaint  Alberto Maldonado is a 39 y.o. male admitted 5/28/2025 with swelling.    Hospital Course  38 yo man with alcoholic cirrhosis, ongoing alcohol use, DM, HTN, BOLIVAR, morbid obesity who presented with worsening swelling.  He continues to drink 9-10 shots every day.  He was found with worsening liver function, elevated ammonia.  Patient was previously also hospitalized and CTAP at that time showed a pancreatic tail lesion and was recommended to have outpatient CT pancreatic protocol.  CT was done in the ER which showed a mass in the pancreatic tail, ovoid mass in the splenic hilum.  MRI showed increased signal in tail of pancreas, prior pancreatitis but mass was not excluded.  He will need a follow-up MRI in 6 months.  GI was consulted and he is not a candidate for further steroid treatment for alcohol hepatitis.  He was started on diuretics for his edema.  Abdominal ultrasound was negative for ascites.  Patient reported he drinks alcohol to treat his anxiety, psychiatry was consulted and patient started on gabapentin.  Psychotherapy is following the patient.    Despite aggressive IV diuresis patient's weight is going up. Worsening renal function and nephrology was consulted.  Started on treatment with octreotide, midodrine, albumin for hepatorenal syndrome and GI has been reconsulted.    Interval Problem Update  I have seen and examined the patient at bedside    Update response was called for worsening mentation and worsening respiratory status  Patient is more lethargic, labored breathing.  Tachycardia with tachypnea.   ABG showed pH 7.3, no CO2 retention.  Likely metabolic acidosis due to EDIN    Chest x-ray showed new right-sided pneumonia, leukocytosis, procalcitonin 0.84.  Started on IV Rocephin     Significant wheezing, likely asthma exacerbation.  Started on IV Solu-Medrol 125 mg once, then daily IV Solu-Medrol 60 mg 3 times  daily, taper down once improving.     I discussed with nephrology, patient is not a candidate for dialysis.  Okay to resume IV Lasix.     I discussed with intensivist, patient will be transferred to ICU for Lasix drip and chlorothiazide.  Continue octreotide drip    Ammonia trending up - 122>137  Lactulose is increased to 45 cc 3 times daily, continue rifaximin    Concerning for cushingoid syndrome - pending 24-hour urine cortisol and midnight salivary cortisol level    I have discussed this patient's plan of care and discharge plan at IDT rounds today with Case Management, Nursing, Nursing leadership, and other members of the IDT team.    Consultants/Specialty  GI  Psychiatry  Nephrology    Code Status  DNAR/DNI    Disposition  The patient is not medically cleared for discharge to home or a post-acute facility.      I have placed the appropriate orders for post-discharge needs.    Review of Systems  Review of Systems   Constitutional:  Positive for malaise/fatigue. Negative for fever.   Respiratory:  Positive for shortness of breath.    Cardiovascular:  Positive for leg swelling.   Gastrointestinal:  Positive for diarrhea. Negative for abdominal pain and vomiting.   Musculoskeletal:  Positive for myalgias.   Neurological:  Positive for weakness.   Psychiatric/Behavioral:  Positive for substance abuse. The patient is nervous/anxious.         Physical Exam  Temp:  [36.6 °C (97.9 °F)-37.6 °C (99.7 °F)] 37.6 °C (99.7 °F)  Pulse:  [] 122  Resp:  [12-26] 26  BP: (108-166)/() 153/117  SpO2:  [94 %-98 %] 94 %    Physical Exam  Vitals and nursing note reviewed.   Constitutional:       General: He is not in acute distress.     Appearance: He is obese. He is ill-appearing.      Comments: More alert today   HENT:      Head: Normocephalic.   Eyes:      General: Scleral icterus present.         Right eye: No discharge.         Left eye: No discharge.   Cardiovascular:      Rate and Rhythm: Normal rate and regular  rhythm.   Pulmonary:      Effort: Pulmonary effort is normal. No respiratory distress.      Breath sounds: No wheezing or rales.      Comments: Diminished at bases  Abdominal:      General: There is distension (Pitting edema).      Tenderness: There is no abdominal tenderness.      Comments: Pitting edema up to umbilicus, + weeping   Musculoskeletal:         General: Swelling (Improved at levels of arms) present.      Cervical back: Neck supple.      Right lower leg: Edema present.      Left lower leg: Edema present.      Comments: +3 pitting edema bilateral lower extremity   Skin:     General: Skin is warm.      Coloration: Skin is jaundiced.      Comments: Excoriations to both arms   Neurological:      Mental Status: He is alert and oriented to person, place, and time.   Psychiatric:         Mood and Affect: Mood is anxious.         Fluids    Intake/Output Summary (Last 24 hours) at 6/8/2025 1553  Last data filed at 6/8/2025 1200  Gross per 24 hour   Intake 200 ml   Output 600 ml   Net -400 ml        Laboratory  Recent Labs     06/06/25  0115 06/08/25  0230   WBC 11.0* 12.5*   RBC 2.96* 2.60*   HEMOGLOBIN 9.7* 8.5*   HEMATOCRIT 29.6* 25.5*   .0* 98.1*   MCH 32.8 32.7   MCHC 32.8 33.3   RDW 69.2* 65.9*   PLATELETCT 193 212   MPV 9.1 9.2     Recent Labs     06/06/25  0115 06/07/25  0445 06/08/25  0211   SODIUM 132* 128* 131*   POTASSIUM 4.7 5.3 5.1   CHLORIDE 101 97 102   CO2 20 18* 14*   GLUCOSE 187* 232* 168*   BUN 34* 44* 53*   CREATININE 2.78* 2.76* 2.74*   CALCIUM 8.0* 8.5 8.9     Recent Labs     06/08/25  0931   INR 1.39*                 Imaging  [id number] VIEW   Final Result         1. Mild colonic ileus type colonic distention.                     DX-CHEST-PORTABLE (1 VIEW)   Final Result      1.  Consolidation within the left lung consistent with pneumonia.      2.  Cardiomegaly with interstitial opacities.      DX-CHEST-PORTABLE (1 VIEW)   Final Result      Cardiomegaly. Hypoinflation. No  definite new abnormality.      US-ABDOMEN LTD (SOFT TISSUE)   Final Result      No ascites.         MR-ABDOMEN-WITH & W/O   Final Result      1.  The tail of the pancreas appears somewhat irregular and stranding with increased signal but evaluation is very limited due to significant motion artifact. This could relate to sequela of prior pancreatitis. A mass cannot be excluded. Follow-up in 6    months with MR after improvement is recommended.   2.  There is an accessory splenule adjacent to the spleen   3. Diffuse subcutaneous edema .                        EC-ECHOCARDIOGRAM COMPLETE W/ CONT   Final Result      US-EXTREMITY VENOUS LOWER BILAT   Final Result      US-RUQ   Final Result         1.  Thickened gallbladder wall without visualized shadowing stones, consider acalculous cholecystitis, could be further evaluated with HIDA scan as clinically appropriate   2.  Hepatomegaly   3.  Echogenic liver, compatible with fatty change versus fibrosis      CT-PANCREAS AND ABDOMEN WITH & W/O   Final Result         1.  Masslike structure adjacent to the tail of pancreas with possible slight enhancement on arterial phase imaging, recommend follow-up MRI of the pancreas with contrast for more definitive characterization.   2.  Ovoid mass in the splenic hilum with similar density to spleen on all contrast phases, appearance favoring splenule.   3.  Changes of cirrhosis.   4.  Hepatomegaly           Assessment/Plan  * Alcoholic liver hepatitis and cirrhosis- (present on admission)  Assessment & Plan  DF score 35.7, MELD 26 -> now 25.  T. bili and INR has decreased  Patient just completed course of steroid, GI consulted and does not recommend restarting  Alcohol cessation counseling, patient is motivated to stop drinking.  Case management to provide resources  No signs of hepatic encephalopathy, continue maintenance lactulose  Continue diuresis, continue on Lasix 60 mg IV twice daily and spironolactone 100mg. Monitor I's and O's,  low-sodium diet and fluid restriction  He has a referral to [facility], he will make a new appointment    Patient reports baseline weight 240-250  -Daily weights, today he is 300 pounds    Hepatic encephalopathy (HCC)  Assessment & Plan  More lethargic and confused  Ammonia trending up - 122>137  Lactulose is increased to 45 cc 3 times daily, continue rifaximin  I discussed with GI    Vitamin D deficiency  Assessment & Plan  Replacement ordered    Hyperammonemia (HCC)  Assessment & Plan  He does not clinically have hepatic encephalopathy  Continue maintenance lactulose    Hyponatremia  Assessment & Plan  Secondary to alcohol abuse, liver disease  Monitor    Anasarca  Assessment & Plan  Likely due to alcoholic cirrhosis  -No ascites on imaging, query Cushing's: Pending 24-hour urine cortisol and midnight salivary cortisol levels  Nephrology and GI following hold diuresis for now  Silver catheter placement, strict I's and O's  Doppler negative for DVT.  TTE with normal EF    Lasix on hold per nephro given worsening EDIN    6/8 I discussed with nephrology, patient is not a candidate for dialysis.  Okay to resume IV Lasix.   I discussed with intensivist at the nephrology, patient will be transferred to ICU for Lasix drip and chlorothiazide.  Continue octreotide drip    No improvement with diuretics  Continue to hold diuretics per nephrology    Hepatorenal syndrome with acute kidney injury (HCC)- (present on admission)  Assessment & Plan  Worsening despite aggressive IV diuresis  Nephrology consulted  -Hold Lasix and Aldactone for now  -Start scheduled albumin and midodrine    6/7 continue octreotide drip, continue albumin 50 mg 3 times daily  Monitor HR, BP, respiratory status while on octreotide drip  Continue telemetry monitor  Continue to hold diuretics per nephrology  I discussed with GI  Avoid nephrotoxic agent  Renal dose medications    Anxiety- (present on admission)  Assessment & Plan  Reports uses alcohol to self treat  his anxiety  Psychiatry consulted recommended starting gabapentin 300 mg 3 times daily  Atarax as needed   inpatient psychotherapy    Intraabdominal mass- (present on admission)  Assessment & Plan  CT pancreatic protocol demonstrated masslike structure adjacent to the tail of the pancreas with possible slight enhancement of arterial phase.    CA 19-9 was elevated.  AFP normal  MRI showed increased signal in tail of pancreas, prior pancreatitis but mass was not excluded.  He will need a follow-up MRI in 6 months.     BOLIVAR on CPAP- (present on admission)  Assessment & Plan  Continue CPAP at night    Pneumonia- (present on admission)  Assessment & Plan  6/8 Chest x-ray showed new right-sided pneumonia, leukocytosis, procalcitonin 0.84.  Started on IV Rocephin     Alcohol use disorder, severe, dependence (HCC)- (present on admission)  Assessment & Plan  Last drink 5/28  CIWA score's were low and was stopped.  He has mild tremors and some anxiety.      Received Atarax for anxiety    Asthma exacerbation  Assessment & Plan  Significant wheezing, likely asthma exacerbation.  Started on IV Solu-Medrol 125 mg once, then daily IV Solu-Medrol 60 mg 3 times daily, taper down once improving.   Continue DuoNeb nebulizer  Continue breathing treatment    Morbid obesity (HCC)- (present on admission)  Assessment & Plan  BMI 44  Query cushingoid syndrome  -24-hour urine cortisol  -Midnight salivary cortisol    Essential hypertension- (present on admission)  Assessment & Plan  Continue on Coreg, BP stable, monitor while on diuretics        VTE prophylaxis:    enoxaparin ppx    I have performed a physical exam and reviewed and updated ROS and Plan today (6/8/2025). In review of yesterday's note (6/7/2025), there are no changes except as documented above.      Patient is critically ill.   The patient continues to have: HRS, worsening mentation and respiratory status  The vital organ system that is affected is the: Cardiac, respiratory and  kidney  If untreated there is a high chance of deterioration into: Cardiorespiratory failure and kidney failure  And eventually death.   The critical care that I am providing today is: Octreotide drip, Lasix drip  the critical that has been undertaken is medically complex.   There has been no overlap in critical care time.   Critical Care Time not including procedures: 50      VTE Selection

## 2025-06-08 NOTE — PROGRESS NOTES
Monitor summary    Rhythm: SR, ST   Rate:   Ectopy: rare PVC  Measurements: .15/.08/.31

## 2025-06-08 NOTE — PROGRESS NOTES
Pt with increased work of breathing with accessory muscle use. O2 sat 95% on 2L NC. RT at bedside giving breathing treatment. Dr Lopez made aware and will come to bedside to evaluate pt.

## 2025-06-09 LAB
ALBUMIN SERPL BCP-MCNC: 4.3 G/DL (ref 3.2–4.9)
ALBUMIN/GLOB SERPL: 1.5 G/DL
ALP SERPL-CCNC: 130 U/L (ref 30–99)
ALT SERPL-CCNC: 31 U/L (ref 2–50)
AMMONIA PLAS-SCNC: 111 UMOL/L (ref 11–45)
ANION GAP SERPL CALC-SCNC: 14 MMOL/L (ref 7–16)
ANNOTATION COMMENT IMP: NORMAL
AST SERPL-CCNC: 43 U/L (ref 12–45)
BILIRUB SERPL-MCNC: 10.1 MG/DL (ref 0.1–1.5)
BUN SERPL-MCNC: 61 MG/DL (ref 8–22)
CALCIUM ALBUM COR SERPL-MCNC: 9 MG/DL (ref 8.5–10.5)
CALCIUM SERPL-MCNC: 9.2 MG/DL (ref 8.5–10.5)
CHLORIDE SERPL-SCNC: 102 MMOL/L (ref 96–112)
CO2 SERPL-SCNC: 19 MMOL/L (ref 20–33)
COLLECT DURATION TIME SPEC: NORMAL HR
CORTIS F 24H UR HPLC-MCNC: 15.5 UG/L
CORTIS F 24H UR-MRATE: NORMAL UG/D
CORTIS F/CREAT 24H UR: 4.92 UG/G CRT
CREAT 24H UR-MCNC: 315 MG/DL
CREAT SERPL-MCNC: 2.49 MG/DL (ref 0.5–1.4)
ERYTHROCYTE [DISTWIDTH] IN BLOOD BY AUTOMATED COUNT: 69 FL (ref 35.9–50)
GFR SERPLBLD CREATININE-BSD FMLA CKD-EPI: 33 ML/MIN/1.73 M 2
GLOBULIN SER CALC-MCNC: 2.9 G/DL (ref 1.9–3.5)
GLUCOSE SERPL-MCNC: 247 MG/DL (ref 65–99)
HCT VFR BLD AUTO: 22.6 % (ref 42–52)
HGB BLD-MCNC: 7.4 G/DL (ref 14–18)
MCH RBC QN AUTO: 33.5 PG (ref 27–33)
MCHC RBC AUTO-ENTMCNC: 32.7 G/DL (ref 32.3–36.5)
MCV RBC AUTO: 102.3 FL (ref 81.4–97.8)
PLATELET # BLD AUTO: 202 K/UL (ref 164–446)
PMV BLD AUTO: 9 FL (ref 9–12.9)
POTASSIUM SERPL-SCNC: 4.7 MMOL/L (ref 3.6–5.5)
PROT SERPL-MCNC: 7.2 G/DL (ref 6–8.2)
RBC # BLD AUTO: 2.21 M/UL (ref 4.7–6.1)
SODIUM SERPL-SCNC: 135 MMOL/L (ref 135–145)
SPECIMEN VOL ?TM UR: NORMAL ML
WBC # BLD AUTO: 15 K/UL (ref 4.8–10.8)

## 2025-06-09 PROCEDURE — 700101 HCHG RX REV CODE 250: Performed by: STUDENT IN AN ORGANIZED HEALTH CARE EDUCATION/TRAINING PROGRAM

## 2025-06-09 PROCEDURE — 99232 SBSQ HOSP IP/OBS MODERATE 35: CPT | Performed by: NURSE PRACTITIONER

## 2025-06-09 PROCEDURE — 99291 CRITICAL CARE FIRST HOUR: CPT | Performed by: INTERNAL MEDICINE

## 2025-06-09 PROCEDURE — 99233 SBSQ HOSP IP/OBS HIGH 50: CPT | Performed by: INTERNAL MEDICINE

## 2025-06-09 PROCEDURE — P9047 ALBUMIN (HUMAN), 25%, 50ML: HCPCS | Mod: JZ | Performed by: INTERNAL MEDICINE

## 2025-06-09 PROCEDURE — 700111 HCHG RX REV CODE 636 W/ 250 OVERRIDE (IP)

## 2025-06-09 PROCEDURE — 770000 HCHG ROOM/CARE - INTERMEDIATE ICU *

## 2025-06-09 PROCEDURE — 80053 COMPREHEN METABOLIC PANEL: CPT

## 2025-06-09 PROCEDURE — A9270 NON-COVERED ITEM OR SERVICE: HCPCS | Performed by: INTERNAL MEDICINE

## 2025-06-09 PROCEDURE — 700102 HCHG RX REV CODE 250 W/ 637 OVERRIDE(OP)

## 2025-06-09 PROCEDURE — 99497 ADVNCD CARE PLAN 30 MIN: CPT

## 2025-06-09 PROCEDURE — 700102 HCHG RX REV CODE 250 W/ 637 OVERRIDE(OP): Performed by: NURSE PRACTITIONER

## 2025-06-09 PROCEDURE — 94640 AIRWAY INHALATION TREATMENT: CPT

## 2025-06-09 PROCEDURE — 700111 HCHG RX REV CODE 636 W/ 250 OVERRIDE (IP): Performed by: HOSPITALIST

## 2025-06-09 PROCEDURE — 700105 HCHG RX REV CODE 258: Performed by: STUDENT IN AN ORGANIZED HEALTH CARE EDUCATION/TRAINING PROGRAM

## 2025-06-09 PROCEDURE — 700111 HCHG RX REV CODE 636 W/ 250 OVERRIDE (IP): Mod: JZ | Performed by: STUDENT IN AN ORGANIZED HEALTH CARE EDUCATION/TRAINING PROGRAM

## 2025-06-09 PROCEDURE — 700111 HCHG RX REV CODE 636 W/ 250 OVERRIDE (IP): Performed by: INTERNAL MEDICINE

## 2025-06-09 PROCEDURE — 700111 HCHG RX REV CODE 636 W/ 250 OVERRIDE (IP): Performed by: EMERGENCY MEDICINE

## 2025-06-09 PROCEDURE — 700105 HCHG RX REV CODE 258: Performed by: EMERGENCY MEDICINE

## 2025-06-09 PROCEDURE — 700111 HCHG RX REV CODE 636 W/ 250 OVERRIDE (IP): Mod: JZ | Performed by: INTERNAL MEDICINE

## 2025-06-09 PROCEDURE — 85027 COMPLETE CBC AUTOMATED: CPT

## 2025-06-09 PROCEDURE — 700102 HCHG RX REV CODE 250 W/ 637 OVERRIDE(OP): Performed by: INTERNAL MEDICINE

## 2025-06-09 PROCEDURE — 302307 BAG FECAL MANAGEMENT TEMPORARY COLLECTION WITH FILTER - SEAL SIGNAL FMS: Performed by: STUDENT IN AN ORGANIZED HEALTH CARE EDUCATION/TRAINING PROGRAM

## 2025-06-09 PROCEDURE — A9270 NON-COVERED ITEM OR SERVICE: HCPCS

## 2025-06-09 PROCEDURE — 92610 EVALUATE SWALLOWING FUNCTION: CPT

## 2025-06-09 PROCEDURE — 700105 HCHG RX REV CODE 258

## 2025-06-09 PROCEDURE — A9270 NON-COVERED ITEM OR SERVICE: HCPCS | Performed by: NURSE PRACTITIONER

## 2025-06-09 PROCEDURE — 700105 HCHG RX REV CODE 258: Performed by: INTERNAL MEDICINE

## 2025-06-09 PROCEDURE — 82140 ASSAY OF AMMONIA: CPT

## 2025-06-09 PROCEDURE — 99255 IP/OBS CONSLTJ NEW/EST HI 80: CPT

## 2025-06-09 RX ORDER — MORPHINE SULFATE 4 MG/ML
4 INJECTION INTRAVENOUS
Status: DISCONTINUED | OUTPATIENT
Start: 2025-06-09 | End: 2025-06-10

## 2025-06-09 RX ORDER — LANOLIN ALCOHOL/MO/W.PET/CERES
400 CREAM (GRAM) TOPICAL DAILY
Status: DISCONTINUED | OUTPATIENT
Start: 2025-06-10 | End: 2025-06-10

## 2025-06-09 RX ORDER — ENOXAPARIN SODIUM 100 MG/ML
60 INJECTION SUBCUTANEOUS EVERY 12 HOURS
Status: DISCONTINUED | OUTPATIENT
Start: 2025-06-09 | End: 2025-06-10

## 2025-06-09 RX ORDER — LACTULOSE 10 G/15ML
45 SOLUTION ORAL 4 TIMES DAILY
Status: DISCONTINUED | OUTPATIENT
Start: 2025-06-09 | End: 2025-06-10

## 2025-06-09 RX ORDER — OXYCODONE HYDROCHLORIDE 10 MG/1
10 TABLET ORAL
Refills: 0 | Status: DISCONTINUED | OUTPATIENT
Start: 2025-06-09 | End: 2025-06-10

## 2025-06-09 RX ORDER — LACTULOSE 10 G/15ML
45 SOLUTION ORAL 4 TIMES DAILY
Status: DISCONTINUED | OUTPATIENT
Start: 2025-06-09 | End: 2025-06-09

## 2025-06-09 RX ORDER — ONDANSETRON 4 MG/1
4 TABLET, ORALLY DISINTEGRATING ORAL EVERY 4 HOURS PRN
Status: DISCONTINUED | OUTPATIENT
Start: 2025-06-09 | End: 2025-06-10

## 2025-06-09 RX ORDER — DIAZEPAM 2 MG/1
2 TABLET ORAL EVERY 6 HOURS PRN
Status: DISCONTINUED | OUTPATIENT
Start: 2025-06-09 | End: 2025-06-10

## 2025-06-09 RX ORDER — MIDODRINE HYDROCHLORIDE 5 MG/1
5 TABLET ORAL EVERY 8 HOURS
Status: DISCONTINUED | OUTPATIENT
Start: 2025-06-09 | End: 2025-06-10

## 2025-06-09 RX ORDER — POLYETHYLENE GLYCOL 3350 17 G/17G
1 POWDER, FOR SOLUTION ORAL 2 TIMES DAILY
Status: DISCONTINUED | OUTPATIENT
Start: 2025-06-09 | End: 2025-06-11 | Stop reason: HOSPADM

## 2025-06-09 RX ORDER — SODIUM BICARBONATE 650 MG/1
1300 TABLET ORAL 3 TIMES DAILY
Status: DISCONTINUED | OUTPATIENT
Start: 2025-06-09 | End: 2025-06-10

## 2025-06-09 RX ORDER — PROMETHAZINE HYDROCHLORIDE 25 MG/1
12.5-25 TABLET ORAL EVERY 4 HOURS PRN
Status: DISCONTINUED | OUTPATIENT
Start: 2025-06-09 | End: 2025-06-10

## 2025-06-09 RX ORDER — FOLIC ACID 1 MG/1
1 TABLET ORAL DAILY
Status: DISCONTINUED | OUTPATIENT
Start: 2025-06-10 | End: 2025-06-10

## 2025-06-09 RX ORDER — METHYLPREDNISOLONE SODIUM SUCCINATE 125 MG/2ML
62.5 INJECTION, POWDER, LYOPHILIZED, FOR SOLUTION INTRAMUSCULAR; INTRAVENOUS EVERY 8 HOURS
Status: DISCONTINUED | OUTPATIENT
Start: 2025-06-09 | End: 2025-06-10

## 2025-06-09 RX ORDER — FUROSEMIDE 10 MG/ML
100 INJECTION INTRAMUSCULAR; INTRAVENOUS ONCE
Status: COMPLETED | OUTPATIENT
Start: 2025-06-09 | End: 2025-06-09

## 2025-06-09 RX ORDER — GABAPENTIN 300 MG/1
300 CAPSULE ORAL 3 TIMES DAILY
Status: DISCONTINUED | OUTPATIENT
Start: 2025-06-09 | End: 2025-06-10

## 2025-06-09 RX ORDER — VITAMIN B COMPLEX
1000 TABLET ORAL DAILY
Status: DISCONTINUED | OUTPATIENT
Start: 2025-06-10 | End: 2025-06-10

## 2025-06-09 RX ORDER — DIAZEPAM 10 MG/2ML
2.5 INJECTION, SOLUTION INTRAMUSCULAR; INTRAVENOUS ONCE
Status: COMPLETED | OUTPATIENT
Start: 2025-06-09 | End: 2025-06-09

## 2025-06-09 RX ORDER — POLYETHYLENE GLYCOL 3350 17 G/17G
1 POWDER, FOR SOLUTION ORAL 2 TIMES DAILY
Status: DISCONTINUED | OUTPATIENT
Start: 2025-06-09 | End: 2025-06-09

## 2025-06-09 RX ORDER — OXYCODONE HYDROCHLORIDE 5 MG/1
5 TABLET ORAL
Refills: 0 | Status: DISCONTINUED | OUTPATIENT
Start: 2025-06-09 | End: 2025-06-10

## 2025-06-09 RX ORDER — AZITHROMYCIN 500 MG/5ML
500 INJECTION, POWDER, LYOPHILIZED, FOR SOLUTION INTRAVENOUS EVERY 24 HOURS
Status: COMPLETED | OUTPATIENT
Start: 2025-06-09 | End: 2025-06-10

## 2025-06-09 RX ADMIN — OCTREOTIDE ACETATE 50 MCG/HR: 200 INJECTION, SOLUTION INTRAVENOUS; SUBCUTANEOUS at 20:23

## 2025-06-09 RX ADMIN — RIFAXIMIN 550 MG: 550 TABLET ORAL at 17:17

## 2025-06-09 RX ADMIN — FUROSEMIDE 10 MG/HR: 10 INJECTION, SOLUTION INTRAMUSCULAR; INTRAVENOUS at 10:00

## 2025-06-09 RX ADMIN — LACTULOSE 300 ML: 10 SOLUTION ORAL; RECTAL at 06:09

## 2025-06-09 RX ADMIN — THIAMINE HYDROCHLORIDE 100 MG: 100 INJECTION, SOLUTION INTRAMUSCULAR; INTRAVENOUS at 05:09

## 2025-06-09 RX ADMIN — METHYLPREDNISOLONE SODIUM SUCCINATE 62.5 MG: 125 INJECTION, POWDER, FOR SOLUTION INTRAMUSCULAR; INTRAVENOUS at 13:47

## 2025-06-09 RX ADMIN — FUROSEMIDE 100 MG: 10 INJECTION, SOLUTION INTRAMUSCULAR; INTRAVENOUS at 09:51

## 2025-06-09 RX ADMIN — CEFTRIAXONE SODIUM 2000 MG: 10 INJECTION, POWDER, FOR SOLUTION INTRAVENOUS at 05:10

## 2025-06-09 RX ADMIN — FUROSEMIDE 10 MG/HR: 10 INJECTION, SOLUTION INTRAMUSCULAR; INTRAVENOUS at 20:24

## 2025-06-09 RX ADMIN — MIDODRINE HYDROCHLORIDE 5 MG: 5 TABLET ORAL at 22:53

## 2025-06-09 RX ADMIN — SODIUM BICARBONATE 1300 MG: 650 TABLET ORAL at 17:17

## 2025-06-09 RX ADMIN — HYDROCORTISONE: 1 CREAM TOPICAL at 18:00

## 2025-06-09 RX ADMIN — AZITHROMYCIN 500 MG: 500 INJECTION, POWDER, LYOPHILIZED, FOR SOLUTION INTRAVENOUS at 06:08

## 2025-06-09 RX ADMIN — ALBUMIN (HUMAN) 50 G: 0.25 INJECTION, SOLUTION INTRAVENOUS at 23:00

## 2025-06-09 RX ADMIN — ENOXAPARIN SODIUM 40 MG: 100 INJECTION SUBCUTANEOUS at 05:09

## 2025-06-09 RX ADMIN — IPRATROPIUM BROMIDE AND ALBUTEROL SULFATE 3 ML: .5; 2.5 SOLUTION RESPIRATORY (INHALATION) at 06:33

## 2025-06-09 RX ADMIN — MORPHINE SULFATE 4 MG: 4 INJECTION INTRAVENOUS at 03:18

## 2025-06-09 RX ADMIN — GABAPENTIN 300 MG: 300 CAPSULE ORAL at 17:17

## 2025-06-09 RX ADMIN — METHYLPREDNISOLONE SODIUM SUCCINATE 62.5 MG: 125 INJECTION, POWDER, FOR SOLUTION INTRAMUSCULAR; INTRAVENOUS at 22:52

## 2025-06-09 RX ADMIN — METHYLPREDNISOLONE SODIUM SUCCINATE 60 MG: 40 INJECTION, POWDER, FOR SOLUTION INTRAMUSCULAR; INTRAVENOUS at 05:09

## 2025-06-09 RX ADMIN — DIAZEPAM 2.5 MG: 10 INJECTION, SOLUTION INTRAMUSCULAR; INTRAVENOUS at 05:00

## 2025-06-09 RX ADMIN — POLYETHYLENE GLYCOL 3350 1 PACKET: 17 POWDER, FOR SOLUTION ORAL at 18:00

## 2025-06-09 RX ADMIN — IPRATROPIUM BROMIDE AND ALBUTEROL SULFATE 3 ML: .5; 2.5 SOLUTION RESPIRATORY (INHALATION) at 19:35

## 2025-06-09 RX ADMIN — LACTULOSE 45 ML: 10 SOLUTION ORAL at 22:52

## 2025-06-09 RX ADMIN — ALBUMIN (HUMAN) 50 G: 0.25 INJECTION, SOLUTION INTRAVENOUS at 13:59

## 2025-06-09 RX ADMIN — IPRATROPIUM BROMIDE AND ALBUTEROL SULFATE 3 ML: .5; 2.5 SOLUTION RESPIRATORY (INHALATION) at 23:14

## 2025-06-09 RX ADMIN — IPRATROPIUM BROMIDE AND ALBUTEROL SULFATE 3 ML: .5; 2.5 SOLUTION RESPIRATORY (INHALATION) at 13:45

## 2025-06-09 RX ADMIN — IPRATROPIUM BROMIDE AND ALBUTEROL SULFATE 3 ML: .5; 2.5 SOLUTION RESPIRATORY (INHALATION) at 11:25

## 2025-06-09 RX ADMIN — HYDROCORTISONE: 1 CREAM TOPICAL at 05:23

## 2025-06-09 RX ADMIN — MIDODRINE HYDROCHLORIDE 5 MG: 5 TABLET ORAL at 13:47

## 2025-06-09 RX ADMIN — ENOXAPARIN SODIUM 60 MG: 100 INJECTION SUBCUTANEOUS at 17:17

## 2025-06-09 RX ADMIN — LACTULOSE 45 ML: 10 SOLUTION ORAL at 16:34

## 2025-06-09 RX ADMIN — ALBUMIN (HUMAN) 50 G: 0.25 INJECTION, SOLUTION INTRAVENOUS at 05:10

## 2025-06-09 RX ADMIN — IPRATROPIUM BROMIDE AND ALBUTEROL SULFATE 3 ML: .5; 2.5 SOLUTION RESPIRATORY (INHALATION) at 02:40

## 2025-06-09 ASSESSMENT — PAIN DESCRIPTION - PAIN TYPE
TYPE: ACUTE PAIN

## 2025-06-09 ASSESSMENT — FIBROSIS 4 INDEX: FIB4 SCORE: 1.41

## 2025-06-09 NOTE — PROGRESS NOTES
[facility] Medicine Daily Progress Note    Date of Service  6/9/2025    Chief Complaint  Alberto Maldonado is a 39 y.o. male admitted 5/28/2025 with swelling.    Hospital Course  38 yo man with alcoholic cirrhosis, ongoing alcohol use, DM, HTN, BOLIVAR, morbid obesity who presented with worsening swelling.  He continues to drink 9-10 shots every day.  He was found with worsening liver function, elevated ammonia.  Patient was previously also hospitalized and CTAP at that time showed a pancreatic tail lesion and was recommended to have outpatient CT pancreatic protocol.  CT was done in the ER which showed a mass in the pancreatic tail, ovoid mass in the splenic hilum.  MRI showed increased signal in tail of pancreas, prior pancreatitis but mass was not excluded.  He will need a follow-up MRI in 6 months.  GI was consulted and he is not a candidate for further steroid treatment for alcohol hepatitis.  He was started on diuretics for his edema.  Abdominal ultrasound was negative for ascites.  Patient reported he drinks alcohol to treat his anxiety, psychiatry was consulted and patient started on gabapentin.  Psychotherapy is following the patient.    Despite aggressive IV diuresis patient's weight is going up. Worsening renal function and nephrology was consulted.  Started on treatment with octreotide, midodrine, albumin for hepatorenal syndrome and GI has been reconsulted.    6/8, rapid response was called due to worsening mentation and respiratory status  Patient was noted to be more lethargic, labored breathing.  ABG obtained which showed a pH of 7.3.  Chest x-ray was obtained which showed a new right-sided pneumonia, patient started on Rocephin.  Patient was also noted to have significant wheezing, deemed to be asthma exacerbation and started on Solu-Medrol.  Because of this, patient was upgraded to the IMCU    Interval Problem Update  Patient continues to be lethargic, not arousable or verbal  Patient continues to  have abnormal respirations.  I did have a prolonged discussion with family at bedside about goals of care, also ordered palliative care.    Overnight  Confused  Lasix gtt  4L NC  99.9 tmax  Sinus tach  -130  Unable to place NG  Silver with good uop    I have discussed this patient's plan of care and discharge plan at IDT rounds today with Case Management, Nursing, Nursing leadership, and other members of the IDT team.    Consultants/Specialty  GI  Psychiatry  Nephrology    Code Status  DNAR/DNI    Disposition  The patient is not medically cleared for discharge to home or a post-acute facility.  Anticipate discharge to: home with organized home healthcare and close outpatient follow-up    I have placed the appropriate orders for post-discharge needs.    Review of Systems  Review of Systems   Unable to perform ROS: Acuity of condition        Physical Exam  Temp:  [36.6 °C (97.9 °F)-37.7 °C (99.9 °F)] 37.3 °C (99.1 °F)  Pulse:  [101-124] 116  Resp:  [11-26] 20  BP: (115-178)/() 149/101  SpO2:  [92 %-98 %] 95 %    Physical Exam  Vitals and nursing note reviewed.   Constitutional:       General: He is not in acute distress.     Appearance: He is obese. He is ill-appearing.      Comments: More alert today   HENT:      Head: Normocephalic and atraumatic.      Nose: Nose normal. No congestion or rhinorrhea.      Mouth/Throat:      Mouth: Mucous membranes are moist.   Eyes:      General: Scleral icterus present.         Right eye: No discharge.         Left eye: No discharge.      Conjunctiva/sclera: Conjunctivae normal.   Neck:      Comments: Swelling   Cardiovascular:      Rate and Rhythm: Normal rate and regular rhythm.      Heart sounds:      No gallop.   Pulmonary:      Effort: Tachypnea and respiratory distress present.      Breath sounds: Stridor present. Wheezing and rales present.   Abdominal:      General: There is distension (Pitting edema).      Tenderness: There is no abdominal tenderness.       Comments: Pitting edema up to umbilicus, + weeping   Musculoskeletal:      Cervical back: Neck supple.      Right lower leg: Edema present.      Left lower leg: Edema present.      Comments: Anasarca    Skin:     Coloration: Skin is jaundiced and pale.   Neurological:      Mental Status: He is lethargic.   Psychiatric:         Speech: He is noncommunicative.         Fluids    Intake/Output Summary (Last 24 hours) at 6/9/2025 0800  Last data filed at 6/9/2025 0708  Gross per 24 hour   Intake 522.32 ml   Output 1300 ml   Net -777.68 ml        Laboratory  Recent Labs     06/08/25  0230 06/09/25  0051   WBC 12.5* 15.0*   RBC 2.60* 2.21*   HEMOGLOBIN 8.5* 7.4*   HEMATOCRIT 25.5* 22.6*   MCV 98.1* 102.3*   MCH 32.7 33.5*   MCHC 33.3 32.7   RDW 65.9* 69.0*   PLATELETCT 212 202   MPV 9.2 9.0     Recent Labs     06/07/25  0445 06/08/25  0211 06/09/25  0051   SODIUM 128* 131* 135   POTASSIUM 5.3 5.1 4.7   CHLORIDE 97 102 102   CO2 18* 14* 19*   GLUCOSE 232* 168* 247*   BUN 44* 53* 61*   CREATININE 2.76* 2.74* 2.49*   CALCIUM 8.5 8.9 9.2     Recent Labs     06/08/25  0931   INR 1.39*                 Imaging  DX-ABDOMEN FOR TUBE PLACEMENT   Final Result         1.  Nonspecific bowel gas pattern in the upper abdomen.   2.  Nasogastric tube with side-port at the gastroesophageal junction, recommend advancement.   3.  Pulmonary edema and/or infiltrate   4.  Cardiomegaly      DX-ABDOMEN FOR TUBE PLACEMENT   Final Result         1.  Nonspecific bowel gas pattern in the upper abdomen.   2.  Nasogastric tube coiled back at the site port with tip overlying the left upper quadrant.   3.  Hazy left lower lobe infiltrates and/or infiltrates.      DX-CHEST-LIMITED (1 VIEW)   Final Result         1.  Pulmonary edema and/or infiltrates are identified, which are stable since the prior exam.   2.  Cardiomegaly      [id number] VIEW   Final Result         1. Mild colonic ileus type colonic distention.                     DX-CHEST-PORTABLE (1  VIEW)   Final Result      1.  Consolidation within the left lung consistent with pneumonia.      2.  Cardiomegaly with interstitial opacities.      DX-CHEST-PORTABLE (1 VIEW)   Final Result      Cardiomegaly. Hypoinflation. No definite new abnormality.      US-ABDOMEN LTD (SOFT TISSUE)   Final Result      No ascites.         MR-ABDOMEN-WITH & W/O   Final Result      1.  The tail of the pancreas appears somewhat irregular and stranding with increased signal but evaluation is very limited due to significant motion artifact. This could relate to sequela of prior pancreatitis. A mass cannot be excluded. Follow-up in 6    months with MR after improvement is recommended.   2.  There is an accessory splenule adjacent to the spleen   3. Diffuse subcutaneous edema .                        EC-ECHOCARDIOGRAM COMPLETE W/ CONT   Final Result      US-EXTREMITY VENOUS LOWER BILAT   Final Result      US-RUQ   Final Result         1.  Thickened gallbladder wall without visualized shadowing stones, consider acalculous cholecystitis, could be further evaluated with HIDA scan as clinically appropriate   2.  Hepatomegaly   3.  Echogenic liver, compatible with fatty change versus fibrosis      CT-PANCREAS AND ABDOMEN WITH & W/O   Final Result         1.  Masslike structure adjacent to the tail of pancreas with possible slight enhancement on arterial phase imaging, recommend follow-up MRI of the pancreas with contrast for more definitive characterization.   2.  Ovoid mass in the splenic hilum with similar density to spleen on all contrast phases, appearance favoring splenule.   3.  Changes of cirrhosis.   4.  Hepatomegaly           Assessment/Plan  * Alcoholic liver hepatitis and cirrhosis- (present on admission)  Assessment & Plan  Patient did just complete a course of steroids, GI was consulted, advised not to restart however patient is now on steroids due to abnormal respirations as well as swelling  Unfortunately, patient is not alert  enough to safely swallow  Failed core track x 3 overnight  Will attempt iris  Does need lactulose and rifaximin to improve his mentation    Anasarca  Assessment & Plan  Profound volume overload due to alcoholic cirrhosis  Now affecting his respirations  Patient was upgraded to the IMCU and started on a Lasix drip  Continue Lasix drip, one-time high-dose Lasix was also given this morning  Urine does appear dark, highly concerning that his kidney function will continue to worsen and he may stop making urine  If that occurs, will rediscuss hemodialysis with nephrology, initially they stated he was not a candidate    Patient is critically ill.   The patient continues to have: Anasarca secondary to cirrhosis  The vital organ system that is affected is the: All are at risk, currently liver is failed and there is high concern for kidney to fail  If untreated there is a high chance of deterioration into: Worsening volume overload, shock  And eventually death.   The critical care that I am providing today is: Lasix drip  The critical that has been undertaken is medically complex.   There has been no overlap in critical care time.   Critical Care Time not including procedures: 35 minutes    Hepatic encephalopathy (HCC)  Assessment & Plan  Iris to be attempted  If successful the, then we will restart oral lactulose and rifaximin  Otherwise, rectal lactulose is ordered as needed    Hyponatremia  Assessment & Plan  Resolved with IV Lasix  Repeat BMP in the morning    Hepatorenal syndrome with acute kidney injury (HCC)- (present on admission)  Assessment & Plan  Patient had significant volume overload, started on a Lasix drip  Continue midodrine and albumin  Additional recommendations per nephrology and GI    Intraabdominal mass- (present on admission)  Assessment & Plan  CT pancreatic protocol demonstrated masslike structure adjacent to the tail of the pancreas with possible slight enhancement of arterial phase.    CA 19-9 was  elevated.  AFP normal  MRI showed increased signal in tail of pancreas, prior pancreatitis but mass was not excluded.  He will need a follow-up MRI in 6 months    BOLIVAR on CPAP- (present on admission)  Assessment & Plan  Continue CPAP at night when able    Pneumonia- (present on admission)  Assessment & Plan  Chest x-ray did show changes consistent with pneumonia with a mildly elevated procalcitonin  Continue Rocephin    Alcohol use disorder, severe, dependence (HCC)- (present on admission)  Assessment & Plan  Patient was previously on CIWA however scores were low and this was stopped    Asthma exacerbation  Assessment & Plan  Deemed secondary to community-acquired pneumonia  Patient started on Rocephin  Mildly elevated procalcitonin, okay to continue Rocephin at this time  Continue IV steroids  Patient remains DNR    Morbid obesity (HCC)- (present on admission)  Assessment & Plan  Body mass index is 51.63 kg/m².      VTE prophylaxis:    enoxaparin ppx    I have performed a physical exam and reviewed and updated ROS and Plan today (6/9/2025). In review of yesterday's note (6/8/2025), there are no changes except as documented above.       enoxaparin ppx

## 2025-06-09 NOTE — PROGRESS NOTES
I was called to bedside for worsening mental status.  Per nurse, he would respond to verbal stimuli throughout the day.  Now he is only responding to painful stimuli.    Per chart review,  Patient has a history of alcohol use, complicated by patient developing hepatorenal syndrome with diffuse pitting edema.  Patient was seen by intensivist today, patient was made DNR/DNI.  He was transferred to [facility] on Lasix drip.  He continues to be on octreotide drip.  He was started on Solu-Medrol for expiratory wheezing.    On physical exam, patient clearly altered and unable to provide history.  He responds to noxious stimuli.  Patient has scleral icterus, pupils 4 mm, response to light.  Auditory wheezing heard  Diffuse pitting edema noted, anasarca    I ordered x-ray, which shows pulmonary edema and/or infiltrates.    Repeat ammonia levels pending.    I spoke with mother at bedside, and I let her know that patient is critically ill and that his condition is clearly deteriorating quickly.  I asked her about how aggressive she and her family would want us to be in patient's care despite me being clear that patient's prognosis is extremely poor.  She states that she continues to wish for DNR/DNI, and would be open to having an NG tube for administration of lactulose as his ammonia levels likely are worsening which is contributing to his worsening encephalopathy.  I explained to her that this would only give patient extra time but is unlikely to improve patient's overall prognosis.  I recommended her to speak with her family about hospice as I am unsure of how long patient has.  Mother states that she will be okay for now with NG tube for lactulose administration and will speak with her family about hospice and possibly de-escalating care.    ACP time: 18 minutes

## 2025-06-09 NOTE — CARE PLAN
The patient is Watcher - Medium risk of patient condition declining or worsening    Shift Goals  Clinical Goals: monitor O2, lasix drip  Patient Goals: aide  Family Goals: patient to feel better    Progress made toward(s) clinical / shift goals:    Problem: Pain - Standard  Goal: Alleviation of pain or a reduction in pain to the patient’s comfort goal  6/9/2025 0043 by Andrew G Reyes, R.N.  Outcome: Progressing  6/9/2025 0042 by Andrew G Reyes, R.N.  Outcome: Progressing     Problem: Risk for Aspiration  Goal: Patient's risk for aspiration will be absent or decrease  Outcome: Progressing     Problem: Hemodynamics  Goal: Patient's hemodynamics, fluid balance and neurologic status will be stable or improve  Outcome: Progressing       Patient is not progressing towards the following goals:

## 2025-06-09 NOTE — THERAPY
"Speech Language Pathology   Clinical Swallow Evaluation     Patient Name:  Alberto Maldonado  AGE:  39 y.o., SEX:  male  Medical Record #:  [id number]  Date of Service:  6/9/2025    Precautions  Swallowing: Swallow Strategies    History of Present Illness  38 y/o male admitted 5/28 with weight gain and edema. During this hospital admission he has developed worsening renal function concerning for HRS. On 6/8, he developed for respiratory distress; a rapid response was called and patient was transferred to [facility].     CMHx: Alcoholic liver hepatitis and cirrhosis, EDIN    PMHx: Alcoholic and NAFLD cirrhosis, metabolic syndrome, asthma, pancreatic mass, alcohol hepatitis (admitted 4/2025), obesity, HTN, BOLIVAR, anasarca, GERD    No hx SLP in [facility].    CXR 6/8:  \"1.  Consolidation within the left lung consistent with pneumonia.     2.  Cardiomegaly with interstitial opacities.\"      General Information:  Vitals  O2 (LPM): 3  O2 Delivery Device: Silicone Nasal Cannula  Level of Consciousness: Drowsy  Orientation: Self  Follows Directives: No      Prior Living Situation & Level of Function:  Lives with - Patient's Self Care Capacity: Parents, Child Less than 18 Years of Age, Sibling  Comments: per OT: Pt lives his mom, brother, sister in law and their kids  Communication: unclear PLOF  Swallowing: unclear PLOF       Oral Mechanism Evaluation:  Dentition: Pt did not follow commands to assess   Motor Speech: dysarthric            Laryngeal Function:  Secretion Management: Adequate  Cough: Perceptually weak         Subjective  Supportive brother at bedside. Pt with intermittent wakefulness asking for water otherwise drowsy requiring cues to maintain alertness.        Assessment  Current Method of Nutrition: NPO until cleared by speech pathology  Positioning: Britt's (60-90 degrees)  Bolus Administration: SLP  O2 (LPM): 3 O2 Delivery Device: Silicone Nasal Cannula  Factor(s) Affecting Performance: Impaired command following, " "Impaired endurance           Swallowing Trials:  Swallowing Trials  Ice: Impaired  Thin Liquid (TN0): Impaired      Comments: Pt unable to remove bolus from the spoon without assistance. Manipulation of all boluses, however, absent swallow trigger with ice chips x2. Delayed coughing noted with teaspoon sips of thin liquids concerning for airway invasion. Audible upper airway noted. Prior to PO, RR between high 20s- mid 30s but increased to 40s with coughing bouts; further PO trials held 2/2 increased WOB.      Clinical Impressions  Currently pt is presenting with an acute change in swallow function s/p lethargy and increased WOB. No safe diet can be recommended at this time. Per RN, unsuccessful placement of NG x3. SLP to follow pending [facility] conversation.    Recommendations  Diet Consistency: NPO with consideration for a non-oral source of nutrition  Instrumentation: Instrumental swallow study pending clinical progress  Medication: Non Oral  Oral Care: Q2h         SLP Treatment Plan  Treatment Plan: Dysphagia Treatment  SLP Frequency: 3x Per Week  Estimated Duration: Until Therapy Goals Met      Anticipated Discharge Needs  Discharge Recommendations: Recommend post-acute placement for additional speech therapy services prior to discharge home   Therapy Recommendations Upon DC: Dysphagia Training, Community Re-Integration, Patient / Family / Caregiver Education        Patient / Family Goals  Patient / Family Goal #1: \"water\"  Short Term Goals  Short Term Goal # 1: Pt will consume prefeeding trials without s/sx of aspiration      JENNIFER Monte   "

## 2025-06-09 NOTE — CARE PLAN
The patient is Watcher - Medium risk of patient condition declining or worsening    Shift Goals  Clinical Goals: monitor O2, lasix drip  Patient Goals: aide  Family Goals: patient to feel better    Progress made toward(s) clinical / shift goals:    Problem: Fall Risk  Goal: Patient will remain free from falls  Description: Target End Date:  Prior to discharge or change in level of careDocument interventions on the Castellano Raghu Fall Risk Assessment1.  Assess for fall risk factors2.  Implement fall precautions  Outcome: Progressing  Note: Safety and fall education given. All fall precautions are in place with belongings at bedside table. Needs are tended to. Educated to use call light for assistance.        Patient is not progressing towards the following goals:      Problem: Knowledge Deficit - Standard  Goal: Patient and family/care givers will demonstrate understanding of plan of care, disease process/condition, diagnostic tests and medications  Description: Target End Date:  1-3 days or as soon as patient condition allowsDocument in Patient Education1.  Patient and family/caregiver oriented to unit, equipment, visitation policy and means for communicating concern2.  Complete/review Learning Assessment3.  Assess knowledge level of disease process/condition, treatment plan, diagnostic tests and medications4.  Explain disease process/condition, treatment plan, diagnostic tests and medications  Outcome: Not Progressing  Note: Patient shows no evidence of learning.

## 2025-06-09 NOTE — CARE PLAN
Problem: Bronchoconstriction  Goal: Improve in air movement and diminished wheezing  Description: Target End Date:  2 to 3 days1.  Implement inhaled treatments2.  Evaluate and manage medication effects  Outcome: Not Progressing     Pt remains wheezing despite Q4 nebulizers.  There is slight improvement post duoneb treatments. Pt not following commands to cough.

## 2025-06-09 NOTE — CARE PLAN
The patient is Watcher - Medium risk of patient condition declining or worsening    Shift Goals  Clinical Goals: Monitor airway, increase alertness, iris placement, BMS care, lactulose  Patient Goals: DEBBI  Family Goals: Patient to get better, talk to MD    Progress made toward(s) clinical / shift goals:      Problem: Pain - Standard  Goal: Alleviation of pain or a reduction in pain to the patient’s comfort goal  Description: Target End Date:  Prior to discharge or change in level of careDocument on Vitals flowsheet1.  Document pain using the appropriate pain scale per order or unit policy2.  Educate and implement non-pharmacologic comfort measures (i.e. relaxation, distraction, massage, cold/heat therapy, etc.)3.  Pain management medications as ordered4.  Reassess pain after pain med administration per policy5.  If opiods administered assess patient's response to pain medication is appropriate per POSS sedation scale6.  Follow pain management plan developed in collaboration with patient and interdisciplinary team (including palliative care or pain specialists if applicable)  Outcome: Progressing     Problem: Seizure Precautions  Goal: Implementation of seizure precautions  Description: Target End Date:  Prior to discharge or change in level of care1.  Padded side rails up at all times2.  Suction equipment and oxygen delivery system at bedside3.  Continuous pulse oximeter in use4.  Implement fall precautions, bed alarm on, bed in lowest position5.  IV access (per order)6.  Provide low stimulus environment, avoid exposure to triggers7.  Instruct patient to use call light/seizure button if having warning signs of impending seizure  Outcome: Progressing     Problem: Fall Risk  Goal: Patient will remain free from falls  Description: Target End Date:  Prior to discharge or change in level of careDocument interventions on the Nona Krishnamurthy Fall Risk Assessment1.  Assess for fall risk factors2.  Implement fall  precautions  Outcome: Progressing       Patient is not progressing towards the following goals:      Problem: Knowledge Deficit - Standard  Goal: Patient and family/care givers will demonstrate understanding of plan of care, disease process/condition, diagnostic tests and medications  Description: Target End Date:  1-3 days or as soon as patient condition allowsDocument in Patient Education1.  Patient and family/caregiver oriented to unit, equipment, visitation policy and means for communicating concern2.  Complete/review Learning Assessment3.  Assess knowledge level of disease process/condition, treatment plan, diagnostic tests and medications4.  Explain disease process/condition, treatment plan, diagnostic tests and medications  Outcome: Not Progressing     Problem: Risk for Aspiration  Goal: Patient's risk for aspiration will be absent or decrease  Description: Target End Date:  Prior to discharge or change in level of care1.   Complete dysphagia screening on admission2.   NPO until dysphagia screening complete or medically cleared3.   Collaborate with Speech Therapy, Clinical Dietitian and interdisciplinary team4.   Implement aspiration precautions5.   Assist patient up to chair for meals6.   Elevate head of bed 90 degrees if patient is unable to get out of bed7.   Encourage small bites8.   Ensure foods/liquids are of appropriate consistency9.   Assess for any signs/symptoms of [id number]. Assess breath sounds and vital signs after oral intake  Outcome: Not Progressing     Problem: Skin Integrity  Goal: Skin integrity is maintained or improved  Description: Target End Date:  Prior to discharge or change in level of careDocument interventions on Skin Risk/Osmani flowsheet groups and corresponding LDA1.  Assess and monitor skin integrity, appearance and/or temperature2.  Assess risk factors for impaired skin integrity and/or pressures ulcers3.  Implement precautions to protect skin integrity in collaboration  with interdisciplinary team4.  Implement pressure ulcer prevention protocol if at risk for skin breakdown5.  Confirm wound care consult if at risk for skin breakdown6.  Ensure patient use of pressure relieving devices  (Low air loss bed, waffle overlay, heel protectors, ROHO cushion, etc)  Outcome: Not Progressing     Problem: Hemodynamics  Goal: Patient's hemodynamics, fluid balance and neurologic status will be stable or improve  Description: Target End Date:  Prior to discharge or change in level of careDocument on Assessment and I/O flowsheet templates1.  Monitor vital signs, pulse oximetry and cardiac monitor per provider order and/or policy2.  Maintain blood pressure per provider order3.  Hemodynamic monitoring per provider order4.  Manage IV fluids and IV infusions5.  Monitor intake and output6.  Daily weights per unit policy or provider order7.  Assess peripheral pulses and capillary refill8.  Assess color and body temperature9.  Position patient for maximum circulation/cardiac [id number]. Monitor for signs/symptoms of excessive [id number]. Assess mental status, restlessness and changes in level of [id number]. Monitor temperature and report fever or hypothermia to provider immediately. Consideration of targeted temperature management.  Outcome: Not Progressing

## 2025-06-09 NOTE — PROGRESS NOTES
..Gastroenterology Progress Note               Author:  Michelle Edwards, STAN,  APRN Date & Time Created: 6/9/2025 7:14 AM       Patient ID:  Name:             Alberto Maldonado  YOB: 1985  Age:                 39 y.o.  male  MRN:               [id number]    Medical Decision Making, by Problem:  Active Hospital Problems    Diagnosis     Hepatic encephalopathy (HCC) [K76.82]     Vitamin D deficiency [E55.9]     Alcoholic liver hepatitis and cirrhosis [K70.10]     Anasarca [R60.1]     Hyponatremia [E87.1]     Hyperammonemia (HCC) [E72.20]     Hepatorenal syndrome with acute kidney injury (HCC) [K76.7, N17.9]     Anxiety [F41.9]     Intraabdominal mass [R19.00]     BOLIVAR on CPAP [G47.33]     Pneumonia [J18.9]     Alcohol use disorder, severe, dependence (HCC) [F10.20]     Morbid obesity (HCC) [E66.01]     Essential hypertension [I10]     Asthma exacerbation [J45.901]        Presenting Chief Complaint:  Decompensated liver cirrhosis     History of Present Illness:   The patient 39 years old gentleman with excessive alcohol use, liver cirrhosis with jaundice and fluid overload, recent admission for acute on chronic liver injury from alcohol use, s/p steroid treatment (however, the patient does not recall if he finished the steroid as instructed upon last discharge), present to inpatient GI service for worsening jaundice and fluid accumulation.  On initial evaluation, he had multiple bottles of water and Gatorade at his bedside.  He was noted to be overtly fluid overloaded with facial swelling, distended abdomen, and severely distended lower extremities.  He was borderline encephalopathic with poor sleep and impaired memory.  There is no evidence of GI bleeding.     Recently admitted from 4/3 through 4/12 when he requested detoxification.    Prior chart review:  Admitted to [facility] in 2021 and described to be alcohol use disorder, daily drinker, admitted with acute pancreatitis with necrosis.   He has had multiple admissions for gout and multiple admissions for alcohol use disorder and withdrawal. Chart review states attending AA and taking naltrexone in 2024.  Previously followed by [facility].  Unfortunately further admissions for alcohol use disorder.  History of DUI May 2024 and arrest.  (Please see Dr. Hagan, Psychiatry, note from August 2024).    Interval History:  5/30/2025: initial consult    6/6/2025: Gastroenterology service reengaged given worsening clinical picture, development of HRS. Patient seen bedside on Charlee 6.  He remains significantly edematous and encephalopathic.  A Silver catheter is being placed for closer monitoring of urine output in the setting of worsening kidney function.  Nephrology also consulted.    Patient seen.  He admits to me that he continued to drink after his recent discharge.  Per chart review it appears he was attending regular visits with his primary care at [facility].  It is unclear if he completed his steroid dose for alcoholic hepatitis.  On admission, his total bilirubin was 17.3, 4.2 on discharge.  His liver enzymes also increased and his ammonia was 122.  INR is about the same.  His creatinine on admission was 1.38 and previously 0.84.  Now it is 2.78.  Bilirubin today is 10 with 6.4 direct.    Given the CT scan during last admission showing pancreatic tail lesion, MRI was completed and remarks the tail of the pancreas is somewhat irregular, could be prior pancreatitis but cannot exclude mass.  However given limited exam recommend 6-month follow-up after improvement.  Noted to have diffuse subcutaneous edema.    6/7/2025: patient seen, clinically worse. Obtunded and barely rousable. Observed apnea and audible wheezing. D/W Dr. Lopez. One BM today. Cr about the same, T. Bili 11.2. INR and direct bili pending.    6/8/2025: In respiratory distress again overnight with increased WOB and wheezing. CXR with LL consolidation and cardiomegaly with interstitial  opacities. One time dose of lasix 60 mg IV given. BC pending, started on ceftriaxone    Patient seen bedside with rapid response RN, bedside RN, and RT.  Continues to have increased work of breathing with audible stridor and wheezing.  Eventually evaluated by intensivist and nephrologist and considered not a candidate for dialysis.  He was upgraded to IMCU for forced diuresis.    Still no BM, ammonia 137, change to rectal lactulose Q4 hours    6/9/2025: Patient seen and then revisited in collaboration with Dr. Ordaz.  Mother and 2 brothers bedside.  Improved work of breathing, stridor, edema.  Plan of care discussed with them as patient is unable to participate.  He remains encephalopathic but arousable to voice.  BMS system in place with approximately 200 mL of liquid stool.  WBC 15, Hgb 7.4, Cr 2.49, T. Bili 10.1.     Hospital Medications:  Current Facility-Administered Medications   Medication Dose Frequency Provider Last Rate Last Admin    azithromycin (ZITHROMAX) 500 mg in D5W 250 mL IVPB premix  500 mg Q24HRS Carrington Kumari M.D. 250 mL/hr at 06/09/25 0608 500 mg at 06/09/25 0608    cefTRIAXone (Rocephin) syringe 2,000 mg  2,000 mg Q24HRS EDWAR McneilN.P.   2,000 mg at 06/09/25 0510    levalbuterol (Xopenex) 1.25 MG/3ML nebulizer solution 1.25 mg  1.25 mg Q4H PRN (RT) EDWAR McneilN.PIesha        lactulose 10 g/15mL solution 300 mL  300 mL Q4HRS PRN EDWAR McneilN.P.   300 mL at 06/09/25 0609    sodium bicarbonate tablet 1,300 mg  1,300 mg TID Faustina Magaña M.D.        methylPREDNISolone sod succ (Solu-Medrol) 40 MG injection 60 mg  60 mg Q8HRS Nicol Lopez M.D.   60 mg at 06/09/25 0509    ipratropium-albuterol (DUONEB) nebulizer solution  3 mL Q4H PRN (RT) Nicol Lopez M.D.        furosemide (Lasix) 100 mg in  mL infusion  10 mg/hr Continuous Shawn Khan M.D. 10 mL/hr at 06/08/25 2312 10 mg/hr at 06/08/25 2312    lactulose 20 GM/30ML solution 45 mL  45 mL 4X/DAY Jose Miguel Pacheco M.D.         ipratropium-albuterol (DUONEB) nebulizer solution  3 mL Q4HRS (RT) Nicol Lopez M.D.   3 mL at 06/09/25 0633    albuterol (Proventil) 2.5mg/0.5ml nebulizer solution 2.5 mg  2.5 mg Q2HRS PRN (RT) Nicol Lopez M.D.   2.5 mg at 06/08/25 0208    riFAXIMin (Xifaxan) tablet 550 mg  550 mg BID Nicol Lopez M.D.        midodrine (Proamatine) tablet 5 mg  5 mg TID WITH MEALS Fernanda Daniels DIeshaOIesha   5 mg at 06/07/25 0756    octreotide (SandoSTATIN) 1,250 mcg in  mL Infusion  50 mcg/hr Continuous Fernanda Daniels D.O. 10 mL/hr at 06/08/25 1824 50 mcg/hr at 06/08/25 1824    albumin human 25% solution 50 g  50 g Q8HRS Fernanda Daniels D.O. 150 mL/hr at 06/09/25 0510 50 g at 06/09/25 0510    Respiratory Therapy Consult   Continuous RT Fernanda Daniels D.OIesha        vitamin D3 (Cholecalciferol) tablet 1,000 Units  1,000 Units DAILY Fernanda Daniels D.O.   1,000 Units at 06/07/25 0518    magnesium oxide tablet 400 mg  400 mg DAILY Fernanda Daniels D.O.   400 mg at 06/07/25 0518    albuterol inhaler 2 Puff  2 Puff Q4HRS PRN FELICIANO Jackman.OIesha   2 Puff at 06/07/25 0322    gabapentin (Neurontin) capsule 300 mg  300 mg TID Fernanda Daniels D.O.   300 mg at 06/07/25 1329    diazePAM (Valium) tablet 2 mg  2 mg Q6HRS PRN Gordon Brambila M.D.        enoxaparin (Lovenox) inj 40 mg  40 mg Q12HRS Gordon Brambila M.D.   40 mg at 06/09/25 0509    [Held by provider] spironolactone (Aldactone) tablet 100 mg  100 mg Q DAY Gordon Brambila M.D.   100 mg at 06/06/25 0424    oxyCODONE immediate-release (Roxicodone) tablet 5 mg  5 mg Q3HRS PROSMAR Jarquin M.D.   5 mg at 06/05/25 0602    Or    oxyCODONE immediate release (Roxicodone) tablet 10 mg  10 mg Q3HRS PRN Héctor Jarquin M.D.   10 mg at 06/06/25 0423    Or    morphine 4 MG/ML injection 4 mg  4 mg Q3HRS PROSMAR Jarquin M.D.   4 mg at 06/09/25 0318    hydrALAZINE (Apresoline) injection 10 mg  10 mg Q4HRS PROSMAR Jarquin M.D.        ondansetron (Zofran) syringe/vial injection 4 mg  4 mg Q4HRS  "PRN Héctor Jarquin M.D.        ondansetron (Zofran ODT) dispertab 4 mg  4 mg Q4HRS PRN Héctor Jarquin M.D.        promethazine (Phenergan) tablet 12.5-25 mg  12.5-25 mg Q4HRS PRN Héctor Jarquin M.D.        promethazine (Phenergan) suppository 12.5-25 mg  12.5-25 mg Q4HRS PRN Héctor Jarquin M.D.        prochlorperazine (Compazine) injection 5-10 mg  5-10 mg Q4HRS PRN Héctor Jarquin M.D.        folic acid (Folvite) tablet 1 mg  1 mg DAILY Héctor Jarquin M.D.   1 mg at 06/07/25 0518    hydrOXYzine HCl (Atarax) tablet 25 mg  25 mg TID PRN Héctor Jarquin M.D.   25 mg at 06/05/25 2019    tamsulosin (Flomax) capsule 0.4 mg  0.4 mg AFTER BREAKFAST Héctor Jarquin M.D.   0.4 mg at 06/07/25 0755    hydrocortisone 1 % cream   BID Gordon Brambila M.D.   Given at 06/09/25 0523   Last reviewed on 5/29/2025  3:51 AM by Bernard Torres       Review of Systems:  Review of Systems   Unable to perform ROS: Acuity of condition         Vital signs:  Weight/BMI: Body mass index is 51.63 kg/m².  BP (!) 149/101   Pulse (!) 116   Temp 37.3 °C (99.1 °F) (Temporal)   Resp (!) 34   Ht 1.6 m (5' 3\")   Wt (!) 132 kg (291 lb 7.2 oz)   SpO2 95%   Vitals:    06/09/25 0500 06/09/25 0513 06/09/25 0600 06/09/25 0634   BP: (!) 144/89  (!) 149/101    Pulse: (!) 124  (!) 120 (!) 116   Resp: (!) 45  (!) 37 (!) 34   Temp:       TempSrc:       SpO2: 95%  95% 95%   Weight:  (!) 132 kg (291 lb 7.2 oz)     Height:         Oxygen Therapy:  Pulse Oximetry: 95 %, O2 (LPM): 3, O2 Delivery Device: Silicone Nasal Cannula    Intake/Output Summary (Last 24 hours) at 6/9/2025 0714  Last data filed at 6/9/2025 0200  Gross per 24 hour   Intake 522.32 ml   Output 1100 ml   Net -577.68 ml       Physical Exam  Vitals and nursing note reviewed.   Constitutional:       General: He is in acute distress.      Appearance: He is obese. He is ill-appearing.   HENT:      Head: Normocephalic and atraumatic.      Right Ear: External ear normal.      Left " Ear: External ear normal.      Nose: Nose normal.      Mouth/Throat:      Mouth: Mucous membranes are dry.      Pharynx: Oropharynx is clear.   Eyes:      General: Scleral icterus present.   Neck:      Comments: Obese  Cardiovascular:      Rate and Rhythm: Regular rhythm. Tachycardia present.      Pulses: Normal pulses.      Heart sounds: Normal heart sounds.   Pulmonary:      Effort: Respiratory distress present.      Breath sounds: Stridor present. Wheezing and rales present.   Abdominal:      General: There is distension.      Tenderness: There is abdominal tenderness.   Musculoskeletal:         General: Swelling present. Normal range of motion.      Right lower leg: Edema present.      Left lower leg: Edema present.   Skin:     General: Skin is warm.      Coloration: Skin is jaundiced.   Neurological:      Motor: Weakness present.      Comments: Lethargic and encephalopathic     Labs:  Recent Labs     06/07/25 0445 06/08/25 0211 06/09/25  0051   SODIUM 128* 131* 135   POTASSIUM 5.3 5.1 4.7   CHLORIDE 97 102 102   CO2 18* 14* 19*   BUN 44* 53* 61*   CREATININE 2.76* 2.74* 2.49*   MAGNESIUM  --  2.2  --    PHOSPHORUS  --  4.7*  --    CALCIUM 8.5 8.9 9.2     Recent Labs     06/07/25 0445 06/08/25 0211 06/08/25  0230 06/09/25  0051   ALTSGPT 54* 37  --  31   ASTSGOT 113* 69*  --  43   ALKPHOSPHAT 193* 154*  --  130*   TBILIRUBIN 11.2* 10.0*  --  10.1*   DBILIRUBIN  --   --  6.5*  --    GLUCOSE 232* 168*  --  247*     Recent Labs     06/07/25 0445 06/08/25 0211 06/08/25  0230 06/09/25  0051   WBC  --   --  12.5* 15.0*   ASTSGOT 113* 69*  --  43   ALTSGPT 54* 37  --  31   ALKPHOSPHAT 193* 154*  --  130*   TBILIRUBIN 11.2* 10.0*  --  10.1*     Recent Labs     06/08/25  0230 06/08/25  0931 06/09/25  0051   RBC 2.60*  --  2.21*   HEMOGLOBIN 8.5*  --  7.4*   HEMATOCRIT 25.5*  --  22.6*   PLATELETCT 212  --  202   PROTHROMBTM  --  17.1*  --    INR  --  1.39*  --      Recent Results (from the past 24 hours)   LACTIC  ACID    Collection Time: 06/08/25  8:01 AM   Result Value Ref Range    Lactic Acid 1.7 0.5 - 2.0 mmol/L   Prothrombin Time    Collection Time: 06/08/25  9:31 AM   Result Value Ref Range    PT 17.1 (H) 12.0 - 14.6 sec    INR 1.39 (H) 0.87 - 1.13   Comp Metabolic Panel    Collection Time: 06/09/25 12:51 AM   Result Value Ref Range    Sodium 135 135 - 145 mmol/L    Potassium 4.7 3.6 - 5.5 mmol/L    Chloride 102 96 - 112 mmol/L    Co2 19 (L) 20 - 33 mmol/L    Anion Gap 14.0 7.0 - 16.0    Glucose 247 (H) 65 - 99 mg/dL    Bun 61 (H) 8 - 22 mg/dL    Creatinine 2.49 (H) 0.50 - 1.40 mg/dL    Calcium 9.2 8.5 - 10.5 mg/dL    Correct Calcium 9.0 8.5 - 10.5 mg/dL    AST(SGOT) 43 12 - 45 U/L    ALT(SGPT) 31 2 - 50 U/L    Alkaline Phosphatase 130 (H) 30 - 99 U/L    Total Bilirubin 10.1 (H) 0.1 - 1.5 mg/dL    Albumin 4.3 3.2 - 4.9 g/dL    Total Protein 7.2 6.0 - 8.2 g/dL    Globulin 2.9 1.9 - 3.5 g/dL    A-G Ratio 1.5 g/dL   CBC WITHOUT DIFFERENTIAL    Collection Time: 06/09/25 12:51 AM   Result Value Ref Range    WBC 15.0 (H) 4.8 - 10.8 K/uL    RBC 2.21 (L) 4.70 - 6.10 M/uL    Hemoglobin 7.4 (L) 14.0 - 18.0 g/dL    Hematocrit 22.6 (L) 42.0 - 52.0 %    .3 (H) 81.4 - 97.8 fL    MCH 33.5 (H) 27.0 - 33.0 pg    MCHC 32.7 32.3 - 36.5 g/dL    RDW 69.0 (H) 35.9 - 50.0 fL    Platelet Count 202 164 - 446 K/uL    MPV 9.0 9.0 - 12.9 fL   AMMONIA    Collection Time: 06/09/25 12:51 AM   Result Value Ref Range    Ammonia 111 (HH) 11 - 45 umol/L   ESTIMATED GFR    Collection Time: 06/09/25 12:51 AM   Result Value Ref Range    GFR (CKD-EPI) 33 (A) >60 mL/min/1.73 m 2       Radiology Review:  DX-ABDOMEN FOR TUBE PLACEMENT   Final Result         1.  Nonspecific bowel gas pattern in the upper abdomen.   2.  Nasogastric tube with side-port at the gastroesophageal junction, recommend advancement.   3.  Pulmonary edema and/or infiltrate   4.  Cardiomegaly      DX-ABDOMEN FOR TUBE PLACEMENT   Final Result         1.  Nonspecific bowel gas pattern  in the upper abdomen.   2.  Nasogastric tube coiled back at the site port with tip overlying the left upper quadrant.   3.  Hazy left lower lobe infiltrates and/or infiltrates.      DX-CHEST-LIMITED (1 VIEW)   Final Result         1.  Pulmonary edema and/or infiltrates are identified, which are stable since the prior exam.   2.  Cardiomegaly      LG-OTPABSC-1 VIEW   Final Result         1. Mild colonic ileus type colonic distention.                     DX-CHEST-PORTABLE (1 VIEW)   Final Result      1.  Consolidation within the left lung consistent with pneumonia.      2.  Cardiomegaly with interstitial opacities.      DX-CHEST-PORTABLE (1 VIEW)   Final Result      Cardiomegaly. Hypoinflation. No definite new abnormality.      US-ABDOMEN LTD (SOFT TISSUE)   Final Result      No ascites.         MR-ABDOMEN-WITH & W/O   Final Result      1.  The tail of the pancreas appears somewhat irregular and stranding with increased signal but evaluation is very limited due to significant motion artifact. This could relate to sequela of prior pancreatitis. A mass cannot be excluded. Follow-up in 6    months with MR after improvement is recommended.   2.  There is an accessory splenule adjacent to the spleen   3. Diffuse subcutaneous edema .                        EC-ECHOCARDIOGRAM COMPLETE W/ CONT   Final Result      US-EXTREMITY VENOUS LOWER BILAT   Final Result      US-RUQ   Final Result         1.  Thickened gallbladder wall without visualized shadowing stones, consider acalculous cholecystitis, could be further evaluated with HIDA scan as clinically appropriate   2.  Hepatomegaly   3.  Echogenic liver, compatible with fatty change versus fibrosis      CT-PANCREAS AND ABDOMEN WITH & W/O   Final Result         1.  Masslike structure adjacent to the tail of pancreas with possible slight enhancement on arterial phase imaging, recommend follow-up MRI of the pancreas with contrast for more definitive characterization.   2.  Ovoid  mass in the splenic hilum with similar density to spleen on all contrast phases, appearance favoring splenule.   3.  Changes of cirrhosis.   4.  Hepatomegaly          MDM (Data Review):   -Records reviewed and summarized in current documentation  -I personally reviewed and interpreted the laboratory results  -I personally reviewed the radiology images    Assessment/Recommendations:  Alcohol use disorder with multiple admissions related to alcohol, DUI, no long term sobriety  Clinically significant portal hypertension with worsening hepatic encephalopathy  Alcoholic liver disease with changes of cirrhosis on CT  Abdominal pain  Diffuse subcutaneous edema - cirrhosis vs nephrotic syndrome/acute kidney failure vs nutritional deficiency vs steroid use vs ?lymphatic obstruction due to malignancy  Previous thyroid studies normal, echo with grossly normal LV function, unable to visualize RV   Hepatorenal syndrome with urine sodium less than 20  Transaminitis and hyperbilirubinemia, direct greater than indirect  Hypoalbuminemia  Thrombocytopenia  Mild coagulopathy  Hypervolemic hyponatremia  Macrocytic anemia  Vitamin D deficiency  Previous HBV, HCV negative, AMA neg, OTF and f actin neg, ferritin normal  CT Chest/abd/pelvis findings concerning for pancreatic mass, MRI with limited evaluation, consider sequela of prior pancreatitis versus mass, recommend repeat imaging in 6 months. CA [id number] [id number] in April 2025  Vitamin D deficiency    Current MELD 3.0 = 30  Child Menard 9 points - class B  Maddrey's 27.5    Recommendations:  Currently on Lasix drip  Pending Dobbhoff placement for nutrition and medication administration  Emphasized that he must be having significant bowel movements to manage his encephalopathy.  Currently on 45 mL 4 times daily lactulose, rifaximin.  Will add twice daily MiraLAX until reliably stooling and then will down titrate  Unfortunately not a dialysis candidate, continue HRS treatment with octreotide,  albumin 1 mg/kg per day, and midodrine.  On IV methylprednisolone, ceftriaxone, azithromycin  24 hour urine cortisol pending  No ascites on RUQ US  Monitor for signs of bleeding, downtrending hemoglobin  High-dose thiamine supplementation, folic acid, consider vitamin D    Unfortunately given his repeated admissions for alcohol related liver decompensation despite counseling to maintain sobriety and refrain from drinking, he is not a candidate for liver transplant at this time.  Unfortunately patient does remain critically ill and this was discussed with family.  They are requesting that we continue to try everything to help Alberto SHINE will continue to follow    Discussed with patient's family, RN, Dr. Ordaz, Dr. Shi    ..Michelle Edwards, DNP,  APRN    Core Quality Measures   Reviewed items::  Labs, Medications and Radiology reports reviewed

## 2025-06-09 NOTE — PROGRESS NOTES
Iris Placement    Tube Team verified patient name and medical record number prior to tube placement.  Cortrak tube (55 inches, 10 Tamazight) placed at *** cm in right nare.  Per Cortrak picture, tube appears to be in the small bowel.  Nursing Instructions: Awaiting KUB to confirm placement before use for medications or feeding. Once placement confirmed, flush tube with 30 ml of water, and then remove and save stylet, in patient medication drawer.

## 2025-06-09 NOTE — PROGRESS NOTES
Pt is anxious and yelling. Dr. Kumari was notified and placed orders as pt is unable to take PO meds.

## 2025-06-09 NOTE — CONSULTS
MRN: [id number]  Date of palliative consult: 2025  Reason for consult: Goals of care/advance care planning  Referring provider: Dr. Ordaz  Location of consult: [facility]  Additional consulting services: GI, Nephrology, Critical care, Psychiatry    HPI:   Alberto Maldonado is a 39 y.o. male with medical history significant for ETOH abuse, liver cirrhosis, multiple admissions related to ETOH use (this is the 9th since 2023), withdrawal, pancreatitis, gout, asthma, morbid obesity admitted  with weight gain and edema. Has an a complex hospital course that has subsequently led to decreased renal function concerning for hepatorenal syndrome that is not responsive to appropriate treatment.  with respiratory distress, diagnosed with PNA and started on treatment. CODE STATUS changed to DNR/DNI and transferred to [facility] with lasix and octreotide drips. Mental status has declined throughout hospitalization; NG tube unable to be placed x3 overnight. SLP recommending no safe diet at this time.Per GI, patient is not a candidate for liver transplant. Per nephrology, patient is not a candidate for dialysis. Palliative care consulted for goals of care conversation.     Additional Pertinent Medical History: BOLIVAR on CPAP, HTN    ROS:    Review of Systems   Unable to perform ROS: Acuity of condition       PE:   Recent vital signs  BMI: Body mass index is 51.63 kg/m².    Temp (24hrs), Av.2 °C (99 °F), Min:36.6 °C (97.9 °F), Max:37.7 °C (99.9 °F)  Temperature: 36.7 °C (98.1 °F)  Pulse  Av.7  Min: 60  Max: 124   Blood Pressure: (!) 152/67       Physical Exam  Vitals and nursing note reviewed.   Constitutional:       Appearance: He is morbidly obese. He is ill-appearing and toxic-appearing.      Interventions: Nasal cannula in place.      Comments: anasarca   Cardiovascular:      Rate and Rhythm: Tachycardia present.      Heart sounds: Normal heart sounds.   Pulmonary:      Effort: Tachypnea present.      Breath  "sounds: Examination of the right-upper field reveals decreased breath sounds and wheezing. Examination of the left-upper field reveals decreased breath sounds. Decreased breath sounds and wheezing present.   Abdominal:      General: Bowel sounds are decreased. There is distension.   Genitourinary:     Comments: Silver catheter in place  Rectal tube in place  Musculoskeletal:      Right lower leg: 3+ Edema present.      Left lower leg: 3+ Edema present.      Comments: Sarcopenic/anasarca obesity   Neurological:      Mental Status: He is lethargic.      Comments: Minimally opens eyes to voice, does not follow commands   Psychiatric:      Comments: Unable to assess         ASSESSMENT/PLAN WITH SHARED DECISION MAKING:   PHYSICAL ASPECTS OF CARE  Palliative Performance Scale: 20%    # Acute respiratory failure  # Hepatorenal syndrome  # Liver cirrhosis  # ETOH abuse  # Withdrawal  # Hepatic encephalopathy  # Critically elevated ammonia  # Fluid overload  # Pneumonia  # Asthma exacerbation  # Multiple hospital admissions  # Portal HTN  # Hyperbilirubinemia  # Transaminitis  # Anemia  # Anasarca  # Mass on pancreas  # BOLIVAR on CPAP  # Morbid obesity  # HTN      SOCIAL ASPECTS OF CARE  Alberto lives with his mother and brother (Freddy) in [place], [place].  He moved to [place] from [place] in 2016.  His mother states that he started heavily drinking during COVID.  At some point when he was in [place], he cared for his father and \"missed a lot of life.\"  This is according to his brother Geovany.  He held multiple jobs within the last few years, all of which he was laid off from due to being sick/drinking.  Alberto has no children and is single.  He has 5 siblings; 2 are in [place], 2 are in [place], and 1 is in the [facility].    SPIRITUAL ASPECTS OF CARE    visit denied at this time.    GOALS OF CARE/SERIOUS ILLNESS CONVERSATION  Introduced myself to Alberto and his family at bedside which included his brother Geovany and his mother " Aide. Discussed role of palliative care and reason for consult. Geovany and Aide agreeable to discuss goals of care; Alberto not responsive to conversation this morning due to the acuity of his condition. Geovany did most of the speaking; I noted that Aide would reply to Geovany in her primary language. I offered  services, which they both declined.    Geovany has good insight into Alberto's current hospitalization as well as his many previous hospital admissions and his overall health.  Geovany is unclear as to whether or not Alberto ever identified wishes regarding healthcare.  He expresses that he is worried Alberto has been depressed.  Alberto's entire family has been working to convince him to stop drinking as they are worried about him.    Geovany is unable to identify anything that Alberto enjoys doing in his free time.  He states he used to travel a lot.  I discussed Alberto's current CODE STATUS with his mother and brother, to which they both agreed that he was recently changed to a DNR/DNI.  They did not have much input into this.    I requested permission to discuss Alberto's prognosis to which his family agreed.  I let them know the team is very concerned about Alberto's current status and that his prognosis is very poor.  We discussed that he is not a candidate for a liver transplant nor is he a candidate for dialysis if his kidneys continue to fail.  I explained that we cannot cure his liver, especially understanding that he will likely not stop drinking.  I introduced the concept of hospice and its philosophy, with which neither family member was familiar.  At this point time, Geovany and Alberto's mother became distraught, as I explained that Alberto qualifies for hospice and his current condition is terminal.  I further explained that we are worried about how much time he truly has left to live.  Geovany expressed confusion because he seems to be doing better today, which I explored with him; I asked  when the last time was he actually spoke with Alberto which is apparently Thursday.  At this point time, Geovany stated no one wants to make any decisions without the entire family present. Alberto has 2 siblings coming from [place] and they should be here tomorrow or Wednesday.  He requested that we have a full family meeting at that point in time.    The family is aware that patient needs an NG tube as he is unable to safely swallow, and this has been unsuccessfully placed.  At the end of our conversation, a nurse came into place a feeding tube and we all stepped out.    Provided palliative care contact information and encouraged Aide and Geovany to reach out with any questions/needs. Geovany will text or call me as soon as he knows when Alberto's two other siblings will arrive from [place] (possibly tomorrow/Wednesday) for a [facility] conversation.    Code Status: DNR/DNI    ACP Documents: none on file    20 minutes spent discussing advance care planning, this time excludes any other billed services.    Interval diagnostic studies and medical documentation entries pertinent to this case were reviewed independently by me. This patient has at least one acute or chronic illness or injury that poses a threat to life or bodily function. This patient suffers from a high risk of morbidity from additional invasive diagnostic testing or intensive treatment. Discussion of recommendations and coordination of care undertaken with primary provider/treatment team.      Araceli Padgett DNP, [facility]  Inpatient Palliative Care Provider  [phone]

## 2025-06-09 NOTE — PROGRESS NOTES
Nephrology Daily Progress Note    Date of Service  6/9/2025    Chief Complaint  39 y.o. male ETOH liver cirrhosis, admitted 5/28/2025 severe volume overloaded,anasarca, consulted for EDIN    Interval Problem Update  6/6/25 -no improvement in edema  Creat level worse  Holding diuretics  Continue albumin  BP well controlled  To monitor UOP closely  6/7 -no acute events  Placed burton catheter -hematuria likely traumatic  Creat level slightly better  6/8 -AMS, worsening ammonia  In resp distress  Wheezes  Creat level at 2.7:s same  Worsening metabolic acidosis  6/9 patient is encephalopathic  Review of Systems  Review of Systems   Unable to perform ROS: Mental acuity        Physical Exam  Temp:  [36.7 °C (98.1 °F)-37.7 °C (99.9 °F)] 36.7 °C (98.1 °F)  Pulse:  [101-124] 116  Resp:  [11-32] 31  BP: (115-178)/() 152/67  SpO2:  [92 %-96 %] 95 %    Physical Exam  Vitals and nursing note reviewed.   Constitutional:       Appearance: He is ill-appearing.   HENT:      Head: Normocephalic and atraumatic.      Right Ear: External ear normal.      Left Ear: External ear normal.      Nose: Nose normal.      Mouth/Throat:      Pharynx: No oropharyngeal exudate or posterior oropharyngeal erythema.   Eyes:      General: Scleral icterus present.         Right eye: No discharge.         Left eye: No discharge.      Conjunctiva/sclera: Conjunctivae normal.   Cardiovascular:      Rate and Rhythm: Normal rate and regular rhythm.   Pulmonary:      Effort: Pulmonary effort is normal. No respiratory distress.      Breath sounds: Wheezing present.   Abdominal:      General: Abdomen is flat. Bowel sounds are normal. There is distension.   Musculoskeletal:         General: No tenderness.      Cervical back: No rigidity. No muscular tenderness.      Right lower leg: Edema present.      Left lower leg: Edema present.   Skin:     General: Skin is warm and dry.      Coloration: Skin is jaundiced.      Findings: No lesion or rash.    Neurological:      General: No focal deficit present.      Mental Status: He is oriented to person, place, and time. Mental status is at baseline. He is lethargic.   Psychiatric:         Mood and Affect: Mood normal.         Behavior: Behavior normal.         Thought Content: Thought content normal.       Fluids    Intake/Output Summary (Last 24 hours) at 6/9/2025 1343  Last data filed at 6/9/2025 0800  Gross per 24 hour   Intake 522.32 ml   Output 1425 ml   Net -902.68 ml       Laboratory  Recent Labs     06/08/25  0230 06/09/25  0051   WBC 12.5* 15.0*   RBC 2.60* 2.21*   HEMOGLOBIN 8.5* 7.4*   HEMATOCRIT 25.5* 22.6*   MCV 98.1* 102.3*   MCH 32.7 33.5*   MCHC 33.3 32.7   RDW 65.9* 69.0*   PLATELETCT 212 202   MPV 9.2 9.0     Recent Labs     06/07/25  0445 06/08/25  0211 06/09/25  0051   SODIUM 128* 131* 135   POTASSIUM 5.3 5.1 4.7   CHLORIDE 97 102 102   CO2 18* 14* 19*   GLUCOSE 232* 168* 247*   BUN 44* 53* 61*   CREATININE 2.76* 2.74* 2.49*   CALCIUM 8.5 8.9 9.2     Recent Labs     06/08/25  0931   INR 1.39*     Recent Labs     06/08/25  0211   NTPROBNP 2718*           Imaging  DX-ABDOMEN FOR TUBE PLACEMENT   Final Result      Enteric tube extends into the stomach or proximal duodenum      DX-ABDOMEN FOR TUBE PLACEMENT   Final Result         1.  Nonspecific bowel gas pattern in the upper abdomen.   2.  Nasogastric tube with side-port at the gastroesophageal junction, recommend advancement.   3.  Pulmonary edema and/or infiltrate   4.  Cardiomegaly      DX-ABDOMEN FOR TUBE PLACEMENT   Final Result         1.  Nonspecific bowel gas pattern in the upper abdomen.   2.  Nasogastric tube coiled back at the site port with tip overlying the left upper quadrant.   3.  Hazy left lower lobe infiltrates and/or infiltrates.      DX-CHEST-LIMITED (1 VIEW)   Final Result         1.  Pulmonary edema and/or infiltrates are identified, which are stable since the prior exam.   2.  Cardiomegaly      [id number] VIEW   Final  Result         1. Mild colonic ileus type colonic distention.                     DX-CHEST-PORTABLE (1 VIEW)   Final Result      1.  Consolidation within the left lung consistent with pneumonia.      2.  Cardiomegaly with interstitial opacities.      DX-CHEST-PORTABLE (1 VIEW)   Final Result      Cardiomegaly. Hypoinflation. No definite new abnormality.      US-ABDOMEN LTD (SOFT TISSUE)   Final Result      No ascites.         MR-ABDOMEN-WITH & W/O   Final Result      1.  The tail of the pancreas appears somewhat irregular and stranding with increased signal but evaluation is very limited due to significant motion artifact. This could relate to sequela of prior pancreatitis. A mass cannot be excluded. Follow-up in 6    months with MR after improvement is recommended.   2.  There is an accessory splenule adjacent to the spleen   3. Diffuse subcutaneous edema .                        EC-ECHOCARDIOGRAM COMPLETE W/ CONT   Final Result      US-EXTREMITY VENOUS LOWER BILAT   Final Result      US-RUQ   Final Result         1.  Thickened gallbladder wall without visualized shadowing stones, consider acalculous cholecystitis, could be further evaluated with HIDA scan as clinically appropriate   2.  Hepatomegaly   3.  Echogenic liver, compatible with fatty change versus fibrosis      CT-PANCREAS AND ABDOMEN WITH & W/O   Final Result         1.  Masslike structure adjacent to the tail of pancreas with possible slight enhancement on arterial phase imaging, recommend follow-up MRI of the pancreas with contrast for more definitive characterization.   2.  Ovoid mass in the splenic hilum with similar density to spleen on all contrast phases, appearance favoring splenule.   3.  Changes of cirrhosis.   4.  Hepatomegaly           Assessment/Plan     The patient is a 39-year-old male with a history of   chronic alcohol abuse, liver cirrhosis, alcohol related, admitted with   worsening liver function tests, severely edematous, developed  acute kidney   injury.    1.  Acute kidney injury due to HRS  2.  Mild hyponatremia:better.  3.  Anemia.   4.  Hypertension: Blood pressure remains well controlled.    5.  Volume: overloaded  6.  Pancreatic mass with elevated Ca 19-9 -consider diagnostic biopsy    RECOMMENDATIONS:   no acute need for HD  Continue IV Lasix  Renal diet  Daily BMP, CBC.  Renal dose all meds  Avoid nephrotoxins like NSAIDs.  Prognosis guarded.  D/W Dr Ordaz

## 2025-06-09 NOTE — PROGRESS NOTES
Patient is only responding to pain.  was notified and came to bedside, gave orders for a nasogastric tube and BMS system to better administer medications. Chest xray and new labs ordered.

## 2025-06-09 NOTE — CARE PLAN
Problem: Bronchoconstriction  Goal: Improve in air movement and diminished wheezing  Description: Target End Date:  2 to 3 days1.  Implement inhaled treatments2.  Evaluate and manage medication effects  Outcome: Not Progressing   Duoneb Q4

## 2025-06-09 NOTE — PROGRESS NOTES
Orders for bowel management system verified by this RN. Patient is free of contraindications or allergies to product. Verified that patient has frequent lactulose enemas ordered for treatment of elevated ammonia. Assessment performed, no fecal impaction and rectal tone present. BMS placed, small amount of bleeding from external hemorrhoids post placement.

## 2025-06-09 NOTE — PROGRESS NOTES
Unable to place nasogastric tube after multiple attempts from both charges nurses and the rapid RN. Discussed with  to switch PO medications to IV. MD also notified that patients condition is worsening.

## 2025-06-10 PROBLEM — D68.9 COAGULOPATHY (HCC): Status: ACTIVE | Noted: 2025-01-01

## 2025-06-10 PROBLEM — J45.901 ASTHMA EXACERBATION: Status: RESOLVED | Noted: 2017-06-29 | Resolved: 2025-01-01

## 2025-06-10 LAB
ALBUMIN SERPL BCP-MCNC: 4.8 G/DL (ref 3.2–4.9)
ALBUMIN/GLOB SERPL: 1.6 G/DL
ALP SERPL-CCNC: 114 U/L (ref 30–99)
ALT SERPL-CCNC: 27 U/L (ref 2–50)
ANION GAP SERPL CALC-SCNC: 18 MMOL/L (ref 7–16)
AST SERPL-CCNC: 36 U/L (ref 12–45)
BILIRUB SERPL-MCNC: 9.2 MG/DL (ref 0.1–1.5)
BUN SERPL-MCNC: 81 MG/DL (ref 8–22)
CALCIUM ALBUM COR SERPL-MCNC: 9.2 MG/DL (ref 8.5–10.5)
CALCIUM SERPL-MCNC: 9.8 MG/DL (ref 8.5–10.5)
CHLORIDE SERPL-SCNC: 101 MMOL/L (ref 96–112)
CO2 SERPL-SCNC: 19 MMOL/L (ref 20–33)
CREAT SERPL-MCNC: 3.24 MG/DL (ref 0.5–1.4)
ERYTHROCYTE [DISTWIDTH] IN BLOOD BY AUTOMATED COUNT: 70.5 FL (ref 35.9–50)
EXPOSED MRN EXMRN: NORMAL
EXPOSED MRN EXMRN: NORMAL
GFR SERPLBLD CREATININE-BSD FMLA CKD-EPI: 24 ML/MIN/1.73 M 2
GLOBULIN SER CALC-MCNC: 3 G/DL (ref 1.9–3.5)
GLUCOSE SERPL-MCNC: 226 MG/DL (ref 65–99)
HBV SURFACE AG SER QL: NORMAL
HCT VFR BLD AUTO: 21.1 % (ref 42–52)
HCV AB SER QL: NORMAL
HGB BLD-MCNC: 7.1 G/DL (ref 14–18)
HIV 1+2 AB+HIV1 P24 AG SERPL QL IA: NORMAL
INR PPP: 1.67 (ref 0.87–1.13)
MCH RBC QN AUTO: 34.1 PG (ref 27–33)
MCHC RBC AUTO-ENTMCNC: 33.6 G/DL (ref 32.3–36.5)
MCV RBC AUTO: 101.4 FL (ref 81.4–97.8)
PLATELET # BLD AUTO: 230 K/UL (ref 164–446)
PMV BLD AUTO: 9.5 FL (ref 9–12.9)
POTASSIUM SERPL-SCNC: 4.7 MMOL/L (ref 3.6–5.5)
PROT SERPL-MCNC: 7.8 G/DL (ref 6–8.2)
PROTHROMBIN TIME: 19.8 SEC (ref 12–14.6)
RBC # BLD AUTO: 2.08 M/UL (ref 4.7–6.1)
SODIUM SERPL-SCNC: 138 MMOL/L (ref 135–145)
WBC # BLD AUTO: 14.2 K/UL (ref 4.8–10.8)

## 2025-06-10 PROCEDURE — 770001 HCHG ROOM/CARE - MED/SURG/GYN PRIV*

## 2025-06-10 PROCEDURE — P9047 ALBUMIN (HUMAN), 25%, 50ML: HCPCS | Mod: JZ | Performed by: INTERNAL MEDICINE

## 2025-06-10 PROCEDURE — A9270 NON-COVERED ITEM OR SERVICE: HCPCS | Performed by: HOSPITALIST

## 2025-06-10 PROCEDURE — 700111 HCHG RX REV CODE 636 W/ 250 OVERRIDE (IP): Mod: JZ | Performed by: INTERNAL MEDICINE

## 2025-06-10 PROCEDURE — 700111 HCHG RX REV CODE 636 W/ 250 OVERRIDE (IP): Mod: JZ | Performed by: STUDENT IN AN ORGANIZED HEALTH CARE EDUCATION/TRAINING PROGRAM

## 2025-06-10 PROCEDURE — 99233 SBSQ HOSP IP/OBS HIGH 50: CPT

## 2025-06-10 PROCEDURE — 700105 HCHG RX REV CODE 258

## 2025-06-10 PROCEDURE — 700111 HCHG RX REV CODE 636 W/ 250 OVERRIDE (IP)

## 2025-06-10 PROCEDURE — A9270 NON-COVERED ITEM OR SERVICE: HCPCS | Performed by: INTERNAL MEDICINE

## 2025-06-10 PROCEDURE — 31720 CLEARANCE OF AIRWAYS: CPT

## 2025-06-10 PROCEDURE — 99232 SBSQ HOSP IP/OBS MODERATE 35: CPT | Performed by: NURSE PRACTITIONER

## 2025-06-10 PROCEDURE — 700111 HCHG RX REV CODE 636 W/ 250 OVERRIDE (IP): Performed by: EMERGENCY MEDICINE

## 2025-06-10 PROCEDURE — 700105 HCHG RX REV CODE 258: Performed by: STUDENT IN AN ORGANIZED HEALTH CARE EDUCATION/TRAINING PROGRAM

## 2025-06-10 PROCEDURE — 700102 HCHG RX REV CODE 250 W/ 637 OVERRIDE(OP): Performed by: STUDENT IN AN ORGANIZED HEALTH CARE EDUCATION/TRAINING PROGRAM

## 2025-06-10 PROCEDURE — 99233 SBSQ HOSP IP/OBS HIGH 50: CPT | Performed by: INTERNAL MEDICINE

## 2025-06-10 PROCEDURE — 700105 HCHG RX REV CODE 258: Performed by: EMERGENCY MEDICINE

## 2025-06-10 PROCEDURE — 85610 PROTHROMBIN TIME: CPT

## 2025-06-10 PROCEDURE — 85027 COMPLETE CBC AUTOMATED: CPT

## 2025-06-10 PROCEDURE — A9270 NON-COVERED ITEM OR SERVICE: HCPCS | Performed by: STUDENT IN AN ORGANIZED HEALTH CARE EDUCATION/TRAINING PROGRAM

## 2025-06-10 PROCEDURE — 700102 HCHG RX REV CODE 250 W/ 637 OVERRIDE(OP): Performed by: INTERNAL MEDICINE

## 2025-06-10 PROCEDURE — 90832 PSYTX W PT 30 MINUTES: CPT | Performed by: SOCIAL WORKER

## 2025-06-10 PROCEDURE — 700102 HCHG RX REV CODE 250 W/ 637 OVERRIDE(OP): Performed by: HOSPITALIST

## 2025-06-10 PROCEDURE — 700111 HCHG RX REV CODE 636 W/ 250 OVERRIDE (IP): Performed by: HOSPITALIST

## 2025-06-10 PROCEDURE — 94640 AIRWAY INHALATION TREATMENT: CPT

## 2025-06-10 PROCEDURE — 80053 COMPREHEN METABOLIC PANEL: CPT

## 2025-06-10 PROCEDURE — 700101 HCHG RX REV CODE 250: Performed by: STUDENT IN AN ORGANIZED HEALTH CARE EDUCATION/TRAINING PROGRAM

## 2025-06-10 PROCEDURE — 99291 CRITICAL CARE FIRST HOUR: CPT | Performed by: HOSPITALIST

## 2025-06-10 RX ORDER — HEPARIN SODIUM 5000 [USP'U]/ML
5000 INJECTION, SOLUTION INTRAVENOUS; SUBCUTANEOUS EVERY 8 HOURS
Status: DISCONTINUED | OUTPATIENT
Start: 2025-06-10 | End: 2025-06-10

## 2025-06-10 RX ORDER — BISACODYL 10 MG
10 SUPPOSITORY, RECTAL RECTAL
Status: DISCONTINUED | OUTPATIENT
Start: 2025-06-10 | End: 2025-06-11 | Stop reason: HOSPADM

## 2025-06-10 RX ORDER — ACETAMINOPHEN 325 MG/1
650 TABLET ORAL EVERY 4 HOURS PRN
Status: DISCONTINUED | OUTPATIENT
Start: 2025-06-10 | End: 2025-06-11 | Stop reason: HOSPADM

## 2025-06-10 RX ORDER — GLYCOPYRROLATE 1 MG/1
1 TABLET ORAL 3 TIMES DAILY PRN
Status: DISCONTINUED | OUTPATIENT
Start: 2025-06-10 | End: 2025-06-11 | Stop reason: HOSPADM

## 2025-06-10 RX ORDER — SCOPOLAMINE 1 MG/3D
1 PATCH, EXTENDED RELEASE TRANSDERMAL
Status: DISCONTINUED | OUTPATIENT
Start: 2025-06-10 | End: 2025-06-11 | Stop reason: HOSPADM

## 2025-06-10 RX ORDER — HALOPERIDOL 5 MG/ML
1 INJECTION INTRAMUSCULAR EVERY 6 HOURS PRN
Status: DISCONTINUED | OUTPATIENT
Start: 2025-06-10 | End: 2025-06-11 | Stop reason: HOSPADM

## 2025-06-10 RX ORDER — ACETAMINOPHEN 650 MG/1
650 SUPPOSITORY RECTAL EVERY 4 HOURS PRN
Status: DISCONTINUED | OUTPATIENT
Start: 2025-06-10 | End: 2025-06-11 | Stop reason: HOSPADM

## 2025-06-10 RX ORDER — CARBOXYMETHYLCELLULOSE SODIUM 5 MG/ML
1 SOLUTION/ DROPS OPHTHALMIC PRN
Status: DISCONTINUED | OUTPATIENT
Start: 2025-06-10 | End: 2025-06-11 | Stop reason: HOSPADM

## 2025-06-10 RX ORDER — ONDANSETRON 2 MG/ML
8 INJECTION INTRAMUSCULAR; INTRAVENOUS EVERY 8 HOURS PRN
Status: DISCONTINUED | OUTPATIENT
Start: 2025-06-10 | End: 2025-06-11 | Stop reason: HOSPADM

## 2025-06-10 RX ORDER — ATROPINE SULFATE 10 MG/ML
2 SOLUTION/ DROPS OPHTHALMIC EVERY 4 HOURS PRN
Status: DISCONTINUED | OUTPATIENT
Start: 2025-06-10 | End: 2025-06-11 | Stop reason: HOSPADM

## 2025-06-10 RX ORDER — DEXMEDETOMIDINE HYDROCHLORIDE 4 UG/ML
.1-1.5 INJECTION, SOLUTION INTRAVENOUS CONTINUOUS
Status: DISCONTINUED | OUTPATIENT
Start: 2025-06-10 | End: 2025-06-10

## 2025-06-10 RX ORDER — DOCUSATE SODIUM 100 MG/1
100 CAPSULE, LIQUID FILLED ORAL EVERY 12 HOURS
Status: DISCONTINUED | OUTPATIENT
Start: 2025-06-10 | End: 2025-06-11 | Stop reason: HOSPADM

## 2025-06-10 RX ORDER — HYDROCORTISONE 20 MG/1
20 TABLET ORAL 2 TIMES DAILY
Status: DISCONTINUED | OUTPATIENT
Start: 2025-06-10 | End: 2025-06-10

## 2025-06-10 RX ORDER — LACTULOSE 10 G/15ML
10 SOLUTION ORAL
Status: DISCONTINUED | OUTPATIENT
Start: 2025-06-10 | End: 2025-06-11 | Stop reason: HOSPADM

## 2025-06-10 RX ORDER — HALOPERIDOL 2 MG/ML
1 SOLUTION ORAL EVERY 6 HOURS PRN
Status: DISCONTINUED | OUTPATIENT
Start: 2025-06-10 | End: 2025-06-11 | Stop reason: HOSPADM

## 2025-06-10 RX ORDER — ONDANSETRON 4 MG/1
8 TABLET, ORALLY DISINTEGRATING ORAL EVERY 8 HOURS PRN
Status: DISCONTINUED | OUTPATIENT
Start: 2025-06-10 | End: 2025-06-11 | Stop reason: HOSPADM

## 2025-06-10 RX ORDER — GLYCOPYRROLATE 0.2 MG/ML
0.2 INJECTION INTRAMUSCULAR; INTRAVENOUS 3 TIMES DAILY PRN
Status: DISCONTINUED | OUTPATIENT
Start: 2025-06-10 | End: 2025-06-11 | Stop reason: HOSPADM

## 2025-06-10 RX ADMIN — SODIUM BICARBONATE 1300 MG: 650 TABLET ORAL at 06:19

## 2025-06-10 RX ADMIN — IPRATROPIUM BROMIDE AND ALBUTEROL SULFATE 3 ML: .5; 2.5 SOLUTION RESPIRATORY (INHALATION) at 13:32

## 2025-06-10 RX ADMIN — HEPARIN SODIUM 5000 UNITS: 5000 INJECTION, SOLUTION INTRAVENOUS; SUBCUTANEOUS at 20:35

## 2025-06-10 RX ADMIN — RIFAXIMIN 550 MG: 550 TABLET ORAL at 06:19

## 2025-06-10 RX ADMIN — GABAPENTIN 300 MG: 300 CAPSULE ORAL at 13:34

## 2025-06-10 RX ADMIN — IPRATROPIUM BROMIDE AND ALBUTEROL SULFATE 3 ML: .5; 2.5 SOLUTION RESPIRATORY (INHALATION) at 03:50

## 2025-06-10 RX ADMIN — SODIUM BICARBONATE 1300 MG: 650 TABLET ORAL at 17:57

## 2025-06-10 RX ADMIN — LACTULOSE 45 ML: 10 SOLUTION ORAL at 17:57

## 2025-06-10 RX ADMIN — RIFAXIMIN 550 MG: 550 TABLET ORAL at 17:58

## 2025-06-10 RX ADMIN — GABAPENTIN 300 MG: 300 CAPSULE ORAL at 06:19

## 2025-06-10 RX ADMIN — CEFTRIAXONE SODIUM 2000 MG: 10 INJECTION, POWDER, FOR SOLUTION INTRAVENOUS at 06:24

## 2025-06-10 RX ADMIN — HYDROCORTISONE 20 MG: 20 TABLET ORAL at 09:13

## 2025-06-10 RX ADMIN — Medication 400 MG: at 06:19

## 2025-06-10 RX ADMIN — AZITHROMYCIN 500 MG: 500 INJECTION, POWDER, LYOPHILIZED, FOR SOLUTION INTRAVENOUS at 06:34

## 2025-06-10 RX ADMIN — Medication 1000 UNITS: at 06:19

## 2025-06-10 RX ADMIN — SCOPOLAMINE 1 PATCH: 1.5 PATCH, EXTENDED RELEASE TRANSDERMAL at 23:40

## 2025-06-10 RX ADMIN — GABAPENTIN 300 MG: 300 CAPSULE ORAL at 17:57

## 2025-06-10 RX ADMIN — MINERAL OIL, PETROLATUM 1 APPLICATION: 425; 568 OINTMENT OPHTHALMIC at 23:41

## 2025-06-10 RX ADMIN — HYDROCORTISONE: 1 CREAM TOPICAL at 06:21

## 2025-06-10 RX ADMIN — MIDODRINE HYDROCHLORIDE 5 MG: 5 TABLET ORAL at 20:22

## 2025-06-10 RX ADMIN — HYDROCORTISONE 20 MG: 20 TABLET ORAL at 17:58

## 2025-06-10 RX ADMIN — ALBUMIN (HUMAN) 50 G: 0.25 INJECTION, SOLUTION INTRAVENOUS at 20:31

## 2025-06-10 RX ADMIN — LACTULOSE 45 ML: 10 SOLUTION ORAL at 08:00

## 2025-06-10 RX ADMIN — FOLIC ACID 1 MG: 1 TABLET ORAL at 06:19

## 2025-06-10 RX ADMIN — ALBUMIN (HUMAN) 50 G: 0.25 INJECTION, SOLUTION INTRAVENOUS at 06:38

## 2025-06-10 RX ADMIN — MORPHINE SULFATE 5 MG: 10 INJECTION INTRAVENOUS at 23:40

## 2025-06-10 RX ADMIN — METHYLPREDNISOLONE SODIUM SUCCINATE 62.5 MG: 125 INJECTION, POWDER, FOR SOLUTION INTRAMUSCULAR; INTRAVENOUS at 06:19

## 2025-06-10 RX ADMIN — ALBUMIN (HUMAN) 50 G: 0.25 INJECTION, SOLUTION INTRAVENOUS at 13:34

## 2025-06-10 RX ADMIN — HYDROCORTISONE: 1 CREAM TOPICAL at 17:59

## 2025-06-10 RX ADMIN — SODIUM BICARBONATE 1300 MG: 650 TABLET ORAL at 13:34

## 2025-06-10 RX ADMIN — FUROSEMIDE 10 MG/HR: 10 INJECTION, SOLUTION INTRAMUSCULAR; INTRAVENOUS at 16:19

## 2025-06-10 RX ADMIN — MIDODRINE HYDROCHLORIDE 5 MG: 5 TABLET ORAL at 06:18

## 2025-06-10 RX ADMIN — MIDODRINE HYDROCHLORIDE 5 MG: 5 TABLET ORAL at 13:34

## 2025-06-10 RX ADMIN — IPRATROPIUM BROMIDE AND ALBUTEROL SULFATE 3 ML: .5; 2.5 SOLUTION RESPIRATORY (INHALATION) at 10:25

## 2025-06-10 RX ADMIN — LACTULOSE 45 ML: 10 SOLUTION ORAL at 13:34

## 2025-06-10 RX ADMIN — FUROSEMIDE 10 MG/HR: 10 INJECTION, SOLUTION INTRAMUSCULAR; INTRAVENOUS at 06:38

## 2025-06-10 RX ADMIN — LACTULOSE 45 ML: 10 SOLUTION ORAL at 20:22

## 2025-06-10 ASSESSMENT — PAIN DESCRIPTION - PAIN TYPE
TYPE: ACUTE PAIN

## 2025-06-10 ASSESSMENT — FIBROSIS 4 INDEX: FIB4 SCORE: 1.41

## 2025-06-10 NOTE — PROGRESS NOTES
[facility] Medicine Daily Progress Note    Date of Service  6/10/2025    Chief Complaint  Alberto Maldonado is a 39 y.o. male admitted 5/28/2025 with worsening of edema.    Hospital Course  Mr. River is a 40 yo man with alcoholic cirrhosis, ongoing alcohol use, DM, HTN, BOLIVAR, morbid obesity who presented with worsening swelling.  He continues to drink 9-10 shots every day.  He was found with worsening liver function, elevated ammonia.  Patient was previously also hospitalized and CTAP at that time showed a pancreatic tail lesion and was recommended to have outpatient CT pancreatic protocol.  CT was done in the ER which showed a mass in the pancreatic tail, ovoid mass in the splenic hilum.  MRI showed increased signal in tail of pancreas, prior pancreatitis but mass was not excluded.  He will need a follow-up MRI in 6 months.  GI was consulted and he is not a candidate for further steroid treatment for alcohol hepatitis.  He was started on diuretics for his edema.  Abdominal ultrasound was negative for ascites.  Patient reported he drinks alcohol to treat his anxiety, psychiatry was consulted and patient started on gabapentin.  Psychotherapy is following the patient.     Despite aggressive IV diuresis patient's weight is going up. Worsening renal function and nephrology was consulted.  Started on treatment with octreotide, midodrine, albumin for hepatorenal syndrome and GI has been reconsulted.     6/8, rapid response was called due to worsening mentation and respiratory status  Patient was noted to be more lethargic, labored breathing.  ABG obtained which showed a pH of 7.3.  Chest x-ray was obtained which showed a new right-sided pneumonia, patient started on Rocephin.  Patient was also noted to have significant wheezing, deemed to be asthma exacerbation and started on Solu-Medrol.  Because of this, patient was upgraded to the [facility]  Nephrology consulted and did not recommend hemodialysis.     Interval Problem  Update  6/10: Mr. Maldonado was evaluated in the IMCU.  He is on an octreotide infusion as well as Lasix infusion at 10 mg/h.  His sodium this morning is 138 and creatinine 3.24 up from 2.49 yesterday. He is receiving nasogastric lactulose and rifaximin. He is obtunded.  250 mL urine over night. Blood pressure is low at 101/52 and he is tachycardic at 118.    I have discussed this patient's plan of care and discharge plan at IDT rounds today with Case Management, Nursing, Nursing leadership, and other members of the IDT team.    Consultants/Specialty  Nephrology  Palliative care  GI  Code Status  DNAR/DNI    Disposition  The patient is not medically cleared for discharge to home or a post-acute facility.  Anticipate discharge to: hospice    I have placed the appropriate orders for post-discharge needs.    Review of Systems  Review of Systems   Unable to perform ROS: Mental acuity        Physical Exam  Temp:  [36.3 °C (97.3 °F)-36.9 °C (98.5 °F)] 36.3 °C (97.3 °F)  Pulse:  [102-131] 120  Resp:  [19-46] 28  BP: (107-185)/() 115/57  SpO2:  [92 %-98 %] 94 %    Physical Exam  Vitals and nursing note reviewed.   Constitutional:       Appearance: He is ill-appearing and toxic-appearing.      Comments: anasarca   HENT:      Nose:      Comments: NG tube     Mouth/Throat:      Mouth: Mucous membranes are dry.   Eyes:      General: Scleral icterus present.   Cardiovascular:      Rate and Rhythm: Tachycardia present.   Pulmonary:      Comments: Tracheal wheezing  Abdominal:      General: There is distension.      Comments: Edema abdomen    Musculoskeletal:      Right lower leg: Edema present.      Left lower leg: Edema present.   Skin:     Coloration: Skin is jaundiced.         Fluids    Intake/Output Summary (Last 24 hours) at 6/10/2025 0731  Last data filed at 6/10/2025 0535  Gross per 24 hour   Intake 753.04 ml   Output 975 ml   Net -221.96 ml        Laboratory  Recent Labs     06/08/25  0230 06/09/25  0051 06/10/25  0326    WBC 12.5* 15.0* 14.2*   RBC 2.60* 2.21* 2.08*   HEMOGLOBIN 8.5* 7.4* 7.1*   HEMATOCRIT 25.5* 22.6* 21.1*   MCV 98.1* 102.3* 101.4*   MCH 32.7 33.5* 34.1*   MCHC 33.3 32.7 33.6   RDW 65.9* 69.0* 70.5*   PLATELETCT 212 202 230   MPV 9.2 9.0 9.5     Recent Labs     06/08/25  0211 06/09/25  0051 06/10/25  0326   SODIUM 131* 135 138   POTASSIUM 5.1 4.7 4.7   CHLORIDE 102 102 101   CO2 14* 19* 19*   GLUCOSE 168* 247* 226*   BUN 53* 61* 81*   CREATININE 2.74* 2.49* 3.24*   CALCIUM 8.9 9.2 9.8     Recent Labs     06/08/25  0931   INR 1.39*               Imaging  DX-ABDOMEN FOR TUBE PLACEMENT   Final Result      Enteric tube extends into the stomach or proximal duodenum      DX-ABDOMEN FOR TUBE PLACEMENT   Final Result         1.  Nonspecific bowel gas pattern in the upper abdomen.   2.  Nasogastric tube with side-port at the gastroesophageal junction, recommend advancement.   3.  Pulmonary edema and/or infiltrate   4.  Cardiomegaly      DX-ABDOMEN FOR TUBE PLACEMENT   Final Result         1.  Nonspecific bowel gas pattern in the upper abdomen.   2.  Nasogastric tube coiled back at the site port with tip overlying the left upper quadrant.   3.  Hazy left lower lobe infiltrates and/or infiltrates.      DX-CHEST-LIMITED (1 VIEW)   Final Result         1.  Pulmonary edema and/or infiltrates are identified, which are stable since the prior exam.   2.  Cardiomegaly      [id number] VIEW   Final Result         1. Mild colonic ileus type colonic distention.                     DX-CHEST-PORTABLE (1 VIEW)   Final Result      1.  Consolidation within the left lung consistent with pneumonia.      2.  Cardiomegaly with interstitial opacities.      DX-CHEST-PORTABLE (1 VIEW)   Final Result      Cardiomegaly. Hypoinflation. No definite new abnormality.      US-ABDOMEN LTD (SOFT TISSUE)   Final Result      No ascites.         MR-ABDOMEN-WITH & W/O   Final Result      1.  The tail of the pancreas appears somewhat irregular and stranding  with increased signal but evaluation is very limited due to significant motion artifact. This could relate to sequela of prior pancreatitis. A mass cannot be excluded. Follow-up in 6    months with MR after improvement is recommended.   2.  There is an accessory splenule adjacent to the spleen   3. Diffuse subcutaneous edema .                        EC-ECHOCARDIOGRAM COMPLETE W/ CONT   Final Result      US-EXTREMITY VENOUS LOWER BILAT   Final Result      US-RUQ   Final Result         1.  Thickened gallbladder wall without visualized shadowing stones, consider acalculous cholecystitis, could be further evaluated with HIDA scan as clinically appropriate   2.  Hepatomegaly   3.  Echogenic liver, compatible with fatty change versus fibrosis      CT-PANCREAS AND ABDOMEN WITH & W/O   Final Result         1.  Masslike structure adjacent to the tail of pancreas with possible slight enhancement on arterial phase imaging, recommend follow-up MRI of the pancreas with contrast for more definitive characterization.   2.  Ovoid mass in the splenic hilum with similar density to spleen on all contrast phases, appearance favoring splenule.   3.  Changes of cirrhosis.   4.  Hepatomegaly           Assessment/Plan  * Hepatorenal syndrome with acute kidney injury (HCC)- (present on admission)  Assessment & Plan  Patient had significant volume overload, started on a Lasix drip  Continue midodrine and albumin  Additional recommendations per nephrology and GI    Anasarca  Assessment & Plan  Profound volume overload due to liver failure and renal failure.  IV Lasix drip though urine output only 250 mL over night.   He has been determined not to be a candidate for dialysis   Urine does appear dark, highly concerning that his kidney function will continue to worsen and he may stop making urine  I    Alcoholic liver hepatitis and cirrhosis- (present on admission)  Assessment & Plan  Patient did just complete a course of steroids, GI was  consulted, advised not to restart  Acutely decompensated  Bilirubin 9  NG for meds  Severely encephalopathic  Multiorgan failure (liver, kidneys, CNS)    Coagulopathy (HCC)- (present on admission)  Assessment & Plan  Secondary to liver failure    Hepatic encephalopathy (HCC)- (present on admission)  Assessment & Plan  Nasogastric rifaximin and lactulose  Check morning ammonia    Hyponatremia  Assessment & Plan  Resolved with IV Lasix  Repeat BMP in the morning    Intraabdominal mass- (present on admission)  Assessment & Plan  CT pancreatic protocol demonstrated masslike structure adjacent to the tail of the pancreas with possible slight enhancement of arterial phase.    CA 19-9 was elevated.  AFP normal  MRI showed increased signal in tail of pancreas, prior pancreatitis but mass was not excluded.  Outpatient MRI in 6 months    BOLIVAR on CPAP- (present on admission)  Assessment & Plan  He is obtunded and cannot tolerate CPAP    Pneumonia- (present on admission)  Assessment & Plan  Chest x-ray did show changes consistent with pneumonia with a mildly elevated procalcitonin  Treated with Rocephin    Alcohol use disorder, severe, dependence (HCC)- (present on admission)  Assessment & Plan  Patient was previously on CIWA however scores were low and this was stopped    Morbid obesity (HCC)- (present on admission)  Assessment & Plan  Body mass index is 51.63 kg/m².         VTE prophylaxis:    heparin ppx      I have performed a physical exam and reviewed and updated ROS and Plan today (6/10/2025). In review of yesterday's note (6/9/2025), there are no changes except as documented above.    Mr. Maldonado is critically ill. 34 minutes of critical care were spent with patient, nursing, pharmacy, and in specific management of hepatorenal syndrome with encephalopathy, hypotension, renal failure, liver failure. Prognosis is very poor. Please see orders.

## 2025-06-10 NOTE — PROGRESS NOTES
MRN: [id number]  Date of palliative consult: 2025  Reason for consult: Goals of care/advance care planning  Referring provider: Dr. Ordaz  Location of consult: [facility]  Additional consulting services: GI, Nephrology, Critical care, Psychiatry    HPI:   Alberto Maldonado is a 39 y.o. male with medical history significant for ETOH abuse, liver cirrhosis, multiple admissions related to ETOH use (this is the 9th since 2023), withdrawal, pancreatitis, gout, asthma, morbid obesity admitted  with weight gain and edema. Has an a complex hospital course that has subsequently led to decreased renal function concerning for hepatorenal syndrome that is not responsive to appropriate treatment.  with respiratory distress, diagnosed with PNA and started on treatment. CODE STATUS changed to DNR/DNI and transferred to [facility] with lasix and octreotide drips. Mental status has declined throughout hospitalization; NG tube unable to be placed x3 overnight. SLP recommending no safe diet at this time.Per GI, patient is not a candidate for liver transplant. Per nephrology, patient is not a candidate for dialysis. Palliative care consulted for goals of care conversation.     Additional Pertinent Medical History: BOLIVAR on CPAP, HTN    Interval history: 6/10 Patient now obtunded, kidney function significantly worse.    ROS:    Review of Systems   Unable to perform ROS: Acuity of condition       PE:   Recent vital signs  BMI: Body mass index is 52.06 kg/m².    Temp (24hrs), Av.6 °C (97.9 °F), Min:36.3 °C (97.3 °F), Max:36.9 °C (98.5 °F)  Temperature: 36.3 °C (97.3 °F)  Pulse  Av.2  Min: 60  Max: 131   Blood Pressure: 115/57       Physical Exam  Vitals and nursing note reviewed.   Constitutional:       Appearance: He is morbidly obese. He is ill-appearing and toxic-appearing.      Interventions: Nasal cannula in place.      Comments: Obtunded   Cardiovascular:      Rate and Rhythm: Tachycardia present.      Heart sounds:  "Normal heart sounds.   Pulmonary:      Effort: Tachypnea present.      Breath sounds: Examination of the right-upper field reveals decreased breath sounds and wheezing. Examination of the left-upper field reveals decreased breath sounds. Decreased breath sounds and wheezing present.   Abdominal:      General: Bowel sounds are decreased. There is distension.   Genitourinary:     Comments: Silver catheter in place  Rectal tube in place  Musculoskeletal:      Right lower le+ Edema present.      Left lower le+ Edema present.      Comments: Sarcopenic/anasarca obesity   Neurological:      Mental Status: He is unresponsive.   Psychiatric:      Comments: Unable to assess         ASSESSMENT/PLAN WITH SHARED DECISION MAKING:   PHYSICAL ASPECTS OF CARE  Palliative Performance Scale: 20%    # Acute respiratory failure  # Hepatorenal syndrome  # Liver cirrhosis  # ETOH abuse  # Withdrawal  # Hepatic encephalopathy  # Critically elevated ammonia  # Fluid overload  # Pneumonia  # Asthma exacerbation  # Multiple hospital admissions  # Portal HTN  # Hyperbilirubinemia  # Transaminitis  # Anemia  # Anasarca  # Mass on pancreas  # BOLIVAR on CPAP  # Morbid obesity  # HTN      SOCIAL ASPECTS OF CARE  Alberto lives with his mother and brother (Freddy) in [place], [place].  He moved to [place] from [place] in .  His mother states that he started heavily drinking during COVID.  At some point when he was in [place], he cared for his father and \"missed a lot of life.\"  This is according to his brother Geovany.  He held multiple jobs within the last few years, all of which he was laid off from due to being sick/drinking.  Alberto has no children and is single.  He has 5 siblings; 2 are in [place], 2 are in [place], and 1 is in the [facility].    SPIRITUAL ASPECTS OF CARE    visit denied at this time.    GOALS OF CARE/SERIOUS ILLNESS CONVERSATION  Introduced myself to Alberto and his family at bedside which included his brother Geovany and his " mother Aide. Discussed role of palliative care and reason for consult. Geovany and Aide agreeable to discuss goals of care; Alberto not responsive to conversation this morning due to the acuity of his condition. Geovany did most of the speaking; I noted that Aide would reply to Geovany in her primary language. I offered  services, which they both declined.    Geovany has good insight into Alberto's current hospitalization as well as his many previous hospital admissions and his overall health.  Geovany is unclear as to whether or not Alberto ever identified wishes regarding healthcare.  He expresses that he is worried Alberto has been depressed.  Alberto's entire family has been working to convince him to stop drinking as they are worried about him.    Geovany is unable to identify anything that Alberto enjoys doing in his free time.  He states he used to travel a lot.  I discussed Alberto's current CODE STATUS with his mother and brother, to which they both agreed that he was recently changed to a DNR/DNI.  They did not have much input into this.    I requested permission to discuss Alberto's prognosis to which his family agreed.  I let them know the team is very concerned about Alberto's current status and that his prognosis is very poor.  We discussed that he is not a candidate for a liver transplant nor is he a candidate for dialysis if his kidneys continue to fail.  I explained that we cannot cure his liver, especially understanding that he will likely not stop drinking.  I introduced the concept of hospice and its philosophy, with which neither family member was familiar.  At this point time, Geovany and Alberto's mother became distraught, as I explained that Alberto qualifies for hospice and his current condition is terminal.  I further explained that we are worried about how much time he truly has left to live.  Geovany expressed confusion because he seems to be doing better today, which I explored with him;  I asked when the last time was he actually spoke with Alberto which is apparently Thursday.  At this point time, Geovany stated no one wants to make any decisions without the entire family present. Alberto has 2 siblings coming from [place] and they should be here tomorrow or Wednesday.  He requested that we have a full family meeting at that point in time.    The family is aware that patient needs an NG tube as he is unable to safely swallow, and this has been unsuccessfully placed.  At the end of our conversation, a nurse came into place a feeding tube and we all stepped out.    Provided palliative care contact information and encouraged Aide and Geovany to reach out with any questions/needs. Geovany will text or call me as soon as he knows when Alberto's two other siblings will arrive from [place] (possibly tomorrow/Wednesday) for a C conversation.    6/10: Placed a phone call to patient's brother HANNA to let him know patient is significantly worse this morning.  HANNA was very tearful over the phone and repeatedly asked if there is anything we can do to save Alberto.  I let HANNA know that it is important to have a conversation with the family as soon as possible due to his significantly worsening status.  HANNA stated understanding; he let me know his mother is having cataract surgery today and his other brother is with her.  He shared that siblings who are coming in from [place] should be here tomorrow evening.  I let HANNA know I was unsure if Alberto will live long enough to see those siblings.  HANNA states he and his wife should be in at 11:00 this morning, though he does not want to make any decisions without his family.    I did discuss hospice with HANNA to let him know what I discussed with his brother and mother yesterday.  I let him know my concern that Alberto would likely not be able to go home at all and he will likely pass away in the hospital.  I then explained that we are able to make him comfortable if that is what the  family desires, as he may suffer through until the end of his life.  HANNA stated understanding, though he was very tearful.    Updated Dr. Arechiga.    Code Status: DNR/DNI    ACP Documents: none on file    10 minutes spent discussing advance care planning, this time excludes any other billed services.    Interval diagnostic studies and medical documentation entries pertinent to this case were reviewed independently by me. This patient has at least one acute or chronic illness or injury that poses a threat to life or bodily function. This patient suffers from a high risk of morbidity from additional invasive diagnostic testing or intensive treatment. Discussion of recommendations and coordination of care undertaken with primary provider/treatment team.      Araceli Padgett DNP, [facility]  Inpatient Palliative Care Provider  [phone]

## 2025-06-10 NOTE — DISCHARGE PLANNING
Case Management Discharge Planning    Admission Date: 5/28/2025  GMLOS: 4.9  ALOS: 12    6-Clicks ADL Score: 21  6-Clicks Mobility Score: 18      Anticipated Discharge Dispo: Discharge Disposition: D/T to hospice home (50)    DME Needed: No    Action(s) Taken: Updated Provider/Nurse on Discharge Plan    Pt was discussed during IDT rounds. Pt creatinine was up to 3.24. Pt is obtunded. Palliative consulted.     Escalations Completed: Provider    Medically Clear: No    Next Steps: RN CM to continue to assist in Pt's discharge needs.     Barriers to Discharge: Medical clearance    Is the patient up for discharge tomorrow: No

## 2025-06-10 NOTE — CONSULTS
"[facility]    INPATIENT ASSESSMENT    Name: Alberto Maldonado  MRN: [id number]  : 1985  Age: 39 y.o.  Date of assessment: 6/10/2025  PCP: Cyrus Tolliver P.A.-C.  Persons in attendance: Patient, family       HPI: Per Medical Record: Patient is a \"40 yo man with alcoholic cirrhosis, ongoing alcohol use, DM, HTN, BOLIVAR, morbid obesity who presented with worsening swelling. He continues to drink 9-10 shots every day. He was found with worsening liver function, elevated ammonia. Patient was previously also hospitalized and CTAP at that time showed a pancreatic tail lesion and was recommended to have outpatient CT pancreatic protocol. CT was done in the ER which showed a mass in the pancreatic tail, ovoid mass in the splenic hilum. MRI showed increased signal in tail of pancreas, prior pancreatitis but mass was not excluded. He will need a follow-up MRI in 6 months. GI was consulted and he is not a candidate for further steroid treatment for alcohol hepatitis. He was started on diuretics for his edema.\" Patient was referred to Behavioral Health for psychotherapy    CHIEF COMPLAINT/PRESENTING ISSUE (as stated by Family): Patient was seen unresponsive wearing face mask, family at bedside crying. Family reports poor prognosis per physician. Clinician provided support and encouragement to the family. Family reports they are hoping a brother in [place] has the opportunity to see patient within the next 24 hours.    Family processed their sadness related to the patients current medical condition. The hope is the brother will arrive in time. Clinician continued to provide support during this difficult time..    Chief Complaint   Patient presents with    Abdominal Swelling     Pt reports facial, abdominal and leg swelling. Pt recently diagnosed with alcoholic cirrhosis, daily ETOH use.     Facial Swelling    Foot Swelling        Legal Hold: N/A    Signing off      Dotty Nix, Ph.D., LCSW  6/10/2025 "   Length of intervention: 30 minutes   No

## 2025-06-10 NOTE — PROGRESS NOTES
..Gastroenterology Progress Note               Author:  RICHY Penny   Date & Time Created: 6/10/2025 11:40 AM       Patient ID:  Name:             Alberto Maldonado  YOB: 1985  Age:                 39 y.o.  male  MRN:               [id number]    Medical Decision Making, by Problem:  Active Hospital Problems    Diagnosis     Coagulopathy (HCC) [D68.9]     Hepatic encephalopathy (HCC) [K76.82]     Vitamin D deficiency [E55.9]     Alcoholic liver hepatitis and cirrhosis [K70.10]     Anasarca [R60.1]     Hyponatremia [E87.1]     Hepatorenal syndrome with acute kidney injury (HCC) [K76.7, N17.9]     Anxiety [F41.9]     Intraabdominal mass [R19.00]     BOLIVAR on CPAP [G47.33]     Pneumonia [J18.9]     Alcohol use disorder, severe, dependence (HCC) [F10.20]     Morbid obesity (HCC) [E66.01]     Essential hypertension [I10]        Presenting Chief Complaint:  Decompensated liver cirrhosis     History of Present Illness:   The patient 39 years old gentleman with excessive alcohol use, liver cirrhosis with jaundice and fluid overload, recent admission for acute on chronic liver injury from alcohol use, s/p steroid treatment (however, the patient does not recall if he finished the steroid as instructed upon last discharge), present to inpatient GI service for worsening jaundice and fluid accumulation.  On initial evaluation, he had multiple bottles of water and Gatorade at his bedside.  He was noted to be overtly fluid overloaded with facial swelling, distended abdomen, and severely distended lower extremities.  He was borderline encephalopathic with poor sleep and impaired memory.  There is no evidence of GI bleeding.     Recently admitted from 4/3 through 4/12 when he requested detoxification.    Prior chart review:  Admitted to [facility] in 2021 and described to be alcohol use disorder, daily drinker, admitted with acute pancreatitis with necrosis.  He has had multiple admissions for gout and  multiple admissions for alcohol use disorder and withdrawal. Chart review states attending AA and taking naltrexone in 2024.  Previously followed by [facility].  Unfortunately further admissions for alcohol use disorder.  History of DUI May 2024 and arrest.  (Please see Dr. Hagan, Psychiatry, note from August 2024).    Interval History:  5/30/2025: initial consult    6/6/2025: Gastroenterology service reengaged given worsening clinical picture, development of HRS. Patient seen bedside on Charlee 6.  He remains significantly edematous and encephalopathic.  A Silver catheter is being placed for closer monitoring of urine output in the setting of worsening kidney function.  Nephrology also consulted.    Patient seen.  He admits to me that he continued to drink after his recent discharge.  Per chart review it appears he was attending regular visits with his primary care at [facility].  It is unclear if he completed his steroid dose for alcoholic hepatitis.  On admission, his total bilirubin was 17.3, 4.2 on discharge.  His liver enzymes also increased and his ammonia was 122.  INR is about the same.  His creatinine on admission was 1.38 and previously 0.84.  Now it is 2.78.  Bilirubin today is 10 with 6.4 direct.    Given the CT scan during last admission showing pancreatic tail lesion, MRI was completed and remarks the tail of the pancreas is somewhat irregular, could be prior pancreatitis but cannot exclude mass.  However given limited exam recommend 6-month follow-up after improvement.  Noted to have diffuse subcutaneous edema.    6/7/2025: patient seen, clinically worse. Obtunded and barely rousable. Observed apnea and audible wheezing. D/W Dr. Lopez. One BM today. Cr about the same, T. Bili 11.2. INR and direct bili pending.    6/8/2025: In respiratory distress again overnight with increased WOB and wheezing. CXR with LL consolidation and cardiomegaly with interstitial opacities. One time dose of lasix 60 mg IV  given. BC pending, started on ceftriaxone    Patient seen bedside with rapid response RN, bedside RN, and RT.  Continues to have increased work of breathing with audible stridor and wheezing.  Eventually evaluated by intensivist and nephrologist and considered not a candidate for dialysis.  He was upgraded to IMCU for forced diuresis.    Still no BM, ammonia 137, change to rectal lactulose Q4 hours    6/9/2025: Patient seen and then revisited in collaboration with Dr. Ordaz.  Mother and 2 brothers bedside.  Improved work of breathing, stridor, edema.  Plan of care discussed with them as patient is unable to participate.  He remains encephalopathic but arousable to voice.  BMS system in place with approximately 200 mL of liquid stool.  WBC 15, Hgb 7.4, Cr 2.49, T. Bili 10.1.     6/10/2025: Patient seen at bedside.  Obtunded.  Octreotide and Lasix infusions in progress.  Audible wheezing.  MAPs >65 overnight.  Bowel movement x 2 last night.  250 mL urine overnight.  leukocytosis 14.2, hemoglobin 7.1, platelets 230.  Sodium 138, BUN 81, creatinine increased from 2.49-3.24.  AST 36, ALT 27, alk phos 114, total bilirubin 9.2.      Hospital Medications:  Current Facility-Administered Medications   Medication Dose Frequency Provider Last Rate Last Admin    hydrocortisone (Cortef) tablet 20 mg  20 mg BID Shan Arechiga M.D.   20 mg at 06/10/25 0913    heparin injection 5,000 Units  5,000 Units Q8HRS Shan Arechiga M.D.        Pharmacy Consult: Enteral tube insertion - review meds/change route/product selection   PHARMACY TO DOSE Anthony Ordaz D.O.        folic acid (Folvite) tablet 1 mg  1 mg DAILY EDWAR GaviriaO.   1 mg at 06/10/25 0619    gabapentin (Neurontin) capsule 300 mg  300 mg TID EDWAR GaviriaO.   300 mg at 06/10/25 0619    magnesium oxide tablet 400 mg  400 mg DAILY EDWAR GaviriaO.   400 mg at 06/10/25 0619    midodrine (Proamatine) tablet 5 mg  5 mg Q8HRS EDWAR GaviriaO.   5 mg at  06/10/25 0618    riFAXIMin (Xifaxan) tablet 550 mg  550 mg BID EDWAR GaviriaOIesha   550 mg at 06/10/25 0619    sodium bicarbonate tablet 1,300 mg  1,300 mg TID EDWAR GaviriaOIesha   1,300 mg at 06/10/25 0619    vitamin D3 (Cholecalciferol) tablet 1,000 Units  1,000 Units DAILY Anthony Ordaz D.O.   1,000 Units at 06/10/25 0619    diazePAM (Valium) tablet 2 mg  2 mg Q6HRS PRN Anthony Ordaz D.O.        ondansetron (Zofran ODT) dispertab 4 mg  4 mg Q4HRS PRN Anthony Ordaz D.O.        oxyCODONE immediate-release (Roxicodone) tablet 5 mg  5 mg Q3HRS PRN EDWAR GaviriaO.        Or    oxyCODONE immediate release (Roxicodone) tablet 10 mg  10 mg Q3HRS PRN EDWAR GaviriaO.        Or    morphine 4 MG/ML injection 4 mg  4 mg Q3HRS PRN EDWAR GaviriaO.        promethazine (Phenergan) tablet 12.5-25 mg  12.5-25 mg Q4HRS PRN Anthony Ordaz D.O.        lactulose 20 GM/30ML solution 45 mL  45 mL 4X/DAY EDWAR GaviriaO.   45 mL at 06/10/25 0800    polyethylene glycol/lytes (Miralax) Packet 1 Packet  1 Packet BID Michelle Edwards, DNP,  APRN   1 Packet at 06/09/25 1800    levalbuterol (Xopenex) 1.25 MG/3ML nebulizer solution 1.25 mg  1.25 mg Q4H PRN (RT) EDWAR McneilN.P.        lactulose 10 g/15mL solution 300 mL  300 mL Q4HRS PRN EDWAR McneilN.P.   300 mL at 06/09/25 0609    ipratropium-albuterol (DUONEB) nebulizer solution  3 mL Q4H PRN (RT) Nicol Lopez M.D.        furosemide (Lasix) 100 mg in  mL infusion  10 mg/hr Continuous Shawn Khan M.D. 10 mL/hr at 06/10/25 0638 10 mg/hr at 06/10/25 0638    ipratropium-albuterol (DUONEB) nebulizer solution  3 mL Q4HRS (RT) Nicol Lopez M.D.   3 mL at 06/10/25 1025    albuterol (Proventil) 2.5mg/0.5ml nebulizer solution 2.5 mg  2.5 mg Q2HRS PRN (RT) Nicol Lopez M.D.   2.5 mg at 06/08/25 0208    octreotide (SandoSTATIN) 1,250 mcg in  mL Infusion  50 mcg/hr Continuous Fernanda Daniels D.O. 10 mL/hr at 06/09/25 2023 50 mcg/hr at 06/09/25 2023  "   albumin human 25% solution 50 g  50 g Q8HRS Fernanda Daniels D.O. 150 mL/hr at 06/10/25 0638 50 g at 06/10/25 0638    Respiratory Therapy Consult   Continuous RT Fernanda Daniels D.O.        albuterol inhaler 2 Puff  2 Puff Q4HRS PRN Fernanda Daniels D.O.   2 Puff at 06/07/25 0322    [Held by provider] spironolactone (Aldactone) tablet 100 mg  100 mg Q DAY Gordon Brambila M.D.   100 mg at 06/06/25 0424    hydrALAZINE (Apresoline) injection 10 mg  10 mg Q4HRS PRN Héctor Jarquin M.D.        ondansetron (Zofran) syringe/vial injection 4 mg  4 mg Q4HRS PRN Héctor Jarquin M.D.        promethazine (Phenergan) suppository 12.5-25 mg  12.5-25 mg Q4HRS PRN Héctor Jarquin M.D.        prochlorperazine (Compazine) injection 5-10 mg  5-10 mg Q4HRS PRN Héctor Jarquin M.D.        [Held by provider] tamsulosin (Flomax) capsule 0.4 mg  0.4 mg AFTER BREAKFAST Héctor Jarquin M.D.   0.4 mg at 06/07/25 0755    hydrocortisone 1 % cream   BID Gordon Brambila M.D.   Given at 06/10/25 0621   Last reviewed on 5/29/2025  3:51 AM by Olga Ronquillo PhT       Review of Systems:  Review of Systems   Unable to perform ROS: Acuity of condition         Vital signs:  Weight/BMI: Body mass index is 52.06 kg/m².  /64   Pulse (!) 124   Temp 37.3 °C (99.1 °F) (Temporal)   Resp (!) 32   Ht 1.6 m (5' 3\")   Wt (!) 133 kg (293 lb 14 oz)   SpO2 96%   Vitals:    06/10/25 0500 06/10/25 0600 06/10/25 0800 06/10/25 1000   BP: 107/53 115/57 101/52 104/64   Pulse: (!) 124 (!) 120 (!) 120 (!) 124   Resp: (!) 38 (!) 28 (!) 27 (!) 32   Temp:   37.3 °C (99.1 °F)    TempSrc:   Temporal    SpO2: 94% 94% 89% 96%   Weight:       Height:         Oxygen Therapy:  Pulse Oximetry: 96 %, O2 (LPM): 10, O2 Delivery Device: Non-Rebreather Mask    Intake/Output Summary (Last 24 hours) at 6/10/2025 1140  Last data filed at 6/10/2025 0800  Gross per 24 hour   Intake 828.04 ml   Output 900 ml   Net -71.96 ml       Physical Exam  Vitals and nursing note reviewed. "   Constitutional:       General: He is in acute distress.      Appearance: He is morbidly obese. He is ill-appearing.   HENT:      Head: Normocephalic and atraumatic.      Nose: Nose normal. No congestion.      Mouth/Throat:      Mouth: Mucous membranes are dry.      Pharynx: Oropharynx is clear.      Comments: NG tube in place  Eyes:      General: Scleral icterus present.   Neck:      Comments: Obese  Cardiovascular:      Rate and Rhythm: Regular rhythm. Tachycardia present.      Pulses: Normal pulses.      Heart sounds: Normal heart sounds.   Pulmonary:      Breath sounds: Stridor present. Wheezing (Audible expiratory wheezes) present.   Abdominal:      General: There is distension.      Tenderness: There is abdominal tenderness.   Musculoskeletal:         General: Swelling present.      Right lower leg: Edema present.      Left lower leg: Edema present.   Skin:     General: Skin is warm.      Capillary Refill: Capillary refill takes less than 2 seconds.      Coloration: Skin is jaundiced.   Neurological:      Motor: Weakness present.      Comments: Obtunded   Psychiatric:      Comments: Unable to assess due to mental status change     Labs:  Recent Labs     06/08/25  0211 06/09/25  0051 06/10/25  0326   SODIUM 131* 135 138   POTASSIUM 5.1 4.7 4.7   CHLORIDE 102 102 101   CO2 14* 19* 19*   BUN 53* 61* 81*   CREATININE 2.74* 2.49* 3.24*   MAGNESIUM 2.2  --   --    PHOSPHORUS 4.7*  --   --    CALCIUM 8.9 9.2 9.8     Recent Labs     06/08/25  0211 06/08/25  0230 06/09/25  0051 06/10/25  0326   ALTSGPT 37  --  31 27   ASTSGOT 69*  --  43 36   ALKPHOSPHAT 154*  --  130* 114*   TBILIRUBIN 10.0*  --  10.1* 9.2*   DBILIRUBIN  --  6.5*  --   --    GLUCOSE 168*  --  247* 226*     Recent Labs     06/08/25  0211 06/08/25  0230 06/09/25  0051 06/10/25  0326   WBC  --  12.5* 15.0* 14.2*   ASTSGOT 69*  --  43 36   ALTSGPT 37  --  31 27   ALKPHOSPHAT 154*  --  130* 114*   TBILIRUBIN 10.0*  --  10.1* 9.2*     Recent Labs      06/08/25  0230 06/08/25  0931 06/09/25  0051 06/10/25  0326   RBC 2.60*  --  2.21* 2.08*   HEMOGLOBIN 8.5*  --  7.4* 7.1*   HEMATOCRIT 25.5*  --  22.6* 21.1*   PLATELETCT 212  --  202 230   PROTHROMBTM  --  17.1*  --   --    INR  --  1.39*  --   --      Recent Results (from the past 24 hours)   Comp Metabolic Panel    Collection Time: 06/10/25  3:26 AM   Result Value Ref Range    Sodium 138 135 - 145 mmol/L    Potassium 4.7 3.6 - 5.5 mmol/L    Chloride 101 96 - 112 mmol/L    Co2 19 (L) 20 - 33 mmol/L    Anion Gap 18.0 (H) 7.0 - 16.0    Glucose 226 (H) 65 - 99 mg/dL    Bun 81 (H) 8 - 22 mg/dL    Creatinine 3.24 (H) 0.50 - 1.40 mg/dL    Calcium 9.8 8.5 - 10.5 mg/dL    Correct Calcium 9.2 8.5 - 10.5 mg/dL    AST(SGOT) 36 12 - 45 U/L    ALT(SGPT) 27 2 - 50 U/L    Alkaline Phosphatase 114 (H) 30 - 99 U/L    Total Bilirubin 9.2 (H) 0.1 - 1.5 mg/dL    Albumin 4.8 3.2 - 4.9 g/dL    Total Protein 7.8 6.0 - 8.2 g/dL    Globulin 3.0 1.9 - 3.5 g/dL    A-G Ratio 1.6 g/dL   CBC WITHOUT DIFFERENTIAL    Collection Time: 06/10/25  3:26 AM   Result Value Ref Range    WBC 14.2 (H) 4.8 - 10.8 K/uL    RBC 2.08 (L) 4.70 - 6.10 M/uL    Hemoglobin 7.1 (L) 14.0 - 18.0 g/dL    Hematocrit 21.1 (L) 42.0 - 52.0 %    .4 (H) 81.4 - 97.8 fL    MCH 34.1 (H) 27.0 - 33.0 pg    MCHC 33.6 32.3 - 36.5 g/dL    RDW 70.5 (H) 35.9 - 50.0 fL    Platelet Count 230 164 - 446 K/uL    MPV 9.5 9.0 - 12.9 fL   ESTIMATED GFR    Collection Time: 06/10/25  3:26 AM   Result Value Ref Range    GFR (CKD-EPI) 24 (A) >60 mL/min/1.73 m 2       Radiology Review:  DX-ABDOMEN FOR TUBE PLACEMENT   Final Result      Enteric tube extends into the stomach or proximal duodenum      DX-ABDOMEN FOR TUBE PLACEMENT   Final Result         1.  Nonspecific bowel gas pattern in the upper abdomen.   2.  Nasogastric tube with side-port at the gastroesophageal junction, recommend advancement.   3.  Pulmonary edema and/or infiltrate   4.  Cardiomegaly      DX-ABDOMEN FOR TUBE PLACEMENT    Final Result         1.  Nonspecific bowel gas pattern in the upper abdomen.   2.  Nasogastric tube coiled back at the site port with tip overlying the left upper quadrant.   3.  Hazy left lower lobe infiltrates and/or infiltrates.      DX-CHEST-LIMITED (1 VIEW)   Final Result         1.  Pulmonary edema and/or infiltrates are identified, which are stable since the prior exam.   2.  Cardiomegaly      [id number] VIEW   Final Result         1. Mild colonic ileus type colonic distention.                     DX-CHEST-PORTABLE (1 VIEW)   Final Result      1.  Consolidation within the left lung consistent with pneumonia.      2.  Cardiomegaly with interstitial opacities.      DX-CHEST-PORTABLE (1 VIEW)   Final Result      Cardiomegaly. Hypoinflation. No definite new abnormality.      US-ABDOMEN LTD (SOFT TISSUE)   Final Result      No ascites.         MR-ABDOMEN-WITH & W/O   Final Result      1.  The tail of the pancreas appears somewhat irregular and stranding with increased signal but evaluation is very limited due to significant motion artifact. This could relate to sequela of prior pancreatitis. A mass cannot be excluded. Follow-up in 6    months with MR after improvement is recommended.   2.  There is an accessory splenule adjacent to the spleen   3. Diffuse subcutaneous edema .                        EC-ECHOCARDIOGRAM COMPLETE W/ CONT   Final Result      US-EXTREMITY VENOUS LOWER BILAT   Final Result      US-RUQ   Final Result         1.  Thickened gallbladder wall without visualized shadowing stones, consider acalculous cholecystitis, could be further evaluated with HIDA scan as clinically appropriate   2.  Hepatomegaly   3.  Echogenic liver, compatible with fatty change versus fibrosis      CT-PANCREAS AND ABDOMEN WITH & W/O   Final Result         1.  Masslike structure adjacent to the tail of pancreas with possible slight enhancement on arterial phase imaging, recommend follow-up MRI of the pancreas with  contrast for more definitive characterization.   2.  Ovoid mass in the splenic hilum with similar density to spleen on all contrast phases, appearance favoring splenule.   3.  Changes of cirrhosis.   4.  Hepatomegaly          MDM (Data Review):   -Records reviewed and summarized in current documentation  -I personally reviewed and interpreted the laboratory results  -I personally reviewed the radiology images    Assessment/Recommendations:  Alcohol use disorder with multiple admissions related to alcohol, DUI, no long term sobriety  Clinically significant portal hypertension with worsening hepatic encephalopathy  Alcoholic liver disease with changes of cirrhosis on CT  Abdominal pain  Diffuse subcutaneous edema - cirrhosis vs nephrotic syndrome/acute kidney failure vs nutritional deficiency vs steroid use vs ?lymphatic obstruction due to malignancy  Previous thyroid studies normal, echo with grossly normal LV function, unable to visualize RV   Hepatorenal syndrome with urine sodium less than 20  Transaminitis and hyperbilirubinemia, direct greater than indirect  Hypoalbuminemia  Thrombocytopenia  Mild coagulopathy  Hypervolemic hyponatremia  Macrocytic anemia  Vitamin D deficiency  Previous HBV, HCV negative, AMA neg, OTF and f actin neg, ferritin normal  CT Chest/abd/pelvis findings concerning for pancreatic mass, MRI with limited evaluation, consider sequela of prior pancreatitis versus mass, recommend repeat imaging in 6 months. CA 19-9 541 in April 2025  Vitamin D deficiency    Current MELD 3.0 = 30  Child Menard 9 points - class B  Maddrey's 27.5    Recommendations:  Currently on Lasix drip  Emphasized that he must be having significant bowel movements to manage his encephalopathy.  Currently on 45 mL 4 times daily lactulose, rifaximin.  Will add twice daily MiraLAX until reliably stooling and then will down titrate  Unfortunately not a dialysis candidate, continue HRS treatment with octreotide, albumin 1 mg/kg  per day, and midodrine.  Ceftriaxone, azithromycin completed  24 hour urine cortisol pending, on stress steroids  No ascites on RUQ US  Monitor for signs of bleeding, downtrending hemoglobin  High-dose thiamine supplementation, folic acid, consider vitamin D    Unfortunately given his repeated admissions for alcohol related liver decompensation despite counseling to maintain sobriety and refrain from drinking, he is not a candidate for liver transplant at this time.  Unfortunately patient does remain critically ill and this was discussed with family.  They are requesting that we continue to try everything to help Alberto.  Multiple family members coming in from [place], should be arriving tomorrow.  Poor prognosis-palliative care involved and pending family meeting tomorrow evening.    GI will continue to follow    Discussed with Dr. Arechiga at IDT rounds, Dr. Camacho.    ..Celina Tolentino, [email or web address].[email or web address].    Core Quality Measures   Reviewed items::  Labs, Medications and Radiology reports reviewed

## 2025-06-10 NOTE — PROGRESS NOTES
Nephrology Daily Progress Note    Date of Service  6/10/2025    Chief Complaint  39 y.o. male ETOH liver cirrhosis, admitted 5/28/2025 severe volume overloaded,anasarca, consulted for EDIN    Interval Problem Update  6/6/25 -no improvement in edema  Creat level worse  Holding diuretics  Continue albumin  BP well controlled  To monitor UOP closely  6/7 -no acute events  Placed burton catheter -hematuria likely traumatic  Creat level slightly better  6/8 -AMS, worsening ammonia  In resp distress  Wheezes  Creat level at 2.7:s same  Worsening metabolic acidosis  6/10 patient still encephalopathic  Worsening respiratory failure, now is on facemask  Review of Systems  Review of Systems   Unable to perform ROS: Mental acuity        Physical Exam  Temp:  [36.3 °C (97.3 °F)-37.7 °C (99.8 °F)] 37.7 °C (99.8 °F)  Pulse:  [112-128] 124  Resp:  [20-45] 45  BP: (101-185)/() 107/48  SpO2:  [89 %-98 %] 98 %    Physical Exam  Vitals and nursing note reviewed.   Constitutional:       General: He is awake.      Appearance: He is ill-appearing.      Interventions: Face mask in place.   HENT:      Head: Normocephalic and atraumatic.      Right Ear: External ear normal.      Left Ear: External ear normal.      Nose: Nose normal.      Mouth/Throat:      Pharynx: No oropharyngeal exudate or posterior oropharyngeal erythema.   Eyes:      General:         Right eye: No discharge.         Left eye: No discharge.      Conjunctiva/sclera: Conjunctivae normal.   Cardiovascular:      Rate and Rhythm: Normal rate and regular rhythm.   Pulmonary:      Effort: Respiratory distress present.      Breath sounds: Wheezing present.   Abdominal:      General: Abdomen is flat. Bowel sounds are normal.   Musculoskeletal:         General: No tenderness.      Cervical back: No rigidity. No muscular tenderness.      Right lower leg: Edema present.      Left lower leg: Edema present.   Skin:     General: Skin is warm and dry.      Coloration: Skin is not  jaundiced.      Findings: No lesion or rash.   Neurological:      General: No focal deficit present.      Mental Status: He is oriented to person, place, and time. Mental status is at baseline.   Psychiatric:         Mood and Affect: Mood normal.         Behavior: Behavior normal.         Thought Content: Thought content normal.         Fluids    Intake/Output Summary (Last 24 hours) at 6/10/2025 1248  Last data filed at 6/10/2025 0800  Gross per 24 hour   Intake 828.04 ml   Output 900 ml   Net -71.96 ml       Laboratory  Recent Labs     06/08/25  0230 06/09/25  0051 06/10/25  0326   WBC 12.5* 15.0* 14.2*   RBC 2.60* 2.21* 2.08*   HEMOGLOBIN 8.5* 7.4* 7.1*   HEMATOCRIT 25.5* 22.6* 21.1*   MCV 98.1* 102.3* 101.4*   MCH 32.7 33.5* 34.1*   MCHC 33.3 32.7 33.6   RDW 65.9* 69.0* 70.5*   PLATELETCT 212 202 230   MPV 9.2 9.0 9.5     Recent Labs     06/08/25  0211 06/09/25  0051 06/10/25  0326   SODIUM 131* 135 138   POTASSIUM 5.1 4.7 4.7   CHLORIDE 102 102 101   CO2 14* 19* 19*   GLUCOSE 168* 247* 226*   BUN 53* 61* 81*   CREATININE 2.74* 2.49* 3.24*   CALCIUM 8.9 9.2 9.8     Recent Labs     06/08/25  0931   INR 1.39*     Recent Labs     06/08/25  0211   NTPROBNP 2718*           Imaging  DX-ABDOMEN FOR TUBE PLACEMENT   Final Result      Enteric tube extends into the stomach or proximal duodenum      DX-ABDOMEN FOR TUBE PLACEMENT   Final Result         1.  Nonspecific bowel gas pattern in the upper abdomen.   2.  Nasogastric tube with side-port at the gastroesophageal junction, recommend advancement.   3.  Pulmonary edema and/or infiltrate   4.  Cardiomegaly      DX-ABDOMEN FOR TUBE PLACEMENT   Final Result         1.  Nonspecific bowel gas pattern in the upper abdomen.   2.  Nasogastric tube coiled back at the site port with tip overlying the left upper quadrant.   3.  Hazy left lower lobe infiltrates and/or infiltrates.      DX-CHEST-LIMITED (1 VIEW)   Final Result         1.  Pulmonary edema and/or infiltrates are  identified, which are stable since the prior exam.   2.  Cardiomegaly      [id number] VIEW   Final Result         1. Mild colonic ileus type colonic distention.                     DX-CHEST-PORTABLE (1 VIEW)   Final Result      1.  Consolidation within the left lung consistent with pneumonia.      2.  Cardiomegaly with interstitial opacities.      DX-CHEST-PORTABLE (1 VIEW)   Final Result      Cardiomegaly. Hypoinflation. No definite new abnormality.      US-ABDOMEN LTD (SOFT TISSUE)   Final Result      No ascites.         MR-ABDOMEN-WITH & W/O   Final Result      1.  The tail of the pancreas appears somewhat irregular and stranding with increased signal but evaluation is very limited due to significant motion artifact. This could relate to sequela of prior pancreatitis. A mass cannot be excluded. Follow-up in 6    months with MR after improvement is recommended.   2.  There is an accessory splenule adjacent to the spleen   3. Diffuse subcutaneous edema .                        EC-ECHOCARDIOGRAM COMPLETE W/ CONT   Final Result      US-EXTREMITY VENOUS LOWER BILAT   Final Result      US-RUQ   Final Result         1.  Thickened gallbladder wall without visualized shadowing stones, consider acalculous cholecystitis, could be further evaluated with HIDA scan as clinically appropriate   2.  Hepatomegaly   3.  Echogenic liver, compatible with fatty change versus fibrosis      CT-PANCREAS AND ABDOMEN WITH & W/O   Final Result         1.  Masslike structure adjacent to the tail of pancreas with possible slight enhancement on arterial phase imaging, recommend follow-up MRI of the pancreas with contrast for more definitive characterization.   2.  Ovoid mass in the splenic hilum with similar density to spleen on all contrast phases, appearance favoring splenule.   3.  Changes of cirrhosis.   4.  Hepatomegaly           Assessment/Plan     The patient is a 39-year-old male with a history of   chronic alcohol abuse, liver  cirrhosis, alcohol related, admitted with   worsening liver function tests, severely edematous, developed acute kidney   injury.    1.  Acute kidney injury due to HRS, creatinine is worse today  2.  Mild hyponatremia:better.  3.  Anemia.   4.  Hypertension: Blood pressure remains well controlled.    5.  Volume: overloaded  6.  Pancreatic mass with elevated Ca 19-9 -consider diagnostic biopsy  7.  leukocytosis  RECOMMENDATIONS:   no acute need for HD, patient is not a good candidate  High-dose diuretics  Renal diet  Daily BMP, CBC.  Renal dose all meds  Avoid nephrotoxins like NSAIDs.  Multisystem organ failure  Prognosis poor  Recommend to transition to quality based treatment

## 2025-06-10 NOTE — CARE PLAN
The patient is Unstable - High likelihood or risk of patient condition declining or worsening    Shift Goals  Clinical Goals: Monitor Airway, improve mentation, hemodynamic stability  Patient Goals: DEBBI  Family Goals: To rest    Progress made toward(s) clinical / shift goals:    Problem: Pain - Standard  Goal: Alleviation of pain or a reduction in pain to the patient’s comfort goal  Outcome: Progressing     Problem: Skin Integrity  Goal: Skin integrity is maintained or improved  Outcome: Progressing     Problem: Hemodynamics  Goal: Patient's hemodynamics, fluid balance and neurologic status will be stable or improve  Outcome: Progressing       Patient is not progressing towards the following goals:

## 2025-06-10 NOTE — THERAPY
Speech Language Therapy Contact Note    Patient Name: Alberto Maldonado  Age:  39 y.o., Sex:  male  Medical Record #: [id number]  Today's Date: 6/10/2025       06/10/25 0801   Treatment Variance   Reason For Missed Therapy Medical - Other (Please Comment)   Interdisciplinary Plan of Care Collaboration   IDT Collaboration with  Nursing   Collaboration Comments SLP treatment attempted. Per RN, patient not following commands. Pending goals of care discussion. Now has NG. Not appropraite for swallow evaluation at this time. Will hold and follow for appropriate timing of treatment.       Yudy Nicholson, SLP

## 2025-06-10 NOTE — PROGRESS NOTES
This RN flushed patient's BMS and noticed moderate bright red blood coming from anus. This RN removed BMS. Notified MD Ordaz. This RN also noticed darkening of urine from burton catheter. Notified MD Ordaz as well.

## 2025-06-11 VITALS
SYSTOLIC BLOOD PRESSURE: 164 MMHG | OXYGEN SATURATION: 61 % | DIASTOLIC BLOOD PRESSURE: 114 MMHG | HEART RATE: 126 BPM | RESPIRATION RATE: 40 BRPM | BODY MASS INDEX: 50.47 KG/M2 | TEMPERATURE: 99.7 F | WEIGHT: 284.83 LBS | HEIGHT: 63 IN

## 2025-06-11 LAB
ANNOTATION COMMENT IMP: NORMAL
COLLECT DURATION TIME SPEC: 24 HR
CORTIS F 24H UR HPLC-MCNC: 28.8 UG/L
CORTIS F 24H UR-MRATE: 17.3 UG/D
CORTIS F/CREAT 24H UR: 11.12 UG/G CRT
CREAT 24H UR-MCNC: 259 MG/DL
SPECIMEN VOL ?TM UR: 600 ML

## 2025-06-11 ASSESSMENT — FIBROSIS 4 INDEX: FIB4 SCORE: 1.11

## 2025-06-11 NOTE — CARE PLAN
The patient is Unstable - High likelihood or risk of patient condition declining or worsening    Shift Goals  Clinical Goals: Airway stability, neuro improvement, hemodynamic stability, excrete ammonia, skin protection  Patient Goals: DEBBI  Family Goals: DEBBI    Progress made toward(s) clinical / shift goals:    Problem: Pain - Standard  Goal: Alleviation of pain or a reduction in pain to the patient’s comfort goal  6/10/2025 1824 by Josué Matson, R.N.  Outcome: Progressing  6/10/2025 1823 by Josué Matson R.N.  Outcome: Progressing     Problem: Knowledge Deficit - Standard  Goal: Patient and family/care givers will demonstrate understanding of plan of care, disease process/condition, diagnostic tests and medications  Outcome: Progressing     Problem: Seizure Precautions  Goal: Implementation of seizure precautions  Outcome: Progressing     Problem: Psychosocial  Goal: Patient's level of anxiety will decrease  Outcome: Progressing       Patient is not progressing towards the following goals:      Problem: Lifestyle Changes  Goal: Patient's ability to identify lifestyle changes and available resources to help reduce recurrence of condition will improve  6/10/2025 1824 by Josué Matson, R.N.  Outcome: Not Progressing  6/10/2025 1823 by Josué Matson, R.N.  Outcome: Progressing     Problem: Risk for Aspiration  Goal: Patient's risk for aspiration will be absent or decrease  6/10/2025 1824 by Josué Matson, R.N.  Outcome: Not Progressing  6/10/2025 1823 by Josué Matson, R.N.  Outcome: Progressing     Problem: Skin Integrity  Goal: Skin integrity is maintained or improved  6/10/2025 1824 by Josué Matson, R.N.  Outcome: Not Progressing  6/10/2025 1823 by Josué Matson, R.N.  Outcome: Progressing     Problem: Fall Risk  Goal: Patient will remain free from falls  6/10/2025 1824 by Josué Matson, R.N.  Outcome: Not Progressing  6/10/2025 1823 by Josué Matson, R.N.  Outcome: Progressing     Problem: Hemodynamics  Goal: Patient's hemodynamics,  fluid balance and neurologic status will be stable or improve  6/10/2025 1824 by Josué Matson R.N.  Outcome: Not Progressing  6/10/2025 1823 by Josué Matson R.N.  Outcome: Progressing

## 2025-06-11 NOTE — PROGRESS NOTES
Interval summary:    Patient increased work of breathing requiring transition from 3LNC to 15L NRB across shift. Severe secretions requiring NT and ororpharyngeal suction around every 5 minutes by the end of shift. Unable to clean patient d/t severe desaturations when turned; patient still on dirty pads

## 2025-06-11 NOTE — PROGRESS NOTES
Paged by IMCU RN regarding hemodynamic assessment, bedside evaluation.    Patient is 39-year-old male with history of alcohol abuse, alcoholic cirrhosis, diabetes, hypertension, BOLIVAR, morbid obesity.  Patient has been declining clinically, worsening respiratory failure.  Patient was being treated for hepatorenal syndrome with decompensated cirrhosis --seen by gastroenterology, nephrology, palliative care.  Patient transitioned to DNR.    Patient has brother who resides in [place], who is on his way to see if patient tomorrow evening.  However, due to worsening altered mental status, despite 15 L nonrebreather, copious secretions and now saturating 62%.  Patient seen and evaluated by me at bedside.    Discussed with patient's immediate family members --5 family members present at bedside.  Explained to family regarding patient's poor prognosis given obvious respiratory distress, hypoxemia.  Essentially multiple endorgan damage-liver, kidneys, pulmonary, CNS --overall very poor prognosis.  We discussed regarding transitioning to comfort care with no escalation in care.  After discussing with family members at bedside, family agreeable for patient to transition to comfort care.  Comfort care measures order set applied. ACP: 20minutes

## 2025-06-11 NOTE — DISCHARGE SUMMARY
Death Summary    Cause of Death  Hepatorenal syndrome due to alcoholic cirrhosis due to alcohol abuse.    Comorbid Conditions at the Time of Death  Principal Problem:    Hepatorenal syndrome with acute kidney injury (HCC) (POA: Yes)  Active Problems:    Alcoholic liver hepatitis and cirrhosis (POA: Yes)    Anasarca (POA: Unknown)    Essential hypertension (Chronic) (POA: Yes)    Morbid obesity (HCC) (Chronic) (POA: Yes)    Alcohol use disorder, severe, dependence (HCC) (POA: Yes)    Pneumonia (POA: Yes)      Overview: 2.24.2024      Mat Dominguez,      We have reviewed the results :            Chest x-ray showed changes suggestive of pneumonia      Recommendation: Please start taking antibiotic Azithromycin as given from       the urgent care on 02.23.2024 .        And be sure to followup with Pulmonologist                  Chest x-ray also showed mildly heart enlargement.      We had the order an echocardiogram [ultrasound of the heart ] for further       evaluation.        Please call [facility] [phone] to schedule the appointment.        Please follow-up with Dr. Pacheco after echocardiogram done.    BOLIVAR on CPAP (Chronic) (POA: Yes)    Intraabdominal mass (POA: Yes)    Anxiety (POA: Yes)    Hyponatremia (POA: Unknown)    Vitamin D deficiency (POA: Unknown)    Hepatic encephalopathy (HCC) (POA: Yes)    Coagulopathy (HCC) (POA: Yes)  Resolved Problems:    Asthma exacerbation (POA: Unknown)      History of Presenting Illness and Hospital Course  This is a 39 y.o. male admitted 5/28/2025 with hepatorenal syndrome  Mr. River is a 38 yo man with alcoholic cirrhosis, ongoing alcohol use, DM, HTN, BOLIVAR, morbid obesity who presented with worsening swelling.  He continues to drink 9-10 shots every day.  He was found with worsening liver function, elevated ammonia.  Patient was previously also hospitalized and CTAP at that time showed a pancreatic tail lesion and was recommended to have outpatient CT pancreatic  protocol.  CT was done in the ER which showed a mass in the pancreatic tail, ovoid mass in the splenic hilum.  MRI showed increased signal in tail of pancreas, prior pancreatitis but mass was not excluded.  He will need a follow-up MRI in 6 months.  GI was consulted and he is not a candidate for further steroid treatment for alcohol hepatitis.  He was started on diuretics for his edema.  Abdominal ultrasound was negative for ascites.  Patient reported he drinks alcohol to treat his anxiety, psychiatry was consulted and patient started on gabapentin.  Psychotherapy is following the patient.     Despite aggressive IV diuresis patient's weight is going up. Worsening renal function and nephrology was consulted.  Started on treatment with octreotide, midodrine, albumin for hepatorenal syndrome and GI has been reconsulted.     6/8, rapid response was called due to worsening mentation and respiratory status  Patient was noted to be more lethargic, labored breathing.  ABG obtained which showed a pH of 7.3.  Chest x-ray was obtained which showed a new right-sided pneumonia, patient started on Rocephin.  Patient was also noted to have significant wheezing, deemed to be asthma exacerbation and started on Solu-Medrol.  Because of this, patient was upgraded to the [facility]  Nephrology consulted and did not recommend hemodialysis.   He was treated with an octreotide infusion as well as Lasix infusion and did not make sufficient urine.   He became volume overloaded requiring substantial increase oxygen and developed oral bleeding.  I met with family members and made them aware that he had a terminal condition.    Death Date: 06/11/25   Death Time: 0030         Pronounced By (RN1): Jelly Her  Pronounced By (RN2): Tameka Fierro

## 2025-06-11 NOTE — PROGRESS NOTES
Pt desatted down to 59% during a turn.Pt was unable to recover, O2 sats 59-70% on 15L non-rebreather. Family was brought back in. RT at bedside assisting with suctioning. Provider and charge nurse were both notified. Pt continues to be bleeding from nose and mouth. Pt not a candidate for bipap and hi-flow due to neuro status and bleeding.     Pt transitioned to comfort care.

## 2025-06-11 NOTE — CARE PLAN
Problem: Bronchoconstriction  Goal: Improve in air movement and diminished wheezing  Description: Target End Date:  2 to 3 days1.  Implement inhaled treatments2.  Evaluate and manage medication effects  Outcome: Progressing     Duoneb Q4 hours
